# Patient Record
Sex: MALE | Race: BLACK OR AFRICAN AMERICAN | Employment: FULL TIME | URBAN - METROPOLITAN AREA
[De-identification: names, ages, dates, MRNs, and addresses within clinical notes are randomized per-mention and may not be internally consistent; named-entity substitution may affect disease eponyms.]

---

## 2017-02-09 ENCOUNTER — OP HISTORICAL/CONVERTED ENCOUNTER (OUTPATIENT)
Dept: OTHER | Age: 47
End: 2017-02-09

## 2017-07-15 ENCOUNTER — APPOINTMENT (OUTPATIENT)
Dept: GENERAL RADIOLOGY | Age: 47
End: 2017-07-15
Attending: PHYSICIAN ASSISTANT
Payer: COMMERCIAL

## 2017-07-15 ENCOUNTER — HOSPITAL ENCOUNTER (EMERGENCY)
Age: 47
Discharge: HOME | End: 2017-07-15
Attending: STUDENT IN AN ORGANIZED HEALTH CARE EDUCATION/TRAINING PROGRAM | Admitting: STUDENT IN AN ORGANIZED HEALTH CARE EDUCATION/TRAINING PROGRAM
Payer: COMMERCIAL

## 2017-07-15 VITALS
OXYGEN SATURATION: 97 % | DIASTOLIC BLOOD PRESSURE: 115 MMHG | WEIGHT: 245 LBS | HEIGHT: 71 IN | BODY MASS INDEX: 34.3 KG/M2 | SYSTOLIC BLOOD PRESSURE: 166 MMHG | HEART RATE: 97 BPM | RESPIRATION RATE: 16 BRPM | TEMPERATURE: 98.6 F

## 2017-07-15 DIAGNOSIS — M54.50 ACUTE BILATERAL LOW BACK PAIN WITHOUT SCIATICA: Primary | ICD-10-CM

## 2017-07-15 DIAGNOSIS — M51.36 DDD (DEGENERATIVE DISC DISEASE), LUMBAR: ICD-10-CM

## 2017-07-15 DIAGNOSIS — M41.9 SCOLIOSIS, UNSPECIFIED SCOLIOSIS TYPE, UNSPECIFIED SPINAL REGION: ICD-10-CM

## 2017-07-15 PROCEDURE — 72100 X-RAY EXAM L-S SPINE 2/3 VWS: CPT

## 2017-07-15 PROCEDURE — 99282 EMERGENCY DEPT VISIT SF MDM: CPT

## 2017-07-15 RX ORDER — TRAMADOL HYDROCHLORIDE 50 MG/1
50 TABLET ORAL
Qty: 15 TAB | Refills: 0 | Status: SHIPPED | OUTPATIENT
Start: 2017-07-15 | End: 2019-01-28

## 2017-07-15 RX ORDER — METHOCARBAMOL 500 MG/1
500 TABLET, FILM COATED ORAL 3 TIMES DAILY
Qty: 15 TAB | Refills: 0 | Status: SHIPPED | OUTPATIENT
Start: 2017-07-15 | End: 2019-01-28

## 2017-07-15 NOTE — ED PROVIDER NOTES
HPI Comments: 41year old male presenting ot the ED for back pain. Pt reports that July 3rd of last year he had an episode of back pain that resolved with therapy and exercises. Pt notes that 2 days ago he had new onset again of back pain. States that yesterday he had errands to do and washed his car and felt okay but around 9:30PM last night was sitting in the chair at a barbershop for a while, after which he felt dodie the pain had intensified, was unable to sleep last night. Denies any injury or trauma. No heavy lifting. Pain is lower midline in location and somewhat radiates to the low back on both sides, L>R, explicitly worsened with movement, \"it spasms all the way across,\" 10/10, constant. Pain radiates \"a little bit\" into the right leg today. Occasional subjective weakness. Patient denies numbness in the legs, urinary retention, incontinence of bowel or bladder, perineal numbness, fever, or history of IV drug abuse. No long-term steroid use. Pt has been taking APAP with minimal relief. Pt has been instructed to avoid NSAIDs due to hx PCKD. PMHx: HTN, PCKD, CHF  PSx: denies  Social: non-smoker. Patient is a 41 y.o. male presenting with back pain. The history is provided by the patient. Back Pain    Pertinent negatives include no chest pain, no fever, no numbness, no abdominal pain and no dysuria. Past Medical History:   Diagnosis Date    Heart failure [facility])     CHF 2007    Hypertension        No past surgical history on file. No family history on file. Social History     Social History    Marital status: SINGLE     Spouse name: N/A    Number of children: N/A    Years of education: N/A     Occupational History    Not on file.      Social History Main Topics    Smoking status: Never Smoker    Smokeless tobacco: Not on file    Alcohol use Yes    Drug use: Not on file    Sexual activity: Not on file     Other Topics Concern    Not on file     Social History Narrative    No narrative on file         ALLERGIES: Shellfish containing products    Review of Systems   Constitutional: Negative for fever. Eyes: Negative for discharge. Respiratory: Negative for shortness of breath. Cardiovascular: Negative for chest pain. Gastrointestinal: Negative for abdominal pain and vomiting. Genitourinary: Negative for difficulty urinating and dysuria. Musculoskeletal: Positive for back pain. Negative for neck pain. Skin: Negative for rash. Neurological: Negative for numbness. All other systems reviewed and are negative. Vitals:    07/15/17 1057   BP: (!) 166/115   Pulse: 92   Resp: 16   Temp: 98.7 °F (37.1 °C)   SpO2: 97%   Weight: 111.1 kg (245 lb)   Height: 5' 11\" (1.803 m)            Physical Exam   Constitutional: He appears well-developed and well-nourished. No distress. Pleasant AA male   HENT:   Head: Normocephalic and atraumatic. Right Ear: External ear normal.   Left Ear: External ear normal.   Eyes: Conjunctivae are normal. Pupils are equal, round, and reactive to light. Right eye exhibits no discharge. Left eye exhibits no discharge. Neck: Normal range of motion. Neck supple. No C-spine midline tenderness   Cardiovascular: Normal rate, regular rhythm, normal heart sounds and intact distal pulses. Exam reveals no gallop and no friction rub. No murmur heard. Pulmonary/Chest: Effort normal and breath sounds normal. No respiratory distress. Abdominal: Soft. He exhibits no distension. Musculoskeletal:   No CVA tenderness  + midline and bilateral paraspinal muscular tenderness in the lumbar region, L>R   Neurological: He is alert. He has normal strength. He is not disoriented. No sensory deficit. Reflex Scores:       Patellar reflexes are 2+ on the right side and 2+ on the left side. 5/5 strength at the ankles, knees, and hips  Sensation grossly intact distally   Skin: Skin is warm and dry. Psychiatric: He has a normal mood and affect. His behavior is normal.   Nursing note and vitals reviewed. MDM  Number of Diagnoses or Management Options  Diagnosis management comments: 41year old male presenting to the ED for 2 days of atraumatic back pain, hx same last year which resolved with PT. Some occasional pain into the right thigh, no clear radicular symptoms. Good strength, reflexes on exam, no red flag symptoms. XR remarkable for scoliosis and DDD. Discussed with pt use of short course pain medicine, tylenol, muscle relaxer (cannot take NSAIDs), spine f/u and return precautions given.  reviewed, 3 Rx in the last year, 0 in the last 5 months.        Amount and/or Complexity of Data Reviewed  Tests in the radiology section of CPT®: ordered and reviewed  Obtain history from someone other than the patient: yes ()  Discuss the patient with other providers: yes (Dr. Dania Blanco, ED attending)      ED Course       Procedures

## 2017-07-15 NOTE — DISCHARGE INSTRUCTIONS
We hope that we have addressed all of your medical concerns. The examination and treatment you received in the Emergency Department were for an emergent problem and were not intended as complete care. It is important that you follow up with your healthcare provider(s) for ongoing care. If your symptoms worsen or do not improve as expected, and you are unable to reach your usual health care provider(s), you should return to the Emergency Department. Today's healthcare is undergoing tremendous change, and patient satisfaction surveys are one of the many tools to assess the quality of medical care. You may receive a survey from the [facility] regarding your experience in the Emergency Department. I hope that your experience has been completely positive, particularly the medical care that I provided. As such, please participate in the survey; anything less than excellent does not meet my expectations or intentions. [facility] and [address] [place] [address] participate in nationally recognized quality of care measures. If your blood pressure is greater than 120/80, as reported below, we urge that you seek medical care to address the potential of high blood pressure, commonly known as hypertension. Hypertension can be hereditary or can be caused by certain medical conditions, pain, stress, or \"white coat syndrome. \"       Please make an appointment with your health care provider(s) for follow up of your Emergency Department visit. VITALS:   Patient Vitals for the past 8 hrs:   Temp Pulse Resp BP SpO2   07/15/17 1057 98.7 °F (37.1 °C) 92 16 (!) 166/115 97 %          Thank you for allowing us to provide you with medical care today. We realize that you have many choices for your emergency care needs. Please choose us in the future for any continued health care needs. Giancarlo Mireles, [address] [facility]. Office: [phone]            No results found for this or any previous visit (from the past 24 hour(s)). Xr Spine Lumb 2 Or 3 V    Result Date: 7/15/2017  EXAM:  XR SPINE LUMB 2 OR 3 V INDICATION: Back pain for 2 days, no trauma. COMPARISON: None. FINDINGS: AP, lateral and spot lateral views of the lumbar spine demonstrate normal alignment with mild levoconvex scoliosis. There are degenerative disc changes with disc space narrowing and sclerosis at L3-L4. The vertebral body heights and remaining disc spaces are preserved. There is no fracture, subluxation or other acute abnormality. IMPRESSION: Mild levoconvex lumbar scoliosis and L3-L4 degenerative disc disease. No fracture or acute abnormality. Learning About How to Have a Healthy Back  What causes back pain? Back pain is often caused by overuse, strain, or injury. For example, people often hurt their backs playing sports or working in the yard, being jolted in a car accident, or lifting something too heavy. Aging plays a part too. Your bones and muscles tend to lose strength as you age, which makes injury more likely. The spongy discs between the bones of the spine (vertebrae) may suffer from wear and tear and no longer provide enough cushion between the bones. A disc that bulges or breaks open (herniated disc) can press on nerves, causing back pain. In some people, back pain is the result of arthritis, broken vertebrae caused by bone loss (osteoporosis), illness, or a spine problem. Although most people have back pain at one time or another, there are steps you can take to make it less likely. How can you have a healthy back? Reduce stress on your back through good posture  Slumping or slouching alone may not cause low back pain. But after the back has been strained or injured, bad posture can make pain worse. · Sleep in a position that maintains your back's normal curves and on a mattress that feels comfortable.  Sleep on your side with a pillow between your knees, or sleep on your back with a pillow under your knees. These positions can reduce strain on your back. · Stand and sit up straight. \"Good posture\" generally means your ears, shoulders, and hips are in a straight line. · If you must stand for a long time, put one foot on a stool, ledge, or box. Switch feet every now and then. · Sit in a chair that is low enough to let you place both feet flat on the floor with both knees nearly level with your hips. If your chair or desk is too high, use a footrest to raise your knees. Place a small pillow, a rolled-up towel, or a lumbar roll in the curve of your back if you need extra support. · Try a kneeling chair, which helps tilt your hips forward. This takes pressure off your lower back. · Try sitting on an exercise ball. It can rock from side to side, which helps keep your back loose. · When driving, keep your knees nearly level with your hips. Sit straight, and drive with both hands on the steering wheel. Your arms should be in a slightly bent position. Reduce stress on your back through careful lifting  · Squat down, bending at the hips and knees only. If you need to, put one knee to the floor and extend your other knee in front of you, bent at a right angle (half kneeling). · Press your chest straight forward. This helps keep your upper back straight while keeping a slight arch in your low back. · Hold the load as close to your body as possible, at the level of your belly button (navel). · Use your feet to change direction, taking small steps. · Lead with your hips as you change direction. Keep your shoulders in line with your hips as you move. · Set down your load carefully, squatting with your knees and hips only. Exercise and stretch your back  · Do some exercise on most days of the week, if your doctor says it is okay. You can walk, run, swim, or cycle. · Stretch your back muscles.  Here are a few exercises to try:  Madi Cutting on your back, and gently pull one bent knee to your chest. Put that foot back on the floor, and then pull the other knee to your chest.  ¨ Do pelvic tilts. Lie on your back with your knees bent. Tighten your stomach muscles. Pull your belly button (navel) in and up toward your ribs. You should feel like your back is pressing to the floor and your hips and pelvis are slightly lifting off the floor. Hold for 6 seconds while breathing smoothly. ¨ Sit with your back flat against a wall. · Keep your core muscles strong. The muscles of your back, belly (abdomen), and buttocks support your spine. ¨ Pull in your belly and imagine pulling your navel toward your spine. Hold this for 6 seconds, then relax. Remember to keep breathing normally as you tense your muscles. ¨ Do curl-ups. Always do them with your knees bent. Keep your low back on the floor, and curl your shoulders toward your knees using a smooth, slow motion. Keep your arms folded across your chest. If this bothers your neck, try putting your hands behind your neck (not your head), with your elbows spread apart. ¨ Lie on your back with your knees bent and your feet flat on the floor. Tighten your belly muscles, and then push with your feet and raise your buttocks up a few inches. Hold this position 6 seconds as you continue to breathe normally, then lower yourself slowly to the floor. Repeat 8 to 12 times. ¨ If you like group exercise, try Pilates or yoga. These classes have poses that strengthen the core muscles. Lead a healthy lifestyle  · Stay at a healthy weight to avoid strain on your back. · Do not smoke. Smoking increases the risk of osteoporosis, which weakens the spine. If you need help quitting, talk to your doctor about stop-smoking programs and medicines. These can increase your chances of quitting for good. Where can you learn more? Go to http://donald-pedro.info/.   Enter L315 in the search box to learn more about \"Learning About How to Have a Healthy Back. \"  Current as of: March 21, 2017  Content Version: 11.3  © 1395-3950 [employer]. Care instructions adapted under license by [employer] (which disclaims liability or warranty for this information). If you have questions about a medical condition or this instruction, always ask your healthcare professional. [address] any warranty or liability for your use of this information. Back Pain, Emergency or Urgent Symptoms: Care Instructions  Your Care Instructions  Many people have back pain at one time or another. In most cases, pain gets better with self-care that includes over-the-counter pain medicine, ice, heat, and exercises. Unless you have symptoms of a severe injury or heart attack, you may be able to give yourself a few days before you call a doctor. But some back problems are very serious. Do not ignore symptoms that need to be checked right away. Follow-up care is a key part of your treatment and safety. Be sure to make and go to all appointments, and call your doctor if you are having problems. It's also a good idea to know your test results and keep a list of the medicines you take. How can you care for yourself at home? · Sit or lie in positions that are most comfortable and that reduce your pain. Try one of these positions when you lie down:  ¨ Lie on your back with your knees bent and supported by large pillows. ¨ Lie on the floor with your legs on the seat of a sofa or chair. Blima Naegeli on your side with your knees and hips bent and a pillow between your legs. ¨ Lie on your stomach if it does not make pain worse. · Do not sit up in bed, and avoid soft couches and twisted positions. Bed rest can help relieve pain at first, but it delays healing. Avoid bed rest after the first day. · Change positions every 30 minutes. If you must sit for long periods of time, take breaks from sitting.  Get up and walk around, or lie flat. · Try using a heating pad on a low or medium setting, for 15 to 20 minutes every 2 or 3 hours. Try a warm shower in place of one session with the heating pad. You can also buy single-use heat wraps that last up to 8 hours. You can also try ice or cold packs on your back for 10 to 20 minutes at a time, several times a day. (Put a thin cloth between the ice pack and your skin.) This reduces pain and makes it easier to be active and exercise. · Take pain medicines exactly as directed. ¨ If the doctor gave you a prescription medicine for pain, take it as prescribed. ¨ If you are not taking a prescription pain medicine, ask your doctor if you can take an over-the-counter medicine. When should you call for help? Call 911 anytime you think you may need emergency care. For example, call if:  · You are unable to move a leg at all. · You have back pain with severe belly pain. · You have symptoms of a heart attack. These may include:  ¨ Chest pain or pressure, or a strange feeling in the chest.  ¨ Sweating. ¨ Shortness of breath. ¨ Nausea or vomiting. ¨ Pain, pressure, or a strange feeling in the back, neck, jaw, or upper belly or in one or both shoulders or arms. ¨ Lightheadedness or sudden weakness. ¨ A fast or irregular heartbeat. After you call 911, the  may tell you to chew 1 adult-strength or 2 to 4 low-dose aspirin. Wait for an ambulance. Do not try to drive yourself. Call your doctor now or seek immediate medical care if:  · You have new or worse symptoms in your arms, legs, chest, belly, or buttocks. Symptoms may include:  ¨ Numbness or tingling. ¨ Weakness. ¨ Pain. · You lose bladder or bowel control. · You have back pain and:  ¨ You have injured your back while lifting or doing some other activity. Call if the pain is severe, has not gone away after 1 or 2 days, and you cannot do your normal daily activities.   ¨ You have had a back injury before that needed treatment. ¨ Your pain has lasted longer than 4 weeks. ¨ You have had weight loss you cannot explain. ¨ You are age 48 or older. ¨ You have cancer now or have had it before. Watch closely for changes in your health, and be sure to contact your doctor if you are not getting better as expected. Where can you learn more? Go to http://donald-pedro.info/. Enter F174 in the search box to learn more about \"Back Pain, Emergency or Urgent Symptoms: Care Instructions. \"  Current as of: March 20, 2017  Content Version: 11.3  © 4999-8270 [employer]. Care instructions adapted under license by [employer] (which disclaims liability or warranty for this information). If you have questions about a medical condition or this instruction, always ask your healthcare professional. [address] any warranty or liability for your use of this information.

## 2017-07-15 NOTE — ED TRIAGE NOTES
Pt reports lower back pain that began Thursday night. Pt denies fall or injury. Pt has HX of back pain that began 1 year ago. Pt has been to PT with good results.

## 2018-08-18 ENCOUNTER — HOSPITAL ENCOUNTER (EMERGENCY)
Age: 48
Discharge: HOME | End: 2018-08-18
Attending: EMERGENCY MEDICINE
Payer: COMMERCIAL

## 2018-08-18 ENCOUNTER — APPOINTMENT (OUTPATIENT)
Dept: GENERAL RADIOLOGY | Age: 48
End: 2018-08-18
Attending: PHYSICIAN ASSISTANT
Payer: COMMERCIAL

## 2018-08-18 VITALS
HEIGHT: 71 IN | HEART RATE: 101 BPM | SYSTOLIC BLOOD PRESSURE: 133 MMHG | OXYGEN SATURATION: 97 % | TEMPERATURE: 98.6 F | BODY MASS INDEX: 32.2 KG/M2 | DIASTOLIC BLOOD PRESSURE: 96 MMHG | RESPIRATION RATE: 16 BRPM | WEIGHT: 230 LBS

## 2018-08-18 DIAGNOSIS — M54.5 ACUTE BILATERAL LOW BACK PAIN, WITH SCIATICA PRESENCE UNSPECIFIED: Primary | ICD-10-CM

## 2018-08-18 PROCEDURE — 74011636637 HC RX REV CODE- 636/637: Performed by: PHYSICIAN ASSISTANT

## 2018-08-18 PROCEDURE — 72100 X-RAY EXAM L-S SPINE 2/3 VWS: CPT

## 2018-08-18 PROCEDURE — 74011250637 HC RX REV CODE- 250/637: Performed by: PHYSICIAN ASSISTANT

## 2018-08-18 PROCEDURE — 96372 THER/PROPH/DIAG INJ SC/IM: CPT

## 2018-08-18 PROCEDURE — 74011250636 HC RX REV CODE- 250/636: Performed by: PHYSICIAN ASSISTANT

## 2018-08-18 PROCEDURE — 99283 EMERGENCY DEPT VISIT LOW MDM: CPT

## 2018-08-18 RX ORDER — OXYCODONE AND ACETAMINOPHEN 5; 325 MG/1; MG/1
1 TABLET ORAL
Status: COMPLETED | OUTPATIENT
Start: 2018-08-18 | End: 2018-08-18

## 2018-08-18 RX ORDER — PREDNISONE 20 MG/1
60 TABLET ORAL ONCE
Status: COMPLETED | OUTPATIENT
Start: 2018-08-18 | End: 2018-08-18

## 2018-08-18 RX ORDER — PREDNISONE 50 MG/1
50 TABLET ORAL DAILY
Qty: 4 TAB | Refills: 0 | Status: SHIPPED | OUTPATIENT
Start: 2018-08-18 | End: 2018-08-22

## 2018-08-18 RX ORDER — KETOROLAC TROMETHAMINE 30 MG/ML
60 INJECTION, SOLUTION INTRAMUSCULAR; INTRAVENOUS
Status: COMPLETED | OUTPATIENT
Start: 2018-08-18 | End: 2018-08-18

## 2018-08-18 RX ORDER — DICLOFENAC POTASSIUM 50 MG/1
50 TABLET, FILM COATED ORAL 3 TIMES DAILY
Qty: 15 TAB | Refills: 0 | Status: SHIPPED | OUTPATIENT
Start: 2018-08-18 | End: 2019-01-28

## 2018-08-18 RX ORDER — DIAZEPAM 5 MG/1
5 TABLET ORAL
Status: COMPLETED | OUTPATIENT
Start: 2018-08-18 | End: 2018-08-18

## 2018-08-18 RX ORDER — CYCLOBENZAPRINE HCL 10 MG
10 TABLET ORAL
Qty: 15 TAB | Refills: 0 | Status: SHIPPED | OUTPATIENT
Start: 2018-08-18 | End: 2019-01-28

## 2018-08-18 RX ADMIN — KETOROLAC TROMETHAMINE 60 MG: 30 INJECTION, SOLUTION INTRAMUSCULAR at 14:40

## 2018-08-18 RX ADMIN — DIAZEPAM 5 MG: 5 TABLET ORAL at 14:40

## 2018-08-18 RX ADMIN — PREDNISONE 60 MG: 20 TABLET ORAL at 14:40

## 2018-08-18 RX ADMIN — OXYCODONE HYDROCHLORIDE AND ACETAMINOPHEN 1 TABLET: 5; 325 TABLET ORAL at 14:40

## 2018-08-18 NOTE — LETTER
Sheela. Andrea 57 
[address] [place], [place] [address] [id number] 
[phone] Work/School Note Date: 8/18/2018 To Whom It May concern: Yessy Watters was seen and treated today in the emergency room by the following provider(s): 
Attending Provider: Bailee Pagan MD 
Physician Assistant: Teresa Ross. Yessy Watters may return to work on 08/20/2018.  
 
 
 
 
Sincerely, 
 
 
 
 
Shyam Gaspar RN

## 2018-08-18 NOTE — ED NOTES
1:58 PM  I have evaluated the patient as the Provider in Triage. I have reviewed His vital signs and the triage nurse assessment. I have talked with the patient and any available family and advised that I am the provider in triage and have ordered the appropriate study to initiate their work up based on the clinical presentation during my assessment. I have advised that the patient will be accommodated in the Main ED as soon as possible. I have also requested to contact the triage nurse or myself immediately if the patient experiences any changes in their condition during this brief waiting period. One week of low back pain with spasms.  R>>L. Some radiation with intermittent numbness in the left leg. Patient denies weakness  in the legs, urinary retention, incontinence of bowel or bladder, perineal numbness, fever,  or history of IV drug abuse. No long-term steroid use.       Levell Vinnie, PA

## 2018-08-18 NOTE — ED PROVIDER NOTES
HPI Comments: 57year old male hx HTN, CHF presenting for back pain. Pt reports issues with the same in the past.  Reports about 5 days of low back pain, atraumatic. Reports that pain is located across the lower back, R>L, explicitly worsened with movement - notes that he tries to avoid lying down because getting up is too painful. Notes that pain sometimes radiates down the right leg with intermittent numbness in the left leg. Patient denies weakness  in the legs, urinary retention, incontinence of bowel or bladder, perineal numbness, fever, or history of IV drug abuse. No long-term steroid use. Has tried various OTC meds with no relief. PMHx: as above  Social: non-smoker    Patient is a 57 y.o. male presenting with back pain. The history is provided by the patient. Back Pain    Associated symptoms include numbness. Pertinent negatives include no chest pain and no fever. Past Medical History:   Diagnosis Date    Heart failure [facility])     CHF 2007    Hypertension        History reviewed. No pertinent surgical history. History reviewed. No pertinent family history. Social History     Social History    Marital status: SINGLE     Spouse name: N/A    Number of children: N/A    Years of education: N/A     Occupational History    Not on file. Social History Main Topics    Smoking status: Never Smoker    Smokeless tobacco: Not on file    Alcohol use Yes    Drug use: Not on file    Sexual activity: Not on file     Other Topics Concern    Not on file     Social History Narrative         ALLERGIES: Shellfish containing products    Review of Systems   Constitutional: Negative for fever. Eyes: Negative for discharge. Respiratory: Negative for shortness of breath. Cardiovascular: Negative for chest pain. Gastrointestinal: Negative for diarrhea and vomiting. Genitourinary: Negative for difficulty urinating and flank pain. Musculoskeletal: Positive for back pain.  Negative for joint swelling. Skin: Negative for wound. Neurological: Positive for numbness. Negative for syncope. All other systems reviewed and are negative. Vitals:    08/18/18 1400 08/18/18 1545   BP: (!) 133/92 (!) 133/96   Pulse: (!) 107 (!) 101   Resp: 16 16   Temp: 98.9 °F (37.2 °C) 98.6 °F (37 °C)   SpO2: 97% 97%   Weight: 104.3 kg (230 lb)    Height: 5' 11\" (1.803 m)             Physical Exam   Constitutional: He appears well-developed and well-nourished. No distress. Pleasant AA male, appears uncomfortable with movement   HENT:   Head: Normocephalic and atraumatic. Right Ear: External ear normal.   Left Ear: External ear normal.   Eyes: Conjunctivae are normal. Pupils are equal, round, and reactive to light. Right eye exhibits no discharge. Left eye exhibits no discharge. Neck: Normal range of motion. Neck supple. No C-spine midline tenderness   Cardiovascular: Normal rate, regular rhythm, normal heart sounds and intact distal pulses. Exam reveals no gallop and no friction rub. No murmur heard. Pulmonary/Chest: Effort normal and breath sounds normal. No respiratory distress. Abdominal: Soft. He exhibits no distension. There is no tenderness. Musculoskeletal:   No CVA tenderness  Diffuse lumbar TTP without focal bony TTP   Neurological: He is alert. He has normal strength. He is not disoriented. No sensory deficit. Reflex Scores:       Patellar reflexes are 2+ on the right side and 2+ on the left side. 5/5 strength at the ankles, knees, and hips  Sensation grossly intact distally  Observed patient ambulate with steady gait   Skin: Skin is warm and dry. Psychiatric: He has a normal mood and affect. His behavior is normal.   Nursing note and vitals reviewed. MDM  Number of Diagnoses or Management Options  Acute bilateral low back pain, with sciatica presence unspecified:   Diagnosis management comments: 57year old male presenting for low back pain, mechanical in nature.   Some radicular symptoms, no bilateral weakness/numbness, good strength, reflexes, distal pulses. Improved with medications in the ED. Moderately severe DDD on XR. Discussed use of medications at home, spine f/u, return precautions given. Amount and/or Complexity of Data Reviewed  Tests in the radiology section of CPT®: ordered and reviewed  Discuss the patient with other providers: yes (Dr. Juju Dobbs, ED attending)          ED Course       Procedures         Pt sleeping comfortably. Notes pain improved. Discussed results, care at home, need for spine f/u. Pt voiced understanding.   Kathie Osler, PA  3:40 PM

## 2018-08-18 NOTE — DISCHARGE INSTRUCTIONS
Back Pain: Care Instructions  Your Care Instructions    Back pain has many possible causes. It is often related to problems with muscles and ligaments of the back. It may also be related to problems with the nerves, discs, or bones of the back. Moving, lifting, standing, sitting, or sleeping in an awkward way can strain the back. Sometimes you don't notice the injury until later. Arthritis is another common cause of back pain. Although it may hurt a lot, back pain usually improves on its own within several weeks. Most people recover in 12 weeks or less. Using good home treatment and being careful not to stress your back can help you feel better sooner. Follow-up care is a key part of your treatment and safety. Be sure to make and go to all appointments, and call your doctor if you are having problems. It's also a good idea to know your test results and keep a list of the medicines you take. How can you care for yourself at home? · Sit or lie in positions that are most comfortable and reduce your pain. Try one of these positions when you lie down:  ¨ Lie on your back with your knees bent and supported by large pillows. ¨ Lie on the floor with your legs on the seat of a sofa or chair. [place] on your side with your knees and hips bent and a pillow between your legs. ¨ Lie on your stomach if it does not make pain worse. · Do not sit up in bed, and avoid soft couches and twisted positions. Bed rest can help relieve pain at first, but it delays healing. Avoid bed rest after the first day of back pain. · Change positions every 30 minutes. If you must sit for long periods of time, take breaks from sitting. Get up and walk around, or lie in a comfortable position. · Try using a heating pad on a low or medium setting for 15 to 20 minutes every 2 or 3 hours. Try a warm shower in place of one session with the heating pad. · You can also try an ice pack for 10 to 15 minutes every 2 to 3 hours.  Put a thin cloth between the ice pack and your skin. · Take pain medicines exactly as directed. ¨ If the doctor gave you a prescription medicine for pain, take it as prescribed. ¨ If you are not taking a prescription pain medicine, ask your doctor if you can take an over-the-counter medicine. · Take short walks several times a day. You can start with 5 to 10 minutes, 3 or 4 times a day, and work up to longer walks. Walk on level surfaces and avoid hills and stairs until your back is better. · Return to work and other activities as soon as you can. Continued rest without activity is usually not good for your back. · To prevent future back pain, do exercises to stretch and strengthen your back and stomach. Learn how to use good posture, safe lifting techniques, and proper body mechanics. When should you call for help? Call your doctor now or seek immediate medical care if:    · You have new or worsening numbness in your legs.     · You have new or worsening weakness in your legs. (This could make it hard to stand up.)     · You lose control of your bladder or bowels.    Watch closely for changes in your health, and be sure to contact your doctor if:    · You have a fever, lose weight, or don't feel well.     · You do not get better as expected. Where can you learn more? Go to http://donald-pedro.info/. Enter U571 in the search box to learn more about \"Back Pain: Care Instructions. \"  Current as of: November 29, 2017  Content Version: 11.7  © 7465-1186 [employer]. Care instructions adapted under license by [employer] (which disclaims liability or warranty for this information). If you have questions about a medical condition or this instruction, always ask your healthcare professional. [place] [address] any warranty or liability for your use of this information.          Back Pain, Emergency or Urgent Symptoms: Care Instructions  Your Care Instructions    Many people have back pain at one time or another. In most cases, pain gets better with self-care that includes over-the-counter pain medicine, ice, heat, and exercises. Unless you have symptoms of a severe injury or heart attack, you may be able to give yourself a few days before you call a doctor. But some back problems are very serious. Do not ignore symptoms that need to be checked right away. Follow-up care is a key part of your treatment and safety. Be sure to make and go to all appointments, and call your doctor if you are having problems. It's also a good idea to know your test results and keep a list of the medicines you take. How can you care for yourself at home? · Sit or lie in positions that are most comfortable and that reduce your pain. Try one of these positions when you lie down:  ¨ Lie on your back with your knees bent and supported by large pillows. ¨ Lie on the floor with your legs on the seat of a sofa or chair. Javier Grist on your side with your knees and hips bent and a pillow between your legs. ¨ Lie on your stomach if it does not make pain worse. · Do not sit up in bed, and avoid soft couches and twisted positions. Bed rest can help relieve pain at first, but it delays healing. Avoid bed rest after the first day. · Change positions every 30 minutes. If you must sit for long periods of time, take breaks from sitting. Get up and walk around, or lie flat. · Try using a heating pad on a low or medium setting, for 15 to 20 minutes every 2 or 3 hours. Try a warm shower in place of one session with the heating pad. You can also buy single-use heat wraps that last up to 8 hours. You can also try ice or cold packs on your back for 10 to 20 minutes at a time, several times a day. (Put a thin cloth between the ice pack and your skin.) This reduces pain and makes it easier to be active and exercise. · Take pain medicines exactly as directed.   ¨ If the doctor gave you a prescription medicine for pain, take it as prescribed. ¨ If you are not taking a prescription pain medicine, ask your doctor if you can take an over-the-counter medicine. When should you call for help? Call 911 anytime you think you may need emergency care. For example, call if:    · You are unable to move a leg at all.     · You have back pain with severe belly pain.     · You have symptoms of a heart attack. These may include:  ¨ Chest pain or pressure, or a strange feeling in the chest.  ¨ Sweating. ¨ Shortness of breath. ¨ Nausea or vomiting. ¨ Pain, pressure, or a strange feeling in the back, neck, jaw, or upper belly or in one or both shoulders or arms. ¨ Lightheadedness or sudden weakness. ¨ A fast or irregular heartbeat. After you call 911, the  may tell you to chew 1 adult-strength or 2 to 4 low-dose aspirin. Wait for an ambulance. Do not try to drive yourself.    Call your doctor now or seek immediate medical care if:    · You have new or worse symptoms in your arms, legs, chest, belly, or buttocks. Symptoms may include:  ¨ Numbness or tingling. ¨ Weakness. ¨ Pain.     · You lose bladder or bowel control.     · You have back pain and:  ¨ You have injured your back while lifting or doing some other activity. Call if the pain is severe, has not gone away after 1 or 2 days, and you cannot do your normal daily activities. ¨ You have had a back injury before that needed treatment. ¨ Your pain has lasted longer than 4 weeks. ¨ You have had weight loss you cannot explain. ¨ You have a fever. ¨ You are age 48 or older. ¨ You have cancer now or have had it before.    Watch closely for changes in your health, and be sure to contact your doctor if you are not getting better as expected. Where can you learn more? Go to http://donald-pedro.info/. Enter Z373 in the search box to learn more about \"Back Pain, Emergency or Urgent Symptoms: Care Instructions. \"  Current as of: November 20, 2017  Content Version: 11.7  © 8489-8935 [employer]. Care instructions adapted under license by [employer] (which disclaims liability or warranty for this information). If you have questions about a medical condition or this instruction, always ask your healthcare professional. [address] any warranty or liability for your use of this information.

## 2018-11-06 ENCOUNTER — OP HISTORICAL/CONVERTED ENCOUNTER (OUTPATIENT)
Dept: OTHER | Age: 48
End: 2018-11-06

## 2019-01-28 ENCOUNTER — HOSPITAL ENCOUNTER (EMERGENCY)
Age: 49
Discharge: HOME | End: 2019-01-28
Attending: EMERGENCY MEDICINE
Payer: COMMERCIAL

## 2019-01-28 ENCOUNTER — APPOINTMENT (OUTPATIENT)
Dept: CT IMAGING | Age: 49
End: 2019-01-28
Attending: EMERGENCY MEDICINE
Payer: COMMERCIAL

## 2019-01-28 VITALS
WEIGHT: 230 LBS | BODY MASS INDEX: 32.2 KG/M2 | HEIGHT: 71 IN | HEART RATE: 84 BPM | TEMPERATURE: 98 F | SYSTOLIC BLOOD PRESSURE: 189 MMHG | RESPIRATION RATE: 16 BRPM | OXYGEN SATURATION: 98 % | DIASTOLIC BLOOD PRESSURE: 127 MMHG

## 2019-01-28 DIAGNOSIS — I10 HYPERTENSION, UNSPECIFIED TYPE: ICD-10-CM

## 2019-01-28 DIAGNOSIS — Q61.3 POLYCYSTIC KIDNEY DISEASE: Primary | ICD-10-CM

## 2019-01-28 DIAGNOSIS — M54.6 ACUTE LEFT-SIDED THORACIC BACK PAIN: ICD-10-CM

## 2019-01-28 LAB
ALBUMIN SERPL-MCNC: 3.3 G/DL (ref 3.5–5)
ALBUMIN/GLOB SERPL: 0.8 {RATIO} (ref 1.1–2.2)
ALP SERPL-CCNC: 55 U/L (ref 45–117)
ALT SERPL-CCNC: 22 U/L (ref 12–78)
ANION GAP SERPL CALC-SCNC: 8 MMOL/L (ref 5–15)
APPEARANCE UR: CLEAR
AST SERPL-CCNC: 21 U/L (ref 15–37)
BACTERIA URNS QL MICRO: NEGATIVE /HPF
BASOPHILS # BLD: 0 K/UL (ref 0–0.1)
BASOPHILS NFR BLD: 0 % (ref 0–1)
BILIRUB SERPL-MCNC: 0.5 MG/DL (ref 0.2–1)
BILIRUB UR QL: NEGATIVE
BUN SERPL-MCNC: 37 MG/DL (ref 6–20)
BUN/CREAT SERPL: 13 (ref 12–20)
CALCIUM SERPL-MCNC: 9 MG/DL (ref 8.5–10.1)
CHLORIDE SERPL-SCNC: 107 MMOL/L (ref 97–108)
CO2 SERPL-SCNC: 26 MMOL/L (ref 21–32)
COLOR UR: ABNORMAL
CREAT SERPL-MCNC: 2.8 MG/DL (ref 0.7–1.3)
DIFFERENTIAL METHOD BLD: ABNORMAL
EOSINOPHIL # BLD: 0.2 K/UL (ref 0–0.4)
EOSINOPHIL NFR BLD: 2 % (ref 0–7)
EPITH CASTS URNS QL MICRO: ABNORMAL /LPF
ERYTHROCYTE [DISTWIDTH] IN BLOOD BY AUTOMATED COUNT: 15.1 % (ref 11.5–14.5)
GLOBULIN SER CALC-MCNC: 4.4 G/DL (ref 2–4)
GLUCOSE SERPL-MCNC: 108 MG/DL (ref 65–100)
GLUCOSE UR STRIP.AUTO-MCNC: NEGATIVE MG/DL
HCT VFR BLD AUTO: 34.8 % (ref 36.6–50.3)
HGB BLD-MCNC: 10.9 G/DL (ref 12.1–17)
HGB UR QL STRIP: ABNORMAL
HYALINE CASTS URNS QL MICRO: ABNORMAL /LPF (ref 0–5)
IMM GRANULOCYTES # BLD AUTO: 0 K/UL (ref 0–0.04)
IMM GRANULOCYTES NFR BLD AUTO: 0 % (ref 0–0.5)
KETONES UR QL STRIP.AUTO: NEGATIVE MG/DL
LEUKOCYTE ESTERASE UR QL STRIP.AUTO: NEGATIVE
LIPASE SERPL-CCNC: 228 U/L (ref 73–393)
LYMPHOCYTES # BLD: 1.1 K/UL (ref 0.8–3.5)
LYMPHOCYTES NFR BLD: 12 % (ref 12–49)
MCH RBC QN AUTO: 29.1 PG (ref 26–34)
MCHC RBC AUTO-ENTMCNC: 31.3 G/DL (ref 30–36.5)
MCV RBC AUTO: 92.8 FL (ref 80–99)
MONOCYTES # BLD: 0.7 K/UL (ref 0–1)
MONOCYTES NFR BLD: 8 % (ref 5–13)
NEUTS SEG # BLD: 7 K/UL (ref 1.8–8)
NEUTS SEG NFR BLD: 78 % (ref 32–75)
NITRITE UR QL STRIP.AUTO: NEGATIVE
NRBC # BLD: 0 K/UL (ref 0–0.01)
NRBC BLD-RTO: 0 PER 100 WBC
PH UR STRIP: 6 [PH] (ref 5–8)
PLATELET # BLD AUTO: 267 K/UL (ref 150–400)
PMV BLD AUTO: 10.5 FL (ref 8.9–12.9)
POTASSIUM SERPL-SCNC: 3.4 MMOL/L (ref 3.5–5.1)
PROT SERPL-MCNC: 7.7 G/DL (ref 6.4–8.2)
PROT UR STRIP-MCNC: 100 MG/DL
RBC # BLD AUTO: 3.75 M/UL (ref 4.1–5.7)
RBC #/AREA URNS HPF: ABNORMAL /HPF (ref 0–5)
RBC MORPH BLD: ABNORMAL
SODIUM SERPL-SCNC: 141 MMOL/L (ref 136–145)
SP GR UR REFRACTOMETRY: 1.01 (ref 1–1.03)
UROBILINOGEN UR QL STRIP.AUTO: 0.2 EU/DL (ref 0.2–1)
WBC # BLD AUTO: 9 K/UL (ref 4.1–11.1)
WBC URNS QL MICRO: ABNORMAL /HPF (ref 0–4)

## 2019-01-28 PROCEDURE — 74011250636 HC RX REV CODE- 250/636: Performed by: EMERGENCY MEDICINE

## 2019-01-28 PROCEDURE — 74011250637 HC RX REV CODE- 250/637: Performed by: EMERGENCY MEDICINE

## 2019-01-28 PROCEDURE — 80053 COMPREHEN METABOLIC PANEL: CPT

## 2019-01-28 PROCEDURE — 85025 COMPLETE CBC W/AUTO DIFF WBC: CPT

## 2019-01-28 PROCEDURE — 81001 URINALYSIS AUTO W/SCOPE: CPT

## 2019-01-28 PROCEDURE — 74176 CT ABD & PELVIS W/O CONTRAST: CPT

## 2019-01-28 PROCEDURE — 83690 ASSAY OF LIPASE: CPT

## 2019-01-28 PROCEDURE — 99283 EMERGENCY DEPT VISIT LOW MDM: CPT

## 2019-01-28 PROCEDURE — 96360 HYDRATION IV INFUSION INIT: CPT

## 2019-01-28 RX ORDER — HYDROCODONE BITARTRATE AND ACETAMINOPHEN 5; 325 MG/1; MG/1
1 TABLET ORAL
Status: COMPLETED | OUTPATIENT
Start: 2019-01-28 | End: 2019-01-28

## 2019-01-28 RX ORDER — TRAMADOL HYDROCHLORIDE 50 MG/1
50 TABLET ORAL
Qty: 12 TAB | Refills: 0 | Status: SHIPPED | OUTPATIENT
Start: 2019-01-28 | End: 2019-06-13

## 2019-01-28 RX ADMIN — SODIUM CHLORIDE 500 ML: 900 INJECTION, SOLUTION INTRAVENOUS at 12:41

## 2019-01-28 RX ADMIN — HYDROCODONE BITARTRATE AND ACETAMINOPHEN 1 TABLET: 5; 325 TABLET ORAL at 12:03

## 2019-01-28 NOTE — ED NOTES
Patient given discharge instructions and verbalized understanding.  Pt ambulatory out of ER with steady gait

## 2019-01-28 NOTE — ED PROVIDER NOTES
Pt w hx of polycystic kidney disease presenting with flank pain. Has had worsening Cr over the last few years actively being managed by his urologist.      The history is provided by the patient. Abdominal Pain    This is a new problem. The current episode started 12 to 24 hours ago. The problem occurs constantly. The problem has not changed since onset. The pain is associated with lifting. Pain location: left flank radiating to the front. The pain is at a severity of 9/10. Associated symptoms include back pain. Pertinent negatives include no fever, no diarrhea, no nausea, no vomiting, no constipation, no dysuria (recently completed a course of abx for a UTI), no frequency and no chest pain. Nothing worsens the pain. The pain is relieved by nothing. Past medical history comments: polycycstic kidney disease, HTN. Past Medical History:   Diagnosis Date    Heart failure [facility])     CHF 2007    Hypertension        No past surgical history on file. No family history on file. Social History     Socioeconomic History    Marital status: SINGLE     Spouse name: Not on file    Number of children: Not on file    Years of education: Not on file    Highest education level: Not on file   Social Needs    Financial resource strain: Not on file    Food insecurity - worry: Not on file    Food insecurity - inability: Not on file    Transportation needs - medical: Not on file   [email or web address] needs - non-medical: Not on file   Occupational History    Not on file   Tobacco Use    Smoking status: Never Smoker   Substance and Sexual Activity    Alcohol use: Yes    Drug use: Not on file    Sexual activity: Not on file   Other Topics Concern    Not on file   Social History Narrative    Not on file         ALLERGIES: Shellfish containing products    Review of Systems   Constitutional: Negative for chills and fever. Respiratory: Negative for shortness of breath. Cardiovascular: Negative for chest pain. Gastrointestinal: Negative for abdominal pain, constipation, diarrhea, nausea and vomiting. Genitourinary: Negative for dysuria (recently completed a course of abx for a UTI) and frequency. Musculoskeletal: Positive for back pain. Neurological: Negative for dizziness and light-headedness. All other systems reviewed and are negative. Vitals:    01/28/19 1042 01/28/19 1150   BP:  (!) 182/116   Pulse: 87 80   Resp:  18   SpO2: 98% 99%   Weight:  104.3 kg (230 lb)   Height:  5' 11\" (1.803 m)            Physical Exam   Constitutional: He is oriented to person, place, and time. He appears well-developed and well-nourished. HENT:   Head: Normocephalic and atraumatic. Eyes: No scleral icterus. Neck: Normal range of motion. Cardiovascular: Normal rate and regular rhythm. Pulmonary/Chest: Effort normal and breath sounds normal.   Abdominal: He exhibits no distension. There is no tenderness. There is no CVA tenderness. Pilar-umbilical hernia, soft and easily reducible   Neurological: He is alert and oriented to person, place, and time. Skin: No rash noted. No erythema. Psychiatric: He has a normal mood and affect. His behavior is normal.   Nursing note and vitals reviewed. MDM  Number of Diagnoses or Management Options  Acute left-sided thoracic back pain: new and requires workup  Hypertension, unspecified type: established and worsening  Polycystic kidney disease: established and worsening  Diagnosis management comments: The patient is resting comfortably and feels better, is alert and in no distress. Cr is 2.8 today and was 2.4 last week, per patient report. HTN is chronic and patient is asymptomatic - no chest pain, shortness of breath, or neurological deficits. The repeat examination is unremarkable and benign; in particular, there is no discomfort at McBurney's point.  The history, exam, diagnostic testing, and current condition do not suggest acute appendicitis, bowel obstruction, incarcerated hernia, acute cholecystitis, bowel perforation, major gastrointestinal bleeding, severe diverticulitis, sepsis, or other significant pathology to warrant further testing, continued ED treatment, admission, or surgical evaluation at this point. The vital signs have been stable and are within normal limits at this time. The patient does not have uncontrollable pain, intractable vomiting, or other significant symptoms. The patient's condition is stable and appropriate for discharge. The patient will pursue further outpatient evaluation with the primary care physician or other designated or consulting physician as indicated in the discharge instructions. Procedures    The patient's results have been reviewed with them and/or available family. Patient and/or family verbally conveyed their understanding and agreement of the patient's signs, symptoms, diagnosis, treatment and prognosis and additionally agree to follow up as recommended in the discharge instructions or to return to the Emergency Room should their condition change prior to their follow-up appointment. The patient/family verbally agrees with the care-plan and verbally conveys that all of their questions have been answered. The discharge instructions have also been provided to the patient and/or family with some educational information regarding the patient's diagnosis as well a list of reasons why the patient would want to return to the ER prior to their follow-up appointment, should their condition change.

## 2019-01-28 NOTE — LETTER
[address] 
[address] [facility] [address] [id number] 
[phone] Work/School Note Date: 1/28/2019 To Whom It May concern: Danna Jenkins was seen and treated today in the emergency room by the following provider(s): 
Attending Provider: Arpit Mock MD. Danna Jenkins may return to work on 1/29/19. Sincerely, Sherry VillagomezkeMD isaias

## 2019-01-28 NOTE — ED TRIAGE NOTES
Pt presents with left sided back/flank pain that started yesterday morning. Pt states it is worse with movement. Denies N/V.  Pt states last Friday he was working with heavier boxes than normal and may have strained it

## 2019-04-01 ENCOUNTER — HOSPITAL ENCOUNTER (EMERGENCY)
Age: 49
Discharge: HOME | End: 2019-04-01
Attending: EMERGENCY MEDICINE
Payer: COMMERCIAL

## 2019-04-01 ENCOUNTER — APPOINTMENT (OUTPATIENT)
Dept: GENERAL RADIOLOGY | Age: 49
End: 2019-04-01
Attending: EMERGENCY MEDICINE
Payer: COMMERCIAL

## 2019-04-01 VITALS — RESPIRATION RATE: 16 BRPM | TEMPERATURE: 97.7 F | HEART RATE: 82 BPM

## 2019-04-01 DIAGNOSIS — M75.51 ACUTE SHOULDER BURSITIS, RIGHT: Primary | ICD-10-CM

## 2019-04-01 PROCEDURE — 74011250637 HC RX REV CODE- 250/637: Performed by: EMERGENCY MEDICINE

## 2019-04-01 PROCEDURE — 99283 EMERGENCY DEPT VISIT LOW MDM: CPT

## 2019-04-01 PROCEDURE — 73030 X-RAY EXAM OF SHOULDER: CPT

## 2019-04-01 PROCEDURE — A4565 SLINGS: HCPCS

## 2019-04-01 RX ORDER — OXYCODONE AND ACETAMINOPHEN 5; 325 MG/1; MG/1
1 TABLET ORAL
Qty: 12 TAB | Refills: 0 | Status: SHIPPED | OUTPATIENT
Start: 2019-04-01 | End: 2019-04-04

## 2019-04-01 RX ORDER — ONDANSETRON 4 MG/1
8 TABLET, ORALLY DISINTEGRATING ORAL
Status: COMPLETED | OUTPATIENT
Start: 2019-04-01 | End: 2019-04-01

## 2019-04-01 RX ORDER — OXYCODONE AND ACETAMINOPHEN 5; 325 MG/1; MG/1
2 TABLET ORAL
Status: COMPLETED | OUTPATIENT
Start: 2019-04-01 | End: 2019-04-01

## 2019-04-01 RX ORDER — PREDNISONE 20 MG/1
20 TABLET ORAL DAILY
Qty: 5 TAB | Refills: 0 | Status: SHIPPED | OUTPATIENT
Start: 2019-04-01 | End: 2019-04-06

## 2019-04-01 RX ADMIN — ONDANSETRON 8 MG: 4 TABLET, ORALLY DISINTEGRATING ORAL at 06:07

## 2019-04-01 RX ADMIN — OXYCODONE AND ACETAMINOPHEN 2 TABLET: 5; 325 TABLET ORAL at 06:07

## 2019-04-01 NOTE — LETTER
[address] 
[address] [facility] [address] 
[phone] Work/School Note Date: 4/1/2019 To Whom It May concern: Calvin Fernandez was seen and treated today in the emergency room by the following provider(s): 
Attending Provider: Jose Burgos MD. Calvin Fernandez may return to school on 4/3/2019. Sincerely, Osman Shultz RN

## 2019-04-01 NOTE — ED PROVIDER NOTES
HPI     54year old male with PCKD, htn, gouty arthritis, presents with severe right shoulder pain. Onset 3 days ago, progressively getting worse. Never had gout in his shoulder. Denies injury. Works as a , is right handed. Has chronic numbness in right hand from carpal tunnel    Past Medical History:   Diagnosis Date    Heart failure ([facility] [address]     CHF 2007    Hypertension     Polycystic kidney disease        History reviewed. No pertinent surgical history. History reviewed. No pertinent family history. Social History     Socioeconomic History    Marital status: SINGLE     Spouse name: Not on file    Number of children: Not on file    Years of education: Not on file    Highest education level: Not on file   Occupational History    Not on file   Social Needs    Financial resource strain: Not on file    Food insecurity:     Worry: Not on file     Inability: Not on file    Transportation needs:     Medical: Not on file     Non-medical: Not on file   Tobacco Use    Smoking status: Never Smoker    Smokeless tobacco: Never Used   Substance and Sexual Activity    Alcohol use:  Yes    Drug use: Yes     Types: Marijuana     Comment: last used 3 days ago     Sexual activity: Not on file   Lifestyle    Physical activity:     Days per week: Not on file     Minutes per session: Not on file    Stress: Not on file   Relationships    Social connections:     Talks on phone: Not on file     Gets together: Not on file     Attends Bahai service: Not on file     Active member of club or organization: Not on file     Attends meetings of clubs or organizations: Not on file     Relationship status: Not on file    Intimate partner violence:     Fear of current or ex partner: Not on file     Emotionally abused: Not on file     Physically abused: Not on file     Forced sexual activity: Not on file   Other Topics Concern    Not on file   Social History Narrative    Not on file         ALLERGIES: Shellfish containing products    Review of Systems   Constitutional: Negative for fever. HENT: Negative for congestion. Eyes: Negative for visual disturbance. Respiratory: Negative for cough and shortness of breath. Cardiovascular: Negative for chest pain. Gastrointestinal: Negative for abdominal pain, nausea and vomiting. Endocrine: Negative for polyuria. Genitourinary: Negative for dysuria. Musculoskeletal: Positive for arthralgias. Skin: Negative for rash. Neurological: Negative for headaches. Psychiatric/Behavioral: Negative for dysphoric mood. Vitals:    04/01/19 0541   Pulse: 82   Resp: 16   Temp: 97.7 °F (36.5 °C)            Physical Exam   Constitutional: He is oriented to person, place, and time. He appears well-developed and well-nourished. No distress. HENT:   Head: Normocephalic and atraumatic. Mouth/Throat: No oropharyngeal exudate. Eyes: Pupils are equal, round, and reactive to light. Right eye exhibits no discharge. Left eye exhibits no discharge. No scleral icterus. Neck: Normal range of motion. Neck supple. No JVD present. Cardiovascular: Normal rate, regular rhythm and normal heart sounds. No murmur heard. Pulmonary/Chest: Effort normal and breath sounds normal. No stridor. No respiratory distress. He has no wheezes. He has no rales. He exhibits no tenderness. Abdominal: Soft. Bowel sounds are normal. He exhibits no distension and no mass. There is no tenderness. There is no rebound and no guarding. Musculoskeletal: Normal range of motion. No gross deformity  No erythema or apparent edema  Painful passive ROM right shoulder   Neurological: He is oriented to person, place, and time. Skin: Skin is warm and dry. Capillary refill takes less than 2 seconds. No rash noted. Psychiatric: He has a normal mood and affect. His behavior is normal. Judgment and thought content normal.        MDM       Procedures         shoulder with ? Effusion.  Spoke with radiologist. Shoulder is not red or hot. Doubt gout or infection. Will treat with Percocet (CAn't take nsaids due to kidney disease) sling and steroids. Will follow up with his orthopedist. REturn if fever, red/hot joint.

## 2019-04-01 NOTE — ED TRIAGE NOTES
Patient arrives ambulatory from home with c/ R shoulder pain that started 3 days ago. Pain worsens with movement and use of arm. Pt denies injury. Pt chronic numbness in hand d/t carpal tunnel. Denies CP, SOB.

## 2019-06-01 ENCOUNTER — APPOINTMENT (OUTPATIENT)
Dept: CT IMAGING | Age: 49
DRG: 354 | End: 2019-06-01
Attending: EMERGENCY MEDICINE
Payer: COMMERCIAL

## 2019-06-01 ENCOUNTER — ANESTHESIA EVENT (OUTPATIENT)
Dept: SURGERY | Age: 49
DRG: 354 | End: 2019-06-01
Payer: COMMERCIAL

## 2019-06-01 ENCOUNTER — ANESTHESIA (OUTPATIENT)
Dept: SURGERY | Age: 49
DRG: 354 | End: 2019-06-01
Payer: COMMERCIAL

## 2019-06-01 ENCOUNTER — HOSPITAL ENCOUNTER (INPATIENT)
Age: 49
LOS: 4 days | Discharge: HOME | DRG: 354 | End: 2019-06-05
Attending: EMERGENCY MEDICINE | Admitting: SURGERY
Payer: COMMERCIAL

## 2019-06-01 DIAGNOSIS — K46.0 ABDOMINAL HERNIA WITH OBSTRUCTION AND WITHOUT GANGRENE, RECURRENCE NOT SPECIFIED, UNSPECIFIED HERNIA TYPE: ICD-10-CM

## 2019-06-01 DIAGNOSIS — K56.609 SMALL BOWEL OBSTRUCTION (HCC): Primary | ICD-10-CM

## 2019-06-01 PROBLEM — K43.6 INCARCERATED VENTRAL HERNIA: Status: ACTIVE | Noted: 2019-06-01

## 2019-06-01 LAB
ALBUMIN SERPL-MCNC: 3.2 G/DL (ref 3.5–5)
ALBUMIN/GLOB SERPL: 0.6 {RATIO} (ref 1.1–2.2)
ALP SERPL-CCNC: 53 U/L (ref 45–117)
ALT SERPL-CCNC: 24 U/L (ref 12–78)
ANION GAP SERPL CALC-SCNC: 9 MMOL/L (ref 5–15)
APPEARANCE UR: CLEAR
AST SERPL-CCNC: 13 U/L (ref 15–37)
BACTERIA URNS QL MICRO: NEGATIVE /HPF
BASOPHILS # BLD: 0 K/UL (ref 0–0.1)
BASOPHILS NFR BLD: 0 % (ref 0–1)
BILIRUB SERPL-MCNC: 0.3 MG/DL (ref 0.2–1)
BILIRUB UR QL: NEGATIVE
BUN SERPL-MCNC: 42 MG/DL (ref 6–20)
BUN/CREAT SERPL: 13 (ref 12–20)
CALCIUM SERPL-MCNC: 9.6 MG/DL (ref 8.5–10.1)
CHLORIDE SERPL-SCNC: 105 MMOL/L (ref 97–108)
CO2 SERPL-SCNC: 28 MMOL/L (ref 21–32)
COLOR UR: ABNORMAL
COMMENT, HOLDF: NORMAL
CREAT SERPL-MCNC: 3.3 MG/DL (ref 0.7–1.3)
DIFFERENTIAL METHOD BLD: ABNORMAL
EOSINOPHIL # BLD: 0 K/UL (ref 0–0.4)
EOSINOPHIL NFR BLD: 0 % (ref 0–7)
EPITH CASTS URNS QL MICRO: ABNORMAL /LPF
ERYTHROCYTE [DISTWIDTH] IN BLOOD BY AUTOMATED COUNT: 14.6 % (ref 11.5–14.5)
GLOBULIN SER CALC-MCNC: 5.1 G/DL (ref 2–4)
GLUCOSE SERPL-MCNC: 126 MG/DL (ref 65–100)
GLUCOSE UR STRIP.AUTO-MCNC: NEGATIVE MG/DL
HCT VFR BLD AUTO: 35.3 % (ref 36.6–50.3)
HGB BLD-MCNC: 11 G/DL (ref 12.1–17)
HGB UR QL STRIP: ABNORMAL
HYALINE CASTS URNS QL MICRO: ABNORMAL /LPF (ref 0–5)
IMM GRANULOCYTES # BLD AUTO: 0 K/UL (ref 0–0.04)
IMM GRANULOCYTES NFR BLD AUTO: 0 % (ref 0–0.5)
KETONES UR QL STRIP.AUTO: NEGATIVE MG/DL
LACTATE SERPL-SCNC: 1.6 MMOL/L (ref 0.4–2)
LEUKOCYTE ESTERASE UR QL STRIP.AUTO: NEGATIVE
LIPASE SERPL-CCNC: 411 U/L (ref 73–393)
LYMPHOCYTES # BLD: 1.2 K/UL (ref 0.8–3.5)
LYMPHOCYTES NFR BLD: 12 % (ref 12–49)
MCH RBC QN AUTO: 28.2 PG (ref 26–34)
MCHC RBC AUTO-ENTMCNC: 31.2 G/DL (ref 30–36.5)
MCV RBC AUTO: 90.5 FL (ref 80–99)
MONOCYTES # BLD: 1 K/UL (ref 0–1)
MONOCYTES NFR BLD: 10 % (ref 5–13)
NEUTS SEG # BLD: 7.4 K/UL (ref 1.8–8)
NEUTS SEG NFR BLD: 78 % (ref 32–75)
NITRITE UR QL STRIP.AUTO: NEGATIVE
NRBC # BLD: 0 K/UL (ref 0–0.01)
NRBC BLD-RTO: 0 PER 100 WBC
PH UR STRIP: 6 [PH] (ref 5–8)
PLATELET # BLD AUTO: 408 K/UL (ref 150–400)
PMV BLD AUTO: 10 FL (ref 8.9–12.9)
POTASSIUM SERPL-SCNC: 3.2 MMOL/L (ref 3.5–5.1)
PROT SERPL-MCNC: 8.3 G/DL (ref 6.4–8.2)
PROT UR STRIP-MCNC: 300 MG/DL
RBC # BLD AUTO: 3.9 M/UL (ref 4.1–5.7)
RBC #/AREA URNS HPF: ABNORMAL /HPF (ref 0–5)
SAMPLES BEING HELD,HOLD: NORMAL
SODIUM SERPL-SCNC: 142 MMOL/L (ref 136–145)
SP GR UR REFRACTOMETRY: 1.02 (ref 1–1.03)
UR CULT HOLD, URHOLD: NORMAL
UROBILINOGEN UR QL STRIP.AUTO: 1 EU/DL (ref 0.2–1)
WBC # BLD AUTO: 9.6 K/UL (ref 4.1–11.1)
WBC URNS QL MICRO: ABNORMAL /HPF (ref 0–4)

## 2019-06-01 PROCEDURE — 65270000029 HC RM PRIVATE

## 2019-06-01 PROCEDURE — 77030008684 HC TU ET CUF COVD -B: Performed by: NURSE ANESTHETIST, CERTIFIED REGISTERED

## 2019-06-01 PROCEDURE — 96375 TX/PRO/DX INJ NEW DRUG ADDON: CPT

## 2019-06-01 PROCEDURE — 74011250636 HC RX REV CODE- 250/636

## 2019-06-01 PROCEDURE — 83605 ASSAY OF LACTIC ACID: CPT

## 2019-06-01 PROCEDURE — 74011250636 HC RX REV CODE- 250/636: Performed by: SURGERY

## 2019-06-01 PROCEDURE — 80053 COMPREHEN METABOLIC PANEL: CPT

## 2019-06-01 PROCEDURE — 74011250636 HC RX REV CODE- 250/636: Performed by: EMERGENCY MEDICINE

## 2019-06-01 PROCEDURE — 85025 COMPLETE CBC W/AUTO DIFF WBC: CPT

## 2019-06-01 PROCEDURE — 74011000250 HC RX REV CODE- 250

## 2019-06-01 PROCEDURE — 74176 CT ABD & PELVIS W/O CONTRAST: CPT

## 2019-06-01 PROCEDURE — 96361 HYDRATE IV INFUSION ADD-ON: CPT

## 2019-06-01 PROCEDURE — 96374 THER/PROPH/DIAG INJ IV PUSH: CPT

## 2019-06-01 PROCEDURE — 77030026438 HC STYL ET INTUB CARD -A: Performed by: NURSE ANESTHETIST, CERTIFIED REGISTERED

## 2019-06-01 PROCEDURE — 36415 COLL VENOUS BLD VENIPUNCTURE: CPT

## 2019-06-01 PROCEDURE — 83690 ASSAY OF LIPASE: CPT

## 2019-06-01 PROCEDURE — 81001 URINALYSIS AUTO W/SCOPE: CPT

## 2019-06-01 PROCEDURE — 99284 EMERGENCY DEPT VISIT MOD MDM: CPT

## 2019-06-01 RX ORDER — SODIUM CHLORIDE, SODIUM LACTATE, POTASSIUM CHLORIDE, CALCIUM CHLORIDE 600; 310; 30; 20 MG/100ML; MG/100ML; MG/100ML; MG/100ML
125 INJECTION, SOLUTION INTRAVENOUS CONTINUOUS
Status: DISCONTINUED | OUTPATIENT
Start: 2019-06-01 | End: 2019-06-06 | Stop reason: HOSPADM

## 2019-06-01 RX ORDER — MIDAZOLAM HYDROCHLORIDE 1 MG/ML
1 INJECTION, SOLUTION INTRAMUSCULAR; INTRAVENOUS AS NEEDED
Status: DISCONTINUED | OUTPATIENT
Start: 2019-06-01 | End: 2019-06-02 | Stop reason: HOSPADM

## 2019-06-01 RX ORDER — LIDOCAINE HYDROCHLORIDE 10 MG/ML
0.1 INJECTION, SOLUTION EPIDURAL; INFILTRATION; INTRACAUDAL; PERINEURAL AS NEEDED
Status: DISCONTINUED | OUTPATIENT
Start: 2019-06-01 | End: 2019-06-02 | Stop reason: HOSPADM

## 2019-06-01 RX ORDER — SODIUM CHLORIDE 0.9 % (FLUSH) 0.9 %
5-40 SYRINGE (ML) INJECTION AS NEEDED
Status: DISCONTINUED | OUTPATIENT
Start: 2019-06-01 | End: 2019-06-02 | Stop reason: HOSPADM

## 2019-06-01 RX ORDER — SODIUM CHLORIDE, SODIUM LACTATE, POTASSIUM CHLORIDE, CALCIUM CHLORIDE 600; 310; 30; 20 MG/100ML; MG/100ML; MG/100ML; MG/100ML
100 INJECTION, SOLUTION INTRAVENOUS CONTINUOUS
Status: DISCONTINUED | OUTPATIENT
Start: 2019-06-02 | End: 2019-06-02 | Stop reason: HOSPADM

## 2019-06-01 RX ORDER — SODIUM CHLORIDE 0.9 % (FLUSH) 0.9 %
5-40 SYRINGE (ML) INJECTION EVERY 8 HOURS
Status: DISCONTINUED | OUTPATIENT
Start: 2019-06-02 | End: 2019-06-02 | Stop reason: HOSPADM

## 2019-06-01 RX ORDER — HYDROMORPHONE HYDROCHLORIDE 1 MG/ML
1 INJECTION, SOLUTION INTRAMUSCULAR; INTRAVENOUS; SUBCUTANEOUS
Status: DISCONTINUED | OUTPATIENT
Start: 2019-06-01 | End: 2019-06-06 | Stop reason: HOSPADM

## 2019-06-01 RX ORDER — HYDROMORPHONE HYDROCHLORIDE 1 MG/ML
1 INJECTION, SOLUTION INTRAMUSCULAR; INTRAVENOUS; SUBCUTANEOUS
Status: COMPLETED | OUTPATIENT
Start: 2019-06-01 | End: 2019-06-01

## 2019-06-01 RX ORDER — ROPIVACAINE HYDROCHLORIDE 5 MG/ML
150 INJECTION, SOLUTION EPIDURAL; INFILTRATION; PERINEURAL AS NEEDED
Status: DISCONTINUED | OUTPATIENT
Start: 2019-06-01 | End: 2019-06-02 | Stop reason: HOSPADM

## 2019-06-01 RX ORDER — FENTANYL CITRATE 50 UG/ML
50 INJECTION, SOLUTION INTRAMUSCULAR; INTRAVENOUS AS NEEDED
Status: DISCONTINUED | OUTPATIENT
Start: 2019-06-01 | End: 2019-06-02 | Stop reason: HOSPADM

## 2019-06-01 RX ORDER — SODIUM CHLORIDE 9 MG/ML
1000 INJECTION, SOLUTION INTRAVENOUS ONCE
Status: COMPLETED | OUTPATIENT
Start: 2019-06-01 | End: 2019-06-01

## 2019-06-01 RX ORDER — FENTANYL CITRATE 50 UG/ML
INJECTION, SOLUTION INTRAMUSCULAR; INTRAVENOUS
Status: COMPLETED
Start: 2019-06-01 | End: 2019-06-02

## 2019-06-01 RX ORDER — METOCLOPRAMIDE HYDROCHLORIDE 5 MG/ML
10 INJECTION INTRAMUSCULAR; INTRAVENOUS
Status: COMPLETED | OUTPATIENT
Start: 2019-06-01 | End: 2019-06-01

## 2019-06-01 RX ORDER — MIDAZOLAM HYDROCHLORIDE 1 MG/ML
INJECTION, SOLUTION INTRAMUSCULAR; INTRAVENOUS
Status: COMPLETED
Start: 2019-06-01 | End: 2019-06-02

## 2019-06-01 RX ORDER — ONDANSETRON 2 MG/ML
4 INJECTION INTRAMUSCULAR; INTRAVENOUS
Status: DISCONTINUED | OUTPATIENT
Start: 2019-06-01 | End: 2019-06-06 | Stop reason: HOSPADM

## 2019-06-01 RX ADMIN — HYDROMORPHONE HYDROCHLORIDE 1 MG: 1 INJECTION, SOLUTION INTRAMUSCULAR; INTRAVENOUS; SUBCUTANEOUS at 22:28

## 2019-06-01 RX ADMIN — SODIUM CHLORIDE 1000 ML: 900 INJECTION, SOLUTION INTRAVENOUS at 19:55

## 2019-06-01 RX ADMIN — METOCLOPRAMIDE 10 MG: 5 INJECTION, SOLUTION INTRAMUSCULAR; INTRAVENOUS at 19:54

## 2019-06-01 RX ADMIN — SODIUM CHLORIDE, SODIUM LACTATE, POTASSIUM CHLORIDE, AND CALCIUM CHLORIDE: 600; 310; 30; 20 INJECTION, SOLUTION INTRAVENOUS at 23:59

## 2019-06-01 NOTE — ED PROVIDER NOTES
54 y.o. male with past medical history significant for HTN, CHF and Polycystic kidney dz who presents ambulatory to the ED, with chief complaint of 7/04 umbilical hernia pain, exacerbated by movement and ambulation, onset 05/29/2019 while pt was walking his dog, w/ N/V (onset 05/30/2019, x10 episodes), diarrhea (resolved), worsened back pain and lower abd pain. No modifying factors noted. Pt states that he has been unable to hold down liquids, since 05/30/19, always vomiting bout 5 minutes after attempting to drink. Pt denies hematemesis. He states that while he believes that his hernia does not typically protrude as far as it is currently, he's recently had a great deal of weight loss, making it hard to determine the \"typical degree of protrusion. \" Pt notes that he saw a surgeon about his hernia \"a long time ago,\" but he was unable to take off from work for the surgery and recovery. There are no other acute medical concerns at this time. Negative Tobacco use; Positive EtOH use; Positive Illicit Drug Abuse       PCP: None    Note written by Bear Escobedo, as dictated by Boni Coronel PA-C 7:09 PM     The history is provided by the patient and medical records. No  was used. Past Medical History:   Diagnosis Date    Heart failure [facility])     CHF 2007    Hypertension     Polycystic kidney disease        No past surgical history on file. No family history on file.     Social History     Socioeconomic History    Marital status: SINGLE     Spouse name: Not on file    Number of children: Not on file    Years of education: Not on file    Highest education level: Not on file   Occupational History    Not on file   Social Needs    Financial resource strain: Not on file    Food insecurity:     Worry: Not on file     Inability: Not on file    Transportation needs:     Medical: Not on file     Non-medical: Not on file   Tobacco Use    Smoking status: Never Smoker    Smokeless tobacco: Never Used   Substance and Sexual Activity    Alcohol use: Yes    Drug use: Yes     Types: Marijuana     Comment: last used 3 days ago     Sexual activity: Not on file   Lifestyle    Physical activity:     Days per week: Not on file     Minutes per session: Not on file    Stress: Not on file   Relationships    Social connections:     Talks on phone: Not on file     Gets together: Not on file     Attends Mosque service: Not on file     Active member of club or organization: Not on file     Attends meetings of clubs or organizations: Not on file     Relationship status: Not on file    Intimate partner violence:     Fear of current or ex partner: Not on file     Emotionally abused: Not on file     Physically abused: Not on file     Forced sexual activity: Not on file   Other Topics Concern    Not on file   Social History Narrative    Not on file         ALLERGIES: Shellfish containing products    Review of Systems   Constitutional: Negative for chills and fever. HENT: Negative for congestion and rhinorrhea. Respiratory: Negative for chest tightness, shortness of breath and wheezing. Cardiovascular: Negative for chest pain and palpitations. Gastrointestinal: Positive for abdominal pain, nausea and vomiting. Negative for constipation, diarrhea and rectal pain. Genitourinary: Negative for difficulty urinating, dysuria, frequency, hematuria and testicular pain. Musculoskeletal: Positive for back pain (chronic). Negative for myalgias, neck pain and neck stiffness. Skin: Negative for rash and wound. Neurological: Negative for dizziness and headaches. All other systems reviewed and are negative. Vitals:    06/01/19 1834 06/01/19 1914   BP:  (!) 144/93   Pulse: (!) 128 (!) 104   Resp:  17   Temp:  98.6 °F (37 °C)   SpO2: 97% 98%   Weight:  92.1 kg (203 lb)   Height:  5' 10\" (1.778 m)            Physical Exam   Constitutional: He is oriented to person, place, and time.  He appears well-developed and well-nourished. No distress. HENT:   Head: Normocephalic and atraumatic. Right Ear: External ear normal.   Left Ear: External ear normal.   Eyes: Pupils are equal, round, and reactive to light. Conjunctivae and EOM are normal.   Neck: Normal range of motion. Neck supple. Cardiovascular: Normal rate, regular rhythm and normal heart sounds. Pulmonary/Chest: Effort normal and breath sounds normal. No respiratory distress. He has no wheezes. Abdominal: Soft. Normal appearance and bowel sounds are normal. He exhibits no shifting dullness, no distension, no pulsatile liver, no abdominal bruit, no pulsatile midline mass and no mass. There is no hepatosplenomegaly, splenomegaly or hepatomegaly. There is tenderness. There is no rigidity, no rebound, no guarding, no CVA tenderness, no tenderness at McBurney's point and negative Vyas's sign. Abdomen exposed for exam. Large ventral hernia  Present  No peritoneal signs   Musculoskeletal: Normal range of motion. He exhibits no edema or tenderness. Neurological: He is alert and oriented to person, place, and time. He has normal reflexes. He displays normal reflexes. No cranial nerve deficit. Coordination normal.   Skin: Skin is warm and dry. No rash noted. No erythema. Psychiatric: He has a normal mood and affect. His behavior is normal. Judgment and thought content normal.   Nursing note and vitals reviewed. MDM  Number of Diagnoses or Management Options  Abdominal hernia with obstruction and without gangrene, recurrence not specified, unspecified hernia type:   Small bowel obstruction [facility]):   Diagnosis management comments: 53 yo male presenting for abdominal pain and vomiting. Large hernia present. No fever. Lungs clear.  No peritoneal signs  P: CT, labs, ua    10:20 PM  Pt case including HPI, PE, and all available lab and radiology results has been discussed with Dr. Glenroy Murdock, general surgery, he will come see patient    11:40 PM  Pt seen and evaluated by , he will admit and go to OR           Amount and/or Complexity of Data Reviewed  Discuss the patient with other providers: yes (ER attending-Nat)    Patient Progress  Patient progress: stable         Procedures      Pt case including HPI, PE, and all available lab and radiology results has been discussed with attending physician. Opportunity to evaluate patient has been provided to ER attending. Discharge and prescription plan has been agreed upon.

## 2019-06-01 NOTE — ED TRIAGE NOTES
Pt states that he diffuse abd pain with an umbilical hernia noted with frequent vomiting since Thursday. Pt states that he feels very weak and has no energy.

## 2019-06-02 LAB
ERYTHROCYTE [DISTWIDTH] IN BLOOD BY AUTOMATED COUNT: 14.6 % (ref 11.5–14.5)
HCT VFR BLD AUTO: 34.7 % (ref 36.6–50.3)
HGB BLD-MCNC: 10.3 G/DL (ref 12.1–17)
LACTATE SERPL-SCNC: 1.8 MMOL/L (ref 0.4–2)
MCH RBC QN AUTO: 27.7 PG (ref 26–34)
MCHC RBC AUTO-ENTMCNC: 29.7 G/DL (ref 30–36.5)
MCV RBC AUTO: 93.3 FL (ref 80–99)
NRBC # BLD: 0 K/UL (ref 0–0.01)
NRBC BLD-RTO: 0 PER 100 WBC
PLATELET # BLD AUTO: 329 K/UL (ref 150–400)
PMV BLD AUTO: 10.1 FL (ref 8.9–12.9)
RBC # BLD AUTO: 3.72 M/UL (ref 4.1–5.7)
WBC # BLD AUTO: 8.5 K/UL (ref 4.1–11.1)

## 2019-06-02 PROCEDURE — 74011000258 HC RX REV CODE- 258: Performed by: SURGERY

## 2019-06-02 PROCEDURE — 77030018846 HC SOL IRR STRL H20 ICUM -A: Performed by: SURGERY

## 2019-06-02 PROCEDURE — 65270000029 HC RM PRIVATE

## 2019-06-02 PROCEDURE — 74011250636 HC RX REV CODE- 250/636

## 2019-06-02 PROCEDURE — 36415 COLL VENOUS BLD VENIPUNCTURE: CPT

## 2019-06-02 PROCEDURE — 77030002895 HC DEV VASC CLOSR COVD -B: Performed by: SURGERY

## 2019-06-02 PROCEDURE — 77030002996 HC SUT SLK J&J -A: Performed by: SURGERY

## 2019-06-02 PROCEDURE — 76010000153 HC OR TIME 1.5 TO 2 HR: Performed by: SURGERY

## 2019-06-02 PROCEDURE — 74011250636 HC RX REV CODE- 250/636: Performed by: SURGERY

## 2019-06-02 PROCEDURE — 74011250637 HC RX REV CODE- 250/637: Performed by: SURGERY

## 2019-06-02 PROCEDURE — 76210000006 HC OR PH I REC 0.5 TO 1 HR: Performed by: SURGERY

## 2019-06-02 PROCEDURE — 74011000250 HC RX REV CODE- 250: Performed by: SURGERY

## 2019-06-02 PROCEDURE — 85027 COMPLETE CBC AUTOMATED: CPT

## 2019-06-02 PROCEDURE — 0WQF0ZZ REPAIR ABDOMINAL WALL, OPEN APPROACH: ICD-10-PCS | Performed by: SURGERY

## 2019-06-02 PROCEDURE — 77030002933 HC SUT MCRYL J&J -A: Performed by: SURGERY

## 2019-06-02 PROCEDURE — 77030013079 HC BLNKT BAIR HGGR 3M -A: Performed by: ANESTHESIOLOGY

## 2019-06-02 PROCEDURE — 77030039266 HC ADH SKN EXOFIN S2SG -A: Performed by: SURGERY

## 2019-06-02 PROCEDURE — 0WUF0JZ SUPPLEMENT ABDOMINAL WALL WITH SYNTHETIC SUBSTITUTE, OPEN APPROACH: ICD-10-PCS | Performed by: SURGERY

## 2019-06-02 PROCEDURE — 77030002966 HC SUT PDS J&J -A: Performed by: SURGERY

## 2019-06-02 PROCEDURE — 77030011640 HC PAD GRND REM COVD -A: Performed by: SURGERY

## 2019-06-02 PROCEDURE — 74011250636 HC RX REV CODE- 250/636: Performed by: ANESTHESIOLOGY

## 2019-06-02 PROCEDURE — 83605 ASSAY OF LACTIC ACID: CPT

## 2019-06-02 PROCEDURE — C1781 MESH (IMPLANTABLE): HCPCS | Performed by: SURGERY

## 2019-06-02 PROCEDURE — 76060000034 HC ANESTHESIA 1.5 TO 2 HR: Performed by: SURGERY

## 2019-06-02 PROCEDURE — 77030032490 HC SLV COMPR SCD KNE COVD -B: Performed by: SURGERY

## 2019-06-02 PROCEDURE — 88302 TISSUE EXAM BY PATHOLOGIST: CPT

## 2019-06-02 PROCEDURE — 77030031139 HC SUT VCRL2 J&J -A: Performed by: SURGERY

## 2019-06-02 DEVICE — VENTRALEX ST HERNIA PATCH, 8.0 CM (3.2"), CIRCLE
Type: IMPLANTABLE DEVICE | Site: UMBILICAL | Status: FUNCTIONAL
Brand: VENTRALEX

## 2019-06-02 RX ORDER — HYDROMORPHONE HYDROCHLORIDE 1 MG/ML
0.2 INJECTION, SOLUTION INTRAMUSCULAR; INTRAVENOUS; SUBCUTANEOUS
Status: DISCONTINUED | OUTPATIENT
Start: 2019-06-02 | End: 2019-06-02 | Stop reason: HOSPADM

## 2019-06-02 RX ORDER — MIDAZOLAM HYDROCHLORIDE 1 MG/ML
INJECTION, SOLUTION INTRAMUSCULAR; INTRAVENOUS AS NEEDED
Status: DISCONTINUED | OUTPATIENT
Start: 2019-06-02 | End: 2019-06-02 | Stop reason: HOSPADM

## 2019-06-02 RX ORDER — ROCURONIUM BROMIDE 10 MG/ML
INJECTION, SOLUTION INTRAVENOUS AS NEEDED
Status: DISCONTINUED | OUTPATIENT
Start: 2019-06-02 | End: 2019-06-02 | Stop reason: HOSPADM

## 2019-06-02 RX ORDER — DEXAMETHASONE SODIUM PHOSPHATE 4 MG/ML
INJECTION, SOLUTION INTRA-ARTICULAR; INTRALESIONAL; INTRAMUSCULAR; INTRAVENOUS; SOFT TISSUE AS NEEDED
Status: DISCONTINUED | OUTPATIENT
Start: 2019-06-02 | End: 2019-06-02 | Stop reason: HOSPADM

## 2019-06-02 RX ORDER — SODIUM CHLORIDE 0.9 % (FLUSH) 0.9 %
5-40 SYRINGE (ML) INJECTION AS NEEDED
Status: DISCONTINUED | OUTPATIENT
Start: 2019-06-02 | End: 2019-06-02 | Stop reason: HOSPADM

## 2019-06-02 RX ORDER — SODIUM CHLORIDE, SODIUM LACTATE, POTASSIUM CHLORIDE, CALCIUM CHLORIDE 600; 310; 30; 20 MG/100ML; MG/100ML; MG/100ML; MG/100ML
100 INJECTION, SOLUTION INTRAVENOUS CONTINUOUS
Status: DISCONTINUED | OUTPATIENT
Start: 2019-06-02 | End: 2019-06-02 | Stop reason: HOSPADM

## 2019-06-02 RX ORDER — ONDANSETRON 2 MG/ML
INJECTION INTRAMUSCULAR; INTRAVENOUS AS NEEDED
Status: DISCONTINUED | OUTPATIENT
Start: 2019-06-02 | End: 2019-06-02 | Stop reason: HOSPADM

## 2019-06-02 RX ORDER — FUROSEMIDE 40 MG/1
40 TABLET ORAL DAILY
Status: DISCONTINUED | OUTPATIENT
Start: 2019-06-02 | End: 2019-06-06 | Stop reason: HOSPADM

## 2019-06-02 RX ORDER — BUPIVACAINE HYDROCHLORIDE 5 MG/ML
INJECTION, SOLUTION EPIDURAL; INTRACAUDAL AS NEEDED
Status: DISCONTINUED | OUTPATIENT
Start: 2019-06-02 | End: 2019-06-02 | Stop reason: HOSPADM

## 2019-06-02 RX ORDER — LIDOCAINE HYDROCHLORIDE 20 MG/ML
INJECTION, SOLUTION EPIDURAL; INFILTRATION; INTRACAUDAL; PERINEURAL AS NEEDED
Status: DISCONTINUED | OUTPATIENT
Start: 2019-06-02 | End: 2019-06-02 | Stop reason: HOSPADM

## 2019-06-02 RX ORDER — NEOSTIGMINE METHYLSULFATE 1 MG/ML
INJECTION INTRAVENOUS AS NEEDED
Status: DISCONTINUED | OUTPATIENT
Start: 2019-06-02 | End: 2019-06-02 | Stop reason: HOSPADM

## 2019-06-02 RX ORDER — CLONIDINE HYDROCHLORIDE 0.1 MG/1
0.1 TABLET ORAL
Status: DISCONTINUED | OUTPATIENT
Start: 2019-06-02 | End: 2019-06-06 | Stop reason: HOSPADM

## 2019-06-02 RX ORDER — CARVEDILOL 12.5 MG/1
12.5 TABLET ORAL 2 TIMES DAILY WITH MEALS
Status: DISCONTINUED | OUTPATIENT
Start: 2019-06-02 | End: 2019-06-06 | Stop reason: HOSPADM

## 2019-06-02 RX ORDER — PROPOFOL 10 MG/ML
INJECTION, EMULSION INTRAVENOUS AS NEEDED
Status: DISCONTINUED | OUTPATIENT
Start: 2019-06-02 | End: 2019-06-02 | Stop reason: HOSPADM

## 2019-06-02 RX ORDER — SODIUM CHLORIDE 0.9 % (FLUSH) 0.9 %
5-40 SYRINGE (ML) INJECTION EVERY 8 HOURS
Status: DISCONTINUED | OUTPATIENT
Start: 2019-06-02 | End: 2019-06-02 | Stop reason: HOSPADM

## 2019-06-02 RX ORDER — DEXMEDETOMIDINE HYDROCHLORIDE 4 UG/ML
INJECTION, SOLUTION INTRAVENOUS AS NEEDED
Status: DISCONTINUED | OUTPATIENT
Start: 2019-06-02 | End: 2019-06-02 | Stop reason: HOSPADM

## 2019-06-02 RX ORDER — DOCUSATE SODIUM 100 MG/1
100 CAPSULE, LIQUID FILLED ORAL 2 TIMES DAILY
Status: DISCONTINUED | OUTPATIENT
Start: 2019-06-02 | End: 2019-06-06 | Stop reason: HOSPADM

## 2019-06-02 RX ORDER — GLYCOPYRROLATE 0.2 MG/ML
INJECTION INTRAMUSCULAR; INTRAVENOUS AS NEEDED
Status: DISCONTINUED | OUTPATIENT
Start: 2019-06-02 | End: 2019-06-02 | Stop reason: HOSPADM

## 2019-06-02 RX ORDER — DIPHENHYDRAMINE HYDROCHLORIDE 50 MG/ML
12.5 INJECTION, SOLUTION INTRAMUSCULAR; INTRAVENOUS AS NEEDED
Status: DISCONTINUED | OUTPATIENT
Start: 2019-06-02 | End: 2019-06-02 | Stop reason: HOSPADM

## 2019-06-02 RX ORDER — HYDROMORPHONE HYDROCHLORIDE 2 MG/ML
INJECTION, SOLUTION INTRAMUSCULAR; INTRAVENOUS; SUBCUTANEOUS AS NEEDED
Status: DISCONTINUED | OUTPATIENT
Start: 2019-06-02 | End: 2019-06-02 | Stop reason: HOSPADM

## 2019-06-02 RX ORDER — MIDAZOLAM HYDROCHLORIDE 1 MG/ML
0.5 INJECTION, SOLUTION INTRAMUSCULAR; INTRAVENOUS
Status: DISCONTINUED | OUTPATIENT
Start: 2019-06-02 | End: 2019-06-02 | Stop reason: HOSPADM

## 2019-06-02 RX ORDER — SUCCINYLCHOLINE CHLORIDE 20 MG/ML
INJECTION INTRAMUSCULAR; INTRAVENOUS AS NEEDED
Status: DISCONTINUED | OUTPATIENT
Start: 2019-06-02 | End: 2019-06-02 | Stop reason: HOSPADM

## 2019-06-02 RX ORDER — AA/PROT/LYSINE/METHIO/VIT C/B6 50-12.5 MG
TABLET ORAL
COMMUNITY
End: 2019-06-13

## 2019-06-02 RX ORDER — SODIUM CHLORIDE 0.9 % (FLUSH) 0.9 %
5-40 SYRINGE (ML) INJECTION EVERY 8 HOURS
Status: DISCONTINUED | OUTPATIENT
Start: 2019-06-02 | End: 2019-06-06 | Stop reason: HOSPADM

## 2019-06-02 RX ORDER — ACETAMINOPHEN 325 MG/1
650 TABLET ORAL
Status: DISCONTINUED | OUTPATIENT
Start: 2019-06-02 | End: 2019-06-06 | Stop reason: HOSPADM

## 2019-06-02 RX ORDER — FENTANYL CITRATE 50 UG/ML
25 INJECTION, SOLUTION INTRAMUSCULAR; INTRAVENOUS
Status: DISCONTINUED | OUTPATIENT
Start: 2019-06-02 | End: 2019-06-02 | Stop reason: HOSPADM

## 2019-06-02 RX ORDER — FENTANYL CITRATE 50 UG/ML
INJECTION, SOLUTION INTRAMUSCULAR; INTRAVENOUS AS NEEDED
Status: DISCONTINUED | OUTPATIENT
Start: 2019-06-02 | End: 2019-06-02 | Stop reason: HOSPADM

## 2019-06-02 RX ADMIN — PIPERACILLIN SODIUM,TAZOBACTAM SODIUM 3.38 G: 3; .375 INJECTION, POWDER, FOR SOLUTION INTRAVENOUS at 05:55

## 2019-06-02 RX ADMIN — ROCURONIUM BROMIDE 10 MG: 10 INJECTION, SOLUTION INTRAVENOUS at 00:18

## 2019-06-02 RX ADMIN — SODIUM CHLORIDE, SODIUM LACTATE, POTASSIUM CHLORIDE, AND CALCIUM CHLORIDE 100 ML/HR: 600; 310; 30; 20 INJECTION, SOLUTION INTRAVENOUS at 01:59

## 2019-06-02 RX ADMIN — LIDOCAINE HYDROCHLORIDE 100 MG: 20 INJECTION, SOLUTION EPIDURAL; INFILTRATION; INTRACAUDAL; PERINEURAL at 00:06

## 2019-06-02 RX ADMIN — DEXMEDETOMIDINE HYDROCHLORIDE 8 MCG: 4 INJECTION, SOLUTION INTRAVENOUS at 00:38

## 2019-06-02 RX ADMIN — CARVEDILOL 12.5 MG: 12.5 TABLET, FILM COATED ORAL at 16:42

## 2019-06-02 RX ADMIN — PIPERACILLIN AND TAZOBACTAM 3.38 G: 3; .375 INJECTION, POWDER, FOR SOLUTION INTRAVENOUS at 00:17

## 2019-06-02 RX ADMIN — HYDROMORPHONE HYDROCHLORIDE 1 MG: 2 INJECTION, SOLUTION INTRAMUSCULAR; INTRAVENOUS; SUBCUTANEOUS at 00:46

## 2019-06-02 RX ADMIN — ROCURONIUM BROMIDE 10 MG: 10 INJECTION, SOLUTION INTRAVENOUS at 00:27

## 2019-06-02 RX ADMIN — DOCUSATE SODIUM 100 MG: 100 CAPSULE, LIQUID FILLED ORAL at 08:04

## 2019-06-02 RX ADMIN — HYDROMORPHONE HYDROCHLORIDE 1 MG: 1 INJECTION, SOLUTION INTRAMUSCULAR; INTRAVENOUS; SUBCUTANEOUS at 17:31

## 2019-06-02 RX ADMIN — HYDROMORPHONE HYDROCHLORIDE 1 MG: 2 INJECTION, SOLUTION INTRAMUSCULAR; INTRAVENOUS; SUBCUTANEOUS at 01:55

## 2019-06-02 RX ADMIN — HYDROMORPHONE HYDROCHLORIDE 1 MG: 1 INJECTION, SOLUTION INTRAMUSCULAR; INTRAVENOUS; SUBCUTANEOUS at 21:51

## 2019-06-02 RX ADMIN — PIPERACILLIN SODIUM,TAZOBACTAM SODIUM 3.38 G: 3; .375 INJECTION, POWDER, FOR SOLUTION INTRAVENOUS at 14:20

## 2019-06-02 RX ADMIN — Medication 10 ML: at 21:50

## 2019-06-02 RX ADMIN — FENTANYL CITRATE 50 MCG: 50 INJECTION, SOLUTION INTRAMUSCULAR; INTRAVENOUS at 00:38

## 2019-06-02 RX ADMIN — FENTANYL CITRATE 50 MCG: 50 INJECTION, SOLUTION INTRAMUSCULAR; INTRAVENOUS at 00:06

## 2019-06-02 RX ADMIN — ROCURONIUM BROMIDE 20 MG: 10 INJECTION, SOLUTION INTRAVENOUS at 00:14

## 2019-06-02 RX ADMIN — FUROSEMIDE 40 MG: 40 TABLET ORAL at 08:04

## 2019-06-02 RX ADMIN — ONDANSETRON 4 MG: 2 INJECTION INTRAMUSCULAR; INTRAVENOUS at 21:50

## 2019-06-02 RX ADMIN — SODIUM CHLORIDE, SODIUM LACTATE, POTASSIUM CHLORIDE, AND CALCIUM CHLORIDE 125 ML/HR: 600; 310; 30; 20 INJECTION, SOLUTION INTRAVENOUS at 21:50

## 2019-06-02 RX ADMIN — SUCCINYLCHOLINE CHLORIDE 160 MG: 20 INJECTION INTRAMUSCULAR; INTRAVENOUS at 00:06

## 2019-06-02 RX ADMIN — ROCURONIUM BROMIDE 10 MG: 10 INJECTION, SOLUTION INTRAVENOUS at 00:06

## 2019-06-02 RX ADMIN — MIDAZOLAM HYDROCHLORIDE 2 MG: 1 INJECTION, SOLUTION INTRAMUSCULAR; INTRAVENOUS at 00:01

## 2019-06-02 RX ADMIN — PIPERACILLIN SODIUM,TAZOBACTAM SODIUM 3.38 G: 3; .375 INJECTION, POWDER, FOR SOLUTION INTRAVENOUS at 21:50

## 2019-06-02 RX ADMIN — HYDROMORPHONE HYDROCHLORIDE 1 MG: 1 INJECTION, SOLUTION INTRAMUSCULAR; INTRAVENOUS; SUBCUTANEOUS at 11:04

## 2019-06-02 RX ADMIN — GLYCOPYRROLATE 0.6 MG: 0.2 INJECTION INTRAMUSCULAR; INTRAVENOUS at 01:27

## 2019-06-02 RX ADMIN — Medication 10 ML: at 05:45

## 2019-06-02 RX ADMIN — DEXAMETHASONE SODIUM PHOSPHATE 4 MG: 4 INJECTION, SOLUTION INTRA-ARTICULAR; INTRALESIONAL; INTRAMUSCULAR; INTRAVENOUS; SOFT TISSUE at 00:16

## 2019-06-02 RX ADMIN — ONDANSETRON 4 MG: 2 INJECTION INTRAMUSCULAR; INTRAVENOUS at 00:16

## 2019-06-02 RX ADMIN — SODIUM CHLORIDE, SODIUM LACTATE, POTASSIUM CHLORIDE, AND CALCIUM CHLORIDE 125 ML/HR: 600; 310; 30; 20 INJECTION, SOLUTION INTRAVENOUS at 05:55

## 2019-06-02 RX ADMIN — NEOSTIGMINE METHYLSULFATE 3 MG: 1 INJECTION INTRAVENOUS at 01:27

## 2019-06-02 RX ADMIN — PROPOFOL 160 MG: 10 INJECTION, EMULSION INTRAVENOUS at 00:06

## 2019-06-02 RX ADMIN — Medication 10 ML: at 14:20

## 2019-06-02 RX ADMIN — HYDROMORPHONE HYDROCHLORIDE 1 MG: 1 INJECTION, SOLUTION INTRAMUSCULAR; INTRAVENOUS; SUBCUTANEOUS at 14:20

## 2019-06-02 RX ADMIN — SODIUM CHLORIDE, SODIUM LACTATE, POTASSIUM CHLORIDE, AND CALCIUM CHLORIDE 125 ML/HR: 600; 310; 30; 20 INJECTION, SOLUTION INTRAVENOUS at 15:16

## 2019-06-02 RX ADMIN — CARVEDILOL 12.5 MG: 12.5 TABLET, FILM COATED ORAL at 08:04

## 2019-06-02 NOTE — ANESTHESIA POSTPROCEDURE EVALUATION
Procedure(s):  SUPRA UMBILICAL HERNIA  REPAIR WITH MESH AND PRIMARY UMBILICAL HERNIA REPAIR. general    Anesthesia Post Evaluation      Multimodal analgesia: multimodal analgesia used between 6 hours prior to anesthesia start to PACU discharge  Patient location during evaluation: bedside  Patient participation: waiting for patient participation  Level of consciousness: awake  Pain management: adequate  Airway patency: patent  Anesthetic complications: no  Cardiovascular status: acceptable  Respiratory status: unassisted  Hydration status: acceptable  Comments: Post-Anesthesia Evaluation and Assessment    I have evaluated the patient and they are ready for PACU discharge. Patient: Mook Mcdonald MRN: [id number]  SSN: [id number]   YOB: 1970  Age: 54 y.o. Sex: male      Cardiovascular Function/Vital Signs  BP (!) 132/92 (BP 1 Location: Right arm, BP Patient Position: At rest)   Pulse 84   Temp 36.3 °C (97.4 °F)   Resp 12   Ht 5' 10\" (1.778 m)   Wt 92.1 kg (203 lb)   SpO2 96%   BMI 29.13 kg/m²     Patient is status post General anesthesia for Procedure(s):  SUPRA UMBILICAL HERNIA  REPAIR WITH MESH AND PRIMARY UMBILICAL HERNIA REPAIR. Nausea/Vomiting: None    Postoperative hydration reviewed and adequate. Pain:  Pain Scale 1: Numeric (0 - 10) (06/02/19 0215)  Pain Intensity 1: 5 (06/02/19 0215)   Managed    Neurological Status:   Neuro (WDL): Within Defined Limits (06/02/19 0215)  Neuro  Neurologic State: Alert (06/02/19 0215)  Orientation Level: Oriented X4 (06/02/19 0215)  Cognition: Follows commands (06/02/19 0215)  Speech: Clear (06/02/19 0215)  LUE Motor Response: Moderate (06/02/19 0215)  LLE Motor Response: Moderate (06/02/19 0215)  RUE Motor Response: Moderate (06/02/19 0215)  RLE Motor Response:  Moderate (06/02/19 0215)   At baseline    Mental Status, Level of Consciousness: Alert and  oriented to person, place, and time    Pulmonary Status:   O2 Device: Room air (06/02/19 0215)   Adequate oxygenation and airway patent    Complications related to anesthesia: None    Post-anesthesia assessment completed. No concerns    Signed By: Rosa Beaulieu MD    June 2, 2019                   Vitals Value Taken Time   /81 6/2/2019  2:30 AM   Temp 36.3 °C (97.4 °F) 6/2/2019  2:15 AM   Pulse 72 6/2/2019  2:40 AM   Resp 10 6/2/2019  2:40 AM   SpO2 89 % 6/2/2019  2:40 AM   Vitals shown include unvalidated device data.

## 2019-06-02 NOTE — ACP (ADVANCE CARE PLANNING)
visit for Advance Medical Directive (AMD) consult. Pt was in bed and welcomed visit. Pt was feeling pain and not up to completing paperwork at this time.  left paperwork with pt. Please contact [phone] [address] [facility] for further support.  follow up as needed.      Jessica Petersen, [facility] [place]   [phone])

## 2019-06-02 NOTE — CONSULTS
Chief Complaint:  Incarcerated ventral hernia    HPI:  55 yo man with hx polycystic kidney disease, HTN, supraumbilical hernia presented to ED with abdominal pain, nausea and vomiting. Pt reported he has known history of abdominal hernia for several years but it had always been reducible. Two days ago he was not able to reduce hernia and became constipated. Pt reported nausea and vomiting with diffuse abdominal pain worsened at hernia site. No fever or chills. Last BM was Thursday. Pt had CT scan performed which showed incarcerated supraumbilical hernia causing bowel obstruction with mural thickening concerning for ischemic bowel. Hernia was not reducible. Past Medical History:   Diagnosis Date    Heart failure [facility])     CHF 2007    Hypertension     Polycystic kidney disease        History reviewed. No pertinent surgical history. No current facility-administered medications on file prior to encounter. Current Outpatient Medications on File Prior to Encounter   Medication Sig Dispense Refill    traMADol (ULTRAM) 50 mg tablet Take 1 Tab by mouth every six (6) hours as needed for Pain. Max Daily Amount: 200 mg. 12 Tab 0    carvedilol (COREG) 12.5 mg tablet Take 12.5 mg by mouth two (2) times daily (with meals).  furosemide (LASIX) 40 mg tablet Take 40 mg by mouth daily.  magnesium oxide (MAG-OX) 400 mg tablet Take 400 mg by mouth daily.  POTASSIUM ASPARTATE/MAG ASP (POTASSIUM & MAGNESIUM ASPARTAT PO) Take  by mouth.  OTHER,NON-FORMULARY, \"potassium co\"       ramipril (ALTACE) 10 mg capsule Take 10 mg by mouth daily.          Allergies   Allergen Reactions    Shellfish Containing Products Swelling       Review of Systems - General ROS: negative  Psychological ROS: negative  Respiratory ROS: negative  Cardiovascular ROS: negative  Gastrointestinal ROS: positive for - abdominal pain and nausea/vomiting    Visit Vitals  /86   Pulse 86   Temp 98.4 °F (36.9 °C)   Resp 16   Ht 5' 10\" (1.778 m)   Wt 203 lb (92.1 kg)   SpO2 98%   BMI 29.13 kg/m²         Physical Exam:    Gen:  NAD  Pulm:  Unlabored  Abd:  S/mildly distended/moderate TTP/4 cm irreducible supraumbilical hernia    Recent Results (from the past 24 hour(s))   CBC WITH AUTOMATED DIFF    Collection Time: 06/01/19  7:22 PM   Result Value Ref Range    WBC 9.6 4.1 - 11.1 K/uL    RBC 3.90 (L) 4.10 - 5.70 M/uL    HGB 11.0 (L) 12.1 - 17.0 g/dL    HCT 35.3 (L) 36.6 - 50.3 %    MCV 90.5 80.0 - 99.0 FL    MCH 28.2 26.0 - 34.0 PG    MCHC 31.2 30.0 - 36.5 g/dL    RDW 14.6 (H) 11.5 - 14.5 %    PLATELET 812 (H) 231 - 400 K/uL    MPV 10.0 8.9 - 12.9 FL    NRBC 0.0 0  WBC    ABSOLUTE NRBC 0.00 0.00 - 0.01 K/uL    NEUTROPHILS 78 (H) 32 - 75 %    LYMPHOCYTES 12 12 - 49 %    MONOCYTES 10 5 - 13 %    EOSINOPHILS 0 0 - 7 %    BASOPHILS 0 0 - 1 %    IMMATURE GRANULOCYTES 0 0.0 - 0.5 %    ABS. NEUTROPHILS 7.4 1.8 - 8.0 K/UL    ABS. LYMPHOCYTES 1.2 0.8 - 3.5 K/UL    ABS. MONOCYTES 1.0 0.0 - 1.0 K/UL    ABS. EOSINOPHILS 0.0 0.0 - 0.4 K/UL    ABS. BASOPHILS 0.0 0.0 - 0.1 K/UL    ABS. IMM. GRANS. 0.0 0.00 - 0.04 K/UL    DF AUTOMATED     METABOLIC PANEL, COMPREHENSIVE    Collection Time: 06/01/19  7:22 PM   Result Value Ref Range    Sodium 142 136 - 145 mmol/L    Potassium 3.2 (L) 3.5 - 5.1 mmol/L    Chloride 105 97 - 108 mmol/L    CO2 28 21 - 32 mmol/L    Anion gap 9 5 - 15 mmol/L    Glucose 126 (H) 65 - 100 mg/dL    BUN 42 (H) 6 - 20 MG/DL    Creatinine 3.30 (H) 0.70 - 1.30 MG/DL    BUN/Creatinine ratio 13 12 - 20      GFR est AA 24 (L) >60 ml/min/1.73m2    GFR est non-AA 20 (L) >60 ml/min/1.73m2    Calcium 9.6 8.5 - 10.1 MG/DL    Bilirubin, total 0.3 0.2 - 1.0 MG/DL    ALT (SGPT) 24 12 - 78 U/L    AST (SGOT) 13 (L) 15 - 37 U/L    Alk.  phosphatase 53 45 - 117 U/L    Protein, total 8.3 (H) 6.4 - 8.2 g/dL    Albumin 3.2 (L) 3.5 - 5.0 g/dL    Globulin 5.1 (H) 2.0 - 4.0 g/dL    A-G Ratio 0.6 (L) 1.1 - 2.2     LIPASE    Collection Time: 06/01/19  7:22 PM   Result Value Ref Range    Lipase 411 (H) 73 - 393 U/L   LACTIC ACID    Collection Time: 06/01/19  7:22 PM   Result Value Ref Range    Lactic acid 1.6 0.4 - 2.0 MMOL/L   SAMPLES BEING HELD    Collection Time: 06/01/19  7:22 PM   Result Value Ref Range    SAMPLES BEING HELD 1 RED, 1 BLUE     COMMENT        Add-on orders for these samples will be processed based on acceptable specimen integrity and analyte stability, which may vary by analyte. URINALYSIS W/MICROSCOPIC    Collection Time: 06/01/19  7:58 PM   Result Value Ref Range    Color YELLOW/STRAW      Appearance CLEAR CLEAR      Specific gravity 1.016 1.003 - 1.030      pH (UA) 6.0 5.0 - 8.0      Protein 300 (A) NEG mg/dL    Glucose NEGATIVE  NEG mg/dL    Ketone NEGATIVE  NEG mg/dL    Bilirubin NEGATIVE  NEG      Blood MODERATE (A) NEG      Urobilinogen 1.0 0.2 - 1.0 EU/dL    Nitrites NEGATIVE  NEG      Leukocyte Esterase NEGATIVE  NEG      WBC 0-4 0 - 4 /hpf    RBC 5-10 0 - 5 /hpf    Epithelial cells FEW FEW /lpf    Bacteria NEGATIVE  NEG /hpf    Hyaline cast 0-2 0 - 5 /lpf   URINE CULTURE HOLD SAMPLE    Collection Time: 06/01/19  7:58 PM   Result Value Ref Range    Urine culture hold        URINE ON HOLD IN MICROBIOLOGY DEPT FOR 3 DAYS. IF UNPRESERVED URINE IS SUBMITTED, IT CANNOT BE USED FOR ADDITIONAL TESTING AFTER 24 HRS, RECOLLECTION WILL BE REQUIRED. AP:  55 yo man with incarcerated ventral hernia concerning for ischemia    - incarcerated ventral hernia:  To OR for open hernia repair with mesh and possible bowel resection  - Risks and benefits explained.   Pt wish to pursue with operation  - NPO  - Zosyn antibiotic

## 2019-06-02 NOTE — ANESTHESIA PREPROCEDURE EVALUATION
Relevant Problems   No relevant active problems       Anesthetic History   No history of anesthetic complications            Review of Systems / Medical History  Patient summary reviewed, nursing notes reviewed and pertinent labs reviewed    Pulmonary  Within defined limits                 Neuro/Psych   Within defined limits           Cardiovascular    Hypertension                   GI/Hepatic/Renal         Renal disease: CRI       Endo/Other  Within defined limits           Other Findings            Physical Exam    Airway  Mallampati: II  TM Distance: > 6 cm  Neck ROM: normal range of motion   Mouth opening: Normal     Cardiovascular  Regular rate and rhythm,  S1 and S2 normal,  no murmur, click, rub, or gallop             Dental  No notable dental hx       Pulmonary  Breath sounds clear to auscultation               Abdominal  GI exam deferred       Other Findings            Anesthetic Plan    ASA: 2, emergent  Anesthesia type: general          Induction: Intravenous  Anesthetic plan and risks discussed with: Patient

## 2019-06-02 NOTE — CDMP QUERY
#2    Patient admitted with hernia  noted to have history of polycystic kidney disease    If possible, please document in progress notes and d/c summary if you are evaluating and/or treating any of the following:    - CKD Stage 1 GFR>90  - CKD Stage 2 GFR 60-90  - CKD Stage 3 GFR 30-59  - CKD Stage 4 GFR 15-29  - CKD Stage 5 GFR<15  - polycystic kidney disease ( current diagnosis )  - Other, please specify  - Unable to Determine    The medical record reflects the following:    Risk Factors: HX of polycystic kidney disease    Clinical Indicators: renal labs consistent with prior labs  GFR  20  BUN: 42 (H)  Creatinine: 3.30 (H)  Treatment: lab monitoring     Thank you,         [place], [place]

## 2019-06-02 NOTE — PROGRESS NOTES
TRANSFER - IN REPORT:    Verbal report received from [facility]) on Vicky Morrison  being received from PACU(unit) for routine post - op      Report consisted of patients Situation, Background, Assessment and   Recommendations(SBAR). Information from the following report(s) SBAR, Kardex, ED Summary, OR Summary, Procedure Summary, Intake/Output, MAR, Recent Results and Cardiac Rhythm NSR was reviewed with the receiving nurse. Opportunity for questions and clarification was provided. Assessment completed upon patients arrival to unit and care assumed.

## 2019-06-02 NOTE — PERIOP NOTES
TRANSFER - OUT REPORT:    Verbal report given to Scarlett Rn(name) on Marialuisa Hutchinson  being transferred to 609(unit) for routine post - op       Report consisted of patients Situation, Background, Assessment and   Recommendations(SBAR). Time Pre op antibiotic given:0017  Anesthesia Stop time: 3834  Wong Present on Transfer to floor:yes  Order for Wong on Chart:yes  Discharge Prescriptions with Chart:no    Information from the following report(s) SBAR, OR Summary, Procedure Summary, Cardiac Rhythm NSR and Alarm Parameters  was reviewed with the receiving nurse. Opportunity for questions and clarification was provided. Is the patient on 02? NO       L/Min 0       Other 0    Is the patient on a monitor? NO    Is the nurse transporting with the patient? YES    Surgical Waiting Area notified of patient's transfer from PACU? YES      The following personal items collected during your admission accompanied patient upon transfer:   Dental Appliance: Dental Appliances: None  Vision: Visual Aid: None  Hearing Aid:    Jewelry: Jewelry: Ring  Clothing: Clothing:  At bedside  Other Valuables:    Valuables sent to safe:

## 2019-06-02 NOTE — H&P
Chief Complaint:  Incarcerated ventral hernia    HPI:  53 yo man with hx polycystic kidney disease, HTN, supraumbilical hernia presented to ED with abdominal pain, nausea and vomiting. Pt reported he has known history of abdominal hernia for several years but it had always been reducible. Two days ago he was not able to reduce hernia and became constipated. Pt reported nausea and vomiting with diffuse abdominal pain worsened at hernia site. No fever or chills. Last BM was Thursday. Pt had CT scan performed which showed incarcerated supraumbilical hernia causing bowel obstruction with mural thickening concerning for ischemic bowel. Hernia was not reducible. Past Medical History:   Diagnosis Date    Heart failure [facility])     CHF 2007    Hypertension     Polycystic kidney disease        History reviewed. No pertinent surgical history. No current facility-administered medications on file prior to encounter. Current Outpatient Medications on File Prior to Encounter   Medication Sig Dispense Refill    traMADol (ULTRAM) 50 mg tablet Take 1 Tab by mouth every six (6) hours as needed for Pain. Max Daily Amount: 200 mg. 12 Tab 0    carvedilol (COREG) 12.5 mg tablet Take 12.5 mg by mouth two (2) times daily (with meals).  furosemide (LASIX) 40 mg tablet Take 40 mg by mouth daily.  magnesium oxide (MAG-OX) 400 mg tablet Take 400 mg by mouth daily.  POTASSIUM ASPARTATE/MAG ASP (POTASSIUM & MAGNESIUM ASPARTAT PO) Take  by mouth.  OTHER,NON-FORMULARY, \"potassium co\"       ramipril (ALTACE) 10 mg capsule Take 10 mg by mouth daily.          Allergies   Allergen Reactions    Shellfish Containing Products Swelling       Review of Systems - General ROS: negative  Psychological ROS: negative  Respiratory ROS: negative  Cardiovascular ROS: negative  Gastrointestinal ROS: positive for - abdominal pain and nausea/vomiting    Visit Vitals  /86   Pulse 86   Temp 98.4 °F (36.9 °C)   Resp 16   Ht 5' 10\" (1.778 m)   Wt 203 lb (92.1 kg)   SpO2 98%   BMI 29.13 kg/m²         Physical Exam:    Gen:  NAD  Pulm:  Unlabored  Abd:  S/mildly distended/moderate TTP/4 cm irreducible supraumbilical hernia    Recent Results (from the past 24 hour(s))   CBC WITH AUTOMATED DIFF    Collection Time: 06/01/19  7:22 PM   Result Value Ref Range    WBC 9.6 4.1 - 11.1 K/uL    RBC 3.90 (L) 4.10 - 5.70 M/uL    HGB 11.0 (L) 12.1 - 17.0 g/dL    HCT 35.3 (L) 36.6 - 50.3 %    MCV 90.5 80.0 - 99.0 FL    MCH 28.2 26.0 - 34.0 PG    MCHC 31.2 30.0 - 36.5 g/dL    RDW 14.6 (H) 11.5 - 14.5 %    PLATELET 058 (H) 451 - 400 K/uL    MPV 10.0 8.9 - 12.9 FL    NRBC 0.0 0  WBC    ABSOLUTE NRBC 0.00 0.00 - 0.01 K/uL    NEUTROPHILS 78 (H) 32 - 75 %    LYMPHOCYTES 12 12 - 49 %    MONOCYTES 10 5 - 13 %    EOSINOPHILS 0 0 - 7 %    BASOPHILS 0 0 - 1 %    IMMATURE GRANULOCYTES 0 0.0 - 0.5 %    ABS. NEUTROPHILS 7.4 1.8 - 8.0 K/UL    ABS. LYMPHOCYTES 1.2 0.8 - 3.5 K/UL    ABS. MONOCYTES 1.0 0.0 - 1.0 K/UL    ABS. EOSINOPHILS 0.0 0.0 - 0.4 K/UL    ABS. BASOPHILS 0.0 0.0 - 0.1 K/UL    ABS. IMM. GRANS. 0.0 0.00 - 0.04 K/UL    DF AUTOMATED     METABOLIC PANEL, COMPREHENSIVE    Collection Time: 06/01/19  7:22 PM   Result Value Ref Range    Sodium 142 136 - 145 mmol/L    Potassium 3.2 (L) 3.5 - 5.1 mmol/L    Chloride 105 97 - 108 mmol/L    CO2 28 21 - 32 mmol/L    Anion gap 9 5 - 15 mmol/L    Glucose 126 (H) 65 - 100 mg/dL    BUN 42 (H) 6 - 20 MG/DL    Creatinine 3.30 (H) 0.70 - 1.30 MG/DL    BUN/Creatinine ratio 13 12 - 20      GFR est AA 24 (L) >60 ml/min/1.73m2    GFR est non-AA 20 (L) >60 ml/min/1.73m2    Calcium 9.6 8.5 - 10.1 MG/DL    Bilirubin, total 0.3 0.2 - 1.0 MG/DL    ALT (SGPT) 24 12 - 78 U/L    AST (SGOT) 13 (L) 15 - 37 U/L    Alk.  phosphatase 53 45 - 117 U/L    Protein, total 8.3 (H) 6.4 - 8.2 g/dL    Albumin 3.2 (L) 3.5 - 5.0 g/dL    Globulin 5.1 (H) 2.0 - 4.0 g/dL    A-G Ratio 0.6 (L) 1.1 - 2.2     LIPASE    Collection Time: 06/01/19  7:22 PM   Result Value Ref Range    Lipase 411 (H) 73 - 393 U/L   LACTIC ACID    Collection Time: 06/01/19  7:22 PM   Result Value Ref Range    Lactic acid 1.6 0.4 - 2.0 MMOL/L   SAMPLES BEING HELD    Collection Time: 06/01/19  7:22 PM   Result Value Ref Range    SAMPLES BEING HELD 1 RED, 1 BLUE     COMMENT        Add-on orders for these samples will be processed based on acceptable specimen integrity and analyte stability, which may vary by analyte. URINALYSIS W/MICROSCOPIC    Collection Time: 06/01/19  7:58 PM   Result Value Ref Range    Color YELLOW/STRAW      Appearance CLEAR CLEAR      Specific gravity 1.016 1.003 - 1.030      pH (UA) 6.0 5.0 - 8.0      Protein 300 (A) NEG mg/dL    Glucose NEGATIVE  NEG mg/dL    Ketone NEGATIVE  NEG mg/dL    Bilirubin NEGATIVE  NEG      Blood MODERATE (A) NEG      Urobilinogen 1.0 0.2 - 1.0 EU/dL    Nitrites NEGATIVE  NEG      Leukocyte Esterase NEGATIVE  NEG      WBC 0-4 0 - 4 /hpf    RBC 5-10 0 - 5 /hpf    Epithelial cells FEW FEW /lpf    Bacteria NEGATIVE  NEG /hpf    Hyaline cast 0-2 0 - 5 /lpf   URINE CULTURE HOLD SAMPLE    Collection Time: 06/01/19  7:58 PM   Result Value Ref Range    Urine culture hold        URINE ON HOLD IN MICROBIOLOGY DEPT FOR 3 DAYS. IF UNPRESERVED URINE IS SUBMITTED, IT CANNOT BE USED FOR ADDITIONAL TESTING AFTER 24 HRS, RECOLLECTION WILL BE REQUIRED. AP:  55 yo man with incarcerated ventral hernia concerning for ischemia    - incarcerated ventral hernia:  To OR for open hernia repair with mesh and possible bowel resection  - Risks and benefits explained.   Pt wish to pursue with operation  - NPO  - Zosyn antibiotic

## 2019-06-02 NOTE — BRIEF OP NOTE
BRIEF OPERATIVE NOTE    Date of Procedure: 6/1/2019   Preoperative Diagnosis: INCARCERATED SUPRAUMBILICAL HERNIA  Postoperative Diagnosis: INCARCERATED SUPRAUMBILICAL HERNIA    Procedure(s):  SUPRA UMBILICAL HERNIA  REPAIR WITH MESH AND PRIMARY UMBILICAL HERNIA REPAIR  Surgeon(s) and Role:     * Bonnie Parker MD - Primary         Surgical Assistant: Marko Campa    Surgical Staff:  Circ-1: Trace Holguin RN  Scrub Tech-1: Vlad Knox  Surg Asst-1: Marko Campa  Event Time In Time Out   Incision Start 06/02/2019 0024    Incision Close 06/02/2019 0140      Anesthesia: General   Estimated Blood Loss: Minimal  Specimens:   ID Type Source Tests Collected by Time Destination   1 : umbilical hernia sac  Kar Lundberg MD 6/2/2019 0046 Pathology      Findings: There was a 4 cm hernia defect above the umbilicus with incarcerated small bowel and omentum. The small bowel was mildly ischemic which pink up when hernia defect was enlarge and small bowel was reduced. This supraumbilical hernia defect was repaired with 8 cm ventralex mesh. There was also a 1 cm umbilical hernia defect without any incarcerated content which was fixed primary  Complications: None  Implants:   Implant Name Type Inv.  Item Serial No.  Lot No. LRB No. Used Action   MESH VENTRALEX ST LG --  - [id number]  MESH VENTRALEX ST LG --  [id number] [facility] [id number] N/A 1 Implanted

## 2019-06-02 NOTE — CDMP QUERY
#1    Pt admitted with hernia and has history of CHF documented. Please further specify type and acuity of CHF in the medical record.         Chronic Systolic CHF   Chronic Diastolic CHF   Chronic Systolic and Diastolic CHF   Other, please specify   Clinically unable to determine    The medical record reflects the following:    Risk Factors:hx of CHF  No current echo    Clinical Indicators:no acute exascerbation    Treatment: lasix, coreg (home meds)    Thank you,         [place]

## 2019-06-02 NOTE — ROUTINE PROCESS
TRANSFER - OUT REPORT:    Verbal report given to RN(name) on Sissy Reason  being transferred to OR(unit) for routine progression of care       Report consisted of patients Situation, Background, Assessment and   Recommendations(SBAR). Information from the following report(s) SBAR, ED Summary, [facility] and Recent Results was reviewed with the receiving nurse. Lines:   Peripheral IV 06/01/19 Left Antecubital (Active)   Site Assessment Clean, dry, & intact 6/1/2019  7:27 PM   Phlebitis Assessment 0 6/1/2019  7:27 PM   Infiltration Assessment 0 6/1/2019  7:27 PM   Dressing Status Clean, dry, & intact 6/1/2019  7:27 PM   Hub Color/Line Status Pink 6/1/2019  7:27 PM        Opportunity for questions and clarification was provided.       Patient transported with:   Nguyen Goncalves RN

## 2019-06-02 NOTE — PROGRESS NOTES
Spiritual Care Partner Volunteer visited patient in [address] on 6/2/19. Documented by:   Chaplain Dong MDiv, MACE  [phone] PRAY [phone])

## 2019-06-02 NOTE — PROGRESS NOTES
Spiritual Care Assessment/Progress Note  [facility]      NAME: Marciano Yousif      MRN: [id number]  AGE: 54 y.o.  SEX: male  Judaism Affiliation: Orthodox   Language: English     6/2/2019     Total Time (in minutes): 10     Spiritual Assessment begun in [facility] through conversation with:         [x]Patient        [] Family    [] Friend(s)        Reason for Consult: Advance medical directive consult     Spiritual beliefs: (Please include comment if needed)     [x] Identifies with a kayla tradition:         [] Supported by a kayla community:            [] Claims no spiritual orientation:           [] Seeking spiritual identity:                [] Adheres to an individual form of spirituality:           [] Not able to assess:                           Identified resources for coping:      [x] Prayer                               [] Music                  [] Guided Imagery     [x] Family/friends                 [] Pet visits     [] Devotional reading                         [] Unknown     [] Other:                                               Interventions offered during this visit: (See comments for more details)    Patient Interventions: Advance medical directive consult, Affirmation of emotions/emotional suffering, Affirmation of kayla, Normalization of emotional/spiritual concerns, Prayer (assurance of)           Plan of Care:     [] Support spiritual and/or cultural needs    [] Support AMD and/or advance care planning process      [] Support grieving process   [] Coordinate Rites and/or Rituals    [] Coordination with community clergy   [] No spiritual needs identified at this time   [] Detailed Plan of Care below (See Comments)  [] Make referral to Music Therapy  [] Make referral to Pet Therapy     [] Make referral to Addiction services  [] Make referral to [facility]  [] Make referral to Spiritual Care Partner  [] No future visits requested        [x] Follow up visits as needed Comments:  visit for Advance Medical Directive (AMD) consult. Pt was in bed and welcomed visit. Pt was feeling pain and not up to completing paperwork at this time.  left paperwork with pt. Please contact [phone] for further support.   follow up as needed.      Chaplain Arianna Ward M.Div, [facility] [place]   [phone])

## 2019-06-02 NOTE — PROGRESS NOTES
Progress Note    Patient: Lenka Retana MRN: [id number]  SSN: [id number]    YOB: 1970  Age: 54 y.o. Sex: male      Admit Date: 2019    1 Day Post-Op    Procedure:  Procedure(s):  SUPRA UMBILICAL HERNIA  REPAIR WITH MESH AND PRIMARY UMBILICAL HERNIA REPAIR    Subjective:     No acute surgical issues. Pt reported soreness. No nausea or vomiting. Pain is under control but worsened with movement. No flatus or BM yet. Objective:     Visit Vitals  BP (!) 141/97   Pulse 65   Temp 97.7 °F (36.5 °C)   Resp 14   Ht 5' 10\" (1.778 m)   Wt 197 lb 12 oz (89.7 kg)   SpO2 99%   BMI 28.37 kg/m²       Temp (24hrs), Av.7 °F (36.5 °C), Min:97.3 °F (36.3 °C), Max:98.6 °F (37 °C)        Physical Exam:    Gen:  NAD  Pulm:  Unlabored  Abd:  S/ND/appropriate TTP  Wound:  C/D/I    Recent Results (from the past 24 hour(s))   CBC WITH AUTOMATED DIFF    Collection Time: 19  7:22 PM   Result Value Ref Range    WBC 9.6 4.1 - 11.1 K/uL    RBC 3.90 (L) 4.10 - 5.70 M/uL    HGB 11.0 (L) 12.1 - 17.0 g/dL    HCT 35.3 (L) 36.6 - 50.3 %    MCV 90.5 80.0 - 99.0 FL    MCH 28.2 26.0 - 34.0 PG    MCHC 31.2 30.0 - 36.5 g/dL    RDW 14.6 (H) 11.5 - 14.5 %    PLATELET 144 (H) 913 - 400 K/uL    MPV 10.0 8.9 - 12.9 FL    NRBC 0.0 0  WBC    ABSOLUTE NRBC 0.00 0.00 - 0.01 K/uL    NEUTROPHILS 78 (H) 32 - 75 %    LYMPHOCYTES 12 12 - 49 %    MONOCYTES 10 5 - 13 %    EOSINOPHILS 0 0 - 7 %    BASOPHILS 0 0 - 1 %    IMMATURE GRANULOCYTES 0 0.0 - 0.5 %    ABS. NEUTROPHILS 7.4 1.8 - 8.0 K/UL    ABS. LYMPHOCYTES 1.2 0.8 - 3.5 K/UL    ABS. MONOCYTES 1.0 0.0 - 1.0 K/UL    ABS. EOSINOPHILS 0.0 0.0 - 0.4 K/UL    ABS. BASOPHILS 0.0 0.0 - 0.1 K/UL    ABS. IMM.  GRANS. 0.0 0.00 - 0.04 K/UL    DF AUTOMATED     METABOLIC PANEL, COMPREHENSIVE    Collection Time: 19  7:22 PM   Result Value Ref Range    Sodium 142 136 - 145 mmol/L    Potassium 3.2 (L) 3.5 - 5.1 mmol/L    Chloride 105 97 - 108 mmol/L    CO2 28 21 - 32 mmol/L    Anion gap 9 5 - 15 mmol/L    Glucose 126 (H) 65 - 100 mg/dL    BUN 42 (H) 6 - 20 MG/DL    Creatinine 3.30 (H) 0.70 - 1.30 MG/DL    BUN/Creatinine ratio 13 12 - 20      GFR est AA 24 (L) >60 ml/min/1.73m2    GFR est non-AA 20 (L) >60 ml/min/1.73m2    Calcium 9.6 8.5 - 10.1 MG/DL    Bilirubin, total 0.3 0.2 - 1.0 MG/DL    ALT (SGPT) 24 12 - 78 U/L    AST (SGOT) 13 (L) 15 - 37 U/L    Alk. phosphatase 53 45 - 117 U/L    Protein, total 8.3 (H) 6.4 - 8.2 g/dL    Albumin 3.2 (L) 3.5 - 5.0 g/dL    Globulin 5.1 (H) 2.0 - 4.0 g/dL    A-G Ratio 0.6 (L) 1.1 - 2.2     LIPASE    Collection Time: 06/01/19  7:22 PM   Result Value Ref Range    Lipase 411 (H) 73 - 393 U/L   LACTIC ACID    Collection Time: 06/01/19  7:22 PM   Result Value Ref Range    Lactic acid 1.6 0.4 - 2.0 MMOL/L   SAMPLES BEING HELD    Collection Time: 06/01/19  7:22 PM   Result Value Ref Range    SAMPLES BEING HELD 1 RED, 1 BLUE     COMMENT        Add-on orders for these samples will be processed based on acceptable specimen integrity and analyte stability, which may vary by analyte. URINALYSIS W/MICROSCOPIC    Collection Time: 06/01/19  7:58 PM   Result Value Ref Range    Color YELLOW/STRAW      Appearance CLEAR CLEAR      Specific gravity 1.016 1.003 - 1.030      pH (UA) 6.0 5.0 - 8.0      Protein 300 (A) NEG mg/dL    Glucose NEGATIVE  NEG mg/dL    Ketone NEGATIVE  NEG mg/dL    Bilirubin NEGATIVE  NEG      Blood MODERATE (A) NEG      Urobilinogen 1.0 0.2 - 1.0 EU/dL    Nitrites NEGATIVE  NEG      Leukocyte Esterase NEGATIVE  NEG      WBC 0-4 0 - 4 /hpf    RBC 5-10 0 - 5 /hpf    Epithelial cells FEW FEW /lpf    Bacteria NEGATIVE  NEG /hpf    Hyaline cast 0-2 0 - 5 /lpf   URINE CULTURE HOLD SAMPLE    Collection Time: 06/01/19  7:58 PM   Result Value Ref Range    Urine culture hold        URINE ON HOLD IN MICROBIOLOGY DEPT FOR 3 DAYS. IF UNPRESERVED URINE IS SUBMITTED, IT CANNOT BE USED FOR ADDITIONAL TESTING AFTER 24 HRS, RECOLLECTION WILL BE REQUIRED. Assessment:     Hospital Problems  Never Reviewed          Codes Class Noted POA    Incarcerated ventral hernia ICD-10-CM: K43.6  ICD-9-CM: 552.20  6/1/2019 Unknown              Plan/Recommendations/Medical Decision Making:     - NPO with sips of clears and popsicles  - Continue antibiotic therapy  - Continue IV dilaudid  - DC jacinto  - Labs in am  - Nephrology consult if renal function not improving  - Out of bed as tolerated

## 2019-06-03 PROCEDURE — 74011250636 HC RX REV CODE- 250/636: Performed by: SURGERY

## 2019-06-03 PROCEDURE — 74011000258 HC RX REV CODE- 258: Performed by: SURGERY

## 2019-06-03 PROCEDURE — 74011250637 HC RX REV CODE- 250/637: Performed by: SURGERY

## 2019-06-03 PROCEDURE — 65270000029 HC RM PRIVATE

## 2019-06-03 RX ORDER — HYDROMORPHONE HYDROCHLORIDE 2 MG/1
2 TABLET ORAL
Status: DISCONTINUED | OUTPATIENT
Start: 2019-06-03 | End: 2019-06-06 | Stop reason: HOSPADM

## 2019-06-03 RX ADMIN — FUROSEMIDE 40 MG: 40 TABLET ORAL at 08:44

## 2019-06-03 RX ADMIN — PIPERACILLIN SODIUM,TAZOBACTAM SODIUM 3.38 G: 3; .375 INJECTION, POWDER, FOR SOLUTION INTRAVENOUS at 05:39

## 2019-06-03 RX ADMIN — SODIUM CHLORIDE, SODIUM LACTATE, POTASSIUM CHLORIDE, AND CALCIUM CHLORIDE 125 ML/HR: 600; 310; 30; 20 INJECTION, SOLUTION INTRAVENOUS at 21:42

## 2019-06-03 RX ADMIN — HYDROMORPHONE HYDROCHLORIDE 1 MG: 1 INJECTION, SOLUTION INTRAMUSCULAR; INTRAVENOUS; SUBCUTANEOUS at 02:28

## 2019-06-03 RX ADMIN — CARVEDILOL 12.5 MG: 12.5 TABLET, FILM COATED ORAL at 07:09

## 2019-06-03 RX ADMIN — DOCUSATE SODIUM 100 MG: 100 CAPSULE, LIQUID FILLED ORAL at 08:43

## 2019-06-03 RX ADMIN — HYDROMORPHONE HYDROCHLORIDE 2 MG: 2 TABLET ORAL at 14:17

## 2019-06-03 RX ADMIN — PIPERACILLIN SODIUM,TAZOBACTAM SODIUM 3.38 G: 3; .375 INJECTION, POWDER, FOR SOLUTION INTRAVENOUS at 21:42

## 2019-06-03 RX ADMIN — PIPERACILLIN SODIUM,TAZOBACTAM SODIUM 3.38 G: 3; .375 INJECTION, POWDER, FOR SOLUTION INTRAVENOUS at 14:17

## 2019-06-03 RX ADMIN — Medication 10 ML: at 14:18

## 2019-06-03 RX ADMIN — Medication 10 ML: at 21:42

## 2019-06-03 RX ADMIN — HYDROMORPHONE HYDROCHLORIDE 2 MG: 2 TABLET ORAL at 18:44

## 2019-06-03 RX ADMIN — CARVEDILOL 12.5 MG: 12.5 TABLET, FILM COATED ORAL at 17:55

## 2019-06-03 RX ADMIN — Medication 10 ML: at 02:29

## 2019-06-03 RX ADMIN — SODIUM CHLORIDE, SODIUM LACTATE, POTASSIUM CHLORIDE, AND CALCIUM CHLORIDE 125 ML/HR: 600; 310; 30; 20 INJECTION, SOLUTION INTRAVENOUS at 05:41

## 2019-06-03 RX ADMIN — DOCUSATE SODIUM 100 MG: 100 CAPSULE, LIQUID FILLED ORAL at 17:55

## 2019-06-03 RX ADMIN — HYDROMORPHONE HYDROCHLORIDE 1 MG: 1 INJECTION, SOLUTION INTRAMUSCULAR; INTRAVENOUS; SUBCUTANEOUS at 08:44

## 2019-06-03 RX ADMIN — SODIUM CHLORIDE, SODIUM LACTATE, POTASSIUM CHLORIDE, AND CALCIUM CHLORIDE 125 ML/HR: 600; 310; 30; 20 INJECTION, SOLUTION INTRAVENOUS at 13:35

## 2019-06-03 NOTE — PROGRESS NOTES
NUTRITION COMPLETE ASSESSMENT    RECOMMENDATIONS:   1. Continue to advance diet per surgery and encourage adequate intake for post-op healing  -- Goal: Regular, Low Fiber    2. Continue daily weights while in the hospital     Interventions/Plan:   Food/Nutrient Delivery:  Ensure Enlive strawberry    Assessment:   Reason for Assessment:   [x]BPA/MST Referral     Diet:  Full Liquid  Nutritionally Significant Medications: [x] Reviewed & Includes: colace twice/day; lasix daily; LR @ 125 mL/hr; zofran q 4 hours prn; zosyn q8 hours   Meal Intake: No data found. Pre-Hospitalization:  Usual Appetite: Poor  Diet at Home: Regular  Vitamins/Supplements: No    Current Hospitalization:   Fluid Restriction:  NA  Appetite: Fair  PO Ability: Independent      Subjective:  Pt admits he hasn't been able to tolerate po and intake has been low for some time. Right before coming in, he wasn't able to tolerate water. He has recently made changes to his diet as well (limiting high fat meats, low sodium, limiting alcohol). Early satiety was his biggest complaint. He works as a . The pt feels better today and feels like he could \"eat a horse\". Objective:  Chart reviewed. Pt admitted with incarcerated ventral hernia and obstruction. PMHx: CHF, HTN, others noted. Pt is now POD2 from hernia repair. Diet advanced to Full Liquids today and will be GI Lite in the morning. He is tolerating full liquids and feels much better. Pt with 11% weight loss x 4 months, which is severe. He meets criteria for malnutrition-- discussed. Pt interested in trial of Ensure Enlive (strawberry), so RD to order BID. This will provide 700 kcal, 40 g protein. Will adjust prn per renal function, though his protein needs are high for post-op healing.     Patient meets criteria for Severe Protein Calorie Malnutrition as evidenced by:   ASPEN Malnutrition Criteria  Acute Illness, Chronic Illness, or Social/Enviornmental: Acute illness  Energy Intake: Less than/equal to 50% est energy req for greater than/equal to 5 days  Weight Loss: Greater than 7.5% x 3 mos  Muscle Mass: Moderate  ASPEN Malnutrition Score - Acute Illness: 18  Acute Illness - Malnutrition Diagnosis: Severe malnutrition. Estimated Nutrition Needs:   Kcals/day: 3204 Kcals/day(MSJ x 1.3)  Protein: 111 g(1.2 g/kg)  Fluid: (1 mL/kcal)  Based On: [place] [address] Jeor  Weight Used: Actual wt(92.6 kg)    Pt expected to meet estimated nutrient needs:  [x]   Yes     []  No [] Unable to predict at this time  Nutrition Diagnosis:   1. Inadequate protein-energy intake related to decreased appetite, N/V; hernia and obstruction as evidenced by 45# weight loss x 1 year and 26# weight loss x 4 months (11%)    Goals:     Pt to tolerate diet advancement to solids and consume at least 75% of meals + 1 supplement per day over the next 5-7 days     Monitoring & Evaluation:    -     - Weight/weight change     Previous Nutrition Goals Met:   N/A  Previous Recommendations:    N/A    Education & Discharge Needs:   [x] None Identified   [] Identified and addressed    [x] Participated in care plan, discharge planning, and/or interdisciplinary rounds        Cultural, Uatsdin and ethnic food preferences identified: None    Skin Integrity: []Intact  []Other  Edema: []None [x]Other: 1-2+ non-pitting  Last BM: 5/30/19  Food Allergies: [x]None []Other  Diet Restrictions: Cultural/Holiness Preference(s): None     Anthropometrics:    Weight Loss Metrics 6/3/2019 1/28/2019 8/18/2018 7/15/2017 3/25/2012 7/6/2011   Today's Wt 204 lb 2.3 oz 230 lb 230 lb 245 lb 280 lb 290 lb   BMI 29.29 kg/m2 32.08 kg/m2 32.08 kg/m2 34.17 kg/m2 39.07 kg/m2 40.46 kg/m2      Weight Source: Bed  Height: 5' 10\" (177.8 cm),    Body mass index is 29.29 kg/m².      IBW : 75.3 kg (166 lb),    Usual Body Weight: 113.4 kg (250 lb),      Labs:    Lab Results   Component Value Date/Time    Sodium 142 06/01/2019 07:22 PM    Potassium 3.2 (L) 06/01/2019 07:22 PM    Chloride 105 06/01/2019 07:22 PM    CO2 28 06/01/2019 07:22 PM    Glucose 126 (H) 06/01/2019 07:22 PM    BUN 42 (H) 06/01/2019 07:22 PM    Creatinine 3.30 (H) 06/01/2019 07:22 PM    Calcium 9.6 06/01/2019 07:22 PM    Magnesium 1.8 03/25/2012 06:00 AM    Albumin 3.2 (L) 06/01/2019 07:22 PM     No results found for: HBA1C, HGBE8, NZV8PCVU, ZEX8MPGR, [address], [address] [facility]

## 2019-06-03 NOTE — PROGRESS NOTES
Reason for Admission:   Incarcerated Ventral Hernia                   RRAT Score:          3           Plan for utilizing home health:    TBD                      Current Advanced Directive/Advance Care Plan:  Not on file    Likelihood of Readmission:  low                         Transition of Care Plan:             CM met with patient to discuss discharge plans. Patient was alert and oriented. Lives with roommate and is independent with ADL's. Patient uses no assisting devices. Patient doesn't use home oxygen is on room air. Patient drives to and from appointment using private vehicle. Patients sister will transport at discharge. [facility] uses [facility] [address]. Patient confirmed that his PCP is Darryle Rough. Patient advised has not had home health in past. There are no plans for home health at this time. CM continuing to monitor patient for updates. SALIMA Gorman/SHALONDA    Care Management Interventions  PCP Verified by CM:  Yes  Mode of Transport at Discharge: (Sister)  Current Support Network: Lives Alone(Has a roommate)  Confirm Follow Up Transport: Self  Discharge Location  Discharge Placement: Home

## 2019-06-03 NOTE — PROGRESS NOTES
Assuming care for this patient. Patient is A&Ox4. Denies pain at this time. Midline abdominal incision has dermabond, dry intact and open to air. Will continue to monitor through end of this shift.

## 2019-06-03 NOTE — OP NOTES
[address]   OPERATIVE REPORT    Name:  Mckenzie Sinha  MR#:  [id number]  :  1970  ACCOUNT #:  [de-identified]  DATE OF SERVICE:  2019    PREOPERATIVE DIAGNOSIS:  Incarcerated supraumbilical hernia. POSTOPERATIVE DIAGNOSIS:  Incarcerated supraumbilical hernia. PROCEDURE PERFORMED:  Open supraumbilical hernia repair with mesh and primary umbilical hernia repair. PRIMARY SURGEON:  Polo Patrick MD    SURGICAL ASSISTANT:  Liam Ahumada, SA    ANESTHESIA:  General endotracheal.    ESTIMATED BLOOD LOSS:  Minimal.    COMPLICATIONS:  None. IMPLANTS:  An 8 cm Ventralex mesh was used with the operation. DRAINS AND TUBES:  None. SPECIMENS:  Supraumbilical hernia sac. FINDINGS:  There was a 4-cm hernia defect above the umbilicus with incarcerated omentum and small bowel. The small bowel was noted to be mildly ischemic but once it was reduced back into the abdomen, it pinked up nicely. There was no evidence of necrosis of the small bowel. The supraumbilical hernia defect was repaired with an 8 cm Ventralex mesh. There was a 1 cm umbilical hernia defect that was closed primarily with 0 PDS. INDICATION FOR OPERATION:  The patient is a 82-year-old gentleman with a history of polycystic kidney disease, chronic renal insufficiency, hypertension, and a known supraumbilical hernia for 2 to 3 years who presented to the emergency department with abdominal pain, nausea and vomiting, who noted to have an incarcerated supraumbilical hernia. A CT scan was performed which showed incarcerated hernia were causing bowel obstruction and concern for ischemia. On physical examination, we tried to reduce the hernia in the emergency department however it was irreducible so he was taken emergently to the operating room for an open supraumbilical hernia repair with mesh with possible bowel resection.     PROCEDURE IN DETAIL:  After informed consent was obtained from the patient, the patient was taken emergently to the operating room and placed in the supine position on the operating table. He underwent general anesthesia with endotracheal intubation without any complication. A Wong catheter was inserted. The patient's abdomen and pelvis were then prepped and draped in the usual sterile surgical fashion. A surgical time-out was then performed. Preoperative antibiotic was given prior to skin incision. After the time-out was performed, a midline incision was made with a 10 blade scalpel. The incision was approximately 5 cm in length and was confined to the supraumbilical area. Dissection was then taken down using a combination of electrocautery and blunt dissection. The hernia was then identified. We circumferentially dissected out the hernia sac. Two hemostats were then used to grasp the hernia sac and Metzenbaum scissors was then used to open up the hernia sac. When the hernia sac was opened, we noted a bunch of ascitic fluid draining from the site. There was definitely incarcerated omentum and small bowel. The small bowel appears mildly ischemic, however, once the reduced back into the abdomen, it pinked up nicely. The omentum was noted to be incarcerated with some edema. A small segment of the omentum was divided with the hernia sac. The hernia sac was circumferentially amputated to reveal a 4-cm hernia defect. An 8-cm Ventralex mesh was then used to repair the hernia defect. Before we did this, however, we noted that the patient also had an umbilical hernia that was approximately 1 cm in diameter. This was closed primarily with 0 PDS. After the umbilical hernia defect was closed, we then focused our attention on the supraumbilical hernia. The 8-cm Ventralex mesh was then circumferentially tagged with 0 PDS and the mesh was then placed into the abdomen in an underlay fashion and then was secured to the fascia using interrupted 0 PDS in a circumferential fashion.   Once the mesh was secured, we then used #1 PDS to close the fascia over the mesh. The subcutaneous tissue was then closed with 3-0 Vicryl in a running fashion. The skin was then closed with 4-0 Monocryl in a subcuticular fashion. Dermabond glue was then applied at each incision site. The patient tolerated the procedure well. There were no complications associated with the operation. Dr. Brian Stevens, the attending surgeon, was present during the entirety of the case. All lap, needle, and instrument counts were correct x2. The patient was successfully extubated and was then transported to PACU in stable condition.         Dee Hanson MD      [id number]  D:  06/02/2019 16:14  T:  06/02/2019 16:18  JOB #:  [id number]

## 2019-06-03 NOTE — PROGRESS NOTES
Progress Note    Patient: Linda Duffy MRN: [id number]  SSN: [id number]    YOB: 1970  Age: 54 y.o. Sex: male      Admit Date: 2019    2 Days Post-Op    Procedure:  Procedure(s):  SUPRA UMBILICAL HERNIA  REPAIR WITH MESH AND PRIMARY UMBILICAL HERNIA REPAIR    Subjective:     No acute surgical issues. Pt doing well but is having pain at midline incision site. Tolerating clears without nausea or vomiting. No flatus or BM yet. Objective:     Visit Vitals  BP (!) 138/92 (BP 1 Location: Right arm, BP Patient Position: At rest)   Pulse 80   Temp 98.4 °F (36.9 °C)   Resp 17   Ht 5' 10\" (1.778 m)   Wt 204 lb 2.3 oz (92.6 kg)   SpO2 96%   BMI 29.29 kg/m²       Temp (24hrs), Av.2 °F (36.8 °C), Min:97.7 °F (36.5 °C), Max:99.1 °F (37.3 °C)        Physical Exam:    Gen:  NAD  Pulm:  Unlabored  Abd:  S/ND/appropriate TTP  Wound:  C/D/I    Recent Results (from the past 24 hour(s))   CBC W/O DIFF    Collection Time: 19 10:33 AM   Result Value Ref Range    WBC 8.5 4.1 - 11.1 K/uL    RBC 3.72 (L) 4.10 - 5.70 M/uL    HGB 10.3 (L) 12.1 - 17.0 g/dL    HCT 34.7 (L) 36.6 - 50.3 %    MCV 93.3 80.0 - 99.0 FL    MCH 27.7 26.0 - 34.0 PG    MCHC 29.7 (L) 30.0 - 36.5 g/dL    RDW 14.6 (H) 11.5 - 14.5 %    PLATELET 719 335 - 046 K/uL    MPV 10.1 8.9 - 12.9 FL    NRBC 0.0 0  WBC    ABSOLUTE NRBC 0.00 0.00 - 0.01 K/uL   LACTIC ACID    Collection Time: 19 10:33 AM   Result Value Ref Range    Lactic acid 1.8 0.4 - 2.0 MMOL/L         Assessment:     Hospital Problems  Never Reviewed          Codes Class Noted POA    Incarcerated ventral hernia ICD-10-CM: K43.6  ICD-9-CM: 552.20  2019 Unknown              Plan/Recommendations/Medical Decision Making:     - Advance to full liquids for lunch  - Out of bed.   Encourage ambulation  - bowel regiment  - Hopefully DC home tomorrow      ADDENDUM FOR CODING QUERY:  - Pt with hx congestive heart failure but no symptomatic and Clinically unable to determine which kind.   No further workup at this time  - Pt has history of polycystic kidney disease with Stage 4 CKD management with gentle fluid hydration and laboratory monitoring

## 2019-06-03 NOTE — PROGRESS NOTES
Problem: Pressure Injury - Risk of  Goal: *Prevention of pressure injury  Description  Document Vito Scale and appropriate interventions in the flowsheet. Outcome: Progressing Towards Goal     Problem: Patient Education: Go to Patient Education Activity  Goal: Patient/Family Education  Outcome: Progressing Towards Goal     Problem: Falls - Risk of  Goal: *Absence of Falls  Description  Document Addisadamaris Morris Fall Risk and appropriate interventions in the flowsheet. Outcome: Progressing Towards Goal  Note:   Fall Risk Interventions:     Call bell within patient reach. Bed in lowest position, side rails up x 3.      Frequent rounding to adjust patient's care needs         Medication Interventions: Evaluate medications/consider consulting pharmacy, Patient to call before getting OOB

## 2019-06-03 NOTE — PROGRESS NOTES
Problem: Pressure Injury - Risk of  Goal: *Prevention of pressure injury  Description  Document Vito Scale and appropriate interventions in the flowsheet. Outcome: Progressing Towards Goal  Note:   Pressure Injury Interventions:  Sensory Interventions: Assess changes in LOC, Avoid rigorous massage over bony prominences, Keep linens dry and wrinkle-free, Monitor skin under medical devices    Moisture Interventions: Absorbent underpads, Apply protective barrier, creams and emollients, Maintain skin hydration (lotion/cream)    Activity Interventions: Pressure redistribution bed/mattress(bed type), Increase time out of bed    Mobility Interventions: HOB 30 degrees or less    Nutrition Interventions: Document food/fluid/supplement intake    Friction and Shear Interventions: HOB 30 degrees or less                Problem: Falls - Risk of  Goal: *Absence of Falls  Description  Document Kelton Fall Risk and appropriate interventions in the flowsheet. Outcome: Progressing Towards Goal  Note:   Fall Risk Interventions:  Medication Interventions: Evaluate medications/consider consulting pharmacy  Patient to call before getting out of bed. Call light within reach.  Gripper socks on feet

## 2019-06-03 NOTE — PROGRESS NOTES
Problem: Infection - Risk of, Surgical Site Infection  Goal: *Absence of surgical site infection signs and symptoms  Outcome: Progressing Towards Goal  Note:   Monitor surgical site for signs of purulent drainage, redness, non-control pain management at surgical site.

## 2019-06-04 LAB
ANION GAP SERPL CALC-SCNC: 8 MMOL/L (ref 5–15)
BUN SERPL-MCNC: 28 MG/DL (ref 6–20)
BUN/CREAT SERPL: 10 (ref 12–20)
CALCIUM SERPL-MCNC: 8.5 MG/DL (ref 8.5–10.1)
CHLORIDE SERPL-SCNC: 106 MMOL/L (ref 97–108)
CO2 SERPL-SCNC: 26 MMOL/L (ref 21–32)
CREAT SERPL-MCNC: 2.74 MG/DL (ref 0.7–1.3)
ERYTHROCYTE [DISTWIDTH] IN BLOOD BY AUTOMATED COUNT: 14.4 % (ref 11.5–14.5)
GLUCOSE SERPL-MCNC: 101 MG/DL (ref 65–100)
HCT VFR BLD AUTO: 31.2 % (ref 36.6–50.3)
HGB BLD-MCNC: 9.6 G/DL (ref 12.1–17)
MCH RBC QN AUTO: 28.1 PG (ref 26–34)
MCHC RBC AUTO-ENTMCNC: 30.8 G/DL (ref 30–36.5)
MCV RBC AUTO: 91.2 FL (ref 80–99)
NRBC # BLD: 0 K/UL (ref 0–0.01)
NRBC BLD-RTO: 0 PER 100 WBC
PLATELET # BLD AUTO: 281 K/UL (ref 150–400)
PMV BLD AUTO: 9.7 FL (ref 8.9–12.9)
POTASSIUM SERPL-SCNC: 3.1 MMOL/L (ref 3.5–5.1)
RBC # BLD AUTO: 3.42 M/UL (ref 4.1–5.7)
SODIUM SERPL-SCNC: 140 MMOL/L (ref 136–145)
WBC # BLD AUTO: 9.2 K/UL (ref 4.1–11.1)

## 2019-06-04 PROCEDURE — 80048 BASIC METABOLIC PNL TOTAL CA: CPT

## 2019-06-04 PROCEDURE — 74011250637 HC RX REV CODE- 250/637: Performed by: NURSE PRACTITIONER

## 2019-06-04 PROCEDURE — 74011250636 HC RX REV CODE- 250/636: Performed by: SURGERY

## 2019-06-04 PROCEDURE — 74011000258 HC RX REV CODE- 258: Performed by: SURGERY

## 2019-06-04 PROCEDURE — 74011250637 HC RX REV CODE- 250/637: Performed by: SURGERY

## 2019-06-04 PROCEDURE — 36415 COLL VENOUS BLD VENIPUNCTURE: CPT

## 2019-06-04 PROCEDURE — 85027 COMPLETE CBC AUTOMATED: CPT

## 2019-06-04 PROCEDURE — 65270000029 HC RM PRIVATE

## 2019-06-04 RX ORDER — POTASSIUM CHLORIDE 7.45 MG/ML
10 INJECTION INTRAVENOUS
Status: COMPLETED | OUTPATIENT
Start: 2019-06-04 | End: 2019-06-04

## 2019-06-04 RX ORDER — POTASSIUM CHLORIDE 750 MG/1
40 TABLET, FILM COATED, EXTENDED RELEASE ORAL EVERY 4 HOURS
Status: COMPLETED | OUTPATIENT
Start: 2019-06-04 | End: 2019-06-04

## 2019-06-04 RX ADMIN — HYDROMORPHONE HYDROCHLORIDE 2 MG: 2 TABLET ORAL at 08:46

## 2019-06-04 RX ADMIN — PIPERACILLIN SODIUM,TAZOBACTAM SODIUM 3.38 G: 3; .375 INJECTION, POWDER, FOR SOLUTION INTRAVENOUS at 07:13

## 2019-06-04 RX ADMIN — HYDROMORPHONE HYDROCHLORIDE 2 MG: 2 TABLET ORAL at 00:28

## 2019-06-04 RX ADMIN — DOCUSATE SODIUM 100 MG: 100 CAPSULE, LIQUID FILLED ORAL at 08:28

## 2019-06-04 RX ADMIN — PIPERACILLIN SODIUM,TAZOBACTAM SODIUM 3.38 G: 3; .375 INJECTION, POWDER, FOR SOLUTION INTRAVENOUS at 13:00

## 2019-06-04 RX ADMIN — POTASSIUM CHLORIDE 40 MEQ: 750 TABLET, EXTENDED RELEASE ORAL at 16:02

## 2019-06-04 RX ADMIN — CARVEDILOL 12.5 MG: 12.5 TABLET, FILM COATED ORAL at 16:02

## 2019-06-04 RX ADMIN — POTASSIUM CHLORIDE 10 MEQ: 7.46 INJECTION, SOLUTION INTRAVENOUS at 15:31

## 2019-06-04 RX ADMIN — SODIUM CHLORIDE, SODIUM LACTATE, POTASSIUM CHLORIDE, AND CALCIUM CHLORIDE 125 ML/HR: 600; 310; 30; 20 INJECTION, SOLUTION INTRAVENOUS at 05:04

## 2019-06-04 RX ADMIN — SODIUM CHLORIDE, SODIUM LACTATE, POTASSIUM CHLORIDE, AND CALCIUM CHLORIDE 125 ML/HR: 600; 310; 30; 20 INJECTION, SOLUTION INTRAVENOUS at 12:57

## 2019-06-04 RX ADMIN — CLONIDINE HYDROCHLORIDE 0.1 MG: 0.1 TABLET ORAL at 08:28

## 2019-06-04 RX ADMIN — Medication 10 ML: at 21:15

## 2019-06-04 RX ADMIN — Medication 10 ML: at 07:14

## 2019-06-04 RX ADMIN — PIPERACILLIN SODIUM,TAZOBACTAM SODIUM 3.38 G: 3; .375 INJECTION, POWDER, FOR SOLUTION INTRAVENOUS at 21:14

## 2019-06-04 RX ADMIN — FUROSEMIDE 40 MG: 40 TABLET ORAL at 08:28

## 2019-06-04 RX ADMIN — POTASSIUM CHLORIDE 40 MEQ: 750 TABLET, EXTENDED RELEASE ORAL at 12:57

## 2019-06-04 RX ADMIN — POTASSIUM CHLORIDE 10 MEQ: 7.46 INJECTION, SOLUTION INTRAVENOUS at 14:03

## 2019-06-04 RX ADMIN — DOCUSATE SODIUM 100 MG: 100 CAPSULE, LIQUID FILLED ORAL at 17:10

## 2019-06-04 RX ADMIN — Medication 10 ML: at 13:04

## 2019-06-04 RX ADMIN — HYDROMORPHONE HYDROCHLORIDE 2 MG: 2 TABLET ORAL at 04:46

## 2019-06-04 NOTE — PROGRESS NOTES
Progress Note    Patient: Sissy Ybarra MRN: [id number]  SSN: [id number]    YOB: 1970  Age: 54 y.o. Sex: male      Admit Date: 2019    2 Day Post-Op    Procedure:  Procedure(s):  SUPRA UMBILICAL HERNIA  REPAIR WITH MESH AND PRIMARY UMBILICAL HERNIA REPAIR    Subjective:     No acute surgical issues. Pt reported passing a little flatus but no BM yet. No nausea or vomiting. Pain is under control but worsened with movement.       Objective:     Visit Vitals  BP 92/69   Pulse 86   Temp 99.5 °F (37.5 °C)   Resp 18   Ht 5' 10\" (1.778 m)   Wt 193 lb 4.8 oz (87.7 kg)   SpO2 99%   BMI 27.74 kg/m²       Temp (24hrs), Av.3 °F (37.4 °C), Min:98.3 °F (36.8 °C), Max:99.7 °F (37.6 °C)        Physical Exam:    Gen:  NAD  Pulm:  Unlabored  Abd:  S/moderately distended/appropriate TTP  Wound:  C/D/I    Recent Results (from the past 24 hour(s))   CBC W/O DIFF    Collection Time: 19  2:32 AM   Result Value Ref Range    WBC 9.2 4.1 - 11.1 K/uL    RBC 3.42 (L) 4.10 - 5.70 M/uL    HGB 9.6 (L) 12.1 - 17.0 g/dL    HCT 31.2 (L) 36.6 - 50.3 %    MCV 91.2 80.0 - 99.0 FL    MCH 28.1 26.0 - 34.0 PG    MCHC 30.8 30.0 - 36.5 g/dL    RDW 14.4 11.5 - 14.5 %    PLATELET 762 053 - 691 K/uL    MPV 9.7 8.9 - 12.9 FL    NRBC 0.0 0  WBC    ABSOLUTE NRBC 0.00 0.00 - 3.29 K/uL   METABOLIC PANEL, BASIC    Collection Time: 19  2:32 AM   Result Value Ref Range    Sodium 140 136 - 145 mmol/L    Potassium 3.1 (L) 3.5 - 5.1 mmol/L    Chloride 106 97 - 108 mmol/L    CO2 26 21 - 32 mmol/L    Anion gap 8 5 - 15 mmol/L    Glucose 101 (H) 65 - 100 mg/dL    BUN 28 (H) 6 - 20 MG/DL    Creatinine 2.74 (H) 0.70 - 1.30 MG/DL    BUN/Creatinine ratio 10 (L) 12 - 20      GFR est AA 30 (L) >60 ml/min/1.73m2    GFR est non-AA 25 (L) >60 ml/min/1.73m2    Calcium 8.5 8.5 - 10.1 MG/DL       Assessment:     Hospital Problems  Never Reviewed          Codes Class Noted POA    Incarcerated ventral hernia ICD-10-CM: K43.6  ICD-9-CM: 552.20  6/1/2019 Unknown              Plan/Recommendations/Medical Decision Making:     - Continue antibiotic therapy  - Continue oral dilaudid with IV dilaudid for breakthrough pain  - Labs in am  - Nephrology consult if renal function not improving  - Out of bed as tolerated  - Hypokalemia:  Replete with 20 meq KCl given renal insufficiency

## 2019-06-04 NOTE — CDMP QUERY
#3    Pt admitted with incarcerated hernia /Pt noted to meet ASPEN criteria by nutritional consult. If possible, please document in the progress notes and d/c summary if you are evaluating and / or treating any of the following:     Severe Protein Calorie Malnutrition   Severe Cachexia    Other, please specify   Clinically unable to determine    The medical record reflects the following:    Risk Factors: hernia    Clinical Indicators:   Pt with 11% weight loss x 4 months, which is severe. He meets criteria for malnutrition-- discussed. Pt interested in trial of Ensure Enlive (strawberry), so RD to order BID. This will provide 700 kcal, 40 g protein. Will adjust prn per renal function, though his protein needs are high for post-op healing. Patient meets criteria for Severe Protein Calorie Malnutrition as evidenced by:   ASPEN Malnutrition Criteria  Acute Illness, Chronic Illness, or Social/Enviornmental: Acute illness  Energy Intake: Less than/equal to 50% est energy req for greater than/equal to 5 days  Weight Loss: Greater than 7.5% x 3 mos  Muscle Mass: Moderate  ASPEN Malnutrition Score - Acute Illness: 18  Acute Illness - Malnutrition Diagnosis: Severe malnutrition.          Treatment: nutritional consult, ensure enlive    Thank you,         Shira Diss [facility], [address]

## 2019-06-04 NOTE — PROGRESS NOTES
Problem: Pressure Injury - Risk of  Goal: *Prevention of pressure injury  Description  Document Vito Scale and appropriate interventions in the flowsheet. Outcome: Progressing Towards Goal  Note:   Pressure Injury Interventions:  Sensory Interventions: Assess changes in LOC, Avoid rigorous massage over bony prominences, Keep linens dry and wrinkle-free, Monitor skin under medical devices    Moisture Interventions: Absorbent underpads, Apply protective barrier, creams and emollients, Maintain skin hydration (lotion/cream)    Activity Interventions: Increase time out of bed, PT/OT evaluation    Mobility Interventions: Float heels, HOB 30 degrees or less    Nutrition Interventions: Document food/fluid/supplement intake    Friction and Shear Interventions: HOB 30 degrees or less, Lift sheet                Problem: Falls - Risk of  Goal: *Absence of Falls  Description  Document Kelton Fall Risk and appropriate interventions in the flowsheet.   Outcome: Progressing Towards Goal  Note:   Fall Risk Interventions:  Mobility Interventions: Strengthening exercises (ROM-active/passive), PT Consult for assist device competence, Communicate number of staff needed for ambulation/transfer         Medication Interventions: Evaluate medications/consider consulting pharmacy, Teach patient to arise slowly         History of Falls Interventions: Door open when patient unattended, Evaluate medications/consider consulting pharmacy

## 2019-06-05 VITALS
RESPIRATION RATE: 17 BRPM | HEIGHT: 70 IN | DIASTOLIC BLOOD PRESSURE: 102 MMHG | OXYGEN SATURATION: 99 % | BODY MASS INDEX: 28.77 KG/M2 | HEART RATE: 85 BPM | TEMPERATURE: 99.3 F | SYSTOLIC BLOOD PRESSURE: 146 MMHG | WEIGHT: 201 LBS

## 2019-06-05 LAB
ANION GAP SERPL CALC-SCNC: 9 MMOL/L (ref 5–15)
BUN SERPL-MCNC: 28 MG/DL (ref 6–20)
BUN/CREAT SERPL: 11 (ref 12–20)
CALCIUM SERPL-MCNC: 8.7 MG/DL (ref 8.5–10.1)
CHLORIDE SERPL-SCNC: 106 MMOL/L (ref 97–108)
CO2 SERPL-SCNC: 24 MMOL/L (ref 21–32)
CREAT SERPL-MCNC: 2.66 MG/DL (ref 0.7–1.3)
GLUCOSE SERPL-MCNC: 133 MG/DL (ref 65–100)
POTASSIUM SERPL-SCNC: 3.2 MMOL/L (ref 3.5–5.1)
SODIUM SERPL-SCNC: 139 MMOL/L (ref 136–145)

## 2019-06-05 PROCEDURE — 74011000258 HC RX REV CODE- 258: Performed by: SURGERY

## 2019-06-05 PROCEDURE — 80048 BASIC METABOLIC PNL TOTAL CA: CPT

## 2019-06-05 PROCEDURE — 74011250636 HC RX REV CODE- 250/636: Performed by: SURGERY

## 2019-06-05 PROCEDURE — 36415 COLL VENOUS BLD VENIPUNCTURE: CPT

## 2019-06-05 PROCEDURE — 74011250637 HC RX REV CODE- 250/637: Performed by: SURGERY

## 2019-06-05 RX ORDER — HYDROMORPHONE HYDROCHLORIDE 2 MG/1
2 TABLET ORAL
Qty: 30 TAB | Refills: 0 | Status: SHIPPED | OUTPATIENT
Start: 2019-06-05 | End: 2019-06-12

## 2019-06-05 RX ORDER — LISINOPRIL 20 MG/1
40 TABLET ORAL DAILY
Status: DISCONTINUED | OUTPATIENT
Start: 2019-06-05 | End: 2019-06-06 | Stop reason: HOSPADM

## 2019-06-05 RX ORDER — RAMIPRIL 10 MG/1
10 CAPSULE ORAL DAILY
Status: DISCONTINUED | OUTPATIENT
Start: 2019-06-06 | End: 2019-06-05

## 2019-06-05 RX ORDER — DOCUSATE SODIUM 100 MG/1
100 CAPSULE, LIQUID FILLED ORAL 2 TIMES DAILY
Qty: 30 CAP | Refills: 0 | Status: SHIPPED | OUTPATIENT
Start: 2019-06-05 | End: 2019-06-13

## 2019-06-05 RX ADMIN — CARVEDILOL 12.5 MG: 12.5 TABLET, FILM COATED ORAL at 16:27

## 2019-06-05 RX ADMIN — SODIUM CHLORIDE, SODIUM LACTATE, POTASSIUM CHLORIDE, AND CALCIUM CHLORIDE 125 ML/HR: 600; 310; 30; 20 INJECTION, SOLUTION INTRAVENOUS at 00:45

## 2019-06-05 RX ADMIN — LISINOPRIL 40 MG: 20 TABLET ORAL at 16:27

## 2019-06-05 RX ADMIN — HYDROMORPHONE HYDROCHLORIDE 2 MG: 2 TABLET ORAL at 00:18

## 2019-06-05 RX ADMIN — HYDROMORPHONE HYDROCHLORIDE 2 MG: 2 TABLET ORAL at 14:55

## 2019-06-05 RX ADMIN — PIPERACILLIN SODIUM,TAZOBACTAM SODIUM 3.38 G: 3; .375 INJECTION, POWDER, FOR SOLUTION INTRAVENOUS at 14:52

## 2019-06-05 RX ADMIN — SODIUM CHLORIDE, SODIUM LACTATE, POTASSIUM CHLORIDE, AND CALCIUM CHLORIDE 125 ML/HR: 600; 310; 30; 20 INJECTION, SOLUTION INTRAVENOUS at 08:55

## 2019-06-05 RX ADMIN — CARVEDILOL 12.5 MG: 12.5 TABLET, FILM COATED ORAL at 07:40

## 2019-06-05 RX ADMIN — ACETAMINOPHEN 650 MG: 325 TABLET ORAL at 07:42

## 2019-06-05 RX ADMIN — CLONIDINE HYDROCHLORIDE 0.1 MG: 0.1 TABLET ORAL at 04:10

## 2019-06-05 RX ADMIN — PIPERACILLIN SODIUM,TAZOBACTAM SODIUM 3.38 G: 3; .375 INJECTION, POWDER, FOR SOLUTION INTRAVENOUS at 05:00

## 2019-06-05 RX ADMIN — FUROSEMIDE 40 MG: 40 TABLET ORAL at 08:23

## 2019-06-05 RX ADMIN — HYDROMORPHONE HYDROCHLORIDE 2 MG: 2 TABLET ORAL at 04:54

## 2019-06-05 NOTE — DISCHARGE INSTRUCTIONS
1.  Do not drive while on pain medication. You should take a stool softener while on pain medication to prevent constipation. 2.  Do not lift anything greater than 10 pounds for 6 weeks. 3.  You may resume your home medications. 4.  You may shower but do not swim or soak in tub for 2 weeks. 5.  Please call [phone] to schedule a follow-up with Dr. David Luu within 2 weeks. Patient Education        Abdominal Hernia Repair: What to Expect at Home  Your Recovery  After surgery to repair your hernia, you are likely to have pain for a few days. You may also feel like you have the flu, and you may have a low fever and feel tired and nauseated. This is common. You should feel better after a few days and will probably feel much better in 7 days. For several weeks you may feel twinges or pulling in the hernia repair when you move. You may have some bruising around the area of your hernia repair. This is normal.  This care sheet gives you a general idea about how long it will take for you to recover. But each person recovers at a different pace. Follow the steps below to get better as quickly as possible. How can you care for yourself at home? Activity    · Rest when you feel tired. Getting enough sleep will help you recover.     · Try to walk each day. Start by walking a little more than you did the day before. Bit by bit, increase the amount you walk. Walking boosts blood flow and helps prevent pneumonia and constipation.     · If your doctor gives you an abdominal binder to wear, use it as directed. This is an elastic bandage that wraps around your belly and upper hips. It helps support your belly muscles after surgery.     · Avoid strenuous activities, such as biking, jogging, weight lifting, or aerobic exercise, until your doctor says it is okay.     · Avoid lifting anything that would make you strain.  This may include heavy grocery bags and milk containers, a heavy briefcase or backpack, cat litter or dog food bags, a vacuum , or a child.     · Ask your doctor when you can drive again.     · Most people are able to return to work within 1 to 2 weeks after surgery. But if your job requires that you to do heavy lifting or strenuous activity, you may need to take 4 to 6 weeks off from work.     · You may shower 24 to 48 hours after surgery, if your doctor okays it. Pat the cut (incision) dry. Do not take a bath for the first 2 weeks, or until your doctor tells you it is okay.     · Ask your doctor when it is okay for you to have sex. Diet    · You can eat your normal diet. If your stomach is upset, try bland, low-fat foods like plain rice, broiled chicken, toast, and yogurt.     · Drink plenty of fluids (unless your doctor tells you not to).     · You may notice that your bowel movements are not regular right after your surgery. This is common. Avoid constipation and straining with bowel movements. You may want to take a fiber supplement every day. If you have not had a bowel movement after a couple of days, ask your doctor about taking a mild laxative. Medicines    · Your doctor will tell you if and when you can restart your medicines. He or she will also give you instructions about taking any new medicines.     · If you take blood thinners, such as warfarin (Coumadin), clopidogrel (Plavix), or aspirin, be sure to talk to your doctor. He or she will tell you if and when to start taking those medicines again. Make sure that you understand exactly what your doctor wants you to do.     · Be safe with medicines. Take pain medicines exactly as directed. ? If the doctor gave you a prescription medicine for pain, take it as prescribed. ? If you are not taking a prescription pain medicine, ask your doctor if you can take an over-the-counter medicine.     · If your doctor prescribed antibiotics, take them as directed. Do not stop taking them just because you feel better. You need to take the full course of antibiotics.   · If you think your pain medicine is making you sick to your stomach:  ? Take your medicine after meals (unless your doctor has told you not to). ? Ask your doctor for a different pain medicine. Incision care    · If you have strips of tape on the cut (incision) the doctor made, leave the tape on for a week or until it falls off. Or follow your doctor's instructions for removing the tape.     · If you have staples closing the cut, you will need to visit your doctor in 1 to 2 weeks to have them removed.     · Wash the area daily with warm, soapy water, and pat it dry. Don't use hydrogen peroxide or alcohol, which can slow healing. You may cover the area with a gauze bandage if it weeps or rubs against clothing. Change the bandage every day. Other instructions    · Hold a pillow over your incision when you cough or take deep breaths. This will support your belly and decrease your pain.     · Do breathing exercises at home as instructed by your doctor. This will help prevent pneumonia.     · If you had laparoscopic surgery, you may also have pain in your left shoulder. The pain usually lasts about a day or two. Follow-up care is a key part of your treatment and safety. Be sure to make and go to all appointments, and call your doctor if you are having problems. It's also a good idea to know your test results and keep a list of the medicines you take. When should you call for help? Call 911 anytime you think you may need emergency care. For example, call if:    · You passed out (lost consciousness).     · You are short of breath.    Call your doctor now or seek immediate medical care if:    · You are sick to your stomach and cannot drink fluids.     · You have signs of a blood clot in your leg (called a deep vein thrombosis), such as:  ? Pain in your calf, back of the knee, thigh, or groin. ?  Redness and swelling in your leg or groin.     · You have signs of infection, such as:  ? Increased pain, swelling, warmth, or redness. ? Red streaks leading from the incision. ? Pus draining from the incision. ? A fever.     · You cannot pass stools or gas.     · You have pain that does not get better after you take pain medicine.     · You have loose stitches, or your incision comes open.     · Bright red blood has soaked through the bandage over your incision.    Watch closely for changes in your health, and be sure to contact your doctor if you have any problems. Where can you learn more? Go to http://donald-pedro.info/. Enter B577 in the search box to learn more about \"Abdominal Hernia Repair: What to Expect at Home. \"  Current as of: March 27, 2018  Content Version: 11.9  © 1907-7918 [employer]. Care instructions adapted under license by [employer] (which disclaims liability or warranty for this information). If you have questions about a medical condition or this instruction, always ask your healthcare professional. [place] [address] any warranty or liability for your use of this information.

## 2019-06-05 NOTE — PROGRESS NOTES
General Surgery Daily Progress Note    Admit Date: 2019  Post-Operative Day: 4 Days Post-Op from Procedure(s):  SUPRA UMBILICAL HERNIA  REPAIR WITH MESH AND PRIMARY UMBILICAL HERNIA REPAIR     Subjective:     Last 24 hrs: Pt c/o R knee pain; no abd pain. Attributes knee pain to arthritis and not walking as much as he usually does. +BM and tolerating gi lite diet   BP up    Objective:     Blood pressure (!) 149/106, pulse 99, temperature 98.4 °F (36.9 °C), resp. rate 16, height 5' 10\" (1.778 m), weight 201 lb (91.2 kg), SpO2 100 %. Temp (24hrs), Av.8 °F (37.1 °C), Min:98.4 °F (36.9 °C), Max:99.2 °F (37.3 °C)      _____________________  Physical Exam:     Alert and Oriented, x3, in no acute distress. Cardiovascular: RRR, no peripheral edema  Lungs:CTAB   Abdomen: soft, +BS, Incision above umbilicus is c/d/i      Assessment:   Active Problems:    Incarcerated ventral hernia (2019)            Plan:     Can go home this afternoon once a ride has been arranged  Ambulate  Cont diet    Data Review:    Recent Labs     19  0232   WBC 9.2   HGB 9.6*   HCT 31.2*        Recent Labs     19  1030 19  0232    140   K 3.2* 3.1*    106   CO2 24 26   * 101*   BUN 28* 28*   CREA 2.66* 2.74*   CA 8.7 8.5     No results for input(s): AML, LPSE in the last 72 hours.         ______________________  Medications:    Current Facility-Administered Medications   Medication Dose Route Frequency    HYDROmorphone (DILAUDID) tablet 2 mg  2 mg Oral Q4H PRN    carvedilol (COREG) tablet 12.5 mg  12.5 mg Oral BID WITH MEALS    furosemide (LASIX) tablet 40 mg  40 mg Oral DAILY    acetaminophen (TYLENOL) tablet 650 mg  650 mg Oral Q6H PRN    docusate sodium (COLACE) capsule 100 mg  100 mg Oral BID    piperacillin-tazobactam (ZOSYN) 3.375 g in 0.9% sodium chloride (MBP/ADV) 100 mL  3.375 g IntraVENous Q8H    SALINE PERIPHERAL FLUSH Q8H soln 5-40 mL  5-40 mL InterCATHeter Q8H    cloNIDine HCl (CATAPRES) tablet 0.1 mg  0.1 mg Oral Q4H PRN    lactated Ringers infusion  125 mL/hr IntraVENous CONTINUOUS    HYDROmorphone (PF) (DILAUDID) injection 1 mg  1 mg IntraVENous Q3H PRN    ondansetron (ZOFRAN) injection 4 mg  4 mg IntraVENous Q4H PRN       Mc Hernandez NP  6/5/2019        ADDENDUM:  Jose Eduardo Gaviria MD  Pt seen and examined. Pt reported right knee pain. Has hx gout flare up. No nausea or vomiting and pt is having BM. No fever or chills. Abdomen is soft and non-distended. Appropriate TTP. Gi lite diet. Pain control.   Plan DC home this afternoon

## 2019-06-05 NOTE — PROGRESS NOTES
Discharge instructions done with patient at the bedside. Hardcopy of prescriptions were provided and patient verbalizes understanding of picking up the medications from his preferred pharmacy. Patient verbalizes and demonstrates all discharge instructions and states that he has no further questions or concerns at this time. Patient IV access removed. Patient will be discharged with his girlfriend via private vehicle.  Tech will wheel patient down to discharge lot

## 2019-06-05 NOTE — ANCILLARY DISCHARGE INSTRUCTIONS
6/5/2019      RE: Robson Thompson      To Whom it May Concern: This is to certify that Robson Thompson was admitted to the hospital for an emergency operation on June 1, 2019. He may return to work on Monday, July 15, 2019. Please feel free to contact my office at [phone] if you have any questions or concerns. Thank you for your assistance in this matter.     Sincerely,      Sam Huerta MD

## 2019-06-10 ENCOUNTER — OFFICE VISIT (OUTPATIENT)
Dept: SURGERY | Age: 49
End: 2019-06-10

## 2019-06-10 VITALS
DIASTOLIC BLOOD PRESSURE: 80 MMHG | BODY MASS INDEX: 29.35 KG/M2 | TEMPERATURE: 98.5 F | SYSTOLIC BLOOD PRESSURE: 110 MMHG | HEART RATE: 95 BPM | RESPIRATION RATE: 18 BRPM | WEIGHT: 205 LBS | HEIGHT: 70 IN | OXYGEN SATURATION: 98 %

## 2019-06-10 DIAGNOSIS — K43.6 INCARCERATED VENTRAL HERNIA: ICD-10-CM

## 2019-06-10 DIAGNOSIS — Z09 POSTOPERATIVE EXAMINATION: Primary | ICD-10-CM

## 2019-06-10 DIAGNOSIS — Z98.890 S/P HERNIA REPAIR: ICD-10-CM

## 2019-06-10 DIAGNOSIS — Z87.19 S/P HERNIA REPAIR: ICD-10-CM

## 2019-06-10 RX ORDER — ALLOPURINOL 100 MG/1
50 TABLET ORAL DAILY
COMMUNITY
End: 2019-06-13

## 2019-06-10 NOTE — PROGRESS NOTES
1. Have you been to the ER, urgent care clinic since your last visit? Hospitalized since your last visit? No    2. Have you seen or consulted any other health care providers outside of the [address] [place] [address] since your last visit? Include any pap smears or colon screening.  no

## 2019-06-10 NOTE — PATIENT INSTRUCTIONS
As of today, you may lift up to 15 lbs for another week and then increase to 25 lbs the following week. On the third week from now, you may lift 35-40 lbs as tolerated. Thereafter, gradually increase weight limit as tolerated. If at any time any lifting or activity causes you pain, please avoid it until pain improves. If you had an inguinal hernia repair, do not perform any biking for 6 weeks after your surgery. Abdominal Hernia Repair: What to Expect at Home  Your Recovery  After surgery to repair your hernia, you are likely to have pain for a few days. You may also feel like you have the flu, and you may have a low fever and feel tired and nauseated. This is common. You should feel better after a few days and will probably feel much better in 7 days. For several weeks you may feel twinges or pulling in the hernia repair when you move. You may have some bruising around the area of your hernia repair. This is normal.  This care sheet gives you a general idea about how long it will take for you to recover. But each person recovers at a different pace. Follow the steps below to get better as quickly as possible. How can you care for yourself at home? Activity    · Rest when you feel tired. Getting enough sleep will help you recover.     · Try to walk each day. Start by walking a little more than you did the day before. Bit by bit, increase the amount you walk. Walking boosts blood flow and helps prevent pneumonia and constipation.     · If your doctor gives you an abdominal binder to wear, use it as directed. This is an elastic bandage that wraps around your belly and upper hips. It helps support your belly muscles after surgery.     · Avoid strenuous activities, such as biking, jogging, weight lifting, or aerobic exercise, until your doctor says it is okay.     · Avoid lifting anything that would make you strain.  This may include heavy grocery bags and milk containers, a heavy briefcase or backpack, cat litter or dog food bags, a vacuum , or a child.     · Ask your doctor when you can drive again.     · Most people are able to return to work within 1 to 2 weeks after surgery. But if your job requires that you to do heavy lifting or strenuous activity, you may need to take 4 to 6 weeks off from work.     · You may shower 24 to 48 hours after surgery, if your doctor okays it. Pat the cut (incision) dry. Do not take a bath for the first 2 weeks, or until your doctor tells you it is okay.     · Ask your doctor when it is okay for you to have sex. Diet    · You can eat your normal diet. If your stomach is upset, try bland, low-fat foods like plain rice, broiled chicken, toast, and yogurt.     · Drink plenty of fluids (unless your doctor tells you not to).     · You may notice that your bowel movements are not regular right after your surgery. This is common. Avoid constipation and straining with bowel movements. You may want to take a fiber supplement every day. If you have not had a bowel movement after a couple of days, ask your doctor about taking a mild laxative. Medicines    · Your doctor will tell you if and when you can restart your medicines. He or she will also give you instructions about taking any new medicines.     · If you take blood thinners, such as warfarin (Coumadin), clopidogrel (Plavix), or aspirin, be sure to talk to your doctor. He or she will tell you if and when to start taking those medicines again. Make sure that you understand exactly what your doctor wants you to do.     · Be safe with medicines. Take pain medicines exactly as directed. ? If the doctor gave you a prescription medicine for pain, take it as prescribed. ? If you are not taking a prescription pain medicine, ask your doctor if you can take an over-the-counter medicine.     · If your doctor prescribed antibiotics, take them as directed. Do not stop taking them just because you feel better.  You need to take the full course of antibiotics.     · If you think your pain medicine is making you sick to your stomach:  ? Take your medicine after meals (unless your doctor has told you not to). ? Ask your doctor for a different pain medicine. Incision care    · If you have strips of tape on the cut (incision) the doctor made, leave the tape on for a week or until it falls off. Or follow your doctor's instructions for removing the tape.     · If you have staples closing the cut, you will need to visit your doctor in 1 to 2 weeks to have them removed.     · Wash the area daily with warm, soapy water, and pat it dry. Don't use hydrogen peroxide or alcohol, which can slow healing. You may cover the area with a gauze bandage if it weeps or rubs against clothing. Change the bandage every day. Other instructions    · Hold a pillow over your incision when you cough or take deep breaths. This will support your belly and decrease your pain.     · Do breathing exercises at home as instructed by your doctor. This will help prevent pneumonia.     · If you had laparoscopic surgery, you may also have pain in your left shoulder. The pain usually lasts about a day or two. Follow-up care is a key part of your treatment and safety. Be sure to make and go to all appointments, and call your doctor if you are having problems. It's also a good idea to know your test results and keep a list of the medicines you take. When should you call for help? Call 911 anytime you think you may need emergency care. For example, call if:    · You passed out (lost consciousness).     · You are short of breath.    Call your doctor now or seek immediate medical care if:    · You are sick to your stomach and cannot drink fluids.     · You have signs of a blood clot in your leg (called a deep vein thrombosis), such as:  ? Pain in your calf, back of the knee, thigh, or groin. ?  Redness and swelling in your leg or groin.     · You have signs of infection, such as:  ? Increased pain, swelling, warmth, or redness. ? Red streaks leading from the incision. ? Pus draining from the incision. ? A fever.     · You cannot pass stools or gas.     · You have pain that does not get better after you take pain medicine.     · You have loose stitches, or your incision comes open.     · Bright red blood has soaked through the bandage over your incision.    Watch closely for changes in your health, and be sure to contact your doctor if you have any problems. Where can you learn more? Go to http://donald-pedro.info/. Enter B577 in the search box to learn more about \"Abdominal Hernia Repair: What to Expect at Home. \"  Current as of: March 27, 2018  Content Version: 11.9  © 9907-5678 [employer]. Care instructions adapted under license by [employer] (which disclaims liability or warranty for this information). If you have questions about a medical condition or this instruction, always ask your healthcare professional. [address] any warranty or liability for your use of this information.

## 2019-06-10 NOTE — PROGRESS NOTES
Subjective: Marialuisa Hutchinson is a 54 y.o. male presents for postop care 9 days following open supraumbilical hernia repair with mesh and primary umbilical hernia repair by Dr. Verona Martinez. Appetite is good; enjoys eating now without pain. Eating a regular diet  without difficulty. Bowel movements are  regular. Patient's main source of pain is his gout flared that he had post surgically because he didn't get colchicine while in the hospital. That has now been stopped and is on allupurinol. Denies fever, nausea, shortness of breath, chest pain, redness at incision site, vomiting and diarrhea. Pt has note from Dr. Verona Martinez to remain out of work until July 15, 2019. Advance directive not on file      Objective:     Visit Vitals  /80   Pulse 95   Temp 98.5 °F (36.9 °C) (Oral)   Resp 18   Ht 5' 10\" (1.778 m)   Wt 205 lb (93 kg)   SpO2 98%   BMI 29.41 kg/m²       General:  alert, no distress   Abdomen: soft, bowel sounds active, non-tender   Incision:   healing well, no drainage, no erythema, no seroma, appropriate post op swelling, no dehiscence, incisions well approximated   Heart: regular rate and rhythm, S1, S2 normal, no murmur, click, rub or gallop   Lungs: clear to auscultation bilaterally       Assessment:     Supraumbilical and umbilical hernia status post repair. Doing well postoperatively. Plan:     1. Pt is to increase activities as tolerated. May lift 15 lbs starting today x 1 week, then increase to 25 lbs x 1 week and increase slowly thereafter. Pt will bring his [facility] paperwork to be filled out- return to work on 7/15/19 with regular duties. 2. Follow-up prn.  3. Wound care discussed    Mr. Kush Flood has a reminder for a \"due or due soon\" health maintenance. I have asked that he contact his primary care provider for follow-up on this health maintenance. Patient verbalized understanding and agreement.

## 2019-06-13 ENCOUNTER — OFFICE VISIT (OUTPATIENT)
Dept: RHEUMATOLOGY | Age: 49
End: 2019-06-13

## 2019-06-13 VITALS
SYSTOLIC BLOOD PRESSURE: 146 MMHG | RESPIRATION RATE: 18 BRPM | TEMPERATURE: 98 F | BODY MASS INDEX: 29.06 KG/M2 | HEIGHT: 70 IN | DIASTOLIC BLOOD PRESSURE: 99 MMHG | WEIGHT: 203 LBS | HEART RATE: 75 BPM

## 2019-06-13 DIAGNOSIS — Q61.2 AUTOSOMAL DOMINANT ADULT POLYCYSTIC KIDNEY DISEASE: ICD-10-CM

## 2019-06-13 DIAGNOSIS — N18.30 CKD (CHRONIC KIDNEY DISEASE) STAGE 3, GFR 30-59 ML/MIN (HCC): ICD-10-CM

## 2019-06-13 DIAGNOSIS — I10 ESSENTIAL HYPERTENSION: ICD-10-CM

## 2019-06-13 DIAGNOSIS — M1A.39X1 CHRONIC TOPHACEOUS GOUT OF MULTIPLE SITES DUE TO RENAL IMPAIRMENT: Primary | ICD-10-CM

## 2019-06-13 RX ORDER — CHLORTHALIDONE 25 MG/1
TABLET ORAL DAILY
COMMUNITY
End: 2019-06-13

## 2019-06-13 RX ORDER — LEFLUNOMIDE 20 MG/1
20 TABLET ORAL DAILY
Qty: 90 TAB | Refills: 0 | Status: SHIPPED | OUTPATIENT
Start: 2019-06-13 | End: 2019-09-12 | Stop reason: SDUPTHER

## 2019-06-13 RX ORDER — COLCHICINE 0.6 MG/1
0.6 TABLET ORAL
Qty: 90 TAB | Refills: 4 | Status: SHIPPED | OUTPATIENT
Start: 2019-06-13 | End: 2019-09-12 | Stop reason: SDUPTHER

## 2019-06-13 RX ORDER — COLCHICINE 0.6 MG/1
0.6 TABLET ORAL DAILY
COMMUNITY
End: 2019-06-13 | Stop reason: SDUPTHER

## 2019-06-13 NOTE — LETTER
6/13/19 Patient: Yolette Barber YOB: 1970 Date of Visit: 6/13/2019 Jai Flores NP 
[address] [place] [address] [address] [id number] [place] [address] VIA Facsimile: [phone] Dear Jai Flores NP, Thank you for referring Mr. Philmore Soulier to [facility] for evaluation. My notes for this consultation are attached. If you have questions, please do not hesitate to call me. I look forward to following your patient along with you.  
 
 
Sincerely, 
 
Farhana Cheatham MD

## 2019-06-14 LAB
ALBUMIN SERPL-MCNC: 3.5 G/DL (ref 3.5–5.5)
ALBUMIN/GLOB SERPL: 1.2 {RATIO} (ref 1.2–2.2)
ALP SERPL-CCNC: 49 IU/L (ref 39–117)
ALT SERPL-CCNC: 28 IU/L (ref 0–44)
AST SERPL-CCNC: 19 IU/L (ref 0–40)
BASOPHILS # BLD AUTO: 0 X10E3/UL (ref 0–0.2)
BASOPHILS NFR BLD AUTO: 0 %
BILIRUB SERPL-MCNC: 0.2 MG/DL (ref 0–1.2)
BUN SERPL-MCNC: 41 MG/DL (ref 6–24)
BUN/CREAT SERPL: 16 (ref 9–20)
CALCIUM SERPL-MCNC: 8.6 MG/DL (ref 8.7–10.2)
CHLORIDE SERPL-SCNC: 103 MMOL/L (ref 96–106)
CO2 SERPL-SCNC: 19 MMOL/L (ref 20–29)
CREAT SERPL-MCNC: 2.55 MG/DL (ref 0.76–1.27)
EOSINOPHIL # BLD AUTO: 0.4 X10E3/UL (ref 0–0.4)
EOSINOPHIL NFR BLD AUTO: 5 %
ERYTHROCYTE [DISTWIDTH] IN BLOOD BY AUTOMATED COUNT: 14.7 % (ref 12.3–15.4)
G6PD BLD QN: 328 U/10E12 RBC (ref 146–376)
GLOBULIN SER CALC-MCNC: 3 G/DL (ref 1.5–4.5)
GLUCOSE SERPL-MCNC: 74 MG/DL (ref 65–99)
HCT VFR BLD AUTO: 30.8 % (ref 37.5–51)
HGB BLD-MCNC: 9.4 G/DL (ref 13–17.7)
IMM GRANULOCYTES # BLD AUTO: 0 X10E3/UL (ref 0–0.1)
IMM GRANULOCYTES NFR BLD AUTO: 0 %
LYMPHOCYTES # BLD AUTO: 1.9 X10E3/UL (ref 0.7–3.1)
LYMPHOCYTES NFR BLD AUTO: 26 %
MCH RBC QN AUTO: 27.1 PG (ref 26.6–33)
MCHC RBC AUTO-ENTMCNC: 30.5 G/DL (ref 31.5–35.7)
MCV RBC AUTO: 89 FL (ref 79–97)
MONOCYTES # BLD AUTO: 0.5 X10E3/UL (ref 0.1–0.9)
MONOCYTES NFR BLD AUTO: 7 %
NEUTROPHILS # BLD AUTO: 4.6 X10E3/UL (ref 1.4–7)
NEUTROPHILS NFR BLD AUTO: 62 %
PLATELET # BLD AUTO: 492 X10E3/UL (ref 150–450)
POTASSIUM SERPL-SCNC: 4.1 MMOL/L (ref 3.5–5.2)
PROT SERPL-MCNC: 6.5 G/DL (ref 6–8.5)
RBC # BLD AUTO: 3.47 X10E6/UL (ref 4.14–5.8)
SODIUM SERPL-SCNC: 141 MMOL/L (ref 134–144)
URATE SERPL-MCNC: 9.4 MG/DL (ref 3.7–8.6)
WBC # BLD AUTO: 7.4 X10E3/UL (ref 3.4–10.8)

## 2019-06-16 NOTE — PROGRESS NOTES
The results were reviewed. Uric acid 9.4 mg/dL. Target less than 4. Normal G6PD. Cleared for Harlin Capuchin. Stable anemia and chronic kidney disease. Elevated platelets which could be from anemia versus inflammation.

## 2019-06-17 NOTE — DISCHARGE SUMMARY
Physician Discharge Summary     Patient ID:  Melecio Botello  [id number]  81 y.o.  1970    Allergies: Shellfish containing products    Admit Date: 6/1/2019    Discharge Date: 6/5/2019    * Admission Diagnoses: Incarcerated ventral hernia [K43.6]    * Discharge Diagnoses:    Hospital Problems as of 6/5/2019 Never Reviewed          Codes Class Noted - Resolved POA    Incarcerated ventral hernia ICD-10-CM: K43.6  ICD-9-CM: 552.20  6/1/2019 - Present Unknown               Admission Condition: Fair    * Discharge Condition: good    * Procedures: Procedure(s):  SUPRA UMBILICAL HERNIA  REPAIR WITH MESH AND PRIMARY UMBILICAL HERNIA REPAIR    * Hospital Course:   55 yo man with known supraumbilical hernia for two years who presented with incarcerated umbilical hernia causing small bowel obstruction. He was taken to emergently to OR for open ventral hernia repair with mesh. He did well post-operative and was placed on a diet when his small bowel obstruction improved. He was discharged home once he tolerated a diet and his pain was under control. Consults: None    Significant Diagnostic Studies: radiology: CT scan: Abdomen and pelvis    * Disposition: Home    Discharge Medications:   Discharge Medication List as of 6/5/2019  4:24 PM          * Follow-up Care/Patient Instructions:   Activity: No lifting, Driving, or Strenuous exercise for 6 weeks  Diet: Regular Diet  Wound Care: Keep wound clean and dry    Follow-up Information     Follow up With Specialties Details Why Contact Info    None    None ([phone]) Patient stated that they have no PCP          Follow-up tests/labs None    Signed:  Antwon Morrison MD  6/17/2019  9:00 AM

## 2019-06-20 ENCOUNTER — TELEPHONE (OUTPATIENT)
Dept: RHEUMATOLOGY | Age: 49
End: 2019-06-20

## 2019-06-20 RX ORDER — LOSARTAN POTASSIUM 25 MG/1
25 TABLET ORAL DAILY
Qty: 90 TAB | Refills: 0 | Status: CANCELLED | OUTPATIENT
Start: 2019-06-20

## 2019-06-20 NOTE — TELEPHONE ENCOUNTER
Spoke with pt who called stating that his new BP medication was never sent in because Dr. Lam Ruiz was taking him off chlorthalidone. I looked back at Dr. Lee Weiner notes and he stated that Dr. Starr Baird was going to prescribe him losartan. I instructed him to call their office and have him prescribe him the Losartan. He stated an understanding and will call back if he has any problems.

## 2019-06-20 NOTE — TELEPHONE ENCOUNTER
----- Message from Anderson Osborne sent at 6/19/2019  3:17 PM EDT -----  Regarding: Dr. Hammad Pearson  Contact: [phone]  Pt requesting new BP medication (Name unknown), stated doctor told him that he was going to put him on a new medication for BP but when he went to pick it up it was the same medication that he normally take. Stated the old medication cause Gout.  [address] (on file)

## 2019-06-25 RX ORDER — DIPHENHYDRAMINE HYDROCHLORIDE 50 MG/ML
25 INJECTION, SOLUTION INTRAMUSCULAR; INTRAVENOUS
Status: DISCONTINUED | OUTPATIENT
Start: 2019-06-28 | End: 2019-07-02 | Stop reason: HOSPADM

## 2019-06-25 RX ORDER — ACETAMINOPHEN 325 MG/1
650 TABLET ORAL ONCE
Status: COMPLETED | OUTPATIENT
Start: 2019-06-28 | End: 2019-06-28

## 2019-06-25 RX ORDER — DIPHENHYDRAMINE HCL 25 MG
25 CAPSULE ORAL ONCE
Status: COMPLETED | OUTPATIENT
Start: 2019-06-28 | End: 2019-06-28

## 2019-06-25 RX ORDER — FAMOTIDINE 10 MG/ML
20 INJECTION INTRAVENOUS ONCE
Status: COMPLETED | OUTPATIENT
Start: 2019-06-28 | End: 2019-06-28

## 2019-06-25 RX ORDER — ACETAMINOPHEN 325 MG/1
650 TABLET ORAL
Status: DISCONTINUED | OUTPATIENT
Start: 2019-06-28 | End: 2019-07-02 | Stop reason: HOSPADM

## 2019-06-28 ENCOUNTER — HOSPITAL ENCOUNTER (OUTPATIENT)
Dept: INFUSION THERAPY | Age: 49
Discharge: HOME | End: 2019-06-28
Payer: COMMERCIAL

## 2019-06-28 VITALS
DIASTOLIC BLOOD PRESSURE: 93 MMHG | SYSTOLIC BLOOD PRESSURE: 145 MMHG | HEART RATE: 77 BPM | RESPIRATION RATE: 18 BRPM | TEMPERATURE: 97.9 F

## 2019-06-28 LAB
ALBUMIN SERPL-MCNC: 3.1 G/DL (ref 3.5–5)
ALBUMIN/GLOB SERPL: 0.9 {RATIO} (ref 1.1–2.2)
ALP SERPL-CCNC: 50 U/L (ref 45–117)
ALT SERPL-CCNC: 26 U/L (ref 12–78)
ANION GAP SERPL CALC-SCNC: 11 MMOL/L (ref 5–15)
AST SERPL-CCNC: 38 U/L (ref 15–37)
BASOPHILS # BLD: 0 K/UL (ref 0–0.1)
BASOPHILS NFR BLD: 0 % (ref 0–1)
BILIRUB SERPL-MCNC: 0.4 MG/DL (ref 0.2–1)
BUN SERPL-MCNC: 40 MG/DL (ref 6–20)
BUN/CREAT SERPL: 13 (ref 12–20)
CALCIUM SERPL-MCNC: 8 MG/DL (ref 8.5–10.1)
CHLORIDE SERPL-SCNC: 111 MMOL/L (ref 97–108)
CO2 SERPL-SCNC: 20 MMOL/L (ref 21–32)
CREAT SERPL-MCNC: 3.07 MG/DL (ref 0.7–1.3)
DIFFERENTIAL METHOD BLD: ABNORMAL
EOSINOPHIL # BLD: 0.6 K/UL (ref 0–0.4)
EOSINOPHIL NFR BLD: 10 % (ref 0–7)
ERYTHROCYTE [DISTWIDTH] IN BLOOD BY AUTOMATED COUNT: 16.8 % (ref 11.5–14.5)
ERYTHROCYTE [SEDIMENTATION RATE] IN BLOOD: 20 MM/HR (ref 0–15)
GLOBULIN SER CALC-MCNC: 3.5 G/DL (ref 2–4)
GLUCOSE SERPL-MCNC: 145 MG/DL (ref 65–100)
HCT VFR BLD AUTO: 29.1 % (ref 36.6–50.3)
HGB BLD-MCNC: 8.7 G/DL (ref 12.1–17)
IMM GRANULOCYTES # BLD AUTO: 0 K/UL (ref 0–0.04)
IMM GRANULOCYTES NFR BLD AUTO: 0 % (ref 0–0.5)
LYMPHOCYTES # BLD: 1.5 K/UL (ref 0.8–3.5)
LYMPHOCYTES NFR BLD: 26 % (ref 12–49)
MCH RBC QN AUTO: 27.6 PG (ref 26–34)
MCHC RBC AUTO-ENTMCNC: 29.9 G/DL (ref 30–36.5)
MCV RBC AUTO: 92.4 FL (ref 80–99)
MONOCYTES # BLD: 0.3 K/UL (ref 0–1)
MONOCYTES NFR BLD: 5 % (ref 5–13)
NEUTS SEG # BLD: 3.6 K/UL (ref 1.8–8)
NEUTS SEG NFR BLD: 59 % (ref 32–75)
NRBC # BLD: 0 K/UL (ref 0–0.01)
NRBC BLD-RTO: 0 PER 100 WBC
PLATELET # BLD AUTO: 222 K/UL (ref 150–400)
PMV BLD AUTO: 11.4 FL (ref 8.9–12.9)
POTASSIUM SERPL-SCNC: 4.9 MMOL/L (ref 3.5–5.1)
PROT SERPL-MCNC: 6.6 G/DL (ref 6.4–8.2)
RBC # BLD AUTO: 3.15 M/UL (ref 4.1–5.7)
SODIUM SERPL-SCNC: 142 MMOL/L (ref 136–145)
URATE SERPL-MCNC: 9.4 MG/DL (ref 3.5–7.2)
WBC # BLD AUTO: 6 K/UL (ref 4.1–11.1)

## 2019-06-28 PROCEDURE — 80053 COMPREHEN METABOLIC PANEL: CPT

## 2019-06-28 PROCEDURE — 74011250637 HC RX REV CODE- 250/637: Performed by: INTERNAL MEDICINE

## 2019-06-28 PROCEDURE — 74011250636 HC RX REV CODE- 250/636: Performed by: INTERNAL MEDICINE

## 2019-06-28 PROCEDURE — 96365 THER/PROPH/DIAG IV INF INIT: CPT

## 2019-06-28 PROCEDURE — 85652 RBC SED RATE AUTOMATED: CPT

## 2019-06-28 PROCEDURE — 85025 COMPLETE CBC W/AUTO DIFF WBC: CPT

## 2019-06-28 PROCEDURE — 36415 COLL VENOUS BLD VENIPUNCTURE: CPT

## 2019-06-28 PROCEDURE — 96366 THER/PROPH/DIAG IV INF ADDON: CPT

## 2019-06-28 PROCEDURE — 96375 TX/PRO/DX INJ NEW DRUG ADDON: CPT

## 2019-06-28 PROCEDURE — 84550 ASSAY OF BLOOD/URIC ACID: CPT

## 2019-06-28 RX ADMIN — METHYLPREDNISOLONE SODIUM SUCCINATE 125 MG: 125 INJECTION, POWDER, FOR SOLUTION INTRAMUSCULAR; INTRAVENOUS at 15:10

## 2019-06-28 RX ADMIN — DIPHENHYDRAMINE HYDROCHLORIDE 25 MG: 25 CAPSULE ORAL at 15:06

## 2019-06-28 RX ADMIN — PEGLOTICASE 8 MG: 8 INJECTION, SOLUTION INTRAVENOUS at 15:50

## 2019-06-28 RX ADMIN — FAMOTIDINE 20 MG: 10 INJECTION, SOLUTION INTRAVENOUS at 15:08

## 2019-06-28 RX ADMIN — ACETAMINOPHEN 650 MG: 325 TABLET ORAL at 15:05

## 2019-06-28 NOTE — PROGRESS NOTES
[facility] VISIT NOTE    1410  Pt arrived at [facility] ambulatory and in no distress for Krystexxa infusion. Assessment completed, no acute complaints at this time. PIV started with no difficulty. Positive blood return noted and labs drawn. Recent Results (from the past 12 hour(s))   CBC WITH AUTOMATED DIFF    Collection Time: 06/28/19  2:34 PM   Result Value Ref Range    WBC 6.0 4.1 - 11.1 K/uL    RBC 3.15 (L) 4.10 - 5.70 M/uL    HGB 8.7 (L) 12.1 - 17.0 g/dL    HCT 29.1 (L) 36.6 - 50.3 %    MCV 92.4 80.0 - 99.0 FL    MCH 27.6 26.0 - 34.0 PG    MCHC 29.9 (L) 30.0 - 36.5 g/dL    RDW 16.8 (H) 11.5 - 14.5 %    PLATELET 779 250 - 187 K/uL    MPV 11.4 8.9 - 12.9 FL    NRBC 0.0 0  WBC    ABSOLUTE NRBC 0.00 0.00 - 0.01 K/uL    NEUTROPHILS 59 32 - 75 %    LYMPHOCYTES 26 12 - 49 %    MONOCYTES 5 5 - 13 %    EOSINOPHILS 10 (H) 0 - 7 %    BASOPHILS 0 0 - 1 %    IMMATURE GRANULOCYTES 0 0.0 - 0.5 %    ABS. NEUTROPHILS 3.6 1.8 - 8.0 K/UL    ABS. LYMPHOCYTES 1.5 0.8 - 3.5 K/UL    ABS. MONOCYTES 0.3 0.0 - 1.0 K/UL    ABS. EOSINOPHILS 0.6 (H) 0.0 - 0.4 K/UL    ABS. BASOPHILS 0.0 0.0 - 0.1 K/UL    ABS. IMM. GRANS. 0.0 0.00 - 0.04 K/UL    DF AUTOMATED     METABOLIC PANEL, COMPREHENSIVE    Collection Time: 06/28/19  2:34 PM   Result Value Ref Range    Sodium 142 136 - 145 mmol/L    Potassium 4.9 3.5 - 5.1 mmol/L    Chloride 111 (H) 97 - 108 mmol/L    CO2 20 (L) 21 - 32 mmol/L    Anion gap 11 5 - 15 mmol/L    Glucose 145 (H) 65 - 100 mg/dL    BUN 40 (H) 6 - 20 MG/DL    Creatinine 3.07 (H) 0.70 - 1.30 MG/DL    BUN/Creatinine ratio 13 12 - 20      GFR est AA 27 (L) >60 ml/min/1.73m2    GFR est non-AA 22 (L) >60 ml/min/1.73m2    Calcium 8.0 (L) 8.5 - 10.1 MG/DL    Bilirubin, total 0.4 0.2 - 1.0 MG/DL    ALT (SGPT) 26 12 - 78 U/L    AST (SGOT) 38 (H) 15 - 37 U/L    Alk.  phosphatase 50 45 - 117 U/L    Protein, total 6.6 6.4 - 8.2 g/dL    Albumin 3.1 (L) 3.5 - 5.0 g/dL    Globulin 3.5 2.0 - 4.0 g/dL    A-G Ratio 0.9 (L) 1.1 - 2.2     URIC ACID    Collection Time: 06/28/19  2:34 PM   Result Value Ref Range    Uric acid 9.4 (H) 3.5 - 7.2 MG/DL     Medications received:  NS at [facility]  Benadryl PO  Tylenol PO  Pepcid IVP  Solu-Medrol IVP  Krystexxa IV over 2 hours    Patient Vitals for the past 12 hrs:   Temp Pulse Resp BP   06/28/19 1830 97.9 °F (36.6 °C) 77 18 (!) 145/93   06/28/19 1720 98 °F (36.7 °C) 75 18 (!) 156/99   06/28/19 1650 98.1 °F (36.7 °C) 73 18 141/89   06/28/19 1620 97.6 °F (36.4 °C) 77 18 (!) 156/96   06/28/19 1412 98.1 °F (36.7 °C) 76 18 (!) 141/93     Tolerated treatment well, no adverse reaction noted. Pt declined 60 minute wait post infusion. [facility]rom[facility] info on [facility] given and all questions answered. Pt verbalized understanding. PIV removed post treatment with positive blood return. 1830  D/C'd from [facility] ambulatory and in no distress.  Next appointment is 7/12/19 at 2pm.

## 2019-07-08 ENCOUNTER — TELEPHONE (OUTPATIENT)
Dept: RHEUMATOLOGY | Age: 49
End: 2019-07-08

## 2019-07-08 RX ORDER — DIPHENHYDRAMINE HYDROCHLORIDE 50 MG/ML
25 INJECTION, SOLUTION INTRAMUSCULAR; INTRAVENOUS
Status: DISCONTINUED | OUTPATIENT
Start: 2019-07-12 | End: 2019-07-16 | Stop reason: HOSPADM

## 2019-07-08 RX ORDER — FAMOTIDINE 10 MG/ML
20 INJECTION INTRAVENOUS ONCE
Status: COMPLETED | OUTPATIENT
Start: 2019-07-12 | End: 2019-07-12

## 2019-07-08 RX ORDER — ACETAMINOPHEN 325 MG/1
650 TABLET ORAL
Status: DISCONTINUED | OUTPATIENT
Start: 2019-07-12 | End: 2019-07-16 | Stop reason: HOSPADM

## 2019-07-08 RX ORDER — ACETAMINOPHEN 325 MG/1
650 TABLET ORAL ONCE
Status: COMPLETED | OUTPATIENT
Start: 2019-07-12 | End: 2019-07-12

## 2019-07-08 RX ORDER — DIPHENHYDRAMINE HCL 25 MG
25 CAPSULE ORAL ONCE
Status: COMPLETED | OUTPATIENT
Start: 2019-07-12 | End: 2019-07-12

## 2019-07-08 NOTE — TELEPHONE ENCOUNTER
----- Message from Zac Frias sent at 7/8/2019 10:58 AM EDT -----  Regarding: Dr. Rodriguez Brochosman  Pt requesting a call back in regards to him having a \"Gout\" flair up.  Pt best contact number is [phone]

## 2019-07-09 ENCOUNTER — TELEPHONE (OUTPATIENT)
Dept: RHEUMATOLOGY | Age: 49
End: 2019-07-09

## 2019-07-09 RX ORDER — PREDNISONE 20 MG/1
20 TABLET ORAL DAILY
Qty: 5 TAB | Refills: 0 | Status: SHIPPED | OUTPATIENT
Start: 2019-07-09 | End: 2019-07-14

## 2019-07-09 NOTE — TELEPHONE ENCOUNTER
Pt called stating that he is having a gout flare in his left knee and his hip. He stated that his knee is swollen and his hip hurts on the backside of his buttock. He stated that this started on Saturday. I spoke with Dr. Nena Morris and he stated that he can either come in for an injection in his knee, or we can send in prednisone for him to take, but that it might not help his hip pain and he does not do injections in the hip. Pt stated that he would prefer to take the oral prednisone because he is having a hard time walking to come in for the injection. A prescription will be sent.

## 2019-07-12 ENCOUNTER — HOSPITAL ENCOUNTER (OUTPATIENT)
Dept: INFUSION THERAPY | Age: 49
Discharge: HOME | End: 2019-07-12
Payer: COMMERCIAL

## 2019-07-12 VITALS
HEART RATE: 80 BPM | DIASTOLIC BLOOD PRESSURE: 97 MMHG | SYSTOLIC BLOOD PRESSURE: 142 MMHG | RESPIRATION RATE: 18 BRPM | TEMPERATURE: 98.1 F

## 2019-07-12 LAB
ALBUMIN SERPL-MCNC: 3.1 G/DL (ref 3.5–5)
ALBUMIN/GLOB SERPL: 0.8 {RATIO} (ref 1.1–2.2)
ALP SERPL-CCNC: 52 U/L (ref 45–117)
ALT SERPL-CCNC: 25 U/L (ref 12–78)
ANION GAP SERPL CALC-SCNC: 9 MMOL/L (ref 5–15)
AST SERPL-CCNC: 14 U/L (ref 15–37)
BASOPHILS # BLD: 0.1 K/UL (ref 0–0.1)
BASOPHILS NFR BLD: 1 % (ref 0–1)
BILIRUB SERPL-MCNC: 0.2 MG/DL (ref 0.2–1)
BUN SERPL-MCNC: 52 MG/DL (ref 6–20)
BUN/CREAT SERPL: 17 (ref 12–20)
CALCIUM SERPL-MCNC: 8.6 MG/DL (ref 8.5–10.1)
CHLORIDE SERPL-SCNC: 112 MMOL/L (ref 97–108)
CO2 SERPL-SCNC: 21 MMOL/L (ref 21–32)
CREAT SERPL-MCNC: 3.15 MG/DL (ref 0.7–1.3)
DIFFERENTIAL METHOD BLD: ABNORMAL
EOSINOPHIL # BLD: 0.3 K/UL (ref 0–0.4)
EOSINOPHIL NFR BLD: 5 % (ref 0–7)
ERYTHROCYTE [DISTWIDTH] IN BLOOD BY AUTOMATED COUNT: 16.2 % (ref 11.5–14.5)
ERYTHROCYTE [SEDIMENTATION RATE] IN BLOOD: 63 MM/HR (ref 0–15)
GLOBULIN SER CALC-MCNC: 3.7 G/DL (ref 2–4)
GLUCOSE SERPL-MCNC: 103 MG/DL (ref 65–100)
HCT VFR BLD AUTO: 31.3 % (ref 36.6–50.3)
HGB BLD-MCNC: 9.2 G/DL (ref 12.1–17)
IMM GRANULOCYTES # BLD AUTO: 0 K/UL (ref 0–0.04)
IMM GRANULOCYTES NFR BLD AUTO: 0 % (ref 0–0.5)
LYMPHOCYTES # BLD: 1.2 K/UL (ref 0.8–3.5)
LYMPHOCYTES NFR BLD: 18 % (ref 12–49)
MCH RBC QN AUTO: 27.5 PG (ref 26–34)
MCHC RBC AUTO-ENTMCNC: 29.4 G/DL (ref 30–36.5)
MCV RBC AUTO: 93.4 FL (ref 80–99)
MONOCYTES # BLD: 0.8 K/UL (ref 0–1)
MONOCYTES NFR BLD: 11 % (ref 5–13)
NEUTS SEG # BLD: 4.5 K/UL (ref 1.8–8)
NEUTS SEG NFR BLD: 65 % (ref 32–75)
NRBC # BLD: 0 K/UL (ref 0–0.01)
NRBC BLD-RTO: 0 PER 100 WBC
PLATELET # BLD AUTO: 287 K/UL (ref 150–400)
PMV BLD AUTO: 11 FL (ref 8.9–12.9)
POTASSIUM SERPL-SCNC: 4.1 MMOL/L (ref 3.5–5.1)
PROT SERPL-MCNC: 6.8 G/DL (ref 6.4–8.2)
RBC # BLD AUTO: 3.35 M/UL (ref 4.1–5.7)
SODIUM SERPL-SCNC: 142 MMOL/L (ref 136–145)
URATE SERPL-MCNC: 2.4 MG/DL (ref 3.5–7.2)
WBC # BLD AUTO: 6.8 K/UL (ref 4.1–11.1)

## 2019-07-12 PROCEDURE — 36415 COLL VENOUS BLD VENIPUNCTURE: CPT

## 2019-07-12 PROCEDURE — 80053 COMPREHEN METABOLIC PANEL: CPT

## 2019-07-12 PROCEDURE — 96375 TX/PRO/DX INJ NEW DRUG ADDON: CPT

## 2019-07-12 PROCEDURE — 74011250636 HC RX REV CODE- 250/636: Performed by: INTERNAL MEDICINE

## 2019-07-12 PROCEDURE — 96366 THER/PROPH/DIAG IV INF ADDON: CPT

## 2019-07-12 PROCEDURE — 96365 THER/PROPH/DIAG IV INF INIT: CPT

## 2019-07-12 PROCEDURE — 85025 COMPLETE CBC W/AUTO DIFF WBC: CPT

## 2019-07-12 PROCEDURE — 85652 RBC SED RATE AUTOMATED: CPT

## 2019-07-12 PROCEDURE — 74011250637 HC RX REV CODE- 250/637: Performed by: INTERNAL MEDICINE

## 2019-07-12 PROCEDURE — 84550 ASSAY OF BLOOD/URIC ACID: CPT

## 2019-07-12 RX ADMIN — PEGLOTICASE 8 MG: 8 INJECTION, SOLUTION INTRAVENOUS at 15:53

## 2019-07-12 RX ADMIN — METHYLPREDNISOLONE SODIUM SUCCINATE 125 MG: 125 INJECTION, POWDER, FOR SOLUTION INTRAMUSCULAR; INTRAVENOUS at 14:53

## 2019-07-12 RX ADMIN — FAMOTIDINE 20 MG: 10 INJECTION INTRAVENOUS at 15:35

## 2019-07-12 RX ADMIN — DIPHENHYDRAMINE HYDROCHLORIDE 25 MG: 25 CAPSULE ORAL at 14:52

## 2019-07-12 RX ADMIN — ACETAMINOPHEN 650 MG: 325 TABLET ORAL at 14:52

## 2019-07-12 NOTE — PROGRESS NOTES
[facility] VISIT NOTE    1430  Pt arrived at [facility] ambulatory and in no distress for Krystexxa infusion. Assessment completed, no acute complaints at this time. PIV started with no difficulty. Positive blood return noted and labs drawn. Recent Results (from the past 12 hour(s))   CBC WITH AUTOMATED DIFF    Collection Time: 07/12/19  2:42 PM   Result Value Ref Range    WBC 6.8 4.1 - 11.1 K/uL    RBC 3.35 (L) 4.10 - 5.70 M/uL    HGB 9.2 (L) 12.1 - 17.0 g/dL    HCT 31.3 (L) 36.6 - 50.3 %    MCV 93.4 80.0 - 99.0 FL    MCH 27.5 26.0 - 34.0 PG    MCHC 29.4 (L) 30.0 - 36.5 g/dL    RDW 16.2 (H) 11.5 - 14.5 %    PLATELET 789 076 - 796 K/uL    MPV 11.0 8.9 - 12.9 FL    NRBC 0.0 0  WBC    ABSOLUTE NRBC 0.00 0.00 - 0.01 K/uL    NEUTROPHILS 65 32 - 75 %    LYMPHOCYTES 18 12 - 49 %    MONOCYTES 11 5 - 13 %    EOSINOPHILS 5 0 - 7 %    BASOPHILS 1 0 - 1 %    IMMATURE GRANULOCYTES 0 0.0 - 0.5 %    ABS. NEUTROPHILS 4.5 1.8 - 8.0 K/UL    ABS. LYMPHOCYTES 1.2 0.8 - 3.5 K/UL    ABS. MONOCYTES 0.8 0.0 - 1.0 K/UL    ABS. EOSINOPHILS 0.3 0.0 - 0.4 K/UL    ABS. BASOPHILS 0.1 0.0 - 0.1 K/UL    ABS. IMM. GRANS. 0.0 0.00 - 0.04 K/UL    DF AUTOMATED     METABOLIC PANEL, COMPREHENSIVE    Collection Time: 07/12/19  2:42 PM   Result Value Ref Range    Sodium 142 136 - 145 mmol/L    Potassium 4.1 3.5 - 5.1 mmol/L    Chloride 112 (H) 97 - 108 mmol/L    CO2 21 21 - 32 mmol/L    Anion gap 9 5 - 15 mmol/L    Glucose 103 (H) 65 - 100 mg/dL    BUN 52 (H) 6 - 20 MG/DL    Creatinine 3.15 (H) 0.70 - 1.30 MG/DL    BUN/Creatinine ratio 17 12 - 20      GFR est AA 26 (L) >60 ml/min/1.73m2    GFR est non-AA 21 (L) >60 ml/min/1.73m2    Calcium 8.6 8.5 - 10.1 MG/DL    Bilirubin, total 0.2 0.2 - 1.0 MG/DL    ALT (SGPT) 25 12 - 78 U/L    AST (SGOT) 14 (L) 15 - 37 U/L    Alk.  phosphatase 52 45 - 117 U/L    Protein, total 6.8 6.4 - 8.2 g/dL    Albumin 3.1 (L) 3.5 - 5.0 g/dL    Globulin 3.7 2.0 - 4.0 g/dL    A-G Ratio 0.8 (L) 1.1 - 2.2     SED RATE (ESR) Collection Time: 07/12/19  2:42 PM   Result Value Ref Range    Sed rate, automated 63 (H) 0 - 15 mm/hr   URIC ACID    Collection Time: 07/12/19  2:42 PM   Result Value Ref Range    Uric acid 2.4 (L) 3.5 - 7.2 MG/DL     Medications received:  NS at [facility]  Benadryl PO  Tylenol PO  Pepcid IVP  Solu-Medrol IVP  Krystexxa IV over 2 hours    Patient Vitals for the past 12 hrs:   Temp Pulse Resp BP   07/12/19 1758 98.1 °F (36.7 °C) 80 -- (!) 142/97   07/12/19 1427 98.2 °F (36.8 °C) 99 18 (!) 145/94     Tolerated treatment well, no adverse reaction noted. Pt declined 60 minute wait post infusion. PIV removed post treatment with positive blood return. 1800  D/C'd from [facility] ambulatory and in no distress. Next appointment is 7/26/19.

## 2019-07-15 NOTE — PROGRESS NOTES
The results were reviewed. Elevated ESR from anemia from chronic kidney disease. Uric acid is starting to decrease from 9.4 to 2. 4.

## 2019-07-23 RX ORDER — DIPHENHYDRAMINE HYDROCHLORIDE 50 MG/ML
25 INJECTION, SOLUTION INTRAMUSCULAR; INTRAVENOUS
Status: DISCONTINUED | OUTPATIENT
Start: 2019-07-26 | End: 2019-07-30 | Stop reason: HOSPADM

## 2019-07-23 RX ORDER — FAMOTIDINE 10 MG/ML
20 INJECTION INTRAVENOUS ONCE
Status: COMPLETED | OUTPATIENT
Start: 2019-07-26 | End: 2019-07-26

## 2019-07-23 RX ORDER — DIPHENHYDRAMINE HCL 25 MG
25 CAPSULE ORAL ONCE
Status: COMPLETED | OUTPATIENT
Start: 2019-07-26 | End: 2019-07-26

## 2019-07-23 RX ORDER — ACETAMINOPHEN 325 MG/1
650 TABLET ORAL ONCE
Status: COMPLETED | OUTPATIENT
Start: 2019-07-26 | End: 2019-07-26

## 2019-07-23 RX ORDER — ACETAMINOPHEN 325 MG/1
650 TABLET ORAL
Status: DISCONTINUED | OUTPATIENT
Start: 2019-07-26 | End: 2019-07-30 | Stop reason: HOSPADM

## 2019-07-26 ENCOUNTER — HOSPITAL ENCOUNTER (OUTPATIENT)
Dept: INFUSION THERAPY | Age: 49
Discharge: HOME | End: 2019-07-26
Payer: COMMERCIAL

## 2019-07-26 VITALS
SYSTOLIC BLOOD PRESSURE: 142 MMHG | HEART RATE: 109 BPM | DIASTOLIC BLOOD PRESSURE: 89 MMHG | RESPIRATION RATE: 18 BRPM | TEMPERATURE: 99.3 F

## 2019-07-26 LAB
ALBUMIN SERPL-MCNC: 3 G/DL (ref 3.5–5)
ALBUMIN/GLOB SERPL: 1 {RATIO} (ref 1.1–2.2)
ALP SERPL-CCNC: 54 U/L (ref 45–117)
ALT SERPL-CCNC: 18 U/L (ref 12–78)
ANION GAP SERPL CALC-SCNC: 7 MMOL/L (ref 5–15)
AST SERPL-CCNC: 13 U/L (ref 15–37)
BASOPHILS # BLD: 0 K/UL (ref 0–0.1)
BASOPHILS NFR BLD: 0 % (ref 0–1)
BILIRUB SERPL-MCNC: 0.2 MG/DL (ref 0.2–1)
BUN SERPL-MCNC: 45 MG/DL (ref 6–20)
BUN/CREAT SERPL: 15 (ref 12–20)
CALCIUM SERPL-MCNC: 8.7 MG/DL (ref 8.5–10.1)
CHLORIDE SERPL-SCNC: 112 MMOL/L (ref 97–108)
CO2 SERPL-SCNC: 24 MMOL/L (ref 21–32)
CREAT SERPL-MCNC: 3.08 MG/DL (ref 0.7–1.3)
DIFFERENTIAL METHOD BLD: ABNORMAL
EOSINOPHIL # BLD: 0.2 K/UL (ref 0–0.4)
EOSINOPHIL NFR BLD: 3 % (ref 0–7)
ERYTHROCYTE [DISTWIDTH] IN BLOOD BY AUTOMATED COUNT: 16.5 % (ref 11.5–14.5)
ERYTHROCYTE [SEDIMENTATION RATE] IN BLOOD: 47 MM/HR (ref 0–15)
GLOBULIN SER CALC-MCNC: 3 G/DL (ref 2–4)
GLUCOSE SERPL-MCNC: 93 MG/DL (ref 65–100)
HCT VFR BLD AUTO: 29.4 % (ref 36.6–50.3)
HGB BLD-MCNC: 8.8 G/DL (ref 12.1–17)
IMM GRANULOCYTES # BLD AUTO: 0 K/UL (ref 0–0.04)
IMM GRANULOCYTES NFR BLD AUTO: 0 % (ref 0–0.5)
LYMPHOCYTES # BLD: 1.2 K/UL (ref 0.8–3.5)
LYMPHOCYTES NFR BLD: 17 % (ref 12–49)
MCH RBC QN AUTO: 27.8 PG (ref 26–34)
MCHC RBC AUTO-ENTMCNC: 29.9 G/DL (ref 30–36.5)
MCV RBC AUTO: 92.7 FL (ref 80–99)
MONOCYTES # BLD: 0.6 K/UL (ref 0–1)
MONOCYTES NFR BLD: 9 % (ref 5–13)
NEUTS SEG # BLD: 4.8 K/UL (ref 1.8–8)
NEUTS SEG NFR BLD: 71 % (ref 32–75)
NRBC # BLD: 0 K/UL (ref 0–0.01)
NRBC BLD-RTO: 0 PER 100 WBC
PLATELET # BLD AUTO: 432 K/UL (ref 150–400)
PMV BLD AUTO: 9.9 FL (ref 8.9–12.9)
POTASSIUM SERPL-SCNC: 4.6 MMOL/L (ref 3.5–5.1)
PROT SERPL-MCNC: 6 G/DL (ref 6.4–8.2)
RBC # BLD AUTO: 3.17 M/UL (ref 4.1–5.7)
SODIUM SERPL-SCNC: 143 MMOL/L (ref 136–145)
URATE SERPL-MCNC: 1.4 MG/DL (ref 3.5–7.2)
WBC # BLD AUTO: 6.7 K/UL (ref 4.1–11.1)

## 2019-07-26 PROCEDURE — 96365 THER/PROPH/DIAG IV INF INIT: CPT

## 2019-07-26 PROCEDURE — 36415 COLL VENOUS BLD VENIPUNCTURE: CPT

## 2019-07-26 PROCEDURE — 80053 COMPREHEN METABOLIC PANEL: CPT

## 2019-07-26 PROCEDURE — 85025 COMPLETE CBC W/AUTO DIFF WBC: CPT

## 2019-07-26 PROCEDURE — 74011250636 HC RX REV CODE- 250/636: Performed by: INTERNAL MEDICINE

## 2019-07-26 PROCEDURE — 96375 TX/PRO/DX INJ NEW DRUG ADDON: CPT

## 2019-07-26 PROCEDURE — 85652 RBC SED RATE AUTOMATED: CPT

## 2019-07-26 PROCEDURE — 96366 THER/PROPH/DIAG IV INF ADDON: CPT

## 2019-07-26 PROCEDURE — 74011250637 HC RX REV CODE- 250/637: Performed by: INTERNAL MEDICINE

## 2019-07-26 PROCEDURE — 84550 ASSAY OF BLOOD/URIC ACID: CPT

## 2019-07-26 RX ADMIN — ACETAMINOPHEN 650 MG: 325 TABLET ORAL at 14:41

## 2019-07-26 RX ADMIN — METHYLPREDNISOLONE SODIUM SUCCINATE 125 MG: 125 INJECTION, POWDER, FOR SOLUTION INTRAMUSCULAR; INTRAVENOUS at 14:42

## 2019-07-26 RX ADMIN — PEGLOTICASE 8 MG: 8 INJECTION, SOLUTION INTRAVENOUS at 15:07

## 2019-07-26 RX ADMIN — FAMOTIDINE 20 MG: 10 INJECTION INTRAVENOUS at 14:46

## 2019-07-26 RX ADMIN — DIPHENHYDRAMINE HYDROCHLORIDE 25 MG: 25 CAPSULE ORAL at 14:41

## 2019-07-26 NOTE — PROGRESS NOTES
1425 Pt admit to [facility] for [address] ambulatory in stable condition. Assessment completed. No new concerns voiced. Peripheral IV established with positive blood return. Labs drawn per order and sent for processing. Normal Saline started at [facility]. Visit Vitals  /89   Pulse (!) 109   Temp 99.3 °F (37.4 °C)   Resp 18       Medications:  Medications Administered     acetaminophen (TYLENOL) tablet 650 mg     Admin Date  07/26/2019 Action  Given Dose  650 mg Route  Oral Administered By  Obey Hernandez RN          diphenhydrAMINE (BENADRYL) capsule 25 mg     Admin Date  07/26/2019 Action  Given Dose  25 mg Route  Oral Administered By  Obey Hernandez RN          famotidine (PF) (PEPCID) injection 20 mg     Admin Date  07/26/2019 Action  Given Dose  20 mg Route  IntraVENous Administered By  Obey Hernandez RN          methylPREDNISolone (PF) (Solu-MEDROL) injection 125 mg     Admin Date  07/26/2019 Action  Given Dose  125 mg Route  IntraVENous Administered By  Obey Hernandez RN          [facility]) 8 mg, overfill volume 25 mL in 0.9% sodium chloride 250 mL infusion     Admin Date  07/26/2019 Action  Given Dose  8 mg Rate  137.5 mL/hr Route  IntraVENous Administered By  Obey Hernandez RN                  [id number] Pt tolerated treatment well. Peripheral IV maintained positive blood return throughout treatment, flushed with positive blood return and removed  at conclusion. D/c home ambulatory in no distress. Pt aware of next [facility] appointment scheduled for 9/6/19.     Recent Results (from the past 24 hour(s))   CBC WITH AUTOMATED DIFF    Collection Time: 07/26/19  2:20 PM   Result Value Ref Range    WBC 6.7 4.1 - 11.1 K/uL    RBC 3.17 (L) 4.10 - 5.70 M/uL    HGB 8.8 (L) 12.1 - 17.0 g/dL    HCT 29.4 (L) 36.6 - 50.3 %    MCV 92.7 80.0 - 99.0 FL    MCH 27.8 26.0 - 34.0 PG    MCHC 29.9 (L) 30.0 - 36.5 g/dL    RDW 16.5 (H) 11.5 - 14.5 %    PLATELET 963 (H) 923 - 400 K/uL    MPV 9.9 8.9 - 12.9 FL    NRBC 0.0 0  WBC    ABSOLUTE NRBC 0.00 0.00 - 0.01 K/uL    NEUTROPHILS 71 32 - 75 %    LYMPHOCYTES 17 12 - 49 %    MONOCYTES 9 5 - 13 %    EOSINOPHILS 3 0 - 7 %    BASOPHILS 0 0 - 1 %    IMMATURE GRANULOCYTES 0 0.0 - 0.5 %    ABS. NEUTROPHILS 4.8 1.8 - 8.0 K/UL    ABS. LYMPHOCYTES 1.2 0.8 - 3.5 K/UL    ABS. MONOCYTES 0.6 0.0 - 1.0 K/UL    ABS. EOSINOPHILS 0.2 0.0 - 0.4 K/UL    ABS. BASOPHILS 0.0 0.0 - 0.1 K/UL    ABS. IMM. GRANS. 0.0 0.00 - 0.04 K/UL    DF AUTOMATED     SED RATE (ESR)    Collection Time: 07/26/19  2:20 PM   Result Value Ref Range    Sed rate, automated 47 (H) 0 - 15 mm/hr   METABOLIC PANEL, COMPREHENSIVE    Collection Time: 07/26/19  5:20 PM   Result Value Ref Range    Sodium 143 136 - 145 mmol/L    Potassium 4.6 3.5 - 5.1 mmol/L    Chloride 112 (H) 97 - 108 mmol/L    CO2 24 21 - 32 mmol/L    Anion gap 7 5 - 15 mmol/L    Glucose 93 65 - 100 mg/dL    BUN 45 (H) 6 - 20 MG/DL    Creatinine 3.08 (H) 0.70 - 1.30 MG/DL    BUN/Creatinine ratio 15 12 - 20      GFR est AA 27 (L) >60 ml/min/1.73m2    GFR est non-AA 22 (L) >60 ml/min/1.73m2    Calcium 8.7 8.5 - 10.1 MG/DL    Bilirubin, total 0.2 0.2 - 1.0 MG/DL    ALT (SGPT) 18 12 - 78 U/L    AST (SGOT) 13 (L) 15 - 37 U/L    Alk.  phosphatase 54 45 - 117 U/L    Protein, total 6.0 (L) 6.4 - 8.2 g/dL    Albumin 3.0 (L) 3.5 - 5.0 g/dL    Globulin 3.0 2.0 - 4.0 g/dL    A-G Ratio 1.0 (L) 1.1 - 2.2     URIC ACID    Collection Time: 07/26/19  5:20 PM   Result Value Ref Range    Uric acid 1.4 (L) 3.5 - 7.2 MG/DL

## 2019-08-06 RX ORDER — FAMOTIDINE 10 MG/ML
20 INJECTION INTRAVENOUS ONCE
Status: DISCONTINUED | OUTPATIENT
Start: 2019-08-09 | End: 2019-08-06 | Stop reason: CLARIF

## 2019-08-06 RX ORDER — ACETAMINOPHEN 325 MG/1
650 TABLET ORAL ONCE
Status: DISCONTINUED | OUTPATIENT
Start: 2019-08-09 | End: 2019-08-10 | Stop reason: HOSPADM

## 2019-08-06 RX ORDER — DIPHENHYDRAMINE HCL 25 MG
25 CAPSULE ORAL ONCE
Status: COMPLETED | OUTPATIENT
Start: 2019-08-09 | End: 2019-08-09

## 2019-08-06 RX ORDER — ACETAMINOPHEN 325 MG/1
650 TABLET ORAL
Status: DISCONTINUED | OUTPATIENT
Start: 2019-08-09 | End: 2019-08-13 | Stop reason: HOSPADM

## 2019-08-06 RX ORDER — DIPHENHYDRAMINE HYDROCHLORIDE 50 MG/ML
25 INJECTION, SOLUTION INTRAMUSCULAR; INTRAVENOUS
Status: DISCONTINUED | OUTPATIENT
Start: 2019-08-09 | End: 2019-08-13 | Stop reason: HOSPADM

## 2019-08-09 ENCOUNTER — HOSPITAL ENCOUNTER (OUTPATIENT)
Dept: INFUSION THERAPY | Age: 49
Discharge: HOME | End: 2019-08-09
Payer: COMMERCIAL

## 2019-08-09 ENCOUNTER — TELEPHONE (OUTPATIENT)
Dept: RHEUMATOLOGY | Age: 49
End: 2019-08-09

## 2019-08-09 VITALS
TEMPERATURE: 98.1 F | HEART RATE: 75 BPM | DIASTOLIC BLOOD PRESSURE: 96 MMHG | RESPIRATION RATE: 17 BRPM | SYSTOLIC BLOOD PRESSURE: 146 MMHG

## 2019-08-09 LAB
ALBUMIN SERPL-MCNC: 3 G/DL (ref 3.5–5)
ALBUMIN/GLOB SERPL: 0.9 {RATIO} (ref 1.1–2.2)
ALP SERPL-CCNC: 54 U/L (ref 45–117)
ALT SERPL-CCNC: 22 U/L (ref 12–78)
ANION GAP SERPL CALC-SCNC: 6 MMOL/L (ref 5–15)
AST SERPL-CCNC: 27 U/L (ref 15–37)
BASOPHILS # BLD: 0 K/UL (ref 0–0.1)
BASOPHILS NFR BLD: 0 % (ref 0–1)
BILIRUB SERPL-MCNC: 0.4 MG/DL (ref 0.2–1)
BUN SERPL-MCNC: 49 MG/DL (ref 6–20)
BUN/CREAT SERPL: 14 (ref 12–20)
CALCIUM SERPL-MCNC: 8.5 MG/DL (ref 8.5–10.1)
CHLORIDE SERPL-SCNC: 115 MMOL/L (ref 97–108)
CO2 SERPL-SCNC: 22 MMOL/L (ref 21–32)
CREAT SERPL-MCNC: 3.56 MG/DL (ref 0.7–1.3)
DIFFERENTIAL METHOD BLD: ABNORMAL
EOSINOPHIL # BLD: 0.3 K/UL (ref 0–0.4)
EOSINOPHIL NFR BLD: 6 % (ref 0–7)
ERYTHROCYTE [DISTWIDTH] IN BLOOD BY AUTOMATED COUNT: 16.6 % (ref 11.5–14.5)
ERYTHROCYTE [SEDIMENTATION RATE] IN BLOOD: 52 MM/HR (ref 0–15)
GLOBULIN SER CALC-MCNC: 3.4 G/DL (ref 2–4)
GLUCOSE SERPL-MCNC: 104 MG/DL (ref 65–100)
HCT VFR BLD AUTO: 28.3 % (ref 36.6–50.3)
HGB BLD-MCNC: 8.4 G/DL (ref 12.1–17)
IMM GRANULOCYTES # BLD AUTO: 0 K/UL (ref 0–0.04)
IMM GRANULOCYTES NFR BLD AUTO: 0 % (ref 0–0.5)
LYMPHOCYTES # BLD: 1.5 K/UL (ref 0.8–3.5)
LYMPHOCYTES NFR BLD: 28 % (ref 12–49)
MCH RBC QN AUTO: 27.6 PG (ref 26–34)
MCHC RBC AUTO-ENTMCNC: 29.7 G/DL (ref 30–36.5)
MCV RBC AUTO: 93.1 FL (ref 80–99)
MONOCYTES # BLD: 0.5 K/UL (ref 0–1)
MONOCYTES NFR BLD: 9 % (ref 5–13)
NEUTS SEG # BLD: 3 K/UL (ref 1.8–8)
NEUTS SEG NFR BLD: 57 % (ref 32–75)
NRBC # BLD: 0 K/UL (ref 0–0.01)
NRBC BLD-RTO: 0 PER 100 WBC
PLATELET # BLD AUTO: 245 K/UL (ref 150–400)
PMV BLD AUTO: 10.9 FL (ref 8.9–12.9)
POTASSIUM SERPL-SCNC: 5.4 MMOL/L (ref 3.5–5.1)
PROT SERPL-MCNC: 6.4 G/DL (ref 6.4–8.2)
RBC # BLD AUTO: 3.04 M/UL (ref 4.1–5.7)
SODIUM SERPL-SCNC: 143 MMOL/L (ref 136–145)
URATE SERPL-MCNC: 8.2 MG/DL (ref 3.5–7.2)
WBC # BLD AUTO: 5.3 K/UL (ref 4.1–11.1)

## 2019-08-09 PROCEDURE — 80053 COMPREHEN METABOLIC PANEL: CPT

## 2019-08-09 PROCEDURE — 84550 ASSAY OF BLOOD/URIC ACID: CPT

## 2019-08-09 PROCEDURE — 85652 RBC SED RATE AUTOMATED: CPT

## 2019-08-09 PROCEDURE — 96366 THER/PROPH/DIAG IV INF ADDON: CPT

## 2019-08-09 PROCEDURE — 74011250636 HC RX REV CODE- 250/636: Performed by: INTERNAL MEDICINE

## 2019-08-09 PROCEDURE — 74011000250 HC RX REV CODE- 250: Performed by: INTERNAL MEDICINE

## 2019-08-09 PROCEDURE — 85025 COMPLETE CBC W/AUTO DIFF WBC: CPT

## 2019-08-09 PROCEDURE — 96375 TX/PRO/DX INJ NEW DRUG ADDON: CPT

## 2019-08-09 PROCEDURE — 36415 COLL VENOUS BLD VENIPUNCTURE: CPT

## 2019-08-09 PROCEDURE — 74011250637 HC RX REV CODE- 250/637: Performed by: INTERNAL MEDICINE

## 2019-08-09 PROCEDURE — 96365 THER/PROPH/DIAG IV INF INIT: CPT

## 2019-08-09 RX ADMIN — PEGLOTICASE 8 MG: 8 INJECTION, SOLUTION INTRAVENOUS at 15:44

## 2019-08-09 RX ADMIN — METHYLPREDNISOLONE SODIUM SUCCINATE 125 MG: 125 INJECTION, POWDER, FOR SOLUTION INTRAMUSCULAR; INTRAVENOUS at 15:24

## 2019-08-09 RX ADMIN — SODIUM CHLORIDE 20 MG: 9 INJECTION INTRAMUSCULAR; INTRAVENOUS; SUBCUTANEOUS at 15:27

## 2019-08-09 RX ADMIN — DIPHENHYDRAMINE HYDROCHLORIDE 25 MG: 25 CAPSULE ORAL at 15:23

## 2019-08-09 RX ADMIN — ACETAMINOPHEN 650 MG: 325 TABLET ORAL at 15:23

## 2019-08-09 NOTE — PROGRESS NOTES
1445 Pt admit to [facility] for [address] ambulatory in stable condition. Assessment completed. No new concerns voiced. Peripheral IV esablished with positive blood return. Labs drawn per order and sent for processing. Visit Vitals  BP (!) 146/96   Pulse 75   Temp 98.1 °F (36.7 °C)   Resp 17       Medications:  Medications Administered     acetaminophen (TYLENOL) tablet 650 mg     Admin Date  08/09/2019 Action  Given Dose  650 mg Route  Oral Administered By  Kaitlynn Pugh RN          diphenhydrAMINE (BENADRYL) capsule 25 mg     Admin Date  08/09/2019 Action  Given Dose  25 mg Route  Oral Administered By  Kaitlynn Pugh RN          famotidine (PF) (PEPCID) 20 mg in sodium chloride 0.9% 10 mL injection     Admin Date  08/09/2019 Action  Given Dose  20 mg Route  IntraVENous Administered By  Kaitlynn Pugh RN          methylPREDNISolone (PF) (Solu-MEDROL) injection 125 mg     Admin Date  08/09/2019 Action  Given Dose  125 mg Route  IntraVENous Administered By  Kaitlynn Pugh RN          [facility]) 8 mg, overfill volume 25 mL in 0.9% sodium chloride 250 mL infusion     Admin Date  08/09/2019 Action  Given Dose  8 mg Rate  137.5 mL/hr Route  IntraVENous Administered By  Kaitlynn Pugh RN                  1800 Pt tolerated treatment well. Peripheral IV maintained positive blood return throughout treatment, flushed with positive blood return and removed at conclusion. D/c home ambulatory in no distress. Pt aware of next [facility] appointment scheduled for 8/23/19.     Recent Results (from the past 12 hour(s))   CBC WITH AUTOMATED DIFF    Collection Time: 08/09/19  2:51 PM   Result Value Ref Range    WBC 5.3 4.1 - 11.1 K/uL    RBC 3.04 (L) 4.10 - 5.70 M/uL    HGB 8.4 (L) 12.1 - 17.0 g/dL    HCT 28.3 (L) 36.6 - 50.3 %    MCV 93.1 80.0 - 99.0 FL    MCH 27.6 26.0 - 34.0 PG    MCHC 29.7 (L) 30.0 - 36.5 g/dL    RDW 16.6 (H) 11.5 - 14.5 %    PLATELET 698 223 - 313 K/uL    MPV 10.9 8.9 - 12.9 FL    NRBC 0.0 0 PER 100 WBC    ABSOLUTE NRBC 0.00 0.00 - 0.01 K/uL    NEUTROPHILS 57 32 - 75 %    LYMPHOCYTES 28 12 - 49 %    MONOCYTES 9 5 - 13 %    EOSINOPHILS 6 0 - 7 %    BASOPHILS 0 0 - 1 %    IMMATURE GRANULOCYTES 0 0.0 - 0.5 %    ABS. NEUTROPHILS 3.0 1.8 - 8.0 K/UL    ABS. LYMPHOCYTES 1.5 0.8 - 3.5 K/UL    ABS. MONOCYTES 0.5 0.0 - 1.0 K/UL    ABS. EOSINOPHILS 0.3 0.0 - 0.4 K/UL    ABS. BASOPHILS 0.0 0.0 - 0.1 K/UL    ABS. IMM. GRANS. 0.0 0.00 - 0.04 K/UL    DF AUTOMATED     METABOLIC PANEL, COMPREHENSIVE    Collection Time: 08/09/19  2:51 PM   Result Value Ref Range    Sodium 143 136 - 145 mmol/L    Potassium 5.4 (H) 3.5 - 5.1 mmol/L    Chloride 115 (H) 97 - 108 mmol/L    CO2 22 21 - 32 mmol/L    Anion gap 6 5 - 15 mmol/L    Glucose 104 (H) 65 - 100 mg/dL    BUN 49 (H) 6 - 20 MG/DL    Creatinine 3.56 (H) 0.70 - 1.30 MG/DL    BUN/Creatinine ratio 14 12 - 20      GFR est AA 22 (L) >60 ml/min/1.73m2    GFR est non-AA 19 (L) >60 ml/min/1.73m2    Calcium 8.5 8.5 - 10.1 MG/DL    Bilirubin, total 0.4 0.2 - 1.0 MG/DL    ALT (SGPT) 22 12 - 78 U/L    AST (SGOT) 27 15 - 37 U/L    Alk.  phosphatase 54 45 - 117 U/L    Protein, total 6.4 6.4 - 8.2 g/dL    Albumin 3.0 (L) 3.5 - 5.0 g/dL    Globulin 3.4 2.0 - 4.0 g/dL    A-G Ratio 0.9 (L) 1.1 - 2.2     SED RATE (ESR)    Collection Time: 08/09/19  2:51 PM   Result Value Ref Range    Sed rate, automated 52 (H) 0 - 15 mm/hr   URIC ACID    Collection Time: 08/09/19  2:51 PM   Result Value Ref Range    Uric acid 8.2 (H) 3.5 - 7.2 MG/DL

## 2019-08-09 NOTE — TELEPHONE ENCOUNTER
Spoke with pt and he stated that he is having a gout flare in his wrist/hand. He is at the infusion center to get his first Krystexxa infusion now. I told him that he will be getting steroids with his infusion, but that per Dr. Dea Lanier we will send in prednisone 20mg tabs for him to take daily for 1 week for the flare if it is not better from the infusion. He stated an understanding.

## 2019-08-09 NOTE — TELEPHONE ENCOUNTER
Patient is calling due to he is having a Gout flare-up and he is at the infusion center as of now. This is in his Right wrist/hand and he is  requesting Prednisone. His phone is [phone].

## 2019-08-11 RX ORDER — PREDNISONE 20 MG/1
20 TABLET ORAL
Qty: 7 TAB | Refills: 0 | Status: SHIPPED | OUTPATIENT
Start: 2019-08-11 | End: 2019-08-28 | Stop reason: SDUPTHER

## 2019-08-20 RX ORDER — SODIUM CHLORIDE 9 MG/ML
25 INJECTION, SOLUTION INTRAVENOUS CONTINUOUS
Status: DISPENSED | OUTPATIENT
Start: 2019-08-23 | End: 2019-08-23

## 2019-08-20 RX ORDER — ACETAMINOPHEN 325 MG/1
650 TABLET ORAL ONCE
Status: COMPLETED | OUTPATIENT
Start: 2019-08-23 | End: 2019-08-23

## 2019-08-20 RX ORDER — DIPHENHYDRAMINE HCL 25 MG
25 CAPSULE ORAL ONCE
Status: COMPLETED | OUTPATIENT
Start: 2019-08-23 | End: 2019-08-23

## 2019-08-20 RX ORDER — ACETAMINOPHEN 325 MG/1
650 TABLET ORAL
Status: DISCONTINUED | OUTPATIENT
Start: 2019-08-23 | End: 2019-08-27 | Stop reason: HOSPADM

## 2019-08-20 RX ORDER — DIPHENHYDRAMINE HYDROCHLORIDE 50 MG/ML
25 INJECTION, SOLUTION INTRAMUSCULAR; INTRAVENOUS
Status: DISCONTINUED | OUTPATIENT
Start: 2019-08-23 | End: 2019-08-27 | Stop reason: HOSPADM

## 2019-08-20 RX ORDER — FAMOTIDINE 10 MG/ML
20 INJECTION INTRAVENOUS ONCE
Status: DISCONTINUED | OUTPATIENT
Start: 2019-08-23 | End: 2019-08-20

## 2019-08-23 ENCOUNTER — HOSPITAL ENCOUNTER (OUTPATIENT)
Dept: INFUSION THERAPY | Age: 49
Discharge: HOME | End: 2019-08-23
Payer: COMMERCIAL

## 2019-08-23 VITALS
HEART RATE: 88 BPM | TEMPERATURE: 98.2 F | SYSTOLIC BLOOD PRESSURE: 144 MMHG | DIASTOLIC BLOOD PRESSURE: 100 MMHG | RESPIRATION RATE: 16 BRPM

## 2019-08-23 LAB
ALBUMIN SERPL-MCNC: 3.2 G/DL (ref 3.5–5)
ALBUMIN/GLOB SERPL: 1 {RATIO} (ref 1.1–2.2)
ALP SERPL-CCNC: 55 U/L (ref 45–117)
ALT SERPL-CCNC: 32 U/L (ref 12–78)
ANION GAP SERPL CALC-SCNC: 8 MMOL/L (ref 5–15)
AST SERPL-CCNC: 15 U/L (ref 15–37)
BASOPHILS # BLD: 0 K/UL (ref 0–0.1)
BASOPHILS NFR BLD: 0 % (ref 0–1)
BILIRUB SERPL-MCNC: 0.2 MG/DL (ref 0.2–1)
BUN SERPL-MCNC: 53 MG/DL (ref 6–20)
BUN/CREAT SERPL: 16 (ref 12–20)
CALCIUM SERPL-MCNC: 8.3 MG/DL (ref 8.5–10.1)
CHLORIDE SERPL-SCNC: 115 MMOL/L (ref 97–108)
CO2 SERPL-SCNC: 23 MMOL/L (ref 21–32)
CREAT SERPL-MCNC: 3.23 MG/DL (ref 0.7–1.3)
DIFFERENTIAL METHOD BLD: ABNORMAL
EOSINOPHIL # BLD: 0.2 K/UL (ref 0–0.4)
EOSINOPHIL NFR BLD: 3 % (ref 0–7)
ERYTHROCYTE [DISTWIDTH] IN BLOOD BY AUTOMATED COUNT: 17.2 % (ref 11.5–14.5)
ERYTHROCYTE [SEDIMENTATION RATE] IN BLOOD: 18 MM/HR (ref 0–15)
GLOBULIN SER CALC-MCNC: 3.1 G/DL (ref 2–4)
GLUCOSE SERPL-MCNC: 97 MG/DL (ref 65–100)
HCT VFR BLD AUTO: 29 % (ref 36.6–50.3)
HGB BLD-MCNC: 8.6 G/DL (ref 12.1–17)
IMM GRANULOCYTES # BLD AUTO: 0 K/UL (ref 0–0.04)
IMM GRANULOCYTES NFR BLD AUTO: 0 % (ref 0–0.5)
LYMPHOCYTES # BLD: 1.9 K/UL (ref 0.8–3.5)
LYMPHOCYTES NFR BLD: 24 % (ref 12–49)
MCH RBC QN AUTO: 27.8 PG (ref 26–34)
MCHC RBC AUTO-ENTMCNC: 29.7 G/DL (ref 30–36.5)
MCV RBC AUTO: 93.9 FL (ref 80–99)
MONOCYTES # BLD: 0.6 K/UL (ref 0–1)
MONOCYTES NFR BLD: 7 % (ref 5–13)
NEUTS SEG # BLD: 5.2 K/UL (ref 1.8–8)
NEUTS SEG NFR BLD: 66 % (ref 32–75)
NRBC # BLD: 0 K/UL (ref 0–0.01)
NRBC BLD-RTO: 0 PER 100 WBC
PLATELET # BLD AUTO: 253 K/UL (ref 150–400)
PMV BLD AUTO: 11.2 FL (ref 8.9–12.9)
POTASSIUM SERPL-SCNC: 3.6 MMOL/L (ref 3.5–5.1)
PROT SERPL-MCNC: 6.3 G/DL (ref 6.4–8.2)
RBC # BLD AUTO: 3.09 M/UL (ref 4.1–5.7)
SODIUM SERPL-SCNC: 146 MMOL/L (ref 136–145)
URATE SERPL-MCNC: 6.4 MG/DL (ref 3.5–7.2)
WBC # BLD AUTO: 7.9 K/UL (ref 4.1–11.1)

## 2019-08-23 PROCEDURE — 84550 ASSAY OF BLOOD/URIC ACID: CPT

## 2019-08-23 PROCEDURE — 85025 COMPLETE CBC W/AUTO DIFF WBC: CPT

## 2019-08-23 PROCEDURE — 85652 RBC SED RATE AUTOMATED: CPT

## 2019-08-23 PROCEDURE — 74011250636 HC RX REV CODE- 250/636: Performed by: INTERNAL MEDICINE

## 2019-08-23 PROCEDURE — 96375 TX/PRO/DX INJ NEW DRUG ADDON: CPT

## 2019-08-23 PROCEDURE — 96366 THER/PROPH/DIAG IV INF ADDON: CPT

## 2019-08-23 PROCEDURE — 80053 COMPREHEN METABOLIC PANEL: CPT

## 2019-08-23 PROCEDURE — 74011250637 HC RX REV CODE- 250/637: Performed by: INTERNAL MEDICINE

## 2019-08-23 PROCEDURE — 96365 THER/PROPH/DIAG IV INF INIT: CPT

## 2019-08-23 PROCEDURE — 74011000250 HC RX REV CODE- 250: Performed by: INTERNAL MEDICINE

## 2019-08-23 PROCEDURE — 36415 COLL VENOUS BLD VENIPUNCTURE: CPT

## 2019-08-23 RX ADMIN — PEGLOTICASE 8 MG: 8 INJECTION, SOLUTION INTRAVENOUS at 17:17

## 2019-08-23 RX ADMIN — DIPHENHYDRAMINE HYDROCHLORIDE 25 MG: 25 CAPSULE ORAL at 17:00

## 2019-08-23 RX ADMIN — SODIUM CHLORIDE 20 MG: 9 INJECTION INTRAMUSCULAR; INTRAVENOUS; SUBCUTANEOUS at 17:00

## 2019-08-23 RX ADMIN — METHYLPREDNISOLONE SODIUM SUCCINATE 125 MG: 125 INJECTION, POWDER, FOR SOLUTION INTRAMUSCULAR; INTRAVENOUS at 17:00

## 2019-08-23 RX ADMIN — ACETAMINOPHEN 650 MG: 325 TABLET ORAL at 17:00

## 2019-08-23 NOTE — PROGRESS NOTES
[facility] Short Note                       Date: 2019    Name: Madie Bassett    MRN: [id number]         : 1970    1420 Pt admit to [facility] for Reji Escobedo ambulatory in stable condition. Assessment completed. No new concerns voiced. Please review pending lab results in [address] [place] [address]. Labs reviewed and MD called about uric acid and was given ok to treat today    Mr. Mcleod Class vitals were reviewed prior to treatment. Patient Vitals for the past 12 hrs:   Temp Pulse Resp BP   19 1419 98.2 °F (36.8 °C) 84 16 (!) 153/97       PIV with positive blood return. Lab drawn, flushed, heparinized and de-accessed per protocol. Patients blood pressure was a little elevated at end of treatment. Patient stated that he hasn't taken his blood pressure meds and that he feels fine and will monitor it at home. Education was provided. Medications given:   Medications Administered     acetaminophen (TYLENOL) tablet 650 mg     Admin Date  2019 Action  Given Dose  650 mg Route  Oral Administered By  Sadie Ornelas RN          diphenhydrAMINE (BENADRYL) capsule 25 mg     Admin Date  2019 Action  Given Dose  25 mg Route  Oral Administered By  Sadie Ornelas RN          famotidine (PF) (PEPCID) 20 mg in sodium chloride 0.9% 10 mL injection     Admin Date  2019 Action  Given Dose  20 mg Route  IntraVENous Administered By  Sadie Ornelas RN          methylPREDNISolone (PF) (Solu-MEDROL) injection 125 mg     Admin Date  2019 Action  Given Dose  125 mg Route  IntraVENous Administered By  Sadie Ornelas RN          [facility]) 8 mg, overfill volume 25 mL in 0.9% sodium chloride 250 mL infusion     Admin Date  2019 Action  Given Dose  8 mg Rate  138 mL/hr Route  IntraVENous Administered By  Sadie Ornelas RN                   1920 Pt tolerated treatment well. D/c home ambulatory in no distress.      Future Appointments   Date Time Provider Barry Cano   2019  2:00 PM [facility] INFUSION NURSE 5 [facility]   9/12/2019  3:40 PM Krysten Rabago MD [address] [facility]   9/20/2019  2:00 PM [facility] INFUSION NURSE 5 [facility]   10/4/2019  2:00 PM 7 [facility] INFUSION NURSE [address], RN  August 23, 2019  7:21 PM

## 2019-08-25 NOTE — PROGRESS NOTES
The results were reviewed. Uric acid decreased to 6.4 from 8.2, which may suggest uric acid shift rather than loss of Krystexxa effectiveness. Will monitor. Remaining labs are stable.

## 2019-08-28 RX ORDER — PREDNISONE 20 MG/1
TABLET ORAL
Qty: 20 TAB | Refills: 0 | Status: ON HOLD | OUTPATIENT
Start: 2019-08-28 | End: 2019-10-29 | Stop reason: SDUPTHER

## 2019-09-01 RX ORDER — DIPHENHYDRAMINE HYDROCHLORIDE 50 MG/ML
25 INJECTION, SOLUTION INTRAMUSCULAR; INTRAVENOUS
Status: DISCONTINUED | OUTPATIENT
Start: 2019-09-06 | End: 2019-09-10 | Stop reason: HOSPADM

## 2019-09-01 RX ORDER — ACETAMINOPHEN 325 MG/1
650 TABLET ORAL ONCE
Status: COMPLETED | OUTPATIENT
Start: 2019-09-06 | End: 2019-09-06

## 2019-09-01 RX ORDER — ACETAMINOPHEN 325 MG/1
650 TABLET ORAL
Status: DISCONTINUED | OUTPATIENT
Start: 2019-09-06 | End: 2019-09-10 | Stop reason: HOSPADM

## 2019-09-01 RX ORDER — SODIUM CHLORIDE 9 MG/ML
25 INJECTION, SOLUTION INTRAVENOUS CONTINUOUS
Status: DISPENSED | OUTPATIENT
Start: 2019-09-06 | End: 2019-09-06

## 2019-09-01 RX ORDER — DIPHENHYDRAMINE HCL 25 MG
25 CAPSULE ORAL ONCE
Status: COMPLETED | OUTPATIENT
Start: 2019-09-06 | End: 2019-09-06

## 2019-09-06 ENCOUNTER — HOSPITAL ENCOUNTER (OUTPATIENT)
Dept: INFUSION THERAPY | Age: 49
Discharge: HOME | End: 2019-09-06
Payer: COMMERCIAL

## 2019-09-06 VITALS
OXYGEN SATURATION: 100 % | TEMPERATURE: 97.7 F | HEART RATE: 74 BPM | SYSTOLIC BLOOD PRESSURE: 136 MMHG | RESPIRATION RATE: 16 BRPM | DIASTOLIC BLOOD PRESSURE: 95 MMHG

## 2019-09-06 LAB
ALBUMIN SERPL-MCNC: 3 G/DL (ref 3.5–5)
ALBUMIN/GLOB SERPL: 0.7 {RATIO} (ref 1.1–2.2)
ALP SERPL-CCNC: 52 U/L (ref 45–117)
ALT SERPL-CCNC: 34 U/L (ref 12–78)
ANION GAP SERPL CALC-SCNC: 11 MMOL/L (ref 5–15)
AST SERPL-CCNC: 14 U/L (ref 15–37)
BASOPHILS # BLD: 0 K/UL (ref 0–0.1)
BASOPHILS NFR BLD: 0 % (ref 0–1)
BILIRUB SERPL-MCNC: 0.3 MG/DL (ref 0.2–1)
BUN SERPL-MCNC: 65 MG/DL (ref 6–20)
BUN/CREAT SERPL: 19 (ref 12–20)
CALCIUM SERPL-MCNC: 8.9 MG/DL (ref 8.5–10.1)
CHLORIDE SERPL-SCNC: 112 MMOL/L (ref 97–108)
CO2 SERPL-SCNC: 19 MMOL/L (ref 21–32)
CREAT SERPL-MCNC: 3.44 MG/DL (ref 0.7–1.3)
DIFFERENTIAL METHOD BLD: ABNORMAL
EOSINOPHIL # BLD: 0 K/UL (ref 0–0.4)
EOSINOPHIL NFR BLD: 0 % (ref 0–7)
ERYTHROCYTE [DISTWIDTH] IN BLOOD BY AUTOMATED COUNT: 15.2 % (ref 11.5–14.5)
ERYTHROCYTE [SEDIMENTATION RATE] IN BLOOD: 63 MM/HR (ref 0–15)
GLOBULIN SER CALC-MCNC: 4.2 G/DL (ref 2–4)
GLUCOSE SERPL-MCNC: 97 MG/DL (ref 65–100)
HCT VFR BLD AUTO: 29 % (ref 36.6–50.3)
HGB BLD-MCNC: 8.6 G/DL (ref 12.1–17)
IMM GRANULOCYTES # BLD AUTO: 0.1 K/UL (ref 0–0.04)
IMM GRANULOCYTES NFR BLD AUTO: 1 % (ref 0–0.5)
LYMPHOCYTES # BLD: 0.7 K/UL (ref 0.8–3.5)
LYMPHOCYTES NFR BLD: 7 % (ref 12–49)
MCH RBC QN AUTO: 27.9 PG (ref 26–34)
MCHC RBC AUTO-ENTMCNC: 29.7 G/DL (ref 30–36.5)
MCV RBC AUTO: 94.2 FL (ref 80–99)
MONOCYTES # BLD: 0.4 K/UL (ref 0–1)
MONOCYTES NFR BLD: 4 % (ref 5–13)
NEUTS SEG # BLD: 8.4 K/UL (ref 1.8–8)
NEUTS SEG NFR BLD: 88 % (ref 32–75)
NRBC # BLD: 0 K/UL (ref 0–0.01)
NRBC BLD-RTO: 0 PER 100 WBC
PLATELET # BLD AUTO: 432 K/UL (ref 150–400)
PMV BLD AUTO: 9.6 FL (ref 8.9–12.9)
POTASSIUM SERPL-SCNC: 3.8 MMOL/L (ref 3.5–5.1)
PROT SERPL-MCNC: 7.2 G/DL (ref 6.4–8.2)
RBC # BLD AUTO: 3.08 M/UL (ref 4.1–5.7)
RBC MORPH BLD: ABNORMAL
SODIUM SERPL-SCNC: 142 MMOL/L (ref 136–145)
URATE SERPL-MCNC: 4.5 MG/DL (ref 3.5–7.2)
WBC # BLD AUTO: 9.6 K/UL (ref 4.1–11.1)

## 2019-09-06 PROCEDURE — 84550 ASSAY OF BLOOD/URIC ACID: CPT

## 2019-09-06 PROCEDURE — 85652 RBC SED RATE AUTOMATED: CPT

## 2019-09-06 PROCEDURE — 74011250636 HC RX REV CODE- 250/636: Performed by: INTERNAL MEDICINE

## 2019-09-06 PROCEDURE — 74011000250 HC RX REV CODE- 250: Performed by: INTERNAL MEDICINE

## 2019-09-06 PROCEDURE — 80053 COMPREHEN METABOLIC PANEL: CPT

## 2019-09-06 PROCEDURE — 85025 COMPLETE CBC W/AUTO DIFF WBC: CPT

## 2019-09-06 PROCEDURE — 74011000258 HC RX REV CODE- 258: Performed by: INTERNAL MEDICINE

## 2019-09-06 PROCEDURE — 96365 THER/PROPH/DIAG IV INF INIT: CPT

## 2019-09-06 PROCEDURE — 96375 TX/PRO/DX INJ NEW DRUG ADDON: CPT

## 2019-09-06 PROCEDURE — 74011250637 HC RX REV CODE- 250/637: Performed by: INTERNAL MEDICINE

## 2019-09-06 PROCEDURE — 36415 COLL VENOUS BLD VENIPUNCTURE: CPT

## 2019-09-06 PROCEDURE — 96366 THER/PROPH/DIAG IV INF ADDON: CPT

## 2019-09-06 RX ADMIN — METHYLPREDNISOLONE SODIUM SUCCINATE 125 MG: 125 INJECTION, POWDER, FOR SOLUTION INTRAMUSCULAR; INTRAVENOUS at 16:45

## 2019-09-06 RX ADMIN — SODIUM CHLORIDE 20 MG: 9 INJECTION INTRAMUSCULAR; INTRAVENOUS; SUBCUTANEOUS at 16:46

## 2019-09-06 RX ADMIN — SODIUM CHLORIDE 25 ML/HR: 900 INJECTION, SOLUTION INTRAVENOUS at 16:51

## 2019-09-06 RX ADMIN — ACETAMINOPHEN 650 MG: 325 TABLET ORAL at 16:49

## 2019-09-06 RX ADMIN — PEGLOTICASE 8 MG: 8 INJECTION, SOLUTION INTRAVENOUS at 17:00

## 2019-09-06 RX ADMIN — DIPHENHYDRAMINE HYDROCHLORIDE 25 MG: 25 CAPSULE ORAL at 16:49

## 2019-09-06 NOTE — PROGRESS NOTES
[facility] Note:  1415Pt arrived at [facility] ambulatory and in no distress for Lab and Krystexxa. Assessment stable, no new complaints voiced. Uric acid improved. Pt having a  Lot of gout lately. Medications received:  Pepcid  Methylprednisolone  Tylenol  Benadryl  Krystexxa    1910 Tolerated treatment well, no adverse reaction noted. D/Cd from [facility] ambulatory and in no distress accompanied by self. Next appt 9/20  1400  Visit Vitals  BP (!) 136/95 (BP 1 Location: Left arm, BP Patient Position: Sitting)   Pulse 74   Temp 97.7 °F (36.5 °C)   Resp 16   SpO2 100%     Recent Results (from the past 12 hour(s))   CBC WITH AUTOMATED DIFF    Collection Time: 09/06/19  3:13 PM   Result Value Ref Range    WBC 9.6 4.1 - 11.1 K/uL    RBC 3.08 (L) 4.10 - 5.70 M/uL    HGB 8.6 (L) 12.1 - 17.0 g/dL    HCT 29.0 (L) 36.6 - 50.3 %    MCV 94.2 80.0 - 99.0 FL    MCH 27.9 26.0 - 34.0 PG    MCHC 29.7 (L) 30.0 - 36.5 g/dL    RDW 15.2 (H) 11.5 - 14.5 %    PLATELET 009 (H) 393 - 400 K/uL    MPV 9.6 8.9 - 12.9 FL    NRBC 0.0 0  WBC    ABSOLUTE NRBC 0.00 0.00 - 0.01 K/uL    NEUTROPHILS 88 (H) 32 - 75 %    LYMPHOCYTES 7 (L) 12 - 49 %    MONOCYTES 4 (L) 5 - 13 %    EOSINOPHILS 0 0 - 7 %    BASOPHILS 0 0 - 1 %    IMMATURE GRANULOCYTES 1 (H) 0.0 - 0.5 %    ABS. NEUTROPHILS 8.4 (H) 1.8 - 8.0 K/UL    ABS. LYMPHOCYTES 0.7 (L) 0.8 - 3.5 K/UL    ABS. MONOCYTES 0.4 0.0 - 1.0 K/UL    ABS. EOSINOPHILS 0.0 0.0 - 0.4 K/UL    ABS. BASOPHILS 0.0 0.0 - 0.1 K/UL    ABS. IMM.  GRANS. 0.1 (H) 0.00 - 0.04 K/UL    DF SMEAR SCANNED      RBC COMMENTS ANISOCYTOSIS  1+       METABOLIC PANEL, COMPREHENSIVE    Collection Time: 09/06/19  3:13 PM   Result Value Ref Range    Sodium 142 136 - 145 mmol/L    Potassium 3.8 3.5 - 5.1 mmol/L    Chloride 112 (H) 97 - 108 mmol/L    CO2 19 (L) 21 - 32 mmol/L    Anion gap 11 5 - 15 mmol/L    Glucose 97 65 - 100 mg/dL    BUN 65 (H) 6 - 20 MG/DL    Creatinine 3.44 (H) 0.70 - 1.30 MG/DL BUN/Creatinine ratio 19 12 - 20      GFR est AA 23 (L) >60 ml/min/1.73m2    GFR est non-AA 19 (L) >60 ml/min/1.73m2    Calcium 8.9 8.5 - 10.1 MG/DL    Bilirubin, total 0.3 0.2 - 1.0 MG/DL    ALT (SGPT) 34 12 - 78 U/L    AST (SGOT) 14 (L) 15 - 37 U/L    Alk.  phosphatase 52 45 - 117 U/L    Protein, total 7.2 6.4 - 8.2 g/dL    Albumin 3.0 (L) 3.5 - 5.0 g/dL    Globulin 4.2 (H) 2.0 - 4.0 g/dL    A-G Ratio 0.7 (L) 1.1 - 2.2     SED RATE (ESR)    Collection Time: 09/06/19  3:13 PM   Result Value Ref Range    Sed rate, automated 63 (H) 0 - 15 mm/hr   URIC ACID    Collection Time: 09/06/19  3:13 PM   Result Value Ref Range    Uric acid 4.5 3.5 - 7.2 MG/DL

## 2019-09-08 NOTE — PROGRESS NOTES
The results were reviewed. Uric acid improving, down to 4.5 from 6.4 from 8.2. Remaining labs are stable and reactive.

## 2019-09-12 ENCOUNTER — OFFICE VISIT (OUTPATIENT)
Dept: RHEUMATOLOGY | Age: 49
End: 2019-09-12

## 2019-09-12 VITALS
BODY MASS INDEX: 28.77 KG/M2 | SYSTOLIC BLOOD PRESSURE: 131 MMHG | RESPIRATION RATE: 18 BRPM | DIASTOLIC BLOOD PRESSURE: 84 MMHG | HEIGHT: 70 IN | TEMPERATURE: 98.2 F | WEIGHT: 201 LBS | HEART RATE: 99 BPM

## 2019-09-12 DIAGNOSIS — N18.4 CKD (CHRONIC KIDNEY DISEASE) STAGE 4, GFR 15-29 ML/MIN (HCC): ICD-10-CM

## 2019-09-12 DIAGNOSIS — Z79.60 LONG-TERM USE OF IMMUNOSUPPRESSANT MEDICATION: ICD-10-CM

## 2019-09-12 DIAGNOSIS — Q61.2 AUTOSOMAL DOMINANT ADULT POLYCYSTIC KIDNEY DISEASE: ICD-10-CM

## 2019-09-12 DIAGNOSIS — M1A.39X1 CHRONIC TOPHACEOUS GOUT OF MULTIPLE SITES DUE TO RENAL IMPAIRMENT: Primary | ICD-10-CM

## 2019-09-12 RX ORDER — PREDNISONE 20 MG/1
20 TABLET ORAL
Qty: 90 TAB | Refills: 0 | Status: SHIPPED | OUTPATIENT
Start: 2019-09-12 | End: 2019-10-17 | Stop reason: SDUPTHER

## 2019-09-12 RX ORDER — COLCHICINE 0.6 MG/1
0.6 TABLET ORAL DAILY
Qty: 90 TAB | Refills: 0 | Status: SHIPPED | OUTPATIENT
Start: 2019-09-12 | End: 2020-03-19

## 2019-09-12 RX ORDER — LEFLUNOMIDE 20 MG/1
20 TABLET ORAL DAILY
Qty: 90 TAB | Refills: 0 | Status: SHIPPED | OUTPATIENT
Start: 2019-09-12 | End: 2020-06-17 | Stop reason: SDUPTHER

## 2019-09-12 NOTE — PROGRESS NOTES
REASON FOR VISIT    This is a follow-up visit for Mr. Tammie Norris for     ICD-10-CM   1. 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     Gouty arthritis phenotype includes:  Tophi: yes  Erosions: yes  Tenosynovitis: yes  Double Contour: N/A     Therapy Includes:  NSAIDs: no (contra-indicated due to CKD)  Colchicine prophylaxis: yes  Current uricosuric therapy: none  Current urate-lowering therapy: Krystexxa 8 mg every 14 days (6/28/2019 to present)  Discontinued uricosuric because of inefficacy: none  Discontinued uricosuric because of side effects: none  Discontinued urate-lowering therapy because of inefficacy: allopurinol 50 mg daily  Discontinued urate-lowering therapy because of side effects: none  Contraindicated urate-lowering therapy because of CKD: allopurinol, probenecid  G6PD Status: normal  Current anti-immunogenicity DMARD therapy: leflunomide    Active problems include:    Patient Active Problem List   Diagnosis Code    Incarcerated ventral hernia K43.6    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 Tammie Norris returns for a follow-up visit. On his last visit, I ordered labs and radiographs, and started Krystexxa infusion 8 mg every 14 days and leflunomide 20 mg daily for immunogenicity prevention. His recent infusion was 9/06/2019. He has had 3 flares of gout after his infusions. He is currently on colchicine daily and prednisone 20 mg daily and prednisone was started 2 days ago for his flare. Today, he reports flaring in his hands and left elbow after his last infusion. The prednisone is helping. He feels more dexterity in his hands when not flaring. He is feeling better daily and is slowly improving. He continues to work.     Most recent uric acid from 9/06/2019 was 4.5 mg/dL (previously 6.4, 8.2, 1.4, 2.4, 9.4 mg/dL). Last toxicity monitoring by blood work was done on 9/06/2019 and did not reveal any significant adverse effects, except creatinine 3.44, eGFR 23, albumin 3.0 g/dL. The patient has not had any interval hospital admissions, infections, or surgeries. REVIEW OF SYSTEMS    A comprehensive review of systems was performed and pertinent results are documented in the HPI, review of systems is otherwise non-contributory. PAST MEDICAL HISTORY    He has a past medical history of Heart failure ([address] Hypertension, and Polycystic kidney disease. FAMILY HISTORY    His family history includes Hypertension in his father and mother; No Known Problems in his brother and brother; Other in his sister. SOCIAL HISTORY    He reports that he has never smoked. He has never used smokeless tobacco. He reports that he drank alcohol. He reports that he has current or past drug history. Drug: Marijuana. IMMUNIZATIONS    There is no immunization history on file for this patient. MEDICATIONS    Current Outpatient Medications   Medication Sig Dispense Refill    colchicine 0.6 mg tablet Take 1 Tab by mouth daily. 90 Tab 0    leflunomide (ARAVA) 20 mg tablet Take 1 Tab by mouth daily. Take half tab daily for 7 days and then one tab daily if tolerated 90 Tab 0    predniSONE (DELTASONE) 20 mg tablet Take 20 mg by mouth daily as needed for Pain. 90 Tab 0    predniSONE (DELTASONE) 20 mg tablet Take 1 tab by mouth daily for flare 20 Tab 0    pegloticase (KRYSTEXXA) 8 mg/mL soln injection 8 mg by IntraVENous route.  carvedilol (COREG) 12.5 mg tablet Take 12.5 mg by mouth two (2) times daily (with meals).  POTASSIUM ASPARTATE/MAG ASP (POTASSIUM & MAGNESIUM ASPARTAT PO) Take  by mouth.           ALLERGIES    Allergies   Allergen Reactions    Shellfish Containing Products Swelling     PHYSICAL EXAMINATION    Visit Vitals  /84   Pulse 99   Temp 98.2 °F (36.8 °C)   Resp 18   Ht 5' 10\" (1.778 m)   Wt 201 lb (91.2 kg)   BMI 28.84 kg/m²     Body mass index is 28.84 kg/m². General: Patient is alert, oriented x 3, not in acute distress    HEENT:   Sclerae are not injected and appear moist.  There is no alopecia. Neck is supple     Cardiovascular:  Heart is regular rate and rhythm, no murmurs. Chest:  Lungs are clear to auscultation bilaterally. No rhonchi, wheezes, or crackles. Extremities:  Free of clubbing, cyanosis, edema    Neurological exam:  Muscle strength is full in upper and lower extremities     Skin exam:  There are no rashes, no alopecia, no discoid lesions, no active Raynaud's, no livedo reticularis, no periungual erythema. Tophi: Tophi: yes: left: left helix, bilateral olecranon (LEFT larger 15 cm), bilateral wrists, left 2nd MCP, IP, 2nd PIP, 3rd PIP, 4th PIP, right 2nd MCP, 5th MCP, IP, 2nd PIP, 5th PIP, RIGHT: patella, LEFT: achilles    Musculoskeletal exam:  A comprehensive musculoskeletal exam was performed for all joints of each upper and lower extremity and assessed for swelling, tenderness and range of motion. Positive results are documented as below:    Bilateral wrist, hand dorsum, MCP and digit swelling with tenderness    DATA REVIEW    Laboratory     Recent laboratory results were reviewed, summarized, and discussed with the patient. Imaging    Musculoskeletal Ultrasound    None    Radiographs    Bilateral Elbow 6/13/2019: RIGHT: normal alignment and bone mineral density. There is a well-circumscribed erosion within the olecranon. No definite effusion. There is high density material in the region of the olecranon bursa. LEFT: no fracture or effusion. No erosive change. There is high density material in the region of the olecranon bursa. Bilateral Hand 6/13/2019: RIGHT: normal alignment. Bone mineral density is normal. No fracture.  There are extensive erosive changes of the distal ulna with adjacent high density soft tissue mass. There is erosive change within the triquetrum capitate and likely trapezoid. Erosive changes are noted the first second and third MCP joints. There is high density soft tissue adjacent to the second digit PIP joint. LEFT: normal alignment and no fracture. There are scattered erosive changes throughout the carpus second MTP joint and PIP joints of the second third and fourth digits. There is soft tissue swelling which is high density of the thumb and second MTP joints. Additional soft tissue swelling at the wrist.     Bilateral Foot 6/13/2019: RIGHT: normal alignment and bone mineral density. There are erosive changes throughout the ankle, talar head, navicular and Lisfranc joint as well as the medial base proximal phalanx of the great toe. There is adjacent soft tissue density at the first metatarsal head. No acute fracture. LEFT: normal alignment bone mineral densities. There are periarticular erosions throughout the Lisfranc joint, between the navicular and cuneiforms and at the first MTP joint. There is mild soft tissue thickening and density medial to the first metatarsal head. There is suggestion of soft tissue density between the second and third metatarsal heads. No acute fracture.     CT Imaging    CT Abdomen and Pelvis without contrast 6/01/2019: Heart/vessels: Pericardial effusion and/or thickening measuring approximately 0.8 cm. Lungs/Pleura: Within normal limits. . ABDOMEN: Liver: Small, low-attenuation lesions in the liver which are incompletely characterized and statistically likely benign. Gallbladder/Biliary: Within normal limits. Spleen: Within normal limits. Pancreas: Within normal limits. Adrenals: Within normal limits. Kidneys: Innumerable low-attenuation lesions and high attenuation lesions throughout the kidneys with complete loss of normal-appearing parenchyma suggestive of polycystic kidney disease. Peritoneum/Mesenteries: Trace amount of fluid in the pelvis. Extraperitoneum: Within normal limits. Gastrointestinal tract: There is a short segment of small bowel within a supraumbilical hernia. The short segment of bowel within the hernia appears to have some mural thickening There are distended loops of small bowel with air-fluid levels associated with the hernia. The more distal small bowel is nearly entirely decompressed as is the large bowel. Normal-appearing appendix. Vascular: Within normal limits. PELVIS: Extraperitoneum: Within normal limits. Ureters: Within normal limits. Bladder: The bladder is nearly entirely decompressed. There is concentric mural thickening in the bladder. Reproductive System: Within normal limits. MSK:  There is a supraumbilical hernia containing a short segment of small bowel with adjacent stranding and inflammation and stranding/inflammation within the fat in the hernia. Tiny, fat-containing umbilical hernia. Degenerative changes in the lumbar spine. There is degenerative disc disease at L3/L4 and L4/L5. Extensive degenerative changes in both hips. Dystrophic appearing calcifications at the origin of both hamstrings at the pubic rami. Degenerative changes in the pubic symphysis. MR Imaging    MRI Right Ankle without contrast 4/10/2012: There is osseous edema like signal at the inferior aspect of the lateral malleolus. There are also subcortical cysts in the anterior process of the calcaneus adjacent to the sinus tarsi. A mild to moderate osseous edema and the medial cuneiform bone is demonstrated subjacent to an area of distal high-grade chondral derangement. There is a moderate to large effusion of the ankle and posterior subtalar joints with demonstration of mild diffuse chondral thinning with small marginal osteophytes. There is a 7 mm loose body in the anterior joint recess. There is also heterogeneous signal in the posterior joint recesses which could contain small loose bodies as well.  There is absent  visualization of the anterior talofibular ligament consistent with chronic tear. The other ankle ligaments appear intact. There is a small effusion of the talonavicular joint. Small dorsal marginal osteophytes throughout the midfoot region are noted. There is a moderate effusion in the posterior tibialis tendon sheath with diffuse mild tendon thickening and inframalleolar heterogeneous signal. There is a small effusion of the flexor digitorum longus and flexor hallucis longus tendon sheaths with normal appearing tendons. There is a small effusion of the peroneal tendon sheath with mild diffuse thickening of the peroneus longus tendon though uniform signal. Mild. Achilles peritendinitis is noted. There is a trace amount of fluid signal in the extensor digitorum longus tendon sheath. There is mild flatfoot deformity. No tarsal coalition is shown. There is no bone or soft tissue mass. DXA     None    ASSESSMENT AND PLAN    This is a follow-up visit for Mr. Sweta Morrissey. 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 Probenecid and allopurinol are contraindicated. Uloric will be ineffective and given the new FDA box warning and his history of heart failure, I do not recommend it. He is currently maintained on colchicine daily, Krystexxa 8 mg every 2 weeks and leflunomide 20 mg daily for immunogenicity prevention, with good tolerance. He has had gouty flares after his infusions which is reassuring of benefit. His uric acid has not dropped to less than 0.2 due to his overwhelming tophaceous burden and I expect it hover for a while. I will continue treatment and asked him to use prednisone 20 mg as needed for flares since NSAIDs are contra-indicated.    2) Long Term Use of Immunosuppressants. The patient remains on immunomodulatory medications (leflunomide) and requires frequent toxicity monitoring by blood work. 3) Chronic Kidney Disease Stage 3.  The patient's creatinine 3.30 mg/dL, eGFR 24. I will repeat today.    4) Autosomal Dominant Polycystic Kidney Disease. He follows with Dr. Ngoc Caraballo who will start [facility].     5) Essential Hypertension. Dr. Ngoc Caraballo will start losartan which will help with its uricosuric properties. The patient voiced understanding of the aforementioned assessment and plan. Summary of plan was provided in the After Visit Summary patient instructions.      TODAY'S ORDERS    Orders Placed This Encounter    colchicine 0.6 mg tablet    leflunomide (ARAVA) 20 mg tablet    predniSONE (DELTASONE) 20 mg tablet     Future Appointments   Date Time Provider Barry Cano   9/20/2019  2:00 PM [facility]   10/4/2019  2:00 PM [address], [place], [address] [facility] [place]    Adult Rheumatology   Rheumatology Ultrasound Certified  [facility], [address]   Phone [phone]  Fax [phone]

## 2019-09-12 NOTE — LETTER
9/12/19 Patient: Britany Lagunas YOB: 1970 Date of Visit: 9/12/2019 Carlo Sebastian NP 
[address] [facility] [address] [address] [place] [address] VIA Facsimile: [phone] Dear Carlo Sebastian NP, Thank you for referring Mr. Hattie Bender to [facility] for evaluation. My notes for this consultation are attached. If you have questions, please do not hesitate to call me. I look forward to following your patient along with you.  
 
 
Sincerely, 
 
Thom Comer MD

## 2019-09-12 NOTE — PROGRESS NOTES
Chief Complaint   Patient presents with    Gout     1. Have you been to the ER, urgent care clinic since your last visit? Hospitalized since your last visit? No    2. Have you seen or consulted any other health care providers outside of the [address] [place] [address] since your last visit? Include any pap smears or colon screening.  No

## 2019-09-17 RX ORDER — SODIUM CHLORIDE 9 MG/ML
25 INJECTION, SOLUTION INTRAVENOUS CONTINUOUS
Status: DISPENSED | OUTPATIENT
Start: 2019-09-20 | End: 2019-09-20

## 2019-09-17 RX ORDER — DIPHENHYDRAMINE HYDROCHLORIDE 50 MG/ML
25 INJECTION, SOLUTION INTRAMUSCULAR; INTRAVENOUS
Status: DISCONTINUED | OUTPATIENT
Start: 2019-09-20 | End: 2019-09-24 | Stop reason: HOSPADM

## 2019-09-17 RX ORDER — ACETAMINOPHEN 325 MG/1
650 TABLET ORAL ONCE
Status: COMPLETED | OUTPATIENT
Start: 2019-09-20 | End: 2019-09-20

## 2019-09-17 RX ORDER — ACETAMINOPHEN 325 MG/1
650 TABLET ORAL
Status: DISCONTINUED | OUTPATIENT
Start: 2019-09-20 | End: 2019-09-24 | Stop reason: HOSPADM

## 2019-09-17 RX ORDER — DIPHENHYDRAMINE HCL 25 MG
25 CAPSULE ORAL ONCE
Status: COMPLETED | OUTPATIENT
Start: 2019-09-20 | End: 2019-09-20

## 2019-09-20 ENCOUNTER — HOSPITAL ENCOUNTER (OUTPATIENT)
Dept: INFUSION THERAPY | Age: 49
Discharge: HOME | End: 2019-09-20
Payer: COMMERCIAL

## 2019-09-20 VITALS
HEART RATE: 90 BPM | DIASTOLIC BLOOD PRESSURE: 74 MMHG | RESPIRATION RATE: 18 BRPM | SYSTOLIC BLOOD PRESSURE: 109 MMHG | TEMPERATURE: 98.2 F

## 2019-09-20 LAB
ALBUMIN SERPL-MCNC: 2.5 G/DL (ref 3.5–5)
ALBUMIN/GLOB SERPL: 0.8 {RATIO} (ref 1.1–2.2)
ALP SERPL-CCNC: 47 U/L (ref 45–117)
ALT SERPL-CCNC: 26 U/L (ref 12–78)
ANION GAP SERPL CALC-SCNC: 10 MMOL/L (ref 5–15)
AST SERPL-CCNC: 12 U/L (ref 15–37)
BASOPHILS # BLD: 0 K/UL (ref 0–0.1)
BASOPHILS NFR BLD: 0 % (ref 0–1)
BILIRUB SERPL-MCNC: 0.4 MG/DL (ref 0.2–1)
BUN SERPL-MCNC: 53 MG/DL (ref 6–20)
BUN/CREAT SERPL: 16 (ref 12–20)
CALCIUM SERPL-MCNC: 8 MG/DL (ref 8.5–10.1)
CHLORIDE SERPL-SCNC: 113 MMOL/L (ref 97–108)
CO2 SERPL-SCNC: 22 MMOL/L (ref 21–32)
CREAT SERPL-MCNC: 3.27 MG/DL (ref 0.7–1.3)
DIFFERENTIAL METHOD BLD: ABNORMAL
EOSINOPHIL # BLD: 0.2 K/UL (ref 0–0.4)
EOSINOPHIL NFR BLD: 2 % (ref 0–7)
ERYTHROCYTE [DISTWIDTH] IN BLOOD BY AUTOMATED COUNT: 15.6 % (ref 11.5–14.5)
ERYTHROCYTE [SEDIMENTATION RATE] IN BLOOD: 68 MM/HR (ref 0–15)
GLOBULIN SER CALC-MCNC: 3 G/DL (ref 2–4)
GLUCOSE SERPL-MCNC: 147 MG/DL (ref 65–100)
HCT VFR BLD AUTO: 24.2 % (ref 36.6–50.3)
HGB BLD-MCNC: 7.1 G/DL (ref 12.1–17)
IMM GRANULOCYTES # BLD AUTO: 0 K/UL (ref 0–0.04)
IMM GRANULOCYTES NFR BLD AUTO: 1 % (ref 0–0.5)
LYMPHOCYTES # BLD: 1.1 K/UL (ref 0.8–3.5)
LYMPHOCYTES NFR BLD: 14 % (ref 12–49)
MCH RBC QN AUTO: 27.8 PG (ref 26–34)
MCHC RBC AUTO-ENTMCNC: 29.3 G/DL (ref 30–36.5)
MCV RBC AUTO: 94.9 FL (ref 80–99)
MONOCYTES # BLD: 0.6 K/UL (ref 0–1)
MONOCYTES NFR BLD: 7 % (ref 5–13)
NEUTS SEG # BLD: 6.4 K/UL (ref 1.8–8)
NEUTS SEG NFR BLD: 76 % (ref 32–75)
NRBC # BLD: 0 K/UL (ref 0–0.01)
NRBC BLD-RTO: 0 PER 100 WBC
PLATELET # BLD AUTO: 255 K/UL (ref 150–400)
PMV BLD AUTO: 10.8 FL (ref 8.9–12.9)
POTASSIUM SERPL-SCNC: 4.1 MMOL/L (ref 3.5–5.1)
PROT SERPL-MCNC: 5.5 G/DL (ref 6.4–8.2)
RBC # BLD AUTO: 2.55 M/UL (ref 4.1–5.7)
SODIUM SERPL-SCNC: 145 MMOL/L (ref 136–145)
URATE SERPL-MCNC: 2 MG/DL (ref 3.5–7.2)
WBC # BLD AUTO: 8.3 K/UL (ref 4.1–11.1)

## 2019-09-20 PROCEDURE — 84550 ASSAY OF BLOOD/URIC ACID: CPT

## 2019-09-20 PROCEDURE — 80053 COMPREHEN METABOLIC PANEL: CPT

## 2019-09-20 PROCEDURE — 85025 COMPLETE CBC W/AUTO DIFF WBC: CPT

## 2019-09-20 PROCEDURE — 74011250636 HC RX REV CODE- 250/636: Performed by: INTERNAL MEDICINE

## 2019-09-20 PROCEDURE — 74011250637 HC RX REV CODE- 250/637: Performed by: INTERNAL MEDICINE

## 2019-09-20 PROCEDURE — 74011000250 HC RX REV CODE- 250: Performed by: INTERNAL MEDICINE

## 2019-09-20 PROCEDURE — 96375 TX/PRO/DX INJ NEW DRUG ADDON: CPT

## 2019-09-20 PROCEDURE — 96366 THER/PROPH/DIAG IV INF ADDON: CPT

## 2019-09-20 PROCEDURE — 96365 THER/PROPH/DIAG IV INF INIT: CPT

## 2019-09-20 PROCEDURE — 36415 COLL VENOUS BLD VENIPUNCTURE: CPT

## 2019-09-20 PROCEDURE — 85652 RBC SED RATE AUTOMATED: CPT

## 2019-09-20 RX ADMIN — SODIUM CHLORIDE 20 MG: 9 INJECTION INTRAMUSCULAR; INTRAVENOUS; SUBCUTANEOUS at 16:33

## 2019-09-20 RX ADMIN — PEGLOTICASE 8 MG: 8 INJECTION, SOLUTION INTRAVENOUS at 17:11

## 2019-09-20 RX ADMIN — ACETAMINOPHEN 650 MG: 325 TABLET ORAL at 16:32

## 2019-09-20 RX ADMIN — DIPHENHYDRAMINE HYDROCHLORIDE 25 MG: 25 CAPSULE ORAL at 16:32

## 2019-09-20 RX ADMIN — SODIUM CHLORIDE 25 ML/HR: 900 INJECTION, SOLUTION INTRAVENOUS at 17:00

## 2019-09-20 RX ADMIN — METHYLPREDNISOLONE SODIUM SUCCINATE 125 MG: 125 INJECTION, POWDER, FOR SOLUTION INTRAMUSCULAR; INTRAVENOUS at 16:33

## 2019-09-20 NOTE — PROGRESS NOTES
OPIC Short Note                       Date: 2019    Name: Sami Corea    MRN: [id number]         : 1970    1430 Pt admit to [place] for [address] ambulatory in stable condition. Assessment completed. No new concerns voiced. Please review pending lab results in [address] [place] [address]. Mr. Ania Felix vitals were reviewed prior to treatment. Patient Vitals for the past 12 hrs:   Temp Pulse Resp BP   19 1434 98.2 °F (36.8 °C) 90 18 109/74       PIV with positive blood return. Lab drawn, flushed, heparinized and de-accessed per protocol. Medications given:   Medications Administered     0.9% sodium chloride infusion     Admin Date  2019 Action  New Bag Dose  25 mL/hr Rate  25 mL/hr Route  IntraVENous Administered By  Stepan Sloan RN          acetaminophen (TYLENOL) tablet 650 mg     Admin Date  2019 Action  Given Dose  650 mg Route  Oral Administered By  Little Alvares RN          diphenhydrAMINE (BENADRYL) capsule 25 mg     Admin Date  2019 Action  Given Dose  25 mg Route  Oral Administered By  Little Alvares RN          famotidine (PF) (PEPCID) 20 mg in sodium chloride 0.9% 10 mL injection     Admin Date  2019 Action  Given Dose  20 mg Route  IntraVENous Administered By  Little Alvares RN          methylPREDNISolone (PF) (Solu-MEDROL) injection 125 mg     Admin Date  2019 Action  Given Dose  125 mg Route  IntraVENous Administered By  Little Alvares RN          [facility]) 8 mg, overfill volume 25 mL in 0.9% sodium chloride 250 mL infusion     Admin Date  2019 Action  Given Dose  8 mg Rate  138 mL/hr Route  IntraVENous Administered By  Stepan Sloan, LORRI                   1930 Pt tolerated treatment well. D/c home ambulatory in no distress.      Future Appointments   Date Time Provider Barry Cano   10/4/2019  2:00  5Th Ritika LORENZ RN  2019  7:22 PM

## 2019-10-01 RX ORDER — ACETAMINOPHEN 325 MG/1
650 TABLET ORAL ONCE
Status: COMPLETED | OUTPATIENT
Start: 2019-10-04 | End: 2019-10-04

## 2019-10-01 RX ORDER — DIPHENHYDRAMINE HCL 25 MG
25 CAPSULE ORAL ONCE
Status: COMPLETED | OUTPATIENT
Start: 2019-10-04 | End: 2019-10-04

## 2019-10-01 RX ORDER — DIPHENHYDRAMINE HYDROCHLORIDE 50 MG/ML
25 INJECTION, SOLUTION INTRAMUSCULAR; INTRAVENOUS
Status: ACTIVE | OUTPATIENT
Start: 2019-10-04 | End: 2019-10-04

## 2019-10-01 RX ORDER — ACETAMINOPHEN 325 MG/1
650 TABLET ORAL
Status: ACTIVE | OUTPATIENT
Start: 2019-10-04 | End: 2019-10-04

## 2019-10-01 RX ORDER — SODIUM CHLORIDE 9 MG/ML
25 INJECTION, SOLUTION INTRAVENOUS CONTINUOUS
Status: DISPENSED | OUTPATIENT
Start: 2019-10-04 | End: 2019-10-04

## 2019-10-04 ENCOUNTER — HOSPITAL ENCOUNTER (OUTPATIENT)
Dept: INFUSION THERAPY | Age: 49
Discharge: HOME | End: 2019-10-04
Payer: COMMERCIAL

## 2019-10-04 VITALS
BODY MASS INDEX: 30.56 KG/M2 | WEIGHT: 213 LBS | RESPIRATION RATE: 18 BRPM | TEMPERATURE: 98.5 F | DIASTOLIC BLOOD PRESSURE: 85 MMHG | HEART RATE: 82 BPM | SYSTOLIC BLOOD PRESSURE: 121 MMHG

## 2019-10-04 LAB
ALBUMIN SERPL-MCNC: 2.8 G/DL (ref 3.5–5)
ALBUMIN/GLOB SERPL: 1 {RATIO} (ref 1.1–2.2)
ALP SERPL-CCNC: 49 U/L (ref 45–117)
ALT SERPL-CCNC: 37 U/L (ref 12–78)
ANION GAP SERPL CALC-SCNC: 7 MMOL/L (ref 5–15)
AST SERPL-CCNC: 21 U/L (ref 15–37)
BASOPHILS # BLD: 0 K/UL (ref 0–0.1)
BASOPHILS NFR BLD: 0 % (ref 0–1)
BILIRUB SERPL-MCNC: 0.2 MG/DL (ref 0.2–1)
BUN SERPL-MCNC: 45 MG/DL (ref 6–20)
BUN/CREAT SERPL: 14 (ref 12–20)
CALCIUM SERPL-MCNC: 8 MG/DL (ref 8.5–10.1)
CHLORIDE SERPL-SCNC: 113 MMOL/L (ref 97–108)
CO2 SERPL-SCNC: 23 MMOL/L (ref 21–32)
CREAT SERPL-MCNC: 3.33 MG/DL (ref 0.7–1.3)
DIFFERENTIAL METHOD BLD: ABNORMAL
EOSINOPHIL # BLD: 0.1 K/UL (ref 0–0.4)
EOSINOPHIL NFR BLD: 2 % (ref 0–7)
ERYTHROCYTE [DISTWIDTH] IN BLOOD BY AUTOMATED COUNT: 16 % (ref 11.5–14.5)
ERYTHROCYTE [SEDIMENTATION RATE] IN BLOOD: 52 MM/HR (ref 0–15)
GLOBULIN SER CALC-MCNC: 2.9 G/DL (ref 2–4)
GLUCOSE SERPL-MCNC: 84 MG/DL (ref 65–100)
HCT VFR BLD AUTO: 24.4 % (ref 36.6–50.3)
HGB BLD-MCNC: 7.2 G/DL (ref 12.1–17)
IMM GRANULOCYTES # BLD AUTO: 0 K/UL (ref 0–0.04)
IMM GRANULOCYTES NFR BLD AUTO: 0 % (ref 0–0.5)
LYMPHOCYTES # BLD: 1.5 K/UL (ref 0.8–3.5)
LYMPHOCYTES NFR BLD: 18 % (ref 12–49)
MCH RBC QN AUTO: 27.9 PG (ref 26–34)
MCHC RBC AUTO-ENTMCNC: 29.5 G/DL (ref 30–36.5)
MCV RBC AUTO: 94.6 FL (ref 80–99)
MONOCYTES # BLD: 0.8 K/UL (ref 0–1)
MONOCYTES NFR BLD: 9 % (ref 5–13)
NEUTS SEG # BLD: 5.7 K/UL (ref 1.8–8)
NEUTS SEG NFR BLD: 71 % (ref 32–75)
NRBC # BLD: 0 K/UL (ref 0–0.01)
NRBC BLD-RTO: 0 PER 100 WBC
PLATELET # BLD AUTO: 238 K/UL (ref 150–400)
PMV BLD AUTO: 10.4 FL (ref 8.9–12.9)
POTASSIUM SERPL-SCNC: 3.7 MMOL/L (ref 3.5–5.1)
PROT SERPL-MCNC: 5.7 G/DL (ref 6.4–8.2)
RBC # BLD AUTO: 2.58 M/UL (ref 4.1–5.7)
SODIUM SERPL-SCNC: 143 MMOL/L (ref 136–145)
URATE SERPL-MCNC: 1.7 MG/DL (ref 3.5–7.2)
WBC # BLD AUTO: 8.1 K/UL (ref 4.1–11.1)

## 2019-10-04 PROCEDURE — 84550 ASSAY OF BLOOD/URIC ACID: CPT

## 2019-10-04 PROCEDURE — 74011250636 HC RX REV CODE- 250/636: Performed by: INTERNAL MEDICINE

## 2019-10-04 PROCEDURE — 85652 RBC SED RATE AUTOMATED: CPT

## 2019-10-04 PROCEDURE — 74011250637 HC RX REV CODE- 250/637: Performed by: INTERNAL MEDICINE

## 2019-10-04 PROCEDURE — 36415 COLL VENOUS BLD VENIPUNCTURE: CPT

## 2019-10-04 PROCEDURE — 80053 COMPREHEN METABOLIC PANEL: CPT

## 2019-10-04 PROCEDURE — 85025 COMPLETE CBC W/AUTO DIFF WBC: CPT

## 2019-10-04 PROCEDURE — 96366 THER/PROPH/DIAG IV INF ADDON: CPT

## 2019-10-04 PROCEDURE — 96365 THER/PROPH/DIAG IV INF INIT: CPT

## 2019-10-04 PROCEDURE — 96375 TX/PRO/DX INJ NEW DRUG ADDON: CPT

## 2019-10-04 PROCEDURE — 74011000250 HC RX REV CODE- 250: Performed by: INTERNAL MEDICINE

## 2019-10-04 RX ORDER — FUROSEMIDE 10 MG/ML
20 INJECTION INTRAMUSCULAR; INTRAVENOUS ONCE
Status: COMPLETED | OUTPATIENT
Start: 2019-10-04 | End: 2019-10-04

## 2019-10-04 RX ORDER — SODIUM CHLORIDE 9 MG/ML
50 INJECTION, SOLUTION INTRAVENOUS AS NEEDED
Status: DISCONTINUED | OUTPATIENT
Start: 2019-10-04 | End: 2019-10-05 | Stop reason: HOSPADM

## 2019-10-04 RX ADMIN — PEGLOTICASE 8 MG: 8 INJECTION, SOLUTION INTRAVENOUS at 16:56

## 2019-10-04 RX ADMIN — DIPHENHYDRAMINE HYDROCHLORIDE 25 MG: 25 CAPSULE ORAL at 16:01

## 2019-10-04 RX ADMIN — METHYLPREDNISOLONE SODIUM SUCCINATE 125 MG: 125 INJECTION, POWDER, FOR SOLUTION INTRAMUSCULAR; INTRAVENOUS at 16:02

## 2019-10-04 RX ADMIN — FAMOTIDINE 20 MG: 10 INJECTION, SOLUTION INTRAVENOUS at 16:15

## 2019-10-04 RX ADMIN — SODIUM CHLORIDE 25 ML/HR: 900 INJECTION, SOLUTION INTRAVENOUS at 16:03

## 2019-10-04 RX ADMIN — ACETAMINOPHEN 650 MG: 325 TABLET ORAL at 16:01

## 2019-10-04 RX ADMIN — FUROSEMIDE 20 MG: 10 INJECTION, SOLUTION INTRAVENOUS at 16:10

## 2019-10-04 NOTE — PROGRESS NOTES
[facility] Note:  1440Pt arrived at [facility] ambulatory and in no distress for q 2 week tx    Pt has 12 lb weight gain since 9/12 at MD office. He has bilateral plus 2 edema in lower legs. For over a week. He also has swelling in wrists and hands from gout. Discussed this with MD and lasix ordered and pt to wear compression stockings. HGB is 7.2      Medications received:  Lasix 20 mg iv  Krystexxa  Pepcid  Methylprednisolone  Benadryl  Tylenol     Tolerated treatment well, no adverse reaction noted. D/Cd from [facility] ambulatory and in no distress accompanied by self. Next appt 10/18  1400  Visit Vitals  BP (!) 140/99   Pulse 85   Temp 98.9 °F (37.2 °C)   Resp 18   Wt 96.6 kg (213 lb)   BMI 30.56 kg/m²     Recent Results (from the past 12 hour(s))   CBC WITH AUTOMATED DIFF    Collection Time: 10/04/19  2:53 PM   Result Value Ref Range    WBC 8.1 4.1 - 11.1 K/uL    RBC 2.58 (L) 4.10 - 5.70 M/uL    HGB 7.2 (L) 12.1 - 17.0 g/dL    HCT 24.4 (L) 36.6 - 50.3 %    MCV 94.6 80.0 - 99.0 FL    MCH 27.9 26.0 - 34.0 PG    MCHC 29.5 (L) 30.0 - 36.5 g/dL    RDW 16.0 (H) 11.5 - 14.5 %    PLATELET 307 804 - 820 K/uL    MPV 10.4 8.9 - 12.9 FL    NRBC 0.0 0  WBC    ABSOLUTE NRBC 0.00 0.00 - 0.01 K/uL    NEUTROPHILS 71 32 - 75 %    LYMPHOCYTES 18 12 - 49 %    MONOCYTES 9 5 - 13 %    EOSINOPHILS 2 0 - 7 %    BASOPHILS 0 0 - 1 %    IMMATURE GRANULOCYTES 0 0.0 - 0.5 %    ABS. NEUTROPHILS 5.7 1.8 - 8.0 K/UL    ABS. LYMPHOCYTES 1.5 0.8 - 3.5 K/UL    ABS. MONOCYTES 0.8 0.0 - 1.0 K/UL    ABS. EOSINOPHILS 0.1 0.0 - 0.4 K/UL    ABS. BASOPHILS 0.0 0.0 - 0.1 K/UL    ABS. IMM.  GRANS. 0.0 0.00 - 0.04 K/UL    DF AUTOMATED

## 2019-10-04 NOTE — PROGRESS NOTES
The results were reviewed. Uric acid at target. Stable anemia, chronic kidney disease and hypoalbuminemia.

## 2019-10-15 RX ORDER — ACETAMINOPHEN 325 MG/1
650 TABLET ORAL
Status: DISCONTINUED | OUTPATIENT
Start: 2019-10-18 | End: 2019-10-19 | Stop reason: HOSPADM

## 2019-10-15 RX ORDER — DIPHENHYDRAMINE HYDROCHLORIDE 50 MG/ML
25 INJECTION, SOLUTION INTRAMUSCULAR; INTRAVENOUS
Status: DISCONTINUED | OUTPATIENT
Start: 2019-10-18 | End: 2019-10-19 | Stop reason: HOSPADM

## 2019-10-15 RX ORDER — ACETAMINOPHEN 325 MG/1
650 TABLET ORAL ONCE
Status: COMPLETED | OUTPATIENT
Start: 2019-10-18 | End: 2019-10-18

## 2019-10-15 RX ORDER — DIPHENHYDRAMINE HCL 25 MG
25 CAPSULE ORAL ONCE
Status: COMPLETED | OUTPATIENT
Start: 2019-10-18 | End: 2019-10-18

## 2019-10-15 RX ORDER — SODIUM CHLORIDE 9 MG/ML
25 INJECTION, SOLUTION INTRAVENOUS CONTINUOUS
Status: DISPENSED | OUTPATIENT
Start: 2019-10-18 | End: 2019-10-18

## 2019-10-17 ENCOUNTER — OFFICE VISIT (OUTPATIENT)
Dept: RHEUMATOLOGY | Age: 49
End: 2019-10-17

## 2019-10-17 VITALS
SYSTOLIC BLOOD PRESSURE: 143 MMHG | WEIGHT: 218 LBS | HEIGHT: 70 IN | HEART RATE: 83 BPM | BODY MASS INDEX: 31.21 KG/M2 | RESPIRATION RATE: 18 BRPM | DIASTOLIC BLOOD PRESSURE: 101 MMHG | TEMPERATURE: 97.6 F

## 2019-10-17 DIAGNOSIS — Q61.2 AUTOSOMAL DOMINANT ADULT POLYCYSTIC KIDNEY DISEASE: ICD-10-CM

## 2019-10-17 DIAGNOSIS — M1A.39X1 CHRONIC TOPHACEOUS GOUT OF MULTIPLE SITES DUE TO RENAL IMPAIRMENT: ICD-10-CM

## 2019-10-17 DIAGNOSIS — N18.4 CKD (CHRONIC KIDNEY DISEASE) STAGE 4, GFR 15-29 ML/MIN (HCC): ICD-10-CM

## 2019-10-17 DIAGNOSIS — R60.0 BILATERAL LOWER EXTREMITY EDEMA: Primary | ICD-10-CM

## 2019-10-17 DIAGNOSIS — Z79.60 LONG-TERM USE OF IMMUNOSUPPRESSANT MEDICATION: ICD-10-CM

## 2019-10-17 RX ORDER — LOSARTAN POTASSIUM 25 MG/1
TABLET ORAL DAILY
COMMUNITY
End: 2019-10-29

## 2019-10-17 RX ORDER — BUMETANIDE 1 MG/1
1 TABLET ORAL DAILY
Qty: 30 TAB | Refills: 0 | Status: SHIPPED | OUTPATIENT
Start: 2019-10-17

## 2019-10-17 NOTE — PROGRESS NOTES
REASON FOR VISIT    This is a follow-up visit for Mr. Gisela Cooper for     ICD-10-CM   1. 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     Gouty arthritis phenotype includes:  Tophi: yes  Erosions: yes  Tenosynovitis: yes  Double Contour: N/A     Therapy Includes:  NSAIDs: no (contra-indicated due to CKD)  Colchicine prophylaxis: yes  Current uricosuric therapy: none  Current urate-lowering therapy: Krystexxa 8 mg every 14 days (6/28/2019 to present)  Discontinued uricosuric because of inefficacy: none  Discontinued uricosuric because of side effects: none  Discontinued urate-lowering therapy because of inefficacy: allopurinol 50 mg daily  Discontinued urate-lowering therapy because of side effects: none  Contraindicated urate-lowering therapy because of CKD: allopurinol, probenecid  G6PD Status: normal  Current anti-immunogenicity DMARD therapy: leflunomide    Active problems include:    Patient Active Problem List   Diagnosis Code    Incarcerated ventral hernia K43.6    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 Gisela Cooper returns for a follow-up visit. On his last visit, I continued Krystexxa infusion 8 mg every 14 days and leflunomide 20 mg daily for immunogenicity prevention. His recent infusion was 10/04/2019. Today, he complains of swelling in his legs that he associates with his infusions. He was given Lasix which helped but then it recurs after he stands up as the day goes on.  It is not present when he first. He denies interval.     He endorses ankle swelling (dependent)    He denies fever, weight loss, blurred vision, vision loss, oral ulcers, dry cough, dyspnea, nausea, vomiting, dysphagia, abdominal pain, black or bloody stool, fall since last visit, rash, easy bruising and increased thirst.    Most recent uric acid from 10/04/2019 was 1.7 mg/dL (previously 4.5, 6.4, 8.2, 1.4, 2.4, 9.4 mg/dL). Last toxicity monitoring by blood work was done on 10/04/2019 and did not reveal any significant adverse effects, except creatinine 3.33, eGFR 24, albumin 2.8 g/dL. The patient has not had any interval hospital admissions, infections, or surgeries. REVIEW OF SYSTEMS    A comprehensive review of systems was performed and pertinent results are documented in the HPI, review of systems is otherwise non-contributory. PAST MEDICAL HISTORY    He has a past medical history of Heart failure ([address] Hypertension, and Polycystic kidney disease. FAMILY HISTORY    His family history includes Hypertension in his father and mother; No Known Problems in his brother and brother; Other in his sister. SOCIAL HISTORY    He reports that he has never smoked. He has never used smokeless tobacco. He reports that he drank alcohol. He reports that he has current or past drug history. Drug: Marijuana. IMMUNIZATIONS    There is no immunization history on file for this patient. MEDICATIONS    Current Outpatient Medications   Medication Sig Dispense Refill    losartan (COZAAR) 25 mg tablet Take  by mouth daily.  bumetanide (BUMEX) 1 mg tablet Take 1 Tab by mouth daily. 30 Tab 0    colchicine 0.6 mg tablet Take 1 Tab by mouth daily. 90 Tab 0    leflunomide (ARAVA) 20 mg tablet Take 1 Tab by mouth daily. Take half tab daily for 7 days and then one tab daily if tolerated 90 Tab 0    predniSONE (DELTASONE) 20 mg tablet Take 1 tab by mouth daily for flare 20 Tab 0    pegloticase (KRYSTEXXA) 8 mg/mL soln injection 8 mg by IntraVENous route Once every 2 weeks.       carvedilol (COREG) 12.5 mg tablet Take 12.5 mg by mouth two (2) times daily (with meals).  POTASSIUM ASPARTATE/MAG ASP (POTASSIUM & MAGNESIUM ASPARTAT PO) Take  by mouth. Facility-Administered Medications Ordered in Other Visits   Medication Dose Route Frequency Provider Last Rate Last Dose    [START ON 10/18/2019] pegloticase (KRYSTEXXA) 8 mg, overfill volume 25 mL in 0.9% sodium chloride 250 mL infusion  8 mg IntraVENous ONCE Little Nicholas MD        [START ON 10/18/2019] diphenhydrAMINE (BENADRYL) capsule 25 mg  25 mg Oral ONCE Little Nicholas MD        [START ON 10/18/2019] methylPREDNISolone (PF) (Solu-MEDROL) injection 125 mg  125 mg IntraVENous ONCE Little Nicholas MD        [START ON 10/18/2019] acetaminophen (TYLENOL) tablet 650 mg  650 mg Oral ONCE Little Nicholas MD        [START ON 10/18/2019] 0.9% sodium chloride infusion  25 mL/hr IntraVENous CONTINUOUS Little Nicholas MD        [START ON 10/18/2019] famotidine (PF) (PEPCID) 20 mg in sodium chloride 0.9% 10 mL injection  20 mg IntraVENous ONCE Little Nicholas MD        [START ON 10/18/2019] diphenhydrAMINE (BENADRYL) injection 25 mg  25 mg IntraVENous Q4H PRN Little Nicholas MD        [START ON 10/18/2019] acetaminophen (TYLENOL) tablet 650 mg  650 mg Oral Q4H PRN Little Nicholas MD            ALLERGIES    Allergies   Allergen Reactions    Shellfish Containing Products Swelling     PHYSICAL EXAMINATION    Visit Vitals  BP (!) 143/101   Pulse 83   Temp 97.6 °F (36.4 °C)   Resp 18   Ht 5' 10\" (1.778 m)   Wt 218 lb (98.9 kg)   BMI 31.28 kg/m²     Body mass index is 31.28 kg/m². General: Patient is alert, oriented x 3, not in acute distress    HEENT:   Sclerae are not injected and appear moist.  There is no alopecia. Neck is supple     Cardiovascular:  Heart is regular rate and rhythm, no murmurs. Chest:  Lungs are clear to auscultation bilaterally. No rhonchi, wheezes, or crackles.     Extremities:  Free of clubbing, cyanosis, +3 pitting lower extremity edema    Neurological exam:  Muscle strength is full in upper and lower extremities     Skin exam:  There are no rashes, no alopecia, no discoid lesions, no active Raynaud's, no livedo reticularis, no periungual erythema. Tophi: Tophi: yes: left: left helix, bilateral olecranon (LEFT larger 15 cm), bilateral wrists, left 2nd MCP, IP, 2nd PIP, 3rd PIP, 4th PIP, right 2nd MCP, 5th MCP, IP, 2nd PIP, 5th PIP, RIGHT: patella, LEFT: achilles    Musculoskeletal exam:  A comprehensive musculoskeletal exam was NOT performed for all joints of each upper and lower extremity and assessed for swelling, tenderness and range of motion. Positive results are documented as below:    Previous Exam    Bilateral wrist, hand dorsum, MCP and digit swelling with tenderness    DATA REVIEW    Laboratory     Recent laboratory results were reviewed, summarized, and discussed with the patient. Imaging    Musculoskeletal Ultrasound    None    Radiographs    Bilateral Elbow 6/13/2019: RIGHT: normal alignment and bone mineral density. There is a well-circumscribed erosion within the olecranon. No definite effusion. There is high density material in the region of the olecranon bursa. LEFT: no fracture or effusion. No erosive change. There is high density material in the region of the olecranon bursa. Bilateral Hand 6/13/2019: RIGHT: normal alignment. Bone mineral density is normal. No fracture. There are extensive erosive changes of the distal ulna with adjacent high density soft tissue mass. There is erosive change within the triquetrum capitate and likely trapezoid. Erosive changes are noted the first second and third MCP joints. There is high density soft tissue adjacent to the second digit PIP joint. LEFT: normal alignment and no fracture. There are scattered erosive changes throughout the carpus second MTP joint and PIP joints of the second third and fourth digits. There is soft tissue swelling which is high density of the thumb and second MTP joints.  Additional soft tissue swelling at the wrist.     Bilateral Foot 6/13/2019: RIGHT: normal alignment and bone mineral density. There are erosive changes throughout the ankle, talar head, navicular and Lisfranc joint as well as the medial base proximal phalanx of the great toe. There is adjacent soft tissue density at the first metatarsal head. No acute fracture. LEFT: normal alignment bone mineral densities. There are periarticular erosions throughout the Lisfranc joint, between the navicular and cuneiforms and at the first MTP joint. There is mild soft tissue thickening and density medial to the first metatarsal head. There is suggestion of soft tissue density between the second and third metatarsal heads. No acute fracture.     CT Imaging    CT Abdomen and Pelvis without contrast 6/01/2019: Heart/vessels: Pericardial effusion and/or thickening measuring approximately 0.8 cm. Lungs/Pleura: Within normal limits. . ABDOMEN: Liver: Small, low-attenuation lesions in the liver which are incompletely characterized and statistically likely benign. Gallbladder/Biliary: Within normal limits. Spleen: Within normal limits. Pancreas: Within normal limits. Adrenals: Within normal limits. Kidneys: Innumerable low-attenuation lesions and high attenuation lesions throughout the kidneys with complete loss of normal-appearing parenchyma suggestive of polycystic kidney disease. Peritoneum/Mesenteries: Trace amount of fluid in the pelvis. Extraperitoneum: Within normal limits. Gastrointestinal tract: There is a short segment of small bowel within a supraumbilical hernia. The short segment of bowel within the hernia appears to have some mural thickening There are distended loops of small bowel with air-fluid levels associated with the hernia. The more distal small bowel is nearly entirely decompressed as is the large bowel. Normal-appearing appendix. Vascular: Within normal limits. PELVIS: Extraperitoneum: Within normal limits. Ureters: Within normal limits. Bladder: The bladder is nearly entirely decompressed. There is concentric mural thickening in the bladder. Reproductive System: Within normal limits. MSK:  There is a supraumbilical hernia containing a short segment of small bowel with adjacent stranding and inflammation and stranding/inflammation within the fat in the hernia. Tiny, fat-containing umbilical hernia. Degenerative changes in the lumbar spine. There is degenerative disc disease at L3/L4 and L4/L5. Extensive degenerative changes in both hips. Dystrophic appearing calcifications at the origin of both hamstrings at the pubic rami. Degenerative changes in the pubic symphysis. MR Imaging    MRI Right Ankle without contrast 4/10/2012: There is osseous edema like signal at the inferior aspect of the lateral malleolus. There are also subcortical cysts in the anterior process of the calcaneus adjacent to the sinus tarsi. A mild to moderate osseous edema and the medial cuneiform bone is demonstrated subjacent to an area of distal high-grade chondral derangement. There is a moderate to large effusion of the ankle and posterior subtalar joints with demonstration of mild diffuse chondral thinning with small marginal osteophytes. There is a 7 mm loose body in the anterior joint recess. There is also heterogeneous signal in the posterior joint recesses which could contain small loose bodies as well. There is absent  visualization of the anterior talofibular ligament consistent with chronic tear. The other ankle ligaments appear intact. There is a small effusion of the talonavicular joint. Small dorsal marginal osteophytes throughout the midfoot region are noted. There is a moderate effusion in the posterior tibialis tendon sheath with diffuse mild tendon thickening and inframalleolar heterogeneous signal. There is a small effusion of the flexor digitorum longus and flexor hallucis longus tendon sheaths with normal appearing tendons.  There is a small effusion of the peroneal tendon sheath with mild diffuse thickening of the peroneus longus tendon though uniform signal. Mild. Achilles peritendinitis is noted. There is a trace amount of fluid signal in the extensor digitorum longus tendon sheath. There is mild flatfoot deformity. No tarsal coalition is shown. There is no bone or soft tissue mass. DXA     None    ASSESSMENT AND PLAN    This is a follow-up visit for Mr. Edel Lloyd. 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 Probenecid and allopurinol are contraindicated. Uloric will be ineffective and given the new FDA box warning and his history of heart failure, I do not recommend it. He is currently maintained on colchicine daily, Krystexxa 8 mg every 2 weeks and leflunomide 20 mg daily for immunogenicity prevention, with good tolerance. He has not had gouty flare since his last visit. His uric acid is now 1.7 mg/dL    I will continue treatment and asked him to use prednisone 20 mg as needed for flares since NSAIDs are contra-indicated.    2) Long Term Use of Immunosuppressants. The patient remains on immunomodulatory medications (leflunomide) and requires frequent toxicity monitoring by blood work. 3) Chronic Kidney Disease Stage 3. The patient's creatinine 3.33 mg/dL, eGFR 24. He follows with Dr. Sarwat Dorado.        4) Autosomal Dominant Polycystic Kidney Disease. He follows with Dr. Joselito Baptiste who will start [facility].     5) Essential Hypertension. Dr. Joselito Baptiste will start losartan which will help with its uricosuric properties. 6) Bilateral Lower Extremity Edema. This is likely from Medrol 125 mg and CKD. I discussed with Dr. Joselito Baptiste today and he asked me to start Bumex 1 mg daily. The patient had not followed up with Dr. Joselito Baptiste so was asked to do so. I will lower his pre-medication Medrol to 60 mg for Krystexxa. I also recommended compression hose.      The patient voiced understanding of the aforementioned assessment and plan. Summary of plan was provided in the After Visit Summary patient instructions.      TODAY'S ORDERS    Orders Placed This Encounter    losartan (COZAAR) 25 mg tablet    bumetanide (BUMEX) 1 mg tablet     Future Appointments   Date Time Provider Barry Cano   10/18/2019  2:00 PM [facility] NURSE 5 [facility]   11/1/2019  2:00 PM [facility] NURSE 5 [facility]   11/15/2019  2:00 PM [facility] NURSE 5 [facility]   4/17/2020 10:40 AM Nicholas Rabago MD [place], MD, [facility]    Adult Rheumatology   Rheumatology Ultrasound Certified  [facility]  A Part of [facility], [address] [place] [address]   Phone [phone]  Fax [phone]

## 2019-10-17 NOTE — LETTER
10/17/19 Patient: Cheryl Postal YOB: 1970 Date of Visit: 10/17/2019 Jessica Smith NP 
[address] [place] [address] [id number] [place] [address] VIA Facsimile: [phone] Dear Jessica Smith NP, Thank you for referring Mr. Asencion Habermann to [facility] for evaluation. My notes for this consultation are attached. If you have questions, please do not hesitate to call me. I look forward to following your patient along with you.  
 
 
Sincerely, 
 
Liu Watson MD

## 2019-10-17 NOTE — PROGRESS NOTES
Chief Complaint   Patient presents with    Gout     1. Have you been to the ER, urgent care clinic since your last visit? Hospitalized since your last visit? No    2. Have you seen or consulted any other health care providers outside of the [address] [place] [address] since your last visit? Include any pap smears or colon screening.  No

## 2019-10-18 ENCOUNTER — HOSPITAL ENCOUNTER (OUTPATIENT)
Dept: INFUSION THERAPY | Age: 49
Discharge: HOME | End: 2019-10-18
Payer: COMMERCIAL

## 2019-10-18 VITALS
TEMPERATURE: 98.5 F | HEART RATE: 105 BPM | SYSTOLIC BLOOD PRESSURE: 151 MMHG | DIASTOLIC BLOOD PRESSURE: 97 MMHG | RESPIRATION RATE: 18 BRPM

## 2019-10-18 LAB
ALBUMIN SERPL-MCNC: 2.8 G/DL (ref 3.5–5)
ALBUMIN/GLOB SERPL: 0.9 {RATIO} (ref 1.1–2.2)
ALP SERPL-CCNC: 52 U/L (ref 45–117)
ALT SERPL-CCNC: 41 U/L (ref 12–78)
ANION GAP SERPL CALC-SCNC: 8 MMOL/L (ref 5–15)
AST SERPL-CCNC: 20 U/L (ref 15–37)
BASOPHILS # BLD: 0 K/UL (ref 0–0.1)
BASOPHILS NFR BLD: 0 % (ref 0–1)
BILIRUB SERPL-MCNC: 0.2 MG/DL (ref 0.2–1)
BUN SERPL-MCNC: 47 MG/DL (ref 6–20)
BUN/CREAT SERPL: 15 (ref 12–20)
CALCIUM SERPL-MCNC: 8.2 MG/DL (ref 8.5–10.1)
CHLORIDE SERPL-SCNC: 114 MMOL/L (ref 97–108)
CO2 SERPL-SCNC: 22 MMOL/L (ref 21–32)
CREAT SERPL-MCNC: 3.24 MG/DL (ref 0.7–1.3)
DIFFERENTIAL METHOD BLD: ABNORMAL
EOSINOPHIL # BLD: 0.1 K/UL (ref 0–0.4)
EOSINOPHIL NFR BLD: 1 % (ref 0–7)
ERYTHROCYTE [DISTWIDTH] IN BLOOD BY AUTOMATED COUNT: 16.1 % (ref 11.5–14.5)
ERYTHROCYTE [SEDIMENTATION RATE] IN BLOOD: 53 MM/HR (ref 0–15)
GLOBULIN SER CALC-MCNC: 3.1 G/DL (ref 2–4)
GLUCOSE SERPL-MCNC: 114 MG/DL (ref 65–100)
HCT VFR BLD AUTO: 27.4 % (ref 36.6–50.3)
HGB BLD-MCNC: 7.8 G/DL (ref 12.1–17)
IMM GRANULOCYTES # BLD AUTO: 0 K/UL (ref 0–0.04)
IMM GRANULOCYTES NFR BLD AUTO: 0 % (ref 0–0.5)
LYMPHOCYTES # BLD: 1.6 K/UL (ref 0.8–3.5)
LYMPHOCYTES NFR BLD: 19 % (ref 12–49)
MCH RBC QN AUTO: 27.2 PG (ref 26–34)
MCHC RBC AUTO-ENTMCNC: 28.5 G/DL (ref 30–36.5)
MCV RBC AUTO: 95.5 FL (ref 80–99)
MONOCYTES # BLD: 0.7 K/UL (ref 0–1)
MONOCYTES NFR BLD: 8 % (ref 5–13)
NEUTS SEG # BLD: 5.9 K/UL (ref 1.8–8)
NEUTS SEG NFR BLD: 72 % (ref 32–75)
NRBC # BLD: 0 K/UL (ref 0–0.01)
NRBC BLD-RTO: 0 PER 100 WBC
PLATELET # BLD AUTO: 284 K/UL (ref 150–400)
PMV BLD AUTO: 11.2 FL (ref 8.9–12.9)
POTASSIUM SERPL-SCNC: 4 MMOL/L (ref 3.5–5.1)
PROT SERPL-MCNC: 5.9 G/DL (ref 6.4–8.2)
RBC # BLD AUTO: 2.87 M/UL (ref 4.1–5.7)
RBC MORPH BLD: ABNORMAL
SODIUM SERPL-SCNC: 144 MMOL/L (ref 136–145)
URATE SERPL-MCNC: 0.8 MG/DL (ref 3.5–7.2)
WBC # BLD AUTO: 8.3 K/UL (ref 4.1–11.1)

## 2019-10-18 PROCEDURE — 74011000250 HC RX REV CODE- 250: Performed by: INTERNAL MEDICINE

## 2019-10-18 PROCEDURE — 74011250636 HC RX REV CODE- 250/636: Performed by: INTERNAL MEDICINE

## 2019-10-18 PROCEDURE — 80053 COMPREHEN METABOLIC PANEL: CPT

## 2019-10-18 PROCEDURE — 85025 COMPLETE CBC W/AUTO DIFF WBC: CPT

## 2019-10-18 PROCEDURE — 85652 RBC SED RATE AUTOMATED: CPT

## 2019-10-18 PROCEDURE — 36415 COLL VENOUS BLD VENIPUNCTURE: CPT

## 2019-10-18 PROCEDURE — 74011250637 HC RX REV CODE- 250/637: Performed by: INTERNAL MEDICINE

## 2019-10-18 PROCEDURE — 96375 TX/PRO/DX INJ NEW DRUG ADDON: CPT

## 2019-10-18 PROCEDURE — 84550 ASSAY OF BLOOD/URIC ACID: CPT

## 2019-10-18 PROCEDURE — 96365 THER/PROPH/DIAG IV INF INIT: CPT

## 2019-10-18 PROCEDURE — 96366 THER/PROPH/DIAG IV INF ADDON: CPT

## 2019-10-18 RX ORDER — FUROSEMIDE 10 MG/ML
60 INJECTION INTRAMUSCULAR; INTRAVENOUS ONCE
Status: COMPLETED | OUTPATIENT
Start: 2019-10-18 | End: 2019-10-18

## 2019-10-18 RX ADMIN — DIPHENHYDRAMINE HYDROCHLORIDE 25 MG: 25 CAPSULE ORAL at 16:16

## 2019-10-18 RX ADMIN — METHYLPREDNISOLONE SODIUM SUCCINATE 60 MG: 125 INJECTION, POWDER, FOR SOLUTION INTRAMUSCULAR; INTRAVENOUS at 16:17

## 2019-10-18 RX ADMIN — FAMOTIDINE 20 MG: 10 INJECTION, SOLUTION INTRAVENOUS at 16:16

## 2019-10-18 RX ADMIN — FUROSEMIDE 60 MG: 10 INJECTION, SOLUTION INTRAVENOUS at 16:16

## 2019-10-18 RX ADMIN — PEGLOTICASE 8 MG: 8 INJECTION, SOLUTION INTRAVENOUS at 16:53

## 2019-10-18 RX ADMIN — ACETAMINOPHEN 650 MG: 325 TABLET, FILM COATED ORAL at 16:16

## 2019-10-18 RX ADMIN — SODIUM CHLORIDE 25 ML/HR: 900 INJECTION, SOLUTION INTRAVENOUS at 16:17

## 2019-10-18 NOTE — PROGRESS NOTES
OPIC Short Note                       Date: 2019    Name: Sharmin Levy    MRN: [id number]         : 1970    1435 Pt admit to [facility] for [facility] ambulatory in stable condition. Assessment completed. No new concerns voiced. Please review pending lab results in [address] [place] [address]. Mr. Rachel Dahl vitals were reviewed prior to treatment. Patient Vitals for the past 12 hrs:   Temp Pulse Resp BP   10/18/19 1438 98.5 °F (36.9 °C) (!) 105 18 (!) 151/97       PIV with positive blood return. Lab drawn, flushed, heparinized and de-accessed per protocol. Medications given:   Medications Administered     0.9% sodium chloride infusion     Admin Date  10/18/2019 Action  New Bag Dose  25 mL/hr Rate  25 mL/hr Route  IntraVENous Administered By  Vishal Rubin RN          acetaminophen (TYLENOL) tablet 650 mg     Admin Date  10/18/2019 Action  Given Dose  650 mg Route  Oral Administered By  Vishal Rubin RN          diphenhydrAMINE (BENADRYL) capsule 25 mg     Admin Date  10/18/2019 Action  Given Dose  25 mg Route  Oral Administered By  Vishal Rubin RN          famotidine (PF) (PEPCID) 20 mg in sodium chloride 0.9% 10 mL injection     Admin Date  10/18/2019 Action  Given Dose  20 mg Route  IntraVENous Administered By  Vishal Rubin RN          furosemide (LASIX) injection 60 mg     Admin Date  10/18/2019 Action  Given Dose  60 mg Route  IntraVENous Administered By  Vishal Rubin RN          methylPREDNISolone (PF) (Solu-MEDROL) injection 60 mg     Admin Date  10/18/2019 Action  Given Dose  60 mg Route  IntraVENous Administered By  Vishal Rubin RN          pegloticase [facility]) 8 mg, overfill volume 25 mL in 0.9% sodium chloride 250 mL infusion     Admin Date  10/18/2019 Action  Given Dose  8 mg Rate  138 mL/hr Route  IntraVENous Administered By  Vishal Rubin RN                   1900 Pt tolerated treatment well. D/c home ambulatory in no distress.      Future Appointments   Date Time Provider Department [place]   11/1/2019  2:00 PM [facility]   11/15/2019  2:00 PM [facility] NURSE 5 [facility]   4/17/2020 10:40 AM Deandre Rabago MD [address], RN  October 18, 2019  7:42 PM

## 2019-10-20 NOTE — PROGRESS NOTES
The results were reviewed. Uric acid at target. Stable anemia and chronic kidney disease.  Stable low albumin

## 2019-10-26 ENCOUNTER — APPOINTMENT (OUTPATIENT)
Dept: VASCULAR SURGERY | Age: 49
DRG: 291 | End: 2019-10-26
Attending: HOSPITALIST
Payer: COMMERCIAL

## 2019-10-26 ENCOUNTER — APPOINTMENT (OUTPATIENT)
Dept: GENERAL RADIOLOGY | Age: 49
DRG: 291 | End: 2019-10-26
Attending: EMERGENCY MEDICINE
Payer: COMMERCIAL

## 2019-10-26 ENCOUNTER — APPOINTMENT (OUTPATIENT)
Dept: NON INVASIVE DIAGNOSTICS | Age: 49
DRG: 291 | End: 2019-10-26
Attending: HOSPITALIST
Payer: COMMERCIAL

## 2019-10-26 ENCOUNTER — APPOINTMENT (OUTPATIENT)
Dept: CT IMAGING | Age: 49
DRG: 291 | End: 2019-10-26
Attending: EMERGENCY MEDICINE
Payer: COMMERCIAL

## 2019-10-26 ENCOUNTER — HOSPITAL ENCOUNTER (INPATIENT)
Age: 49
LOS: 3 days | Discharge: HOME | DRG: 291 | End: 2019-10-29
Attending: EMERGENCY MEDICINE | Admitting: HOSPITALIST
Payer: COMMERCIAL

## 2019-10-26 DIAGNOSIS — R60.1 ANASARCA: Primary | ICD-10-CM

## 2019-10-26 DIAGNOSIS — Q61.2 AUTOSOMAL DOMINANT ADULT POLYCYSTIC KIDNEY DISEASE: ICD-10-CM

## 2019-10-26 DIAGNOSIS — M1A.39X1 CHRONIC TOPHACEOUS GOUT OF MULTIPLE SITES DUE TO RENAL IMPAIRMENT: ICD-10-CM

## 2019-10-26 DIAGNOSIS — N18.4 CKD (CHRONIC KIDNEY DISEASE) STAGE 4, GFR 15-29 ML/MIN (HCC): ICD-10-CM

## 2019-10-26 DIAGNOSIS — I50.20 SYSTOLIC CONGESTIVE HEART FAILURE, UNSPECIFIED HF CHRONICITY (HCC): ICD-10-CM

## 2019-10-26 PROBLEM — R06.02 SOB (SHORTNESS OF BREATH): Status: ACTIVE | Noted: 2019-10-26

## 2019-10-26 LAB
ALBUMIN SERPL-MCNC: 2.6 G/DL (ref 3.5–5)
ALBUMIN/GLOB SERPL: 0.8 {RATIO} (ref 1.1–2.2)
ALP SERPL-CCNC: 57 U/L (ref 45–117)
ALT SERPL-CCNC: 69 U/L (ref 12–78)
ANION GAP SERPL CALC-SCNC: 9 MMOL/L (ref 5–15)
APTT PPP: 27.9 SEC (ref 22.1–32)
AST SERPL-CCNC: 68 U/L (ref 15–37)
ATRIAL RATE: 103 BPM
AV PEAK GRADIENT: 86.76 MMHG
AV VELOCITY RATIO: 0.6
BASOPHILS # BLD: 0 K/UL (ref 0–0.1)
BASOPHILS NFR BLD: 0 % (ref 0–1)
BILIRUB SERPL-MCNC: 0.2 MG/DL (ref 0.2–1)
BNP SERPL-MCNC: ABNORMAL PG/ML
BUN SERPL-MCNC: 55 MG/DL (ref 6–20)
BUN/CREAT SERPL: 16 (ref 12–20)
CALCIUM SERPL-MCNC: 8.2 MG/DL (ref 8.5–10.1)
CALCULATED P AXIS, ECG09: 67 DEGREES
CALCULATED R AXIS, ECG10: -20 DEGREES
CALCULATED T AXIS, ECG11: 130 DEGREES
CHLORIDE SERPL-SCNC: 115 MMOL/L (ref 97–108)
CK MB CFR SERPL CALC: 2 % (ref 0–2.5)
CK MB SERPL-MCNC: 1.7 NG/ML (ref 5–25)
CK SERPL-CCNC: 87 U/L (ref 39–308)
CO2 SERPL-SCNC: 22 MMOL/L (ref 21–32)
COMMENT, HOLDF: NORMAL
CREAT SERPL-MCNC: 3.37 MG/DL (ref 0.7–1.3)
DIAGNOSIS, 93000: NORMAL
DIFFERENTIAL METHOD BLD: ABNORMAL
ECHO AO ROOT DIAM: 3.84 CM
ECHO AV AREA PEAK VELOCITY: 2.8 CM2
ECHO AV PEAK GRADIENT: 5.4 MMHG
ECHO AV PEAK VELOCITY: 116.4 CM/S
ECHO AV REGURGITANT PHT: 404.8 CM
ECHO EST RA PRESSURE: 15 MMHG
ECHO LA AREA 4C: 31.8 CM2
ECHO LA MAJOR AXIS: 4.46 CM
ECHO LA TO AORTIC ROOT RATIO: 1.16
ECHO LA VOL 2C: 114.92 ML (ref 18–58)
ECHO LA VOL 4C: 122.9 ML (ref 18–58)
ECHO LA VOL BP: 131.93 ML (ref 18–58)
ECHO LA VOL/BSA BIPLANE: 60.08 ML/M2 (ref 16–28)
ECHO LA VOLUME INDEX A2C: 52.33 ML/M2 (ref 16–28)
ECHO LA VOLUME INDEX A4C: 55.96 ML/M2 (ref 16–28)
ECHO LV E' LATERAL VELOCITY: 4.25 CM/S
ECHO LV E' SEPTAL VELOCITY: 4.04 CM/S
ECHO LV EDV A2C: 265.8 ML
ECHO LV EDV A4C: 210.5 ML
ECHO LV EDV BP: 236.5 ML (ref 67–155)
ECHO LV EDV INDEX A4C: 95.9 ML/M2
ECHO LV EDV INDEX BP: 107.7 ML/M2
ECHO LV EDV NDEX A2C: 121 ML/M2
ECHO LV EJECTION FRACTION A2C: 28 %
ECHO LV EJECTION FRACTION A4C: 26 %
ECHO LV EJECTION FRACTION BIPLANE: 26.7 % (ref 55–100)
ECHO LV ESV A2C: 192.1 ML
ECHO LV ESV A4C: 155.3 ML
ECHO LV ESV BP: 173.3 ML (ref 22–58)
ECHO LV ESV INDEX A2C: 87.5 ML/M2
ECHO LV ESV INDEX A4C: 70.7 ML/M2
ECHO LV ESV INDEX BP: 78.9 ML/M2
ECHO LV INTERNAL DIMENSION DIASTOLIC: 7.13 CM (ref 4.2–5.9)
ECHO LV INTERNAL DIMENSION SYSTOLIC: 6.29 CM
ECHO LV IVSD: 0.89 CM (ref 0.6–1)
ECHO LV MASS 2D: 344.9 G (ref 88–224)
ECHO LV MASS INDEX 2D: 157.1 G/M2 (ref 49–115)
ECHO LV POSTERIOR WALL DIASTOLIC: 0.89 CM (ref 0.6–1)
ECHO LVOT DIAM: 2.43 CM
ECHO LVOT PEAK GRADIENT: 1.9 MMHG
ECHO LVOT PEAK VELOCITY: 69.66 CM/S
ECHO MV A VELOCITY: 46.14 CM/S
ECHO MV AREA PHT: 8.7 CM2
ECHO MV E DECELERATION TIME (DT): 87 MS
ECHO MV E VELOCITY: 114.58 CM/S
ECHO MV E/A RATIO: 2.48
ECHO MV E/E' LATERAL: 26.96
ECHO MV E/E' RATIO (AVERAGED): 27.66
ECHO MV E/E' SEPTAL: 28.36
ECHO MV PRESSURE HALF TIME (PHT): 25.2 MS
ECHO PULMONARY ARTERY SYSTOLIC PRESSURE (PASP): 56.2 MMHG
ECHO PV MAX VELOCITY: 89.76 CM/S
ECHO PV PEAK GRADIENT: 3.2 MMHG
ECHO RIGHT VENTRICULAR SYSTOLIC PRESSURE (RVSP): 56.2 MMHG
ECHO RV INTERNAL DIMENSION: 3.52 CM
ECHO RV TAPSE: 1.61 CM (ref 1.5–2)
ECHO TV REGURGITANT MAX VELOCITY: 320.91 CM/S
ECHO TV REGURGITANT PEAK GRADIENT: 41.2 MMHG
EOSINOPHIL # BLD: 0.1 K/UL (ref 0–0.4)
EOSINOPHIL NFR BLD: 1 % (ref 0–7)
ERYTHROCYTE [DISTWIDTH] IN BLOOD BY AUTOMATED COUNT: 16.6 % (ref 11.5–14.5)
GLOBULIN SER CALC-MCNC: 3.2 G/DL (ref 2–4)
GLUCOSE SERPL-MCNC: 105 MG/DL (ref 65–100)
HCT VFR BLD AUTO: 28.3 % (ref 36.6–50.3)
HGB BLD-MCNC: 8 G/DL (ref 12.1–17)
IMM GRANULOCYTES # BLD AUTO: 0.1 K/UL (ref 0–0.04)
IMM GRANULOCYTES NFR BLD AUTO: 1 % (ref 0–0.5)
INR PPP: 1.1 (ref 0.9–1.1)
LVFS 2D: 11.82 %
LYMPHOCYTES # BLD: 1.3 K/UL (ref 0.8–3.5)
LYMPHOCYTES NFR BLD: 12 % (ref 12–49)
MAGNESIUM SERPL-MCNC: 1.6 MG/DL (ref 1.6–2.4)
MAGNESIUM SERPL-MCNC: 1.7 MG/DL (ref 1.6–2.4)
MCH RBC QN AUTO: 27.4 PG (ref 26–34)
MCHC RBC AUTO-ENTMCNC: 28.3 G/DL (ref 30–36.5)
MCV RBC AUTO: 96.9 FL (ref 80–99)
MONOCYTES # BLD: 1 K/UL (ref 0–1)
MONOCYTES NFR BLD: 9 % (ref 5–13)
MV DEC SLOPE: 13.16
NEUTS SEG # BLD: 8.6 K/UL (ref 1.8–8)
NEUTS SEG NFR BLD: 77 % (ref 32–75)
NRBC # BLD: 0 K/UL (ref 0–0.01)
NRBC BLD-RTO: 0 PER 100 WBC
P-R INTERVAL, ECG05: 180 MS
PHOSPHATE SERPL-MCNC: 3.4 MG/DL (ref 2.6–4.7)
PISA AR MAX VEL: 465.74 CM/S
PLATELET # BLD AUTO: 341 K/UL (ref 150–400)
PMV BLD AUTO: 10.6 FL (ref 8.9–12.9)
POTASSIUM SERPL-SCNC: 3.7 MMOL/L (ref 3.5–5.1)
PROT SERPL-MCNC: 5.8 G/DL (ref 6.4–8.2)
PROTHROMBIN TIME: 10.8 SEC (ref 9–11.1)
PULMONARY ARTERY END DIASTOLIC PRESSURE: 27.8 MMHG
PULMONARY ARTERY MEAN PRESURE: 37.3 MMHG
PV END DIASTOLIC VELOCITY: 1.8 MMHG
Q-T INTERVAL, ECG07: 340 MS
QRS DURATION, ECG06: 88 MS
QTC CALCULATION (BEZET), ECG08: 445 MS
RBC # BLD AUTO: 2.92 M/UL (ref 4.1–5.7)
RBC MORPH BLD: ABNORMAL
SAMPLES BEING HELD,HOLD: NORMAL
SODIUM SERPL-SCNC: 146 MMOL/L (ref 136–145)
THERAPEUTIC RANGE,PTTT: NORMAL SECS (ref 58–77)
TROPONIN I SERPL-MCNC: 0.11 NG/ML
TROPONIN I SERPL-MCNC: 0.12 NG/ML
VENTRICULAR RATE, ECG03: 103 BPM
WBC # BLD AUTO: 11.1 K/UL (ref 4.1–11.1)

## 2019-10-26 PROCEDURE — 82550 ASSAY OF CK (CPK): CPT

## 2019-10-26 PROCEDURE — 71250 CT THORAX DX C-: CPT

## 2019-10-26 PROCEDURE — 85730 THROMBOPLASTIN TIME PARTIAL: CPT

## 2019-10-26 PROCEDURE — 93005 ELECTROCARDIOGRAM TRACING: CPT

## 2019-10-26 PROCEDURE — 83735 ASSAY OF MAGNESIUM: CPT

## 2019-10-26 PROCEDURE — 85610 PROTHROMBIN TIME: CPT

## 2019-10-26 PROCEDURE — 74011250637 HC RX REV CODE- 250/637: Performed by: INTERNAL MEDICINE

## 2019-10-26 PROCEDURE — 84484 ASSAY OF TROPONIN QUANT: CPT

## 2019-10-26 PROCEDURE — 36415 COLL VENOUS BLD VENIPUNCTURE: CPT

## 2019-10-26 PROCEDURE — 93970 EXTREMITY STUDY: CPT

## 2019-10-26 PROCEDURE — 99285 EMERGENCY DEPT VISIT HI MDM: CPT

## 2019-10-26 PROCEDURE — 96374 THER/PROPH/DIAG INJ IV PUSH: CPT

## 2019-10-26 PROCEDURE — 74011000250 HC RX REV CODE- 250: Performed by: INTERNAL MEDICINE

## 2019-10-26 PROCEDURE — 84100 ASSAY OF PHOSPHORUS: CPT

## 2019-10-26 PROCEDURE — 80053 COMPREHEN METABOLIC PANEL: CPT

## 2019-10-26 PROCEDURE — 74011000250 HC RX REV CODE- 250: Performed by: EMERGENCY MEDICINE

## 2019-10-26 PROCEDURE — 71045 X-RAY EXAM CHEST 1 VIEW: CPT

## 2019-10-26 PROCEDURE — 99218 HC RM OBSERVATION: CPT

## 2019-10-26 PROCEDURE — 83880 ASSAY OF NATRIURETIC PEPTIDE: CPT

## 2019-10-26 PROCEDURE — 93306 TTE W/DOPPLER COMPLETE: CPT

## 2019-10-26 PROCEDURE — 65270000029 HC RM PRIVATE

## 2019-10-26 PROCEDURE — 74011250637 HC RX REV CODE- 250/637: Performed by: EMERGENCY MEDICINE

## 2019-10-26 PROCEDURE — 74011250636 HC RX REV CODE- 250/636: Performed by: HOSPITALIST

## 2019-10-26 PROCEDURE — 74011250637 HC RX REV CODE- 250/637: Performed by: HOSPITALIST

## 2019-10-26 PROCEDURE — 85025 COMPLETE CBC W/AUTO DIFF WBC: CPT

## 2019-10-26 RX ORDER — CARVEDILOL 12.5 MG/1
12.5 TABLET ORAL 2 TIMES DAILY WITH MEALS
Status: DISCONTINUED | OUTPATIENT
Start: 2019-10-26 | End: 2019-10-26

## 2019-10-26 RX ORDER — BUMETANIDE 0.25 MG/ML
4 INJECTION INTRAMUSCULAR; INTRAVENOUS EVERY 12 HOURS
Status: DISCONTINUED | OUTPATIENT
Start: 2019-10-26 | End: 2019-10-29

## 2019-10-26 RX ORDER — ACETAMINOPHEN 325 MG/1
650 TABLET ORAL
Status: DISCONTINUED | OUTPATIENT
Start: 2019-10-26 | End: 2019-10-29 | Stop reason: HOSPADM

## 2019-10-26 RX ORDER — BUMETANIDE 1 MG/1
2 TABLET ORAL 2 TIMES DAILY
Status: DISCONTINUED | OUTPATIENT
Start: 2019-10-26 | End: 2019-10-26

## 2019-10-26 RX ORDER — AMLODIPINE BESYLATE 5 MG/1
5 TABLET ORAL DAILY
Status: DISCONTINUED | OUTPATIENT
Start: 2019-10-27 | End: 2019-10-28

## 2019-10-26 RX ORDER — ASPIRIN 325 MG
325 TABLET ORAL ONCE
Status: COMPLETED | OUTPATIENT
Start: 2019-10-26 | End: 2019-10-26

## 2019-10-26 RX ORDER — LOSARTAN POTASSIUM 25 MG/1
25 TABLET ORAL DAILY
Status: DISCONTINUED | OUTPATIENT
Start: 2019-10-26 | End: 2019-10-26

## 2019-10-26 RX ORDER — CARVEDILOL 12.5 MG/1
25 TABLET ORAL 2 TIMES DAILY WITH MEALS
Status: DISCONTINUED | OUTPATIENT
Start: 2019-10-26 | End: 2019-10-29 | Stop reason: HOSPADM

## 2019-10-26 RX ORDER — COLCHICINE 0.6 MG/1
0.6 TABLET ORAL DAILY
Status: DISCONTINUED | OUTPATIENT
Start: 2019-10-26 | End: 2019-10-29 | Stop reason: HOSPADM

## 2019-10-26 RX ORDER — BUMETANIDE 0.25 MG/ML
2 INJECTION INTRAMUSCULAR; INTRAVENOUS
Status: COMPLETED | OUTPATIENT
Start: 2019-10-26 | End: 2019-10-26

## 2019-10-26 RX ORDER — LEFLUNOMIDE 10 MG/1
20 TABLET ORAL DAILY
Status: DISCONTINUED | OUTPATIENT
Start: 2019-10-26 | End: 2019-10-29 | Stop reason: HOSPADM

## 2019-10-26 RX ORDER — LOSARTAN POTASSIUM 50 MG/1
50 TABLET ORAL DAILY
Status: DISCONTINUED | OUTPATIENT
Start: 2019-10-27 | End: 2019-10-27

## 2019-10-26 RX ORDER — HEPARIN SODIUM 5000 [USP'U]/ML
5000 INJECTION, SOLUTION INTRAVENOUS; SUBCUTANEOUS EVERY 8 HOURS
Status: DISCONTINUED | OUTPATIENT
Start: 2019-10-26 | End: 2019-10-29 | Stop reason: HOSPADM

## 2019-10-26 RX ORDER — SODIUM CHLORIDE 0.9 % (FLUSH) 0.9 %
5-40 SYRINGE (ML) INJECTION AS NEEDED
Status: DISCONTINUED | OUTPATIENT
Start: 2019-10-26 | End: 2019-10-29 | Stop reason: HOSPADM

## 2019-10-26 RX ORDER — SODIUM CHLORIDE 0.9 % (FLUSH) 0.9 %
5-40 SYRINGE (ML) INJECTION EVERY 8 HOURS
Status: DISCONTINUED | OUTPATIENT
Start: 2019-10-26 | End: 2019-10-29 | Stop reason: HOSPADM

## 2019-10-26 RX ORDER — ONDANSETRON 2 MG/ML
4 INJECTION INTRAMUSCULAR; INTRAVENOUS
Status: DISCONTINUED | OUTPATIENT
Start: 2019-10-26 | End: 2019-10-29 | Stop reason: HOSPADM

## 2019-10-26 RX ADMIN — Medication 10 ML: at 15:00

## 2019-10-26 RX ADMIN — ASPIRIN 325 MG ORAL TABLET 325 MG: 325 PILL ORAL at 06:38

## 2019-10-26 RX ADMIN — LEFLUNOMIDE 20 MG: 10 TABLET ORAL at 10:35

## 2019-10-26 RX ADMIN — BUMETANIDE 2 MG: 0.25 INJECTION INTRAMUSCULAR; INTRAVENOUS at 03:04

## 2019-10-26 RX ADMIN — HEPARIN SODIUM 5000 UNITS: 5000 INJECTION INTRAVENOUS; SUBCUTANEOUS at 10:01

## 2019-10-26 RX ADMIN — Medication 10 ML: at 10:01

## 2019-10-26 RX ADMIN — Medication 10 ML: at 21:05

## 2019-10-26 RX ADMIN — ACETAMINOPHEN 650 MG: 325 TABLET, FILM COATED ORAL at 15:06

## 2019-10-26 RX ADMIN — LOSARTAN POTASSIUM 25 MG: 25 TABLET ORAL at 10:00

## 2019-10-26 RX ADMIN — HEPARIN SODIUM 5000 UNITS: 5000 INJECTION INTRAVENOUS; SUBCUTANEOUS at 17:16

## 2019-10-26 RX ADMIN — CARVEDILOL 12.5 MG: 12.5 TABLET, FILM COATED ORAL at 09:58

## 2019-10-26 RX ADMIN — BUMETANIDE 4 MG: 0.25 INJECTION INTRAMUSCULAR; INTRAVENOUS at 20:54

## 2019-10-26 RX ADMIN — COLCHICINE 0.6 MG: 0.6 TABLET, FILM COATED ORAL at 09:58

## 2019-10-26 RX ADMIN — BUMETANIDE 4 MG: 0.25 INJECTION INTRAMUSCULAR; INTRAVENOUS at 15:00

## 2019-10-26 RX ADMIN — CARVEDILOL 25 MG: 12.5 TABLET, FILM COATED ORAL at 17:19

## 2019-10-26 RX ADMIN — NITROGLYCERIN 1 INCH: 20 OINTMENT TOPICAL at 06:38

## 2019-10-26 NOTE — CONSULTS
[address]    Name:  Quincy Sánchez  MR#:  [id number]  :  1970  ACCOUNT #:  [de-identified]  DATE OF SERVICE:  10/26/2019    REFERRING PHYSICIAN:  Burton Ocasio MD    HISTORY OF PRESENT ILLNESS:  This 22-year-old man with polycystic kidney disease, presenting with shortness of breath and anasarca, is seen for evaluation of possible non-ST-elevation infarct. He has two troponins 0.12 and 0.11 with a normal CPK. No chest pain, some chest tightness as he has been more short of breath with exertion with one episode of orthopnea prompting him to come to the ER. He is feeling better after getting some diuresis with IV Bumex and continues to have edema. The patient has longstanding history of polycystic kidney disease, hypertension and gout, formerly followed by Dr. Jacky Keyes, now switching over to Dr. Carmen Ackerman. He had an episode of congestive heart failure in . PAST MEDICAL HISTORY:  Otherwise remarkable for hypertension, recurrent problems with GOUT. In  of this year, he had a herniorrhaphy. CURRENT MEDICATIONS:  Coreg 12.5 twice daily, colchicine 0.6 daily, heparin 5000 subcu q.8 h., Arava 20 mg daily, losartan 25 mg a day, Krystexxa injection solution every 2 weeks. ALLERGIES:  SHELLFISH. FAMILY HISTORY:  Positive for hypertension, polycystic kidney disease and gout. SOCIAL HISTORY:  Single, three children, working full-time as a  at [employer] in a [employer]. REVIEW OF SYSTEMS:  As per HPI, otherwise negative and specifically, no presyncope, syncope, palpitations and no recent acute exacerbations of gout. PHYSICAL EXAMINATION:  GENERAL:  Pleasant, middle-aged man, in no acute distress. VITAL SIGNS:  Blood pressure 131/96, pulse 92 and regular. HEENT:  Sitting up, no jugular distention. Carotids are full without bruits. Sclerae are clear. Pupils equal, round, and reactive to light and accommodation.   Extraocular muscles are full without nystagmus. LUNGS:  Clear. HEART:  Regular rate and rhythm with S4 and S3 gallops, but no murmur. ABDOMEN:  Soft and nontender without masses or organomegaly. EXTREMITIES:  Moderate distal edema. Radial and pedal pulses are intact. SKIN:  Intact. SKELETAL:  No skeletal deformities. NEUROLOGIC:  Nonfocal.    DIAGNOSTIC DATA:  Chest x-ray, no acute process. Echo is pending. CT of the chest without contrast, pulmonary interstitial and alveolar edema with trace pericardial effusion, markedly enlarged kidneys with cysts. EKG, sinus tachycardia, possible lateral ischemia. LABORATORY DATA:  Hemoglobin 8.0, hematocrit 28.3, white count 11,100, platelets 457,269. Sodium 146, potassium 3.7, chloride 115, CO2 22, BUN 55, creatinine 3.37. CPK total was 87. Troponin 0.12, then 0.11. PROBLEMS:  1. Acute systolic congestive heart failure with likely underlying cardiomyopathic process with prominent S4 and S3 gallop on auscultation and displaced PMI. 2.  Polycystic kidney disease, creatinine ranging from 3.34-3.37. On this hospitalization laboratories, he thinks is range is more in the 2.4 area. 3.  Hypertension. 4.  Gout. PLAN/RECOMMENDATIONS:  Diurese, intensify CHF regimen, Renal consult. He will need echo and stress nuclear studies to complete his workup along with thyroid levels.       Mimi De Luna MD [id number]  D:  10/26/2019 12:19  T:  10/26/2019 16:23  JOB #:  [id number]  CC:  MD Reji Bright MD

## 2019-10-26 NOTE — PROGRESS NOTES
TRANSFER - IN REPORT:    Verbal report received from Lashonda Barroso (name) on David Began  being received from ED (unit) for routine progression of care      Report consisted of patients Situation, Background, Assessment and   Recommendations(SBAR). Information from the following report(s) SBAR and ED Summary was reviewed with the receiving nurse. Opportunity for questions and clarification was provided. Assessment completed upon patients arrival to unit and care assumed.

## 2019-10-26 NOTE — ED NOTES
MD reviewed discharge instructions and options with patient and patient verbalized understanding. RN reviewed discharge instructions using teachback method. Pt ambulated to exit without difficulty and in no signs of acute distress, and a medicaid cab  will drive home. No complaints or needs expressed at this time. Patient was counseled on medications prescribed at discharge. VSS, verbalized relief from most intense pain. Patient to call PCP in the morning for appointment.

## 2019-10-26 NOTE — ED NOTES
TRANSFER - OUT REPORT:    Verbal report given to [employer], RN(name) on Willy Baron  being transferred to (unit) for routine progression of care       Report consisted of patients Situation, Background, Assessment and   Recommendations(SBAR). Information from the following report(s) SBAR and ED Summary was reviewed with the receiving nurse. Lines:       Opportunity for questions and clarification was provided.       Patient transported with:   [employer]

## 2019-10-26 NOTE — PROGRESS NOTES
Primary Nurse Quincy Fraser RN and Octavio Hinojosa RN performed a dual skin assessment on this patient No impairment noted.

## 2019-10-26 NOTE — ED PROVIDER NOTES
The patient is a 51-year-old male with a past medical history significant for CHF, hypertension, polycystic kidney disease, moderate to severe gouty arthropathy who presents to the ED with a complaint of shortness of breath on exertion and at rest that began today and has been getting progressively worse. The patient also complains with intermittent episodes of chest pain described as sharp and stabbing, lasting a few minutes at a time, without any aggravating or relieving factors and nonradiating. The patient is currently pain-free at this time. The patient states that he gets weekly injection of Cetraxal and gets premedicated with steroids that he takes occasionally when he gets a gout flareup. He denies any fever, cough, congestion, headache, neck and back pain, nausea, vomiting, abdominal pain, diarrhea, constipation, dysuria, dizziness, extremity weakness numbness, skin rash, sick contact, recent travel. The patient had a AUSTEN performed in 2009 that revealed mild left ventricular hypertrophy with no wall motion abnormality and EF of 60%. Nephrologist: Dr. Jazmin Robles             Past Medical History:   Diagnosis Date    Heart failure [facility])     CHF 2007    Hypertension     Polycystic kidney disease        Past Surgical History:   Procedure Laterality Date    HX HERNIA REPAIR  33/39/1402    Supra umbilical hernia with mesh and Umbilical hernia repair by Dr. Sb Worthington.          Family History:   Problem Relation Age of Onset    Hypertension Mother     Hypertension Father     Other Sister         PKD, Fibromyalgia    No Known Problems Brother     No Known Problems Brother        Social History     Socioeconomic History    Marital status: SINGLE     Spouse name: Not on file    Number of children: Not on file    Years of education: Not on file    Highest education level: Not on file   Occupational History    Not on file   Social Needs    Financial resource strain: Not on file    Food insecurity: Worry: Not on file     Inability: Not on file    Transportation needs:     Medical: Not on file     Non-medical: Not on file   Tobacco Use    Smoking status: Never Smoker    Smokeless tobacco: Never Used   Substance and Sexual Activity    Alcohol use: Not Currently    Drug use: Yes     Types: Marijuana    Sexual activity: Not on file   Lifestyle    Physical activity:     Days per week: Not on file     Minutes per session: Not on file    Stress: Not on file   Relationships    Social connections:     Talks on phone: Not on file     Gets together: Not on file     Attends Protestant service: Not on file     Active member of club or organization: Not on file     Attends meetings of clubs or organizations: Not on file     Relationship status: Not on file    Intimate partner violence:     Fear of current or ex partner: Not on file     Emotionally abused: Not on file     Physically abused: Not on file     Forced sexual activity: Not on file   Other Topics Concern    Not on file   Social History Narrative    Not on file         ALLERGIES: Shellfish containing products    Review of Systems   All other systems reviewed and are negative. Vitals:    10/26/19 0243   BP: (!) 151/105   Pulse: (!) 115   Resp: 22   Temp: 97.9 °F (36.6 °C)   SpO2: 98%            Physical Exam   Nursing note and vitals reviewed. CONSTITUTIONAL: Well-appearing; well-nourished; in mild distress  HEAD: Normocephalic; atraumatic  EYES: PERRL; EOM intact; conjunctiva and sclera are clear bilaterally. ENT: No rhinorrhea; normal pharynx with no tonsillar hypertrophy; mucous membranes pink/moist, no erythema, no exudate. NECK: Supple; non-tender; no cervical lymphadenopathy  CARD: Normal S1, S2; no murmurs, rubs, or gallops. Increased Regular rate and rhythm. RESP: Normal respiratory effort; coarse breath sounds equal bilaterally with bibasilar crackles but no wheezes, rhonchi. .  ABD: Normal bowel sounds; non-distended; non-tender; no palpable organomegaly, no masses, no bruits. Back Exam: Normal inspection; no vertebral point tenderness, no CVA tenderness. Normal range of motion. EXT: Normal ROM in all four extremities; non-tender to palpation; no swelling or deformity; distal pulses are normal,   4+ pitting edema bilaterally;  SKIN: Warm; dry; bilateral upper and lower extremity swelling with joint deformity consistent with anasarca with arthritic inflammatory changes  NEURO:Alert and oriented x 3, coherent, SHAHANA-XII grossly intact, sensory and motor are non-focal.        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          Amount and/or Complexity of Data Reviewed  Clinical lab tests: ordered and reviewed  Tests in the radiology section of CPT®: ordered and reviewed  Tests in the medicine section of CPT®: reviewed and ordered  Discussion of test results with the performing providers: yes  Decide to obtain previous medical records or to obtain history from someone other than the patient: yes  Obtain history from someone other than the patient: yes  Review and summarize past medical records: yes  Discuss the patient with other providers: yes  Independent visualization of images, tracings, or specimens: yes    Risk of Complications, Morbidity, and/or Mortality  Presenting problems: moderate  Diagnostic procedures: moderate  Management options: moderate           Procedures    ED EKG interpretation:  Rhythm: sinus tachycardia; and regular . Rate (approx.): 103; Axis: left axis deviation; P wave: normal; QRS interval: normal ; ST/T wave: non-specific changes; in  Lead: Diffusely; Other findings: abnormal ekg. This EKG was interpreted by Ro Martinez MD,ED Provider. XRAY INTERPRETATION (ED MD)  Chest Xray  No acute process seen. Normal heart size. No bony abnormalities. No infiltrate. Silvana Hallman MD 3:03 AM      PROGRESS NOTE:  Pt has been reexamined by Silvana Hallman MD all available results have been reviewed with pt and any available family. Pt understands sx, dx, and tx in ED. Care plan has been outlined and questions have been answered. Pt and any available family understands and agrees to need for admission to hospital for further tx not available in ED. Pt is ready for admission. Written by Ro Martinez MD,  5:14 AM    Hospitalist TigerText for Admission  6:24 AM    ED Room Number: ER09/09  Patient Name and age:  Zac Marquez 54 y.o.  male  Working Diagnosis:   1. Anasarca    2. Systolic congestive heart failure, unspecified HF chronicity ([address]    3. CKD (chronic kidney disease) stage 4, GFR 15-29 ml/min ([facility])    4. 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 Autosomal dominant adult polycystic kidney disease      Readmission: no  Isolation Requirements:  no  Recommended Level of Care:  telemetry  Code Status:  Full Code  Department:[facility] Adult ED - [phone]  Other:      CONSULT NOTE:  Silvana Hallman MD spoke with Dr. Elo Gutierrez of the adult hospitalist team. Discussed patient's presentation, history, physical assessment, and available diagnostic results.  He will evaluate, write orders and admit the patient to the hospital. 6:24 AM    .

## 2019-10-26 NOTE — H&P
[address]  HISTORY AND PHYSICAL    Name:  Jake Ureña  MR#:  [id number]  :  1970  ACCOUNT #:  [de-identified]  ADMIT DATE:  10/26/2019      TIME OF ADMISSION:  08:00 a.m. ADMITTING ATTENDING:  Jovon Ricardo MD    PRIMARY CARE PROVIDER:  Merry Brown NP    PRIMARY NEPHROLOGIST:  Roxie Fritz MD    CHIEF COMPLAINT:  Chest pain, shortness of breath that started yesterday. The patient claims that yesterday afternoon the patient went off to sleep. When he woke up, he felt some shortness of breath and chest tightness, and chest tightness was mainly in the retrosternal region, but it progressively got worse to the point that early morning the patient was having sharp, stabbing chest pain along with shortness of breath in the retrosternal region. Not associated with any vomiting, headache, dizziness, therefore, the patient was brought to the ER. The patient claims that when he was brought over here, he was having some significant shortness of breath especially on exertion, but by the time I saw the patient, the patient's chest pain as well as shortness of breath had already resolved. The patient also claims that he has been having pedal edema since many months, but in the last few days it had gone worse. Again by the time I saw the patient, the patient had received Bumex and his pedal edema was much better according to him. The patient says that he usually takes his medications regularly except for yesterday evening dose of his Coreg, he has taken all his medications. No fever, cough, nausea, vomiting, headache, or dizziness. No changes in bowel movements. REVIEW OF SYSTEMS:  Pertinent positive as above. Rest of review of systems done. They were all negative except for above. PAST MEDICAL HISTORY:  1. Hypertension. 2.  Adult polycystic kidney disease. PAST SURGICAL HISTORY:  Hernia repair in 2019. FAMILY HISTORY:  Significant for hypertension. No coronary artery disease. SOCIAL HISTORY:  Nonsmoker, nonalcoholic, non-drug abuser. ALLERGIES:  THE PATIENT IS REPORTEDLY ALLERGIC TO SHELLFISH. HOME MEDICATIONS:  The patient is on following medications:  1. Bumex 1 mg p.o. daily. 2.  Coreg 12.5 mg p.o. b.i.d.  3.  Colchicine 0.6 mg p.o. daily. 4.  Arava 20 mg tablet p.o. daily. 5.  Cozaar 25 mg p.o. daily. 6.  Krystexxa 8 mg intravenous injection every 2 weeks. 7.  Potassium supplement. 8.  Prednisone 20 mg tablet every other week on Friday. PHYSICAL EXAMINATION:  VITAL SIGNS:  At the time the patient was seen, the patient's vitals were as follows. Blood pressure 151/107, pulse 115, respiratory rate 17, saturating 94% on room air. GENERAL:  Not in acute distress, comfortable lying in bed. HEAD:  Normocephalic, atraumatic. EYES:  PERRLA. EOMI. EARS:  Bilateral hearing normal.  No growth. NOSE:  No polyps. No bleeding. MOUTH:  Dry mucous membranes. Decent oral hygiene. NECK:  Supple. No JVD. No thyromegaly. RESPIRATORY:  Clear to auscultation bilaterally. No adventitious breath sounds. CARDIOVASCULAR:  S1 and S2 normal.  No murmurs, rubs, or gallops. GASTROINTESTINAL:  Bowel sounds present. Soft, nontender, nondistended. NEUROLOGICAL:  Cranial nerves II through XII intact. Motor 5/5 bilaterally in upper limbs and lower limbs. Sensory normal.  PSYCHIATRIC: Mood and affect appropriate. Alert, awake, and oriented x3. LABORATORIES AND RADIOLOGICAL RESULTS:  WBC 11.1, hemoglobin , hematocrit 28, and platelet count of 825. Sodium 146, potassium 3.7, chloride 115, bicarbonate 22, BUN 55, creatinine 2.3. Troponin is 0. 12. X-ray of the chest was done which did not show any acute cardiopulmonary process. CT chest without contrast shows pulmonary interstitial and alveolar edema, trace bilateral pleural effusions, trace pericardial effusion. Findings consistent with autosomal dominant polycystic kidney disease.   EKG shows sinus tachycardia, no ST-T wave changes suggestive of ischemia. ASSESSMENT AND PLAN:  1. This is a 61-year-old [facility] American male with a past medical history of hypertension, adult polycystic kidney disease, gout. He comes over here because of chest pain, shortness of breath with elevated troponins. I think likely secondary to uncontrolled blood pressure. I would admit the patient and get an echo and consult Cardiology. I doubt that the patient would receive any cardiac workup for now but will follow up with Cardiology. 2.  Hypertensive urgency. We will continue the patient's home medications. He is on nitro paste. We will continue that, and we might have to readjust his antihypertensive. 3.  History of gout. I will continue the patient's home medication. The patient is being followed by   as outpatient. 4.  Chronic kidney disease stage IV. The patient is being followed by Dr. Lenin Torres. We will consult Dr. Lenin Torres. 5.  The patient is full code. I spent about 40 minutes in taking care of the patient.         Kennedi Garnett MD      [id number]  D:  10/26/2019 12:55  T:  10/26/2019 14:18  JOB #:  [id number]

## 2019-10-26 NOTE — ED NOTES
Pt was sleeping when RN entered room. Pt arousable to voice and is a&ox4, airway is patent, skin is cool, dry, intact. Pt given warm blanket. Pt has no complaints at this time.

## 2019-10-26 NOTE — PROGRESS NOTES
Pt admitted from ED and oriented to room. Pt A&Ox4. VSS. Pt O2 sats in the 90's on room air. Pt is ST/NSR on the heart monitor. Pt complained of pain and prn tylenol given x 1 with improvement. Pt with good urine output. Pt up ad micheline. Pt remains afebrile, free from injury, and with no changes in skin integrity noted this shift. 1930 Bedside shift change report given to [address] [place] [address] Avenue (oncoming nurse) by Laurence BLACKMON (offgoing nurse). Report included the following information SBAR, Kardex, Intake/Output, MAR, Recent Results and Cardiac Rhythm ST/NS. Problem: Falls - Risk of  Goal: *Absence of Falls  Description  Document Orly Dear Fall Risk and appropriate interventions in the flowsheet.   Outcome: Progressing Towards Goal  Note:   Fall Risk Interventions:            Medication Interventions: Teach patient to arise slowly                   Problem: Patient Education: Go to Patient Education Activity  Goal: Patient/Family Education  Outcome: Progressing Towards Goal     Problem: Patient Education: Go to Patient Education Activity  Goal: Patient/Family Education  Outcome: Progressing Towards Goal     Problem: Heart Failure: Day 1  Goal: Off Pathway (Use only if patient is Off Pathway)  Outcome: Progressing Towards Goal  Goal: Activity/Safety  Outcome: Progressing Towards Goal  Goal: Consults, if ordered  Outcome: Progressing Towards Goal  Goal: Diagnostic Test/Procedures  Outcome: Progressing Towards Goal  Goal: Nutrition/Diet  Outcome: Progressing Towards Goal  Goal: Discharge Planning  Outcome: Progressing Towards Goal  Goal: Medications  Outcome: Progressing Towards Goal  Goal: Respiratory  Outcome: Progressing Towards Goal  Goal: Treatments/Interventions/Procedures  Outcome: Progressing Towards Goal  Goal: Psychosocial  Outcome: Progressing Towards Goal  Goal: *Oxygen saturation within defined limits  Outcome: Progressing Towards Goal  Goal: *Hemodynamically stable  Outcome: Progressing Towards Goal  Goal: *Optimal pain control at patient's stated goal  Outcome: Progressing Towards Goal  Goal: *Anxiety reduced or absent  Outcome: Progressing Towards Goal     Problem: Heart Failure: Day 2  Goal: Off Pathway (Use only if patient is Off Pathway)  Outcome: Progressing Towards Goal  Goal: Activity/Safety  Outcome: Progressing Towards Goal  Goal: Consults, if ordered  Outcome: Progressing Towards Goal  Goal: Diagnostic Test/Procedures  Outcome: Progressing Towards Goal  Goal: Nutrition/Diet  Outcome: Progressing Towards Goal  Goal: Discharge Planning  Outcome: Progressing Towards Goal  Goal: Medications  Outcome: Progressing Towards Goal  Goal: Respiratory  Outcome: Progressing Towards Goal  Goal: Treatments/Interventions/Procedures  Outcome: Progressing Towards Goal  Goal: Psychosocial  Outcome: Progressing Towards Goal  Goal: *Oxygen saturation within defined limits  Outcome: Progressing Towards Goal  Goal: *Hemodynamically stable  Outcome: Progressing Towards Goal  Goal: *Optimal pain control at patient's stated goal  Outcome: Progressing Towards Goal  Goal: *Anxiety reduced or absent  Outcome: Progressing Towards Goal  Goal: *Demonstrates progressive activity  Outcome: Progressing Towards Goal     Problem: Heart Failure: Day 3  Goal: Off Pathway (Use only if patient is Off Pathway)  Outcome: Progressing Towards Goal  Goal: Activity/Safety  Outcome: Progressing Towards Goal  Goal: Diagnostic Test/Procedures  Outcome: Progressing Towards Goal  Goal: Nutrition/Diet  Outcome: Progressing Towards Goal  Goal: Discharge Planning  Outcome: Progressing Towards Goal  Goal: Medications  Outcome: Progressing Towards Goal  Goal: Respiratory  Outcome: Progressing Towards Goal  Goal: Treatments/Interventions/Procedures  Outcome: Progressing Towards Goal  Goal: Psychosocial  Outcome: Progressing Towards Goal  Goal: *Oxygen saturation within defined limits  Outcome: Progressing Towards Goal  Goal: *Hemodynamically stable  Outcome: Progressing Towards Goal  Goal: *Optimal pain control at patient's stated goal  Outcome: Progressing Towards Goal  Goal: *Anxiety reduced or absent  Outcome: Progressing Towards Goal  Goal: *Demonstrates progressive activity  Outcome: Progressing Towards Goal     Problem: Heart Failure: Day 4  Goal: Off Pathway (Use only if patient is Off Pathway)  Outcome: Progressing Towards Goal  Goal: Activity/Safety  Outcome: Progressing Towards Goal  Goal: Diagnostic Test/Procedures  Outcome: Progressing Towards Goal  Goal: Nutrition/Diet  Outcome: Progressing Towards Goal  Goal: Discharge Planning  Outcome: Progressing Towards Goal  Goal: Medications  Outcome: Progressing Towards Goal  Goal: Respiratory  Outcome: Progressing Towards Goal  Goal: Treatments/Interventions/Procedures  Outcome: Progressing Towards Goal  Goal: Psychosocial  Outcome: Progressing Towards Goal  Goal: *Oxygen saturation within defined limits  Outcome: Progressing Towards Goal  Goal: *Hemodynamically stable  Outcome: Progressing Towards Goal  Goal: *Optimal pain control at patient's stated goal  Outcome: Progressing Towards Goal  Goal: *Anxiety reduced or absent  Outcome: Progressing Towards Goal  Goal: *Demonstrates progressive activity  Outcome: Progressing Towards Goal     Problem: Heart Failure: Day 5  Goal: Off Pathway (Use only if patient is Off Pathway)  Outcome: Progressing Towards Goal  Goal: Activity/Safety  Outcome: Progressing Towards Goal  Goal: Diagnostic Test/Procedures  Outcome: Progressing Towards Goal  Goal: Nutrition/Diet  Outcome: Progressing Towards Goal  Goal: Discharge Planning  Outcome: Progressing Towards Goal  Goal: Medications  Outcome: Progressing Towards Goal  Goal: Respiratory  Outcome: Progressing Towards Goal  Goal: Treatments/Interventions/Procedures  Outcome: Progressing Towards Goal  Goal: Psychosocial  Outcome: Progressing Towards Goal     Problem: Heart Failure: Discharge Outcomes  Goal: *Demonstrates ability to perform prescribed activity without shortness of breath or discomfort  Outcome: Progressing Towards Goal  Goal: *Left ventricular function assessment completed prior to or during stay, or planned for post-discharge  Outcome: Progressing Towards Goal  Goal: *ACEI prescribed if LVEF less than 40% and no contraindications or ARB prescribed  Outcome: Progressing Towards Goal  Goal: *Verbalizes understanding and describes prescribed diet  Outcome: Progressing Towards Goal  Goal: *Verbalizes understanding/describes prescribed medications  Outcome: Progressing Towards Goal  Goal: *Describes available resources and support systems  Description  (eg: Home Health, Palliative Care, Advanced Medical Directive)  Outcome: Progressing Towards Goal  Goal: *Describes smoking cessation resources  Outcome: Progressing Towards Goal  Goal: *Understands and describes signs and symptoms to report to providers(Stroke Metric)  Outcome: Progressing Towards Goal  Goal: *Describes/verbalizes understanding of follow-up/return appt  Description  (eg: to physicians, diabetes treatment coordinator, and other resources  Outcome: Progressing Towards Goal  Goal: *Describes importance of continuing daily weights and changes to report to physician  Outcome: Progressing Towards Goal

## 2019-10-26 NOTE — ED TRIAGE NOTES
Arrived from home, reporting SOB, accompanied with some chest tightness that started around 2100 yesterday. Patient hx CHF. Also receives infusions for gout. Pt on Bumex. Bilateral legs edema noted.

## 2019-10-26 NOTE — CONSULTS
[facility]   [address] [facility], [address] [place] [address], [facility]  Phone: [phone] NOTE     Patient: Sami Corea MRN: [id number]  PCP: Lula Soler NP   :     1970  Age:   54 y.o. Sex:  male      Referring physician: Alyssia Interiano MD  Reason for consultation: 54 y.o. male with SOB (shortness of breath) [Z63.43] complicated by URI   Admission Date: 10/26/2019  2:36 AM  LOS: 0 days      ASSESSMENT and PLAN :   Progressive edema and SOB - renal dysfunction, prednisone, rule out CV dysfunction    PCKD    CKD~3.3 baseline creat    HTN    Anemia      REC:  Change diuretics to IV  Add Norvasc for BP control  Echo results pending  Serial labs              Thank you for consulting [facility] in the care of your patient. Subjective:   HPI: Sami Corea is a 54 y.o.  male who has been admitted to the hospital forprogressive SOB and edema; no real chest paoin. He has PCKD with CKD and baseline creat~3.3. Past Medical Hx:   Past Medical History:   Diagnosis Date    Heart failure ([facility] [address]     CHF 2007    Hypertension     Polycystic kidney disease         Past Surgical Hx:     Past Surgical History:   Procedure Laterality Date    HX HERNIA REPAIR      Supra umbilical hernia with mesh and Umbilical hernia repair by Dr. Yong Carranza. Allergies   Allergen Reactions    Shellfish Containing Products Swelling       Social Hx:  reports that he has never smoked. He has never used smokeless tobacco. He reports that he drank alcohol. He reports that he has current or past drug history. Drug: Marijuana. Family History   Problem Relation Age of Onset    Hypertension Mother     Hypertension Father     Other Sister         PKD, Fibromyalgia    No Known Problems Brother     No Known Problems Brother        Review of Systems:  A thorough twelve point review of system was performed today. Pertinent positives and negatives are mentioned in the HPI. The reminder of the ROS is negative and noncontributory. Objective:    Vitals:    Vitals:    10/26/19 0832 10/26/19 0958 10/26/19 1057 10/26/19 1202   BP: (!) 145/106 (!) 154/107 (!) 154/107 (!) 131/96   Pulse: (!) 104 (!) 102  92   Resp: 18   14   Temp: 98.5 °F (36.9 °C)   98.4 °F (36.9 °C)   SpO2: 100%   96%   Weight: 99.8 kg (220 lb 0.3 oz)  99.8 kg (220 lb)    Height: 5' 11\" (1.803 m)  5' 11\" (1.803 m)      I&O's:  10/25 0701 - 10/26 0700  In: -   Out: 1000 [Urine:1000]  Visit Vitals  BP (!) 131/96 (BP 1 Location: Right arm, BP Patient Position: At rest)   Pulse 92   Temp 98.4 °F (36.9 °C)   Resp 14   Ht 5' 11\" (1.803 m)   Wt 99.8 kg (220 lb)   SpO2 96%   BMI 30.68 kg/m²       Physical Exam:  General:  No apparent Distress  HEENT:  No Pallor , No Icterus  Neck: Supple,no mass palpable  Lungs : CTA  CVS: RRR, S1 S2 normal, No murmur   Abdomen: Soft, NT, BS +  Extremities: Edema 3+  Skin: No rash or lesions.   MS: No joint swelling, erythema, warmth  Neurologic: non focal, AAO x 3  Psych: normal affect    Laboratory Results:    Recent Labs     10/26/19  0907 10/26/19  0303   NA  --  146*   K  --  3.7   CL  --  115*   CO2  --  22   GLU  --  105*   BUN  --  55*   CREA  --  3.37*   CA  --  8.2*   MG 1.7 1.6   PHOS  --  3.4   ALB  --  2.6*   SGOT  --  68*   ALT  --  69   INR  --  1.1     Recent Labs     10/26/19  0303   WBC 11.1   HGB 8.0*   HCT 28.3*        Lab Results   Component Value Date/Time    Specimen Description: URINE 03/25/2012 07:00 AM    Specimen Description: URINE 09/19/2009 09:02 AM     Lab Results   Component Value Date/Time    Culture result: NO GROWTH 1 DAY 03/25/2012 07:00 AM    Culture result: NO GROWTH 1 DAY 09/19/2009 09:02 AM     Recent Results (from the past 24 hour(s))   EKG, 12 LEAD, INITIAL    Collection Time: 10/26/19  3:00 AM   Result Value Ref Range    Ventricular Rate 103 BPM    Atrial Rate 103 BPM    P-R Interval 180 ms    QRS Duration 88 ms    Q-T Interval 340 ms    QTC Calculation (Bezet) 445 ms    Calculated P Axis 67 degrees    Calculated R Axis -20 degrees    Calculated T Axis 130 degrees    Diagnosis       Sinus tachycardia  T wave abnormality, consider lateral ischemia  No previous ECGs available     CBC WITH AUTOMATED DIFF    Collection Time: 10/26/19  3:03 AM   Result Value Ref Range    WBC 11.1 4.1 - 11.1 K/uL    RBC 2.92 (L) 4.10 - 5.70 M/uL    HGB 8.0 (L) 12.1 - 17.0 g/dL    HCT 28.3 (L) 36.6 - 50.3 %    MCV 96.9 80.0 - 99.0 FL    MCH 27.4 26.0 - 34.0 PG    MCHC 28.3 (L) 30.0 - 36.5 g/dL    RDW 16.6 (H) 11.5 - 14.5 %    PLATELET 397 347 - 688 K/uL    MPV 10.6 8.9 - 12.9 FL    NRBC 0.0 0  WBC    ABSOLUTE NRBC 0.00 0.00 - 0.01 K/uL    NEUTROPHILS 77 (H) 32 - 75 %    LYMPHOCYTES 12 12 - 49 %    MONOCYTES 9 5 - 13 %    EOSINOPHILS 1 0 - 7 %    BASOPHILS 0 0 - 1 %    IMMATURE GRANULOCYTES 1 (H) 0.0 - 0.5 %    ABS. NEUTROPHILS 8.6 (H) 1.8 - 8.0 K/UL    ABS. LYMPHOCYTES 1.3 0.8 - 3.5 K/UL    ABS. MONOCYTES 1.0 0.0 - 1.0 K/UL    ABS. EOSINOPHILS 0.1 0.0 - 0.4 K/UL    ABS. BASOPHILS 0.0 0.0 - 0.1 K/UL    ABS. IMM.  GRANS. 0.1 (H) 0.00 - 0.04 K/UL    DF SMEAR SCANNED      RBC COMMENTS ANISOCYTOSIS  1+        RBC COMMENTS MACROCYTOSIS  1+        RBC COMMENTS OVALOCYTES  PRESENT        RBC COMMENTS KADI CELLS  PRESENT       CK W/ CKMB & INDEX    Collection Time: 10/26/19  3:03 AM   Result Value Ref Range    CK 87 39 - 308 U/L    CK - MB 1.7 <3.6 NG/ML    CK-MB Index 2.0 0.0 - 2.5     METABOLIC PANEL, COMPREHENSIVE    Collection Time: 10/26/19  3:03 AM   Result Value Ref Range    Sodium 146 (H) 136 - 145 mmol/L    Potassium 3.7 3.5 - 5.1 mmol/L    Chloride 115 (H) 97 - 108 mmol/L    CO2 22 21 - 32 mmol/L    Anion gap 9 5 - 15 mmol/L    Glucose 105 (H) 65 - 100 mg/dL    BUN 55 (H) 6 - 20 MG/DL    Creatinine 3.37 (H) 0.70 - 1.30 MG/DL    BUN/Creatinine ratio 16 12 - 20      GFR est AA 24 (L) >60 ml/min/1.73m2    GFR est non-AA 20 (L) >60 ml/min/1.73m2    Calcium 8.2 (L) 8.5 - 10.1 MG/DL    Bilirubin, total 0.2 0.2 - 1.0 MG/DL    ALT (SGPT) 69 12 - 78 U/L    AST (SGOT) 68 (H) 15 - 37 U/L    Alk. phosphatase 57 45 - 117 U/L    Protein, total 5.8 (L) 6.4 - 8.2 g/dL    Albumin 2.6 (L) 3.5 - 5.0 g/dL    Globulin 3.2 2.0 - 4.0 g/dL    A-G Ratio 0.8 (L) 1.1 - 2.2     NT-PRO BNP    Collection Time: 10/26/19  3:03 AM   Result Value Ref Range    NT pro-BNP 13,781 (H) <125 PG/ML   MAGNESIUM    Collection Time: 10/26/19  3:03 AM   Result Value Ref Range    Magnesium 1.6 1.6 - 2.4 mg/dL   PHOSPHORUS    Collection Time: 10/26/19  3:03 AM   Result Value Ref Range    Phosphorus 3.4 2.6 - 4.7 MG/DL   PTT    Collection Time: 10/26/19  3:03 AM   Result Value Ref Range    aPTT 27.9 22.1 - 32.0 sec    aPTT, therapeutic range     58.0 - 77.0 SECS   PROTHROMBIN TIME + INR    Collection Time: 10/26/19  3:03 AM   Result Value Ref Range    INR 1.1 0.9 - 1.1      Prothrombin time 10.8 9.0 - 11.1 sec   SAMPLES BEING HELD    Collection Time: 10/26/19  3:03 AM   Result Value Ref Range    SAMPLES BEING HELD 1RED     COMMENT        Add-on orders for these samples will be processed based on acceptable specimen integrity and analyte stability, which may vary by analyte.    TROPONIN I    Collection Time: 10/26/19  3:03 AM   Result Value Ref Range    Troponin-I, Qt. 0.12 (H) <0.05 ng/mL   TROPONIN I    Collection Time: 10/26/19  9:07 AM   Result Value Ref Range    Troponin-I, Qt. 0.11 (H) <0.05 ng/mL   MAGNESIUM    Collection Time: 10/26/19  9:07 AM   Result Value Ref Range    Magnesium 1.7 1.6 - 2.4 mg/dL   ECHO ADULT COMPLETE    Collection Time: 10/26/19 11:29 AM   Result Value Ref Range    LA Volume 131.93 18 - 58 mL    LV E' Lateral Velocity 4.25 cm/s    LV E' Septal Velocity 4.04 cm/s    Tapse 1.61 1.5 - 2.0 cm    Ao Root D 3.84 cm    Aortic Valve Systolic Peak Velocity 209.93 cm/s    Aortic Valve Area by Continuity of Peak Velocity 2.8 cm2    AoV PG 5.4 mmHg LVIDd 7.13 (A) 4.2 - 5.9 cm    LVPWd 0.89 0.6 - 1.0 cm    LVIDs 6.29 cm    IVSd 0.89 0.6 - 1.0 cm    LV ED Vol A2C 265.8 mL    LV ES Vol A4C 155.3 mL    LV ES Vol .3 (A) 22 - 58 mL    LVOT d 2.43 cm    LVOT Peak Velocity 69.66 cm/s    LVOT Peak Gradient 1.9 mmHg    MVA (PHT) 8.7 cm2    MV A Gasper 46.14 cm/s    MV E Gasper 114.58 cm/s    MV E/A 2.50     Left Atrium to Aortic Root Ratio 1.16     RVIDd 3.52 cm    BP EF 26.7 (A) 55 - 100 %    LV Ejection Fraction MOD 4C 26 %    LV Ejection Fraction MOD 2C 28 %    LA Vol 4C 122.90 (A) 18 - 58 mL    LA Vol 2C 114.92 (A) 18 - 58 mL    LA Area 4C 31.8 cm2    LV Mass .9 (A) 88 - 224 g    LV Mass AL Index 157.1 49 - 115 g/m2    E/E' lateral 26.96     E/E' septal 28.36     RVSP 56.2 mmHg    E/E' ratio (averaged) 27.66     LV ES Vol A2C 192.1 mL    LVES Vol Index BP 78.9 mL/m2    LV ED Vol A4C 210.5 mL    LVED Vol Index .7 mL/m2    Est. RA Pressure 15.0 mmHg    Mitral Valve E Wave Deceleration Time 87.0 ms    Mitral Valve Pressure Half-time 25.2 ms    Left Atrium Major Axis 4.46 cm    Triscuspid Valve Regurgitation Peak Gradient 41.2 mmHg    Aortic Regurgitant Pressure Half-time 404.8 cm    Pulmonic Valve Max Velocity 89.76 cm/s    LV ED Vol .5 (A) 67 - 155 ml    TR Max Velocity 320.91 cm/s    LA Vol Index 60.08 16 - 28 ml/m2    PASP 56.2 mmHg    LA Vol Index 52.33 16 - 28 ml/m2    LA Vol Index 55.96 16 - 28 ml/m2    LVED Vol Index A4C 95.9 mL/m2    LVED Vol Index A2C 121.0 mL/m2    LVES Vol Index A4C 70.7 mL/m2    LVES Vol Index A2C 87.5 mL/m2    AR Max Gasper 465.74 cm/s    Left Ventricular Fractional Shortening by 2D 42.07047168 %    PV End Diastolic Velocity 1.8 mmHg    Mitral Valve Deceleration Parke 76.78766788     AV Velocity Ratio 0.60     Pulmonary Artery Mean Presure 37.3 mmHg    PI End Diastolic Pressure 27.0 mmHg    AV peak gradient 48.32488172 mmHg    PV peak gradient 3.2 mmHg         Urine dipstick:   Lab Results   Component Value Date/Time Color YELLOW/STRAW 06/01/2019 07:58 PM    Appearance CLEAR 06/01/2019 07:58 PM    Specific gravity 1.016 06/01/2019 07:58 PM    Specific gravity 1.010 09/19/2009 09:02 AM    pH (UA) 6.0 06/01/2019 07:58 PM    Protein 300 (A) 06/01/2019 07:58 PM    Glucose NEGATIVE  06/01/2019 07:58 PM    Ketone NEGATIVE  06/01/2019 07:58 PM    Bilirubin NEGATIVE  06/01/2019 07:58 PM    Urobilinogen 1.0 06/01/2019 07:58 PM    Nitrites NEGATIVE  06/01/2019 07:58 PM    Leukocyte Esterase NEGATIVE  06/01/2019 07:58 PM    Epithelial cells FEW 06/01/2019 07:58 PM    Bacteria NEGATIVE  06/01/2019 07:58 PM    WBC 0-4 06/01/2019 07:58 PM    RBC 5-10 06/01/2019 07:58 PM       I have reviewed the following: All pertinent labs, microbiology data, radiology imaging for my assessment     Medications list Personally Reviewed   [x]      Yes     []               No       Medications:  Prior to Admission medications    Medication Sig Start Date End Date Taking? Authorizing Provider   predniSONE (DELTASONE) 20 mg tablet Take 1 tab by mouth daily for flare 8/28/19  Yes Naman Upton MD   losartan (COZAAR) 25 mg tablet Take  by mouth daily. Provider, Historical   bumetanide (BUMEX) 1 mg tablet Take 1 Tab by mouth daily. 10/17/19   Naman Upton MD   colchicine 0.6 mg tablet Take 1 Tab by mouth daily. 9/12/19   Naman Upton MD   leflunomide (ARAVA) 20 mg tablet Take 1 Tab by mouth daily. Take half tab daily for 7 days and then one tab daily if tolerated 9/12/19   Naman Upton MD   pegloticase [facility]) 8 mg/mL soln injection 8 mg by IntraVENous route Once every 2 weeks. Provider, Historical   carvedilol (COREG) 12.5 mg tablet Take 12.5 mg by mouth two (2) times daily (with meals). Kirk Bennett MD   POTASSIUM ASPARTATE/MAG ASP (POTASSIUM & MAGNESIUM ASPARTAT PO) Take  by mouth. Kirk Bennett MD        Thank you for allowing us to participate in the care of this patient. We will follow patient.  Please dont hesitate to call with any questions    Grace Hair MD  [facility]   [address] [facility] Marc Corbin [address] [place] [address]  Phone - [phone]   Fax - [phone]  [email or web address]

## 2019-10-26 NOTE — PROGRESS NOTES
The patient was given to me for admission by the triage person at 7:15am.  [id number]  SOB, chest pain, Hypertensive urgency  Jennifer Pedroza MD

## 2019-10-27 PROBLEM — I50.21 ACUTE SYSTOLIC CHF (CONGESTIVE HEART FAILURE) (HCC): Status: ACTIVE | Noted: 2019-10-27

## 2019-10-27 LAB
ALBUMIN SERPL-MCNC: 2.4 G/DL (ref 3.5–5)
ALBUMIN/GLOB SERPL: 0.8 {RATIO} (ref 1.1–2.2)
ALP SERPL-CCNC: 48 U/L (ref 45–117)
ALT SERPL-CCNC: 46 U/L (ref 12–78)
ANION GAP SERPL CALC-SCNC: 7 MMOL/L (ref 5–15)
AST SERPL-CCNC: 21 U/L (ref 15–37)
ATRIAL RATE: 90 BPM
BASOPHILS # BLD: 0 K/UL (ref 0–0.1)
BASOPHILS NFR BLD: 0 % (ref 0–1)
BILIRUB SERPL-MCNC: 0.3 MG/DL (ref 0.2–1)
BUN SERPL-MCNC: 51 MG/DL (ref 6–20)
BUN/CREAT SERPL: 16 (ref 12–20)
CALCIUM SERPL-MCNC: 8.3 MG/DL (ref 8.5–10.1)
CALCULATED P AXIS, ECG09: 49 DEGREES
CALCULATED R AXIS, ECG10: -22 DEGREES
CALCULATED T AXIS, ECG11: 147 DEGREES
CHLORIDE SERPL-SCNC: 112 MMOL/L (ref 97–108)
CHOLEST SERPL-MCNC: 141 MG/DL
CK MB CFR SERPL CALC: 2.3 % (ref 0–2.5)
CK MB SERPL-MCNC: 1.5 NG/ML (ref 5–25)
CK SERPL-CCNC: 65 U/L (ref 39–308)
CO2 SERPL-SCNC: 23 MMOL/L (ref 21–32)
CREAT SERPL-MCNC: 3.24 MG/DL (ref 0.7–1.3)
DIAGNOSIS, 93000: NORMAL
DIFFERENTIAL METHOD BLD: ABNORMAL
EOSINOPHIL # BLD: 0.1 K/UL (ref 0–0.4)
EOSINOPHIL NFR BLD: 2 % (ref 0–7)
ERYTHROCYTE [DISTWIDTH] IN BLOOD BY AUTOMATED COUNT: 16.7 % (ref 11.5–14.5)
FOLATE SERPL-MCNC: 8.1 NG/ML (ref 5–21)
GLOBULIN SER CALC-MCNC: 3.1 G/DL (ref 2–4)
GLUCOSE SERPL-MCNC: 90 MG/DL (ref 65–100)
HCT VFR BLD AUTO: 28.4 % (ref 36.6–50.3)
HDLC SERPL-MCNC: 47 MG/DL
HDLC SERPL: 3 {RATIO} (ref 0–5)
HGB BLD-MCNC: 8.2 G/DL (ref 12.1–17)
IMM GRANULOCYTES # BLD AUTO: 0 K/UL (ref 0–0.04)
IMM GRANULOCYTES NFR BLD AUTO: 0 % (ref 0–0.5)
IRON SATN MFR SERPL: 11 % (ref 20–50)
IRON SERPL-MCNC: 22 UG/DL (ref 35–150)
LDLC SERPL CALC-MCNC: 71.6 MG/DL (ref 0–100)
LIPID PROFILE,FLP: NORMAL
LYMPHOCYTES # BLD: 1.7 K/UL (ref 0.8–3.5)
LYMPHOCYTES NFR BLD: 24 % (ref 12–49)
MCH RBC QN AUTO: 27.4 PG (ref 26–34)
MCHC RBC AUTO-ENTMCNC: 28.9 G/DL (ref 30–36.5)
MCV RBC AUTO: 95 FL (ref 80–99)
MONOCYTES # BLD: 0.7 K/UL (ref 0–1)
MONOCYTES NFR BLD: 10 % (ref 5–13)
NEUTS SEG # BLD: 4.4 K/UL (ref 1.8–8)
NEUTS SEG NFR BLD: 64 % (ref 32–75)
NRBC # BLD: 0 K/UL (ref 0–0.01)
NRBC BLD-RTO: 0 PER 100 WBC
P-R INTERVAL, ECG05: 164 MS
PLATELET # BLD AUTO: 333 K/UL (ref 150–400)
PMV BLD AUTO: 10.8 FL (ref 8.9–12.9)
POTASSIUM SERPL-SCNC: 3.6 MMOL/L (ref 3.5–5.1)
PROT SERPL-MCNC: 5.5 G/DL (ref 6.4–8.2)
Q-T INTERVAL, ECG07: 384 MS
QRS DURATION, ECG06: 98 MS
QTC CALCULATION (BEZET), ECG08: 469 MS
RBC # BLD AUTO: 2.99 M/UL (ref 4.1–5.7)
RBC MORPH BLD: ABNORMAL
SODIUM SERPL-SCNC: 142 MMOL/L (ref 136–145)
T4 FREE SERPL-MCNC: 1.1 NG/DL (ref 0.8–1.5)
TIBC SERPL-MCNC: 195 UG/DL (ref 250–450)
TRIGL SERPL-MCNC: 112 MG/DL (ref ?–150)
TROPONIN I SERPL-MCNC: 0.08 NG/ML
TSH SERPL DL<=0.05 MIU/L-ACNC: 1.29 UIU/ML (ref 0.36–3.74)
VENTRICULAR RATE, ECG03: 90 BPM
VIT B12 SERPL-MCNC: 745 PG/ML (ref 193–986)
VLDLC SERPL CALC-MCNC: 22.4 MG/DL
WBC # BLD AUTO: 6.9 K/UL (ref 4.1–11.1)

## 2019-10-27 PROCEDURE — 80053 COMPREHEN METABOLIC PANEL: CPT

## 2019-10-27 PROCEDURE — 84443 ASSAY THYROID STIM HORMONE: CPT

## 2019-10-27 PROCEDURE — 80061 LIPID PANEL: CPT

## 2019-10-27 PROCEDURE — 82746 ASSAY OF FOLIC ACID SERUM: CPT

## 2019-10-27 PROCEDURE — 82607 VITAMIN B-12: CPT

## 2019-10-27 PROCEDURE — 65660000000 HC RM CCU STEPDOWN

## 2019-10-27 PROCEDURE — 84439 ASSAY OF FREE THYROXINE: CPT

## 2019-10-27 PROCEDURE — 74011250637 HC RX REV CODE- 250/637: Performed by: INTERNAL MEDICINE

## 2019-10-27 PROCEDURE — 82550 ASSAY OF CK (CPK): CPT

## 2019-10-27 PROCEDURE — 84484 ASSAY OF TROPONIN QUANT: CPT

## 2019-10-27 PROCEDURE — 83540 ASSAY OF IRON: CPT

## 2019-10-27 PROCEDURE — 93005 ELECTROCARDIOGRAM TRACING: CPT

## 2019-10-27 PROCEDURE — 74011250636 HC RX REV CODE- 250/636: Performed by: INTERNAL MEDICINE

## 2019-10-27 PROCEDURE — 85025 COMPLETE CBC W/AUTO DIFF WBC: CPT

## 2019-10-27 PROCEDURE — 36415 COLL VENOUS BLD VENIPUNCTURE: CPT

## 2019-10-27 PROCEDURE — 74011250636 HC RX REV CODE- 250/636: Performed by: HOSPITALIST

## 2019-10-27 PROCEDURE — 74011000250 HC RX REV CODE- 250: Performed by: INTERNAL MEDICINE

## 2019-10-27 PROCEDURE — 74011250637 HC RX REV CODE- 250/637: Performed by: HOSPITALIST

## 2019-10-27 PROCEDURE — 99218 HC RM OBSERVATION: CPT

## 2019-10-27 RX ORDER — COLCHICINE 0.6 MG/1
0.6 TABLET ORAL ONCE
Status: COMPLETED | OUTPATIENT
Start: 2019-10-27 | End: 2019-10-27

## 2019-10-27 RX ORDER — LOSARTAN POTASSIUM 50 MG/1
100 TABLET ORAL DAILY
Status: DISCONTINUED | OUTPATIENT
Start: 2019-10-28 | End: 2019-10-27

## 2019-10-27 RX ADMIN — ACETAMINOPHEN 650 MG: 325 TABLET, FILM COATED ORAL at 08:20

## 2019-10-27 RX ADMIN — COLCHICINE 0.6 MG: 0.6 TABLET, FILM COATED ORAL at 08:13

## 2019-10-27 RX ADMIN — HEPARIN SODIUM 5000 UNITS: 5000 INJECTION INTRAVENOUS; SUBCUTANEOUS at 11:01

## 2019-10-27 RX ADMIN — SACUBITRIL AND VALSARTAN 1 TABLET: 24; 26 TABLET, FILM COATED ORAL at 21:22

## 2019-10-27 RX ADMIN — Medication 10 ML: at 21:22

## 2019-10-27 RX ADMIN — Medication 10 ML: at 06:59

## 2019-10-27 RX ADMIN — CARVEDILOL 25 MG: 12.5 TABLET, FILM COATED ORAL at 17:23

## 2019-10-27 RX ADMIN — COLCHICINE 0.6 MG: 0.6 TABLET, FILM COATED ORAL at 14:10

## 2019-10-27 RX ADMIN — SODIUM CHLORIDE 300 MG: 900 INJECTION, SOLUTION INTRAVENOUS at 12:46

## 2019-10-27 RX ADMIN — AMLODIPINE BESYLATE 5 MG: 5 TABLET ORAL at 08:13

## 2019-10-27 RX ADMIN — HEPARIN SODIUM 5000 UNITS: 5000 INJECTION INTRAVENOUS; SUBCUTANEOUS at 03:32

## 2019-10-27 RX ADMIN — HEPARIN SODIUM 5000 UNITS: 5000 INJECTION INTRAVENOUS; SUBCUTANEOUS at 21:22

## 2019-10-27 RX ADMIN — BUMETANIDE 4 MG: 0.25 INJECTION INTRAMUSCULAR; INTRAVENOUS at 21:22

## 2019-10-27 RX ADMIN — LEFLUNOMIDE 20 MG: 10 TABLET ORAL at 08:20

## 2019-10-27 RX ADMIN — BUMETANIDE 4 MG: 0.25 INJECTION INTRAMUSCULAR; INTRAVENOUS at 08:14

## 2019-10-27 RX ADMIN — CARVEDILOL 25 MG: 12.5 TABLET, FILM COATED ORAL at 08:13

## 2019-10-27 RX ADMIN — ACETAMINOPHEN 650 MG: 325 TABLET, FILM COATED ORAL at 14:10

## 2019-10-27 RX ADMIN — SACUBITRIL AND VALSARTAN 1 TABLET: 24; 26 TABLET, FILM COATED ORAL at 14:10

## 2019-10-27 RX ADMIN — LOSARTAN POTASSIUM 50 MG: 50 TABLET ORAL at 08:14

## 2019-10-27 RX ADMIN — Medication 10 ML: at 14:14

## 2019-10-27 NOTE — PROGRESS NOTES
Pt A&Ox4. VSS. Pt O2 sats in the 90's on room air. Pt is ST/NSR on the heart monitor. Pt complained of pain and prn tylenol given x 2 with improvement. Pt with good urine output. IV iron administered. Pt up ad micheline. Pt remains afebrile, free from injury, and with no changes in skin integrity noted this shift. 36 Dr. Juan Rubio at bedside and told him that pt's gout pain in rt hand and rt knee is worse. He said it was ok to order a one time dose of colchicine and he will readdress it with pt tomorrow. 1930 Bedside shift change report given to Christie Anaya 31 (oncoming nurse) by Laurence BLACKMON (offgoing nurse). Report included the following information SBAR, Kardex, Intake/Output, MAR, Recent Results and Cardiac Rhythm ST/NS. Problem: Falls - Risk of  Goal: *Absence of Falls  Description  Document Rocio Carlson Fall Risk and appropriate interventions in the flowsheet.   Outcome: Progressing Towards Goal  Note:   Fall Risk Interventions:            Medication Interventions: Teach patient to arise slowly, Patient to call before getting OOB, Evaluate medications/consider consulting pharmacy                   Problem: Patient Education: Go to Patient Education Activity  Goal: Patient/Family Education  Outcome: Progressing Towards Goal     Problem: Patient Education: Go to Patient Education Activity  Goal: Patient/Family Education  Outcome: Progressing Towards Goal     Problem: Heart Failure: Day 1  Goal: Off Pathway (Use only if patient is Off Pathway)  Outcome: Progressing Towards Goal  Goal: Activity/Safety  Outcome: Progressing Towards Goal  Goal: Consults, if ordered  Outcome: Progressing Towards Goal  Goal: Diagnostic Test/Procedures  Outcome: Progressing Towards Goal  Goal: Nutrition/Diet  Outcome: Progressing Towards Goal  Goal: Discharge Planning  Outcome: Progressing Towards Goal  Goal: Medications  Outcome: Progressing Towards Goal  Goal: Respiratory  Outcome: Progressing Towards Goal  Goal: Treatments/Interventions/Procedures  Outcome: Progressing Towards Goal  Goal: Psychosocial  Outcome: Progressing Towards Goal  Goal: *Oxygen saturation within defined limits  Outcome: Progressing Towards Goal  Goal: *Hemodynamically stable  Outcome: Progressing Towards Goal  Goal: *Optimal pain control at patient's stated goal  Outcome: Progressing Towards Goal  Goal: *Anxiety reduced or absent  Outcome: Progressing Towards Goal     Problem: Heart Failure: Day 2  Goal: Off Pathway (Use only if patient is Off Pathway)  Outcome: Progressing Towards Goal  Goal: Activity/Safety  Outcome: Progressing Towards Goal  Goal: Consults, if ordered  Outcome: Progressing Towards Goal  Goal: Diagnostic Test/Procedures  Outcome: Progressing Towards Goal  Goal: Nutrition/Diet  Outcome: Progressing Towards Goal  Goal: Discharge Planning  Outcome: Progressing Towards Goal  Goal: Medications  Outcome: Progressing Towards Goal  Goal: Respiratory  Outcome: Progressing Towards Goal  Goal: Treatments/Interventions/Procedures  Outcome: Progressing Towards Goal  Goal: Psychosocial  Outcome: Progressing Towards Goal  Goal: *Oxygen saturation within defined limits  Outcome: Progressing Towards Goal  Goal: *Hemodynamically stable  Outcome: Progressing Towards Goal  Goal: *Optimal pain control at patient's stated goal  Outcome: Progressing Towards Goal  Goal: *Anxiety reduced or absent  Outcome: Progressing Towards Goal  Goal: *Demonstrates progressive activity  Outcome: Progressing Towards Goal     Problem: Heart Failure: Day 3  Goal: Off Pathway (Use only if patient is Off Pathway)  Outcome: Progressing Towards Goal  Goal: Activity/Safety  Outcome: Progressing Towards Goal  Goal: Diagnostic Test/Procedures  Outcome: Progressing Towards Goal  Goal: Nutrition/Diet  Outcome: Progressing Towards Goal  Goal: Discharge Planning  Outcome: Progressing Towards Goal  Goal: Medications  Outcome: Progressing Towards Goal  Goal: Respiratory  Outcome: Progressing Towards Goal  Goal: Treatments/Interventions/Procedures  Outcome: Progressing Towards Goal  Goal: Psychosocial  Outcome: Progressing Towards Goal  Goal: *Oxygen saturation within defined limits  Outcome: Progressing Towards Goal  Goal: *Hemodynamically stable  Outcome: Progressing Towards Goal  Goal: *Optimal pain control at patient's stated goal  Outcome: Progressing Towards Goal  Goal: *Anxiety reduced or absent  Outcome: Progressing Towards Goal  Goal: *Demonstrates progressive activity  Outcome: Progressing Towards Goal     Problem: Heart Failure: Day 4  Goal: Off Pathway (Use only if patient is Off Pathway)  Outcome: Progressing Towards Goal  Goal: Activity/Safety  Outcome: Progressing Towards Goal  Goal: Diagnostic Test/Procedures  Outcome: Progressing Towards Goal  Goal: Nutrition/Diet  Outcome: Progressing Towards Goal  Goal: Discharge Planning  Outcome: Progressing Towards Goal  Goal: Medications  Outcome: Progressing Towards Goal  Goal: Respiratory  Outcome: Progressing Towards Goal  Goal: Treatments/Interventions/Procedures  Outcome: Progressing Towards Goal  Goal: Psychosocial  Outcome: Progressing Towards Goal  Goal: *Oxygen saturation within defined limits  Outcome: Progressing Towards Goal  Goal: *Hemodynamically stable  Outcome: Progressing Towards Goal  Goal: *Optimal pain control at patient's stated goal  Outcome: Progressing Towards Goal  Goal: *Anxiety reduced or absent  Outcome: Progressing Towards Goal  Goal: *Demonstrates progressive activity  Outcome: Progressing Towards Goal     Problem: Heart Failure: Day 5  Goal: Off Pathway (Use only if patient is Off Pathway)  Outcome: Progressing Towards Goal  Goal: Activity/Safety  Outcome: Progressing Towards Goal  Goal: Diagnostic Test/Procedures  Outcome: Progressing Towards Goal  Goal: Nutrition/Diet  Outcome: Progressing Towards Goal  Goal: Discharge Planning  Outcome: Progressing Towards Goal  Goal: Medications  Outcome: Progressing Towards Goal  Goal: Respiratory  Outcome: Progressing Towards Goal  Goal: Treatments/Interventions/Procedures  Outcome: Progressing Towards Goal  Goal: Psychosocial  Outcome: Progressing Towards Goal     Problem: Heart Failure: Discharge Outcomes  Goal: *Demonstrates ability to perform prescribed activity without shortness of breath or discomfort  Outcome: Progressing Towards Goal  Goal: *Left ventricular function assessment completed prior to or during stay, or planned for post-discharge  Outcome: Progressing Towards Goal  Goal: *ACEI prescribed if LVEF less than 40% and no contraindications or ARB prescribed  Outcome: Progressing Towards Goal  Goal: *Verbalizes understanding and describes prescribed diet  Outcome: Progressing Towards Goal  Goal: *Verbalizes understanding/describes prescribed medications  Outcome: Progressing Towards Goal  Goal: *Describes available resources and support systems  Description  (eg: Home Health, Palliative Care, Advanced Medical Directive)  Outcome: Progressing Towards Goal  Goal: *Describes smoking cessation resources  Outcome: Progressing Towards Goal  Goal: *Understands and describes signs and symptoms to report to providers(Stroke Metric)  Outcome: Progressing Towards Goal  Goal: *Describes/verbalizes understanding of follow-up/return appt  Description  (eg: to physicians, diabetes treatment coordinator, and other resources  Outcome: Progressing Towards Goal  Goal: *Describes importance of continuing daily weights and changes to report to physician  Outcome: Progressing Towards Goal

## 2019-10-27 NOTE — PROGRESS NOTES
[facility]   [address] [facility], [address] [place] [address], [facility]  Phone: [phone]   [phone]       Nephrology Progress Note  Rhonda Young     1970     [id number]  Date of Admission : 10/26/2019  10/27/19    CC:  Follow up for CKD, PCKD, edema       Assessment and Plan   Progressive edema and SOB - renal dysfunction, CV dysfunction, prednisone, and possibly compression of IVC by organomegaly  -ECHO with LVEF 25-30% and marked IVC dilation, some increased PAP as well  -continue current diuretics but at some point his creat will rise once filling volumes fall too low     PCKD - large kidneys     CKD~3.3 baseline creat     HTN - stable  -increase Losartan given stable creat at this point     Anemia - iron sat=11%; folate and B12 WNL  -IV iron    Gout       Interval History: A+O, edema some better but a long way to go; Echo results as above      Review of Systems: Pertinent items are noted in HPI.     Current Medications:   Current Facility-Administered Medications   Medication Dose Route Frequency    pegloticase (KRYSTEXXA) injection soln 8 mg  8 mg IntraVENous EVERY 2 WEEKS    colchicine tablet 0.6 mg  0.6 mg Oral DAILY    leflunomide (ARAVA) tablet 20 mg  20 mg Oral DAILY    sodium chloride (NS) flush 5-40 mL  5-40 mL IntraVENous Q8H    sodium chloride (NS) flush 5-40 mL  5-40 mL IntraVENous PRN    acetaminophen (TYLENOL) tablet 650 mg  650 mg Oral Q4H PRN    ondansetron (ZOFRAN) injection 4 mg  4 mg IntraVENous Q4H PRN    heparin (porcine) injection 5,000 Units  5,000 Units SubCUTAneous Q8H    carvedilol (COREG) tablet 25 mg  25 mg Oral BID WITH MEALS    losartan (COZAAR) tablet 50 mg  50 mg Oral DAILY    bumetanide (BUMEX) injection 4 mg  4 mg IntraVENous Q12H    amLODIPine (NORVASC) tablet 5 mg  5 mg Oral DAILY      Allergies   Allergen Reactions    Shellfish Containing Products Swelling       Objective:  Vitals:    Vitals:    10/26/19 2317 10/27/19 8004 10/27/19 0206 10/27/19 1131   BP: (!) 139/99 (!) 142/98 (!) 145/97 (!) 127/93   Pulse: 92 99 99 87   Resp: 15 20 18 18   Temp: 98.1 °F (36.7 °C) 97.7 °F (36.5 °C) 98.4 °F (36.9 °C) 97.7 °F (36.5 °C)   SpO2: 100% 97% 98% 98%   Weight:  101 kg (222 lb 10.6 oz)     Height:         Intake and Output:  10/27 0701 - 10/27 1900  In: 240 [P.O.:240]  Out: 975 [Urine:975]  10/25 1901 - 10/27 0700  In: 1320 [P.O.:1320]  Out: 6100 [Urine:6100]    Physical Examination:  Pt intubated    No  General: NAD,Conversant   Neck:  Supple, full ROM  Resp:  Lungs CTA B/L, no wheezing , normal respiratory effort  CV:  RRR,  no murmur or rub, 3+ LE edema  GI:  Soft, NT, + Bowel sounds, palpable right kdiney  Neurologic:  Non focal  Psych:             AAO x 3 appropriate affect   Skin:  No Rash  :  No jacinto    []    High complexity decision making was performed  []    Patient is at high-risk of decompensation with multiple organ involvement    Lab Data Personally Reviewed: I have reviewed all the pertinent labs, microbiology data and radiology studies during assessment.     Recent Labs     10/27/19  0338 10/26/19  0907 10/26/19  0303     --  146*   K 3.6  --  3.7   *  --  115*   CO2 23  --  22   GLU 90  --  105*   BUN 51*  --  55*   CREA 3.24*  --  3.37*   CA 8.3*  --  8.2*   MG  --  1.7 1.6   PHOS  --   --  3.4   ALB 2.4*  --  2.6*   SGOT 21  --  68*   ALT 46  --  69   INR  --   --  1.1     Recent Labs     10/27/19  0338 10/26/19  0303   WBC 6.9 11.1   HGB 8.2* 8.0*   HCT 28.4* 28.3*    341     Lab Results   Component Value Date/Time    Specimen Description: URINE 03/25/2012 07:00 AM    Specimen Description: URINE 09/19/2009 09:02 AM     Lab Results   Component Value Date/Time    Culture result: NO GROWTH 1 DAY 03/25/2012 07:00 AM    Culture result: NO GROWTH 1 DAY 09/19/2009 09:02 AM     Recent Results (from the past 24 hour(s))   CBC WITH AUTOMATED DIFF    Collection Time: 10/27/19  3:38 AM   Result Value Ref Range    WBC 6.9 4.1 - 11.1 K/uL    RBC 2.99 (L) 4.10 - 5.70 M/uL    HGB 8.2 (L) 12.1 - 17.0 g/dL    HCT 28.4 (L) 36.6 - 50.3 %    MCV 95.0 80.0 - 99.0 FL    MCH 27.4 26.0 - 34.0 PG    MCHC 28.9 (L) 30.0 - 36.5 g/dL    RDW 16.7 (H) 11.5 - 14.5 %    PLATELET 083 141 - 121 K/uL    MPV 10.8 8.9 - 12.9 FL    NRBC 0.0 0  WBC    ABSOLUTE NRBC 0.00 0.00 - 0.01 K/uL    NEUTROPHILS 64 32 - 75 %    LYMPHOCYTES 24 12 - 49 %    MONOCYTES 10 5 - 13 %    EOSINOPHILS 2 0 - 7 %    BASOPHILS 0 0 - 1 %    IMMATURE GRANULOCYTES 0 0.0 - 0.5 %    ABS. NEUTROPHILS 4.4 1.8 - 8.0 K/UL    ABS. LYMPHOCYTES 1.7 0.8 - 3.5 K/UL    ABS. MONOCYTES 0.7 0.0 - 1.0 K/UL    ABS. EOSINOPHILS 0.1 0.0 - 0.4 K/UL    ABS. BASOPHILS 0.0 0.0 - 0.1 K/UL    ABS. IMM. GRANS. 0.0 0.00 - 0.04 K/UL    DF SMEAR SCANNED      RBC COMMENTS ANISOCYTOSIS  1+        RBC COMMENTS MACROCYTOSIS  1+        RBC COMMENTS OVALOCYTES  PRESENT        RBC COMMENTS HYPOCHROMIA  1+       METABOLIC PANEL, COMPREHENSIVE    Collection Time: 10/27/19  3:38 AM   Result Value Ref Range    Sodium 142 136 - 145 mmol/L    Potassium 3.6 3.5 - 5.1 mmol/L    Chloride 112 (H) 97 - 108 mmol/L    CO2 23 21 - 32 mmol/L    Anion gap 7 5 - 15 mmol/L    Glucose 90 65 - 100 mg/dL    BUN 51 (H) 6 - 20 MG/DL    Creatinine 3.24 (H) 0.70 - 1.30 MG/DL    BUN/Creatinine ratio 16 12 - 20      GFR est AA 25 (L) >60 ml/min/1.73m2    GFR est non-AA 21 (L) >60 ml/min/1.73m2    Calcium 8.3 (L) 8.5 - 10.1 MG/DL    Bilirubin, total 0.3 0.2 - 1.0 MG/DL    ALT (SGPT) 46 12 - 78 U/L    AST (SGOT) 21 15 - 37 U/L    Alk.  phosphatase 48 45 - 117 U/L    Protein, total 5.5 (L) 6.4 - 8.2 g/dL    Albumin 2.4 (L) 3.5 - 5.0 g/dL    Globulin 3.1 2.0 - 4.0 g/dL    A-G Ratio 0.8 (L) 1.1 - 2.2     TSH 3RD GENERATION    Collection Time: 10/27/19  3:38 AM   Result Value Ref Range    TSH 1.29 0.36 - 3.74 uIU/mL   T4, FREE    Collection Time: 10/27/19  3:38 AM   Result Value Ref Range    T4, Free 1.1 0.8 - 1.5 NG/DL   IRON PROFILE    Collection Time: 10/27/19  3:38 AM   Result Value Ref Range    Iron 22 (L) 35 - 150 ug/dL    TIBC 195 (L) 250 - 450 ug/dL    Iron % saturation 11 (L) 20 - 50 %   FOLATE    Collection Time: 10/27/19  3:38 AM   Result Value Ref Range    Folate 8.1 5.0 - 21.0 ng/mL   VITAMIN B12    Collection Time: 10/27/19  3:38 AM   Result Value Ref Range    Vitamin B12 745 193 - 986 pg/mL   CK W/ CKMB & INDEX    Collection Time: 10/27/19  3:38 AM   Result Value Ref Range    CK 65 39 - 308 U/L    CK - MB 1.5 <3.6 NG/ML    CK-MB Index 2.3 0.0 - 2.5     LIPID PANEL    Collection Time: 10/27/19  3:38 AM   Result Value Ref Range    LIPID PROFILE          Cholesterol, total 141 <200 MG/DL    Triglyceride 112 <150 MG/DL    HDL Cholesterol 47 MG/DL    LDL, calculated 71.6 0 - 100 MG/DL    VLDL, calculated 22.4 MG/DL    CHOL/HDL Ratio 3.0 0.0 - 5.0     TROPONIN I    Collection Time: 10/27/19  3:38 AM   Result Value Ref Range    Troponin-I, Qt. 0.08 (H) <0.05 ng/mL   EKG, 12 LEAD, INITIAL    Collection Time: 10/27/19  9:26 AM   Result Value Ref Range    Ventricular Rate 90 BPM    Atrial Rate 90 BPM    P-R Interval 164 ms    QRS Duration 98 ms    Q-T Interval 384 ms    QTC Calculation (Bezet) 469 ms    Calculated P Axis 49 degrees    Calculated R Axis -22 degrees    Calculated T Axis 147 degrees    Diagnosis       Normal sinus rhythm  T wave abnormality, consider lateral ischemia  Prolonged QT  When compared with ECG of 26-OCT-2019 03:00,  ST no longer depressed in Anterior leads             Total time spent with patient:  xxx   min. Care Plan discussed with:  Patient     Family      RN      Consulting Physician [facility],         I have reviewed the flowsheets. Chart and Pertinent Notes have been reviewed. No change in PMH ,family and social history from Consult note.       Domenico Green MD

## 2019-10-27 NOTE — PROGRESS NOTES
Cardiology Progress Note  10/27/2019     Admit Date: 10/26/2019  Admit Diagnosis: SOB (shortness of breath) [R06.02]  CC: none currently    Assessment:   Principal Problem:    SOB (shortness of breath) (10/26/2019)      Plan:   Diuresing well. Stable renal function. Will switch from [facility] to [facility]. Pharmacologic EST tomorrow. Volume status:euvolemic  Renal function: stable    For other plans, see orders. Subjective: Jeremy Caraballo reports   Chest Pain:  [x]   none,  consistent with  []   non-cardiac   []   atypical   []   angina             [x]   none now    []      on-going  Dyspnea: [x]   none  []   at rest  []   with exertion     []   improved   []   unchanged   []   worsening  PND:       [x]   none  []   overnight    Orthopnea: [x]   none  []   improved  []   unchanged  []   worsening  Presyncope: [x]   none   []   improved    []   unchanged    []   worsening  Ambulated in hallway without symptoms  []   Yes  Ambulated in room without symptoms  []   Yes    Objective:    Physical Exam:  Overall VSSAF;    Visit Vitals  BP (!) 127/93 (BP 1 Location: Right arm, BP Patient Position: At rest)   Pulse 87   Temp 97.7 °F (36.5 °C)   Resp 18   Ht 5' 11\" (1.803 m)   Wt 96.1 kg (211 lb 14.4 oz)   SpO2 98%   BMI 29.55 kg/m²     Temp (24hrs), Av.1 °F (36.7 °C), Min:97.6 °F (36.4 °C), Max:99.1 °F (37.3 °C)    Patient Vitals for the past 8 hrs:   Pulse   10/27/19 1131 87   10/27/19 0807 99    Patient Vitals for the past 8 hrs:   Resp   10/27/19 1131 18   10/27/19 0807 18    Patient Vitals for the past 8 hrs:   BP   10/27/19 1131 (!) 127/93   10/27/19 0807 (!) 145/97          Intake/Output Summary (Last 24 hours) at 10/27/2019 1320  Last data filed at 10/27/2019 1208  Gross per 24 hour   Intake 1320 ml   Output 6075 ml   Net -4755 ml       General Appearance: Well developed, well nourished, no acute distress. Ears/Nose/Mouth/Throat:   Normal MM; anicteric.      JVP: WNL   Resp:   Lungs clear to auscultation bilaterally. Nl resp effort. Cardiovascular:  RRR, S1, S2 normal, no new murmur. S3 and S4 present   Abdomen:   Soft, non-tender, bowel sounds are present. Extremities: 2+ edema bilaterally. Skin:  Neuro: Warm and dry. A/O x3, grossly nonfocal    []      cath site intact w/o hematoma or bruit; distal pulse unchanged. Data Review:     Telemetry independently reviewed : [x]   sinus  []   chronic afib   []   par afib  []      NSVT    ECG independently reviewed:  []   NSR   []   no significant changes  []   no new ECG provided for review  Lab results reviewed as noted below. Current medications reviewed as noted below. No results for input(s): PH, PCO2, PO2 in the last 72 hours.   Recent Labs     10/27/19  0338 10/26/19  0907 10/26/19  0303   CPK 65  --  87   CKMB 1.5  --  1.7   TROIQ 0.08* 0.11* 0.12*     Recent Labs     10/27/19  0338 10/26/19  0303    146*   K 3.6 3.7   * 115*   CO2 23 22   BUN 51* 55*   CREA 3.24* 3.37*   GLU 90 105*   PHOS  --  3.4   CA 8.3* 8.2*   ALB 2.4* 2.6*   WBC 6.9 11.1   HGB 8.2* 8.0*   HCT 28.4* 28.3*    341     Recent Labs     10/27/19  0338 10/26/19  0303   SGOT 21 68*   ALT 46 69   AP 48 57   TBILI 0.3 0.2   TP 5.5* 5.8*   ALB 2.4* 2.6*   GLOB 3.1 3.2     Recent Labs     10/26/19  0303   INR 1.1   PTP 10.8   APTT 27.9      Recent Labs     10/27/19  0338   TIBC 195*   PSAT 11*      No results found for: GLUCPOC    Current Facility-Administered Medications   Medication Dose Route Frequency    iron sucrose (VENOFER) 300 mg in 0.9% sodium chloride 250 mL IVPB  300 mg IntraVENous Q24H    sacubitril-valsartan (ENTRESTO) 24-26 mg tablet 1 Tab  1 Tab Oral Q12H    pegloticase (KRYSTEXXA) injection soln 8 mg  8 mg IntraVENous EVERY 2 WEEKS    colchicine tablet 0.6 mg  0.6 mg Oral DAILY    leflunomide (ARAVA) tablet 20 mg  20 mg Oral DAILY    sodium chloride (NS) flush 5-40 mL  5-40 mL IntraVENous Q8H    sodium chloride (NS) flush 5-40 mL  5-40 mL IntraVENous PRN    acetaminophen (TYLENOL) tablet 650 mg  650 mg Oral Q4H PRN    ondansetron (ZOFRAN) injection 4 mg  4 mg IntraVENous Q4H PRN    heparin (porcine) injection 5,000 Units  5,000 Units SubCUTAneous Q8H    carvedilol (COREG) tablet 25 mg  25 mg Oral BID WITH MEALS    bumetanide (BUMEX) injection 4 mg  4 mg IntraVENous Q12H    amLODIPine (NORVASC) tablet 5 mg  5 mg Oral DAILY        Mich Murrieta MD

## 2019-10-27 NOTE — PROGRESS NOTES
Hospitalist Progress Note  Ginger Edwards MD  Answering service: [phone] from in house phone        Date of Service:  10/27/2019  NAME:  Lucy Marie  :  1970  MRN:  [id number]      Admission Summary:   The patient claims that yesterday afternoon the patient went off to sleep. When he woke up, he felt some shortness of breath and chest tightness, and chest tightness was mainly in the retrosternal region, but it progressively got worse to the point that early morning the patient was having sharp, stabbing chest pain along with shortness of breath in the retrosternal region. Not associated with any vomiting, headache, dizziness, therefore, the patient was brought to the ER. The patient claims that when he was brought over here, he was having some significant shortness of breath especially on exertion, but by the time I saw the patient, the patient's chest pain as well as shortness of breath had already resolved.     The patient also claims that he has been having pedal edema since many months, but in the last few days it had gone worse. Again by the time I saw the patient, the patient had received Bumex and his pedal edema was much better according to him.     The patient says that he usually takes his medications regularly except for yesterday evening dose of his Coreg, he has taken all his medications.     No fever, cough, nausea, vomiting, headache, or dizziness.   No changes in bowel movements.       Interval history / Subjective:     F/u SOB/chest pain   No SOB, chest pain, pedal edema improved  Pain right forearm/wrist and right knee \"gout flare up\"  Assessment & Plan:     Acute systolic CHF NYHA III  -sec to ?  -Daily weight  -I/O  -Echo EF 26-30% with severe global hypokinesis  -On IV diuresis with good urine output, negative 5l since admission  -Appreciate Cardiology  -on Entresto/Coreg    Elevated troponins  -Echo as above  -Plan noted for stress test     Iron deficiency anemia  -on IV iron    Hypertensive urgency  -continue home meds  -Improved    History of gout with acute flare up  -Continue home meds  -Will give extra dose of colchicine  -If does not improve, will talk to Dr Oneal Craft tomorrow    Chronic kidney disease stage IV  -stable    Cardiac diet  -NPO from midnight    Code status: FULL CODE  DVT prophylaxis: heparin    Plan: Continue IV diuresis. Stress test ?tomorrow    Care Plan discussed with: Patient/Family  Disposition: TBD     Hospital Problems  Date Reviewed: 10/27/2019          Codes Class Noted POA    * (Principal) SOB (shortness of breath) ICD-10-CM: R06.02  ICD-9-CM: 786.05  10/26/2019 Yes                Review of Systems:   A comprehensive review of systems was negative except for that written in the HPI. Vital Signs:    Last 24hrs VS reviewed since prior progress note. Most recent are:  Visit Vitals  BP (!) 127/93 (BP 1 Location: Right arm, BP Patient Position: At rest)   Pulse 87   Temp 97.7 °F (36.5 °C)   Resp 18   Ht 5' 11\" (1.803 m)   Wt 96.1 kg (211 lb 14.4 oz)   SpO2 98%   BMI 29.55 kg/m²         Intake/Output Summary (Last 24 hours) at 10/27/2019 1341  Last data filed at 10/27/2019 1208  Gross per 24 hour   Intake 1320 ml   Output 6075 ml   Net -4755 ml        Physical Examination:             Constitutional:  No acute distress, cooperative, pleasant    ENT:  Oral mucous moist, oropharynx benign. Resp:  CTA bilaterally. No wheezing/rhonchi/rales. No accessory muscle use   CV:  Regular rhythm, normal rate, no murmurs, gallops, rubs    GI:  Soft, non distended, non tender. normoactive bowel sounds, no hepatosplenomegaly     Musculoskeletal:  No edema, warm, 2+ pulses throughout    Neurologic:  Moves all extremities.   AAOx3, CN II-XII reviewed     Skin:  Good turgor, no rashes or ulcers       Data Review:    Review and/or order of clinical lab test      Labs:     Recent Labs     10/27/19  6297 10/26/19  0303   WBC 6.9 11.1   HGB 8.2* 8.0*   HCT 28.4* 28.3*    341     Recent Labs     10/27/19  0338 10/26/19  0907 10/26/19  0303     --  146*   K 3.6  --  3.7   *  --  115*   CO2 23  --  22   BUN 51*  --  55*   CREA 3.24*  --  3.37*   GLU 90  --  105*   CA 8.3*  --  8.2*   MG  --  1.7 1.6   PHOS  --   --  3.4     Recent Labs     10/27/19  0338 10/26/19  0303   SGOT 21 68*   ALT 46 69   AP 48 57   TBILI 0.3 0.2   TP 5.5* 5.8*   ALB 2.4* 2.6*   GLOB 3.1 3.2     Recent Labs     10/26/19  0303   INR 1.1   PTP 10.8   APTT 27.9      Recent Labs     10/27/19  0338   TIBC 195*   PSAT 11*      Lab Results   Component Value Date/Time    Folate 8.1 10/27/2019 03:38 AM      No results for input(s): PH, PCO2, PO2 in the last 72 hours.   Recent Labs     10/27/19  0338 10/26/19  0907 10/26/19  0303   CPK 65  --  87   CKNDX 2.3  --  2.0   TROIQ 0.08* 0.11* 0.12*     Lab Results   Component Value Date/Time    Cholesterol, total 141 10/27/2019 03:38 AM    HDL Cholesterol 47 10/27/2019 03:38 AM    LDL, calculated 71.6 10/27/2019 03:38 AM    Triglyceride 112 10/27/2019 03:38 AM    CHOL/HDL Ratio 3.0 10/27/2019 03:38 AM     No results found for: [facility]  Lab Results   Component Value Date/Time    Color YELLOW/STRAW 06/01/2019 07:58 PM    Appearance CLEAR 06/01/2019 07:58 PM    Specific gravity 1.016 06/01/2019 07:58 PM    Specific gravity 1.010 09/19/2009 09:02 AM    pH (UA) 6.0 06/01/2019 07:58 PM    Protein 300 (A) 06/01/2019 07:58 PM    Glucose NEGATIVE  06/01/2019 07:58 PM    Ketone NEGATIVE  06/01/2019 07:58 PM    Bilirubin NEGATIVE  06/01/2019 07:58 PM    Urobilinogen 1.0 06/01/2019 07:58 PM    Nitrites NEGATIVE  06/01/2019 07:58 PM    Leukocyte Esterase NEGATIVE  06/01/2019 07:58 PM    Epithelial cells FEW 06/01/2019 07:58 PM    Bacteria NEGATIVE  06/01/2019 07:58 PM    WBC 0-4 06/01/2019 07:58 PM    RBC 5-10 06/01/2019 07:58 PM         Medications Reviewed:     Current Facility-Administered Medications Medication Dose Route Frequency    iron sucrose (VENOFER) 300 mg in 0.9% sodium chloride 250 mL IVPB  300 mg IntraVENous Q24H    sacubitril-valsartan (ENTRESTO) 24-26 mg tablet 1 Tab  1 Tab Oral Q12H    colchicine tablet 0.6 mg  0.6 mg Oral ONCE    pegloticase (KRYSTEXXA) injection soln 8 mg  8 mg IntraVENous EVERY 2 WEEKS    colchicine tablet 0.6 mg  0.6 mg Oral DAILY    leflunomide (ARAVA) tablet 20 mg  20 mg Oral DAILY    sodium chloride (NS) flush 5-40 mL  5-40 mL IntraVENous Q8H    sodium chloride (NS) flush 5-40 mL  5-40 mL IntraVENous PRN    acetaminophen (TYLENOL) tablet 650 mg  650 mg Oral Q4H PRN    ondansetron (ZOFRAN) injection 4 mg  4 mg IntraVENous Q4H PRN    heparin (porcine) injection 5,000 Units  5,000 Units SubCUTAneous Q8H    carvedilol (COREG) tablet 25 mg  25 mg Oral BID WITH MEALS    bumetanide (BUMEX) injection 4 mg  4 mg IntraVENous Q12H    amLODIPine (NORVASC) tablet 5 mg  5 mg Oral DAILY     ______________________________________________________________________  EXPECTED LENGTH OF STAY: - - -  ACTUAL LENGTH OF STAY:          Daylin Orozco MD

## 2019-10-27 NOTE — PROGRESS NOTES
Problem: Falls - Risk of  Goal: *Absence of Falls  Description  Document Renay Mauraankur Fall Risk and appropriate interventions in the flowsheet. Outcome: Progressing Towards Goal  Note:   Fall Risk Interventions:  Medication Interventions: Teach patient to arise slowly  Pt. To call before getting OOB, bed locked and in low position, urinal in reach. Pt. Educated on fall prevention. Problem: Heart Failure: Day 1  Goal: Medications  Outcome: Progressing Towards Goal  Goal: Respiratory  Outcome: Progressing Towards Goal    Last 3 Recorded Weights in this Encounter    10/26/19 0832 10/26/19 1057 10/27/19 0324   Weight: 99.8 kg (220 lb 0.3 oz) 99.8 kg (220 lb) 101 kg (222 lb 10.6 oz)     Pt. Weight source bed. Bedside shift change report given to HARPREET Gallardo RN (oncoming nurse) by Radha Smith RN (offgoing nurse). Report included the following information SBAR, Kardex, ED Summary, Procedure Summary, Intake/Output, MAR, Accordion, Recent Results, Med Rec Status, Cardiac Rhythm NSR, Sinus Tach, Alarm Parameters , Pre Procedure Checklist, Procedure Verification and Quality Measures.

## 2019-10-28 ENCOUNTER — APPOINTMENT (OUTPATIENT)
Dept: NUCLEAR MEDICINE | Age: 49
DRG: 291 | End: 2019-10-28
Attending: INTERNAL MEDICINE
Payer: COMMERCIAL

## 2019-10-28 ENCOUNTER — APPOINTMENT (OUTPATIENT)
Dept: NON INVASIVE DIAGNOSTICS | Age: 49
DRG: 291 | End: 2019-10-28
Attending: INTERNAL MEDICINE
Payer: COMMERCIAL

## 2019-10-28 LAB
ANION GAP SERPL CALC-SCNC: 7 MMOL/L (ref 5–15)
BASOPHILS # BLD: 0 K/UL (ref 0–0.1)
BASOPHILS NFR BLD: 0 % (ref 0–1)
BUN SERPL-MCNC: 51 MG/DL (ref 6–20)
BUN/CREAT SERPL: 15 (ref 12–20)
CALCIUM SERPL-MCNC: 8.2 MG/DL (ref 8.5–10.1)
CHLORIDE SERPL-SCNC: 111 MMOL/L (ref 97–108)
CO2 SERPL-SCNC: 23 MMOL/L (ref 21–32)
CREAT SERPL-MCNC: 3.41 MG/DL (ref 0.7–1.3)
DIFFERENTIAL METHOD BLD: ABNORMAL
EOSINOPHIL # BLD: 0.1 K/UL (ref 0–0.4)
EOSINOPHIL NFR BLD: 2 % (ref 0–7)
ERYTHROCYTE [DISTWIDTH] IN BLOOD BY AUTOMATED COUNT: 16.8 % (ref 11.5–14.5)
GLUCOSE SERPL-MCNC: 99 MG/DL (ref 65–100)
HCT VFR BLD AUTO: 31 % (ref 36.6–50.3)
HGB BLD-MCNC: 9.3 G/DL (ref 12.1–17)
IMM GRANULOCYTES # BLD AUTO: 0 K/UL (ref 0–0.04)
IMM GRANULOCYTES NFR BLD AUTO: 0 % (ref 0–0.5)
LYMPHOCYTES # BLD: 1.7 K/UL (ref 0.8–3.5)
LYMPHOCYTES NFR BLD: 19 % (ref 12–49)
MCH RBC QN AUTO: 27.8 PG (ref 26–34)
MCHC RBC AUTO-ENTMCNC: 30 G/DL (ref 30–36.5)
MCV RBC AUTO: 92.5 FL (ref 80–99)
MONOCYTES # BLD: 1 K/UL (ref 0–1)
MONOCYTES NFR BLD: 11 % (ref 5–13)
NEUTS SEG # BLD: 6.2 K/UL (ref 1.8–8)
NEUTS SEG NFR BLD: 68 % (ref 32–75)
NRBC # BLD: 0 K/UL (ref 0–0.01)
NRBC BLD-RTO: 0 PER 100 WBC
PLATELET # BLD AUTO: 370 K/UL (ref 150–400)
PMV BLD AUTO: 10.6 FL (ref 8.9–12.9)
POTASSIUM SERPL-SCNC: 3.4 MMOL/L (ref 3.5–5.1)
RBC # BLD AUTO: 3.35 M/UL (ref 4.1–5.7)
SODIUM SERPL-SCNC: 141 MMOL/L (ref 136–145)
WBC # BLD AUTO: 9.2 K/UL (ref 4.1–11.1)

## 2019-10-28 PROCEDURE — 85025 COMPLETE CBC W/AUTO DIFF WBC: CPT

## 2019-10-28 PROCEDURE — 74011250636 HC RX REV CODE- 250/636: Performed by: INTERNAL MEDICINE

## 2019-10-28 PROCEDURE — 80048 BASIC METABOLIC PNL TOTAL CA: CPT

## 2019-10-28 PROCEDURE — 78452 HT MUSCLE IMAGE SPECT MULT: CPT

## 2019-10-28 PROCEDURE — 74011000250 HC RX REV CODE- 250: Performed by: INTERNAL MEDICINE

## 2019-10-28 PROCEDURE — 65660000000 HC RM CCU STEPDOWN

## 2019-10-28 PROCEDURE — 36415 COLL VENOUS BLD VENIPUNCTURE: CPT

## 2019-10-28 PROCEDURE — 74011250637 HC RX REV CODE- 250/637: Performed by: INTERNAL MEDICINE

## 2019-10-28 PROCEDURE — 74011250637 HC RX REV CODE- 250/637: Performed by: HOSPITALIST

## 2019-10-28 PROCEDURE — 74011250636 HC RX REV CODE- 250/636: Performed by: HOSPITALIST

## 2019-10-28 PROCEDURE — A9500 TC99M SESTAMIBI: HCPCS

## 2019-10-28 RX ORDER — PREDNISONE 20 MG/1
20 TABLET ORAL
Status: DISCONTINUED | OUTPATIENT
Start: 2019-10-29 | End: 2019-10-29 | Stop reason: HOSPADM

## 2019-10-28 RX ORDER — SODIUM CHLORIDE 0.9 % (FLUSH) 0.9 %
20 SYRINGE (ML) INJECTION
Status: COMPLETED | OUTPATIENT
Start: 2019-10-28 | End: 2019-10-28

## 2019-10-28 RX ADMIN — COLCHICINE 0.6 MG: 0.6 TABLET, FILM COATED ORAL at 11:11

## 2019-10-28 RX ADMIN — BUMETANIDE 4 MG: 0.25 INJECTION INTRAMUSCULAR; INTRAVENOUS at 21:00

## 2019-10-28 RX ADMIN — Medication 10 ML: at 14:00

## 2019-10-28 RX ADMIN — SACUBITRIL AND VALSARTAN 1 TABLET: 24; 26 TABLET, FILM COATED ORAL at 20:59

## 2019-10-28 RX ADMIN — AMLODIPINE BESYLATE 5 MG: 5 TABLET ORAL at 11:10

## 2019-10-28 RX ADMIN — Medication 10 ML: at 21:00

## 2019-10-28 RX ADMIN — BUMETANIDE 4 MG: 0.25 INJECTION INTRAMUSCULAR; INTRAVENOUS at 11:11

## 2019-10-28 RX ADMIN — HEPARIN SODIUM 5000 UNITS: 5000 INJECTION INTRAVENOUS; SUBCUTANEOUS at 11:21

## 2019-10-28 RX ADMIN — SODIUM CHLORIDE 300 MG: 900 INJECTION, SOLUTION INTRAVENOUS at 11:21

## 2019-10-28 RX ADMIN — LEFLUNOMIDE 20 MG: 10 TABLET ORAL at 11:10

## 2019-10-28 RX ADMIN — Medication 20 ML: at 09:55

## 2019-10-28 RX ADMIN — SACUBITRIL AND VALSARTAN 1 TABLET: 24; 26 TABLET, FILM COATED ORAL at 11:11

## 2019-10-28 RX ADMIN — ACETAMINOPHEN 650 MG: 325 TABLET, FILM COATED ORAL at 11:17

## 2019-10-28 RX ADMIN — REGADENOSON 0.4 MG: 0.08 INJECTION, SOLUTION INTRAVENOUS at 09:55

## 2019-10-28 RX ADMIN — Medication 10 ML: at 06:02

## 2019-10-28 RX ADMIN — CARVEDILOL 25 MG: 12.5 TABLET, FILM COATED ORAL at 17:39

## 2019-10-28 RX ADMIN — HEPARIN SODIUM 5000 UNITS: 5000 INJECTION INTRAVENOUS; SUBCUTANEOUS at 21:00

## 2019-10-28 RX ADMIN — ACETAMINOPHEN 650 MG: 325 TABLET, FILM COATED ORAL at 20:59

## 2019-10-28 RX ADMIN — CARVEDILOL 25 MG: 12.5 TABLET, FILM COATED ORAL at 11:11

## 2019-10-28 NOTE — CONSULTS
[facility]   [address] [address], [facility] [address] [facility], [facility] [place]  Phone: [phone]   [phone]       Nephrology Progress Note  Kimberley Brown     1970     [id number]  Date of Admission : 10/26/2019  10/28/19    CC:  Follow up for CKD, PCKD, edema       Assessment and Plan   URI on CKD IV:  - slight rise in Cr likely from diuretics  - cont current dose of bumex  - will need to accept higher Cr to keep him out of HF    Hypervolemia:  -  CVS dysfunction, progressive CKD playing a role  - cont current diuretics    HFrEF:  - ECHO with LVEF 25-30% and marked IVC dilation, some increased PAP as well  - stress test today  - cont diuretics  - Entresto started 10/27  - per cardiology    CKD IV:  - baseline Cr around 3.3     PCKD - large kidneys     HTN:  - cont current BP meds    Anemia - iron sat=11%; folate and B12 WNL  - IV iron x 3 doses    Gout:  - on colchicine       Interval History:   Seen and examined. Feeling better. Still w/ sig edema. Good UOP, Cr relatively stable. Started Entresto yesterday evening. No cp, sob, n/v/d reported. Had stress test this AM.      Review of Systems: Pertinent items are noted in HPI.     Current Medications:   Current Facility-Administered Medications   Medication Dose Route Frequency    iron sucrose (VENOFER) 300 mg in 0.9% sodium chloride 250 mL IVPB  300 mg IntraVENous Q24H    sacubitril-valsartan (ENTRESTO) 24-26 mg tablet 1 Tab  1 Tab Oral Q12H    colchicine tablet 0.6 mg  0.6 mg Oral DAILY    leflunomide (ARAVA) tablet 20 mg  20 mg Oral DAILY    sodium chloride (NS) flush 5-40 mL  5-40 mL IntraVENous Q8H    sodium chloride (NS) flush 5-40 mL  5-40 mL IntraVENous PRN    acetaminophen (TYLENOL) tablet 650 mg  650 mg Oral Q4H PRN    ondansetron (ZOFRAN) injection 4 mg  4 mg IntraVENous Q4H PRN    heparin (porcine) injection 5,000 Units  5,000 Units SubCUTAneous Q8H    carvedilol (COREG) tablet 25 mg  25 mg Oral BID WITH MEALS    bumetanide (BUMEX) injection 4 mg  4 mg IntraVENous Q12H    amLODIPine (NORVASC) tablet 5 mg  5 mg Oral DAILY      Allergies   Allergen Reactions    Shellfish Containing Products Swelling       Objective:  Vitals:    Vitals:    10/28/19 0407 10/28/19 0619 10/28/19 0953 10/28/19 1110   BP: 127/90  128/86 (!) 134/92   Pulse: 92   99   Resp: 18   18   Temp: 99 °F (37.2 °C)   98.1 °F (36.7 °C)   SpO2: 95%   100%   Weight:  95.8 kg (211 lb 3.2 oz)     Height:         Intake and Output:  No intake/output data recorded. 10/26 1901 - 10/28 0700  In: 1825 [P.O.:1560; I.V.:265]  Out: 8225 [Urine:8225]    Physical Examination:  Pt intubated    No  General: NAD,Conversant   Neck:  Supple, full ROM  Resp:  Lungs CTA B/L, no wheezing , normal respiratory effort  CV:  RRR,  no murmur or rub, 3+ LE edema  GI:  Soft, NT, + Bowel sounds, palpable right kdiney  Neurologic:  Non focal  Psych:             AAO x 3 appropriate affect   Skin:  No Rash  :  No jacinto    []    High complexity decision making was performed  []    Patient is at high-risk of decompensation with multiple organ involvement    Lab Data Personally Reviewed: I have reviewed all the pertinent labs, microbiology data and radiology studies during assessment.     Recent Labs     10/28/19  0409 10/27/19  0338 10/26/19  0907 10/26/19  0303    142  --  146*   K 3.4* 3.6  --  3.7   * 112*  --  115*   CO2 23 23  --  22   GLU 99 90  --  105*   BUN 51* 51*  --  55*   CREA 3.41* 3.24*  --  3.37*   CA 8.2* 8.3*  --  8.2*   MG  --   --  1.7 1.6   PHOS  --   --   --  3.4   ALB  --  2.4*  --  2.6*   SGOT  --  21  --  68*   ALT  --  46  --  69   INR  --   --   --  1.1     Recent Labs     10/28/19  0409 10/27/19  0338 10/26/19  0303   WBC 9.2 6.9 11.1   HGB 9.3* 8.2* 8.0*   HCT 31.0* 28.4* 28.3*    333 341     Lab Results   Component Value Date/Time    Specimen Description: URINE 03/25/2012 07:00 AM    Specimen Description: URINE 09/19/2009 09:02 AM     Lab Results   Component Value Date/Time    Culture result: NO GROWTH 1 DAY 03/25/2012 07:00 AM    Culture result: NO GROWTH 1 DAY 09/19/2009 09:02 AM     Recent Results (from the past 24 hour(s))   CBC WITH AUTOMATED DIFF    Collection Time: 10/28/19  4:09 AM   Result Value Ref Range    WBC 9.2 4.1 - 11.1 K/uL    RBC 3.35 (L) 4.10 - 5.70 M/uL    HGB 9.3 (L) 12.1 - 17.0 g/dL    HCT 31.0 (L) 36.6 - 50.3 %    MCV 92.5 80.0 - 99.0 FL    MCH 27.8 26.0 - 34.0 PG    MCHC 30.0 30.0 - 36.5 g/dL    RDW 16.8 (H) 11.5 - 14.5 %    PLATELET 054 549 - 675 K/uL    MPV 10.6 8.9 - 12.9 FL    NRBC 0.0 0  WBC    ABSOLUTE NRBC 0.00 0.00 - 0.01 K/uL    NEUTROPHILS 68 32 - 75 %    LYMPHOCYTES 19 12 - 49 %    MONOCYTES 11 5 - 13 %    EOSINOPHILS 2 0 - 7 %    BASOPHILS 0 0 - 1 %    IMMATURE GRANULOCYTES 0 0.0 - 0.5 %    ABS. NEUTROPHILS 6.2 1.8 - 8.0 K/UL    ABS. LYMPHOCYTES 1.7 0.8 - 3.5 K/UL    ABS. MONOCYTES 1.0 0.0 - 1.0 K/UL    ABS. EOSINOPHILS 0.1 0.0 - 0.4 K/UL    ABS. BASOPHILS 0.0 0.0 - 0.1 K/UL    ABS. IMM.  GRANS. 0.0 0.00 - 0.04 K/UL    DF AUTOMATED     METABOLIC PANEL, BASIC    Collection Time: 10/28/19  4:09 AM   Result Value Ref Range    Sodium 141 136 - 145 mmol/L    Potassium 3.4 (L) 3.5 - 5.1 mmol/L    Chloride 111 (H) 97 - 108 mmol/L    CO2 23 21 - 32 mmol/L    Anion gap 7 5 - 15 mmol/L    Glucose 99 65 - 100 mg/dL    BUN 51 (H) 6 - 20 MG/DL    Creatinine 3.41 (H) 0.70 - 1.30 MG/DL    BUN/Creatinine ratio 15 12 - 20      GFR est AA 24 (L) >60 ml/min/1.73m2    GFR est non-AA 19 (L) >60 ml/min/1.73m2    Calcium 8.2 (L) 8.5 - 10.1 MG/DL   NUCLEAR CARDIAC STRESS TEST    Collection Time: 10/28/19  8:48 AM   Result Value Ref Range    Target  bpm    Exercise duration time 00:03:00     Stress Base Systolic  mmHg    Stress Base Diastolic BP 86 mmHg    Post peak  BPM    Baseline HR 93 BPM    Estimated workload 1.0 METS    Percent HR 58 %    Stress Rate Pressure Product 12,928 BPM*mmHg    Baseline  mmHg Stress Base Diastolic BP 86 mmHg           Total time spent with patient:  xxx   min. Care Plan discussed with:  Patient     Family      RN      Consulting Physician [address],         I have reviewed the flowsheets. Chart and Pertinent Notes have been reviewed. No change in PMH ,family and social history from Consult note.       Tea Russo MD

## 2019-10-28 NOTE — PROGRESS NOTES
A Spiritual Care Partner Volunteer visited patient in Rm [id number] on 10/28/2019.   Documented by:  Chaplain Crooks MDiv, MS, [place] [id number] PRA ([id number])

## 2019-10-28 NOTE — PROGRESS NOTES
Problem: Falls - Risk of  Goal: *Absence of Falls  Description  Document Mike Diaz Fall Risk and appropriate interventions in the flowsheet. Outcome: Progressing Towards Goal  Note:   Fall Risk Interventions:  Medication Interventions: Teach patient to arise slowly  Problem: Heart Failure: Day 1  Goal: Medications  Outcome: Progressing Towards Goal  Goal: Respiratory  Outcome: Progressing Towards Goal    Bedside shift change report given to John Carrera RN (oncoming nurse) by Duran Moeller RN (offgoing nurse).  Report included the following information SBAR, Kardex, ED Summary, Procedure Summary, Intake/Output, MAR, Accordion, Recent Results, Med Rec Status, Cardiac Rhythm NSR, Alarm Parameters  and Pre Procedure Checklist.

## 2019-10-28 NOTE — PROGRESS NOTES
Cardiology Progress Note  10/28/2019     Admit Date: 10/26/2019  Admit Diagnosis: SOB (shortness of breath) [K00.01]  Acute systolic CHF (congestive heart failure) ([facility]) [I50.21]  CC: none currently    Assessment:   Principal Problem:    SOB (shortness of breath) (10/26/2019)    Active Problems:    Acute systolic CHF (congestive heart failure) ([address] (10/27/2019)      Plan:   Improved. Reviewed nuclear study. I think the apical finidings represent attenuation rather than infarct. Clearly , no ischemia. Cath not indicated. Stop amlodipine and push doses of Coreg and Entresto as tolerated. Diuretics per Renal.    Volume status:euvolemic  Renal function: stable    For other plans, see orders. Subjective:  Oral Vazquez reports   Chest Pain:  [x]   none,  consistent with  []   non-cardiac   []   atypical   []   angina             [x]   none now    []      on-going  Dyspnea: [x]   none  []   at rest  []   with exertion     []   improved   []   unchanged   []   worsening  PND:       [x]   none  []   overnight    Orthopnea: [x]   none  []   improved  []   unchanged  []   worsening  Presyncope: [x]   none   []   improved    []   unchanged    []   worsening  Ambulated in hallway without symptoms  []   Yes  Ambulated in room without symptoms  []   Yes    Objective:    Physical Exam:  Overall VSSAF;    Visit Vitals  /61   Pulse (!) 102   Temp 97.9 °F (36.6 °C)   Resp 20   Ht 5' 11\" (1.803 m)   Wt 95.8 kg (211 lb 3.2 oz)   SpO2 97%   BMI 29.46 kg/m²     Temp (24hrs), Av.6 °F (37 °C), Min:97.9 °F (36.6 °C), Max:99.7 °F (37.6 °C)    Patient Vitals for the past 8 hrs:   Pulse   10/28/19 1739 (!) 102   10/28/19 1542 91   10/28/19 1200 94   10/28/19 1110 99    Patient Vitals for the past 8 hrs:   Resp   10/28/19 1542 20   10/28/19 1110 18    Patient Vitals for the past 8 hrs:   BP   10/28/19 1739 115/61   10/28/19 1542 110/77   10/28/19 1110 (!) 134/92   10/28/19 0953 128/86          Intake/Output Summary (Last 24 hours) at 10/28/2019 1742  Last data filed at 10/28/2019 1739  Gross per 24 hour   Intake 240 ml   Output 3750 ml   Net -3510 ml       General Appearance: Well developed, well nourished, no acute distress. Ears/Nose/Mouth/Throat:   Normal MM; anicteric. JVP: WNL   Resp:   Lungs clear to auscultation bilaterally. Nl resp effort. Cardiovascular:  RRR, S1, S2 normal, no new murmur. +S3 and S4 gallops. Abdomen:   Soft, non-tender, bowel sounds are present. Extremities: No edema bilaterally. Skin:  Neuro: Warm and dry. A/O x3, grossly nonfocal    []      cath site intact w/o hematoma or bruit; distal pulse unchanged. Data Review:     Telemetry independently reviewed : [x]   sinus  []   chronic afib   []   par afib  []      NSVT    ECG independently reviewed:  []   NSR   []   no significant changes  []   no new ECG provided for review  Lab results reviewed as noted below. Current medications reviewed as noted below. No results for input(s): PH, PCO2, PO2 in the last 72 hours.   Recent Labs     10/27/19  0338 10/26/19  0907 10/26/19  0303   CPK 65  --  87   CKMB 1.5  --  1.7   TROIQ 0.08* 0.11* 0.12*     Recent Labs     10/28/19  0409 10/27/19  0338 10/26/19  0303    142 146*   K 3.4* 3.6 3.7   * 112* 115*   CO2 23 23 22   BUN 51* 51* 55*   CREA 3.41* 3.24* 3.37*   GLU 99 90 105*   PHOS  --   --  3.4   CA 8.2* 8.3* 8.2*   ALB  --  2.4* 2.6*   WBC 9.2 6.9 11.1   HGB 9.3* 8.2* 8.0*   HCT 31.0* 28.4* 28.3*    333 341     Recent Labs     10/27/19  0338 10/26/19  0303   SGOT 21 68*   ALT 46 69   AP 48 57   TBILI 0.3 0.2   TP 5.5* 5.8*   ALB 2.4* 2.6*   GLOB 3.1 3.2     Recent Labs     10/26/19  0303   INR 1.1   PTP 10.8   APTT 27.9      Recent Labs     10/27/19  0338   TIBC 195*   PSAT 11*      No results found for: GLUCPOC    Current Facility-Administered Medications   Medication Dose Route Frequency    [START ON 10/29/2019] predniSONE (DELTASONE) tablet 20 mg  20 mg Oral DAILY WITH BREAKFAST    iron sucrose (VENOFER) 300 mg in 0.9% sodium chloride 250 mL IVPB  300 mg IntraVENous Q24H    sacubitril-valsartan (ENTRESTO) 24-26 mg tablet 1 Tab  1 Tab Oral Q12H    colchicine tablet 0.6 mg  0.6 mg Oral DAILY    leflunomide (ARAVA) tablet 20 mg  20 mg Oral DAILY    sodium chloride (NS) flush 5-40 mL  5-40 mL IntraVENous Q8H    sodium chloride (NS) flush 5-40 mL  5-40 mL IntraVENous PRN    acetaminophen (TYLENOL) tablet 650 mg  650 mg Oral Q4H PRN    ondansetron (ZOFRAN) injection 4 mg  4 mg IntraVENous Q4H PRN    heparin (porcine) injection 5,000 Units  5,000 Units SubCUTAneous Q8H    carvedilol (COREG) tablet 25 mg  25 mg Oral BID WITH MEALS    bumetanide (BUMEX) injection 4 mg  4 mg IntraVENous Q12H        Yaya Pearce MD

## 2019-10-28 NOTE — PROGRESS NOTES
Problem: Heart Failure: Day 2  Goal: Diagnostic Test/Procedures  Outcome: Progressing Towards Goal  Note:   Pt NPO for stress today. Cardiac diet restarted. Notified Dr. Laura Leija of pt potassium of 3.4, no orders received at this time.

## 2019-10-28 NOTE — NURSE NAVIGATOR
Chart reviewed by Heart Failure Nurse Navigator. Heart Failure database completed. EF:  26/30     ACEi/ARB/ARNi: Entresto    BB: Coreg    Aldosterone Antagonist: Contraindicated GFR 24. Obstructive Sleep Apnea Screening:No   STOP-BANG score:   Referred to Sleep Medicine:     CRT not indicated    NYHA Functional Class III     Heart Failure Teach Back in Patient Education. Heart Failure Avoiding Triggers on Discharge Instructions. Cardiologist: [facility]      Post discharge follow up phone call to be made within 48-72 hours of discharge.

## 2019-10-28 NOTE — PROGRESS NOTES
Hospitalist Progress Note  Alfredo Black MD  Answering service: [phone] from in house phone        Date of Service:  10/28/2019  NAME:  Italia Arguello  :  1970  MRN:  [id number]      Admission Summary:   The patient claims that yesterday afternoon the patient went off to sleep. When he woke up, he felt some shortness of breath and chest tightness, and chest tightness was mainly in the retrosternal region, but it progressively got worse to the point that early morning the patient was having sharp, stabbing chest pain along with shortness of breath in the retrosternal region. Not associated with any vomiting, headache, dizziness, therefore, the patient was brought to the ER. The patient claims that when he was brought over here, he was having some significant shortness of breath especially on exertion, but by the time I saw the patient, the patient's chest pain as well as shortness of breath had already resolved.     The patient also claims that he has been having pedal edema since many months, but in the last few days it had gone worse. Again by the time I saw the patient, the patient had received Bumex and his pedal edema was much better according to him.     The patient says that he usually takes his medications regularly except for yesterday evening dose of his Coreg, he has taken all his medications.     No fever, cough, nausea, vomiting, headache, or dizziness.   No changes in bowel movements.       Interval history / Subjective:     F/u SOB/chest pain   No SOB, chest pain, pedal edema improved  Pain right forearm/wrist and right knee \"gout flare up\" slightly better  Assessment & Plan:     Acute systolic CHF NYHA III  -sec to ?  -Daily weight  -I/O  -Echo EF 26-30% with severe global hypokinesis  -On IV diuresis with good urine output, negative 5l since admission  -Appreciate Cardiology  -on Entresto/Coreg    Elevated troponins  -Echo as above  -Nuclear stress test 10/28 Apical infarct. No evidence of ischemia. Left ventricular ejection fraction measures 17%    Iron deficiency anemia  -on IV iron    Hypertensive urgency  -continue home meds  -Improved    History of gout with acute flare up  -Continue home meds  -Will put the patient on prednisone  -Follow up with Dr Eldridge Essex    Chronic kidney disease stage IV  -stable    Cardiac diet    Code status: FULL CODE  DVT prophylaxis: heparin  PTA: home  Baseline: Independent    Plan: Continue IV diuresis. Follow Cardiology/Nephrology    Care Plan discussed with: Patient/Family  Disposition: TBD     Hospital Problems  Date Reviewed: 10/27/2019          Codes Class Noted POA    Acute systolic CHF (congestive heart failure) ([facility] [address] ICD-10-CM: I50.21  ICD-9-CM: 428.21, 428.0  10/27/2019 Unknown        * (Principal) SOB (shortness of breath) ICD-10-CM: R06.02  ICD-9-CM: 786.05  10/26/2019 Yes                Review of Systems:   A comprehensive review of systems was negative except for that written in the HPI. Vital Signs:    Last 24hrs VS reviewed since prior progress note. Most recent are:  Visit Vitals  BP (!) 134/92 (BP 1 Location: Right arm, BP Patient Position: At rest)   Pulse 99   Temp 98.1 °F (36.7 °C)   Resp 18   Ht 5' 11\" (1.803 m)   Wt 95.8 kg (211 lb 3.2 oz)   SpO2 100%   BMI 29.46 kg/m²         Intake/Output Summary (Last 24 hours) at 10/28/2019 1210  Last data filed at 10/28/2019 0800  Gross per 24 hour   Intake 505 ml   Output 3750 ml   Net -3245 ml        Physical Examination:             Constitutional:  No acute distress, cooperative, pleasant    ENT:  Oral mucous moist, oropharynx benign. Resp:  CTA bilaterally. No wheezing/rhonchi/rales. No accessory muscle use   CV:  Regular rhythm, normal rate, no murmurs, gallops, rubs    GI:  Soft, non distended, non tender.  normoactive bowel sounds, no hepatosplenomegaly     Musculoskeletal:  No edema, warm, 2+ pulses throughout Neurologic:  Moves all extremities. AAOx3, CN II-XII reviewed     Skin:  Good turgor, no rashes or ulcers       Data Review:    Review and/or order of clinical lab test      Labs:     Recent Labs     10/28/19  0409 10/27/19  0338   WBC 9.2 6.9   HGB 9.3* 8.2*   HCT 31.0* 28.4*    333     Recent Labs     10/28/19  0409 10/27/19  0338 10/26/19  0907 10/26/19  0303    142  --  146*   K 3.4* 3.6  --  3.7   * 112*  --  115*   CO2 23 23  --  22   BUN 51* 51*  --  55*   CREA 3.41* 3.24*  --  3.37*   GLU 99 90  --  105*   CA 8.2* 8.3*  --  8.2*   MG  --   --  1.7 1.6   PHOS  --   --   --  3.4     Recent Labs     10/27/19  0338 10/26/19  0303   SGOT 21 68*   ALT 46 69   AP 48 57   TBILI 0.3 0.2   TP 5.5* 5.8*   ALB 2.4* 2.6*   GLOB 3.1 3.2     Recent Labs     10/26/19  0303   INR 1.1   PTP 10.8   APTT 27.9      Recent Labs     10/27/19  0338   TIBC 195*   PSAT 11*      Lab Results   Component Value Date/Time    Folate 8.1 10/27/2019 03:38 AM      No results for input(s): PH, PCO2, PO2 in the last 72 hours.   Recent Labs     10/27/19  0338 10/26/19  0907 10/26/19  0303   CPK 65  --  87   CKNDX 2.3  --  2.0   TROIQ 0.08* 0.11* 0.12*     Lab Results   Component Value Date/Time    Cholesterol, total 141 10/27/2019 03:38 AM    HDL Cholesterol 47 10/27/2019 03:38 AM    LDL, calculated 71.6 10/27/2019 03:38 AM    Triglyceride 112 10/27/2019 03:38 AM    CHOL/HDL Ratio 3.0 10/27/2019 03:38 AM     No results found for: [facility]  Lab Results   Component Value Date/Time    Color YELLOW/STRAW 06/01/2019 07:58 PM    Appearance CLEAR 06/01/2019 07:58 PM    Specific gravity 1.016 06/01/2019 07:58 PM    Specific gravity 1.010 09/19/2009 09:02 AM    pH (UA) 6.0 06/01/2019 07:58 PM    Protein 300 (A) 06/01/2019 07:58 PM    Glucose NEGATIVE  06/01/2019 07:58 PM    Ketone NEGATIVE  06/01/2019 07:58 PM    Bilirubin NEGATIVE  06/01/2019 07:58 PM    Urobilinogen 1.0 06/01/2019 07:58 PM    Nitrites NEGATIVE  06/01/2019 07:58 PM Leukocyte Esterase NEGATIVE  06/01/2019 07:58 PM    Epithelial cells FEW 06/01/2019 07:58 PM    Bacteria NEGATIVE  06/01/2019 07:58 PM    WBC 0-4 06/01/2019 07:58 PM    RBC 5-10 06/01/2019 07:58 PM         Medications Reviewed:     Current Facility-Administered Medications   Medication Dose Route Frequency    iron sucrose (VENOFER) 300 mg in 0.9% sodium chloride 250 mL IVPB  300 mg IntraVENous Q24H    sacubitril-valsartan (ENTRESTO) 24-26 mg tablet 1 Tab  1 Tab Oral Q12H    colchicine tablet 0.6 mg  0.6 mg Oral DAILY    leflunomide (ARAVA) tablet 20 mg  20 mg Oral DAILY    sodium chloride (NS) flush 5-40 mL  5-40 mL IntraVENous Q8H    sodium chloride (NS) flush 5-40 mL  5-40 mL IntraVENous PRN    acetaminophen (TYLENOL) tablet 650 mg  650 mg Oral Q4H PRN    ondansetron (ZOFRAN) injection 4 mg  4 mg IntraVENous Q4H PRN    heparin (porcine) injection 5,000 Units  5,000 Units SubCUTAneous Q8H    carvedilol (COREG) tablet 25 mg  25 mg Oral BID WITH MEALS    bumetanide (BUMEX) injection 4 mg  4 mg IntraVENous Q12H    amLODIPine (NORVASC) tablet 5 mg  5 mg Oral DAILY     ______________________________________________________________________  EXPECTED LENGTH OF STAY: 4d 2h  ACTUAL LENGTH OF STAY:          1                 Jovon Ricardo MD

## 2019-10-29 VITALS
HEART RATE: 91 BPM | HEIGHT: 71 IN | DIASTOLIC BLOOD PRESSURE: 74 MMHG | OXYGEN SATURATION: 98 % | WEIGHT: 210.1 LBS | RESPIRATION RATE: 16 BRPM | BODY MASS INDEX: 29.41 KG/M2 | TEMPERATURE: 98.1 F | SYSTOLIC BLOOD PRESSURE: 115 MMHG

## 2019-10-29 LAB
ANION GAP SERPL CALC-SCNC: 7 MMOL/L (ref 5–15)
BASOPHILS # BLD: 0 K/UL (ref 0–0.1)
BASOPHILS NFR BLD: 0 % (ref 0–1)
BUN SERPL-MCNC: 53 MG/DL (ref 6–20)
BUN/CREAT SERPL: 14 (ref 12–20)
CALCIUM SERPL-MCNC: 7.9 MG/DL (ref 8.5–10.1)
CHLORIDE SERPL-SCNC: 110 MMOL/L (ref 97–108)
CO2 SERPL-SCNC: 24 MMOL/L (ref 21–32)
CREAT SERPL-MCNC: 3.73 MG/DL (ref 0.7–1.3)
DIFFERENTIAL METHOD BLD: ABNORMAL
EOSINOPHIL # BLD: 0.2 K/UL (ref 0–0.4)
EOSINOPHIL NFR BLD: 2 % (ref 0–7)
ERYTHROCYTE [DISTWIDTH] IN BLOOD BY AUTOMATED COUNT: 16.9 % (ref 11.5–14.5)
GLUCOSE SERPL-MCNC: 102 MG/DL (ref 65–100)
HCT VFR BLD AUTO: 27.2 % (ref 36.6–50.3)
HGB BLD-MCNC: 8.2 G/DL (ref 12.1–17)
IMM GRANULOCYTES # BLD AUTO: 0.1 K/UL (ref 0–0.04)
IMM GRANULOCYTES NFR BLD AUTO: 1 % (ref 0–0.5)
LYMPHOCYTES # BLD: 1.4 K/UL (ref 0.8–3.5)
LYMPHOCYTES NFR BLD: 15 % (ref 12–49)
MCH RBC QN AUTO: 28.3 PG (ref 26–34)
MCHC RBC AUTO-ENTMCNC: 30.1 G/DL (ref 30–36.5)
MCV RBC AUTO: 93.8 FL (ref 80–99)
MONOCYTES # BLD: 1.3 K/UL (ref 0–1)
MONOCYTES NFR BLD: 14 % (ref 5–13)
NEUTS SEG # BLD: 6.2 K/UL (ref 1.8–8)
NEUTS SEG NFR BLD: 68 % (ref 32–75)
NRBC # BLD: 0 K/UL (ref 0–0.01)
NRBC BLD-RTO: 0 PER 100 WBC
PLATELET # BLD AUTO: 343 K/UL (ref 150–400)
PMV BLD AUTO: 11.3 FL (ref 8.9–12.9)
POTASSIUM SERPL-SCNC: 3.4 MMOL/L (ref 3.5–5.1)
RBC # BLD AUTO: 2.9 M/UL (ref 4.1–5.7)
SODIUM SERPL-SCNC: 141 MMOL/L (ref 136–145)
STRESS BASELINE DIAS BP: 86 MMHG
STRESS BASELINE HR: 93 BPM
STRESS BASELINE SYS BP: 128 MMHG
STRESS ESTIMATED WORKLOAD: 1 METS
STRESS EXERCISE DUR MIN: NORMAL
STRESS PEAK DIAS BP: 86 MMHG
STRESS PEAK SYS BP: 128 MMHG
STRESS PERCENT HR ACHIEVED: 58 %
STRESS POST PEAK HR: 101 BPM
STRESS RATE PRESSURE PRODUCT: NORMAL BPM*MMHG
STRESS TARGET HR: 173 BPM
WBC # BLD AUTO: 9 K/UL (ref 4.1–11.1)

## 2019-10-29 PROCEDURE — 74011250637 HC RX REV CODE- 250/637: Performed by: INTERNAL MEDICINE

## 2019-10-29 PROCEDURE — 80048 BASIC METABOLIC PNL TOTAL CA: CPT

## 2019-10-29 PROCEDURE — 74011636637 HC RX REV CODE- 636/637: Performed by: HOSPITALIST

## 2019-10-29 PROCEDURE — 74011250637 HC RX REV CODE- 250/637: Performed by: HOSPITALIST

## 2019-10-29 PROCEDURE — 74011000250 HC RX REV CODE- 250: Performed by: INTERNAL MEDICINE

## 2019-10-29 PROCEDURE — 85025 COMPLETE CBC W/AUTO DIFF WBC: CPT

## 2019-10-29 PROCEDURE — 74011250636 HC RX REV CODE- 250/636: Performed by: HOSPITALIST

## 2019-10-29 PROCEDURE — 36415 COLL VENOUS BLD VENIPUNCTURE: CPT

## 2019-10-29 RX ORDER — BUMETANIDE 1 MG/1
4 TABLET ORAL 2 TIMES DAILY
Status: DISCONTINUED | OUTPATIENT
Start: 2019-10-29 | End: 2019-10-29 | Stop reason: HOSPADM

## 2019-10-29 RX ORDER — PREDNISONE 20 MG/1
TABLET ORAL
Qty: 20 TAB | Refills: 0 | Status: SHIPPED | OUTPATIENT
Start: 2019-10-29 | End: 2021-09-01 | Stop reason: ALTCHOICE

## 2019-10-29 RX ORDER — METOLAZONE 5 MG/1
5 TABLET ORAL ONCE
Status: COMPLETED | OUTPATIENT
Start: 2019-10-29 | End: 2019-10-29

## 2019-10-29 RX ORDER — POTASSIUM CHLORIDE 750 MG/1
40 TABLET, FILM COATED, EXTENDED RELEASE ORAL
Status: COMPLETED | OUTPATIENT
Start: 2019-10-29 | End: 2019-10-29

## 2019-10-29 RX ORDER — CARVEDILOL 25 MG/1
25 TABLET ORAL 2 TIMES DAILY WITH MEALS
Qty: 60 TAB | Refills: 1 | Status: SHIPPED | OUTPATIENT
Start: 2019-10-29 | End: 2022-08-05 | Stop reason: ALTCHOICE

## 2019-10-29 RX ADMIN — LEFLUNOMIDE 20 MG: 10 TABLET ORAL at 09:34

## 2019-10-29 RX ADMIN — SACUBITRIL AND VALSARTAN 1 TABLET: 24; 26 TABLET, FILM COATED ORAL at 09:32

## 2019-10-29 RX ADMIN — HEPARIN SODIUM 5000 UNITS: 5000 INJECTION INTRAVENOUS; SUBCUTANEOUS at 04:08

## 2019-10-29 RX ADMIN — PREDNISONE 20 MG: 20 TABLET ORAL at 09:32

## 2019-10-29 RX ADMIN — METOLAZONE 5 MG: 5 TABLET ORAL at 11:38

## 2019-10-29 RX ADMIN — Medication 10 ML: at 06:46

## 2019-10-29 RX ADMIN — POTASSIUM CHLORIDE 40 MEQ: 750 TABLET, FILM COATED, EXTENDED RELEASE ORAL at 11:38

## 2019-10-29 RX ADMIN — CARVEDILOL 25 MG: 12.5 TABLET, FILM COATED ORAL at 09:32

## 2019-10-29 RX ADMIN — BUMETANIDE 4 MG: 0.25 INJECTION INTRAMUSCULAR; INTRAVENOUS at 09:32

## 2019-10-29 RX ADMIN — COLCHICINE 0.6 MG: 0.6 TABLET, FILM COATED ORAL at 09:32

## 2019-10-29 NOTE — DISCHARGE INSTRUCTIONS
Discharge Instructions       PATIENT ID: Lucy Marie  MRN: [id number]   YOB: 1970    DATE OF ADMISSION: 10/26/2019  2:36 AM    DATE OF DISCHARGE: 10/29/2019    PRIMARY CARE PROVIDER: Nima Johnson NP     ATTENDING PHYSICIAN: Emy Perez MD  DISCHARGING PROVIDER: Ginger Edwards MD    To contact this individual call [phone] and ask the  to page. If unavailable ask to be transferred the Adult Hospitalist Department. DISCHARGE DIAGNOSES   Acute systolic CHF    CONSULTATIONS: IP CONSULT TO HOSPITALIST  IP CONSULT TO HOSPITALIST  IP CONSULT TO NEPHROLOGY    PROCEDURES/SURGERIES: * No surgery found *    PENDING TEST RESULTS:   At the time of discharge the following test results are still pending: none    FOLLOW UP APPOINTMENTS:   Follow-up Information     Follow up With Specialties Details Why Contact Info    Nima Johnson NP Nurse Practitioner On 11/5/2019 Hospital f/u PCP appointment Tuesday, 11/5/19 @ 1:15 p.m.  [address] [facility]  [address] [facility]  [phone]      Queen Tiffani MD Cardiology In 1 week  [address] [place] [address]  [address] [facility] 7 [address]      Erika Sorenson MD Nephrology On 11/1/2019  [address] [facility] [phone]             ADDITIONAL CARE RECOMMENDATIONS:   Follow up with PMD  Follow up with Cardiology  Follow up with Dr Dana Otero  Daily weight. If weight gain >2lbs in 3-4 days please call Cardiologist      DIET: Cardiac Diet    ACTIVITY: Activity as tolerated      DISCHARGE MEDICATIONS:   See Medication Reconciliation Form    · It is important that you take the medication exactly as they are prescribed. · Keep your medication in the bottles provided by the pharmacist and keep a list of the medication names, dosages, and times to be taken in your wallet. · Do not take other medications without consulting your doctor.        NOTIFY YOUR PHYSICIAN FOR ANY OF THE FOLLOWING: Fever over 101 degrees for 24 hours. Chest pain, shortness of breath, fever, chills, nausea, vomiting, diarrhea, change in mentation, falling, weakness, bleeding. Severe pain or pain not relieved by medications. Or, any other signs or symptoms that you may have questions about.       DISPOSITION:  x  Home With:   OT  PT  HH  RN       SNF/Inpatient Rehab/LTAC    Independent/assisted living    Hospice    Other:     CDMP Checked:   Yes x     PROBLEM LIST Updated:  Yes x       Signed:   Zackary Anders MD  10/29/2019  11:29 AM

## 2019-10-29 NOTE — PROGRESS NOTES
Problem: Falls - Risk of  Goal: *Absence of Falls  Description  Document Eldon Doan Fall Risk and appropriate interventions in the flowsheet.   Outcome: Progressing Towards Goal  Note:   Fall Risk Interventions:    Medication Interventions: Evaluate medications/consider consulting pharmacy, Patient to call before getting OOB, Teach patient to arise slowly     Problem: Heart Failure: Day 3  Goal: *Oxygen saturation within defined limits  Outcome: Progressing Towards Goal  Note:   O2 sats stable on room air  Goal: *Hemodynamically stable  Outcome: Progressing Towards Goal  Note:   Patient Vitals for the past 12 hrs:   Temp Pulse Resp BP SpO2   10/28/19 2310 98.1 °F (36.7 °C) 97 20 109/77 91 %   10/28/19 1901 98.5 °F (36.9 °C) 93 21 127/80 97 %   10/28/19 1739 -- (!) 102 -- 115/61 --   10/28/19 1542 97.9 °F (36.6 °C) 91 20 110/77 97 %       Goal: *Optimal pain control at patient's stated goal  Outcome: Progressing Towards Goal  Goal: *Anxiety reduced or absent  Outcome: Progressing Towards Goal

## 2019-10-29 NOTE — PROGRESS NOTES
Problem: Falls - Risk of  Goal: *Absence of Falls  Description  Document Mj Jose Alfredo Fall Risk and appropriate interventions in the flowsheet. Outcome: Progressing Towards Goal  Note:   Fall Risk Interventions:            Medication Interventions: Assess postural VS orthostatic hypotension, Patient to call before getting OOB, Teach patient to arise slowly                   Problem: Heart Failure: Day 3  Goal: Medications  Outcome: Progressing Towards Goal  Note:   Pt on Ra. Mild swelling in BLE. IV bumex given but switched to PO by Tesfaye Kelli. Cleared from Cardio standpoint.

## 2019-10-29 NOTE — PROGRESS NOTES
The patient Aimee Lee 1970 is a male who  has a past medical history of Heart failure ([address] Hypertension, and Polycystic kidney disease. Latoya Pass He was admitted on 10/26/2019. The patient's current medications were discussed by Mark0 Hanane Unger. The patient was interactive and did understand their medications. Methods used: verbal, teach back (verbal, videos, demonstrations, etc)    Resources provided: medication schedule, MEFs (medication schedule, brochures, monographs)    Additional Comments: I counseled Mr. Rosette Hernandez on medication schedule, side effects, the importance of adherence, checking weight daily, and monitoring salt/water intake. Previously he was using a pillbox to help him organize his medications so I encouraged him to continue. I also suggested taking medications with meals, using phone reminders, or putting a note or something that will help him remember in his bathroom. He was checking his weight before, but not daily so we discussed making sure he checks daily so he is able to accurately identify when he needs to contact his doctor. He was able to tell me the ~weight gain/days before he should call his doctor. He stated he doesn't like the taste of salt and he is a  so he doesn't feel he has a problem using alternatives to season his food. He was agreeable to the suggestions I made. I was able to assess knowledge with teach back.     Dominique Lawrence, PharmD Candidate [id number]

## 2019-10-29 NOTE — PROGRESS NOTES
Reason for Admission:   Chest pain, shortness of breath                   RRAT Score: 10-Low                    Plan for utilizing home health:    No needs                      Current Advanced Directive/Advance Care Plan: Patient does not have an AMD on file and is a Full code. Transition of Care Plan:  CM met with patient to inform of CM role and to assess needs. Patient verified demographic information. Patient reports independence with self-care and ADLs. Patient does not use any DME at home. Patient verified PCP as NP Bree Burrows whom he has seen within the last six months. Patient has an appt with Dr. Humberto Campos Friday. Patient is planned for discharge today and has confirmed that he has a ride. There are no CM needs.      Nina Blackmon MS

## 2019-10-29 NOTE — DISCHARGE SUMMARY
Discharge Summary       PATIENT ID: Sharmin Levy  MRN: [id number]   YOB: 1970    DATE OF ADMISSION: 10/26/2019  2:36 AM    DATE OF DISCHARGE: 10/29/2019   PRIMARY CARE PROVIDER: Blaine Hernandez NP     ATTENDING PHYSICIAN: Dr Blaine Malone  DISCHARGING PROVIDER: Blaine Malone MD    To contact this individual call [phone] and ask the  to page. If unavailable ask to be transferred the Adult Hospitalist Department. CONSULTATIONS: IP CONSULT TO HOSPITALIST  IP CONSULT TO HOSPITALIST  IP CONSULT TO NEPHROLOGY    PROCEDURES/SURGERIES: * No surgery found *    ADMITTING DIAGNOSES & HOSPITAL COURSE:   Acute systolic CHF NYHA III  -sec to ?  -Daily weight  -I/O  -Echo EF 26-30% with severe global hypokinesis  -On IV diuresis with good urine output, negative 10l since admission  -Appreciate Cardiology  -on Entresto/Coreg    Elevated troponins  -Echo as above  -Nuclear stress test 10/28 Apical infarct. No evidence of ischemia. Left ventricular ejection fraction measures 17%    Iron deficiency anemia  -on IV iron    Hypertensive urgency  -continue home meds  -Improved    History of gout with acute flare up  -Continue home meds  -Now on prednisone  -Follow up with Dr iNkki Olguin    Chronic kidney disease stage IV  -stable    Cardiac diet    Code status: FULL CODE  DVT prophylaxis: heparin  PTA: home  Baseline: Independent        DISCHARGE DIAGNOSES / PLAN:      1. Acute systolic CHF     ADDITIONAL CARE RECOMMENDATIONS:   Follow up with PMD  Follow up with Cardiology  Follow up with Dr Azam Hernandez  Daily weight.  If weight gain >2lbs in 3-4 days please call Cardiologist     PENDING TEST RESULTS:   At the time of discharge the following test results are still pending: none    FOLLOW UP APPOINTMENTS:    Follow-up Information     Follow up With Specialties Details Why Contact Info    Blaine Hernandez NP Nurse Practitioner On 11/5/2019 Hospital f/u PCP appointment Tuesday, 11/5/19 @ 1:15 p.m.  [id number] [address] [facility] [address]  [address] [facility] [address]  [address] [facility] Ritika Hoffmann MD Cardiology In 1 week  200 [facility]  109 [facility]  [place] [address]      Brian Payne MD Nephrology On 11/1/2019  [address] [facility]  [phone]               DIET: Cardiac Diet    ACTIVITY: Activity as tolerated      DISCHARGE MEDICATIONS:  Current Discharge Medication List      START taking these medications    Details   ferrous sulfate 325 mg (65 mg iron) cpER Take 1 Each by mouth daily. Qty: 30 Cap, Refills: 2      sacubitril-valsartan (ENTRESTO) 24 mg/26 mg tablet Take 1 Tab by mouth every twelve (12) hours. Qty: 60 Tab, Refills: 1         CONTINUE these medications which have CHANGED    Details   carvedilol (COREG) 25 mg tablet Take 1 Tab by mouth two (2) times daily (with meals). Qty: 60 Tab, Refills: 1         CONTINUE these medications which have NOT CHANGED    Details   predniSONE (DELTASONE) 20 mg tablet Take 1 tab by mouth daily for flare  Qty: 20 Tab, Refills: 0      bumetanide (BUMEX) 1 mg tablet Take 1 Tab by mouth daily. Qty: 30 Tab, Refills: 0    Associated Diagnoses: Bilateral lower extremity edema      colchicine 0.6 mg tablet Take 1 Tab by mouth daily. 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 POTASSIUM ASPARTATE/MAG ASP (POTASSIUM & MAGNESIUM ASPARTAT PO) Take  by mouth.          STOP taking these medications       losartan (COZAAR) 25 mg tablet Comments:   Reason for Stopping:                 NOTIFY [address] [facility] FOLLOWING:   Fever over 101 degrees for 24 hours. Chest pain, shortness of breath, fever, chills, nausea, vomiting, diarrhea, change in mentation, falling, weakness, bleeding. Severe pain or pain not relieved by medications. Or, any other signs or symptoms that you may have questions about.     DISPOSITION:   x Home With:   OT  PT  HH  RN       Long term SNF/Inpatient Rehab    Independent/assisted living    Hospice    Other:       PATIENT CONDITION AT DISCHARGE:     Functional status    Poor     Deconditioned    x Independent      Cognition    x Lucid     Forgetful     Dementia      Catheters/lines (plus indication)    Wong     PICC     PEG    x None      Code status    x Full code     DNR      PHYSICAL EXAMINATION AT DISCHARGE:  Please see progress note      CHRONIC MEDICAL DIAGNOSES:  Problem List as of 10/29/2019 Date Reviewed: 10/27/2019          Codes Class Noted - Resolved    Acute systolic CHF (congestive heart failure) ([facility] [address] ICD-10-CM: I50.21  ICD-9-CM: 428.21, 428.0  10/27/2019 - Present        * (Principal) SOB (shortness of breath) ICD-10-CM: R06.02  ICD-9-CM: 786.05  10/26/2019 - Present        Long-term use of immunosuppressant medication ICD-10-CM: Z79.899  ICD-9-CM: V58.69  9/12/2019 - Present        CKD (chronic kidney disease) stage 4, GFR 15-29 ml/min (HCC) ICD-10-CM: N18.4  ICD-9-CM: 585.4  9/12/2019 - Present        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 ICD-10-CM: M1A.39X1  ICD-9-CM: 274.03, 588.89  6/13/2019 - Present        Autosomal dominant adult polycystic kidney disease ICD-10-CM: Q61.2  ICD-9-CM: 753.13  6/13/2019 - Present        Essential hypertension ICD-10-CM: I10  ICD-9-CM: 401.9  6/13/2019 - Present        CKD (chronic kidney disease) stage 3, GFR 30-59 ml/min (HCC) ICD-10-CM: N18.3  ICD-9-CM: 585.3  6/13/2019 - Present        Incarcerated ventral hernia ICD-10-CM: K43.6  ICD-9-CM: 552.20  6/1/2019 - Present              Greater than 37 minutes were spent with the patient on counseling and coordination of care    Signed:   Blaine Malone MD  10/29/2019  11:32 AM

## 2019-10-29 NOTE — PROGRESS NOTES
Cardiology Progress Note  10/29/2019     Admit Date: 10/26/2019  Admit Diagnosis: SOB (shortness of breath) [H72.16]  Acute systolic CHF (congestive heart failure) ([facility]) [I50.21]  CC: none currently    Assessment:   Principal Problem:    SOB (shortness of breath) (10/26/2019)    Active Problems:    Acute systolic CHF (congestive heart failure) ([address] (10/27/2019)      Plan:   Stable on current regimen. Switch to oral diuretic with one dose of metolazone. ? Home soon. Volume status:euvolemic  Renal function: stable    For other plans, see orders. Subjective: Preethi Siri reports   Chest Pain:  [x]   none,  consistent with  []   non-cardiac   []   atypical   []   angina             [x]   none now    []      on-going  Dyspnea: [x]   none  []   at rest  []   with exertion     []   improved   []   unchanged   []   worsening  PND:       [x]   none  []   overnight    Orthopnea: [x]   none  []   improved  []   unchanged  []   worsening  Presyncope: [x]   none   []   improved    []   unchanged    []   worsening  Ambulated in hallway without symptoms  []   Yes  Ambulated in room without symptoms  []   Yes    Objective:    Physical Exam:  Overall VSSAF;    Visit Vitals  /89 (BP 1 Location: Right arm, BP Patient Position: At rest)   Pulse 95   Temp 98 °F (36.7 °C)   Resp 18   Ht 5' 11\" (1.803 m)   Wt 95.3 kg (210 lb 1.6 oz)   SpO2 97%   BMI 29.30 kg/m²     Temp (24hrs), Av.1 °F (36.7 °C), Min:97.9 °F (36.6 °C), Max:98.5 °F (36.9 °C)    Patient Vitals for the past 8 hrs:   Pulse   10/29/19 035 95    Patient Vitals for the past 8 hrs:   Resp   10/29/19 0356 18    Patient Vitals for the past 8 hrs:   BP   10/29/19 0356 132/89          Intake/Output Summary (Last 24 hours) at 10/29/2019 0957  Last data filed at 10/29/2019 0943  Gross per 24 hour   Intake 690 ml   Output 2890 ml   Net -2200 ml       General Appearance: Well developed, well nourished, no acute distress.    Ears/Nose/Mouth/Throat:   Normal MM; anicteric. JVP: WNL   Resp:   Lungs clear to auscultation bilaterally. Nl resp effort. Cardiovascular:  RRR, S1, S2 normal, no new murmur. No gallop or rub. Abdomen:   Soft, non-tender, bowel sounds are present. Extremities: 2+ edema bilaterally. Skin:  Neuro: Warm and dry. A/O x3, grossly nonfocal    []      cath site intact w/o hematoma or bruit; distal pulse unchanged. Data Review:     Telemetry independently reviewed : [x]   sinus  []   chronic afib   []   par afib  []      NSVT    ECG independently reviewed:  []   NSR   []   no significant changes  []   no new ECG provided for review  Lab results reviewed as noted below. Current medications reviewed as noted below. No results for input(s): PH, PCO2, PO2 in the last 72 hours. Recent Labs     10/27/19  0338   CPK 65   CKMB 1.5   TROIQ 0.08*     Recent Labs     10/29/19  0359 10/28/19  0409 10/27/19  0338    141 142   K 3.4* 3.4* 3.6   * 111* 112*   CO2 24 23 23   BUN 53* 51* 51*   CREA 3.73* 3.41* 3.24*   * 99 90   CA 7.9* 8.2* 8.3*   ALB  --   --  2.4*   WBC 9.0 9.2 6.9   HGB 8.2* 9.3* 8.2*   HCT 27.2* 31.0* 28.4*    370 333     Recent Labs     10/27/19  0338   SGOT 21   ALT 46   AP 48   TBILI 0.3   TP 5.5*   ALB 2.4*   GLOB 3.1     No results for input(s): INR, PTP, APTT, INREXT in the last 72 hours.    Recent Labs     10/27/19  0338   TIBC 195*   PSAT 11*      No results found for: GLUCPOC    Current Facility-Administered Medications   Medication Dose Route Frequency    bumetanide (BUMEX) tablet 4 mg  4 mg Oral BID    metOLazone (ZAROXOLYN) tablet 5 mg  5 mg Oral ONCE    potassium chloride SR (KLOR-CON 10) tablet 40 mEq  40 mEq Oral NOW    predniSONE (DELTASONE) tablet 20 mg  20 mg Oral DAILY WITH BREAKFAST    iron sucrose (VENOFER) 300 mg in 0.9% sodium chloride 250 mL IVPB  300 mg IntraVENous Q24H    sacubitril-valsartan (ENTRESTO) 24-26 mg tablet 1 Tab  1 Tab Oral Q12H    colchicine tablet 0.6 mg  0.6 mg Oral DAILY    leflunomide (ARAVA) tablet 20 mg  20 mg Oral DAILY    sodium chloride (NS) flush 5-40 mL  5-40 mL IntraVENous Q8H    sodium chloride (NS) flush 5-40 mL  5-40 mL IntraVENous PRN    acetaminophen (TYLENOL) tablet 650 mg  650 mg Oral Q4H PRN    ondansetron (ZOFRAN) injection 4 mg  4 mg IntraVENous Q4H PRN    heparin (porcine) injection 5,000 Units  5,000 Units SubCUTAneous Q8H    carvedilol (COREG) tablet 25 mg  25 mg Oral BID WITH MEALS        Andre Barnard MD

## 2019-10-29 NOTE — PROGRESS NOTES
Hospitalist Progress Note  Consuelo Interiano MD  Answering service: [phone] from in house phone        Date of Service:  10/29/2019  NAME:  Suzy Pierre  :  1970  MRN:  [id number]      Admission Summary:   The patient claims that yesterday afternoon the patient went off to sleep. When he woke up, he felt some shortness of breath and chest tightness, and chest tightness was mainly in the retrosternal region, but it progressively got worse to the point that early morning the patient was having sharp, stabbing chest pain along with shortness of breath in the retrosternal region. Not associated with any vomiting, headache, dizziness, therefore, the patient was brought to the ER. The patient claims that when he was brought over here, he was having some significant shortness of breath especially on exertion, but by the time I saw the patient, the patient's chest pain as well as shortness of breath had already resolved.     The patient also claims that he has been having pedal edema since many months, but in the last few days it had gone worse. Again by the time I saw the patient, the patient had received Bumex and his pedal edema was much better according to him.     The patient says that he usually takes his medications regularly except for yesterday evening dose of his Coreg, he has taken all his medications.     No fever, cough, nausea, vomiting, headache, or dizziness.   No changes in bowel movements.       Interval history / Subjective:     F/u SOB/chest pain   No SOB, chest pain, pedal edema improved  Pain right forearm/wrist and right knee \"gout flare up\" slightly better  Assessment & Plan:     Acute systolic CHF NYHA III  -sec to ?  -Daily weight  -I/O  -Echo EF 26-30% with severe global hypokinesis  -On IV diuresis with good urine output, negative 10l since admission  -Appreciate Cardiology  -on Entresto/Coreg    Elevated troponins  -Echo as above  -Nuclear stress test 10/28 Apical infarct. No evidence of ischemia. Left ventricular ejection fraction measures 17%    Iron deficiency anemia  -on IV iron    Hypertensive urgency  -continue home meds  -Improved    History of gout with acute flare up  -Continue home meds  -Now on prednisone  -Follow up with Dr Mccray Reasons    Chronic kidney disease stage IV  -stable    Cardiac diet    Code status: FULL CODE  DVT prophylaxis: heparin  PTA: home  Baseline: Independent    Plan: Discharge the patient home on oral diuretics. Would need close follow up with Cardiology. The patient already has an appointment with Dr Carrie Lomas discussed with: Patient/Family  Disposition: home     Hospital Problems  Date Reviewed: 10/27/2019          Codes Class Noted POA    Acute systolic CHF (congestive heart failure) ([facility] [address] ICD-10-CM: I50.21  ICD-9-CM: 428.21, 428.0  10/27/2019 Unknown        * (Principal) SOB (shortness of breath) ICD-10-CM: R06.02  ICD-9-CM: 786.05  10/26/2019 Yes                Review of Systems:   A comprehensive review of systems was negative except for that written in the HPI. Vital Signs:    Last 24hrs VS reviewed since prior progress note. Most recent are:  Visit Vitals  /89 (BP 1 Location: Right arm, BP Patient Position: At rest)   Pulse 95   Temp 98 °F (36.7 °C)   Resp 18   Ht 5' 11\" (1.803 m)   Wt 95.3 kg (210 lb 1.6 oz)   SpO2 97%   BMI 29.30 kg/m²         Intake/Output Summary (Last 24 hours) at 10/29/2019 1123  Last data filed at 10/29/2019 0943  Gross per 24 hour   Intake 930 ml   Output 2490 ml   Net -1560 ml        Physical Examination:             Constitutional:  No acute distress, cooperative, pleasant    ENT:  Oral mucous moist, oropharynx benign. Resp:  CTA bilaterally. No wheezing/rhonchi/rales. No accessory muscle use   CV:  Regular rhythm, normal rate, no murmurs, gallops, rubs    GI:  Soft, non distended, non tender.  normoactive bowel sounds, no hepatosplenomegaly     Musculoskeletal:  No edema, warm, 2+ pulses throughout    Neurologic:  Moves all extremities. AAOx3, CN II-XII reviewed     Skin:  Good turgor, no rashes or ulcers       Data Review:    Review and/or order of clinical lab test      Labs:     Recent Labs     10/29/19  0359 10/28/19  0409   WBC 9.0 9.2   HGB 8.2* 9.3*   HCT 27.2* 31.0*    370     Recent Labs     10/29/19  0359 10/28/19  0409 10/27/19  0338    141 142   K 3.4* 3.4* 3.6   * 111* 112*   CO2 24 23 23   BUN 53* 51* 51*   CREA 3.73* 3.41* 3.24*   * 99 90   CA 7.9* 8.2* 8.3*     Recent Labs     10/27/19  0338   SGOT 21   ALT 46   AP 48   TBILI 0.3   TP 5.5*   ALB 2.4*   GLOB 3.1     No results for input(s): INR, PTP, APTT, INREXT, INREXT in the last 72 hours. Recent Labs     10/27/19  0338   TIBC 195*   PSAT 11*      Lab Results   Component Value Date/Time    Folate 8.1 10/27/2019 03:38 AM      No results for input(s): PH, PCO2, PO2 in the last 72 hours.   Recent Labs     10/27/19  0338   CPK 65   CKNDX 2.3   TROIQ 0.08*     Lab Results   Component Value Date/Time    Cholesterol, total 141 10/27/2019 03:38 AM    HDL Cholesterol 47 10/27/2019 03:38 AM    LDL, calculated 71.6 10/27/2019 03:38 AM    Triglyceride 112 10/27/2019 03:38 AM    CHOL/HDL Ratio 3.0 10/27/2019 03:38 AM     No results found for: [facility]  Lab Results   Component Value Date/Time    Color YELLOW/STRAW 06/01/2019 07:58 PM    Appearance CLEAR 06/01/2019 07:58 PM    Specific gravity 1.016 06/01/2019 07:58 PM    Specific gravity 1.010 09/19/2009 09:02 AM    pH (UA) 6.0 06/01/2019 07:58 PM    Protein 300 (A) 06/01/2019 07:58 PM    Glucose NEGATIVE  06/01/2019 07:58 PM    Ketone NEGATIVE  06/01/2019 07:58 PM    Bilirubin NEGATIVE  06/01/2019 07:58 PM    Urobilinogen 1.0 06/01/2019 07:58 PM    Nitrites NEGATIVE  06/01/2019 07:58 PM    Leukocyte Esterase NEGATIVE  06/01/2019 07:58 PM    Epithelial cells FEW 06/01/2019 07:58 PM    Bacteria NEGATIVE 06/01/2019 07:58 PM    WBC 0-4 06/01/2019 07:58 PM    RBC 5-10 06/01/2019 07:58 PM         Medications Reviewed:     Current Facility-Administered Medications   Medication Dose Route Frequency    bumetanide (BUMEX) tablet 4 mg  4 mg Oral BID    metOLazone (ZAROXOLYN) tablet 5 mg  5 mg Oral ONCE    potassium chloride SR (KLOR-CON 10) tablet 40 mEq  40 mEq Oral NOW    predniSONE (DELTASONE) tablet 20 mg  20 mg Oral DAILY WITH BREAKFAST    iron sucrose (VENOFER) 300 mg in 0.9% sodium chloride 250 mL IVPB  300 mg IntraVENous Q24H    sacubitril-valsartan (ENTRESTO) 24-26 mg tablet 1 Tab  1 Tab Oral Q12H    colchicine tablet 0.6 mg  0.6 mg Oral DAILY    leflunomide (ARAVA) tablet 20 mg  20 mg Oral DAILY    sodium chloride (NS) flush 5-40 mL  5-40 mL IntraVENous Q8H    sodium chloride (NS) flush 5-40 mL  5-40 mL IntraVENous PRN    acetaminophen (TYLENOL) tablet 650 mg  650 mg Oral Q4H PRN    ondansetron (ZOFRAN) injection 4 mg  4 mg IntraVENous Q4H PRN    heparin (porcine) injection 5,000 Units  5,000 Units SubCUTAneous Q8H    carvedilol (COREG) tablet 25 mg  25 mg Oral BID WITH MEALS     ______________________________________________________________________  EXPECTED LENGTH OF STAY: 4d 2h  ACTUAL LENGTH OF STAY:          3                 Millie Ybarra MD

## 2019-10-29 NOTE — PROGRESS NOTES
[facility]   [address], [address], [facility]  Phone: [phone]   [phone]       Nephrology Progress Note  Emily Galarza     1970     [id number]  Date of Admission : 10/26/2019  10/29/19    CC:  Follow up for CKD, PCKD, edema       Assessment and Plan   URI on CKD IV:  - slight rise in Cr likely from diuretics  - agree with transition to oral diuretics today  - daily labs    Hypervolemia:  -  CVS dysfunction, progressive CKD playing a role  - improving    HFrEF:  - ECHO with LVEF 25-30% and marked IVC dilation, some increased PAP as well  - stress test showing apical infarct, EF 17%  - cont diuretics as above  - Entresto started 10/27  - per cardiology    CKD IV:  - baseline Cr around 3.3     PCKD - large kidneys     HTN:  - cont current BP meds    Anemia - iron sat=11%; folate and B12 WNL  - IV iron x 3 doses    Gout     Interval History:   Seen and examined. Feeling better. Still w/ sig edema. Wt down 10 lbs, good UOP,  Cr up today. No cp, sob, n/v/d. On RA, able to lay flat w/o SOB. Review of Systems: Pertinent items are noted in HPI.     Current Medications:   Current Facility-Administered Medications   Medication Dose Route Frequency    bumetanide (BUMEX) tablet 4 mg  4 mg Oral BID    metOLazone (ZAROXOLYN) tablet 5 mg  5 mg Oral ONCE    potassium chloride SR (KLOR-CON 10) tablet 40 mEq  40 mEq Oral NOW    predniSONE (DELTASONE) tablet 20 mg  20 mg Oral DAILY WITH BREAKFAST    iron sucrose (VENOFER) 300 mg in 0.9% sodium chloride 250 mL IVPB  300 mg IntraVENous Q24H    sacubitril-valsartan (ENTRESTO) 24-26 mg tablet 1 Tab  1 Tab Oral Q12H    colchicine tablet 0.6 mg  0.6 mg Oral DAILY    leflunomide (ARAVA) tablet 20 mg  20 mg Oral DAILY    sodium chloride (NS) flush 5-40 mL  5-40 mL IntraVENous Q8H    sodium chloride (NS) flush 5-40 mL  5-40 mL IntraVENous PRN    acetaminophen (TYLENOL) tablet 650 mg  650 mg Oral Q4H PRN    ondansetron [facility]) injection 4 mg  4 mg IntraVENous Q4H PRN    heparin (porcine) injection 5,000 Units  5,000 Units SubCUTAneous Q8H    carvedilol (COREG) tablet 25 mg  25 mg Oral BID WITH MEALS      Allergies   Allergen Reactions    Shellfish Containing Products Swelling       Objective:  Vitals:    Vitals:    10/28/19 1901 10/28/19 2310 10/29/19 0356 10/29/19 0721   BP: 127/80 109/77 132/89    Pulse: 93 97 95    Resp: 21 20 18    Temp: 98.5 °F (36.9 °C) 98.1 °F (36.7 °C) 98 °F (36.7 °C)    SpO2: 97% 91% 97%    Weight:    95.3 kg (210 lb 1.6 oz)   Height:         Intake and Output:  10/29 0701 - 10/29 1900  In: 240 [P.O.:240]  Out: 300 [Urine:300]  10/27 1901 - 10/29 0700  In: 690 [P.O.:690]  Out: 5126 [Urine:5140]    Physical Examination:  Pt intubated    No  General: NAD,Conversant   Neck:  Supple, full ROM  Resp:  Lungs CTA B/L, no wheezing , normal respiratory effort  CV:  RRR,  no murmur or rub, 2+ LE edema  GI:  Soft, NT, + Bowel sounds, palpable right kdiney  Neurologic:  Non focal  Psych:             AAO x 3 appropriate affect   Skin:  No Rash  :  No jacinto    []    High complexity decision making was performed  []    Patient is at high-risk of decompensation with multiple organ involvement    Lab Data Personally Reviewed: I have reviewed all the pertinent labs, microbiology data and radiology studies during assessment.     Recent Labs     10/29/19  0359 10/28/19  0409 10/27/19  0338    141 142   K 3.4* 3.4* 3.6   * 111* 112*   CO2 24 23 23   * 99 90   BUN 53* 51* 51*   CREA 3.73* 3.41* 3.24*   CA 7.9* 8.2* 8.3*   ALB  --   --  2.4*   SGOT  --   --  21   ALT  --   --  46     Recent Labs     10/29/19  0359 10/28/19  0409 10/27/19  0338   WBC 9.0 9.2 6.9   HGB 8.2* 9.3* 8.2*   HCT 27.2* 31.0* 28.4*    370 333     Lab Results   Component Value Date/Time    Specimen Description: URINE 03/25/2012 07:00 AM    Specimen Description: URINE 09/19/2009 09:02 AM     Lab Results   Component Value Date/Time    Culture result: NO GROWTH 1 DAY 03/25/2012 07:00 AM    Culture result: NO GROWTH 1 DAY 09/19/2009 09:02 AM     Recent Results (from the past 24 hour(s))   NUCLEAR CARDIAC STRESS TEST    Collection Time: 10/28/19 11:52 AM   Result Value Ref Range    Target  bpm    Exercise duration time 00:03:00     Stress Base Systolic  mmHg    Stress Base Diastolic BP 86 mmHg    Post peak  BPM    Baseline HR 93 BPM    Estimated workload 1.0 METS    Percent HR 58 %    Stress Rate Pressure Product 12,928 BPM*mmHg    Baseline  mmHg    Stress Base Diastolic BP 86 mmHg   CBC WITH AUTOMATED DIFF    Collection Time: 10/29/19  3:59 AM   Result Value Ref Range    WBC 9.0 4.1 - 11.1 K/uL    RBC 2.90 (L) 4.10 - 5.70 M/uL    HGB 8.2 (L) 12.1 - 17.0 g/dL    HCT 27.2 (L) 36.6 - 50.3 %    MCV 93.8 80.0 - 99.0 FL    MCH 28.3 26.0 - 34.0 PG    MCHC 30.1 30.0 - 36.5 g/dL    RDW 16.9 (H) 11.5 - 14.5 %    PLATELET 904 594 - 271 K/uL    MPV 11.3 8.9 - 12.9 FL    NRBC 0.0 0  WBC    ABSOLUTE NRBC 0.00 0.00 - 0.01 K/uL    NEUTROPHILS 68 32 - 75 %    LYMPHOCYTES 15 12 - 49 %    MONOCYTES 14 (H) 5 - 13 %    EOSINOPHILS 2 0 - 7 %    BASOPHILS 0 0 - 1 %    IMMATURE GRANULOCYTES 1 (H) 0.0 - 0.5 %    ABS. NEUTROPHILS 6.2 1.8 - 8.0 K/UL    ABS. LYMPHOCYTES 1.4 0.8 - 3.5 K/UL    ABS. MONOCYTES 1.3 (H) 0.0 - 1.0 K/UL    ABS. EOSINOPHILS 0.2 0.0 - 0.4 K/UL    ABS. BASOPHILS 0.0 0.0 - 0.1 K/UL    ABS. IMM.  GRANS. 0.1 (H) 0.00 - 0.04 K/UL    DF AUTOMATED     METABOLIC PANEL, BASIC    Collection Time: 10/29/19  3:59 AM   Result Value Ref Range    Sodium 141 136 - 145 mmol/L    Potassium 3.4 (L) 3.5 - 5.1 mmol/L    Chloride 110 (H) 97 - 108 mmol/L    CO2 24 21 - 32 mmol/L    Anion gap 7 5 - 15 mmol/L    Glucose 102 (H) 65 - 100 mg/dL    BUN 53 (H) 6 - 20 MG/DL    Creatinine 3.73 (H) 0.70 - 1.30 MG/DL    BUN/Creatinine ratio 14 12 - 20      GFR est AA 21 (L) >60 ml/min/1.73m2    GFR est non-AA 18 (L) >60 ml/min/1.73m2 Calcium 7.9 (L) 8.5 - 10.1 MG/DL           Total time spent with patient:  xxx   min. Care Plan discussed with:  Patient     Family      RN      Consulting Physician [address] [facility],         I have reviewed the flowsheets. Chart and Pertinent Notes have been reviewed. No change in PMH ,family and social history from Consult note.       Lalito Watts MD

## 2019-10-29 NOTE — CARDIO/PULMONARY
Cardiac Rehab: Living with Heart Failure Booklet given to Olivier Mathur. Educated using teach back method. Discussed diagnosis definition and assessed patient understanding. He has a good understanding. Reviewed importance of daily weight monitoring and Low Sodium diet (6474-5142 mg. daily). He does not like the taste of salt and does not use it. He has a scale and weighs but not every morning. He is able to state weight gain to report. He will begin daily weights. Reviewed coreg and bumex, purpose of medication, potential side effects, compliance. Discussed importance of reporting signs and symptoms of exacerbation, and when to report them to the doctor, to prevent re-hospitalization. Olivier Mathur was encouraged to keep all appointments with doctor. He sees Dr. Surinder Hernandez. Smoking history assessed. Patient is a non smoker. Discussed the Cardiac Rehab Program, benefits, format, and encouraged enrollment, when eligible. Patient is interested and we will follow up, by phone, once eligible.   Dutch Adrian RN

## 2019-10-29 NOTE — PROGRESS NOTES
Hospital follow-up PCP transitional care appointment has been scheduled with Miguel A Marroquin NP for Tuesday, 11/5/19 at 1:15 p.m. Pending patient discharge.   Sandhya Marquez, Care Management Specialist.

## 2019-10-30 ENCOUNTER — TELEPHONE (OUTPATIENT)
Dept: CASE MANAGEMENT | Age: 49
End: 2019-10-30

## 2019-10-30 ENCOUNTER — TELEPHONE (OUTPATIENT)
Dept: RHEUMATOLOGY | Age: 49
End: 2019-10-30

## 2019-10-30 NOTE — TELEPHONE ENCOUNTER
Spoke with Davy Bernal (pharmacist) and told him that we will send over an \"ok to treat\" for Mr. Lena Ryan. He provided the fax number for me to send it to.

## 2019-10-30 NOTE — TELEPHONE ENCOUNTER
Lilian Charlton, Pharmacist,[place] calling due to this patient was admitted to [facility] and the need a okay to treat for [place] for his Infusion appointment tomorrow at 2:00pm. His phone is [phone].

## 2019-10-30 NOTE — TELEPHONE ENCOUNTER
LAURA attempted to make post discharge followup call ([phone]) Per voicemail the mailbox has not been set up and therefore unable to leave message.   Megha Duncan RN-CHFN/LAURA

## 2019-10-31 ENCOUNTER — TELEPHONE (OUTPATIENT)
Dept: CASE MANAGEMENT | Age: 49
End: 2019-10-31

## 2019-11-01 ENCOUNTER — TELEPHONE (OUTPATIENT)
Dept: CASE MANAGEMENT | Age: 49
End: 2019-11-01

## 2019-11-15 ENCOUNTER — APPOINTMENT (OUTPATIENT)
Dept: INFUSION THERAPY | Age: 49
End: 2019-11-15

## 2019-11-26 ENCOUNTER — TELEPHONE (OUTPATIENT)
Dept: CARDIAC REHAB | Age: 49
End: 2019-11-26

## 2019-11-26 NOTE — TELEPHONE ENCOUNTER
Cardiac Rehab: [address] [place] to discuss participation in the Cardiac Rehab Program following his admission for heart failure on 10/26/2019. Unable to leave voice mail (recording spays voicemail is not set up). Will follow up call week of 12/2/19.  Doylene Osgood, RN

## 2019-12-02 ENCOUNTER — APPOINTMENT (OUTPATIENT)
Dept: INFUSION THERAPY | Age: 49
End: 2019-12-02

## 2019-12-02 ENCOUNTER — TELEPHONE (OUTPATIENT)
Dept: CARDIAC REHAB | Age: 49
End: 2019-12-02

## 2019-12-02 NOTE — TELEPHONE ENCOUNTER
12/2/2019 Cardiac Rehab: Called Mr. Nura Delcid  to discuss participation in the Cardiac Rehab Program . Spoke with the pt. Who asked for a call this afternoon after 3pm Chacorta Manzo RN    Cardiac Rehab: Soila Croft to discuss participation in the Cardiac Rehab Program following his admission for heart failure on 10/26/2019. Unable to leave voice mail (recording spays voicemail is not set up). Will follow up call week of 12/2/19.  Art Allen RN

## 2019-12-02 NOTE — TELEPHONE ENCOUNTER
12/2/2019 Cardiac Rehab: Called Mr. Marciano Yousif  to discuss participation in the Cardiac Rehab Program and his voice mail is not set up nor did he answer the call. Mallorie Winslow RN    12/2/2019 Cardiac Rehab: Called Mr. Marciano Yousif  to discuss participation in the Cardiac Rehab Program . Spoke with the pt. Who asked for a call this afternoon after 3pm Mallorie Winslow RN     Cardiac Rehab: Juan Dinh discuss participation in the Cardiac Rehab Program following his admission for heart failure on 10/26/2019. Unable to leave voice mail (recording spays voicemail is not set up). Will follow up call week of 12/2/19. Catie Zheng RN

## 2020-03-18 DIAGNOSIS — M1A.39X1 CHRONIC TOPHACEOUS GOUT OF MULTIPLE SITES DUE TO RENAL IMPAIRMENT: ICD-10-CM

## 2020-03-19 RX ORDER — COLCHICINE 0.6 MG/1
TABLET, FILM COATED ORAL
Qty: 30 TAB | Refills: 0 | Status: SHIPPED | OUTPATIENT
Start: 2020-03-19 | End: 2020-06-17 | Stop reason: SDUPTHER

## 2020-04-24 ENCOUNTER — HOSPITAL ENCOUNTER (OUTPATIENT)
Dept: INFUSION THERAPY | Age: 50
Discharge: HOME | End: 2020-04-24

## 2020-06-15 ENCOUNTER — TELEPHONE (OUTPATIENT)
Dept: RHEUMATOLOGY | Age: 50
End: 2020-06-15

## 2020-06-15 NOTE — TELEPHONE ENCOUNTER
CALLED PATIENT ON 6/15/2020 TO SETUP AN APPT COULD NOT LEAVE VOICE MESSAGE PATIENT VOICE MESSAGE IS NOT SETUP ANY REP CAN ASSIST PT. SDH

## 2020-06-15 NOTE — TELEPHONE ENCOUNTER
----- Message from Pop Turcios sent at 6/15/2020  3:27 PM EDT -----  Regarding: Dr. Tad Lala  Pt missed a call from the practice and request for another attempt to be made. He also states that he would like to discuss the recent denial of his medication. Best contact number is [phone].

## 2020-06-17 ENCOUNTER — OFFICE VISIT (OUTPATIENT)
Dept: RHEUMATOLOGY | Age: 50
End: 2020-06-17

## 2020-06-17 ENCOUNTER — TELEPHONE (OUTPATIENT)
Dept: RHEUMATOLOGY | Age: 50
End: 2020-06-17

## 2020-06-17 VITALS
HEIGHT: 71 IN | RESPIRATION RATE: 18 BRPM | HEART RATE: 80 BPM | DIASTOLIC BLOOD PRESSURE: 90 MMHG | SYSTOLIC BLOOD PRESSURE: 133 MMHG | WEIGHT: 192 LBS | BODY MASS INDEX: 26.88 KG/M2 | TEMPERATURE: 98 F

## 2020-06-17 DIAGNOSIS — M1A.39X1 CHRONIC TOPHACEOUS GOUT OF MULTIPLE SITES DUE TO RENAL IMPAIRMENT: Primary | ICD-10-CM

## 2020-06-17 DIAGNOSIS — N18.4 CKD (CHRONIC KIDNEY DISEASE) STAGE 4, GFR 15-29 ML/MIN (HCC): ICD-10-CM

## 2020-06-17 DIAGNOSIS — I50.21 ACUTE SYSTOLIC CHF (CONGESTIVE HEART FAILURE) (HCC): ICD-10-CM

## 2020-06-17 DIAGNOSIS — Z79.60 LONG-TERM USE OF IMMUNOSUPPRESSANT MEDICATION: ICD-10-CM

## 2020-06-17 RX ORDER — GLUCOSAMINE SULFATE 1500 MG
POWDER IN PACKET (EA) ORAL DAILY
COMMUNITY
End: 2022-08-19

## 2020-06-17 RX ORDER — COLCHICINE 0.6 MG/1
0.6 TABLET ORAL AS NEEDED
Qty: 90 TAB | Refills: 4 | Status: SHIPPED | OUTPATIENT
Start: 2020-06-17 | End: 2020-07-20

## 2020-06-17 RX ORDER — LEFLUNOMIDE 20 MG/1
20 TABLET ORAL DAILY
Qty: 90 TAB | Refills: 5 | Status: SHIPPED | OUTPATIENT
Start: 2020-06-17 | End: 2020-09-24 | Stop reason: SDUPTHER

## 2020-06-17 NOTE — PROGRESS NOTES
REASON FOR VISIT    This is a follow-up visit for Mr. Zari Daniel for     ICD-10-CM   1. 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     Gouty arthritis phenotype includes:  Tophi: yes  Erosions: yes  Tenosynovitis: yes  Double Contour: N/A     Therapy Includes:  NSAIDs: no (contra-indicated due to CKD)  Colchicine prophylaxis: yes  Current uricosuric therapy: none  Current urate-lowering therapy: none  Previous urate-lowering therapy:  Krystexxa 8 mg every 14 days (6/28/2019 to 10/18/2019)  Discontinued uricosuric because of inefficacy: none  Discontinued uricosuric because of side effects: none  Discontinued urate-lowering therapy because of inefficacy: allopurinol 50 mg daily  Discontinued urate-lowering therapy because of side effects: none  Contraindicated urate-lowering therapy because of CKD: allopurinol, probenecid  G6PD Status: normal  Current anti-immunogenicity DMARD therapy: none (previously leflunomide)    Active problems include:    Patient Active Problem List   Diagnosis Code    Incarcerated ventral hernia K43.6    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 Zari Daniel returns for a follow-up visit. On his last visit, I continued Krystexxa infusion 8 mg every 14 day/s and leflunomide 20 mg daily for immunogenicity prevention. His recent infusion was 10/18/2019. He was hospitalized on 63/86/4833 for acute systolic heart failure (Echo EF 26-30% with severe global hypokinesis).  He was treated with IV diuretics and Entresto/Coreg. The likely reason was volume overload due to steroids (prednisone/Medrol). Today, he feels well. He has several minor flares which were not like before. He has noticed a growth on his left eye lid. He felt that Kelly Marcelo was helping his hand mobility. He denies fever, weight loss, blurred vision, vision loss, oral ulcers, ankle swelling, dry cough, dyspnea, nausea, vomiting, dysphagia, abdominal pain, black or bloody stool, fall since last visit, rash, easy bruising and increased thirst.    Most recent uric acid from 5/29/2020 was 8.9 mg/dL (previously 0.8, 1.7, 4.5, 6.4, 8.2, 1.4, 2.4, 9.4 mg/dL). Last toxicity monitoring by blood work was done on 5/29/2020 DR. BLAS'S [facility]) and did not reveal any significant adverse effects, except creatinine 3.90, eGFR 20. The patient has not had any interval hospital admissions, infections, or surgeries. REVIEW OF SYSTEMS    A comprehensive review of systems was performed and pertinent results are documented in the HPI, review of systems is otherwise non-contributory. PAST MEDICAL HISTORY    He has a past medical history of Heart failure ([address] Hypertension, and Polycystic kidney disease. FAMILY HISTORY    His family history includes Hypertension in his father and mother; No Known Problems in his brother and brother; Other in his sister. SOCIAL HISTORY    He reports that he has never smoked. He has never used smokeless tobacco. He reports previous alcohol use. He reports current drug use. Drug: Marijuana. IMMUNIZATIONS    There is no immunization history on file for this patient. MEDICATIONS    Current Outpatient Medications   Medication Sig Dispense Refill    cholecalciferol (Vitamin D3) 25 mcg (1,000 unit) cap Take  by mouth daily.  colchicine (Colcrys) 0.6 mg tablet Take 1 Tab by mouth as needed for Gout. 90 Tab 4    leflunomide (ARAVA) 20 mg tablet Take 1 Tab by mouth daily.  Take half tab daily for 7 days and then one tab daily if tolerated 90 Tab 5    carvedilol (COREG) 25 mg tablet Take 1 Tab by mouth two (2) times daily (with meals). 60 Tab 1    ferrous sulfate 325 mg (65 mg iron) cpER Take 1 Each by mouth daily. 30 Cap 2    sacubitril-valsartan (ENTRESTO) 24 mg/26 mg tablet Take 1 Tab by mouth every twelve (12) hours. 60 Tab 1    bumetanide (BUMEX) 1 mg tablet Take 1 Tab by mouth daily. 30 Tab 0    POTASSIUM ASPARTATE/MAG ASP (POTASSIUM & MAGNESIUM ASPARTAT PO) Take  by mouth.  predniSONE (DELTASONE) 20 mg tablet Take 1 tab by mouth daily for flare 20 Tab 0    pegloticase (KRYSTEXXA) 8 mg/mL soln injection 8 mg by IntraVENous route Once every 2 weeks. Facility-Administered Medications Ordered in Other Visits   Medication Dose Route Frequency Provider Last Rate Last Dose    [START ON 6/19/2020] epoetin yodit-epbx (RETACRIT) injection 20,000 Units  20,000 Units SubCUTAneous ONCE Laury Glover, MIHAELA            ALLERGIES    Allergies   Allergen Reactions    Shellfish Containing Products Swelling     PHYSICAL EXAMINATION    Visit Vitals  /90   Pulse 80   Temp 98 °F (36.7 °C)   Resp 18   Ht 5' 11\" (1.803 m)   Wt 192 lb (87.1 kg)   BMI 26.78 kg/m²     Body mass index is 26.78 kg/m². General: Patient is alert, oriented x 3, not in acute distress    HEENT:   Sclerae are not injected and appear moist.  There is no alopecia. Neck is supple     Cardiovascular:  Heart is regular rate and rhythm, no murmurs. Chest:  Lungs are clear to auscultation bilaterally. No rhonchi, wheezes, or crackles.     Extremities:  Free of clubbing, cyanosis, edema    Neurological exam:  Muscle strength is full in upper and lower extremities     Skin exam:    Tophi: left eyelid left: left helix, bilateral olecranon (LEFT larger 15 cm), bilateral wrists, left 2nd MCP, IP, 2nd PIP, 3rd PIP, 4th PIP, right 2nd MCP, 5th MCP, IP, 2nd PIP, 5th PIP, RIGHT: patella, LEFT: achilles    Musculoskeletal exam:  A comprehensive musculoskeletal exam was performed for all joints of each upper and lower extremity and assessed for swelling, tenderness and range of motion. Positive results are documented as below:    Bilateral wrist, hand dorsum, MCP and digit swelling without tenderness    DATA REVIEW    Laboratory     Recent laboratory results were reviewed, summarized, and discussed with the patient. Imaging    Musculoskeletal Ultrasound    None    Radiographs    Bilateral Elbow 6/13/2019: RIGHT: normal alignment and bone mineral density. There is a well-circumscribed erosion within the olecranon. No definite effusion. There is high density material in the region of the olecranon bursa. LEFT: no fracture or effusion. No erosive change. There is high density material in the region of the olecranon bursa. Bilateral Hand 6/13/2019: RIGHT: normal alignment. Bone mineral density is normal. No fracture. There are extensive erosive changes of the distal ulna with adjacent high density soft tissue mass. There is erosive change within the triquetrum capitate and likely trapezoid. Erosive changes are noted the first second and third MCP joints. There is high density soft tissue adjacent to the second digit PIP joint. LEFT: normal alignment and no fracture. There are scattered erosive changes throughout the carpus second MTP joint and PIP joints of the second third and fourth digits. There is soft tissue swelling which is high density of the thumb and second MTP joints. Additional soft tissue swelling at the wrist.     Bilateral Foot 6/13/2019: RIGHT: normal alignment and bone mineral density. There are erosive changes throughout the ankle, talar head, navicular and Lisfranc joint as well as the medial base proximal phalanx of the great toe. There is adjacent soft tissue density at the first metatarsal head. No acute fracture. LEFT: normal alignment bone mineral densities.  There are periarticular erosions throughout the Lisfranc joint, between the navicular and cuneiforms and at the first MTP joint. There is mild soft tissue thickening and density medial to the first metatarsal head. There is suggestion of soft tissue density between the second and third metatarsal heads. No acute fracture.     CT Imaging    CT Abdomen and Pelvis without contrast 6/01/2019: Heart/vessels: Pericardial effusion and/or thickening measuring approximately 0.8 cm. Lungs/Pleura: Within normal limits. . ABDOMEN: Liver: Small, low-attenuation lesions in the liver which are incompletely characterized and statistically likely benign. Gallbladder/Biliary: Within normal limits. Spleen: Within normal limits. Pancreas: Within normal limits. Adrenals: Within normal limits. Kidneys: Innumerable low-attenuation lesions and high attenuation lesions throughout the kidneys with complete loss of normal-appearing parenchyma suggestive of polycystic kidney disease. Peritoneum/Mesenteries: Trace amount of fluid in the pelvis. Extraperitoneum: Within normal limits. Gastrointestinal tract: There is a short segment of small bowel within a supraumbilical hernia. The short segment of bowel within the hernia appears to have some mural thickening There are distended loops of small bowel with air-fluid levels associated with the hernia. The more distal small bowel is nearly entirely decompressed as is the large bowel. Normal-appearing appendix. Vascular: Within normal limits. PELVIS: Extraperitoneum: Within normal limits. Ureters: Within normal limits. Bladder: The bladder is nearly entirely decompressed. There is concentric mural thickening in the bladder. Reproductive System: Within normal limits. MSK:  There is a supraumbilical hernia containing a short segment of small bowel with adjacent stranding and inflammation and stranding/inflammation within the fat in the hernia. Tiny, fat-containing umbilical hernia. Degenerative changes in the lumbar spine. There is degenerative disc disease at L3/L4 and L4/L5.  Extensive degenerative changes in both hips. Dystrophic appearing calcifications at the origin of both hamstrings at the pubic rami. Degenerative changes in the pubic symphysis. MR Imaging    MRI Right Ankle without contrast 4/10/2012: There is osseous edema like signal at the inferior aspect of the lateral malleolus. There are also subcortical cysts in the anterior process of the calcaneus adjacent to the sinus tarsi. A mild to moderate osseous edema and the medial cuneiform bone is demonstrated subjacent to an area of distal high-grade chondral derangement. There is a moderate to large effusion of the ankle and posterior subtalar joints with demonstration of mild diffuse chondral thinning with small marginal osteophytes. There is a 7 mm loose body in the anterior joint recess. There is also heterogeneous signal in the posterior joint recesses which could contain small loose bodies as well. There is absent  visualization of the anterior talofibular ligament consistent with chronic tear. The other ankle ligaments appear intact. There is a small effusion of the talonavicular joint. Small dorsal marginal osteophytes throughout the midfoot region are noted. There is a moderate effusion in the posterior tibialis tendon sheath with diffuse mild tendon thickening and inframalleolar heterogeneous signal. There is a small effusion of the flexor digitorum longus and flexor hallucis longus tendon sheaths with normal appearing tendons. There is a small effusion of the peroneal tendon sheath with mild diffuse thickening of the peroneus longus tendon though uniform signal. Mild. Achilles peritendinitis is noted. There is a trace amount of fluid signal in the extensor digitorum longus tendon sheath. There is mild flatfoot deformity. No tarsal coalition is shown. There is no bone or soft tissue mass. DXA     None    ASSESSMENT AND PLAN    This is a follow-up visit for Mr. Lizeth Fam.     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 Probenecid and allopurinol are contraindicated. Uloric will be ineffective and given the new FDA box warning and his history of heart failure, I do not recommend it. He had acute heart failure likely due to steroid/Medrol induced volume overload. I discussed with his nephrologist, Dr. Tremaine Wills who reported that his volume stasis and been stable and was amenable to resume Krystexxa. He is off Krystexxa 8 mg every 2 weeks and leflunomide 20 mg daily for immunogenicity prevention. He had felt better on Heraclio Emms noticing improvement of his hand mobility but he had flares which were medicated with prednisone. He now uses colchicine without issues. He was amenable to resuming Krystexxa 8 mg every 2 weeks and leflunomide 20 mg daily, so I asked him to resume leflunomide and schedule his infusion 4 weeks later. I will submit for Medrol 30 mg IV as pre-medication instead of 125     2) Long Term Use of Immunosuppressants. The patient will resume immunomodulatory medications (leflunomide). 3) Chronic Kidney Disease Stage 3. The patient's creatinine 3.33 mg/dL, eGFR 24. He follows with Dr. William Gaona.        4) Autosomal Dominant Polycystic Kidney Disease. He follows with Dr. Tremaine Wills who will start [facility].     5) Bilateral Lower Extremity Edema. This was likely from pre-medication with Medrol 125 mg, oral prednisone for flares and CKD. I will lower his pre-medication Medrol to 30 mg for Krystexxa. The patient voiced understanding of the aforementioned assessment and plan. Summary of plan was provided in the After Visit Summary patient instructions.      TODAY'S ORDERS    Orders Placed This Encounter    colchicine (Colcrys) 0.6 mg tablet    leflunomide (ARAVA) 20 mg tablet     Future Appointments   Date Time Provider Barry Cano   6/19/2020  2:00 PM A1 [facility] [address] [facility]   7/15/2020  1:00 PM A1 [facility] [address] [place] [address]   7/17/2020  2:00 PM A1 [facility] [address] [place] [address]   7/29/2020  1:00 PM A1 [facility] [address] [place] [address]   8/17/2020 10:00 AM Rosaura Rabago MD [place], MD, [address] [place] [address]    Adult Rheumatology   Rheumatology Ultrasound Certified  [facility]  A Part of [facility], [address] [place] [address] Road   Phone [phone]  Fax [phone]

## 2020-06-17 NOTE — LETTER
6/17/20 Patient: Jennie Serrato YOB: 1970 Date of Visit: 6/17/2020 Ollie Garg NP 
[address] [facility] [address] [address] [place] [address] VIA In Basket Dear Ollie Garg NP, Thank you for referring Mr. Gabe Hudson to [facility] for evaluation. My notes for this consultation are attached. If you have questions, please do not hesitate to call me. I look forward to following your patient along with you.  
 
 
Sincerely, 
 
Morgan Tom MD

## 2020-07-10 RX ORDER — DIPHENHYDRAMINE HYDROCHLORIDE 50 MG/ML
25 INJECTION, SOLUTION INTRAMUSCULAR; INTRAVENOUS
Status: DISCONTINUED | OUTPATIENT
Start: 2020-07-15 | End: 2020-07-16 | Stop reason: HOSPADM

## 2020-07-10 RX ORDER — ACETAMINOPHEN 325 MG/1
650 TABLET ORAL
Status: DISCONTINUED | OUTPATIENT
Start: 2020-07-15 | End: 2020-07-16 | Stop reason: HOSPADM

## 2020-07-10 RX ORDER — DIPHENHYDRAMINE HCL 25 MG
25 CAPSULE ORAL ONCE
Status: ACTIVE | OUTPATIENT
Start: 2020-07-15 | End: 2020-07-15

## 2020-07-10 RX ORDER — ACETAMINOPHEN 325 MG/1
650 TABLET ORAL ONCE
Status: ACTIVE | OUTPATIENT
Start: 2020-07-15 | End: 2020-07-15

## 2020-07-10 RX ORDER — SODIUM CHLORIDE 9 MG/ML
25 INJECTION, SOLUTION INTRAVENOUS CONTINUOUS
Status: DISCONTINUED | OUTPATIENT
Start: 2020-07-15 | End: 2020-07-16 | Stop reason: HOSPADM

## 2020-07-15 ENCOUNTER — HOSPITAL ENCOUNTER (OUTPATIENT)
Dept: INFUSION THERAPY | Age: 50
Discharge: HOME | End: 2020-07-15

## 2020-07-17 ENCOUNTER — HOSPITAL ENCOUNTER (OUTPATIENT)
Dept: INFUSION THERAPY | Age: 50
Discharge: HOME | End: 2020-07-17

## 2020-08-07 RX ORDER — ACETAMINOPHEN 325 MG/1
650 TABLET ORAL
Status: DISCONTINUED | OUTPATIENT
Start: 2020-08-12 | End: 2020-08-13 | Stop reason: HOSPADM

## 2020-08-07 RX ORDER — DIPHENHYDRAMINE HYDROCHLORIDE 50 MG/ML
25 INJECTION, SOLUTION INTRAMUSCULAR; INTRAVENOUS
Status: DISCONTINUED | OUTPATIENT
Start: 2020-08-12 | End: 2020-08-13 | Stop reason: HOSPADM

## 2020-08-07 RX ORDER — SODIUM CHLORIDE 9 MG/ML
25 INJECTION, SOLUTION INTRAVENOUS CONTINUOUS
Status: DISCONTINUED | OUTPATIENT
Start: 2020-08-12 | End: 2020-08-13 | Stop reason: HOSPADM

## 2020-08-07 RX ORDER — DIPHENHYDRAMINE HCL 25 MG
25 CAPSULE ORAL ONCE
Status: ACTIVE | OUTPATIENT
Start: 2020-08-12 | End: 2020-08-12

## 2020-08-07 RX ORDER — ACETAMINOPHEN 325 MG/1
650 TABLET ORAL ONCE
Status: ACTIVE | OUTPATIENT
Start: 2020-08-12 | End: 2020-08-12

## 2020-08-12 ENCOUNTER — HOSPITAL ENCOUNTER (OUTPATIENT)
Dept: INFUSION THERAPY | Age: 50
Discharge: HOME | End: 2020-08-12
Payer: COMMERCIAL

## 2020-08-14 RX ORDER — SPIRONOLACTONE 25 MG/1
25 TABLET ORAL DAILY
COMMUNITY

## 2020-08-17 ENCOUNTER — VIRTUAL VISIT (OUTPATIENT)
Dept: RHEUMATOLOGY | Age: 50
End: 2020-08-17
Payer: COMMERCIAL

## 2020-08-17 DIAGNOSIS — Q61.2 AUTOSOMAL DOMINANT ADULT POLYCYSTIC KIDNEY DISEASE: ICD-10-CM

## 2020-08-17 DIAGNOSIS — Z79.60 LONG-TERM USE OF IMMUNOSUPPRESSANT MEDICATION: Primary | ICD-10-CM

## 2020-08-17 DIAGNOSIS — N18.4 CKD (CHRONIC KIDNEY DISEASE) STAGE 4, GFR 15-29 ML/MIN (HCC): ICD-10-CM

## 2020-08-17 DIAGNOSIS — M1A.39X1 CHRONIC TOPHACEOUS GOUT OF MULTIPLE SITES DUE TO RENAL IMPAIRMENT: ICD-10-CM

## 2020-08-17 PROCEDURE — 99214 OFFICE O/P EST MOD 30 MIN: CPT | Performed by: INTERNAL MEDICINE

## 2020-08-17 RX ORDER — COLCHICINE 0.6 MG/1
TABLET ORAL
Qty: 90 TAB | Refills: 2 | Status: SHIPPED | OUTPATIENT
Start: 2020-08-17 | End: 2020-10-14

## 2020-08-17 NOTE — PROGRESS NOTES
REASON FOR VISIT    This is a follow-up visit for Mr. Alicja Luevano for     ICD-10-CM   1. 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 Services were provided through a video synchronous discussion virtually to substitute for in-person clinic visit.     Gouty arthritis phenotype includes:  Tophi: yes  Erosions: yes  Tenosynovitis: yes  Double Contour: N/A     Therapy Includes:  NSAIDs: no (contra-indicated due to CKD)  Colchicine prophylaxis: yes  Current uricosuric therapy: none  Current urate-lowering therapy: none  Previous urate-lowering therapy:  Krystexxa 8 mg every 14 days (6/28/2019 to 10/18/2019)  Discontinued uricosuric because of inefficacy: none  Discontinued uricosuric because of side effects: none  Discontinued urate-lowering therapy because of inefficacy: allopurinol 50 mg daily  Discontinued urate-lowering therapy because of side effects: none  Contraindicated urate-lowering therapy because of CKD: allopurinol, probenecid  G6PD Status: normal  Current anti-immunogenicity DMARD therapy: none (previously leflunomide)    Active problems include:    Patient Active Problem List   Diagnosis Code    Incarcerated ventral hernia K43.6    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) N18.3    Long-term use of immunosuppressant medication Z79.899    CKD (chronic kidney disease) stage 4, GFR 15-29 ml/min ([facility]) N18.4    SOB (shortness of breath) R29.48    Acute systolic CHF (congestive heart failure) ([facility]) I50.21     HISTORY OF PRESENT ILLNESS    Mr. Paris Hobbs returns for a follow-up visit. On his last visit, I asked him to resume Krystexxa infusion 8 mg every 14 days and continued leflunomide 20 mg daily for immunogenicity prevention. I lowered his Medrol dose to 30 mg due to volume overload. He was unable to get his infusion since his last visit due to family issues but is scheduled for 8/26/2020. Today, he feels well. He has noticed his elbow is starting to bother him. He denies fever, weight loss, blurred vision, vision loss, oral ulcers, ankle swelling, dry cough, dyspnea, nausea, vomiting, dysphagia, abdominal pain, black or bloody stool, fall since last visit, rash, easy bruising and increased thirst.    Most recent uric acid from 5/29/2020 was 8.9 mg/dL (previously 0.8, 1.7, 4.5, 6.4, 8.2, 1.4, 2.4, 9.4 mg/dL). Last toxicity monitoring by blood work was done on 5/29/2020 DR. [facility]) and did not reveal any significant adverse effects, except creatinine 3.90, eGFR 20. The patient has not had any interval hospital admissions, infections, or surgeries. REVIEW OF SYSTEMS    A comprehensive review of systems was performed and pertinent results are documented in the HPI, review of systems is otherwise non-contributory. PAST MEDICAL HISTORY    He has a past medical history of Heart failure ([address] Hypertension, and Polycystic kidney disease. FAMILY HISTORY    His family history includes Hypertension in his father and mother; No Known Problems in his brother and brother; Other in his sister. SOCIAL HISTORY    He reports that he has never smoked.  He has never used smokeless tobacco. He reports previous alcohol use. He reports current drug use. Drug: Marijuana. IMMUNIZATIONS    There is no immunization history on file for this patient. MEDICATIONS    Current Outpatient Medications   Medication Sig Dispense Refill    colchicine (Colcrys) 0.6 mg tablet TAKE ONE TABLET BY MOUTH DAILY AS NEEDED FOR GOUT FLARE 90 Tab 2    spironolactone (ALDACTONE) 25 mg tablet Take 25 mg by mouth daily.  cholecalciferol (Vitamin D3) 25 mcg (1,000 unit) cap Take  by mouth daily.  leflunomide (ARAVA) 20 mg tablet Take 1 Tab by mouth daily. Take half tab daily for 7 days and then one tab daily if tolerated 90 Tab 5    carvedilol (COREG) 25 mg tablet Take 1 Tab by mouth two (2) times daily (with meals). 60 Tab 1    ferrous sulfate 325 mg (65 mg iron) cpER Take 1 Each by mouth daily. 30 Cap 2    sacubitril-valsartan (ENTRESTO) 24 mg/26 mg tablet Take 1 Tab by mouth every twelve (12) hours. 60 Tab 1    bumetanide (BUMEX) 1 mg tablet Take 1 Tab by mouth daily. 30 Tab 0    predniSONE (DELTASONE) 20 mg tablet Take 1 tab by mouth daily for flare 20 Tab 0    pegloticase (KRYSTEXXA) 8 mg/mL soln injection 8 mg by IntraVENous route Once every 2 weeks.  POTASSIUM ASPARTATE/MAG ASP (POTASSIUM & MAGNESIUM ASPARTAT PO) Take  by mouth. ALLERGIES    Allergies   Allergen Reactions    Shellfish Containing Products Swelling     PHYSICAL EXAMINATION    There were no vitals taken for this visit. There is no height or weight on file to calculate BMI. General: Patient is alert, oriented x 3, not in acute distress    HEENT:   Sclerae are not injected and appear moist.  There is no alopecia.  Neck is supple       Chest:  Breathing comfortably at room air    Skin exam:    Tophi: left eyelid left: left helix, bilateral olecranon (LEFT larger 15 cm), bilateral wrists, left 2nd MCP, IP, 2nd PIP, 3rd PIP, 4th PIP, right 2nd MCP, 5th MCP, IP, 2nd PIP, 5th PIP, RIGHT: patella, LEFT: achilles    Musculoskeletal exam:  A comprehensive musculoskeletal exam was NOT performed for all joints of each upper and lower extremity and assessed for swelling, tenderness and range of motion. Positive results are documented as below:    Previous Exam    Bilateral wrist, hand dorsum, MCP and digit swelling without tenderness    DATA REVIEW    Laboratory     Recent laboratory results were reviewed, summarized, and discussed with the patient. Imaging    Musculoskeletal Ultrasound    None    Radiographs    Bilateral Elbow 6/13/2019: RIGHT: normal alignment and bone mineral density. There is a well-circumscribed erosion within the olecranon. No definite effusion. There is high density material in the region of the olecranon bursa. LEFT: no fracture or effusion. No erosive change. There is high density material in the region of the olecranon bursa. Bilateral Hand 6/13/2019: RIGHT: normal alignment. Bone mineral density is normal. No fracture. There are extensive erosive changes of the distal ulna with adjacent high density soft tissue mass. There is erosive change within the triquetrum capitate and likely trapezoid. Erosive changes are noted the first second and third MCP joints. There is high density soft tissue adjacent to the second digit PIP joint. LEFT: normal alignment and no fracture. There are scattered erosive changes throughout the carpus second MTP joint and PIP joints of the second third and fourth digits. There is soft tissue swelling which is high density of the thumb and second MTP joints. Additional soft tissue swelling at the wrist.     Bilateral Foot 6/13/2019: RIGHT: normal alignment and bone mineral density. There are erosive changes throughout the ankle, talar head, navicular and Lisfranc joint as well as the medial base proximal phalanx of the great toe. There is adjacent soft tissue density at the first metatarsal head. No acute fracture. LEFT: normal alignment bone mineral densities.  There are periarticular erosions throughout the Lisfranc joint, between the navicular and cuneiforms and at the first MTP joint. There is mild soft tissue thickening and density medial to the first metatarsal head. There is suggestion of soft tissue density between the second and third metatarsal heads. No acute fracture.     CT Imaging    CT Abdomen and Pelvis without contrast 6/01/2019: Heart/vessels: Pericardial effusion and/or thickening measuring approximately 0.8 cm. Lungs/Pleura: Within normal limits. . ABDOMEN: Liver: Small, low-attenuation lesions in the liver which are incompletely characterized and statistically likely benign. Gallbladder/Biliary: Within normal limits. Spleen: Within normal limits. Pancreas: Within normal limits. Adrenals: Within normal limits. Kidneys: Innumerable low-attenuation lesions and high attenuation lesions throughout the kidneys with complete loss of normal-appearing parenchyma suggestive of polycystic kidney disease. Peritoneum/Mesenteries: Trace amount of fluid in the pelvis. Extraperitoneum: Within normal limits. Gastrointestinal tract: There is a short segment of small bowel within a supraumbilical hernia. The short segment of bowel within the hernia appears to have some mural thickening There are distended loops of small bowel with air-fluid levels associated with the hernia. The more distal small bowel is nearly entirely decompressed as is the large bowel. Normal-appearing appendix. Vascular: Within normal limits. PELVIS: Extraperitoneum: Within normal limits. Ureters: Within normal limits. Bladder: The bladder is nearly entirely decompressed. There is concentric mural thickening in the bladder. Reproductive System: Within normal limits. MSK:  There is a supraumbilical hernia containing a short segment of small bowel with adjacent stranding and inflammation and stranding/inflammation within the fat in the hernia. Tiny, fat-containing umbilical hernia. Degenerative changes in the lumbar spine.  There is degenerative disc disease at L3/L4 and L4/L5. Extensive degenerative changes in both hips. Dystrophic appearing calcifications at the origin of both hamstrings at the pubic rami. Degenerative changes in the pubic symphysis. MR Imaging    MRI Right Ankle without contrast 4/10/2012: There is osseous edema like signal at the inferior aspect of the lateral malleolus. There are also subcortical cysts in the anterior process of the calcaneus adjacent to the sinus tarsi. A mild to moderate osseous edema and the medial cuneiform bone is demonstrated subjacent to an area of distal high-grade chondral derangement. There is a moderate to large effusion of the ankle and posterior subtalar joints with demonstration of mild diffuse chondral thinning with small marginal osteophytes. There is a 7 mm loose body in the anterior joint recess. There is also heterogeneous signal in the posterior joint recesses which could contain small loose bodies as well. There is absent  visualization of the anterior talofibular ligament consistent with chronic tear. The other ankle ligaments appear intact. There is a small effusion of the talonavicular joint. Small dorsal marginal osteophytes throughout the midfoot region are noted. There is a moderate effusion in the posterior tibialis tendon sheath with diffuse mild tendon thickening and inframalleolar heterogeneous signal. There is a small effusion of the flexor digitorum longus and flexor hallucis longus tendon sheaths with normal appearing tendons. There is a small effusion of the peroneal tendon sheath with mild diffuse thickening of the peroneus longus tendon though uniform signal. Mild. Achilles peritendinitis is noted. There is a trace amount of fluid signal in the extensor digitorum longus tendon sheath. There is mild flatfoot deformity. No tarsal coalition is shown. There is no bone or soft tissue mass. DXA     None    ASSESSMENT AND PLAN    This is a follow-up visit for Estephanie Naida Prisca.     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 Probenecid and allopurinol are contraindicated. Uloric will be ineffective and given the new FDA box warning and his history of heart failure, I do not recommend it. He is will resume Krystexxa 8 mg every 2 weeks on 8/26/2020 and continue leflunomide 20 mg daily for immunogenicity prevention. I lowered his pre-medication Medrol to 30 mg due to hypervolemia. I asked him to weight himself daily and if his weight increased by 5 lbs to contact me or Dr. Negrito Klein to adjust his Bumex dose. He understood. 2) Long Term Use of Immunosuppressants. The patient remains on immunomodulatory medications (leflunomide) and requires frequent toxicity monitoring by blood work. Respective labs were ordered (CBC and CMP) with each scheduled Krystexxa infusion. 3) Chronic Kidney Disease Stage 3. The patient's creatinine 3.33 mg/dL, eGFR 24. He follows with Dr. Nuris Alcantara.        4) Autosomal Dominant Polycystic Kidney Disease. He follows with Dr. Negrito Klein who will start [facility].     5) Bilateral Lower Extremity Edema. This was likely from pre-medication with Medrol 125 mg, oral prednisone for flares and CKD. I lowered his pre-medication Medrol to 30 mg for Krystexxa. The patient voiced understanding of the aforementioned assessment and plan. Summary of plan was provided in the After Visit Summary patient instructions.      TODAY'S ORDERS    Orders Placed This Encounter    colchicine (Colcrys) 0.6 mg tablet     Future Appointments   Date Time Provider Barry Cano   8/26/2020  1:00 PM 1910 Tyrone Yost MD, [address]    Adult Rheumatology   Rheumatology Ultrasound Certified  [facility], [address] [place] [address]   Phone [phone]  Fax [phone]

## 2020-08-21 RX ORDER — ACETAMINOPHEN 325 MG/1
650 TABLET ORAL ONCE
Status: COMPLETED | OUTPATIENT
Start: 2020-08-26 | End: 2020-08-26

## 2020-08-21 RX ORDER — DIPHENHYDRAMINE HYDROCHLORIDE 50 MG/ML
25 INJECTION, SOLUTION INTRAMUSCULAR; INTRAVENOUS
Status: DISCONTINUED | OUTPATIENT
Start: 2020-08-26 | End: 2020-08-27 | Stop reason: HOSPADM

## 2020-08-21 RX ORDER — SODIUM CHLORIDE 9 MG/ML
25 INJECTION, SOLUTION INTRAVENOUS CONTINUOUS
Status: DISCONTINUED | OUTPATIENT
Start: 2020-08-26 | End: 2020-08-27 | Stop reason: HOSPADM

## 2020-08-21 RX ORDER — ACETAMINOPHEN 325 MG/1
650 TABLET ORAL
Status: DISCONTINUED | OUTPATIENT
Start: 2020-08-26 | End: 2020-08-27 | Stop reason: HOSPADM

## 2020-08-21 RX ORDER — DIPHENHYDRAMINE HYDROCHLORIDE 50 MG/ML
25 INJECTION, SOLUTION INTRAMUSCULAR; INTRAVENOUS
Status: DISCONTINUED | OUTPATIENT
Start: 2020-08-26 | End: 2020-08-21 | Stop reason: SDUPTHER

## 2020-08-21 RX ORDER — DIPHENHYDRAMINE HCL 25 MG
25 CAPSULE ORAL ONCE
Status: COMPLETED | OUTPATIENT
Start: 2020-08-26 | End: 2020-08-26

## 2020-08-26 ENCOUNTER — HOSPITAL ENCOUNTER (OUTPATIENT)
Dept: INFUSION THERAPY | Age: 50
Discharge: HOME | End: 2020-08-26
Payer: COMMERCIAL

## 2020-08-26 VITALS
TEMPERATURE: 97.1 F | DIASTOLIC BLOOD PRESSURE: 92 MMHG | SYSTOLIC BLOOD PRESSURE: 150 MMHG | RESPIRATION RATE: 16 BRPM | HEART RATE: 90 BPM

## 2020-08-26 LAB
ALBUMIN SERPL-MCNC: 3.4 G/DL (ref 3.5–5)
ALBUMIN/GLOB SERPL: 1 {RATIO} (ref 1.1–2.2)
ALP SERPL-CCNC: 78 U/L (ref 45–117)
ALT SERPL-CCNC: 24 U/L (ref 12–78)
ANION GAP SERPL CALC-SCNC: 10 MMOL/L (ref 5–15)
AST SERPL-CCNC: 14 U/L (ref 15–37)
BASOPHILS # BLD: 0 K/UL (ref 0–0.1)
BASOPHILS NFR BLD: 1 % (ref 0–1)
BILIRUB SERPL-MCNC: 0.3 MG/DL (ref 0.2–1)
BUN SERPL-MCNC: 73 MG/DL (ref 6–20)
BUN/CREAT SERPL: 15 (ref 12–20)
CALCIUM SERPL-MCNC: 8.3 MG/DL (ref 8.5–10.1)
CHLORIDE SERPL-SCNC: 113 MMOL/L (ref 97–108)
CO2 SERPL-SCNC: 17 MMOL/L (ref 21–32)
CREAT SERPL-MCNC: 4.84 MG/DL (ref 0.7–1.3)
DIFFERENTIAL METHOD BLD: ABNORMAL
EOSINOPHIL # BLD: 0.7 K/UL (ref 0–0.4)
EOSINOPHIL NFR BLD: 12 % (ref 0–7)
ERYTHROCYTE [DISTWIDTH] IN BLOOD BY AUTOMATED COUNT: 13.6 % (ref 11.5–14.5)
ERYTHROCYTE [SEDIMENTATION RATE] IN BLOOD: 22 MM/HR (ref 0–15)
GLOBULIN SER CALC-MCNC: 3.5 G/DL (ref 2–4)
GLUCOSE SERPL-MCNC: 87 MG/DL (ref 65–100)
HCT VFR BLD AUTO: 35.8 % (ref 36.6–50.3)
HGB BLD-MCNC: 10.8 G/DL (ref 12.1–17)
IMM GRANULOCYTES # BLD AUTO: 0 K/UL (ref 0–0.04)
IMM GRANULOCYTES NFR BLD AUTO: 0 % (ref 0–0.5)
LYMPHOCYTES # BLD: 1.4 K/UL (ref 0.8–3.5)
LYMPHOCYTES NFR BLD: 25 % (ref 12–49)
MCH RBC QN AUTO: 28.9 PG (ref 26–34)
MCHC RBC AUTO-ENTMCNC: 30.2 G/DL (ref 30–36.5)
MCV RBC AUTO: 95.7 FL (ref 80–99)
MONOCYTES # BLD: 0.5 K/UL (ref 0–1)
MONOCYTES NFR BLD: 8 % (ref 5–13)
NEUTS SEG # BLD: 3.1 K/UL (ref 1.8–8)
NEUTS SEG NFR BLD: 54 % (ref 32–75)
NRBC # BLD: 0 K/UL (ref 0–0.01)
NRBC BLD-RTO: 0 PER 100 WBC
PLATELET # BLD AUTO: 250 K/UL (ref 150–400)
PMV BLD AUTO: 10.5 FL (ref 8.9–12.9)
POTASSIUM SERPL-SCNC: 4.3 MMOL/L (ref 3.5–5.1)
PROT SERPL-MCNC: 6.9 G/DL (ref 6.4–8.2)
RBC # BLD AUTO: 3.74 M/UL (ref 4.1–5.7)
SODIUM SERPL-SCNC: 140 MMOL/L (ref 136–145)
URATE SERPL-MCNC: 9.8 MG/DL (ref 3.5–7.2)
WBC # BLD AUTO: 5.8 K/UL (ref 4.1–11.1)

## 2020-08-26 PROCEDURE — 84550 ASSAY OF BLOOD/URIC ACID: CPT

## 2020-08-26 PROCEDURE — 74011250637 HC RX REV CODE- 250/637: Performed by: INTERNAL MEDICINE

## 2020-08-26 PROCEDURE — 74011000258 HC RX REV CODE- 258: Performed by: INTERNAL MEDICINE

## 2020-08-26 PROCEDURE — 80053 COMPREHEN METABOLIC PANEL: CPT

## 2020-08-26 PROCEDURE — 96365 THER/PROPH/DIAG IV INF INIT: CPT

## 2020-08-26 PROCEDURE — 96366 THER/PROPH/DIAG IV INF ADDON: CPT

## 2020-08-26 PROCEDURE — 85025 COMPLETE CBC W/AUTO DIFF WBC: CPT

## 2020-08-26 PROCEDURE — 74011250636 HC RX REV CODE- 250/636: Performed by: INTERNAL MEDICINE

## 2020-08-26 PROCEDURE — 74011000250 HC RX REV CODE- 250: Performed by: INTERNAL MEDICINE

## 2020-08-26 PROCEDURE — 36415 COLL VENOUS BLD VENIPUNCTURE: CPT

## 2020-08-26 PROCEDURE — 96375 TX/PRO/DX INJ NEW DRUG ADDON: CPT

## 2020-08-26 PROCEDURE — 85652 RBC SED RATE AUTOMATED: CPT

## 2020-08-26 RX ORDER — SODIUM CHLORIDE 0.9 % (FLUSH) 0.9 %
5-10 SYRINGE (ML) INJECTION AS NEEDED
Status: DISCONTINUED | OUTPATIENT
Start: 2020-08-26 | End: 2020-08-27 | Stop reason: HOSPADM

## 2020-08-26 RX ADMIN — SODIUM CHLORIDE 25 ML/HR: 900 INJECTION, SOLUTION INTRAVENOUS at 15:08

## 2020-08-26 RX ADMIN — Medication 10 ML: at 13:18

## 2020-08-26 RX ADMIN — METHYLPREDNISOLONE SODIUM SUCCINATE 30 MG: 40 INJECTION, POWDER, FOR SOLUTION INTRAMUSCULAR; INTRAVENOUS at 15:03

## 2020-08-26 RX ADMIN — PEGLOTICASE 8 MG: 8 INJECTION, SOLUTION INTRAVENOUS at 15:23

## 2020-08-26 RX ADMIN — ACETAMINOPHEN 650 MG: 325 TABLET ORAL at 15:03

## 2020-08-26 RX ADMIN — FAMOTIDINE 20 MG: 10 INJECTION, SOLUTION INTRAVENOUS at 15:04

## 2020-08-26 RX ADMIN — DIPHENHYDRAMINE HYDROCHLORIDE 25 MG: 25 CAPSULE ORAL at 15:03

## 2020-08-26 NOTE — PROGRESS NOTES
[facility] Short Note                       Date: 2020    Name: Julianna Hillman    MRN: [id number]         : 1970      1315 Pt admit to [facility] for [address] ambulatory in stable condition. Assessment completed. No concerns voiced. Pt states he hasn't been coming to [facility] for Krystexxa infusions for a \"few reasons\" he was hospitalized he states for a while and just got thrown off. But further advises, \"when I did get the infusion my swelling went down\". Spoke with Merrill Lucio, Luis EduardoD who advised we would need to wait for Uric Acid level today and next infusion before proceeding. Peripheral IV established with positive blood return. Labs drawn and sent for processing. Line flushed and capped. Mr. Tan Avila vitals were reviewed prior to and after treatment. Patient Vitals for the past 12 hrs:   Temp Pulse Resp BP   20 1733 -- 90 16 (!) 150/92   20 1315 97.1 °F (36.2 °C) 83 16 118/82     Lab results were obtained and reviewed. Line connected to NS at [facility]. Recent Results (from the past 12 hour(s))   CBC WITH AUTOMATED DIFF    Collection Time: 20  1:15 PM   Result Value Ref Range    WBC 5.8 4.1 - 11.1 K/uL    RBC 3.74 (L) 4.10 - 5.70 M/uL    HGB 10.8 (L) 12.1 - 17.0 g/dL    HCT 35.8 (L) 36.6 - 50.3 %    MCV 95.7 80.0 - 99.0 FL    MCH 28.9 26.0 - 34.0 PG    MCHC 30.2 30.0 - 36.5 g/dL    RDW 13.6 11.5 - 14.5 %    PLATELET 601 396 - 988 K/uL    MPV 10.5 8.9 - 12.9 FL    NRBC 0.0 0  WBC    ABSOLUTE NRBC 0.00 0.00 - 0.01 K/uL    NEUTROPHILS 54 32 - 75 %    LYMPHOCYTES 25 12 - 49 %    MONOCYTES 8 5 - 13 %    EOSINOPHILS 12 (H) 0 - 7 %    BASOPHILS 1 0 - 1 %    IMMATURE GRANULOCYTES 0 0.0 - 0.5 %    ABS. NEUTROPHILS 3.1 1.8 - 8.0 K/UL    ABS. LYMPHOCYTES 1.4 0.8 - 3.5 K/UL    ABS. MONOCYTES 0.5 0.0 - 1.0 K/UL    ABS. EOSINOPHILS 0.7 (H) 0.0 - 0.4 K/UL    ABS. BASOPHILS 0.0 0.0 - 0.1 K/UL    ABS. IMM.  GRANS. 0.0 0.00 - 0.04 K/UL    DF AUTOMATED     METABOLIC PANEL, COMPREHENSIVE Collection Time: 08/26/20  1:15 PM   Result Value Ref Range    Sodium 140 136 - 145 mmol/L    Potassium 4.3 3.5 - 5.1 mmol/L    Chloride 113 (H) 97 - 108 mmol/L    CO2 17 (L) 21 - 32 mmol/L    Anion gap 10 5 - 15 mmol/L    Glucose 87 65 - 100 mg/dL    BUN 73 (H) 6 - 20 MG/DL    Creatinine 4.84 (H) 0.70 - 1.30 MG/DL    BUN/Creatinine ratio 15 12 - 20      GFR est AA 16 (L) >60 ml/min/1.73m2    GFR est non-AA 13 (L) >60 ml/min/1.73m2    Calcium 8.3 (L) 8.5 - 10.1 MG/DL    Bilirubin, total 0.3 0.2 - 1.0 MG/DL    ALT (SGPT) 24 12 - 78 U/L    AST (SGOT) 14 (L) 15 - 37 U/L    Alk.  phosphatase 78 45 - 117 U/L    Protein, total 6.9 6.4 - 8.2 g/dL    Albumin 3.4 (L) 3.5 - 5.0 g/dL    Globulin 3.5 2.0 - 4.0 g/dL    A-G Ratio 1.0 (L) 1.1 - 2.2     SED RATE (ESR)    Collection Time: 08/26/20  1:15 PM   Result Value Ref Range    Sed rate, automated 22 (H) 0 - 15 mm/hr   URIC ACID    Collection Time: 08/26/20  1:15 PM   Result Value Ref Range    Uric acid 9.8 (H) 3.5 - 7.2 MG/DL     Medications given:   Medications Administered     0.9% sodium chloride infusion     Admin Date  08/26/2020 Action  New Bag Dose  25 mL/hr Rate  25 mL/hr Route  IntraVENous Administered By  Ria Brunner RN          acetaminophen (TYLENOL) tablet 650 mg     Admin Date  08/26/2020 Action  Given Dose  650 mg Route  Oral Administered By  Ria Brunner RN          diphenhydrAMINE (BENADRYL) capsule 25 mg     Admin Date  08/26/2020 Action  Given Dose  25 mg Route  Oral Administered By  Ria Brunner RN          famotidine (PF) (PEPCID) 20 mg in 0.9% sodium chloride 10 mL injection     Admin Date  08/26/2020 Action  Given Dose  20 mg Route  IntraVENous Administered By  Ria Brunner RN          methylPREDNISolone (PF) (SOLU-MEDROL) injection 30 mg     Admin Date  08/26/2020 Action  Given Dose  30 mg Route  IntraVENous Administered By  Ria Brunner RN          pegloticase (KRYSTEXXA) 8 mg, overfill volume 25 mL in 0.9% sodium chloride 250 mL infusion     Admin Date  08/26/2020 Action  Given Dose  8 mg Rate  138 mL/hr Route  IntraVENous Administered By  Sean Marley RN          sodium chloride (NS) flush 5-10 mL     Admin Date  08/26/2020 Action  Given Dose  10 mL Route  IntraVENous Administered By  Sean Marley RN              PIV maintained positive blood return. Line flushed and removed per protocol. Mr. Petar Cotter tolerated the infusion, had no complaints, and was discharged from [place] [id number] in stable condition at 1735.      Future Appointments   Date Time Provider Barry Cano   11/17/2020  1:40 PM Vitaliy Mathias MD [facility] BS MARILOU Rucker RN  August 26, 2020  7:01 PM

## 2020-09-03 RX ORDER — ACETAMINOPHEN 325 MG/1
650 TABLET ORAL
Status: DISCONTINUED | OUTPATIENT
Start: 2020-09-09 | End: 2020-09-10 | Stop reason: HOSPADM

## 2020-09-03 RX ORDER — DIPHENHYDRAMINE HCL 25 MG
25 CAPSULE ORAL ONCE
Status: COMPLETED | OUTPATIENT
Start: 2020-09-09 | End: 2020-09-09

## 2020-09-03 RX ORDER — DIPHENHYDRAMINE HYDROCHLORIDE 50 MG/ML
25 INJECTION, SOLUTION INTRAMUSCULAR; INTRAVENOUS
Status: DISCONTINUED | OUTPATIENT
Start: 2020-09-09 | End: 2020-09-10 | Stop reason: HOSPADM

## 2020-09-03 RX ORDER — ACETAMINOPHEN 325 MG/1
650 TABLET ORAL ONCE
Status: COMPLETED | OUTPATIENT
Start: 2020-09-09 | End: 2020-09-09

## 2020-09-03 RX ORDER — SODIUM CHLORIDE 9 MG/ML
25 INJECTION, SOLUTION INTRAVENOUS CONTINUOUS
Status: DISCONTINUED | OUTPATIENT
Start: 2020-09-09 | End: 2020-09-10 | Stop reason: HOSPADM

## 2020-09-09 ENCOUNTER — HOSPITAL ENCOUNTER (OUTPATIENT)
Dept: INFUSION THERAPY | Age: 50
Discharge: HOME | End: 2020-09-09
Payer: COMMERCIAL

## 2020-09-09 VITALS
TEMPERATURE: 97.1 F | SYSTOLIC BLOOD PRESSURE: 114 MMHG | HEART RATE: 73 BPM | DIASTOLIC BLOOD PRESSURE: 75 MMHG | RESPIRATION RATE: 18 BRPM

## 2020-09-09 LAB
ALBUMIN SERPL-MCNC: 3.4 G/DL (ref 3.5–5)
ALBUMIN/GLOB SERPL: 1.1 {RATIO} (ref 1.1–2.2)
ALP SERPL-CCNC: 77 U/L (ref 45–117)
ALT SERPL-CCNC: 30 U/L (ref 12–78)
ANION GAP SERPL CALC-SCNC: 10 MMOL/L (ref 5–15)
AST SERPL-CCNC: 21 U/L (ref 15–37)
BASOPHILS # BLD: 0.1 K/UL (ref 0–0.1)
BASOPHILS NFR BLD: 1 % (ref 0–1)
BILIRUB SERPL-MCNC: 0.4 MG/DL (ref 0.2–1)
BUN SERPL-MCNC: 95 MG/DL (ref 6–20)
BUN/CREAT SERPL: 17 (ref 12–20)
CALCIUM SERPL-MCNC: 8.4 MG/DL (ref 8.5–10.1)
CHLORIDE SERPL-SCNC: 111 MMOL/L (ref 97–108)
CO2 SERPL-SCNC: 19 MMOL/L (ref 21–32)
CREAT SERPL-MCNC: 5.5 MG/DL (ref 0.7–1.3)
DIFFERENTIAL METHOD BLD: ABNORMAL
EOSINOPHIL # BLD: 0.6 K/UL (ref 0–0.4)
EOSINOPHIL NFR BLD: 11 % (ref 0–7)
ERYTHROCYTE [DISTWIDTH] IN BLOOD BY AUTOMATED COUNT: 13.4 % (ref 11.5–14.5)
ERYTHROCYTE [SEDIMENTATION RATE] IN BLOOD: 16 MM/HR (ref 0–15)
GLOBULIN SER CALC-MCNC: 3.2 G/DL (ref 2–4)
GLUCOSE SERPL-MCNC: 95 MG/DL (ref 65–100)
HCT VFR BLD AUTO: 33.2 % (ref 36.6–50.3)
HGB BLD-MCNC: 10 G/DL (ref 12.1–17)
IMM GRANULOCYTES # BLD AUTO: 0 K/UL (ref 0–0.04)
IMM GRANULOCYTES NFR BLD AUTO: 0 % (ref 0–0.5)
LYMPHOCYTES # BLD: 1.7 K/UL (ref 0.8–3.5)
LYMPHOCYTES NFR BLD: 30 % (ref 12–49)
MCH RBC QN AUTO: 29 PG (ref 26–34)
MCHC RBC AUTO-ENTMCNC: 30.1 G/DL (ref 30–36.5)
MCV RBC AUTO: 96.2 FL (ref 80–99)
MONOCYTES # BLD: 0.4 K/UL (ref 0–1)
MONOCYTES NFR BLD: 7 % (ref 5–13)
NEUTS SEG # BLD: 2.8 K/UL (ref 1.8–8)
NEUTS SEG NFR BLD: 51 % (ref 32–75)
NRBC # BLD: 0 K/UL (ref 0–0.01)
NRBC BLD-RTO: 0 PER 100 WBC
PLATELET # BLD AUTO: 222 K/UL (ref 150–400)
PMV BLD AUTO: 10.8 FL (ref 8.9–12.9)
POTASSIUM SERPL-SCNC: 3.9 MMOL/L (ref 3.5–5.1)
PROT SERPL-MCNC: 6.6 G/DL (ref 6.4–8.2)
RBC # BLD AUTO: 3.45 M/UL (ref 4.1–5.7)
RBC MORPH BLD: ABNORMAL
SODIUM SERPL-SCNC: 140 MMOL/L (ref 136–145)
URATE SERPL-MCNC: 6.2 MG/DL (ref 3.5–7.2)
WBC # BLD AUTO: 5.6 K/UL (ref 4.1–11.1)

## 2020-09-09 PROCEDURE — 74011000250 HC RX REV CODE- 250: Performed by: INTERNAL MEDICINE

## 2020-09-09 PROCEDURE — 85025 COMPLETE CBC W/AUTO DIFF WBC: CPT

## 2020-09-09 PROCEDURE — 84550 ASSAY OF BLOOD/URIC ACID: CPT

## 2020-09-09 PROCEDURE — 74011250636 HC RX REV CODE- 250/636: Performed by: INTERNAL MEDICINE

## 2020-09-09 PROCEDURE — 96365 THER/PROPH/DIAG IV INF INIT: CPT

## 2020-09-09 PROCEDURE — 85652 RBC SED RATE AUTOMATED: CPT

## 2020-09-09 PROCEDURE — 36415 COLL VENOUS BLD VENIPUNCTURE: CPT

## 2020-09-09 PROCEDURE — 74011250637 HC RX REV CODE- 250/637: Performed by: INTERNAL MEDICINE

## 2020-09-09 PROCEDURE — 80053 COMPREHEN METABOLIC PANEL: CPT

## 2020-09-09 PROCEDURE — 96366 THER/PROPH/DIAG IV INF ADDON: CPT

## 2020-09-09 PROCEDURE — 96375 TX/PRO/DX INJ NEW DRUG ADDON: CPT

## 2020-09-09 RX ORDER — ACETAMINOPHEN 325 MG/1
TABLET ORAL
Status: DISCONTINUED
Start: 2020-09-09 | End: 2020-09-10 | Stop reason: HOSPADM

## 2020-09-09 RX ORDER — DIPHENHYDRAMINE HCL 25 MG
CAPSULE ORAL
Status: DISCONTINUED
Start: 2020-09-09 | End: 2020-09-10 | Stop reason: HOSPADM

## 2020-09-09 RX ORDER — SODIUM CHLORIDE 9 MG/ML
INJECTION INTRAMUSCULAR; INTRAVENOUS; SUBCUTANEOUS
Status: DISCONTINUED
Start: 2020-09-09 | End: 2020-09-10 | Stop reason: HOSPADM

## 2020-09-09 RX ORDER — FAMOTIDINE 10 MG/ML
INJECTION INTRAVENOUS
Status: DISCONTINUED
Start: 2020-09-09 | End: 2020-09-10 | Stop reason: HOSPADM

## 2020-09-09 RX ADMIN — FAMOTIDINE 20 MG: 10 INJECTION INTRAVENOUS at 15:37

## 2020-09-09 RX ADMIN — ACETAMINOPHEN 650 MG: 325 TABLET ORAL at 15:35

## 2020-09-09 RX ADMIN — SODIUM CHLORIDE 25 ML/HR: 900 INJECTION, SOLUTION INTRAVENOUS at 15:34

## 2020-09-09 RX ADMIN — PEGLOTICASE 8 MG: 8 INJECTION, SOLUTION INTRAVENOUS at 16:47

## 2020-09-09 RX ADMIN — METHYLPREDNISOLONE SODIUM SUCCINATE 30 MG: 40 INJECTION, POWDER, FOR SOLUTION INTRAMUSCULAR; INTRAVENOUS at 15:44

## 2020-09-09 RX ADMIN — DIPHENHYDRAMINE HYDROCHLORIDE 25 MG: 25 CAPSULE ORAL at 15:35

## 2020-09-09 NOTE — PROGRESS NOTES
[facility] VISIT NOTE    1410  Pt arrived at [facility] ambulatory and in no distress for Krystexxa infusion. Assessment completed, no new complaints at this time. PIV started with no difficulty in left forearm. Positive blood return noted and labs drawn. Recent Results (from the past 12 hour(s))   CBC WITH AUTOMATED DIFF    Collection Time: 09/09/20  2:26 PM   Result Value Ref Range    WBC 5.6 4.1 - 11.1 K/uL    RBC 3.45 (L) 4.10 - 5.70 M/uL    HGB 10.0 (L) 12.1 - 17.0 g/dL    HCT 33.2 (L) 36.6 - 50.3 %    MCV 96.2 80.0 - 99.0 FL    MCH 29.0 26.0 - 34.0 PG    MCHC 30.1 30.0 - 36.5 g/dL    RDW 13.4 11.5 - 14.5 %    PLATELET 421 673 - 569 K/uL    MPV 10.8 8.9 - 12.9 FL    NRBC 0.0 0  WBC    ABSOLUTE NRBC 0.00 0.00 - 0.01 K/uL    NEUTROPHILS 51 32 - 75 %    LYMPHOCYTES 30 12 - 49 %    MONOCYTES 7 5 - 13 %    EOSINOPHILS 11 (H) 0 - 7 %    BASOPHILS 1 0 - 1 %    IMMATURE GRANULOCYTES 0 0.0 - 0.5 %    ABS. NEUTROPHILS 2.8 1.8 - 8.0 K/UL    ABS. LYMPHOCYTES 1.7 0.8 - 3.5 K/UL    ABS. MONOCYTES 0.4 0.0 - 1.0 K/UL    ABS. EOSINOPHILS 0.6 (H) 0.0 - 0.4 K/UL    ABS. BASOPHILS 0.1 0.0 - 0.1 K/UL    ABS. IMM. GRANS. 0.0 0.00 - 0.04 K/UL    DF SMEAR SCANNED      RBC COMMENTS NORMOCYTIC, NORMOCHROMIC     METABOLIC PANEL, COMPREHENSIVE    Collection Time: 09/09/20  2:26 PM   Result Value Ref Range    Sodium 140 136 - 145 mmol/L    Potassium 3.9 3.5 - 5.1 mmol/L    Chloride 111 (H) 97 - 108 mmol/L    CO2 19 (L) 21 - 32 mmol/L    Anion gap 10 5 - 15 mmol/L    Glucose 95 65 - 100 mg/dL    BUN 95 (H) 6 - 20 MG/DL    Creatinine 5.50 (H) 0.70 - 1.30 MG/DL    BUN/Creatinine ratio 17 12 - 20      GFR est AA 14 (L) >60 ml/min/1.73m2    GFR est non-AA 11 (L) >60 ml/min/1.73m2    Calcium 8.4 (L) 8.5 - 10.1 MG/DL    Bilirubin, total 0.4 0.2 - 1.0 MG/DL    ALT (SGPT) 30 12 - 78 U/L    AST (SGOT) 21 15 - 37 U/L    Alk.  phosphatase 77 45 - 117 U/L    Protein, total 6.6 6.4 - 8.2 g/dL    Albumin 3.4 (L) 3.5 - 5.0 g/dL    Globulin 3.2 2.0 - 4.0 g/dL    A-G Ratio 1.1 1.1 - 2.2     URIC ACID    Collection Time: 09/09/20  2:26 PM   Result Value Ref Range    Uric acid 6.2 3.5 - 7.2 MG/DL   SED RATE (ESR)    Collection Time: 09/09/20  2:26 PM   Result Value Ref Range    Sed rate, automated 16 (H) 0 - 15 mm/hr     Medications received:  NS at [facility]  Tylenol PO  Benadryl PO  Pepcid IVP  Solu-Medrol IVP  Krystexxa IV    Patient Vitals for the past 12 hrs:   Temp Pulse Resp BP   09/09/20 1847 97.1 °F (36.2 °C) 73 18 114/75   09/09/20 1732 97.1 °F (36.2 °C) 67 18 102/69   09/09/20 1411 (!) 96.4 °F (35.8 °C) 88 18 104/77     Tolerated treatment well, no adverse reaction noted. Pt declined 1 hour observation. PIV removed per protocol. 1900  D/C'd from [facility] ambulatory and in no distress. Next appointment is 9/23/20.

## 2020-09-09 NOTE — PROGRESS NOTES
The results were reviewed. Uric acid dropped to 6.2 mg/dL from 9.8 mg/dL. Remaining labs are stable.

## 2020-09-18 RX ORDER — DIPHENHYDRAMINE HYDROCHLORIDE 50 MG/ML
25 INJECTION, SOLUTION INTRAMUSCULAR; INTRAVENOUS
Status: DISCONTINUED | OUTPATIENT
Start: 2020-09-23 | End: 2020-09-24 | Stop reason: HOSPADM

## 2020-09-18 RX ORDER — DIPHENHYDRAMINE HCL 25 MG
25 CAPSULE ORAL ONCE
Status: COMPLETED | OUTPATIENT
Start: 2020-09-23 | End: 2020-09-23

## 2020-09-18 RX ORDER — ACETAMINOPHEN 325 MG/1
650 TABLET ORAL ONCE
Status: COMPLETED | OUTPATIENT
Start: 2020-09-23 | End: 2020-09-23

## 2020-09-18 RX ORDER — SODIUM CHLORIDE 9 MG/ML
25 INJECTION, SOLUTION INTRAVENOUS CONTINUOUS
Status: DISCONTINUED | OUTPATIENT
Start: 2020-09-23 | End: 2020-09-24 | Stop reason: HOSPADM

## 2020-09-18 RX ORDER — ACETAMINOPHEN 325 MG/1
650 TABLET ORAL
Status: DISCONTINUED | OUTPATIENT
Start: 2020-09-23 | End: 2020-09-24 | Stop reason: HOSPADM

## 2020-09-23 ENCOUNTER — HOSPITAL ENCOUNTER (OUTPATIENT)
Dept: INFUSION THERAPY | Age: 50
Discharge: HOME | End: 2020-09-23
Payer: COMMERCIAL

## 2020-09-23 VITALS
DIASTOLIC BLOOD PRESSURE: 94 MMHG | HEART RATE: 72 BPM | SYSTOLIC BLOOD PRESSURE: 133 MMHG | RESPIRATION RATE: 16 BRPM | TEMPERATURE: 98.5 F

## 2020-09-23 LAB
ALBUMIN SERPL-MCNC: 3.1 G/DL (ref 3.5–5)
ALBUMIN/GLOB SERPL: 0.9 {RATIO} (ref 1.1–2.2)
ALP SERPL-CCNC: 63 U/L (ref 45–117)
ALT SERPL-CCNC: 24 U/L (ref 12–78)
ANION GAP SERPL CALC-SCNC: 11 MMOL/L (ref 5–15)
AST SERPL-CCNC: 13 U/L (ref 15–37)
BASOPHILS # BLD: 0.1 K/UL (ref 0–0.1)
BASOPHILS NFR BLD: 1 % (ref 0–1)
BILIRUB SERPL-MCNC: 0.3 MG/DL (ref 0.2–1)
BUN SERPL-MCNC: 79 MG/DL (ref 6–20)
BUN/CREAT SERPL: 15 (ref 12–20)
CALCIUM SERPL-MCNC: 8.1 MG/DL (ref 8.5–10.1)
CHLORIDE SERPL-SCNC: 115 MMOL/L (ref 97–108)
CO2 SERPL-SCNC: 17 MMOL/L (ref 21–32)
CREAT SERPL-MCNC: 5.17 MG/DL (ref 0.7–1.3)
DIFFERENTIAL METHOD BLD: ABNORMAL
EOSINOPHIL # BLD: 0.6 K/UL (ref 0–0.4)
EOSINOPHIL NFR BLD: 10 % (ref 0–7)
ERYTHROCYTE [DISTWIDTH] IN BLOOD BY AUTOMATED COUNT: 13.6 % (ref 11.5–14.5)
ERYTHROCYTE [SEDIMENTATION RATE] IN BLOOD: 52 MM/HR (ref 0–15)
GLOBULIN SER CALC-MCNC: 3.5 G/DL (ref 2–4)
GLUCOSE SERPL-MCNC: 90 MG/DL (ref 65–100)
HCT VFR BLD AUTO: 30.4 % (ref 36.6–50.3)
HGB BLD-MCNC: 9 G/DL (ref 12.1–17)
IMM GRANULOCYTES # BLD AUTO: 0 K/UL (ref 0–0.04)
IMM GRANULOCYTES NFR BLD AUTO: 1 % (ref 0–0.5)
LYMPHOCYTES # BLD: 1 K/UL (ref 0.8–3.5)
LYMPHOCYTES NFR BLD: 17 % (ref 12–49)
MCH RBC QN AUTO: 28.8 PG (ref 26–34)
MCHC RBC AUTO-ENTMCNC: 29.6 G/DL (ref 30–36.5)
MCV RBC AUTO: 97.1 FL (ref 80–99)
MONOCYTES # BLD: 0.5 K/UL (ref 0–1)
MONOCYTES NFR BLD: 10 % (ref 5–13)
NEUTS SEG # BLD: 3.5 K/UL (ref 1.8–8)
NEUTS SEG NFR BLD: 61 % (ref 32–75)
NRBC # BLD: 0 K/UL (ref 0–0.01)
NRBC BLD-RTO: 0 PER 100 WBC
PLATELET # BLD AUTO: 317 K/UL (ref 150–400)
PMV BLD AUTO: 10.5 FL (ref 8.9–12.9)
POTASSIUM SERPL-SCNC: 5 MMOL/L (ref 3.5–5.1)
PROT SERPL-MCNC: 6.6 G/DL (ref 6.4–8.2)
RBC # BLD AUTO: 3.13 M/UL (ref 4.1–5.7)
SODIUM SERPL-SCNC: 143 MMOL/L (ref 136–145)
URATE SERPL-MCNC: 9.1 MG/DL (ref 3.5–7.2)
WBC # BLD AUTO: 5.7 K/UL (ref 4.1–11.1)

## 2020-09-23 PROCEDURE — 74011250637 HC RX REV CODE- 250/637: Performed by: INTERNAL MEDICINE

## 2020-09-23 PROCEDURE — 96365 THER/PROPH/DIAG IV INF INIT: CPT

## 2020-09-23 PROCEDURE — 84550 ASSAY OF BLOOD/URIC ACID: CPT

## 2020-09-23 PROCEDURE — 74011000250 HC RX REV CODE- 250: Performed by: INTERNAL MEDICINE

## 2020-09-23 PROCEDURE — 85025 COMPLETE CBC W/AUTO DIFF WBC: CPT

## 2020-09-23 PROCEDURE — 80053 COMPREHEN METABOLIC PANEL: CPT

## 2020-09-23 PROCEDURE — 85652 RBC SED RATE AUTOMATED: CPT

## 2020-09-23 PROCEDURE — 74011250636 HC RX REV CODE- 250/636: Performed by: INTERNAL MEDICINE

## 2020-09-23 PROCEDURE — 96375 TX/PRO/DX INJ NEW DRUG ADDON: CPT

## 2020-09-23 PROCEDURE — 36415 COLL VENOUS BLD VENIPUNCTURE: CPT

## 2020-09-23 PROCEDURE — 96366 THER/PROPH/DIAG IV INF ADDON: CPT

## 2020-09-23 RX ADMIN — SODIUM CHLORIDE 20 MG: 9 INJECTION INTRAMUSCULAR; INTRAVENOUS; SUBCUTANEOUS at 16:45

## 2020-09-23 RX ADMIN — DIPHENHYDRAMINE HYDROCHLORIDE 25 MG: 25 CAPSULE ORAL at 16:42

## 2020-09-23 RX ADMIN — METHYLPREDNISOLONE SODIUM SUCCINATE 30 MG: 40 INJECTION, POWDER, FOR SOLUTION INTRAMUSCULAR; INTRAVENOUS at 16:48

## 2020-09-23 RX ADMIN — ACETAMINOPHEN 650 MG: 325 TABLET ORAL at 16:42

## 2020-09-23 RX ADMIN — SODIUM CHLORIDE 25 ML/HR: 900 INJECTION, SOLUTION INTRAVENOUS at 16:40

## 2020-09-23 RX ADMIN — PEGLOTICASE 8 MG: 8 INJECTION, SOLUTION INTRAVENOUS at 17:06

## 2020-09-23 NOTE — PROGRESS NOTES
OPIC Short Note                   1400 Pt admit to [facility] for [address] ambulatory in stable condition. Assessment completed. No new concerns voiced. Please review pending lab results in [facility]. PIV established in [facility] with good blood return. Labs collected and sent for processing. Uric acid level is elevated - Dr. Ollie Knox notified and OK to proceed with today's tx. Mr. Dang Prior vitals were reviewed prior to and after treatment. Patient Vitals for the past 12 hrs:   Temp Pulse Resp BP   09/23/20 1914 -- 72 -- (!) 133/94   09/23/20 1404 98.5 °F (36.9 °C) 84 16 106/69         Lab results were obtained and reviewed. Recent Results (from the past 12 hour(s))   CBC WITH AUTOMATED DIFF    Collection Time: 09/23/20  2:07 PM   Result Value Ref Range    WBC 5.7 4.1 - 11.1 K/uL    RBC 3.13 (L) 4.10 - 5.70 M/uL    HGB 9.0 (L) 12.1 - 17.0 g/dL    HCT 30.4 (L) 36.6 - 50.3 %    MCV 97.1 80.0 - 99.0 FL    MCH 28.8 26.0 - 34.0 PG    MCHC 29.6 (L) 30.0 - 36.5 g/dL    RDW 13.6 11.5 - 14.5 %    PLATELET 591 981 - 368 K/uL    MPV 10.5 8.9 - 12.9 FL    NRBC 0.0 0  WBC    ABSOLUTE NRBC 0.00 0.00 - 0.01 K/uL    NEUTROPHILS 61 32 - 75 %    LYMPHOCYTES 17 12 - 49 %    MONOCYTES 10 5 - 13 %    EOSINOPHILS 10 (H) 0 - 7 %    BASOPHILS 1 0 - 1 %    IMMATURE GRANULOCYTES 1 (H) 0.0 - 0.5 %    ABS. NEUTROPHILS 3.5 1.8 - 8.0 K/UL    ABS. LYMPHOCYTES 1.0 0.8 - 3.5 K/UL    ABS. MONOCYTES 0.5 0.0 - 1.0 K/UL    ABS. EOSINOPHILS 0.6 (H) 0.0 - 0.4 K/UL    ABS. BASOPHILS 0.1 0.0 - 0.1 K/UL    ABS. IMM.  GRANS. 0.0 0.00 - 0.04 K/UL    DF AUTOMATED     METABOLIC PANEL, COMPREHENSIVE    Collection Time: 09/23/20  2:07 PM   Result Value Ref Range    Sodium 143 136 - 145 mmol/L    Potassium 5.0 3.5 - 5.1 mmol/L    Chloride 115 (H) 97 - 108 mmol/L    CO2 17 (L) 21 - 32 mmol/L    Anion gap 11 5 - 15 mmol/L    Glucose 90 65 - 100 mg/dL    BUN 79 (H) 6 - 20 MG/DL    Creatinine 5.17 (H) 0.70 - 1.30 MG/DL    BUN/Creatinine ratio 15 12 - 20      GFR est AA 15 (L) >60 ml/min/1.73m2    GFR est non-AA 12 (L) >60 ml/min/1.73m2    Calcium 8.1 (L) 8.5 - 10.1 MG/DL    Bilirubin, total 0.3 0.2 - 1.0 MG/DL    ALT (SGPT) 24 12 - 78 U/L    AST (SGOT) 13 (L) 15 - 37 U/L    Alk. phosphatase 63 45 - 117 U/L    Protein, total 6.6 6.4 - 8.2 g/dL    Albumin 3.1 (L) 3.5 - 5.0 g/dL    Globulin 3.5 2.0 - 4.0 g/dL    A-G Ratio 0.9 (L) 1.1 - 2.2     SED RATE (ESR)    Collection Time: 09/23/20  2:07 PM   Result Value Ref Range    Sed rate, automated 52 (H) 0 - 15 mm/hr   URIC ACID    Collection Time: 09/23/20  2:07 PM   Result Value Ref Range    Uric acid 9.1 (H) 3.5 - 7.2 MG/DL       Medications Administered     0.9% sodium chloride infusion     Admin Date  09/23/2020 Action  New Bag Dose  25 mL/hr Rate  25 mL/hr Route  IntraVENous Administered By  Andrea Johnson RN          acetaminophen (TYLENOL) tablet 650 mg     Admin Date  09/23/2020 Action  Given Dose  650 mg Route  Oral Administered By  Andrea Johnson RN          diphenhydrAMINE (BENADRYL) capsule 25 mg     Admin Date  09/23/2020 Action  Given Dose  25 mg Route  Oral Administered By  Andrea Johnson RN          famotidine (PF) (PEPCID) 20 mg in 0.9% sodium chloride 10 mL injection     Admin Date  09/23/2020 Action  Given Dose  20 mg Route  IntraVENous Administered By  Andrea Johnson RN          methylPREDNISolone (PF) (SOLU-MEDROL) injection 30 mg     Admin Date  09/23/2020 Action  Given Dose  30 mg Route  IntraVENous Administered By  Andrea Johnson RN          [facility]) 8 mg, overfill volume 25 mL in 0.9% sodium chloride 250 mL infusion     Admin Date  09/23/2020 Action  Given Dose  8 mg Rate  138 mL/hr Route  IntraVENous Administered By  Sheldon Rose RN              Mr. Danny Dan tolerated the infusion, and had no complaints PIV removed without difficulty. Mr. Danny Dan was discharged from [place] [address] in stable condition at [address] [place] [address].      Future Appointments   Date Time Provider Barry Cano 10/7/2020  2:00 PM A3 [facility] [address] [place] [address]   10/21/2020  2:00 PM A[facility] [address] [place] [address]   11/17/2020  1:40 PM Sarai Hidalgo MD AOCR BS AMB       Jeoffrey Cooks, RN  September 23, 2020  7:15 PM

## 2020-09-24 DIAGNOSIS — M1A.39X1 CHRONIC TOPHACEOUS GOUT OF MULTIPLE SITES DUE TO RENAL IMPAIRMENT: ICD-10-CM

## 2020-09-24 NOTE — PATIENT INSTRUCTIONS
STOP CHLORTHALIDONE    STOP ALLOPURINOL    ARAVA (leflunomide)    I prescribed you Arava (leflunomide) 20 mg tablets. Please start with half a tablet (10 mg) daily for 7 days and if you have no side effects, such as diarrhea or upset stomach, please increase to one full tablet daily (20 mg). Potential Adverse Affects    - Nausea  - Vomiting  - Upset stomach  - Diarrhea  - Oral ulcers  - Hair thinning  - Infection  - Liver function abnormalities  - Blood count abnormalities  - Numbness and tingling  - Burning sensation  - Itchiness  - Rash    Medication Monitoring    You will need routine blood counts and kidney and liver testing as a measure of monitoring for long term use of immunosuppressants. Things You Should Do    You should avoid ill contacts     You should get annual influenza vaccines and the pneumonia vaccines. You should apply SPF 50 sunscreen when out in the sun. You should avoid alcohol as it may hasten hepatotoxicity. You should avoid pregnancy due to risk for birth defects. You should contact me if you are sick. You should hold leflunomide if you are sick and resume after your sickness resolves. If you have any problems or side effects with this medication, please contact me immediately to inform me. Leflunomide may take 4 to 12 weeks to be effective. Pegloticase (By injection)   Pegloticase (peg-JACKELIN-ti-frank)  Treats chronic gout. Brand Name(s): Krystexxa   There may be other brand names for this medicine. When This Medicine Should Not Be Used: This medicine is not right for everyone. You should not receive it if you had an allergic reaction to pegloticase. How to Use This Medicine:   Injectable  · Your doctor will prescribe your dose and schedule. This medicine is given through a needle placed in a vein. This medicine is usually given every 2 weeks. · A nurse or other health provider will give you this medicine.   · This medicine must be given slowly, so the needle will stay in place for 2 hours or longer. · This medicine should come with a Medication Guide. Ask your pharmacist for a copy if you do not have one. · Missed dose: You must use this medicine on a fixed schedule. Call your doctor or pharmacist if you miss a dose. Drugs and Foods to Avoid:   Ask your doctor or pharmacist before using any other medicine, including over-the-counter medicines, vitamins, and herbal products. · Tell your doctor if you use other medicines to lower uric acid levels, including allopurinol or febuxostat. Warnings While Using This Medicine:   · Tell your doctor if you are pregnant or breastfeeding or if you have congestive heart failure, or glucose-6-phosphate dehydrogenase (G6PD) deficiency or favism. · Tell your doctor if you have relatives from [place], [address] [place] [address] or  [address] [facility]. People with relatives from these areas are more likely to have a genetic G6PD deficiency. · Gout flares may occur in the first 3 months after you start receiving this medicine. Do not stop receiving this medicine even if you have a gout flare. Your doctor may give you medicines to reduce and prevent worsening of gout. · Your doctor will do lab tests at regular visits to check on the effects of this medicine. Keep all appointments.   Possible Side Effects While Using This Medicine:   Call your doctor right away if you notice any of these side effects:  · Allergic reaction: Itching or hives, swelling in your face or hands, swelling or tingling in your mouth or throat, chest tightness, trouble breathing  · Chest pain or uneven heartbeat, trouble breathing  · Swelling in your hands, ankles, or feet  · Warmth or redness in your face, neck, arms, or upper chest  · Worsening symptoms of gout  If you notice these less serious side effects, talk with your doctor:   · Bruises or purplish patches on the skin  · Nausea  · Pain, itching, burning, swelling, or a lump under your skin where the needle is placed  If you notice other side effects that you think are caused by this medicine, tell your doctor. Call your doctor for medical advice about side effects. You may report side effects to FDA at 4-210-BBD-1319  © 2017 [facility] Information is for End User's use only and may not be sold, redistributed or otherwise used for commercial purposes. The above information is an  only. It is not intended as medical advice for individual conditions or treatments. Talk to your doctor, nurse or pharmacist before following any medical regimen to see if it is safe and effective for you. electronic

## 2020-09-27 RX ORDER — LEFLUNOMIDE 20 MG/1
20 TABLET ORAL DAILY
Qty: 90 TAB | Refills: 5 | Status: SHIPPED | OUTPATIENT
Start: 2020-09-27 | End: 2021-06-21

## 2020-09-28 ENCOUNTER — HOSPITAL ENCOUNTER (OUTPATIENT)
Dept: INFUSION THERAPY | Age: 50
Discharge: HOME | End: 2020-09-28
Payer: COMMERCIAL

## 2020-09-28 VITALS
SYSTOLIC BLOOD PRESSURE: 137 MMHG | TEMPERATURE: 97.9 F | RESPIRATION RATE: 18 BRPM | DIASTOLIC BLOOD PRESSURE: 88 MMHG | HEART RATE: 71 BPM

## 2020-09-28 LAB
ALBUMIN SERPL-MCNC: 3 G/DL (ref 3.5–5)
ANION GAP SERPL CALC-SCNC: 7 MMOL/L (ref 5–15)
BASOPHILS # BLD: 0 K/UL (ref 0–0.1)
BASOPHILS NFR BLD: 1 % (ref 0–1)
BUN SERPL-MCNC: 73 MG/DL (ref 6–20)
BUN/CREAT SERPL: 15 (ref 12–20)
CALCIUM SERPL-MCNC: 8.6 MG/DL (ref 8.5–10.1)
CHLORIDE SERPL-SCNC: 112 MMOL/L (ref 97–108)
CO2 SERPL-SCNC: 18 MMOL/L (ref 21–32)
CREAT SERPL-MCNC: 4.81 MG/DL (ref 0.7–1.3)
DIFFERENTIAL METHOD BLD: ABNORMAL
EOSINOPHIL # BLD: 0.4 K/UL (ref 0–0.4)
EOSINOPHIL NFR BLD: 7 % (ref 0–7)
ERYTHROCYTE [DISTWIDTH] IN BLOOD BY AUTOMATED COUNT: 13.7 % (ref 11.5–14.5)
GLUCOSE SERPL-MCNC: 107 MG/DL (ref 65–100)
HCT VFR BLD AUTO: 29 % (ref 36.6–50.3)
HGB BLD-MCNC: 8.9 G/DL (ref 12.1–17)
IMM GRANULOCYTES # BLD AUTO: 0 K/UL (ref 0–0.04)
IMM GRANULOCYTES NFR BLD AUTO: 0 % (ref 0–0.5)
LYMPHOCYTES # BLD: 1.1 K/UL (ref 0.8–3.5)
LYMPHOCYTES NFR BLD: 20 % (ref 12–49)
MCH RBC QN AUTO: 29.5 PG (ref 26–34)
MCHC RBC AUTO-ENTMCNC: 30.7 G/DL (ref 30–36.5)
MCV RBC AUTO: 96 FL (ref 80–99)
MONOCYTES # BLD: 0.4 K/UL (ref 0–1)
MONOCYTES NFR BLD: 8 % (ref 5–13)
NEUTS SEG # BLD: 3.6 K/UL (ref 1.8–8)
NEUTS SEG NFR BLD: 64 % (ref 32–75)
NRBC # BLD: 0 K/UL (ref 0–0.01)
NRBC BLD-RTO: 0 PER 100 WBC
PHOSPHATE SERPL-MCNC: 4.2 MG/DL (ref 2.6–4.7)
PLATELET # BLD AUTO: 364 K/UL (ref 150–400)
PMV BLD AUTO: 11.3 FL (ref 8.9–12.9)
POTASSIUM SERPL-SCNC: 5.4 MMOL/L (ref 3.5–5.1)
RBC # BLD AUTO: 3.02 M/UL (ref 4.1–5.7)
SODIUM SERPL-SCNC: 137 MMOL/L (ref 136–145)
WBC # BLD AUTO: 5.5 K/UL (ref 4.1–11.1)

## 2020-09-28 PROCEDURE — 96361 HYDRATE IV INFUSION ADD-ON: CPT

## 2020-09-28 PROCEDURE — 36415 COLL VENOUS BLD VENIPUNCTURE: CPT

## 2020-09-28 PROCEDURE — 85025 COMPLETE CBC W/AUTO DIFF WBC: CPT

## 2020-09-28 PROCEDURE — 74011250636 HC RX REV CODE- 250/636: Performed by: INTERNAL MEDICINE

## 2020-09-28 PROCEDURE — 96360 HYDRATION IV INFUSION INIT: CPT

## 2020-09-28 PROCEDURE — 80069 RENAL FUNCTION PANEL: CPT

## 2020-09-28 RX ORDER — SODIUM CHLORIDE 0.9 % (FLUSH) 0.9 %
5-10 SYRINGE (ML) INJECTION AS NEEDED
Status: DISCONTINUED | OUTPATIENT
Start: 2020-09-28 | End: 2020-09-29 | Stop reason: HOSPADM

## 2020-09-28 RX ADMIN — SODIUM CHLORIDE 1000 ML: 900 INJECTION, SOLUTION INTRAVENOUS at 10:47

## 2020-09-28 RX ADMIN — Medication 10 ML: at 10:40

## 2020-09-28 NOTE — PROGRESS NOTES
[address] [facility] [place] [facility] Visit Note    9354 Patient arrives for Hydration without acute problems. Please see [facility] for complete assessment and education provided. Vital signs stable throughout and prior to discharge. Patient tolerated procedure well and was discharged without incident. Patient has no further appointments with this infusion center and knows to follow up with his provider for further treatments. Medications verified by Gladys Chapman RN via [employer]:    1. NS 1000 ml IV    Vital signs:  Patient Vitals for the past 12 hrs:   Temp Pulse Resp BP   09/28/20 1510 97.9 °F (36.6 °C) 71 18 137/88   09/28/20 1033 98.3 °F (36.8 °C) 77 16 100/68       Lab work:   Recent Results (from the past 12 hour(s))   CBC WITH AUTOMATED DIFF    Collection Time: 09/28/20 10:41 AM   Result Value Ref Range    WBC 5.5 4.1 - 11.1 K/uL    RBC 3.02 (L) 4.10 - 5.70 M/uL    HGB 8.9 (L) 12.1 - 17.0 g/dL    HCT 29.0 (L) 36.6 - 50.3 %    MCV 96.0 80.0 - 99.0 FL    MCH 29.5 26.0 - 34.0 PG    MCHC 30.7 30.0 - 36.5 g/dL    RDW 13.7 11.5 - 14.5 %    PLATELET 191 970 - 806 K/uL    MPV 11.3 8.9 - 12.9 FL    NRBC 0.0 0  WBC    ABSOLUTE NRBC 0.00 0.00 - 0.01 K/uL    NEUTROPHILS 64 32 - 75 %    LYMPHOCYTES 20 12 - 49 %    MONOCYTES 8 5 - 13 %    EOSINOPHILS 7 0 - 7 %    BASOPHILS 1 0 - 1 %    IMMATURE GRANULOCYTES 0 0.0 - 0.5 %    ABS. NEUTROPHILS 3.6 1.8 - 8.0 K/UL    ABS. LYMPHOCYTES 1.1 0.8 - 3.5 K/UL    ABS. MONOCYTES 0.4 0.0 - 1.0 K/UL    ABS. EOSINOPHILS 0.4 0.0 - 0.4 K/UL    ABS. BASOPHILS 0.0 0.0 - 0.1 K/UL    ABS. IMM.  GRANS. 0.0 0.00 - 0.04 K/UL    DF AUTOMATED     RENAL FUNCTION PANEL    Collection Time: 09/28/20 10:41 AM   Result Value Ref Range    Sodium 137 136 - 145 mmol/L    Potassium 5.4 (H) 3.5 - 5.1 mmol/L    Chloride 112 (H) 97 - 108 mmol/L    CO2 18 (L) 21 - 32 mmol/L    Anion gap 7 5 - 15 mmol/L    Glucose 107 (H) 65 - 100 mg/dL    BUN 73 (H) 6 - 20 MG/DL    Creatinine 4.81 (H) 0.70 - 1.30 MG/DL    BUN/Creatinine ratio 15 12 - 20      GFR est AA 16 (L) >60 ml/min/1.73m2    GFR est non-AA 13 (L) >60 ml/min/1.73m2    Calcium 8.6 8.5 - 10.1 MG/DL    Phosphorus 4.2 2.6 - 4.7 MG/DL    Albumin 3.0 (L) 3.5 - 5.0 g/dL

## 2020-10-01 RX ORDER — DIPHENHYDRAMINE HYDROCHLORIDE 50 MG/ML
25 INJECTION, SOLUTION INTRAMUSCULAR; INTRAVENOUS
Status: DISCONTINUED | OUTPATIENT
Start: 2020-10-07 | End: 2020-10-08 | Stop reason: HOSPADM

## 2020-10-01 RX ORDER — ACETAMINOPHEN 325 MG/1
650 TABLET ORAL ONCE
Status: COMPLETED | OUTPATIENT
Start: 2020-10-07 | End: 2020-10-07

## 2020-10-01 RX ORDER — DIPHENHYDRAMINE HCL 25 MG
25 CAPSULE ORAL ONCE
Status: COMPLETED | OUTPATIENT
Start: 2020-10-07 | End: 2020-10-07

## 2020-10-01 RX ORDER — SODIUM CHLORIDE 9 MG/ML
25 INJECTION, SOLUTION INTRAVENOUS CONTINUOUS
Status: DISCONTINUED | OUTPATIENT
Start: 2020-10-07 | End: 2020-10-08 | Stop reason: HOSPADM

## 2020-10-01 RX ORDER — ACETAMINOPHEN 325 MG/1
650 TABLET ORAL
Status: DISCONTINUED | OUTPATIENT
Start: 2020-10-07 | End: 2020-10-08 | Stop reason: HOSPADM

## 2020-10-07 ENCOUNTER — HOSPITAL ENCOUNTER (OUTPATIENT)
Dept: INFUSION THERAPY | Age: 50
Discharge: HOME | End: 2020-10-07
Payer: COMMERCIAL

## 2020-10-07 VITALS
SYSTOLIC BLOOD PRESSURE: 109 MMHG | TEMPERATURE: 98 F | DIASTOLIC BLOOD PRESSURE: 77 MMHG | HEART RATE: 77 BPM | RESPIRATION RATE: 18 BRPM

## 2020-10-07 LAB
ALBUMIN SERPL-MCNC: 3 G/DL (ref 3.5–5)
ALBUMIN/GLOB SERPL: 0.8 {RATIO} (ref 1.1–2.2)
ALP SERPL-CCNC: 58 U/L (ref 45–117)
ALT SERPL-CCNC: 22 U/L (ref 12–78)
ANION GAP SERPL CALC-SCNC: 9 MMOL/L (ref 5–15)
AST SERPL-CCNC: 15 U/L (ref 15–37)
BASOPHILS # BLD: 0 K/UL (ref 0–0.1)
BASOPHILS NFR BLD: 0 % (ref 0–1)
BILIRUB SERPL-MCNC: 0.3 MG/DL (ref 0.2–1)
BUN SERPL-MCNC: 67 MG/DL (ref 6–20)
BUN/CREAT SERPL: 12 (ref 12–20)
CALCIUM SERPL-MCNC: 8.2 MG/DL (ref 8.5–10.1)
CHLORIDE SERPL-SCNC: 113 MMOL/L (ref 97–108)
CO2 SERPL-SCNC: 16 MMOL/L (ref 21–32)
CREAT SERPL-MCNC: 5.37 MG/DL (ref 0.7–1.3)
DIFFERENTIAL METHOD BLD: ABNORMAL
EOSINOPHIL # BLD: 0.6 K/UL (ref 0–0.4)
EOSINOPHIL NFR BLD: 9 % (ref 0–7)
ERYTHROCYTE [DISTWIDTH] IN BLOOD BY AUTOMATED COUNT: 13.7 % (ref 11.5–14.5)
ERYTHROCYTE [SEDIMENTATION RATE] IN BLOOD: 106 MM/HR (ref 0–15)
GLOBULIN SER CALC-MCNC: 3.8 G/DL (ref 2–4)
GLUCOSE SERPL-MCNC: 85 MG/DL (ref 65–100)
HCT VFR BLD AUTO: 26.5 % (ref 36.6–50.3)
HGB BLD-MCNC: 7.7 G/DL (ref 12.1–17)
IMM GRANULOCYTES # BLD AUTO: 0 K/UL (ref 0–0.04)
IMM GRANULOCYTES NFR BLD AUTO: 0 % (ref 0–0.5)
LYMPHOCYTES # BLD: 1 K/UL (ref 0.8–3.5)
LYMPHOCYTES NFR BLD: 15 % (ref 12–49)
MCH RBC QN AUTO: 28.5 PG (ref 26–34)
MCHC RBC AUTO-ENTMCNC: 29.1 G/DL (ref 30–36.5)
MCV RBC AUTO: 98.1 FL (ref 80–99)
MONOCYTES # BLD: 0.5 K/UL (ref 0–1)
MONOCYTES NFR BLD: 7 % (ref 5–13)
NEUTS SEG # BLD: 4.5 K/UL (ref 1.8–8)
NEUTS SEG NFR BLD: 69 % (ref 32–75)
NRBC # BLD: 0 K/UL (ref 0–0.01)
NRBC BLD-RTO: 0 PER 100 WBC
PLATELET # BLD AUTO: 270 K/UL (ref 150–400)
PMV BLD AUTO: 10.3 FL (ref 8.9–12.9)
POTASSIUM SERPL-SCNC: 4.6 MMOL/L (ref 3.5–5.1)
PROT SERPL-MCNC: 6.8 G/DL (ref 6.4–8.2)
RBC # BLD AUTO: 2.7 M/UL (ref 4.1–5.7)
SODIUM SERPL-SCNC: 138 MMOL/L (ref 136–145)
URATE SERPL-MCNC: 4.2 MG/DL (ref 3.5–7.2)
WBC # BLD AUTO: 6.6 K/UL (ref 4.1–11.1)

## 2020-10-07 PROCEDURE — 85025 COMPLETE CBC W/AUTO DIFF WBC: CPT

## 2020-10-07 PROCEDURE — 74011250637 HC RX REV CODE- 250/637: Performed by: INTERNAL MEDICINE

## 2020-10-07 PROCEDURE — 80053 COMPREHEN METABOLIC PANEL: CPT

## 2020-10-07 PROCEDURE — 96366 THER/PROPH/DIAG IV INF ADDON: CPT

## 2020-10-07 PROCEDURE — 74011250636 HC RX REV CODE- 250/636: Performed by: INTERNAL MEDICINE

## 2020-10-07 PROCEDURE — 85652 RBC SED RATE AUTOMATED: CPT

## 2020-10-07 PROCEDURE — 36415 COLL VENOUS BLD VENIPUNCTURE: CPT

## 2020-10-07 PROCEDURE — 84550 ASSAY OF BLOOD/URIC ACID: CPT

## 2020-10-07 PROCEDURE — 74011000258 HC RX REV CODE- 258: Performed by: INTERNAL MEDICINE

## 2020-10-07 PROCEDURE — 96375 TX/PRO/DX INJ NEW DRUG ADDON: CPT

## 2020-10-07 PROCEDURE — 96365 THER/PROPH/DIAG IV INF INIT: CPT

## 2020-10-07 RX ADMIN — DIPHENHYDRAMINE HYDROCHLORIDE 25 MG: 25 CAPSULE ORAL at 15:53

## 2020-10-07 RX ADMIN — SODIUM CHLORIDE 25 ML/HR: 900 INJECTION, SOLUTION INTRAVENOUS at 14:35

## 2020-10-07 RX ADMIN — FAMOTIDINE 20 MG: 10 INJECTION INTRAVENOUS at 16:00

## 2020-10-07 RX ADMIN — PEGLOTICASE 8 MG: 8 INJECTION, SOLUTION INTRAVENOUS at 16:38

## 2020-10-07 RX ADMIN — ACETAMINOPHEN 650 MG: 325 TABLET ORAL at 15:53

## 2020-10-07 RX ADMIN — METHYLPREDNISOLONE SODIUM SUCCINATE 30 MG: 40 INJECTION, POWDER, FOR SOLUTION INTRAMUSCULAR; INTRAVENOUS at 15:56

## 2020-10-07 NOTE — PROGRESS NOTES
OPIC Short Note                   6897 Pt admit to [facility] for [address] ambulatory in stable condition. Assessment completed. No new concerns voiced. PIV established in [facility] with good blood return. Labs collected and sent for processing. Uric acid level has been elevated - pharmacist asking to wait for uric acid result before infusing. Pt in agreement. Uric acid=4.2; ok to proceed. Mr. Constance Nunez vitals were reviewed prior to and after treatment. Patient Vitals for the past 12 hrs:   Temp Pulse Resp BP   10/07/20 1904 -- 77 -- 109/77   10/07/20 1414 98 °F (36.7 °C) 96 18 99/71         Lab results were obtained and reviewed. Recent Results (from the past 12 hour(s))   CBC WITH AUTOMATED DIFF    Collection Time: 10/07/20  2:26 PM   Result Value Ref Range    WBC 6.6 4.1 - 11.1 K/uL    RBC 2.70 (L) 4.10 - 5.70 M/uL    HGB 7.7 (L) 12.1 - 17.0 g/dL    HCT 26.5 (L) 36.6 - 50.3 %    MCV 98.1 80.0 - 99.0 FL    MCH 28.5 26.0 - 34.0 PG    MCHC 29.1 (L) 30.0 - 36.5 g/dL    RDW 13.7 11.5 - 14.5 %    PLATELET 385 850 - 973 K/uL    MPV 10.3 8.9 - 12.9 FL    NRBC 0.0 0  WBC    ABSOLUTE NRBC 0.00 0.00 - 0.01 K/uL    NEUTROPHILS 69 32 - 75 %    LYMPHOCYTES 15 12 - 49 %    MONOCYTES 7 5 - 13 %    EOSINOPHILS 9 (H) 0 - 7 %    BASOPHILS 0 0 - 1 %    IMMATURE GRANULOCYTES 0 0.0 - 0.5 %    ABS. NEUTROPHILS 4.5 1.8 - 8.0 K/UL    ABS. LYMPHOCYTES 1.0 0.8 - 3.5 K/UL    ABS. MONOCYTES 0.5 0.0 - 1.0 K/UL    ABS. EOSINOPHILS 0.6 (H) 0.0 - 0.4 K/UL    ABS. BASOPHILS 0.0 0.0 - 0.1 K/UL    ABS. IMM.  GRANS. 0.0 0.00 - 0.04 K/UL    DF AUTOMATED     METABOLIC PANEL, COMPREHENSIVE    Collection Time: 10/07/20  2:26 PM   Result Value Ref Range    Sodium 138 136 - 145 mmol/L    Potassium 4.6 3.5 - 5.1 mmol/L    Chloride 113 (H) 97 - 108 mmol/L    CO2 16 (L) 21 - 32 mmol/L    Anion gap 9 5 - 15 mmol/L    Glucose 85 65 - 100 mg/dL    BUN 67 (H) 6 - 20 MG/DL    Creatinine 5.37 (H) 0.70 - 1.30 MG/DL    BUN/Creatinine ratio 12 12 - 20 GFR est AA 14 (L) >60 ml/min/1.73m2    GFR est non-AA 11 (L) >60 ml/min/1.73m2    Calcium 8.2 (L) 8.5 - 10.1 MG/DL    Bilirubin, total 0.3 0.2 - 1.0 MG/DL    ALT (SGPT) 22 12 - 78 U/L    AST (SGOT) 15 15 - 37 U/L    Alk. phosphatase 58 45 - 117 U/L    Protein, total 6.8 6.4 - 8.2 g/dL    Albumin 3.0 (L) 3.5 - 5.0 g/dL    Globulin 3.8 2.0 - 4.0 g/dL    A-G Ratio 0.8 (L) 1.1 - 2.2     SED RATE (ESR)    Collection Time: 10/07/20  2:26 PM   Result Value Ref Range    Sed rate, automated 106 (H) 0 - 15 mm/hr   URIC ACID    Collection Time: 10/07/20  2:26 PM   Result Value Ref Range    Uric acid 4.2 3.5 - 7.2 MG/DL       Medications Administered     0.9% sodium chloride infusion     Admin Date  10/07/2020 Action  New Bag Dose  25 mL/hr Rate  25 mL/hr Route  IntraVENous Administered By  Aminta Lancaster RN          acetaminophen (TYLENOL) tablet 650 mg     Admin Date  10/07/2020 Action  Given Dose  650 mg Route  Oral Administered By  Aminta Lancaster RN          diphenhydrAMINE (BENADRYL) capsule 25 mg     Admin Date  10/07/2020 Action  Given Dose  25 mg Route  Oral Administered By  Aminta Lancaster RN          famotidine (PF) (PEPCID) 20 mg in 0.9% sodium chloride 10 mL injection     Admin Date  10/07/2020 Action  Given Dose  20 mg Route  IntraVENous Administered By  Aminta Lancaster RN          methylPREDNISolone (PF) (SOLU-MEDROL) injection 30 mg     Admin Date  10/07/2020 Action  Given Dose  30 mg Route  IntraVENous Administered By  Aminta Lancaster RN          [facility]) 8 mg, overfill volume 25 mL in 0.9% sodium chloride 250 mL infusion     Admin Date  10/07/2020 Action  Given Dose  8 mg Rate  138 mL/hr Route  IntraVENous Administered By  Aminta Lancaster RN                   Mr. Jenna Smith tolerated the infusion, and had no complaints PIV removed without difficulty.      Mr. Jenna Smith was discharged from [facility] in stable condition at [address]   Date Time Provider Barry Cano   10/21/2020  2:00 PM A[facility] [address] [place] [address]   11/4/2020  2:00 PM A3 [facility] [address] [place] [address]   11/17/2020  1:40 PM Filemon Rabago MD AOCR BS AMB   11/18/2020  2:00 PM A3 [facility] [address] [place] [address], RN  October 7, 2020  7:15 PM

## 2020-10-07 NOTE — PROGRESS NOTES
The results were reviewed. Uric acid 4.1 mg/dL down from 9.1 mg/dL. Slight worsening anemia (Hct 26.5%).  Remaining labs are stable

## 2020-10-16 RX ORDER — DIPHENHYDRAMINE HYDROCHLORIDE 50 MG/ML
25 INJECTION, SOLUTION INTRAMUSCULAR; INTRAVENOUS
Status: ACTIVE | OUTPATIENT
Start: 2020-10-21 | End: 2020-10-21

## 2020-10-16 RX ORDER — SODIUM CHLORIDE 9 MG/ML
25 INJECTION, SOLUTION INTRAVENOUS CONTINUOUS
Status: DISPENSED | OUTPATIENT
Start: 2020-10-21 | End: 2020-10-21

## 2020-10-16 RX ORDER — ACETAMINOPHEN 325 MG/1
650 TABLET ORAL
Status: ACTIVE | OUTPATIENT
Start: 2020-10-21 | End: 2020-10-21

## 2020-10-16 RX ORDER — DIPHENHYDRAMINE HCL 25 MG
25 CAPSULE ORAL ONCE
Status: COMPLETED | OUTPATIENT
Start: 2020-10-21 | End: 2020-10-21

## 2020-10-16 RX ORDER — ACETAMINOPHEN 325 MG/1
650 TABLET ORAL ONCE
Status: COMPLETED | OUTPATIENT
Start: 2020-10-21 | End: 2020-10-21

## 2020-10-21 ENCOUNTER — HOSPITAL ENCOUNTER (OUTPATIENT)
Dept: INFUSION THERAPY | Age: 50
Discharge: HOME | End: 2020-10-21
Payer: COMMERCIAL

## 2020-10-21 VITALS
DIASTOLIC BLOOD PRESSURE: 81 MMHG | TEMPERATURE: 98.2 F | RESPIRATION RATE: 18 BRPM | SYSTOLIC BLOOD PRESSURE: 129 MMHG | HEART RATE: 76 BPM

## 2020-10-21 LAB
ALBUMIN SERPL-MCNC: 3 G/DL (ref 3.5–5)
ALBUMIN/GLOB SERPL: 0.9 {RATIO} (ref 1.1–2.2)
ALP SERPL-CCNC: 57 U/L (ref 45–117)
ALT SERPL-CCNC: 19 U/L (ref 12–78)
ANION GAP SERPL CALC-SCNC: 10 MMOL/L (ref 5–15)
AST SERPL-CCNC: 14 U/L (ref 15–37)
BASOPHILS # BLD: 0 K/UL (ref 0–0.1)
BASOPHILS NFR BLD: 1 % (ref 0–1)
BILIRUB SERPL-MCNC: 0.3 MG/DL (ref 0.2–1)
BUN SERPL-MCNC: 69 MG/DL (ref 6–20)
BUN/CREAT SERPL: 14 (ref 12–20)
CALCIUM SERPL-MCNC: 8.4 MG/DL (ref 8.5–10.1)
CHLORIDE SERPL-SCNC: 114 MMOL/L (ref 97–108)
CO2 SERPL-SCNC: 17 MMOL/L (ref 21–32)
CREAT SERPL-MCNC: 4.93 MG/DL (ref 0.7–1.3)
DIFFERENTIAL METHOD BLD: ABNORMAL
EOSINOPHIL # BLD: 0.5 K/UL (ref 0–0.4)
EOSINOPHIL NFR BLD: 9 % (ref 0–7)
ERYTHROCYTE [DISTWIDTH] IN BLOOD BY AUTOMATED COUNT: 15.2 % (ref 11.5–14.5)
ERYTHROCYTE [SEDIMENTATION RATE] IN BLOOD: 106 MM/HR (ref 0–15)
GLOBULIN SER CALC-MCNC: 3.5 G/DL (ref 2–4)
GLUCOSE SERPL-MCNC: 85 MG/DL (ref 65–100)
HCT VFR BLD AUTO: 25.2 % (ref 36.6–50.3)
HGB BLD-MCNC: 7.3 G/DL (ref 12.1–17)
IMM GRANULOCYTES # BLD AUTO: 0 K/UL (ref 0–0.04)
IMM GRANULOCYTES NFR BLD AUTO: 0 % (ref 0–0.5)
LYMPHOCYTES # BLD: 1.2 K/UL (ref 0.8–3.5)
LYMPHOCYTES NFR BLD: 21 % (ref 12–49)
MCH RBC QN AUTO: 28.5 PG (ref 26–34)
MCHC RBC AUTO-ENTMCNC: 29 G/DL (ref 30–36.5)
MCV RBC AUTO: 98.4 FL (ref 80–99)
MONOCYTES # BLD: 0.5 K/UL (ref 0–1)
MONOCYTES NFR BLD: 8 % (ref 5–13)
NEUTS SEG # BLD: 3.5 K/UL (ref 1.8–8)
NEUTS SEG NFR BLD: 61 % (ref 32–75)
NRBC # BLD: 0 K/UL (ref 0–0.01)
NRBC BLD-RTO: 0 PER 100 WBC
PLATELET # BLD AUTO: 312 K/UL (ref 150–400)
PMV BLD AUTO: 9.7 FL (ref 8.9–12.9)
POTASSIUM SERPL-SCNC: 4.6 MMOL/L (ref 3.5–5.1)
PROT SERPL-MCNC: 6.5 G/DL (ref 6.4–8.2)
RBC # BLD AUTO: 2.56 M/UL (ref 4.1–5.7)
SODIUM SERPL-SCNC: 141 MMOL/L (ref 136–145)
URATE SERPL-MCNC: 1.6 MG/DL (ref 3.5–7.2)
WBC # BLD AUTO: 5.6 K/UL (ref 4.1–11.1)

## 2020-10-21 PROCEDURE — 96365 THER/PROPH/DIAG IV INF INIT: CPT

## 2020-10-21 PROCEDURE — 96375 TX/PRO/DX INJ NEW DRUG ADDON: CPT

## 2020-10-21 PROCEDURE — 85652 RBC SED RATE AUTOMATED: CPT

## 2020-10-21 PROCEDURE — 74011250636 HC RX REV CODE- 250/636: Performed by: INTERNAL MEDICINE

## 2020-10-21 PROCEDURE — 36415 COLL VENOUS BLD VENIPUNCTURE: CPT

## 2020-10-21 PROCEDURE — 74011250637 HC RX REV CODE- 250/637: Performed by: INTERNAL MEDICINE

## 2020-10-21 PROCEDURE — 85025 COMPLETE CBC W/AUTO DIFF WBC: CPT

## 2020-10-21 PROCEDURE — 80053 COMPREHEN METABOLIC PANEL: CPT

## 2020-10-21 PROCEDURE — 84550 ASSAY OF BLOOD/URIC ACID: CPT

## 2020-10-21 PROCEDURE — 96366 THER/PROPH/DIAG IV INF ADDON: CPT

## 2020-10-21 RX ADMIN — DIPHENHYDRAMINE HYDROCHLORIDE 25 MG: 25 CAPSULE ORAL at 14:33

## 2020-10-21 RX ADMIN — FAMOTIDINE 20 MG: 10 INJECTION INTRAVENOUS at 14:33

## 2020-10-21 RX ADMIN — METHYLPREDNISOLONE SODIUM SUCCINATE 30 MG: 40 INJECTION, POWDER, FOR SOLUTION INTRAMUSCULAR; INTRAVENOUS at 14:33

## 2020-10-21 RX ADMIN — ACETAMINOPHEN 650 MG: 325 TABLET ORAL at 14:33

## 2020-10-21 RX ADMIN — SODIUM CHLORIDE 25 ML/HR: 900 INJECTION, SOLUTION INTRAVENOUS at 14:32

## 2020-10-21 RX ADMIN — PEGLOTICASE 8 MG: 8 INJECTION, SOLUTION INTRAVENOUS at 14:54

## 2020-10-21 NOTE — PROGRESS NOTES
Outpatient Infusion Center Progress Note    1400 Pt admit to [facility] for [address] ambulatory in stable condition. Assessment completed. No new concerns voiced. Peripheral IV established in the LEFT arm with positive blood return. Labs were drawn and sent for processing. Visit Vitals  /75 (BP 1 Location: Left arm, BP Patient Position: Sitting)   Pulse 72   Temp 98.1 °F (36.7 °C)   Resp 18       Medications Administered     0.9% sodium chloride infusion     Admin Date  10/21/2020 Action  New Bag Dose  25 mL/hr Rate  25 mL/hr Route  IntraVENous Administered By  Pj Whitten          acetaminophen (TYLENOL) tablet 650 mg     Admin Date  10/21/2020 Action  Given Dose  650 mg Route  Oral Administered By  Pj Whitten          diphenhydrAMINE (BENADRYL) capsule 25 mg     Admin Date  10/21/2020 Action  Given Dose  25 mg Route  Oral Administered By  Pj Whitten          famotidine (PF) (PEPCID) 20 mg in 0.9% sodium chloride 10 mL injection     Admin Date  10/21/2020 Action  Given Dose  20 mg Route  IntraVENous Administered By  Pj Whitten          methylPREDNISolone (PF) (SOLU-MEDROL) injection 30 mg     Admin Date  10/21/2020 Action  Given Dose  30 mg Route  IntraVENous Administered By  Pj Whitten          pegloticase [facility]) 8 mg, overfill volume 25 mL in 0.9% sodium chloride 250 mL infusion     Admin Date  10/21/2020 Action  Given Dose  8 mg Rate  138 mL/hr Route  IntraVENous Administered By  [address] Pt tolerated treatment well. Peripheral IV maintained positive blood return throughout the course of treatment and was removed at the conclsuion of therapy. D/c home ambulatory in no distress. Pt aware of next appointment scheduled.     Loan Haywood RN

## 2020-10-30 RX ORDER — DIPHENHYDRAMINE HCL 25 MG
25 CAPSULE ORAL ONCE
Status: COMPLETED | OUTPATIENT
Start: 2020-11-04 | End: 2020-11-04

## 2020-10-30 RX ORDER — SODIUM CHLORIDE 9 MG/ML
25 INJECTION, SOLUTION INTRAVENOUS CONTINUOUS
Status: DISPENSED | OUTPATIENT
Start: 2020-11-04 | End: 2020-11-04

## 2020-10-30 RX ORDER — ACETAMINOPHEN 325 MG/1
650 TABLET ORAL
Status: ACTIVE | OUTPATIENT
Start: 2020-11-04 | End: 2020-11-04

## 2020-10-30 RX ORDER — ACETAMINOPHEN 325 MG/1
650 TABLET ORAL ONCE
Status: COMPLETED | OUTPATIENT
Start: 2020-11-04 | End: 2020-11-04

## 2020-10-30 RX ORDER — DIPHENHYDRAMINE HYDROCHLORIDE 50 MG/ML
25 INJECTION, SOLUTION INTRAMUSCULAR; INTRAVENOUS
Status: ACTIVE | OUTPATIENT
Start: 2020-11-04 | End: 2020-11-04

## 2020-11-04 ENCOUNTER — HOSPITAL ENCOUNTER (OUTPATIENT)
Dept: INFUSION THERAPY | Age: 50
Discharge: HOME | End: 2020-11-04
Payer: COMMERCIAL

## 2020-11-04 VITALS
TEMPERATURE: 98.6 F | SYSTOLIC BLOOD PRESSURE: 134 MMHG | DIASTOLIC BLOOD PRESSURE: 85 MMHG | HEART RATE: 81 BPM | RESPIRATION RATE: 16 BRPM

## 2020-11-04 LAB
ALBUMIN SERPL-MCNC: 3.4 G/DL (ref 3.5–5)
ALBUMIN/GLOB SERPL: 1 {RATIO} (ref 1.1–2.2)
ALP SERPL-CCNC: 70 U/L (ref 45–117)
ALT SERPL-CCNC: 26 U/L (ref 12–78)
ANION GAP SERPL CALC-SCNC: 8 MMOL/L (ref 5–15)
AST SERPL-CCNC: 15 U/L (ref 15–37)
BASOPHILS # BLD: 0 K/UL (ref 0–0.1)
BASOPHILS NFR BLD: 1 % (ref 0–1)
BILIRUB SERPL-MCNC: 0.4 MG/DL (ref 0.2–1)
BUN SERPL-MCNC: 75 MG/DL (ref 6–20)
BUN/CREAT SERPL: 15 (ref 12–20)
CALCIUM SERPL-MCNC: 8.4 MG/DL (ref 8.5–10.1)
CHLORIDE SERPL-SCNC: 112 MMOL/L (ref 97–108)
CO2 SERPL-SCNC: 21 MMOL/L (ref 21–32)
CREAT SERPL-MCNC: 4.99 MG/DL (ref 0.7–1.3)
CRP SERPL-MCNC: <0.29 MG/DL (ref 0–0.6)
DIFFERENTIAL METHOD BLD: ABNORMAL
EOSINOPHIL # BLD: 0.5 K/UL (ref 0–0.4)
EOSINOPHIL NFR BLD: 8 % (ref 0–7)
ERYTHROCYTE [DISTWIDTH] IN BLOOD BY AUTOMATED COUNT: 15.5 % (ref 11.5–14.5)
ERYTHROCYTE [SEDIMENTATION RATE] IN BLOOD: 65 MM/HR (ref 0–15)
GLOBULIN SER CALC-MCNC: 3.4 G/DL (ref 2–4)
GLUCOSE SERPL-MCNC: 100 MG/DL (ref 65–100)
HCT VFR BLD AUTO: 25.7 % (ref 36.6–50.3)
HGB BLD-MCNC: 7.6 G/DL (ref 12.1–17)
IMM GRANULOCYTES # BLD AUTO: 0 K/UL (ref 0–0.04)
IMM GRANULOCYTES NFR BLD AUTO: 0 % (ref 0–0.5)
LYMPHOCYTES # BLD: 1.5 K/UL (ref 0.8–3.5)
LYMPHOCYTES NFR BLD: 23 % (ref 12–49)
MCH RBC QN AUTO: 28.8 PG (ref 26–34)
MCHC RBC AUTO-ENTMCNC: 29.6 G/DL (ref 30–36.5)
MCV RBC AUTO: 97.3 FL (ref 80–99)
MONOCYTES # BLD: 0.4 K/UL (ref 0–1)
MONOCYTES NFR BLD: 6 % (ref 5–13)
NEUTS SEG # BLD: 3.8 K/UL (ref 1.8–8)
NEUTS SEG NFR BLD: 62 % (ref 32–75)
NRBC # BLD: 0 K/UL (ref 0–0.01)
NRBC BLD-RTO: 0 PER 100 WBC
PLATELET # BLD AUTO: 260 K/UL (ref 150–400)
PMV BLD AUTO: 10.6 FL (ref 8.9–12.9)
POTASSIUM SERPL-SCNC: 4.1 MMOL/L (ref 3.5–5.1)
PROT SERPL-MCNC: 6.8 G/DL (ref 6.4–8.2)
RBC # BLD AUTO: 2.64 M/UL (ref 4.1–5.7)
SODIUM SERPL-SCNC: 141 MMOL/L (ref 136–145)
WBC # BLD AUTO: 6.2 K/UL (ref 4.1–11.1)

## 2020-11-04 PROCEDURE — 80053 COMPREHEN METABOLIC PANEL: CPT

## 2020-11-04 PROCEDURE — 96375 TX/PRO/DX INJ NEW DRUG ADDON: CPT

## 2020-11-04 PROCEDURE — 96366 THER/PROPH/DIAG IV INF ADDON: CPT

## 2020-11-04 PROCEDURE — 86140 C-REACTIVE PROTEIN: CPT

## 2020-11-04 PROCEDURE — 74011250636 HC RX REV CODE- 250/636: Performed by: INTERNAL MEDICINE

## 2020-11-04 PROCEDURE — 74011250637 HC RX REV CODE- 250/637: Performed by: INTERNAL MEDICINE

## 2020-11-04 PROCEDURE — 96365 THER/PROPH/DIAG IV INF INIT: CPT

## 2020-11-04 PROCEDURE — 36415 COLL VENOUS BLD VENIPUNCTURE: CPT

## 2020-11-04 PROCEDURE — 85025 COMPLETE CBC W/AUTO DIFF WBC: CPT

## 2020-11-04 PROCEDURE — 74011000250 HC RX REV CODE- 250: Performed by: INTERNAL MEDICINE

## 2020-11-04 PROCEDURE — 85652 RBC SED RATE AUTOMATED: CPT

## 2020-11-04 RX ORDER — SODIUM CHLORIDE 0.9 % (FLUSH) 0.9 %
5-10 SYRINGE (ML) INJECTION AS NEEDED
Status: DISCONTINUED | OUTPATIENT
Start: 2020-11-04 | End: 2020-11-05 | Stop reason: HOSPADM

## 2020-11-04 RX ADMIN — PEGLOTICASE 8 MG: 8 INJECTION, SOLUTION INTRAVENOUS at 15:05

## 2020-11-04 RX ADMIN — Medication 10 ML: at 14:35

## 2020-11-04 RX ADMIN — ACETAMINOPHEN 650 MG: 325 TABLET ORAL at 14:29

## 2020-11-04 RX ADMIN — SODIUM CHLORIDE 25 ML/HR: 900 INJECTION, SOLUTION INTRAVENOUS at 14:38

## 2020-11-04 RX ADMIN — DIPHENHYDRAMINE HYDROCHLORIDE 25 MG: 25 CAPSULE ORAL at 14:29

## 2020-11-04 RX ADMIN — Medication 10 ML: at 14:37

## 2020-11-04 RX ADMIN — METHYLPREDNISOLONE SODIUM SUCCINATE 30 MG: 40 INJECTION, POWDER, FOR SOLUTION INTRAMUSCULAR; INTRAVENOUS at 14:41

## 2020-11-04 RX ADMIN — FAMOTIDINE 20 MG: 10 INJECTION INTRAVENOUS at 14:39

## 2020-11-05 NOTE — PROGRESS NOTES
[facility] Progress Note    Date: 2020    Name: Radha Sanchez    MRN: [id number]         : 1970  1405 Mr. Lucile Merlin Arrived to [address] [place] [address] for [address] ambulatory in stable condition. Assessment was completed, no acute issues at this time, no new complaints voiced. Peripheral IV established with positive blood return. Labs drawn and sent for processing. Line flushed and connected to NS at [facility]. Mr. Cheri Hoffman vitals were reviewed. Patient Vitals for the past 12 hrs:   Temp Pulse Resp BP   20 1730 -- 81 16 134/85   20 1405 98.6 °F (37 °C) 94 18 129/85       Recent Results (from the past 12 hour(s))   CBC WITH AUTOMATED DIFF    Collection Time: 20  2:34 PM   Result Value Ref Range    WBC 6.2 4.1 - 11.1 K/uL    RBC 2.64 (L) 4.10 - 5.70 M/uL    HGB 7.6 (L) 12.1 - 17.0 g/dL    HCT 25.7 (L) 36.6 - 50.3 %    MCV 97.3 80.0 - 99.0 FL    MCH 28.8 26.0 - 34.0 PG    MCHC 29.6 (L) 30.0 - 36.5 g/dL    RDW 15.5 (H) 11.5 - 14.5 %    PLATELET 892 985 - 689 K/uL    MPV 10.6 8.9 - 12.9 FL    NRBC 0.0 0  WBC    ABSOLUTE NRBC 0.00 0.00 - 0.01 K/uL    NEUTROPHILS 62 32 - 75 %    LYMPHOCYTES 23 12 - 49 %    MONOCYTES 6 5 - 13 %    EOSINOPHILS 8 (H) 0 - 7 %    BASOPHILS 1 0 - 1 %    IMMATURE GRANULOCYTES 0 0.0 - 0.5 %    ABS. NEUTROPHILS 3.8 1.8 - 8.0 K/UL    ABS. LYMPHOCYTES 1.5 0.8 - 3.5 K/UL    ABS. MONOCYTES 0.4 0.0 - 1.0 K/UL    ABS. EOSINOPHILS 0.5 (H) 0.0 - 0.4 K/UL    ABS. BASOPHILS 0.0 0.0 - 0.1 K/UL    ABS. IMM.  GRANS. 0.0 0.00 - 0.04 K/UL    DF AUTOMATED     METABOLIC PANEL, COMPREHENSIVE    Collection Time: 20  2:34 PM   Result Value Ref Range    Sodium 141 136 - 145 mmol/L    Potassium 4.1 3.5 - 5.1 mmol/L    Chloride 112 (H) 97 - 108 mmol/L    CO2 21 21 - 32 mmol/L    Anion gap 8 5 - 15 mmol/L    Glucose 100 65 - 100 mg/dL    BUN 75 (H) 6 - 20 MG/DL    Creatinine 4.99 (H) 0.70 - 1.30 MG/DL    BUN/Creatinine ratio 15 12 - 20      GFR est AA 15 (L) >60 ml/min/1.73m2    GFR est non-AA 12 (L) >60 ml/min/1.73m2    Calcium 8.4 (L) 8.5 - 10.1 MG/DL    Bilirubin, total 0.4 0.2 - 1.0 MG/DL    ALT (SGPT) 26 12 - 78 U/L    AST (SGOT) 15 15 - 37 U/L    Alk. phosphatase 70 45 - 117 U/L    Protein, total 6.8 6.4 - 8.2 g/dL    Albumin 3.4 (L) 3.5 - 5.0 g/dL    Globulin 3.4 2.0 - 4.0 g/dL    A-G Ratio 1.0 (L) 1.1 - 2.2     C REACTIVE PROTEIN, QT    Collection Time: 11/04/20  2:34 PM   Result Value Ref Range    C-Reactive protein <0.29 0.00 - 0.60 mg/dL   SED RATE (ESR)    Collection Time: 11/04/20  2:34 PM   Result Value Ref Range    Sed rate, automated 65 (H) 0 - 15 mm/hr     Pre-medications  were administered as ordered and therapy was initiated.   Medications Administered     0.9% sodium chloride infusion     Admin Date  11/04/2020 Action  New Bag Dose  25 mL/hr Rate  25 mL/hr Route  IntraVENous Administered By  Lenin Kiser RN          acetaminophen (TYLENOL) tablet 650 mg     Admin Date  11/04/2020 Action  Given Dose  650 mg Route  Oral Administered By  Lenin Kiser RN          diphenhydrAMINE (BENADRYL) capsule 25 mg     Admin Date  11/04/2020 Action  Given Dose  25 mg Route  Oral Administered By  Lenin Kiser RN          famotidine (PF) (PEPCID) 20 mg in 0.9% sodium chloride 10 mL injection     Admin Date  11/04/2020 Action  Given Dose  20 mg Route  IntraVENous Administered By  Lenin Kiser RN          methylPREDNISolone (PF) (SOLU-MEDROL) injection 30 mg     Admin Date  11/04/2020 Action  Given Dose  30 mg Route  IntraVENous Administered By  Lenin Kiser RN          [facility]) 8 mg, overfill volume 25 mL in 0.9% sodium chloride 250 mL infusion     Admin Date  11/04/2020 Action  Given Dose  8 mg Rate  138 mL/hr Route  IntraVENous Administered By  Lenin Kiser RN          sodium chloride (NS) flush 5-10 mL     Admin Date  11/04/2020 Action  Given Dose  10 mL Route  IntraVENous Administered By  Lenin Kiser RN           Admin Date  11/04/2020 Action  Given Dose  10 mL Route  IntraVENous Administered By  Clara Cespedes, RN              0020 Patient tolerated treatment well. PIV maintained positive blood return throughout treatment. Line flushed and removed per protocol. Patient was discharged from [facility] ambulatory and in no distress.      Future Appointments   Date Time Provider Barry Cano   11/17/2020  1:40 PM Liliam Hendricks MD AO BS AMB   11/18/2020  2:00 PM [address], RN  November 4, 2020

## 2020-11-12 RX ORDER — DIPHENHYDRAMINE HYDROCHLORIDE 50 MG/ML
25 INJECTION, SOLUTION INTRAMUSCULAR; INTRAVENOUS
Status: DISCONTINUED | OUTPATIENT
Start: 2020-11-18 | End: 2020-11-19 | Stop reason: HOSPADM

## 2020-11-12 RX ORDER — DIPHENHYDRAMINE HCL 25 MG
25 CAPSULE ORAL ONCE
Status: COMPLETED | OUTPATIENT
Start: 2020-11-18 | End: 2020-11-18

## 2020-11-12 RX ORDER — ACETAMINOPHEN 325 MG/1
650 TABLET ORAL
Status: DISCONTINUED | OUTPATIENT
Start: 2020-11-18 | End: 2020-11-19 | Stop reason: HOSPADM

## 2020-11-12 RX ORDER — SODIUM CHLORIDE 9 MG/ML
25 INJECTION, SOLUTION INTRAVENOUS CONTINUOUS
Status: DISCONTINUED | OUTPATIENT
Start: 2020-11-18 | End: 2020-11-19 | Stop reason: HOSPADM

## 2020-11-12 RX ORDER — ACETAMINOPHEN 325 MG/1
650 TABLET ORAL ONCE
Status: COMPLETED | OUTPATIENT
Start: 2020-11-18 | End: 2020-11-18

## 2020-11-17 ENCOUNTER — VIRTUAL VISIT (OUTPATIENT)
Dept: RHEUMATOLOGY | Age: 50
End: 2020-11-17
Payer: COMMERCIAL

## 2020-11-17 DIAGNOSIS — Z79.60 LONG-TERM USE OF IMMUNOSUPPRESSANT MEDICATION: ICD-10-CM

## 2020-11-17 DIAGNOSIS — M1A.39X1 CHRONIC TOPHACEOUS GOUT OF MULTIPLE SITES DUE TO RENAL IMPAIRMENT: Primary | ICD-10-CM

## 2020-11-17 DIAGNOSIS — N18.4 CKD (CHRONIC KIDNEY DISEASE) STAGE 4, GFR 15-29 ML/MIN (HCC): ICD-10-CM

## 2020-11-17 DIAGNOSIS — R60.0 BILATERAL LOWER EXTREMITY EDEMA: ICD-10-CM

## 2020-11-17 PROCEDURE — 99214 OFFICE O/P EST MOD 30 MIN: CPT | Performed by: INTERNAL MEDICINE

## 2020-11-17 NOTE — PROGRESS NOTES
REASON FOR VISIT    [facility] a [facility]     ICD-10-CM   1. 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 Services were provided through a video synchronous discussion virtually to substitute for in-person clinic visit. Gouty arthritis phenotype includes:  Tophi: yes  Erosions: yes  Tenosynovitis: yes  Double Contour: N/A     Therapy Includes:  NSAIDs: no (contra-indicated due to CKD)  Colchicine prophylaxis: yes  Current uricosuric therapy: none  Current urate-lowering therapy: none  Previous urate-lowering therapy:  Krystexxa 8 mg every 14 days (6/28/2019 to 10/18/2019; 8/26/2020 to present)  Discontinued uricosuric because of inefficacy: none  Discontinued uricosuric because of side effects: none  Discontinued urate-lowering therapy because of inefficacy: allopurinol 50 mg daily  Discontinued urate-lowering therapy because of side effects: none  Contraindicated urate-lowering therapy because of CKD: allopurinol, probenecid  Contra-Indicated urate-lowering therapy because of FDA warning of all-cause mortality and cardiac death: Uloric (febuxostat)  G6PD Status: normal  Current anti-immunogenicity DMARD therapy: leflunomide 20 mg daily    HISTORY OF PRESENT ILLNESS    Mr. CARLOS COFFEY returns for a follow-up visit.     On his last visit, I resumed Krystexxa infusion 8 mg every 14 days and continued leflunomide 20 mg daily for immunogenicity prevention. I lowered his Medrol dose to 30 mg due to volume overload. I asked him to weight himself daily and if his weight increased by 5 lbs to contact me or Dr. Angela Rascon to adjust his Bumex dose. He understood. He resumed it on 8/26/2020. His most recent infusion was 11/04/2020, which he has received with good tolerance. Today, he feels a lot better. He has had a 3 flares in his left ankle, bilateral hands and wrists that were self limiting, which he has been medicating with colchicine and APAP which helps. He feels that even if he does not flare, he feels aching discomfort. He no longer has swelling in his legs. He no longer has aching pain in his feet. He has no pain after his infusion. He asks about duration of treatment and about his renal disability. He denies fever, weight loss, blurred vision, vision loss, oral ulcers, ankle swelling, dry cough, dyspnea, nausea, vomiting, dysphagia, abdominal pain, black or bloody stool, fall since last visit, rash, easy bruising and increased thirst.    Most recent uric acid from 11/04/2020 was 1.6 mg/dL. Last toxicity monitoring by blood work was done on 11/04/2020 and did not reveal any significant adverse effects, except creatinine 4.99, eGFR 15. The patient has not had any interval hospital admissions, infections, or surgeries. REVIEW OF SYSTEMS    A comprehensive review of systems was performed and pertinent results are documented in the HPI, review of systems is otherwise non-contributory. PAST MEDICAL HISTORY    He has a past medical history of Heart failure ([address] Hypertension, and Polycystic kidney disease. FAMILY HISTORY    His family history includes Hypertension in his father and mother; No Known Problems in his brother and brother; Other in his sister. SOCIAL HISTORY    He reports that he has never smoked. He has never used smokeless tobacco. He reports previous alcohol use. He reports current drug use. Drug: Marijuana. IMMUNIZATIONS    There is no immunization history on file for this patient. MEDICATIONS    Current Outpatient Medications   Medication Sig    colchicine (Colcrys) 0.6 mg tablet TAKE ONE TABLET BY MOUTH DAILY AS NEEDED FOR GOUT FLARE    leflunomide (ARAVA) 20 mg tablet Take 1 Tab by mouth daily.  pegloticase (KRYSTEXXA) 8 mg/mL soln injection 8 mg by IntraVENous route Once every 2 weeks.  spironolactone (ALDACTONE) 25 mg tablet Take 25 mg by mouth daily.  cholecalciferol (Vitamin D3) 25 mcg (1,000 unit) cap Take  by mouth daily.  carvedilol (COREG) 25 mg tablet Take 1 Tab by mouth two (2) times daily (with meals).  ferrous sulfate 325 mg (65 mg iron) cpER Take 1 Each by mouth daily.  sacubitril-valsartan (ENTRESTO) 24 mg/26 mg tablet Take 1 Tab by mouth every twelve (12) hours.  predniSONE (DELTASONE) 20 mg tablet Take 1 tab by mouth daily for flare    bumetanide (BUMEX) 1 mg tablet Take 1 Tab by mouth daily.  POTASSIUM ASPARTATE/MAG ASP (POTASSIUM & MAGNESIUM ASPARTAT PO) Take  by mouth. No current facility-administered medications for this visit.       Facility-Administered Medications Ordered in Other Visits   Medication    [START ON 11/18/2020] pegloticase (KRYSTEXXA) 8 mg, overfill volume 25 mL in 0.9% sodium chloride 250 mL infusion    [START ON 11/18/2020] acetaminophen (TYLENOL) tablet 650 mg    [START ON 11/18/2020] famotidine (PF) (PEPCID) 20 mg in 0.9% sodium chloride 10 mL injection    [START ON 11/18/2020] diphenhydrAMINE (BENADRYL) capsule 25 mg    [START ON 11/18/2020] methylPREDNISolone (PF) (SOLU-MEDROL) injection 30 mg    [START ON 11/18/2020] acetaminophen (TYLENOL) tablet 650 mg    [START ON 11/18/2020] diphenhydrAMINE (BENADRYL) injection 25 mg    [START ON 11/18/2020] 0.9% sodium chloride infusion     ALLERGIES    Allergies   Allergen Reactions    Shellfish Containing Products Swelling     PHYSICAL EXAMINATION    There were no vitals taken for this visit. There is no height or weight on file to calculate BMI. General: Patient is alert, oriented x 3, not in acute distress    HEENT:   Sclerae are not injected and appear moist.  There is no alopecia. Chest:  Breathing comfortably at room air    Skin exam:    Tophi: left eyelid left: left helix, bilateral olecranon (LEFT larger 15 cm), bilateral wrists, left 2nd MCP, IP, 2nd PIP, 3rd PIP, 4th PIP, right 2nd MCP, 5th MCP, IP, 2nd PIP, 5th PIP, RIGHT: patella, LEFT: achilles    Musculoskeletal exam:  A comprehensive musculoskeletal exam was NOT performed for all joints of each upper and lower extremity and assessed for swelling, tenderness and range of motion. Positive results are documented as below:    Previous Exam    Bilateral wrist, hand dorsum, MCP and digit swelling without tenderness    DATA REVIEW    Laboratory     Recent laboratory results were reviewed, summarized, and discussed with the patient. Imaging    Musculoskeletal Ultrasound    None    Radiographs    Bilateral Elbow 6/13/2019: RIGHT: normal alignment and bone mineral density. There is a well-circumscribed erosion within the olecranon. No definite effusion. There is high density material in the region of the olecranon bursa. LEFT: no fracture or effusion. No erosive change. There is high density material in the region of the olecranon bursa. Bilateral Hand 6/13/2019: RIGHT: normal alignment. Bone mineral density is normal. No fracture. There are extensive erosive changes of the distal ulna with adjacent high density soft tissue mass. There is erosive change within the triquetrum capitate and likely trapezoid. Erosive changes are noted the first second and third MCP joints. There is high density soft tissue adjacent to the second digit PIP joint. LEFT: normal alignment and no fracture.  There are scattered erosive changes throughout the carpus second MTP joint and PIP joints of the second third and fourth digits. There is soft tissue swelling which is high density of the thumb and second MTP joints. Additional soft tissue swelling at the wrist.     Bilateral Foot 6/13/2019: RIGHT: normal alignment and bone mineral density. There are erosive changes throughout the ankle, talar head, navicular and Lisfranc joint as well as the medial base proximal phalanx of the great toe. There is adjacent soft tissue density at the first metatarsal head. No acute fracture. LEFT: normal alignment bone mineral densities. There are periarticular erosions throughout the Lisfranc joint, between the navicular and cuneiforms and at the first MTP joint. There is mild soft tissue thickening and density medial to the first metatarsal head. There is suggestion of soft tissue density between the second and third metatarsal heads. No acute fracture.     CT Imaging    CT Abdomen and Pelvis without contrast 6/01/2019: Heart/vessels: Pericardial effusion and/or thickening measuring approximately 0.8 cm. Lungs/Pleura: Within normal limits. . ABDOMEN: Liver: Small, low-attenuation lesions in the liver which are incompletely characterized and statistically likely benign. Gallbladder/Biliary: Within normal limits. Spleen: Within normal limits. Pancreas: Within normal limits. Adrenals: Within normal limits. Kidneys: Innumerable low-attenuation lesions and high attenuation lesions throughout the kidneys with complete loss of normal-appearing parenchyma suggestive of polycystic kidney disease. Peritoneum/Mesenteries: Trace amount of fluid in the pelvis. Extraperitoneum: Within normal limits. Gastrointestinal tract: There is a short segment of small bowel within a supraumbilical hernia. The short segment of bowel within the hernia appears to have some mural thickening There are distended loops of small bowel with air-fluid levels associated with the hernia.  The more distal small bowel is nearly entirely decompressed as is the large bowel. Normal-appearing appendix. Vascular: Within normal limits. PELVIS: Extraperitoneum: Within normal limits. Ureters: Within normal limits. Bladder: The bladder is nearly entirely decompressed. There is concentric mural thickening in the bladder. Reproductive System: Within normal limits. MSK:  There is a supraumbilical hernia containing a short segment of small bowel with adjacent stranding and inflammation and stranding/inflammation within the fat in the hernia. Tiny, fat-containing umbilical hernia. Degenerative changes in the lumbar spine. There is degenerative disc disease at L3/L4 and L4/L5. Extensive degenerative changes in both hips. Dystrophic appearing calcifications at the origin of both hamstrings at the pubic rami. Degenerative changes in the pubic symphysis. MR Imaging    MRI Right Ankle without contrast 4/10/2012: There is osseous edema like signal at the inferior aspect of the lateral malleolus. There are also subcortical cysts in the anterior process of the calcaneus adjacent to the sinus tarsi. A mild to moderate osseous edema and the medial cuneiform bone is demonstrated subjacent to an area of distal high-grade chondral derangement. There is a moderate to large effusion of the ankle and posterior subtalar joints with demonstration of mild diffuse chondral thinning with small marginal osteophytes. There is a 7 mm loose body in the anterior joint recess. There is also heterogeneous signal in the posterior joint recesses which could contain small loose bodies as well. There is absent  visualization of the anterior talofibular ligament consistent with chronic tear. The other ankle ligaments appear intact. There is a small effusion of the talonavicular joint. Small dorsal marginal osteophytes throughout the midfoot region are noted.  There is a moderate effusion in the posterior tibialis tendon sheath with diffuse mild tendon thickening and inframalleolar heterogeneous signal. There is a small effusion of the flexor digitorum longus and flexor hallucis longus tendon sheaths with normal appearing tendons. There is a small effusion of the peroneal tendon sheath with mild diffuse thickening of the peroneus longus tendon though uniform signal. Mild. Achilles peritendinitis is noted. There is a trace amount of fluid signal in the extensor digitorum longus tendon sheath. There is mild flatfoot deformity. No tarsal coalition is shown. There is no bone or soft tissue mass. DXA     None    ASSESSMENT AND PLAN    This is a follow-up visit for Mr. Medina. 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 Probenecid and allopurinol are contraindicated. Uloric will be ineffective and given the new FDA box warning and his history of heart failure, I do not recommend it. He is maintained on Krystexxa 8 mg every 2 weeks on 8/26/2020 and leflunomide 20 mg daily for immunogenicity prevention. He initially was on Bertis Needle from 6/28/2019 to 10/18/2019 but then stopped it due to hypervolemia related to CKD progression and likely Medrol. He resumed treatment on 8/26/2020 with good tolerance and lower Medrol pre-medication at 30 mg without lower extremity edema. I recommend at least 12 months of Bertis Needle and removal of all visible tophi starting from 8/26/2020. I will continue treatment. 2) Long Term Use of Immunosuppressants. The patient remains on immunomodulatory medications (leflunomide) and requires frequent toxicity monitoring by blood work. Respective labs were ordered (CBC and CMP) with each scheduled Krystexxa infusion. 3) Chronic Kidney Disease Stage 3. The patient's creatinine 4.99 mg/dL, eGFR 15. He follows with Dr. Apoorva Waite.        4) Autosomal Dominant Polycystic Kidney Disease. He follows with Dr. Liset Carrillo who will start [facility].     5) Bilateral Lower Extremity Edema.  This resolved with lower pre-medication Medrol to 30 mg for Krystexxa. The patient voiced understanding of the aforementioned assessment and plan. Summary of plan was provided in the After Visit Summary patient instructions. TODAY'S ORDERS    No orders of the defined types were placed in this encounter.     Future Appointments   Date Time Provider Barry Cano   11/18/2020  2:00 PM G3 [facility] [address] [place] [address]   12/2/2020  3:30 PM G3 [facility] [address] [place] [address]   12/16/2020  2:00 PM G3 [facility] [address] [place] [address]   2/4/2021 10:20 AM Abad Rabago MD AOCR BS AMB     Crayton Phalen, MD, [address] [place] [address]    Adult Rheumatology   Rheumatology Ultrasound Certified  [facility]  [facility] of [facility], [address] [place] [address]   Phone [phone]  Fax [phone]

## 2020-11-18 ENCOUNTER — HOSPITAL ENCOUNTER (OUTPATIENT)
Dept: INFUSION THERAPY | Age: 50
Discharge: HOME | End: 2020-11-18
Payer: COMMERCIAL

## 2020-11-18 VITALS
RESPIRATION RATE: 18 BRPM | HEART RATE: 85 BPM | SYSTOLIC BLOOD PRESSURE: 140 MMHG | DIASTOLIC BLOOD PRESSURE: 91 MMHG | TEMPERATURE: 97.8 F

## 2020-11-18 LAB
ALBUMIN SERPL-MCNC: 3.4 G/DL (ref 3.5–5)
ALBUMIN/GLOB SERPL: 1 {RATIO} (ref 1.1–2.2)
ALP SERPL-CCNC: 68 U/L (ref 45–117)
ALT SERPL-CCNC: 21 U/L (ref 12–78)
ANION GAP SERPL CALC-SCNC: 7 MMOL/L (ref 5–15)
AST SERPL-CCNC: 16 U/L (ref 15–37)
BASOPHILS # BLD: 0 K/UL (ref 0–0.1)
BASOPHILS NFR BLD: 1 % (ref 0–1)
BILIRUB SERPL-MCNC: 0.3 MG/DL (ref 0.2–1)
BUN SERPL-MCNC: 80 MG/DL (ref 6–20)
BUN/CREAT SERPL: 16 (ref 12–20)
CALCIUM SERPL-MCNC: 7.7 MG/DL (ref 8.5–10.1)
CHLORIDE SERPL-SCNC: 115 MMOL/L (ref 97–108)
CO2 SERPL-SCNC: 19 MMOL/L (ref 21–32)
CREAT SERPL-MCNC: 4.86 MG/DL (ref 0.7–1.3)
DIFFERENTIAL METHOD BLD: ABNORMAL
EOSINOPHIL # BLD: 0.6 K/UL (ref 0–0.4)
EOSINOPHIL NFR BLD: 10 % (ref 0–7)
ERYTHROCYTE [DISTWIDTH] IN BLOOD BY AUTOMATED COUNT: 15.6 % (ref 11.5–14.5)
ERYTHROCYTE [SEDIMENTATION RATE] IN BLOOD: 60 MM/HR (ref 0–15)
GLOBULIN SER CALC-MCNC: 3.4 G/DL (ref 2–4)
GLUCOSE SERPL-MCNC: 81 MG/DL (ref 65–100)
HCT VFR BLD AUTO: 25.2 % (ref 36.6–50.3)
HGB BLD-MCNC: 7.4 G/DL (ref 12.1–17)
IMM GRANULOCYTES # BLD AUTO: 0 K/UL (ref 0–0.04)
IMM GRANULOCYTES NFR BLD AUTO: 0 % (ref 0–0.5)
LYMPHOCYTES # BLD: 1.3 K/UL (ref 0.8–3.5)
LYMPHOCYTES NFR BLD: 21 % (ref 12–49)
MCH RBC QN AUTO: 28.6 PG (ref 26–34)
MCHC RBC AUTO-ENTMCNC: 29.4 G/DL (ref 30–36.5)
MCV RBC AUTO: 97.3 FL (ref 80–99)
MONOCYTES # BLD: 0.5 K/UL (ref 0–1)
MONOCYTES NFR BLD: 9 % (ref 5–13)
NEUTS SEG # BLD: 3.6 K/UL (ref 1.8–8)
NEUTS SEG NFR BLD: 59 % (ref 32–75)
NRBC # BLD: 0 K/UL (ref 0–0.01)
NRBC BLD-RTO: 0 PER 100 WBC
PLATELET # BLD AUTO: 236 K/UL (ref 150–400)
PMV BLD AUTO: 11.5 FL (ref 8.9–12.9)
POTASSIUM SERPL-SCNC: 4.6 MMOL/L (ref 3.5–5.1)
PROT SERPL-MCNC: 6.8 G/DL (ref 6.4–8.2)
RBC # BLD AUTO: 2.59 M/UL (ref 4.1–5.7)
SODIUM SERPL-SCNC: 141 MMOL/L (ref 136–145)
URATE SERPL-MCNC: 1 MG/DL (ref 3.5–7.2)
WBC # BLD AUTO: 6 K/UL (ref 4.1–11.1)

## 2020-11-18 PROCEDURE — 80053 COMPREHEN METABOLIC PANEL: CPT

## 2020-11-18 PROCEDURE — 74011250636 HC RX REV CODE- 250/636: Performed by: INTERNAL MEDICINE

## 2020-11-18 PROCEDURE — 85025 COMPLETE CBC W/AUTO DIFF WBC: CPT

## 2020-11-18 PROCEDURE — 74011000250 HC RX REV CODE- 250: Performed by: INTERNAL MEDICINE

## 2020-11-18 PROCEDURE — 85652 RBC SED RATE AUTOMATED: CPT

## 2020-11-18 PROCEDURE — 74011250637 HC RX REV CODE- 250/637: Performed by: INTERNAL MEDICINE

## 2020-11-18 PROCEDURE — 36415 COLL VENOUS BLD VENIPUNCTURE: CPT

## 2020-11-18 PROCEDURE — 96375 TX/PRO/DX INJ NEW DRUG ADDON: CPT

## 2020-11-18 PROCEDURE — 96366 THER/PROPH/DIAG IV INF ADDON: CPT

## 2020-11-18 PROCEDURE — 84550 ASSAY OF BLOOD/URIC ACID: CPT

## 2020-11-18 PROCEDURE — 96365 THER/PROPH/DIAG IV INF INIT: CPT

## 2020-11-18 RX ADMIN — ACETAMINOPHEN 650 MG: 325 TABLET ORAL at 15:30

## 2020-11-18 RX ADMIN — FAMOTIDINE 20 MG: 10 INJECTION INTRAVENOUS at 15:38

## 2020-11-18 RX ADMIN — PEGLOTICASE 8 MG: 8 INJECTION, SOLUTION INTRAVENOUS at 16:10

## 2020-11-18 RX ADMIN — SODIUM CHLORIDE 25 ML/HR: 900 INJECTION, SOLUTION INTRAVENOUS at 15:30

## 2020-11-18 RX ADMIN — METHYLPREDNISOLONE SODIUM SUCCINATE 30 MG: 40 INJECTION, POWDER, FOR SOLUTION INTRAMUSCULAR; INTRAVENOUS at 15:35

## 2020-11-18 RX ADMIN — DIPHENHYDRAMINE HYDROCHLORIDE 25 MG: 25 CAPSULE ORAL at 15:30

## 2020-11-18 NOTE — PROGRESS NOTES
[facility] Short Note                       Date: November 18, 2020        1400 Pt admit to [facility] for [address] ambulatory in stable condition. Assessment completed. No new concerns voiced. PIV placed to left AC with positive blood return. Labs drawn and sent for processing. [address] Uric acid was 1.0 mg/dL. Okay to proceed with treatment. Mr. Luke Hernandez vitals were reviewed prior to and after treatment. Patient Vitals for the past 12 hrs:   Temp Pulse Resp BP   11/18/20 1833 -- 85 18 (!) 140/91   11/18/20 1403 97.8 °F (36.6 °C) 75 16 (!) 135/92       Lab results were obtained and reviewed. Recent Results (from the past 12 hour(s))   CBC WITH AUTOMATED DIFF    Collection Time: 11/18/20  2:08 PM   Result Value Ref Range    WBC 6.0 4.1 - 11.1 K/uL    RBC 2.59 (L) 4.10 - 5.70 M/uL    HGB 7.4 (L) 12.1 - 17.0 g/dL    HCT 25.2 (L) 36.6 - 50.3 %    MCV 97.3 80.0 - 99.0 FL    MCH 28.6 26.0 - 34.0 PG    MCHC 29.4 (L) 30.0 - 36.5 g/dL    RDW 15.6 (H) 11.5 - 14.5 %    PLATELET 414 289 - 848 K/uL    MPV 11.5 8.9 - 12.9 FL    NRBC 0.0 0  WBC    ABSOLUTE NRBC 0.00 0.00 - 0.01 K/uL    NEUTROPHILS 59 32 - 75 %    LYMPHOCYTES 21 12 - 49 %    MONOCYTES 9 5 - 13 %    EOSINOPHILS 10 (H) 0 - 7 %    BASOPHILS 1 0 - 1 %    IMMATURE GRANULOCYTES 0 0.0 - 0.5 %    ABS. NEUTROPHILS 3.6 1.8 - 8.0 K/UL    ABS. LYMPHOCYTES 1.3 0.8 - 3.5 K/UL    ABS. MONOCYTES 0.5 0.0 - 1.0 K/UL    ABS. EOSINOPHILS 0.6 (H) 0.0 - 0.4 K/UL    ABS. BASOPHILS 0.0 0.0 - 0.1 K/UL    ABS. IMM.  GRANS. 0.0 0.00 - 0.04 K/UL    DF AUTOMATED     METABOLIC PANEL, COMPREHENSIVE    Collection Time: 11/18/20  2:08 PM   Result Value Ref Range    Sodium 141 136 - 145 mmol/L    Potassium 4.6 3.5 - 5.1 mmol/L    Chloride 115 (H) 97 - 108 mmol/L    CO2 19 (L) 21 - 32 mmol/L    Anion gap 7 5 - 15 mmol/L    Glucose 81 65 - 100 mg/dL    BUN 80 (H) 6 - 20 MG/DL    Creatinine 4.86 (H) 0.70 - 1.30 MG/DL    BUN/Creatinine ratio 16 12 - 20      GFR est AA 16 (L) >60 ml/min/1.73m2    GFR est non-AA 13 (L) >60 ml/min/1.73m2    Calcium 7.7 (L) 8.5 - 10.1 MG/DL    Bilirubin, total 0.3 0.2 - 1.0 MG/DL    ALT (SGPT) 21 12 - 78 U/L    AST (SGOT) 16 15 - 37 U/L    Alk. phosphatase 68 45 - 117 U/L    Protein, total 6.8 6.4 - 8.2 g/dL    Albumin 3.4 (L) 3.5 - 5.0 g/dL    Globulin 3.4 2.0 - 4.0 g/dL    A-G Ratio 1.0 (L) 1.1 - 2.2     SED RATE (ESR)    Collection Time: 11/18/20  2:08 PM   Result Value Ref Range    Sed rate, automated 60 (H) 0 - 15 mm/hr   URIC ACID    Collection Time: 11/18/20  2:08 PM   Result Value Ref Range    Uric acid 1.0 (L) 3.5 - 7.2 MG/DL       Medications given:   Medications Administered     0.9% sodium chloride infusion     Admin Date  11/18/2020 Action  New Bag Dose  25 mL/hr Rate  25 mL/hr Route  IntraVENous Administered By  Arnaud Worthy          acetaminophen (TYLENOL) tablet 650 mg     Admin Date  11/18/2020 Action  Given Dose  650 mg Route  Oral Administered By  Arnaud Worthy          diphenhydrAMINE (BENADRYL) capsule 25 mg     Admin Date  11/18/2020 Action  Given Dose  25 mg Route  Oral Administered By  Arnaud Worthy          famotidine (PF) (PEPCID) 20 mg in 0.9% sodium chloride 10 mL injection     Admin Date  11/18/2020 Action  Given Dose  20 mg Route  IntraVENous Administered By  Arnaud Worthy          methylPREDNISolone (PF) (SOLU-MEDROL) injection 30 mg     Admin Date  11/18/2020 Action  Given Dose  30 mg Route  IntraVENous Administered By  Arnaud Worthy          [facility] 8 mg, overfill volume 25 mL in 0.9% sodium chloride 250 mL infusion     Admin Date  11/18/2020 Action  Given Dose  8 mg Rate  138 mL/hr Route  IntraVENous Administered By  Arnaud Worthy                Mr. Jenna Smith tolerated the infusion, and had no complaints. PIV maintained blood return throughout treatment. PIV flushed and removed. Mr. Jenna Smith was discharged from [place] [address] in stable condition at [address]  Patient is aware of next scheduled appointment on 12/2/2020. Will also be receiving Retacrit at that appointment.     Future Appointments   Date Time Provider Barry Rachael   12/2/2020  3:30 PM G3 [facility] [address] [place] [address]   12/2/2020  6:30 PM H1 DEEPA MUNOZ [facility]   12/16/2020  2:00 PM G3 DEEPA MED [address] [place] [address]   2/4/2021 10:20 AM Lorie Ricketts MD AOCR BS AMB       Mike Fonseca RN  November 18, 2020

## 2020-11-27 RX ORDER — DIPHENHYDRAMINE HYDROCHLORIDE 50 MG/ML
25 INJECTION, SOLUTION INTRAMUSCULAR; INTRAVENOUS
Status: DISCONTINUED | OUTPATIENT
Start: 2020-12-02 | End: 2020-12-03 | Stop reason: HOSPADM

## 2020-11-27 RX ORDER — ACETAMINOPHEN 325 MG/1
650 TABLET ORAL
Status: DISCONTINUED | OUTPATIENT
Start: 2020-12-02 | End: 2020-12-03 | Stop reason: HOSPADM

## 2020-11-27 RX ORDER — SODIUM CHLORIDE 9 MG/ML
25 INJECTION, SOLUTION INTRAVENOUS CONTINUOUS
Status: DISCONTINUED | OUTPATIENT
Start: 2020-12-02 | End: 2020-12-03 | Stop reason: HOSPADM

## 2020-11-27 RX ORDER — DIPHENHYDRAMINE HCL 25 MG
25 CAPSULE ORAL ONCE
Status: COMPLETED | OUTPATIENT
Start: 2020-12-02 | End: 2020-12-02

## 2020-11-27 RX ORDER — ACETAMINOPHEN 325 MG/1
650 TABLET ORAL ONCE
Status: COMPLETED | OUTPATIENT
Start: 2020-12-02 | End: 2020-12-02

## 2020-12-02 ENCOUNTER — HOSPITAL ENCOUNTER (OUTPATIENT)
Dept: INFUSION THERAPY | Age: 50
Discharge: HOME | End: 2020-12-02
Payer: COMMERCIAL

## 2020-12-02 VITALS
SYSTOLIC BLOOD PRESSURE: 139 MMHG | HEART RATE: 78 BPM | RESPIRATION RATE: 18 BRPM | TEMPERATURE: 97.7 F | DIASTOLIC BLOOD PRESSURE: 96 MMHG

## 2020-12-02 LAB
ALBUMIN SERPL-MCNC: 3.7 G/DL (ref 3.5–5)
ALBUMIN/GLOB SERPL: 1.1 {RATIO} (ref 1.1–2.2)
ALP SERPL-CCNC: 82 U/L (ref 45–117)
ALT SERPL-CCNC: 28 U/L (ref 12–78)
ANION GAP SERPL CALC-SCNC: 10 MMOL/L (ref 5–15)
AST SERPL-CCNC: 14 U/L (ref 15–37)
BASOPHILS # BLD: 0.1 K/UL (ref 0–0.1)
BASOPHILS NFR BLD: 1 % (ref 0–1)
BILIRUB SERPL-MCNC: 0.2 MG/DL (ref 0.2–1)
BUN SERPL-MCNC: 83 MG/DL (ref 6–20)
BUN/CREAT SERPL: 14 (ref 12–20)
CALCIUM SERPL-MCNC: 7.4 MG/DL (ref 8.5–10.1)
CHLORIDE SERPL-SCNC: 115 MMOL/L (ref 97–108)
CO2 SERPL-SCNC: 18 MMOL/L (ref 21–32)
CREAT SERPL-MCNC: 5.79 MG/DL (ref 0.7–1.3)
DIFFERENTIAL METHOD BLD: ABNORMAL
EOSINOPHIL # BLD: 0.8 K/UL (ref 0–0.4)
EOSINOPHIL NFR BLD: 11 % (ref 0–7)
ERYTHROCYTE [DISTWIDTH] IN BLOOD BY AUTOMATED COUNT: 15.2 % (ref 11.5–14.5)
ERYTHROCYTE [SEDIMENTATION RATE] IN BLOOD: 24 MM/HR (ref 0–15)
GLOBULIN SER CALC-MCNC: 3.3 G/DL (ref 2–4)
GLUCOSE SERPL-MCNC: 68 MG/DL (ref 65–100)
HCT VFR BLD AUTO: 28.3 % (ref 36.6–50.3)
HGB BLD-MCNC: 8.3 G/DL (ref 12.1–17)
IMM GRANULOCYTES # BLD AUTO: 0 K/UL (ref 0–0.04)
IMM GRANULOCYTES NFR BLD AUTO: 0 % (ref 0–0.5)
LYMPHOCYTES # BLD: 1.8 K/UL (ref 0.8–3.5)
LYMPHOCYTES NFR BLD: 25 % (ref 12–49)
MCH RBC QN AUTO: 29 PG (ref 26–34)
MCHC RBC AUTO-ENTMCNC: 29.3 G/DL (ref 30–36.5)
MCV RBC AUTO: 99 FL (ref 80–99)
MONOCYTES # BLD: 0.8 K/UL (ref 0–1)
MONOCYTES NFR BLD: 11 % (ref 5–13)
NEUTS SEG # BLD: 3.7 K/UL (ref 1.8–8)
NEUTS SEG NFR BLD: 52 % (ref 32–75)
NRBC # BLD: 0 K/UL (ref 0–0.01)
NRBC BLD-RTO: 0 PER 100 WBC
PLATELET # BLD AUTO: 250 K/UL (ref 150–400)
PMV BLD AUTO: 10.9 FL (ref 8.9–12.9)
POTASSIUM SERPL-SCNC: 4.8 MMOL/L (ref 3.5–5.1)
PROT SERPL-MCNC: 7 G/DL (ref 6.4–8.2)
RBC # BLD AUTO: 2.86 M/UL (ref 4.1–5.7)
SODIUM SERPL-SCNC: 143 MMOL/L (ref 136–145)
URATE SERPL-MCNC: 0.8 MG/DL (ref 3.5–7.2)
WBC # BLD AUTO: 7.1 K/UL (ref 4.1–11.1)

## 2020-12-02 PROCEDURE — 74011250636 HC RX REV CODE- 250/636: Performed by: INTERNAL MEDICINE

## 2020-12-02 PROCEDURE — 96375 TX/PRO/DX INJ NEW DRUG ADDON: CPT

## 2020-12-02 PROCEDURE — 96365 THER/PROPH/DIAG IV INF INIT: CPT

## 2020-12-02 PROCEDURE — 85652 RBC SED RATE AUTOMATED: CPT

## 2020-12-02 PROCEDURE — 96366 THER/PROPH/DIAG IV INF ADDON: CPT

## 2020-12-02 PROCEDURE — 80053 COMPREHEN METABOLIC PANEL: CPT

## 2020-12-02 PROCEDURE — 36415 COLL VENOUS BLD VENIPUNCTURE: CPT

## 2020-12-02 PROCEDURE — 84550 ASSAY OF BLOOD/URIC ACID: CPT

## 2020-12-02 PROCEDURE — 74011000250 HC RX REV CODE- 250: Performed by: INTERNAL MEDICINE

## 2020-12-02 PROCEDURE — 74011250637 HC RX REV CODE- 250/637: Performed by: INTERNAL MEDICINE

## 2020-12-02 PROCEDURE — 96372 THER/PROPH/DIAG INJ SC/IM: CPT

## 2020-12-02 PROCEDURE — 85025 COMPLETE CBC W/AUTO DIFF WBC: CPT

## 2020-12-02 RX ADMIN — ACETAMINOPHEN 650 MG: 325 TABLET ORAL at 15:55

## 2020-12-02 RX ADMIN — EPOETIN ALFA-EPBX 40000 UNITS: 40000 INJECTION, SOLUTION INTRAVENOUS; SUBCUTANEOUS at 19:20

## 2020-12-02 RX ADMIN — METHYLPREDNISOLONE SODIUM SUCCINATE 30 MG: 40 INJECTION, POWDER, FOR SOLUTION INTRAMUSCULAR; INTRAVENOUS at 15:56

## 2020-12-02 RX ADMIN — FAMOTIDINE 20 MG: 10 INJECTION INTRAVENOUS at 15:58

## 2020-12-02 RX ADMIN — SODIUM CHLORIDE 25 ML/HR: 900 INJECTION, SOLUTION INTRAVENOUS at 15:55

## 2020-12-02 RX ADMIN — DIPHENHYDRAMINE HYDROCHLORIDE 25 MG: 25 CAPSULE ORAL at 15:55

## 2020-12-02 RX ADMIN — PEGLOTICASE 8 MG: 8 INJECTION, SOLUTION INTRAVENOUS at 16:40

## 2020-12-03 NOTE — PROGRESS NOTES
[facility] Short Note                   4702 Pt admit to [facility] for Bertis Needle (& Retacrit) ambulatory in stable condition. Assessment completed. No new concerns voiced. PIV established in left arm with good blood return. Labs collected and sent for processing. Mr. [place] Mendoza vitals were reviewed prior to and after treatment. Patient Vitals for the past 12 hrs:   Temp Pulse Resp BP   12/02/20 1929 -- 78 -- (!) 139/96   12/02/20 1535 97.7 °F (36.5 °C) 85 18 (!) 144/92         Lab results were obtained and reviewed. Recent Results (from the past 12 hour(s))   CBC WITH AUTOMATED DIFF    Collection Time: 12/02/20  3:49 PM   Result Value Ref Range    WBC 7.1 4.1 - 11.1 K/uL    RBC 2.86 (L) 4.10 - 5.70 M/uL    HGB 8.3 (L) 12.1 - 17.0 g/dL    HCT 28.3 (L) 36.6 - 50.3 %    MCV 99.0 80.0 - 99.0 FL    MCH 29.0 26.0 - 34.0 PG    MCHC 29.3 (L) 30.0 - 36.5 g/dL    RDW 15.2 (H) 11.5 - 14.5 %    PLATELET 762 253 - 642 K/uL    MPV 10.9 8.9 - 12.9 FL    NRBC 0.0 0  WBC    ABSOLUTE NRBC 0.00 0.00 - 0.01 K/uL    NEUTROPHILS 52 32 - 75 %    LYMPHOCYTES 25 12 - 49 %    MONOCYTES 11 5 - 13 %    EOSINOPHILS 11 (H) 0 - 7 %    BASOPHILS 1 0 - 1 %    IMMATURE GRANULOCYTES 0 0.0 - 0.5 %    ABS. NEUTROPHILS 3.7 1.8 - 8.0 K/UL    ABS. LYMPHOCYTES 1.8 0.8 - 3.5 K/UL    ABS. MONOCYTES 0.8 0.0 - 1.0 K/UL    ABS. EOSINOPHILS 0.8 (H) 0.0 - 0.4 K/UL    ABS. BASOPHILS 0.1 0.0 - 0.1 K/UL    ABS. IMM.  GRANS. 0.0 0.00 - 0.04 K/UL    DF AUTOMATED     METABOLIC PANEL, COMPREHENSIVE    Collection Time: 12/02/20  3:49 PM   Result Value Ref Range    Sodium 143 136 - 145 mmol/L    Potassium 4.8 3.5 - 5.1 mmol/L    Chloride 115 (H) 97 - 108 mmol/L    CO2 18 (L) 21 - 32 mmol/L    Anion gap 10 5 - 15 mmol/L    Glucose 68 65 - 100 mg/dL    BUN 83 (H) 6 - 20 MG/DL    Creatinine 5.79 (H) 0.70 - 1.30 MG/DL    BUN/Creatinine ratio 14 12 - 20      GFR est AA 13 (L) >60 ml/min/1.73m2    GFR est non-AA 11 (L) >60 ml/min/1.73m2    Calcium 7.4 (L) 8.5 - 10.1 MG/DL Bilirubin, total 0.2 0.2 - 1.0 MG/DL    ALT (SGPT) 28 12 - 78 U/L    AST (SGOT) 14 (L) 15 - 37 U/L    Alk. phosphatase 82 45 - 117 U/L    Protein, total 7.0 6.4 - 8.2 g/dL    Albumin 3.7 3.5 - 5.0 g/dL    Globulin 3.3 2.0 - 4.0 g/dL    A-G Ratio 1.1 1.1 - 2.2     SED RATE (ESR)    Collection Time: 12/02/20  3:49 PM   Result Value Ref Range    Sed rate, automated 24 (H) 0 - 15 mm/hr   URIC ACID    Collection Time: 12/02/20  3:49 PM   Result Value Ref Range    Uric acid 0.8 (L) 3.5 - 7.2 MG/DL     Medications Administered     0.9% sodium chloride infusion     Admin Date  12/02/2020 Action  New Bag Dose  25 mL/hr Rate  25 mL/hr Route  IntraVENous Administered By  Gwyn Mckeon RN          acetaminophen (TYLENOL) tablet 650 mg     Admin Date  12/02/2020 Action  Given Dose  650 mg Route  Oral Administered By  Gwyn Mckeon RN          diphenhydrAMINE (BENADRYL) capsule 25 mg     Admin Date  12/02/2020 Action  Given Dose  25 mg Route  Oral Administered By  Gwyn Mckeon RN          famotidine (PF) (PEPCID) 20 mg in 0.9% sodium chloride 10 mL injection     Admin Date  12/02/2020 Action  Given Dose  20 mg Route  IntraVENous Administered By  Gwyn Mckeon RN          methylPREDNISolone (PF) (SOLU-MEDROL) injection 30 mg     Admin Date  12/02/2020 Action  Given Dose  30 mg Route  IntraVENous Administered By  Gwyn Mckeon RN          [facility] 8 mg, overfill volume 25 mL in 0.9% sodium chloride 250 mL infusion     Admin Date  12/02/2020 Action  Given Dose  8 mg Rate  138 mL/hr Route  IntraVENous Administered By  Gwyn Mckeon RN                  Mr. Medina tolerated the infusion, and had no complaints PIV removed without difficulty.      [facility] at [address]   Date Time Provider [facility] [place]   12/16/2020  2:00 PM [id number] [facility]   2/4/2021 10:20 AM Kartki Rabago MD AOCR BS AMB Jaimee Nielsen RN  December 2, 2020  7:15 PM

## 2020-12-03 NOTE — PROGRESS NOTES
Outpatient Infusion Center Progress Note    1218 Pt admit to [facility] for Retacrit [facility] DONNIE) ambulatory in stable condition. Assessment completed. No new concerns voiced. Medications:  Medications Administered     epoetin yodit-epbx (RETACRIT) injection 40,000 Units     Admin Date  12/02/2020 Action  Given Dose  60862 Units Route  SubCUTAneous Administered By  Modesta Goff RN            SC L arm       1930 Pt tolerated treatment well. D/c home ambulatory in no distress. Pt aware of next appointment scheduled for   Future Appointments   Date Time Provider Barry Cano   12/16/2020  2:00 PM [address] [facility]   2/4/2021 10:20 AM Cl Zayas MD AOCR BS AMB     Recent Results (from the past 12 hour(s))   CBC WITH AUTOMATED DIFF    Collection Time: 12/02/20  3:49 PM   Result Value Ref Range    WBC 7.1 4.1 - 11.1 K/uL    RBC 2.86 (L) 4.10 - 5.70 M/uL    HGB 8.3 (L) 12.1 - 17.0 g/dL    HCT 28.3 (L) 36.6 - 50.3 %    MCV 99.0 80.0 - 99.0 FL    MCH 29.0 26.0 - 34.0 PG    MCHC 29.3 (L) 30.0 - 36.5 g/dL    RDW 15.2 (H) 11.5 - 14.5 %    PLATELET 285 806 - 626 K/uL    MPV 10.9 8.9 - 12.9 FL    NRBC 0.0 0  WBC    ABSOLUTE NRBC 0.00 0.00 - 0.01 K/uL    NEUTROPHILS 52 32 - 75 %    LYMPHOCYTES 25 12 - 49 %    MONOCYTES 11 5 - 13 %    EOSINOPHILS 11 (H) 0 - 7 %    BASOPHILS 1 0 - 1 %    IMMATURE GRANULOCYTES 0 0.0 - 0.5 %    ABS. NEUTROPHILS 3.7 1.8 - 8.0 K/UL    ABS. LYMPHOCYTES 1.8 0.8 - 3.5 K/UL    ABS. MONOCYTES 0.8 0.0 - 1.0 K/UL    ABS. EOSINOPHILS 0.8 (H) 0.0 - 0.4 K/UL    ABS. BASOPHILS 0.1 0.0 - 0.1 K/UL    ABS. IMM.  GRANS. 0.0 0.00 - 0.04 K/UL    DF AUTOMATED     METABOLIC PANEL, COMPREHENSIVE    Collection Time: 12/02/20  3:49 PM   Result Value Ref Range    Sodium 143 136 - 145 mmol/L    Potassium 4.8 3.5 - 5.1 mmol/L    Chloride 115 (H) 97 - 108 mmol/L    CO2 18 (L) 21 - 32 mmol/L    Anion gap 10 5 - 15 mmol/L    Glucose 68 65 - 100 mg/dL    BUN 83 (H) 6 - 20 MG/DL    Creatinine 5.79 (H) 0.70 - 1.30 MG/DL    BUN/Creatinine ratio 14 12 - 20      GFR est AA 13 (L) >60 ml/min/1.73m2    GFR est non-AA 11 (L) >60 ml/min/1.73m2    Calcium 7.4 (L) 8.5 - 10.1 MG/DL    Bilirubin, total 0.2 0.2 - 1.0 MG/DL    ALT (SGPT) 28 12 - 78 U/L    AST (SGOT) 14 (L) 15 - 37 U/L    Alk.  phosphatase 82 45 - 117 U/L    Protein, total 7.0 6.4 - 8.2 g/dL    Albumin 3.7 3.5 - 5.0 g/dL    Globulin 3.3 2.0 - 4.0 g/dL    A-G Ratio 1.1 1.1 - 2.2     SED RATE (ESR)    Collection Time: 12/02/20  3:49 PM   Result Value Ref Range    Sed rate, automated 24 (H) 0 - 15 mm/hr   URIC ACID    Collection Time: 12/02/20  3:49 PM   Result Value Ref Range    Uric acid 0.8 (L) 3.5 - 7.2 MG/DL

## 2020-12-11 RX ORDER — SODIUM CHLORIDE 9 MG/ML
25 INJECTION, SOLUTION INTRAVENOUS CONTINUOUS
Status: DISCONTINUED | OUTPATIENT
Start: 2020-12-16 | End: 2020-12-17 | Stop reason: HOSPADM

## 2020-12-11 RX ORDER — DIPHENHYDRAMINE HYDROCHLORIDE 50 MG/ML
25 INJECTION, SOLUTION INTRAMUSCULAR; INTRAVENOUS
Status: DISCONTINUED | OUTPATIENT
Start: 2020-12-16 | End: 2020-12-17 | Stop reason: HOSPADM

## 2020-12-11 RX ORDER — ACETAMINOPHEN 325 MG/1
650 TABLET ORAL
Status: DISCONTINUED | OUTPATIENT
Start: 2020-12-16 | End: 2020-12-17 | Stop reason: HOSPADM

## 2020-12-11 RX ORDER — ACETAMINOPHEN 325 MG/1
650 TABLET ORAL ONCE
Status: COMPLETED | OUTPATIENT
Start: 2020-12-16 | End: 2020-12-16

## 2020-12-11 RX ORDER — DIPHENHYDRAMINE HCL 25 MG
25 CAPSULE ORAL ONCE
Status: COMPLETED | OUTPATIENT
Start: 2020-12-16 | End: 2020-12-16

## 2020-12-16 ENCOUNTER — HOSPITAL ENCOUNTER (OUTPATIENT)
Dept: INFUSION THERAPY | Age: 50
Discharge: HOME | End: 2020-12-16
Payer: COMMERCIAL

## 2020-12-16 VITALS
TEMPERATURE: 96.8 F | SYSTOLIC BLOOD PRESSURE: 121 MMHG | RESPIRATION RATE: 18 BRPM | DIASTOLIC BLOOD PRESSURE: 84 MMHG | HEART RATE: 76 BPM

## 2020-12-16 LAB
ALBUMIN SERPL-MCNC: 3.8 G/DL (ref 3.5–5)
ALBUMIN/GLOB SERPL: 1.1 {RATIO} (ref 1.1–2.2)
ALP SERPL-CCNC: 80 U/L (ref 45–117)
ALT SERPL-CCNC: 20 U/L (ref 12–78)
ANION GAP SERPL CALC-SCNC: 10 MMOL/L (ref 5–15)
AST SERPL-CCNC: 15 U/L (ref 15–37)
BASOPHILS # BLD: 0 K/UL (ref 0–0.1)
BASOPHILS NFR BLD: 1 % (ref 0–1)
BILIRUB SERPL-MCNC: 0.4 MG/DL (ref 0.2–1)
BUN SERPL-MCNC: 91 MG/DL (ref 6–20)
BUN/CREAT SERPL: 17 (ref 12–20)
CALCIUM SERPL-MCNC: 7.6 MG/DL (ref 8.5–10.1)
CHLORIDE SERPL-SCNC: 112 MMOL/L (ref 97–108)
CO2 SERPL-SCNC: 17 MMOL/L (ref 21–32)
CREAT SERPL-MCNC: 5.45 MG/DL (ref 0.7–1.3)
DIFFERENTIAL METHOD BLD: ABNORMAL
EOSINOPHIL # BLD: 0.6 K/UL (ref 0–0.4)
EOSINOPHIL NFR BLD: 11 % (ref 0–7)
ERYTHROCYTE [DISTWIDTH] IN BLOOD BY AUTOMATED COUNT: 15.5 % (ref 11.5–14.5)
ERYTHROCYTE [SEDIMENTATION RATE] IN BLOOD: 12 MM/HR (ref 0–15)
GLOBULIN SER CALC-MCNC: 3.6 G/DL (ref 2–4)
GLUCOSE SERPL-MCNC: 102 MG/DL (ref 65–100)
HCT VFR BLD AUTO: 36.9 % (ref 36.6–50.3)
HGB BLD-MCNC: 11.1 G/DL (ref 12.1–17)
IMM GRANULOCYTES # BLD AUTO: 0 K/UL (ref 0–0.04)
IMM GRANULOCYTES NFR BLD AUTO: 0 % (ref 0–0.5)
LYMPHOCYTES # BLD: 1.2 K/UL (ref 0.8–3.5)
LYMPHOCYTES NFR BLD: 23 % (ref 12–49)
MCH RBC QN AUTO: 29.8 PG (ref 26–34)
MCHC RBC AUTO-ENTMCNC: 30.1 G/DL (ref 30–36.5)
MCV RBC AUTO: 99.2 FL (ref 80–99)
MONOCYTES # BLD: 0.5 K/UL (ref 0–1)
MONOCYTES NFR BLD: 9 % (ref 5–13)
NEUTS SEG # BLD: 2.9 K/UL (ref 1.8–8)
NEUTS SEG NFR BLD: 56 % (ref 32–75)
NRBC # BLD: 0 K/UL (ref 0–0.01)
NRBC BLD-RTO: 0 PER 100 WBC
PLATELET # BLD AUTO: 235 K/UL (ref 150–400)
PMV BLD AUTO: 11.1 FL (ref 8.9–12.9)
POTASSIUM SERPL-SCNC: 5 MMOL/L (ref 3.5–5.1)
PROT SERPL-MCNC: 7.4 G/DL (ref 6.4–8.2)
RBC # BLD AUTO: 3.72 M/UL (ref 4.1–5.7)
SODIUM SERPL-SCNC: 139 MMOL/L (ref 136–145)
URATE SERPL-MCNC: 0.4 MG/DL (ref 3.5–7.2)
WBC # BLD AUTO: 5.2 K/UL (ref 4.1–11.1)

## 2020-12-16 PROCEDURE — 96366 THER/PROPH/DIAG IV INF ADDON: CPT

## 2020-12-16 PROCEDURE — 74011000250 HC RX REV CODE- 250: Performed by: INTERNAL MEDICINE

## 2020-12-16 PROCEDURE — 84550 ASSAY OF BLOOD/URIC ACID: CPT

## 2020-12-16 PROCEDURE — 74011250637 HC RX REV CODE- 250/637: Performed by: INTERNAL MEDICINE

## 2020-12-16 PROCEDURE — 85025 COMPLETE CBC W/AUTO DIFF WBC: CPT

## 2020-12-16 PROCEDURE — 96375 TX/PRO/DX INJ NEW DRUG ADDON: CPT

## 2020-12-16 PROCEDURE — 74011250636 HC RX REV CODE- 250/636: Performed by: INTERNAL MEDICINE

## 2020-12-16 PROCEDURE — 85652 RBC SED RATE AUTOMATED: CPT

## 2020-12-16 PROCEDURE — 96365 THER/PROPH/DIAG IV INF INIT: CPT

## 2020-12-16 PROCEDURE — 96374 THER/PROPH/DIAG INJ IV PUSH: CPT

## 2020-12-16 PROCEDURE — 36415 COLL VENOUS BLD VENIPUNCTURE: CPT

## 2020-12-16 PROCEDURE — 80053 COMPREHEN METABOLIC PANEL: CPT

## 2020-12-16 RX ADMIN — DIPHENHYDRAMINE HYDROCHLORIDE 25 MG: 25 CAPSULE ORAL at 14:46

## 2020-12-16 RX ADMIN — FAMOTIDINE 20 MG: 10 INJECTION INTRAVENOUS at 14:47

## 2020-12-16 RX ADMIN — PEGLOTICASE 8 MG: 8 INJECTION, SOLUTION INTRAVENOUS at 15:41

## 2020-12-16 RX ADMIN — METHYLPREDNISOLONE SODIUM SUCCINATE 30 MG: 40 INJECTION, POWDER, FOR SOLUTION INTRAMUSCULAR; INTRAVENOUS at 14:49

## 2020-12-16 RX ADMIN — ACETAMINOPHEN 650 MG: 325 TABLET ORAL at 14:46

## 2020-12-16 RX ADMIN — SODIUM CHLORIDE 25 ML/HR: 900 INJECTION, SOLUTION INTRAVENOUS at 14:45

## 2020-12-16 NOTE — PROGRESS NOTES
[facility] Short Note                       Date: 2020    Name: Jd Serrato    MRN: [id number]         : 1970    1420 Pt admit to [facility] for [facility] ambulatory in stable condition. Assessment completed. No new concerns voiced. Please review pending lab results in [facility]. Patient denies SOB, fever, cough, general not feeling well. Patient denies recent exposure to someone who has tested positive for COVID-19. Patient denies having contact with anyone who has a pending COVID test.    Mr. Baron Liter vitals were reviewed prior to treatment. Patient Vitals for the past 12 hrs:   Temp Pulse Resp BP   20 1426 96.8 °F (36 °C) 76 18 121/84       PIV with positive blood return. Lab drawn, flushed, heparinized and de-accessed per protocol. Patient did not need retacrit     Medications given:   Medications Administered     0.9% sodium chloride infusion     Admin Date  2020 Action  New Bag Dose  25 mL/hr Rate  25 mL/hr Route  IntraVENous Administered By  Izabela Yost RN          acetaminophen (TYLENOL) tablet 650 mg     Admin Date  2020 Action  Given Dose  650 mg Route  Oral Administered By  Izabela Yost RN          diphenhydrAMINE (BENADRYL) capsule 25 mg     Admin Date  2020 Action  Given Dose  25 mg Route  Oral Administered By  Izabela Yost RN          famotidine (PF) (PEPCID) 20 mg in 0.9% sodium chloride 10 mL injection     Admin Date  2020 Action  Given Dose  20 mg Route  IntraVENous Administered By  Izabela Yost RN          methylPREDNISolone (PF) (SOLU-MEDROL) injection 30 mg     Admin Date  2020 Action  Given Dose  30 mg Route  IntraVENous Administered By  Izabela Yost RN          pegloticase [facility]) 8 mg, overfill volume 25 mL in 0.9% sodium chloride 250 mL infusion     Admin Date  2020 Action  Given Dose  8 mg Rate  138 mL/hr Route  IntraVENous Administered By  Izabela Yost RN                   1800 Pt tolerated treatment well.  D/c home ambulatory in no distress.      Future Appointments   Date Time Provider [facility] Rachael   1/6/2021  2:00 PM G3 DEEPA MED [address] [facility]   1/6/2021  5:00 PM G2 DEEPA [facility]   1/20/2021  2:00 PM G3 DEEPA MED [place] [facility]   1/20/2021  5:00 PM G2 DEEPA [facility]   2/4/2021 10:20 AM Masri, Severo Roan, MD AOCR BS [address], RN  December 16, 2020  6:23 PM

## 2020-12-16 NOTE — PROGRESS NOTES
\[facility]\"" Short Note Date: 2020 Name: Luis F Wren MRN: [id number] : 1970 
 
1420 Pt admit to [facility] for [address] [facility] ambulatory in stable condition. Assessment completed. No new concerns voiced. Please review pending lab results in [address] [facility]. Patient denies SOB, fever, cough, general not feeling well. Patient denies recent exposure to someone who has tested positive for COVID-19. Patient denies having contact with anyone who has a pending COVID test. 
 
Mr. Linda Willson vitals were reviewed prior to treatment. No data found. *** with positive blood return. Lab drawn, flushed, heparinized and de-accessed per protocol. Medications given: *** 
 
  
 
*** Pt tolerated treatment well. D/c home ambulatory in no distress. Pt aware of next appointment scheduled for ***. Future Appointments Date Time Provider Barry Cano 2021  2:00 PM G3 [facility]EE[facility] MED [address]  
2021  5:00 PM [facility]  
2021  2:00 PM G3 DEEPA MED [address]  
2021  5:00 PM G2 DEEPA [address]  
2021 10:20 AM Joan Rabago MD AOCR BS AMB Summer Urban RN 2020 
6:19 PM

## 2020-12-30 RX ORDER — ACETAMINOPHEN 325 MG/1
650 TABLET ORAL ONCE
Status: COMPLETED | OUTPATIENT
Start: 2021-01-06 | End: 2021-01-06

## 2020-12-30 RX ORDER — ACETAMINOPHEN 325 MG/1
650 TABLET ORAL
Status: DISCONTINUED | OUTPATIENT
Start: 2021-01-06 | End: 2021-01-07 | Stop reason: HOSPADM

## 2020-12-30 RX ORDER — SODIUM CHLORIDE 9 MG/ML
25 INJECTION, SOLUTION INTRAVENOUS CONTINUOUS
Status: DISCONTINUED | OUTPATIENT
Start: 2021-01-06 | End: 2021-01-07 | Stop reason: HOSPADM

## 2020-12-30 RX ORDER — DIPHENHYDRAMINE HCL 25 MG
25 CAPSULE ORAL ONCE
Status: COMPLETED | OUTPATIENT
Start: 2021-01-06 | End: 2021-01-06

## 2020-12-30 RX ORDER — DIPHENHYDRAMINE HYDROCHLORIDE 50 MG/ML
25 INJECTION, SOLUTION INTRAMUSCULAR; INTRAVENOUS
Status: DISCONTINUED | OUTPATIENT
Start: 2021-01-06 | End: 2021-01-07 | Stop reason: HOSPADM

## 2021-01-06 ENCOUNTER — HOSPITAL ENCOUNTER (OUTPATIENT)
Dept: INFUSION THERAPY | Age: 51
Discharge: HOME | End: 2021-01-06
Payer: COMMERCIAL

## 2021-01-06 VITALS
TEMPERATURE: 97.3 F | RESPIRATION RATE: 18 BRPM | DIASTOLIC BLOOD PRESSURE: 89 MMHG | HEART RATE: 80 BPM | SYSTOLIC BLOOD PRESSURE: 123 MMHG

## 2021-01-06 LAB
ALBUMIN SERPL-MCNC: 3.8 G/DL (ref 3.5–5)
ALBUMIN/GLOB SERPL: 1 {RATIO} (ref 1.1–2.2)
ALP SERPL-CCNC: 81 U/L (ref 45–117)
ALT SERPL-CCNC: 27 U/L (ref 12–78)
ANION GAP SERPL CALC-SCNC: 7 MMOL/L (ref 5–15)
AST SERPL-CCNC: 14 U/L (ref 15–37)
BASOPHILS # BLD: 0.1 K/UL (ref 0–0.1)
BASOPHILS NFR BLD: 1 % (ref 0–1)
BILIRUB SERPL-MCNC: 0.4 MG/DL (ref 0.2–1)
BUN SERPL-MCNC: 76 MG/DL (ref 6–20)
BUN/CREAT SERPL: 14 (ref 12–20)
CALCIUM SERPL-MCNC: 8.4 MG/DL (ref 8.5–10.1)
CHLORIDE SERPL-SCNC: 112 MMOL/L (ref 97–108)
CO2 SERPL-SCNC: 19 MMOL/L (ref 21–32)
CREAT SERPL-MCNC: 5.57 MG/DL (ref 0.7–1.3)
DIFFERENTIAL METHOD BLD: ABNORMAL
EOSINOPHIL # BLD: 0.7 K/UL (ref 0–0.4)
EOSINOPHIL NFR BLD: 13 % (ref 0–7)
ERYTHROCYTE [DISTWIDTH] IN BLOOD BY AUTOMATED COUNT: 13.7 % (ref 11.5–14.5)
ERYTHROCYTE [SEDIMENTATION RATE] IN BLOOD: 12 MM/HR (ref 0–15)
GLOBULIN SER CALC-MCNC: 3.7 G/DL (ref 2–4)
GLUCOSE SERPL-MCNC: 83 MG/DL (ref 65–100)
HCT VFR BLD AUTO: 36 % (ref 36.6–50.3)
HGB BLD-MCNC: 10.9 G/DL (ref 12.1–17)
IMM GRANULOCYTES # BLD AUTO: 0 K/UL (ref 0–0.04)
IMM GRANULOCYTES NFR BLD AUTO: 0 % (ref 0–0.5)
LYMPHOCYTES # BLD: 1.3 K/UL (ref 0.8–3.5)
LYMPHOCYTES NFR BLD: 24 % (ref 12–49)
MCH RBC QN AUTO: 29.3 PG (ref 26–34)
MCHC RBC AUTO-ENTMCNC: 30.3 G/DL (ref 30–36.5)
MCV RBC AUTO: 96.8 FL (ref 80–99)
MONOCYTES # BLD: 0.5 K/UL (ref 0–1)
MONOCYTES NFR BLD: 9 % (ref 5–13)
NEUTS SEG # BLD: 2.8 K/UL (ref 1.8–8)
NEUTS SEG NFR BLD: 53 % (ref 32–75)
NRBC # BLD: 0 K/UL (ref 0–0.01)
NRBC BLD-RTO: 0 PER 100 WBC
PLATELET # BLD AUTO: 211 K/UL (ref 150–400)
PMV BLD AUTO: 11.8 FL (ref 8.9–12.9)
POTASSIUM SERPL-SCNC: 4.6 MMOL/L (ref 3.5–5.1)
PROT SERPL-MCNC: 7.5 G/DL (ref 6.4–8.2)
RBC # BLD AUTO: 3.72 M/UL (ref 4.1–5.7)
SODIUM SERPL-SCNC: 138 MMOL/L (ref 136–145)
URATE SERPL-MCNC: 1.3 MG/DL (ref 3.5–7.2)
WBC # BLD AUTO: 5.3 K/UL (ref 4.1–11.1)

## 2021-01-06 PROCEDURE — 85652 RBC SED RATE AUTOMATED: CPT

## 2021-01-06 PROCEDURE — 36415 COLL VENOUS BLD VENIPUNCTURE: CPT

## 2021-01-06 PROCEDURE — 84550 ASSAY OF BLOOD/URIC ACID: CPT

## 2021-01-06 PROCEDURE — 74011250637 HC RX REV CODE- 250/637: Performed by: INTERNAL MEDICINE

## 2021-01-06 PROCEDURE — 96366 THER/PROPH/DIAG IV INF ADDON: CPT

## 2021-01-06 PROCEDURE — 74011250636 HC RX REV CODE- 250/636: Performed by: INTERNAL MEDICINE

## 2021-01-06 PROCEDURE — 96372 THER/PROPH/DIAG INJ SC/IM: CPT

## 2021-01-06 PROCEDURE — 96375 TX/PRO/DX INJ NEW DRUG ADDON: CPT

## 2021-01-06 PROCEDURE — 96365 THER/PROPH/DIAG IV INF INIT: CPT

## 2021-01-06 PROCEDURE — 80053 COMPREHEN METABOLIC PANEL: CPT

## 2021-01-06 PROCEDURE — 74011000250 HC RX REV CODE- 250: Performed by: INTERNAL MEDICINE

## 2021-01-06 PROCEDURE — 85025 COMPLETE CBC W/AUTO DIFF WBC: CPT

## 2021-01-06 RX ADMIN — FAMOTIDINE 20 MG: 10 INJECTION INTRAVENOUS at 14:37

## 2021-01-06 RX ADMIN — EPOETIN ALFA-EPBX 40000 UNITS: 40000 INJECTION, SOLUTION INTRAVENOUS; SUBCUTANEOUS at 15:48

## 2021-01-06 RX ADMIN — PEGLOTICASE 8 MG: 8 INJECTION, SOLUTION INTRAVENOUS at 15:04

## 2021-01-06 RX ADMIN — ACETAMINOPHEN 650 MG: 325 TABLET ORAL at 14:33

## 2021-01-06 RX ADMIN — METHYLPREDNISOLONE SODIUM SUCCINATE 30 MG: 40 INJECTION, POWDER, FOR SOLUTION INTRAMUSCULAR; INTRAVENOUS at 14:36

## 2021-01-06 RX ADMIN — SODIUM CHLORIDE 25 ML/HR: 900 INJECTION, SOLUTION INTRAVENOUS at 14:40

## 2021-01-06 RX ADMIN — DIPHENHYDRAMINE HYDROCHLORIDE 25 MG: 25 CAPSULE ORAL at 14:33

## 2021-01-06 NOTE — PROGRESS NOTES
Outpatient Infusion Center Progress Note    1410 Pt admit to [facility] for [address] ambulatory in stable condition. Assessment completed. No new concerns voiced. Piv established in left ac, labs drawn and sent for processing, IV maintained good blood return throughout infusion. Patient denies Covid exposures, denies, fever, cough, sob, and feeling un well. Visit Vitals  /89   Pulse 80   Temp 97.3 °F (36.3 °C)   Resp 18       Medications:  Recent Results (from the past 12 hour(s))   CBC WITH AUTOMATED DIFF    Collection Time: 01/06/21  2:12 PM   Result Value Ref Range    WBC 5.3 4.1 - 11.1 K/uL    RBC 3.72 (L) 4.10 - 5.70 M/uL    HGB 10.9 (L) 12.1 - 17.0 g/dL    HCT 36.0 (L) 36.6 - 50.3 %    MCV 96.8 80.0 - 99.0 FL    MCH 29.3 26.0 - 34.0 PG    MCHC 30.3 30.0 - 36.5 g/dL    RDW 13.7 11.5 - 14.5 %    PLATELET 222 294 - 562 K/uL    MPV 11.8 8.9 - 12.9 FL    NRBC 0.0 0  WBC    ABSOLUTE NRBC 0.00 0.00 - 0.01 K/uL    NEUTROPHILS 53 32 - 75 %    LYMPHOCYTES 24 12 - 49 %    MONOCYTES 9 5 - 13 %    EOSINOPHILS 13 (H) 0 - 7 %    BASOPHILS 1 0 - 1 %    IMMATURE GRANULOCYTES 0 0.0 - 0.5 %    ABS. NEUTROPHILS 2.8 1.8 - 8.0 K/UL    ABS. LYMPHOCYTES 1.3 0.8 - 3.5 K/UL    ABS. MONOCYTES 0.5 0.0 - 1.0 K/UL    ABS. EOSINOPHILS 0.7 (H) 0.0 - 0.4 K/UL    ABS. BASOPHILS 0.1 0.0 - 0.1 K/UL    ABS. IMM.  GRANS. 0.0 0.00 - 0.04 K/UL    DF AUTOMATED     METABOLIC PANEL, COMPREHENSIVE    Collection Time: 01/06/21  2:12 PM   Result Value Ref Range    Sodium 138 136 - 145 mmol/L    Potassium 4.6 3.5 - 5.1 mmol/L    Chloride 112 (H) 97 - 108 mmol/L    CO2 19 (L) 21 - 32 mmol/L    Anion gap 7 5 - 15 mmol/L    Glucose 83 65 - 100 mg/dL    BUN 76 (H) 6 - 20 MG/DL    Creatinine 5.57 (H) 0.70 - 1.30 MG/DL    BUN/Creatinine ratio 14 12 - 20      GFR est AA 13 (L) >60 ml/min/1.73m2    GFR est non-AA 11 (L) >60 ml/min/1.73m2    Calcium 8.4 (L) 8.5 - 10.1 MG/DL    Bilirubin, total 0.4 0.2 - 1.0 MG/DL    ALT (SGPT) 27 12 - 78 U/L    AST (SGOT) 14 (L) 15 - 37 U/L    Alk. phosphatase 81 45 - 117 U/L    Protein, total 7.5 6.4 - 8.2 g/dL    Albumin 3.8 3.5 - 5.0 g/dL    Globulin 3.7 2.0 - 4.0 g/dL    A-G Ratio 1.0 (L) 1.1 - 2.2     SED RATE (ESR)    Collection Time: 01/06/21  2:12 PM   Result Value Ref Range    Sed rate, automated 12 0 - 15 mm/hr   URIC ACID    Collection Time: 01/06/21  2:12 PM   Result Value Ref Range    Uric acid 1.3 (L) 3.5 - 7.2 MG/DL       1720 Pt tolerated treatment well. D/c home ambulatory in no distress. Pt aware of next appointment scheduled for 1/20/21.

## 2021-01-15 RX ORDER — SODIUM CHLORIDE 9 MG/ML
25 INJECTION, SOLUTION INTRAVENOUS CONTINUOUS
Status: DISCONTINUED | OUTPATIENT
Start: 2021-01-20 | End: 2021-01-21 | Stop reason: HOSPADM

## 2021-01-15 RX ORDER — ACETAMINOPHEN 325 MG/1
650 TABLET ORAL
Status: DISCONTINUED | OUTPATIENT
Start: 2021-01-20 | End: 2021-01-21 | Stop reason: HOSPADM

## 2021-01-15 RX ORDER — ACETAMINOPHEN 325 MG/1
650 TABLET ORAL ONCE
Status: COMPLETED | OUTPATIENT
Start: 2021-01-20 | End: 2021-01-20

## 2021-01-15 RX ORDER — DIPHENHYDRAMINE HYDROCHLORIDE 50 MG/ML
25 INJECTION, SOLUTION INTRAMUSCULAR; INTRAVENOUS
Status: DISCONTINUED | OUTPATIENT
Start: 2021-01-20 | End: 2021-01-21 | Stop reason: HOSPADM

## 2021-01-15 RX ORDER — DIPHENHYDRAMINE HCL 25 MG
25 CAPSULE ORAL ONCE
Status: COMPLETED | OUTPATIENT
Start: 2021-01-20 | End: 2021-01-20

## 2021-01-20 ENCOUNTER — HOSPITAL ENCOUNTER (OUTPATIENT)
Dept: INFUSION THERAPY | Age: 51
Discharge: HOME | End: 2021-01-20
Payer: COMMERCIAL

## 2021-01-20 VITALS
HEART RATE: 74 BPM | DIASTOLIC BLOOD PRESSURE: 79 MMHG | TEMPERATURE: 97.8 F | RESPIRATION RATE: 18 BRPM | SYSTOLIC BLOOD PRESSURE: 120 MMHG

## 2021-01-20 LAB
ALBUMIN SERPL-MCNC: 3.5 G/DL (ref 3.5–5)
ALBUMIN/GLOB SERPL: 1 {RATIO} (ref 1.1–2.2)
ALP SERPL-CCNC: 67 U/L (ref 45–117)
ALT SERPL-CCNC: 20 U/L (ref 12–78)
ANION GAP SERPL CALC-SCNC: 13 MMOL/L (ref 5–15)
AST SERPL-CCNC: 14 U/L (ref 15–37)
BASOPHILS # BLD: 0 K/UL (ref 0–0.1)
BASOPHILS NFR BLD: 1 % (ref 0–1)
BILIRUB SERPL-MCNC: 0.4 MG/DL (ref 0.2–1)
BUN SERPL-MCNC: 101 MG/DL (ref 6–20)
BUN/CREAT SERPL: 17 (ref 12–20)
CALCIUM SERPL-MCNC: 7.9 MG/DL (ref 8.5–10.1)
CHLORIDE SERPL-SCNC: 113 MMOL/L (ref 97–108)
CO2 SERPL-SCNC: 13 MMOL/L (ref 21–32)
CREAT SERPL-MCNC: 5.87 MG/DL (ref 0.7–1.3)
DIFFERENTIAL METHOD BLD: ABNORMAL
EOSINOPHIL # BLD: 0.6 K/UL (ref 0–0.4)
EOSINOPHIL NFR BLD: 11 % (ref 0–7)
ERYTHROCYTE [DISTWIDTH] IN BLOOD BY AUTOMATED COUNT: 14.5 % (ref 11.5–14.5)
ERYTHROCYTE [SEDIMENTATION RATE] IN BLOOD: 22 MM/HR (ref 0–15)
GLOBULIN SER CALC-MCNC: 3.6 G/DL (ref 2–4)
GLUCOSE SERPL-MCNC: 87 MG/DL (ref 65–100)
HCT VFR BLD AUTO: 35.4 % (ref 36.6–50.3)
HGB BLD-MCNC: 10.6 G/DL (ref 12.1–17)
IMM GRANULOCYTES # BLD AUTO: 0 K/UL (ref 0–0.04)
IMM GRANULOCYTES NFR BLD AUTO: 0 % (ref 0–0.5)
LYMPHOCYTES # BLD: 1.1 K/UL (ref 0.8–3.5)
LYMPHOCYTES NFR BLD: 20 % (ref 12–49)
MCH RBC QN AUTO: 28.9 PG (ref 26–34)
MCHC RBC AUTO-ENTMCNC: 29.9 G/DL (ref 30–36.5)
MCV RBC AUTO: 96.5 FL (ref 80–99)
MONOCYTES # BLD: 0.4 K/UL (ref 0–1)
MONOCYTES NFR BLD: 8 % (ref 5–13)
NEUTS SEG # BLD: 3.2 K/UL (ref 1.8–8)
NEUTS SEG NFR BLD: 60 % (ref 32–75)
NRBC # BLD: 0 K/UL (ref 0–0.01)
NRBC BLD-RTO: 0 PER 100 WBC
PLATELET # BLD AUTO: 282 K/UL (ref 150–400)
PMV BLD AUTO: 10.1 FL (ref 8.9–12.9)
POTASSIUM SERPL-SCNC: 4.6 MMOL/L (ref 3.5–5.1)
PROT SERPL-MCNC: 7.1 G/DL (ref 6.4–8.2)
RBC # BLD AUTO: 3.67 M/UL (ref 4.1–5.7)
SODIUM SERPL-SCNC: 139 MMOL/L (ref 136–145)
URATE SERPL-MCNC: <0.2 MG/DL (ref 3.5–7.2)
WBC # BLD AUTO: 5.3 K/UL (ref 4.1–11.1)

## 2021-01-20 PROCEDURE — 80053 COMPREHEN METABOLIC PANEL: CPT

## 2021-01-20 PROCEDURE — 74011000250 HC RX REV CODE- 250: Performed by: INTERNAL MEDICINE

## 2021-01-20 PROCEDURE — 74011250636 HC RX REV CODE- 250/636: Performed by: INTERNAL MEDICINE

## 2021-01-20 PROCEDURE — 96365 THER/PROPH/DIAG IV INF INIT: CPT

## 2021-01-20 PROCEDURE — 36415 COLL VENOUS BLD VENIPUNCTURE: CPT

## 2021-01-20 PROCEDURE — 85652 RBC SED RATE AUTOMATED: CPT

## 2021-01-20 PROCEDURE — 74011250637 HC RX REV CODE- 250/637: Performed by: INTERNAL MEDICINE

## 2021-01-20 PROCEDURE — 96375 TX/PRO/DX INJ NEW DRUG ADDON: CPT

## 2021-01-20 PROCEDURE — 84550 ASSAY OF BLOOD/URIC ACID: CPT

## 2021-01-20 PROCEDURE — 96372 THER/PROPH/DIAG INJ SC/IM: CPT

## 2021-01-20 PROCEDURE — 85025 COMPLETE CBC W/AUTO DIFF WBC: CPT

## 2021-01-20 PROCEDURE — 96366 THER/PROPH/DIAG IV INF ADDON: CPT

## 2021-01-20 RX ADMIN — PEGLOTICASE 8 MG: 8 INJECTION, SOLUTION INTRAVENOUS at 15:04

## 2021-01-20 RX ADMIN — METHYLPREDNISOLONE SODIUM SUCCINATE 30 MG: 40 INJECTION, POWDER, FOR SOLUTION INTRAMUSCULAR; INTRAVENOUS at 14:35

## 2021-01-20 RX ADMIN — DIPHENHYDRAMINE HYDROCHLORIDE 25 MG: 25 CAPSULE ORAL at 14:28

## 2021-01-20 RX ADMIN — EPOETIN ALFA-EPBX 40000 UNITS: 40000 INJECTION, SOLUTION INTRAVENOUS; SUBCUTANEOUS at 15:46

## 2021-01-20 RX ADMIN — SODIUM CHLORIDE 25 ML/HR: 900 INJECTION, SOLUTION INTRAVENOUS at 14:40

## 2021-01-20 RX ADMIN — ACETAMINOPHEN 650 MG: 325 TABLET ORAL at 14:28

## 2021-01-20 RX ADMIN — FAMOTIDINE 20 MG: 10 INJECTION, SOLUTION INTRAVENOUS at 14:29

## 2021-01-20 NOTE — PROGRESS NOTES
Outpatient Infusion Center Progress Note    1400 Pt admit to [facility] for Kyrystexxa  infusion ambulatory in stable condition. Assessment completed. No new concerns voiced. PIV established in his left Ac with good blood return, labs drawn and sent for processing, PIv maintained good blood return throughout infusion. Patient denies any Covid contact, denies sob, cough , fever or feeling un well    Visit Vitals  /79   Pulse 74   Temp 97.8 °F (36.6 °C)   Resp 18       Medications:  Medications Administered     0.9% sodium chloride infusion     Admin Date  01/20/2021 Action  New Bag Dose  25 mL/hr Rate  25 mL/hr Route  IntraVENous Administered By  Erika Mike, LORRI          acetaminophen (TYLENOL) tablet 650 mg     Admin Date  01/20/2021 Action  Given Dose  650 mg Route  Oral Administered By  Erika Mike, LORRI          diphenhydrAMINE (BENADRYL) capsule 25 mg     Admin Date  01/20/2021 Action  Given Dose  25 mg Route  Oral Administered By  Erika Mike, LORRI          famotidine (PF) (PEPCID) 20 mg in 0.9% sodium chloride 10 mL injection     Admin Date  01/20/2021 Action  Given Dose  20 mg Route  IntraVENous Administered By  Erika Mike, LORRI          methylPREDNISolone (PF) (SOLU-MEDROL) injection 30 mg     Admin Date  01/20/2021 Action  Given Dose  30 mg Route  IntraVENous Administered By  Erika Mike, LORRI          pegloticase [facility]) 8 mg, overfill volume 25 mL in 0.9% sodium chloride 250 mL infusion     Admin Date  01/20/2021 Action  Given Dose  8 mg Rate  138 mL/hr Route  IntraVENous Administered By  Erika Mike, RN                4047 Pt tolerated treatment well. D/c home ambulatory in no distress. Pt aware of next appointment scheduled for patient will call to make more appointments.     Recent Results (from the past 12 hour(s))   CBC WITH AUTOMATED DIFF    Collection Time: 01/20/21  2:12 PM   Result Value Ref Range    WBC 5.3 4.1 - 11.1 K/uL    RBC 3.67 (L) 4.10 - 5.70 M/uL    HGB 10.6 (L) 12.1 - 17.0 g/dL    HCT 35.4 (L) 36.6 - 50.3 %    MCV 96.5 80.0 - 99.0 FL    MCH 28.9 26.0 - 34.0 PG    MCHC 29.9 (L) 30.0 - 36.5 g/dL    RDW 14.5 11.5 - 14.5 %    PLATELET 916 278 - 355 K/uL    MPV 10.1 8.9 - 12.9 FL    NRBC 0.0 0  WBC    ABSOLUTE NRBC 0.00 0.00 - 0.01 K/uL    NEUTROPHILS 60 32 - 75 %    LYMPHOCYTES 20 12 - 49 %    MONOCYTES 8 5 - 13 %    EOSINOPHILS 11 (H) 0 - 7 %    BASOPHILS 1 0 - 1 %    IMMATURE GRANULOCYTES 0 0.0 - 0.5 %    ABS. NEUTROPHILS 3.2 1.8 - 8.0 K/UL    ABS. LYMPHOCYTES 1.1 0.8 - 3.5 K/UL    ABS. MONOCYTES 0.4 0.0 - 1.0 K/UL    ABS. EOSINOPHILS 0.6 (H) 0.0 - 0.4 K/UL    ABS. BASOPHILS 0.0 0.0 - 0.1 K/UL    ABS. IMM. GRANS. 0.0 0.00 - 0.04 K/UL    DF AUTOMATED     METABOLIC PANEL, COMPREHENSIVE    Collection Time: 01/20/21  2:12 PM   Result Value Ref Range    Sodium 139 136 - 145 mmol/L    Potassium 4.6 3.5 - 5.1 mmol/L    Chloride 113 (H) 97 - 108 mmol/L    CO2 13 (LL) 21 - 32 mmol/L    Anion gap 13 5 - 15 mmol/L    Glucose 87 65 - 100 mg/dL     (H) 6 - 20 MG/DL    Creatinine 5.87 (H) 0.70 - 1.30 MG/DL    BUN/Creatinine ratio 17 12 - 20      GFR est AA 12 (L) >60 ml/min/1.73m2    GFR est non-AA 10 (L) >60 ml/min/1.73m2    Calcium 7.9 (L) 8.5 - 10.1 MG/DL    Bilirubin, total 0.4 0.2 - 1.0 MG/DL    ALT (SGPT) 20 12 - 78 U/L    AST (SGOT) 14 (L) 15 - 37 U/L    Alk.  phosphatase 67 45 - 117 U/L    Protein, total 7.1 6.4 - 8.2 g/dL    Albumin 3.5 3.5 - 5.0 g/dL    Globulin 3.6 2.0 - 4.0 g/dL    A-G Ratio 1.0 (L) 1.1 - 2.2     SED RATE (ESR)    Collection Time: 01/20/21  2:12 PM   Result Value Ref Range    Sed rate, automated 22 (H) 0 - 15 mm/hr   URIC ACID    Collection Time: 01/20/21  2:12 PM   Result Value Ref Range    Uric acid <0.2 (L) 3.5 - 7.2 MG/DL

## 2021-01-20 NOTE — PROGRESS NOTES
Outpatient Infusion Center Progress Note    1400 Pt admit to [facility] for Retacrit injection ambulatory in stable condition. Assessment completed. No new concerns voiced. There were no vitals taken for this visit. Injection given SC in left arm  Medications:  Medications Administered     epoetin yodit-epbx (RETACRIT) injection 40,000 Units     Admin Date  01/20/2021 Action  Given Dose  40,000 Units Route  SubCUTAneous Administered By  Eduar De La O RN              Recent Results (from the past 12 hour(s))   CBC WITH AUTOMATED DIFF    Collection Time: 01/20/21  2:12 PM   Result Value Ref Range    WBC 5.3 4.1 - 11.1 K/uL    RBC 3.67 (L) 4.10 - 5.70 M/uL    HGB 10.6 (L) 12.1 - 17.0 g/dL    HCT 35.4 (L) 36.6 - 50.3 %    MCV 96.5 80.0 - 99.0 FL    MCH 28.9 26.0 - 34.0 PG    MCHC 29.9 (L) 30.0 - 36.5 g/dL    RDW 14.5 11.5 - 14.5 %    PLATELET 203 270 - 201 K/uL    MPV 10.1 8.9 - 12.9 FL    NRBC 0.0 0  WBC    ABSOLUTE NRBC 0.00 0.00 - 0.01 K/uL    NEUTROPHILS 60 32 - 75 %    LYMPHOCYTES 20 12 - 49 %    MONOCYTES 8 5 - 13 %    EOSINOPHILS 11 (H) 0 - 7 %    BASOPHILS 1 0 - 1 %    IMMATURE GRANULOCYTES 0 0.0 - 0.5 %    ABS. NEUTROPHILS 3.2 1.8 - 8.0 K/UL    ABS. LYMPHOCYTES 1.1 0.8 - 3.5 K/UL    ABS. MONOCYTES 0.4 0.0 - 1.0 K/UL    ABS. EOSINOPHILS 0.6 (H) 0.0 - 0.4 K/UL    ABS. BASOPHILS 0.0 0.0 - 0.1 K/UL    ABS. IMM.  GRANS. 0.0 0.00 - 0.04 K/UL    DF AUTOMATED     METABOLIC PANEL, COMPREHENSIVE    Collection Time: 01/20/21  2:12 PM   Result Value Ref Range    Sodium 139 136 - 145 mmol/L    Potassium 4.6 3.5 - 5.1 mmol/L    Chloride 113 (H) 97 - 108 mmol/L    CO2 13 (LL) 21 - 32 mmol/L    Anion gap 13 5 - 15 mmol/L    Glucose 87 65 - 100 mg/dL     (H) 6 - 20 MG/DL    Creatinine 5.87 (H) 0.70 - 1.30 MG/DL    BUN/Creatinine ratio 17 12 - 20      GFR est AA 12 (L) >60 ml/min/1.73m2    GFR est non-AA 10 (L) >60 ml/min/1.73m2    Calcium 7.9 (L) 8.5 - 10.1 MG/DL    Bilirubin, total 0.4 0.2 - 1.0 MG/DL    ALT (SGPT) 20 12 - 78 U/L    AST (SGOT) 14 (L) 15 - 37 U/L    Alk. phosphatase 67 45 - 117 U/L    Protein, total 7.1 6.4 - 8.2 g/dL    Albumin 3.5 3.5 - 5.0 g/dL    Globulin 3.6 2.0 - 4.0 g/dL    A-G Ratio 1.0 (L) 1.1 - 2.2         1600 Pt tolerated treatment well. D/c home ambulatory in no distress. Pt aware of next appointment scheduled for Patient needs to make more appointments.

## 2021-02-03 ENCOUNTER — HOSPITAL ENCOUNTER (OUTPATIENT)
Dept: INFUSION THERAPY | Age: 51
Discharge: HOME | End: 2021-02-03
Payer: COMMERCIAL

## 2021-02-03 ENCOUNTER — HOSPITAL ENCOUNTER (OUTPATIENT)
Dept: INFUSION THERAPY | Age: 51
Discharge: HOME | End: 2021-02-03

## 2021-02-03 VITALS
HEART RATE: 70 BPM | SYSTOLIC BLOOD PRESSURE: 146 MMHG | DIASTOLIC BLOOD PRESSURE: 90 MMHG | TEMPERATURE: 96.9 F | RESPIRATION RATE: 20 BRPM

## 2021-02-03 LAB
ALBUMIN SERPL-MCNC: 3.6 G/DL (ref 3.5–5)
ALBUMIN/GLOB SERPL: 1 {RATIO} (ref 1.1–2.2)
ALP SERPL-CCNC: 76 U/L (ref 45–117)
ALT SERPL-CCNC: 24 U/L (ref 12–78)
ANION GAP SERPL CALC-SCNC: 13 MMOL/L (ref 5–15)
AST SERPL-CCNC: 14 U/L (ref 15–37)
BASOPHILS # BLD: 0.1 K/UL (ref 0–0.1)
BASOPHILS NFR BLD: 1 % (ref 0–1)
BILIRUB SERPL-MCNC: 0.6 MG/DL (ref 0.2–1)
BUN SERPL-MCNC: 80 MG/DL (ref 6–20)
BUN/CREAT SERPL: 15 (ref 12–20)
CALCIUM SERPL-MCNC: 8 MG/DL (ref 8.5–10.1)
CHLORIDE SERPL-SCNC: 113 MMOL/L (ref 97–108)
CO2 SERPL-SCNC: 17 MMOL/L (ref 21–32)
CREAT SERPL-MCNC: 5.39 MG/DL (ref 0.7–1.3)
DIFFERENTIAL METHOD BLD: ABNORMAL
EOSINOPHIL # BLD: 0.6 K/UL (ref 0–0.4)
EOSINOPHIL NFR BLD: 12 % (ref 0–7)
ERYTHROCYTE [DISTWIDTH] IN BLOOD BY AUTOMATED COUNT: 16.5 % (ref 11.5–14.5)
ERYTHROCYTE [SEDIMENTATION RATE] IN BLOOD: 8 MM/HR (ref 0–15)
GLOBULIN SER CALC-MCNC: 3.6 G/DL (ref 2–4)
GLUCOSE SERPL-MCNC: 76 MG/DL (ref 65–100)
HCT VFR BLD AUTO: 38.7 % (ref 36.6–50.3)
HGB BLD-MCNC: 11.8 G/DL (ref 12.1–17)
IMM GRANULOCYTES # BLD AUTO: 0 K/UL (ref 0–0.04)
IMM GRANULOCYTES NFR BLD AUTO: 0 % (ref 0–0.5)
LYMPHOCYTES # BLD: 1.1 K/UL (ref 0.8–3.5)
LYMPHOCYTES NFR BLD: 23 % (ref 12–49)
MCH RBC QN AUTO: 29.9 PG (ref 26–34)
MCHC RBC AUTO-ENTMCNC: 30.5 G/DL (ref 30–36.5)
MCV RBC AUTO: 98.2 FL (ref 80–99)
MONOCYTES # BLD: 0.4 K/UL (ref 0–1)
MONOCYTES NFR BLD: 8 % (ref 5–13)
NEUTS SEG # BLD: 2.8 K/UL (ref 1.8–8)
NEUTS SEG NFR BLD: 56 % (ref 32–75)
NRBC # BLD: 0 K/UL (ref 0–0.01)
NRBC BLD-RTO: 0 PER 100 WBC
PLATELET # BLD AUTO: 242 K/UL (ref 150–400)
PMV BLD AUTO: 10.7 FL (ref 8.9–12.9)
POTASSIUM SERPL-SCNC: 4.6 MMOL/L (ref 3.5–5.1)
PROT SERPL-MCNC: 7.2 G/DL (ref 6.4–8.2)
RBC # BLD AUTO: 3.94 M/UL (ref 4.1–5.7)
SODIUM SERPL-SCNC: 143 MMOL/L (ref 136–145)
WBC # BLD AUTO: 4.9 K/UL (ref 4.1–11.1)

## 2021-02-03 PROCEDURE — 96366 THER/PROPH/DIAG IV INF ADDON: CPT

## 2021-02-03 PROCEDURE — 96365 THER/PROPH/DIAG IV INF INIT: CPT

## 2021-02-03 PROCEDURE — 74011250636 HC RX REV CODE- 250/636: Performed by: INTERNAL MEDICINE

## 2021-02-03 PROCEDURE — 80053 COMPREHEN METABOLIC PANEL: CPT

## 2021-02-03 PROCEDURE — 85025 COMPLETE CBC W/AUTO DIFF WBC: CPT

## 2021-02-03 PROCEDURE — 96375 TX/PRO/DX INJ NEW DRUG ADDON: CPT

## 2021-02-03 PROCEDURE — 74011250637 HC RX REV CODE- 250/637: Performed by: INTERNAL MEDICINE

## 2021-02-03 PROCEDURE — 74011000250 HC RX REV CODE- 250: Performed by: INTERNAL MEDICINE

## 2021-02-03 PROCEDURE — 85652 RBC SED RATE AUTOMATED: CPT

## 2021-02-03 PROCEDURE — 36415 COLL VENOUS BLD VENIPUNCTURE: CPT

## 2021-02-03 RX ORDER — SODIUM CHLORIDE 9 MG/ML
25 INJECTION, SOLUTION INTRAVENOUS CONTINUOUS
Status: DISCONTINUED | OUTPATIENT
Start: 2021-02-03 | End: 2021-02-04 | Stop reason: HOSPADM

## 2021-02-03 RX ORDER — ACETAMINOPHEN 325 MG/1
650 TABLET ORAL
Status: DISCONTINUED | OUTPATIENT
Start: 2021-02-03 | End: 2021-02-04 | Stop reason: HOSPADM

## 2021-02-03 RX ORDER — DIPHENHYDRAMINE HCL 25 MG
25 CAPSULE ORAL ONCE
Status: COMPLETED | OUTPATIENT
Start: 2021-02-03 | End: 2021-02-03

## 2021-02-03 RX ORDER — ACETAMINOPHEN 325 MG/1
650 TABLET ORAL ONCE
Status: COMPLETED | OUTPATIENT
Start: 2021-02-03 | End: 2021-02-03

## 2021-02-03 RX ORDER — DIPHENHYDRAMINE HYDROCHLORIDE 50 MG/ML
25 INJECTION, SOLUTION INTRAMUSCULAR; INTRAVENOUS
Status: DISCONTINUED | OUTPATIENT
Start: 2021-02-03 | End: 2021-02-04 | Stop reason: HOSPADM

## 2021-02-03 RX ADMIN — ACETAMINOPHEN 650 MG: 325 TABLET ORAL at 15:01

## 2021-02-03 RX ADMIN — METHYLPREDNISOLONE SODIUM SUCCINATE 30 MG: 40 INJECTION, POWDER, FOR SOLUTION INTRAMUSCULAR; INTRAVENOUS at 15:02

## 2021-02-03 RX ADMIN — DIPHENHYDRAMINE HYDROCHLORIDE 25 MG: 25 CAPSULE ORAL at 15:01

## 2021-02-03 RX ADMIN — FAMOTIDINE 20 MG: 10 INJECTION INTRAVENOUS at 15:03

## 2021-02-03 RX ADMIN — PEGLOTICASE 8 MG: 8 INJECTION, SOLUTION INTRAVENOUS at 15:23

## 2021-02-03 RX ADMIN — SODIUM CHLORIDE 25 ML/HR: 900 INJECTION, SOLUTION INTRAVENOUS at 14:58

## 2021-02-03 NOTE — PROGRESS NOTES
[facility] Short Visit Note:    4346:  Pt arrived ambulatory and in no distress for REtacrit injection. Labs drawn and sent for processing. Retacrit injection not needed today as hgb was 11.8 D/Cd from [facility] ambulatory and in no distress. Next visit 2/17/21.    [phone] Patient denies any COVID contacts, denies cough, sob, fever and feeling un well.

## 2021-02-03 NOTE — PROGRESS NOTES
Outpatient Infusion Center Progress Note    1325 Pt admit to [address] [place] [address] for Krystexxa infusion ambulatory in stable condition. Assessment completed. No new concerns voiced. PIV established in [facility], labs drawn and sent for processing  IV maintained good blood return throughout infusion and was taken out prior to discharge per protocol. Patient denies any COVID contact, denies fever, cough , sob, and feeling un well    Visit Vitals  BP (!) 146/90   Pulse 70   Temp 96.9 °F (36.1 °C)   Resp 20       Medications:  Medications Administered     0.9% sodium chloride infusion     Admin Date  02/03/2021 Action  New Bag Dose  25 mL/hr Rate  25 mL/hr Route  IntraVENous Administered By  Lyndsey Muñoz RN          acetaminophen (TYLENOL) tablet 650 mg     Admin Date  02/03/2021 Action  Given Dose  650 mg Route  Oral Administered By  Lyndsey Muñoz RN          diphenhydrAMINE (BENADRYL) capsule 25 mg     Admin Date  02/03/2021 Action  Given Dose  25 mg Route  Oral Administered By  Lyndsey Muñoz RN          famotidine (PF) (PEPCID) 20 mg in 0.9% sodium chloride 10 mL injection     Admin Date  02/03/2021 Action  Given Dose  20 mg Route  IntraVENous Administered By  Lyndsey Muñoz RN          methylPREDNISolone (PF) (SOLU-MEDROL) injection 30 mg     Admin Date  02/03/2021 Action  Given Dose  30 mg Route  IntraVENous Administered By  Lyndsey Muñoz RN          [facility]) 8 mg, overfill volume 25 mL in 0.9% sodium chloride 250 mL infusion     Admin Date  02/03/2021 Action  Given Dose  8 mg Rate  138 mL/hr Route  IntraVENous Administered By  Lyndsey Muñoz RN                1851 Pt tolerated treatment well. D/c home ambulatory in no distress. Pt aware of next appointment scheduled for 2/17/21.     Recent Results (from the past 12 hour(s))   CBC WITH AUTOMATED DIFF    Collection Time: 02/03/21  2:29 PM   Result Value Ref Range    WBC 4.9 4.1 - 11.1 K/uL    RBC 3.94 (L) 4.10 - 5.70 M/uL    HGB 11.8 (L) 12.1 - 17.0 g/dL    HCT 38.7 36.6 - 50.3 %    MCV 98.2 80.0 - 99.0 FL    MCH 29.9 26.0 - 34.0 PG    MCHC 30.5 30.0 - 36.5 g/dL    RDW 16.5 (H) 11.5 - 14.5 %    PLATELET 128 263 - 005 K/uL    MPV 10.7 8.9 - 12.9 FL    NRBC 0.0 0  WBC    ABSOLUTE NRBC 0.00 0.00 - 0.01 K/uL    NEUTROPHILS 56 32 - 75 %    LYMPHOCYTES 23 12 - 49 %    MONOCYTES 8 5 - 13 %    EOSINOPHILS 12 (H) 0 - 7 %    BASOPHILS 1 0 - 1 %    IMMATURE GRANULOCYTES 0 0.0 - 0.5 %    ABS. NEUTROPHILS 2.8 1.8 - 8.0 K/UL    ABS. LYMPHOCYTES 1.1 0.8 - 3.5 K/UL    ABS. MONOCYTES 0.4 0.0 - 1.0 K/UL    ABS. EOSINOPHILS 0.6 (H) 0.0 - 0.4 K/UL    ABS. BASOPHILS 0.1 0.0 - 0.1 K/UL    ABS. IMM. GRANS. 0.0 0.00 - 0.04 K/UL    DF AUTOMATED     METABOLIC PANEL, COMPREHENSIVE    Collection Time: 02/03/21  2:29 PM   Result Value Ref Range    Sodium 143 136 - 145 mmol/L    Potassium 4.6 3.5 - 5.1 mmol/L    Chloride 113 (H) 97 - 108 mmol/L    CO2 17 (L) 21 - 32 mmol/L    Anion gap 13 5 - 15 mmol/L    Glucose 76 65 - 100 mg/dL    BUN 80 (H) 6 - 20 MG/DL    Creatinine 5.39 (H) 0.70 - 1.30 MG/DL    BUN/Creatinine ratio 15 12 - 20      GFR est AA 14 (L) >60 ml/min/1.73m2    GFR est non-AA 11 (L) >60 ml/min/1.73m2    Calcium 8.0 (L) 8.5 - 10.1 MG/DL    Bilirubin, total 0.6 0.2 - 1.0 MG/DL    ALT (SGPT) 24 12 - 78 U/L    AST (SGOT) 14 (L) 15 - 37 U/L    Alk.  phosphatase 76 45 - 117 U/L    Protein, total 7.2 6.4 - 8.2 g/dL    Albumin 3.6 3.5 - 5.0 g/dL    Globulin 3.6 2.0 - 4.0 g/dL    A-G Ratio 1.0 (L) 1.1 - 2.2     SED RATE (ESR)    Collection Time: 02/03/21  2:29 PM   Result Value Ref Range    Sed rate, automated 8 0 - 15 mm/hr

## 2021-02-04 ENCOUNTER — VIRTUAL VISIT (OUTPATIENT)
Dept: RHEUMATOLOGY | Age: 51
End: 2021-02-04
Payer: COMMERCIAL

## 2021-02-04 DIAGNOSIS — Z79.60 LONG-TERM USE OF IMMUNOSUPPRESSANT MEDICATION: ICD-10-CM

## 2021-02-04 DIAGNOSIS — N18.4 CKD (CHRONIC KIDNEY DISEASE) STAGE 4, GFR 15-29 ML/MIN (HCC): ICD-10-CM

## 2021-02-04 DIAGNOSIS — M1A.39X1 CHRONIC TOPHACEOUS GOUT OF MULTIPLE SITES DUE TO RENAL IMPAIRMENT: Primary | ICD-10-CM

## 2021-02-04 PROCEDURE — 99215 OFFICE O/P EST HI 40 MIN: CPT | Performed by: INTERNAL MEDICINE

## 2021-02-04 NOTE — PROGRESS NOTES
REASON FOR VISIT    This is a follow-up visit for Mr. Anne for     ICD-10-CM   1. 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     Services were provided through a video synchronous discussion virtually to substitute for in-person clinic visit.    Gouty arthritis phenotype includes:  Tophi: yes  Erosions: yes  Tenosynovitis: yes  Double Contour: N/A     Therapy Includes:  NSAIDs: no (contra-indicated due to CKD)  Colchicine prophylaxis: PRN  Current uricosuric therapy: none  Current urate-lowering therapy: Krystexxa 8 mg every 14 days (6/28/2019 to 10/18/2019; 8/26/2020 to present)  Previous urate-lowering therapy: none  Discontinued uricosuric because of inefficacy: none  Discontinued uricosuric because of side effects: none  Discontinued urate-lowering therapy because of inefficacy: allopurinol 50 mg daily  Discontinued urate-lowering therapy because of side effects: none  Contraindicated urate-lowering therapy because of CKD: allopurinol, probenecid  Contra-Indicated urate-lowering therapy because of FDA warning of all-cause mortality and cardiac death: Uloric (febuxostat)  G6PD Status: normal  Current anti-immunogenicity DMARD therapy: leflunomide 20 mg daily    HISTORY OF PRESENT ILLNESS    Mr. Anne returns for a follow-up visit.    On his last visit, I continued Krystexxa infusion 8 mg every 14 days and  leflunomide 20 mg daily for immunogenicity prevention. He resumed it on 8/26/2020. His most recent infusion was 2/03/2021, which he has received with good tolerance. Today, he feels well. He denies interval flares. He met with his renal transplant team on 1/26/2021 at [facility] and will start transplant list testing on 2/19/2021. He has a living donor. He denies fever, weight loss, blurred vision, vision loss, oral ulcers, ankle swelling, dry cough, dyspnea, nausea, vomiting, dysphagia, abdominal pain, black or bloody stool, fall since last visit, rash, easy bruising and increased thirst.    Most recent uric acid from 1/20/2021 was 0.2 mg/dL. Last toxicity monitoring by blood work was done on 2/03/2021 and did not reveal any significant adverse effects, except creatinine 5.39, eGFR 14. The patient has not had any interval hospital admissions, infections, or surgeries. REVIEW OF SYSTEMS    A comprehensive review of systems was performed and pertinent results are documented in the HPI, review of systems is otherwise non-contributory. PAST MEDICAL HISTORY    He has a past medical history of Heart failure ([address] Hypertension, and Polycystic kidney disease. FAMILY HISTORY    His family history includes Hypertension in his father and mother; No Known Problems in his brother and brother; Other in his sister. SOCIAL HISTORY    He reports that he has never smoked. He has never used smokeless tobacco. He reports previous alcohol use. He reports current drug use. Drug: Marijuana. MEDICATIONS    Current Outpatient Medications   Medication Sig    colchicine (Colcrys) 0.6 mg tablet TAKE ONE TABLET BY MOUTH DAILY AS NEEDED FOR GOUT FLARE    leflunomide (ARAVA) 20 mg tablet Take 1 Tab by mouth daily.  pegloticase (KRYSTEXXA) 8 mg/mL soln injection 8 mg by IntraVENous route Once every 2 weeks.  spironolactone (ALDACTONE) 25 mg tablet Take 25 mg by mouth daily.     cholecalciferol (Vitamin D3) 25 mcg (1,000 unit) cap Take  by mouth daily.  carvedilol (COREG) 25 mg tablet Take 1 Tab by mouth two (2) times daily (with meals).  ferrous sulfate 325 mg (65 mg iron) cpER Take 1 Each by mouth daily.  sacubitril-valsartan (ENTRESTO) 24 mg/26 mg tablet Take 1 Tab by mouth every twelve (12) hours.  predniSONE (DELTASONE) 20 mg tablet Take 1 tab by mouth daily for flare    bumetanide (BUMEX) 1 mg tablet Take 1 Tab by mouth daily.  POTASSIUM ASPARTATE/MAG ASP (POTASSIUM & MAGNESIUM ASPARTAT PO) Take  by mouth. No current facility-administered medications for this visit. Facility-Administered Medications Ordered in Other Visits   Medication    acetaminophen (TYLENOL) tablet 650 mg    diphenhydrAMINE (BENADRYL) injection 25 mg    0.9% sodium chloride infusion     ALLERGIES    Allergies   Allergen Reactions    Shellfish Containing Products Swelling     PHYSICAL EXAMINATION    There were no vitals taken for this visit. There is no height or weight on file to calculate BMI. General: Patient is alert, oriented x 3, not in acute distress    HEENT:   Sclerae are not injected and appear moist.  There is no alopecia. Chest:  Breathing comfortably at room air    Skin exam:    Tophi: left eyelid left: left helix, bilateral olecranon (LEFT larger 15 cm), bilateral wrists, left 2nd MCP, IP, 2nd PIP, 3rd PIP, 4th PIP, right 2nd MCP, 5th MCP, IP, 2nd PIP, 5th PIP, RIGHT: patella, LEFT: achilles    Musculoskeletal exam:  A comprehensive musculoskeletal exam was NOT performed for all joints of each upper and lower extremity and assessed for swelling, tenderness and range of motion. Positive results are documented as below:    Previous Exam    Bilateral wrist, hand dorsum, MCP and digit swelling without tenderness    DATA REVIEW    Laboratory     Recent laboratory results were reviewed, summarized, and discussed with the patient.     Imaging    Musculoskeletal Ultrasound    None    Radiographs    Bilateral Elbow 6/13/2019: RIGHT: normal alignment and bone mineral density. There is a well-circumscribed erosion within the olecranon. No definite effusion. There is high density material in the region of the olecranon bursa. LEFT: no fracture or effusion. No erosive change. There is high density material in the region of the olecranon bursa. Bilateral Hand 6/13/2019: RIGHT: normal alignment. Bone mineral density is normal. No fracture. There are extensive erosive changes of the distal ulna with adjacent high density soft tissue mass. There is erosive change within the triquetrum capitate and likely trapezoid. Erosive changes are noted the first second and third MCP joints. There is high density soft tissue adjacent to the second digit PIP joint. LEFT: normal alignment and no fracture. There are scattered erosive changes throughout the carpus second MTP joint and PIP joints of the second third and fourth digits. There is soft tissue swelling which is high density of the thumb and second MTP joints. Additional soft tissue swelling at the wrist.     Bilateral Foot 6/13/2019: RIGHT: normal alignment and bone mineral density. There are erosive changes throughout the ankle, talar head, navicular and Lisfranc joint as well as the medial base proximal phalanx of the great toe. There is adjacent soft tissue density at the first metatarsal head. No acute fracture. LEFT: normal alignment bone mineral densities. There are periarticular erosions throughout the Lisfranc joint, between the navicular and cuneiforms and at the first MTP joint. There is mild soft tissue thickening and density medial to the first metatarsal head. There is suggestion of soft tissue density between the second and third metatarsal heads. No acute fracture.     CT Imaging    CT Abdomen and Pelvis without contrast 6/01/2019: Heart/vessels: Pericardial effusion and/or thickening measuring approximately 0.8 cm. Lungs/Pleura: Within normal limits.  . ABDOMEN: Liver: Small, low-attenuation lesions in the liver which are incompletely characterized and statistically likely benign. Gallbladder/Biliary: Within normal limits. Spleen: Within normal limits. Pancreas: Within normal limits. Adrenals: Within normal limits. Kidneys: Innumerable low-attenuation lesions and high attenuation lesions throughout the kidneys with complete loss of normal-appearing parenchyma suggestive of polycystic kidney disease. Peritoneum/Mesenteries: Trace amount of fluid in the pelvis. Extraperitoneum: Within normal limits. Gastrointestinal tract: There is a short segment of small bowel within a supraumbilical hernia. The short segment of bowel within the hernia appears to have some mural thickening There are distended loops of small bowel with air-fluid levels associated with the hernia. The more distal small bowel is nearly entirely decompressed as is the large bowel. Normal-appearing appendix. Vascular: Within normal limits. PELVIS: Extraperitoneum: Within normal limits. Ureters: Within normal limits. Bladder: The bladder is nearly entirely decompressed. There is concentric mural thickening in the bladder. Reproductive System: Within normal limits. MSK:  There is a supraumbilical hernia containing a short segment of small bowel with adjacent stranding and inflammation and stranding/inflammation within the fat in the hernia. Tiny, fat-containing umbilical hernia. Degenerative changes in the lumbar spine. There is degenerative disc disease at L3/L4 and L4/L5. Extensive degenerative changes in both hips. Dystrophic appearing calcifications at the origin of both hamstrings at the pubic rami. Degenerative changes in the pubic symphysis. MR Imaging    MRI Right Ankle without contrast 4/10/2012: There is osseous edema like signal at the inferior aspect of the lateral malleolus. There are also subcortical cysts in the anterior process of the calcaneus adjacent to the sinus tarsi.  A mild to moderate osseous edema and the medial cuneiform bone is demonstrated subjacent to an area of distal high-grade chondral derangement. There is a moderate to large effusion of the ankle and posterior subtalar joints with demonstration of mild diffuse chondral thinning with small marginal osteophytes. There is a 7 mm loose body in the anterior joint recess. There is also heterogeneous signal in the posterior joint recesses which could contain small loose bodies as well. There is absent  visualization of the anterior talofibular ligament consistent with chronic tear. The other ankle ligaments appear intact. There is a small effusion of the talonavicular joint. Small dorsal marginal osteophytes throughout the midfoot region are noted. There is a moderate effusion in the posterior tibialis tendon sheath with diffuse mild tendon thickening and inframalleolar heterogeneous signal. There is a small effusion of the flexor digitorum longus and flexor hallucis longus tendon sheaths with normal appearing tendons. There is a small effusion of the peroneal tendon sheath with mild diffuse thickening of the peroneus longus tendon though uniform signal. Mild. Achilles peritendinitis is noted. There is a trace amount of fluid signal in the extensor digitorum longus tendon sheath. There is mild flatfoot deformity. No tarsal coalition is shown. There is no bone or soft tissue mass. DXA     None    ASSESSMENT AND PLAN    This is a follow-up visit for Mr. Lizeth Fam. 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 Probenecid and allopurinol are contraindicated. Uloric will be ineffective and given the new FDA box warning and his history of heart failure, I do not recommend it. He is maintained on Krystexxa 8 mg every 2 weeks on 8/26/2020 and leflunomide 20 mg daily for immunogenicity prevention.  He initially was on Apurva Osorio from 6/28/2019 to 10/18/2019 but then stopped it due to hypervolemia related to CKD progression and likely Medrol. He resumed treatment on 8/26/2020 with good tolerance and lower Medrol pre-medication at 30 mg without lower extremity edema. His most recent infusion was 2/03/2021. I recommend at least 12 months of Ced Pi and removal of all visible tophi starting from 8/26/2020. He is planning on renal transplant, and I explained that they use anti-rejection medications, so I would need to discuss with them about his gout management prior to moving forward. I will continue treatment. 2) Long Term Use of Immunosuppressants. The patient remains on immunomodulatory medications (leflunomide) and requires frequent toxicity monitoring by blood work. Respective labs were ordered (CBC and CMP) with each scheduled Krystexxa infusion. 3) Chronic Kidney Disease Stage 3. The patient's creatinine 5.39, eGFR 14. He follows with Dr. Rito Sauceda.        4) Autosomal Dominant Polycystic Kidney Disease. He follows with Dr. Navjot Kennedy. He is planning on renal transplant. He has a donor.     5) Bilateral Lower Extremity Edema. This resolved with lower pre-medication Medrol to 30 mg for Krystexxa. The patient voiced understanding of the aforementioned assessment and plan. Summary of plan was provided in the After Visit Summary patient instructions. TODAY'S ORDERS    No orders of the defined types were placed in this encounter.     Future Appointments   Date Time Provider Barry Cano   2/17/2021  3:00 PM B3 [facility]PA MED [place] [facility]   2/17/2021  6:00 PM G1 DEEPA [facility]   3/3/2021  3:00 PM B3 [facility]EEPA MED TX [facility]   3/3/2021  6:00 PM G1 [facility]EEPA [facility]   3/17/2021  3:00 PM B3 [facility]EEPA MED TX [facility]   3/17/2021  6:00 PM G1 [facility]EEPA [facility]   3/31/2021  2:30 PM B3 [facility]EEPA MED TX [facility]   3/31/2021  6:00 PM G1 [facility]   7/7/2021 8:20 AM Jose Jarrett MD AOCR BS AMB     Al Pinon MD, [address] [facility]    Adult Rheumatology   Rheumatology Ultrasound Certified  [address], [address]   Phone [phone]  Fax [phone]

## 2021-02-12 RX ORDER — ACETAMINOPHEN 325 MG/1
650 TABLET ORAL ONCE
Status: COMPLETED | OUTPATIENT
Start: 2021-02-17 | End: 2021-02-17

## 2021-02-12 RX ORDER — DIPHENHYDRAMINE HCL 25 MG
25 CAPSULE ORAL ONCE
Status: COMPLETED | OUTPATIENT
Start: 2021-02-17 | End: 2021-02-17

## 2021-02-12 RX ORDER — ACETAMINOPHEN 325 MG/1
650 TABLET ORAL
Status: DISCONTINUED | OUTPATIENT
Start: 2021-02-17 | End: 2021-02-18 | Stop reason: HOSPADM

## 2021-02-12 RX ORDER — SODIUM CHLORIDE 9 MG/ML
25 INJECTION, SOLUTION INTRAVENOUS CONTINUOUS
Status: DISCONTINUED | OUTPATIENT
Start: 2021-02-17 | End: 2021-02-18 | Stop reason: HOSPADM

## 2021-02-12 RX ORDER — DIPHENHYDRAMINE HYDROCHLORIDE 50 MG/ML
25 INJECTION, SOLUTION INTRAMUSCULAR; INTRAVENOUS
Status: DISCONTINUED | OUTPATIENT
Start: 2021-02-17 | End: 2021-02-18 | Stop reason: HOSPADM

## 2021-02-17 ENCOUNTER — HOSPITAL ENCOUNTER (OUTPATIENT)
Dept: INFUSION THERAPY | Age: 51
Discharge: HOME | End: 2021-02-17
Payer: COMMERCIAL

## 2021-02-17 ENCOUNTER — HOSPITAL ENCOUNTER (OUTPATIENT)
Dept: INFUSION THERAPY | Age: 51
Discharge: HOME | End: 2021-02-17

## 2021-02-17 VITALS — DIASTOLIC BLOOD PRESSURE: 85 MMHG | HEART RATE: 81 BPM | SYSTOLIC BLOOD PRESSURE: 132 MMHG

## 2021-02-17 LAB
ALBUMIN SERPL-MCNC: 3.8 G/DL (ref 3.5–5)
ALBUMIN/GLOB SERPL: 1 {RATIO} (ref 1.1–2.2)
ALP SERPL-CCNC: 89 U/L (ref 45–117)
ALT SERPL-CCNC: 26 U/L (ref 12–78)
ANION GAP SERPL CALC-SCNC: 6 MMOL/L (ref 5–15)
AST SERPL-CCNC: 16 U/L (ref 15–37)
BASOPHILS # BLD: 0 K/UL (ref 0–0.1)
BASOPHILS NFR BLD: 1 % (ref 0–1)
BILIRUB SERPL-MCNC: 0.4 MG/DL (ref 0.2–1)
BUN SERPL-MCNC: 77 MG/DL (ref 6–20)
BUN/CREAT SERPL: 14 (ref 12–20)
CALCIUM SERPL-MCNC: 8.1 MG/DL (ref 8.5–10.1)
CHLORIDE SERPL-SCNC: 116 MMOL/L (ref 97–108)
CO2 SERPL-SCNC: 20 MMOL/L (ref 21–32)
CREAT SERPL-MCNC: 5.7 MG/DL (ref 0.7–1.3)
DIFFERENTIAL METHOD BLD: ABNORMAL
EOSINOPHIL # BLD: 0.6 K/UL (ref 0–0.4)
EOSINOPHIL NFR BLD: 10 % (ref 0–7)
ERYTHROCYTE [DISTWIDTH] IN BLOOD BY AUTOMATED COUNT: 15.2 % (ref 11.5–14.5)
ERYTHROCYTE [SEDIMENTATION RATE] IN BLOOD: 5 MM/HR (ref 0–15)
GLOBULIN SER CALC-MCNC: 3.7 G/DL (ref 2–4)
GLUCOSE SERPL-MCNC: 103 MG/DL (ref 65–100)
HCT VFR BLD AUTO: 39.6 % (ref 36.6–50.3)
HGB BLD-MCNC: 11.7 G/DL (ref 12.1–17)
IMM GRANULOCYTES # BLD AUTO: 0 K/UL (ref 0–0.04)
IMM GRANULOCYTES NFR BLD AUTO: 0 % (ref 0–0.5)
LYMPHOCYTES # BLD: 1.4 K/UL (ref 0.8–3.5)
LYMPHOCYTES NFR BLD: 23 % (ref 12–49)
MCH RBC QN AUTO: 29.1 PG (ref 26–34)
MCHC RBC AUTO-ENTMCNC: 29.5 G/DL (ref 30–36.5)
MCV RBC AUTO: 98.5 FL (ref 80–99)
MONOCYTES # BLD: 0.5 K/UL (ref 0–1)
MONOCYTES NFR BLD: 8 % (ref 5–13)
NEUTS SEG # BLD: 3.5 K/UL (ref 1.8–8)
NEUTS SEG NFR BLD: 58 % (ref 32–75)
NRBC # BLD: 0 K/UL (ref 0–0.01)
NRBC BLD-RTO: 0 PER 100 WBC
PLATELET # BLD AUTO: 205 K/UL (ref 150–400)
PMV BLD AUTO: 11.1 FL (ref 8.9–12.9)
POTASSIUM SERPL-SCNC: 4.5 MMOL/L (ref 3.5–5.1)
PROT SERPL-MCNC: 7.5 G/DL (ref 6.4–8.2)
RBC # BLD AUTO: 4.02 M/UL (ref 4.1–5.7)
SODIUM SERPL-SCNC: 142 MMOL/L (ref 136–145)
URATE SERPL-MCNC: <0.2 MG/DL (ref 3.5–7.2)
WBC # BLD AUTO: 6.1 K/UL (ref 4.1–11.1)

## 2021-02-17 PROCEDURE — 84550 ASSAY OF BLOOD/URIC ACID: CPT

## 2021-02-17 PROCEDURE — 80053 COMPREHEN METABOLIC PANEL: CPT

## 2021-02-17 PROCEDURE — 74011000250 HC RX REV CODE- 250: Performed by: INTERNAL MEDICINE

## 2021-02-17 PROCEDURE — 85652 RBC SED RATE AUTOMATED: CPT

## 2021-02-17 PROCEDURE — 96365 THER/PROPH/DIAG IV INF INIT: CPT

## 2021-02-17 PROCEDURE — 74011250636 HC RX REV CODE- 250/636: Performed by: INTERNAL MEDICINE

## 2021-02-17 PROCEDURE — 36415 COLL VENOUS BLD VENIPUNCTURE: CPT

## 2021-02-17 PROCEDURE — 96375 TX/PRO/DX INJ NEW DRUG ADDON: CPT

## 2021-02-17 PROCEDURE — 85025 COMPLETE CBC W/AUTO DIFF WBC: CPT

## 2021-02-17 PROCEDURE — 96366 THER/PROPH/DIAG IV INF ADDON: CPT

## 2021-02-17 PROCEDURE — 74011250637 HC RX REV CODE- 250/637: Performed by: INTERNAL MEDICINE

## 2021-02-17 RX ADMIN — PEGLOTICASE 8 MG: 8 INJECTION, SOLUTION INTRAVENOUS at 16:22

## 2021-02-17 RX ADMIN — METHYLPREDNISOLONE SODIUM SUCCINATE 30 MG: 40 INJECTION, POWDER, FOR SOLUTION INTRAMUSCULAR; INTRAVENOUS at 15:58

## 2021-02-17 RX ADMIN — DIPHENHYDRAMINE HYDROCHLORIDE 25 MG: 25 CAPSULE ORAL at 15:54

## 2021-02-17 RX ADMIN — SODIUM CHLORIDE 25 ML/HR: 900 INJECTION, SOLUTION INTRAVENOUS at 15:54

## 2021-02-17 RX ADMIN — ACETAMINOPHEN 650 MG: 325 TABLET ORAL at 15:54

## 2021-02-17 RX ADMIN — FAMOTIDINE 20 MG: 10 INJECTION, SOLUTION INTRAVENOUS at 15:55

## 2021-02-17 NOTE — PROGRESS NOTES
[facility] [place] Progress Note    Date: 2021    Name: Richie Butler    MRN: [id number]         : 1970        1500: Pt arrived ambulatory to [facility] for Marcelino Saavedra in stable condition. Assessment completed. No other complaints voiced at this time. Peripheral IV established with positive blood return. Normal Saline started at [facility]. Patient denies SOB, fever, cough, general not feeling well. Patient denies recent exposure to someone who has tested positive for COVID-19. Patient denies having contact with anyone who has a pending COVID test.      Patient Vitals for the past 12 hrs:   Pulse BP   21 1500 81 132/85         Medications Administered     0.9% sodium chloride infusion     Admin Date  2021 Action  New Bag Dose  25 mL/hr Rate  25 mL/hr Route  IntraVENous Administered By  Susan Manzo RN          acetaminophen (TYLENOL) tablet 650 mg     Admin Date  2021 Action  Given Dose  650 mg Route  Oral Administered By  Susan Manzo RN          diphenhydrAMINE (BENADRYL) capsule 25 mg     Admin Date  2021 Action  Given Dose  25 mg Route  Oral Administered By  Susan Manzo RN          famotidine (PF) (PEPCID) 20 mg in 0.9% sodium chloride 10 mL injection     Admin Date  2021 Action  Given Dose  20 mg Route  IntraVENous Administered By  Susan Manzo RN          methylPREDNISolone (PF) (SOLU-MEDROL) injection 30 mg     Admin Date  2021 Action  Given Dose  30 mg Route  IntraVENous Administered By  Susan Manzo RN          pegloticase [facility]) 8 mg, overfill volume 25 mL in 0.9% sodium chloride 250 mL infusion     Admin Date  2021 Action  Given Dose  8 mg Rate  138 mL/hr Route  IntraVENous Administered By  Susan Manzo RN                  6241: Tolerated treatment well, no adverse reactions noted. D/Cd from [facility] ambulatory and in no distress.       Future Appointments   Date Time Provider Barry Cano   3/3/2021  3:00 PM [facility] MED [facility]   3/3/2021  6:00 PM G1 [facility]EEPA [facility]   3/17/2021  3:00 PM B3 DEEPA  [facility]   3/17/2021  6:00 PM G1 [facility]   3/31/2021  2:30 PM [facility]   3/31/2021  6:00 PM G1 [facility]EEPA [facility]   7/7/2021  8:20 AM Mary Rabago MD AOCR BS MARILOU Jackson RN  February 17, 2021

## 2021-02-17 NOTE — PROGRESS NOTES
[facility] [place] Progress Note    Date: 2021    Name: Holland Cedeño    MRN: [id number]         : 1970        1500: Pt arrived ambulatory to [facility] for Retacrit Held in stable condition. Assessment completed. No other complaints voiced at this time. Per order treatment not needed. Patient denies SOB, fever, cough, general not feeling well. Patient denies recent exposure to someone who has tested positive for COVID-19. Patient denies having contact with anyone who has a pending COVID test.            2728: Tolerated treatment well, no adverse reactions noted. D/Cd from [facility] ambulatory and in no distress.       Future Appointments   Date Time Provider Barry Cano   3/3/2021  3:00 PM B3 [facility] MED [facility]   3/3/2021  6:00 PM G1 [facility]EEPA [facility]   3/17/2021  3:00 PM B3 [facility] MED [facility]   3/17/2021  6:00 PM G1 [facility]EEPA [facility]   3/31/2021  2:30 PM B3 [facility]EE[facility] MED TX [facility]   3/31/2021  6:00 PM G1 [facility]EEPA [facility]   2021  8:20 AM Zehra Rabago MD AOCR BS MARILOU Ribeiro RN  2021

## 2021-02-25 RX ORDER — DIPHENHYDRAMINE HCL 25 MG
25 CAPSULE ORAL ONCE
Status: COMPLETED | OUTPATIENT
Start: 2021-03-03 | End: 2021-03-03

## 2021-02-25 RX ORDER — ACETAMINOPHEN 325 MG/1
650 TABLET ORAL
Status: DISCONTINUED | OUTPATIENT
Start: 2021-03-03 | End: 2021-03-04 | Stop reason: HOSPADM

## 2021-02-25 RX ORDER — DIPHENHYDRAMINE HYDROCHLORIDE 50 MG/ML
25 INJECTION, SOLUTION INTRAMUSCULAR; INTRAVENOUS
Status: DISCONTINUED | OUTPATIENT
Start: 2021-03-03 | End: 2021-03-04 | Stop reason: HOSPADM

## 2021-02-25 RX ORDER — SODIUM CHLORIDE 9 MG/ML
25 INJECTION, SOLUTION INTRAVENOUS CONTINUOUS
Status: DISCONTINUED | OUTPATIENT
Start: 2021-03-03 | End: 2021-03-04 | Stop reason: HOSPADM

## 2021-02-25 RX ORDER — ACETAMINOPHEN 325 MG/1
650 TABLET ORAL ONCE
Status: COMPLETED | OUTPATIENT
Start: 2021-03-03 | End: 2021-03-03

## 2021-03-03 ENCOUNTER — HOSPITAL ENCOUNTER (OUTPATIENT)
Dept: INFUSION THERAPY | Age: 51
Discharge: HOME | End: 2021-03-03
Payer: COMMERCIAL

## 2021-03-03 VITALS
HEART RATE: 72 BPM | DIASTOLIC BLOOD PRESSURE: 84 MMHG | SYSTOLIC BLOOD PRESSURE: 126 MMHG | RESPIRATION RATE: 18 BRPM | TEMPERATURE: 97.3 F

## 2021-03-03 LAB
ALBUMIN SERPL-MCNC: 3.4 G/DL (ref 3.5–5)
ALBUMIN SERPL-MCNC: 3.4 G/DL (ref 3.5–5)
ALBUMIN/GLOB SERPL: 0.9 {RATIO} (ref 1.1–2.2)
ALBUMIN/GLOB SERPL: 1.1 {RATIO} (ref 1.1–2.2)
ALP SERPL-CCNC: 65 U/L (ref 45–117)
ALP SERPL-CCNC: 65 U/L (ref 45–117)
ALT SERPL-CCNC: 27 U/L (ref 12–78)
ALT SERPL-CCNC: 29 U/L (ref 12–78)
ANION GAP SERPL CALC-SCNC: 9 MMOL/L (ref 5–15)
ANION GAP SERPL CALC-SCNC: 9 MMOL/L (ref 5–15)
AST SERPL-CCNC: 18 U/L (ref 15–37)
AST SERPL-CCNC: 27 U/L (ref 15–37)
BASOPHILS # BLD: 0 K/UL (ref 0–0.1)
BASOPHILS NFR BLD: 1 % (ref 0–1)
BILIRUB SERPL-MCNC: 0.4 MG/DL (ref 0.2–1)
BILIRUB SERPL-MCNC: 0.6 MG/DL (ref 0.2–1)
BUN SERPL-MCNC: 76 MG/DL (ref 6–20)
BUN SERPL-MCNC: 77 MG/DL (ref 6–20)
BUN/CREAT SERPL: 15 (ref 12–20)
BUN/CREAT SERPL: 16 (ref 12–20)
CALCIUM SERPL-MCNC: 7.8 MG/DL (ref 8.5–10.1)
CALCIUM SERPL-MCNC: 7.8 MG/DL (ref 8.5–10.1)
CHLORIDE SERPL-SCNC: 114 MMOL/L (ref 97–108)
CHLORIDE SERPL-SCNC: 116 MMOL/L (ref 97–108)
CO2 SERPL-SCNC: 13 MMOL/L (ref 21–32)
CO2 SERPL-SCNC: 14 MMOL/L (ref 21–32)
CREAT SERPL-MCNC: 4.88 MG/DL (ref 0.7–1.3)
CREAT SERPL-MCNC: 5.28 MG/DL (ref 0.7–1.3)
DIFFERENTIAL METHOD BLD: ABNORMAL
EOSINOPHIL # BLD: 0.5 K/UL (ref 0–0.4)
EOSINOPHIL NFR BLD: 8 % (ref 0–7)
ERYTHROCYTE [DISTWIDTH] IN BLOOD BY AUTOMATED COUNT: 15 % (ref 11.5–14.5)
ERYTHROCYTE [SEDIMENTATION RATE] IN BLOOD: 7 MM/HR (ref 0–15)
GLOBULIN SER CALC-MCNC: 3.2 G/DL (ref 2–4)
GLOBULIN SER CALC-MCNC: 3.7 G/DL (ref 2–4)
GLUCOSE SERPL-MCNC: 117 MG/DL (ref 65–100)
GLUCOSE SERPL-MCNC: 91 MG/DL (ref 65–100)
HCT VFR BLD AUTO: 33.3 % (ref 36.6–50.3)
HGB BLD-MCNC: 10 G/DL (ref 12.1–17)
IMM GRANULOCYTES # BLD AUTO: 0 K/UL (ref 0–0.04)
IMM GRANULOCYTES NFR BLD AUTO: 0 % (ref 0–0.5)
LYMPHOCYTES # BLD: 1.7 K/UL (ref 0.8–3.5)
LYMPHOCYTES NFR BLD: 29 % (ref 12–49)
MCH RBC QN AUTO: 29.1 PG (ref 26–34)
MCHC RBC AUTO-ENTMCNC: 30 G/DL (ref 30–36.5)
MCV RBC AUTO: 96.8 FL (ref 80–99)
MONOCYTES # BLD: 0.6 K/UL (ref 0–1)
MONOCYTES NFR BLD: 10 % (ref 5–13)
NEUTS SEG # BLD: 3 K/UL (ref 1.8–8)
NEUTS SEG NFR BLD: 52 % (ref 32–75)
NRBC # BLD: 0 K/UL (ref 0–0.01)
NRBC BLD-RTO: 0 PER 100 WBC
PLATELET # BLD AUTO: 187 K/UL (ref 150–400)
PMV BLD AUTO: 11.1 FL (ref 8.9–12.9)
POTASSIUM SERPL-SCNC: 5.2 MMOL/L (ref 3.5–5.1)
POTASSIUM SERPL-SCNC: 6.5 MMOL/L (ref 3.5–5.1)
PROT SERPL-MCNC: 6.6 G/DL (ref 6.4–8.2)
PROT SERPL-MCNC: 7.1 G/DL (ref 6.4–8.2)
RBC # BLD AUTO: 3.44 M/UL (ref 4.1–5.7)
SODIUM SERPL-SCNC: 136 MMOL/L (ref 136–145)
SODIUM SERPL-SCNC: 139 MMOL/L (ref 136–145)
URATE SERPL-MCNC: <0.2 MG/DL (ref 3.5–7.2)
WBC # BLD AUTO: 5.9 K/UL (ref 4.1–11.1)

## 2021-03-03 PROCEDURE — 74011000250 HC RX REV CODE- 250: Performed by: INTERNAL MEDICINE

## 2021-03-03 PROCEDURE — 74011250636 HC RX REV CODE- 250/636: Performed by: INTERNAL MEDICINE

## 2021-03-03 PROCEDURE — 85025 COMPLETE CBC W/AUTO DIFF WBC: CPT

## 2021-03-03 PROCEDURE — 85652 RBC SED RATE AUTOMATED: CPT

## 2021-03-03 PROCEDURE — 96372 THER/PROPH/DIAG INJ SC/IM: CPT

## 2021-03-03 PROCEDURE — 84550 ASSAY OF BLOOD/URIC ACID: CPT

## 2021-03-03 PROCEDURE — 36415 COLL VENOUS BLD VENIPUNCTURE: CPT

## 2021-03-03 PROCEDURE — 96365 THER/PROPH/DIAG IV INF INIT: CPT

## 2021-03-03 PROCEDURE — 96366 THER/PROPH/DIAG IV INF ADDON: CPT

## 2021-03-03 PROCEDURE — 74011250637 HC RX REV CODE- 250/637: Performed by: INTERNAL MEDICINE

## 2021-03-03 PROCEDURE — 96375 TX/PRO/DX INJ NEW DRUG ADDON: CPT

## 2021-03-03 PROCEDURE — 80053 COMPREHEN METABOLIC PANEL: CPT

## 2021-03-03 RX ADMIN — ACETAMINOPHEN 650 MG: 325 TABLET ORAL at 15:32

## 2021-03-03 RX ADMIN — EPOETIN ALFA-EPBX 40000 UNITS: 40000 INJECTION, SOLUTION INTRAVENOUS; SUBCUTANEOUS at 18:25

## 2021-03-03 RX ADMIN — PEGLOTICASE 8 MG: 8 INJECTION, SOLUTION INTRAVENOUS at 16:13

## 2021-03-03 RX ADMIN — METHYLPREDNISOLONE SODIUM SUCCINATE 30 MG: 40 INJECTION, POWDER, FOR SOLUTION INTRAMUSCULAR; INTRAVENOUS at 15:33

## 2021-03-03 RX ADMIN — SODIUM CHLORIDE 25 ML/HR: 900 INJECTION, SOLUTION INTRAVENOUS at 15:32

## 2021-03-03 RX ADMIN — FAMOTIDINE 20 MG: 10 INJECTION INTRAVENOUS at 15:39

## 2021-03-03 RX ADMIN — DIPHENHYDRAMINE HYDROCHLORIDE 25 MG: 25 CAPSULE ORAL at 15:32

## 2021-03-03 NOTE — PROGRESS NOTES
[facility] Progress Note    Date: March 3, 2021        1500: Pt arrived ambulatory to [facility] for Gwenevere Haus in stable condition. Assessment completed. No other complaints voiced at this time. PIV placed to right hand with positive blood return. Labs drawn and sent for processing. Premedications given as ordered. Patient denies any symptoms of COVID-19, including SOB, coughing, fever, or generally not feeling well. Patient denies any recent exposure to COVID-19. Patient denies any recent contact with family or friends that have a pending COVID-19 test.     Patient Vitals for the past 12 hrs:   Temp Pulse Resp BP   03/03/21 1833 -- 72 18 126/84   03/03/21 1504 -- 72 18 (!) 141/93   03/03/21 1502 97.3 °F (36.3 °C) -- -- --     Recent Results (from the past 12 hour(s))   METABOLIC PANEL, COMPREHENSIVE    Collection Time: 03/03/21  3:10 PM   Result Value Ref Range    Sodium 136 136 - 145 mmol/L    Potassium 6.5 (H) 3.5 - 5.1 mmol/L    Chloride 114 (H) 97 - 108 mmol/L    CO2 13 (LL) 21 - 32 mmol/L    Anion gap 9 5 - 15 mmol/L    Glucose 91 65 - 100 mg/dL    BUN 77 (H) 6 - 20 MG/DL    Creatinine 5.28 (H) 0.70 - 1.30 MG/DL    BUN/Creatinine ratio 15 12 - 20      GFR est AA 14 (L) >60 ml/min/1.73m2    GFR est non-AA 12 (L) >60 ml/min/1.73m2    Calcium 7.8 (L) 8.5 - 10.1 MG/DL    Bilirubin, total 0.6 0.2 - 1.0 MG/DL    ALT (SGPT) 29 12 - 78 U/L    AST (SGOT) 27 15 - 37 U/L    Alk.  phosphatase 65 45 - 117 U/L    Protein, total 7.1 6.4 - 8.2 g/dL    Albumin 3.4 (L) 3.5 - 5.0 g/dL    Globulin 3.7 2.0 - 4.0 g/dL    A-G Ratio 0.9 (L) 1.1 - 2.2     URIC ACID    Collection Time: 03/03/21  3:10 PM   Result Value Ref Range    Uric acid <0.2 (L) 3.5 - 7.2 MG/DL   CBC WITH AUTOMATED DIFF    Collection Time: 03/03/21  4:14 PM   Result Value Ref Range    WBC 5.9 4.1 - 11.1 K/uL    RBC 3.44 (L) 4.10 - 5.70 M/uL    HGB 10.0 (L) 12.1 - 17.0 g/dL    HCT 33.3 (L) 36.6 - 50.3 %    MCV 96.8 80.0 - 99.0 FL    MCH 29.1 26.0 - 34.0 PG    MCHC 30.0 30.0 - 36.5 g/dL    RDW 15.0 (H) 11.5 - 14.5 %    PLATELET 786 436 - 257 K/uL    MPV 11.1 8.9 - 12.9 FL    NRBC 0.0 0  WBC    ABSOLUTE NRBC 0.00 0.00 - 0.01 K/uL    NEUTROPHILS 52 32 - 75 %    LYMPHOCYTES 29 12 - 49 %    MONOCYTES 10 5 - 13 %    EOSINOPHILS 8 (H) 0 - 7 %    BASOPHILS 1 0 - 1 %    IMMATURE GRANULOCYTES 0 0.0 - 0.5 %    ABS. NEUTROPHILS 3.0 1.8 - 8.0 K/UL    ABS. LYMPHOCYTES 1.7 0.8 - 3.5 K/UL    ABS. MONOCYTES 0.6 0.0 - 1.0 K/UL    ABS. EOSINOPHILS 0.5 (H) 0.0 - 0.4 K/UL    ABS. BASOPHILS 0.0 0.0 - 0.1 K/UL    ABS. IMM. GRANS. 0.0 0.00 - 0.04 K/UL    DF AUTOMATED         Medications Administered     0.9% sodium chloride infusion     Admin Date  03/03/2021 Action  New Bag Dose  25 mL/hr Rate  25 mL/hr Route  IntraVENous Administered By  [employer]          acetaminophen (TYLENOL) tablet 650 mg     Admin Date  03/03/2021 Action  Given Dose  650 mg Route  Oral Administered By  [employer]          diphenhydrAMINE (BENADRYL) capsule 25 mg     Admin Date  03/03/2021 Action  Given Dose  25 mg Route  Oral Administered By  [employer]          famotidine (PF) (PEPCID) 20 mg in 0.9% sodium chloride 10 mL injection     Admin Date  03/03/2021 Action  Given Dose  20 mg Route  IntraVENous Administered By  [employer]          methylPREDNISolone (PF) (SOLU-MEDROL) injection 30 mg     Admin Date  03/03/2021 Action  Given Dose  30 mg Route  IntraVENous Administered By  [employer]          pegloticase [facility]) 8 mg, overfill volume 25 mL in 0.9% sodium chloride 250 mL infusion     Admin Date  03/03/2021 Action  Given Dose  8 mg Rate  138 mL/hr Route  IntraVENous Administered By  Sera Pinon, [address]:  CMP redrawn prior to discharge per Dr. Reba Gowers. Tolerated treatment well, no adverse reactions noted. PIV flushed and removed. 2x2 and coban placed. D/Cd from [facility] ambulatory and in no distress.   Patient is aware of next scheduled [facility] appointment on 3/17/21.     Future Appointments   Date Time Provider Barry Cano   3/3/2021  6:00 PM G1 [facility]EEPA [facility]   3/17/2021  3:00 PM B3 [facility]EEPA MED TX [facility]   3/17/2021  6:00 PM G1 [facility]EEPA [facility]   3/31/2021  2:30 PM B3 [facility]EEPA MED TX [facility]   3/31/2021  6:00 PM G1 [facility]EEPA [facility]   7/7/2021  8:20 AM Bob Rabago MD AOCR BS AMB           Tray Bermeo RN  March 3, 2021

## 2021-03-03 NOTE — PROGRESS NOTES
[facility] Progress Note    Date: March 3, 2021        1500: Pt arrived ambulatory to [facility] for Retacrit in stable condition. Assessment completed. No other complaints voiced at this time. Patient denies any symptoms of COVID-19, including SOB, coughing, fever, or generally not feeling well. Patient denies any recent exposure to COVID-19. Patient denies any recent contact with family or friends that have a pending COVID-19 test.    See Veda Ax encounter for vital sign data. Hgb = 10. Met criteria for treatment today. Recent Results (from the past 12 hour(s))   CBC WITH AUTOMATED DIFF    Collection Time: 03/03/21  4:14 PM   Result Value Ref Range    WBC 5.9 4.1 - 11.1 K/uL    RBC 3.44 (L) 4.10 - 5.70 M/uL    HGB 10.0 (L) 12.1 - 17.0 g/dL    HCT 33.3 (L) 36.6 - 50.3 %    MCV 96.8 80.0 - 99.0 FL    MCH 29.1 26.0 - 34.0 PG    MCHC 30.0 30.0 - 36.5 g/dL    RDW 15.0 (H) 11.5 - 14.5 %    PLATELET 350 985 - 457 K/uL    MPV 11.1 8.9 - 12.9 FL    NRBC 0.0 0  WBC    ABSOLUTE NRBC 0.00 0.00 - 0.01 K/uL    NEUTROPHILS 52 32 - 75 %    LYMPHOCYTES 29 12 - 49 %    MONOCYTES 10 5 - 13 %    EOSINOPHILS 8 (H) 0 - 7 %    BASOPHILS 1 0 - 1 %    IMMATURE GRANULOCYTES 0 0.0 - 0.5 %    ABS. NEUTROPHILS 3.0 1.8 - 8.0 K/UL    ABS. LYMPHOCYTES 1.7 0.8 - 3.5 K/UL    ABS. MONOCYTES 0.6 0.0 - 1.0 K/UL    ABS. EOSINOPHILS 0.5 (H) 0.0 - 0.4 K/UL    ABS. BASOPHILS 0.0 0.0 - 0.1 K/UL    ABS. IMM. GRANS. 0.0 0.00 - 0.04 K/UL    DF AUTOMATED       Medications Administered     epoetin yodit-epbx (RETACRIT) injection 40,000 Units     Admin Date  03/03/2021 Action  Given Dose  40,000 Units Route  SubCUTAneous Administered By  Lima Scar              Retacrit given SQ to left upper arm    1835: Tolerated treatment well, no adverse reactions noted. D/Cd from [facility] ambulatory and in no distress. Patient is aware of next scheduled [facility] appointment on 3/17/21.     Future Appointments   Date Time Provider Barry Cano   3/3/2021  6:00 PM G1 [facility]   3/17/2021  3:00 PM B3 DEEPA MED [address] [place] [address]   3/17/2021  6:00 PM G1 [facility]EEPA [facility]   3/31/2021  2:30 PM B3 DEEPA MED [address] [place] [address]   3/31/2021  6:00 PM G1 [facility]EEPA [facility]   7/7/2021  8:20 AM Dacia Rabago MD AOCR BS AMB         Samantha Levy RN  March 3, 2021

## 2021-03-04 NOTE — PROGRESS NOTES
The results were reviewed. CO2 14 --> informed Dr. Lisa Rodríguez who will address.  Uric acid at target (0.2)

## 2021-03-12 RX ORDER — SODIUM CHLORIDE 9 MG/ML
25 INJECTION, SOLUTION INTRAVENOUS CONTINUOUS
Status: DISCONTINUED | OUTPATIENT
Start: 2021-03-17 | End: 2021-03-18 | Stop reason: HOSPADM

## 2021-03-12 RX ORDER — ACETAMINOPHEN 325 MG/1
650 TABLET ORAL
Status: DISCONTINUED | OUTPATIENT
Start: 2021-03-17 | End: 2021-03-18 | Stop reason: HOSPADM

## 2021-03-12 RX ORDER — DIPHENHYDRAMINE HYDROCHLORIDE 50 MG/ML
25 INJECTION, SOLUTION INTRAMUSCULAR; INTRAVENOUS
Status: DISCONTINUED | OUTPATIENT
Start: 2021-03-17 | End: 2021-03-18 | Stop reason: HOSPADM

## 2021-03-12 RX ORDER — DIPHENHYDRAMINE HCL 25 MG
25 CAPSULE ORAL ONCE
Status: ACTIVE | OUTPATIENT
Start: 2021-03-17 | End: 2021-03-17

## 2021-03-12 RX ORDER — ACETAMINOPHEN 325 MG/1
650 TABLET ORAL ONCE
Status: ACTIVE | OUTPATIENT
Start: 2021-03-17 | End: 2021-03-17

## 2021-03-17 ENCOUNTER — HOSPITAL ENCOUNTER (OUTPATIENT)
Dept: INFUSION THERAPY | Age: 51
Discharge: HOME | End: 2021-03-17

## 2021-03-26 RX ORDER — DIPHENHYDRAMINE HYDROCHLORIDE 50 MG/ML
25 INJECTION, SOLUTION INTRAMUSCULAR; INTRAVENOUS
Status: DISCONTINUED | OUTPATIENT
Start: 2021-03-31 | End: 2021-04-01 | Stop reason: HOSPADM

## 2021-03-26 RX ORDER — SODIUM CHLORIDE 9 MG/ML
25 INJECTION, SOLUTION INTRAVENOUS CONTINUOUS
Status: DISCONTINUED | OUTPATIENT
Start: 2021-03-31 | End: 2021-04-01 | Stop reason: HOSPADM

## 2021-03-26 RX ORDER — DIPHENHYDRAMINE HCL 25 MG
25 CAPSULE ORAL ONCE
Status: COMPLETED | OUTPATIENT
Start: 2021-03-31 | End: 2021-03-31

## 2021-03-26 RX ORDER — ACETAMINOPHEN 325 MG/1
650 TABLET ORAL ONCE
Status: COMPLETED | OUTPATIENT
Start: 2021-03-31 | End: 2021-03-31

## 2021-03-26 RX ORDER — ACETAMINOPHEN 325 MG/1
650 TABLET ORAL
Status: DISCONTINUED | OUTPATIENT
Start: 2021-03-31 | End: 2021-04-01 | Stop reason: HOSPADM

## 2021-03-31 ENCOUNTER — HOSPITAL ENCOUNTER (OUTPATIENT)
Dept: INFUSION THERAPY | Age: 51
Discharge: HOME | End: 2021-03-31

## 2021-03-31 ENCOUNTER — HOSPITAL ENCOUNTER (OUTPATIENT)
Dept: INFUSION THERAPY | Age: 51
Discharge: HOME | End: 2021-03-31
Payer: COMMERCIAL

## 2021-03-31 VITALS
DIASTOLIC BLOOD PRESSURE: 84 MMHG | SYSTOLIC BLOOD PRESSURE: 127 MMHG | RESPIRATION RATE: 18 BRPM | HEART RATE: 90 BPM | TEMPERATURE: 97.3 F

## 2021-03-31 LAB
ALBUMIN SERPL-MCNC: 3.7 G/DL (ref 3.5–5)
ALBUMIN/GLOB SERPL: 1.2 {RATIO} (ref 1.1–2.2)
ALP SERPL-CCNC: 65 U/L (ref 45–117)
ALT SERPL-CCNC: 22 U/L (ref 12–78)
ANION GAP SERPL CALC-SCNC: 8 MMOL/L (ref 5–15)
AST SERPL-CCNC: 14 U/L (ref 15–37)
BASOPHILS # BLD: 0 K/UL (ref 0–0.1)
BASOPHILS NFR BLD: 0 % (ref 0–1)
BILIRUB SERPL-MCNC: 0.3 MG/DL (ref 0.2–1)
BUN SERPL-MCNC: 75 MG/DL (ref 6–20)
BUN/CREAT SERPL: 13 (ref 12–20)
CALCIUM SERPL-MCNC: 8.1 MG/DL (ref 8.5–10.1)
CALCIUM SERPL-MCNC: 8.4 MG/DL (ref 8.5–10.1)
CHLORIDE SERPL-SCNC: 111 MMOL/L (ref 97–108)
CO2 SERPL-SCNC: 20 MMOL/L (ref 21–32)
CREAT SERPL-MCNC: 5.56 MG/DL (ref 0.7–1.3)
DIFFERENTIAL METHOD BLD: ABNORMAL
EOSINOPHIL # BLD: 0.6 K/UL (ref 0–0.4)
EOSINOPHIL NFR BLD: 15 % (ref 0–7)
ERYTHROCYTE [DISTWIDTH] IN BLOOD BY AUTOMATED COUNT: 14.9 % (ref 11.5–14.5)
ERYTHROCYTE [SEDIMENTATION RATE] IN BLOOD: 21 MM/HR (ref 0–15)
GLOBULIN SER CALC-MCNC: 3.1 G/DL (ref 2–4)
GLUCOSE SERPL-MCNC: 95 MG/DL (ref 65–100)
HCT VFR BLD AUTO: 35.2 % (ref 36.6–50.3)
HGB BLD-MCNC: 10.7 G/DL (ref 12.1–17)
IMM GRANULOCYTES # BLD AUTO: 0 K/UL (ref 0–0.04)
IMM GRANULOCYTES NFR BLD AUTO: 0 % (ref 0–0.5)
IRON SATN MFR SERPL: 24 % (ref 20–50)
IRON SERPL-MCNC: 56 UG/DL (ref 35–150)
LYMPHOCYTES # BLD: 1.1 K/UL (ref 0.8–3.5)
LYMPHOCYTES NFR BLD: 27 % (ref 12–49)
MCH RBC QN AUTO: 29.1 PG (ref 26–34)
MCHC RBC AUTO-ENTMCNC: 30.4 G/DL (ref 30–36.5)
MCV RBC AUTO: 95.7 FL (ref 80–99)
MONOCYTES # BLD: 0.5 K/UL (ref 0–1)
MONOCYTES NFR BLD: 12 % (ref 5–13)
NEUTS SEG # BLD: 1.9 K/UL (ref 1.8–8)
NEUTS SEG NFR BLD: 46 % (ref 32–75)
NRBC # BLD: 0 K/UL (ref 0–0.01)
NRBC BLD-RTO: 0 PER 100 WBC
PHOSPHATE SERPL-MCNC: 4.8 MG/DL (ref 2.6–4.7)
PLATELET # BLD AUTO: 199 K/UL (ref 150–400)
PMV BLD AUTO: 11.3 FL (ref 8.9–12.9)
POTASSIUM SERPL-SCNC: 4.6 MMOL/L (ref 3.5–5.1)
PROT SERPL-MCNC: 6.8 G/DL (ref 6.4–8.2)
PTH-INTACT SERPL-MCNC: 840.4 PG/ML (ref 18.4–88)
RBC # BLD AUTO: 3.68 M/UL (ref 4.1–5.7)
SODIUM SERPL-SCNC: 139 MMOL/L (ref 136–145)
TIBC SERPL-MCNC: 235 UG/DL (ref 250–450)
URATE SERPL-MCNC: 3.3 MG/DL (ref 3.5–7.2)
WBC # BLD AUTO: 4.1 K/UL (ref 4.1–11.1)

## 2021-03-31 PROCEDURE — 84550 ASSAY OF BLOOD/URIC ACID: CPT

## 2021-03-31 PROCEDURE — 36415 COLL VENOUS BLD VENIPUNCTURE: CPT

## 2021-03-31 PROCEDURE — 85025 COMPLETE CBC W/AUTO DIFF WBC: CPT

## 2021-03-31 PROCEDURE — 74011250636 HC RX REV CODE- 250/636: Performed by: INTERNAL MEDICINE

## 2021-03-31 PROCEDURE — 84100 ASSAY OF PHOSPHORUS: CPT

## 2021-03-31 PROCEDURE — 96365 THER/PROPH/DIAG IV INF INIT: CPT

## 2021-03-31 PROCEDURE — 74011250637 HC RX REV CODE- 250/637: Performed by: INTERNAL MEDICINE

## 2021-03-31 PROCEDURE — 96375 TX/PRO/DX INJ NEW DRUG ADDON: CPT

## 2021-03-31 PROCEDURE — 96366 THER/PROPH/DIAG IV INF ADDON: CPT

## 2021-03-31 PROCEDURE — 80053 COMPREHEN METABOLIC PANEL: CPT

## 2021-03-31 PROCEDURE — 74011000250 HC RX REV CODE- 250: Performed by: INTERNAL MEDICINE

## 2021-03-31 PROCEDURE — 85652 RBC SED RATE AUTOMATED: CPT

## 2021-03-31 PROCEDURE — 83540 ASSAY OF IRON: CPT

## 2021-03-31 PROCEDURE — 83970 ASSAY OF PARATHORMONE: CPT

## 2021-03-31 RX ADMIN — PEGLOTICASE 8 MG: 8 INJECTION, SOLUTION INTRAVENOUS at 15:21

## 2021-03-31 RX ADMIN — DIPHENHYDRAMINE HYDROCHLORIDE 25 MG: 25 CAPSULE ORAL at 14:37

## 2021-03-31 RX ADMIN — SODIUM CHLORIDE 25 ML/HR: 900 INJECTION, SOLUTION INTRAVENOUS at 14:36

## 2021-03-31 RX ADMIN — ACETAMINOPHEN 650 MG: 325 TABLET ORAL at 14:37

## 2021-03-31 RX ADMIN — METHYLPREDNISOLONE SODIUM SUCCINATE 30 MG: 40 INJECTION, POWDER, FOR SOLUTION INTRAMUSCULAR; INTRAVENOUS at 14:46

## 2021-03-31 RX ADMIN — FAMOTIDINE 20 MG: 10 INJECTION INTRAVENOUS at 14:37

## 2021-03-31 NOTE — PROGRESS NOTES
[facility] Progress Note    Date: 2021    Name: Neha Cobian    MRN: [id number]         : 1970        1400: Pt arrived ambulatory to [facility] for Sean Minors in stable condition. Assessment completed. No other complaints voiced at this time. Peripheral IV  with positive blood return. Labs drawn per order and sent for processing. Normal Saline started at [facility]. Patient denies SOB, fever, cough, general not feeling well. Patient denies recent exposure to someone who has tested positive for COVID-19. Patient denies having contact with anyone who has a pending COVID test.      Patient Vitals for the past 12 hrs:   Temp Pulse Resp BP   21 1404 97.3 °F (36.3 °C) 90 18 127/84         Medications Administered     0.9% sodium chloride infusion     Admin Date  2021 Action  New Bag Dose  25 mL/hr Rate  25 mL/hr Route  IntraVENous Administered By  Ricardo Dinh RN          acetaminophen (TYLENOL) tablet 650 mg     Admin Date  2021 Action  Given Dose  650 mg Route  Oral Administered By  Ricardo Dinh RN          diphenhydrAMINE (BENADRYL) capsule 25 mg     Admin Date  2021 Action  Given Dose  25 mg Route  Oral Administered By  Ricardo Dinh RN          famotidine (PF) (PEPCID) 20 mg in 0.9% sodium chloride 10 mL injection     Admin Date  2021 Action  Given Dose  20 mg Route  IntraVENous Administered By  Ricardo Dinh RN          methylPREDNISolone (PF) (SOLU-MEDROL) injection 30 mg     Admin Date  2021 Action  Given Dose  30 mg Route  IntraVENous Administered By  Ricardo Dinh RN          pegloticase [facility]) 8 mg, overfill volume 25 mL in 0.9% sodium chloride 250 mL infusion     Admin Date  2021 Action  Given Dose  8 mg Rate  138 mL/hr Route  IntraVENous Administered By  Ricrado Dinh RN                  1630: Tolerated treatment well, no adverse reactions noted. D/Cd from [facility] ambulatory and in no distress.       Future Appointments   Date Time Provider Barry Cano   4/14/2021  3:00 PM B3 [facility] MED [address]   4/28/2021  2:00 PM B3 [facility]EE[facility] MED [address]   5/12/2021  2:00 PM B3 DEEPA MED [address]   7/7/2021  8:20 AM Kirill Rabago MD AOCR BS AMB           Yessy Montes De Oca RN  March 31, 2021

## 2021-03-31 NOTE — PROGRESS NOTES
The results were reviewed. Uric acid 3.3 mg/dL, elevated. Last dose one month ago. Remaining labs are stable.

## 2021-04-09 RX ORDER — DIPHENHYDRAMINE HCL 25 MG
25 CAPSULE ORAL ONCE
Status: COMPLETED | OUTPATIENT
Start: 2021-04-14 | End: 2021-04-14

## 2021-04-09 RX ORDER — ACETAMINOPHEN 325 MG/1
650 TABLET ORAL ONCE
Status: COMPLETED | OUTPATIENT
Start: 2021-04-14 | End: 2021-04-14

## 2021-04-09 RX ORDER — SODIUM CHLORIDE 9 MG/ML
25 INJECTION, SOLUTION INTRAVENOUS CONTINUOUS
Status: DISCONTINUED | OUTPATIENT
Start: 2021-04-14 | End: 2021-04-15 | Stop reason: HOSPADM

## 2021-04-09 RX ORDER — DIPHENHYDRAMINE HYDROCHLORIDE 50 MG/ML
25 INJECTION, SOLUTION INTRAMUSCULAR; INTRAVENOUS
Status: DISCONTINUED | OUTPATIENT
Start: 2021-04-14 | End: 2021-04-15 | Stop reason: HOSPADM

## 2021-04-09 RX ORDER — ACETAMINOPHEN 325 MG/1
650 TABLET ORAL
Status: DISCONTINUED | OUTPATIENT
Start: 2021-04-14 | End: 2021-04-15 | Stop reason: HOSPADM

## 2021-04-14 ENCOUNTER — HOSPITAL ENCOUNTER (OUTPATIENT)
Dept: INFUSION THERAPY | Age: 51
Discharge: HOME | End: 2021-04-14
Payer: COMMERCIAL

## 2021-04-14 ENCOUNTER — APPOINTMENT (OUTPATIENT)
Dept: INFUSION THERAPY | Age: 51
End: 2021-04-14

## 2021-04-14 VITALS
RESPIRATION RATE: 18 BRPM | HEART RATE: 75 BPM | SYSTOLIC BLOOD PRESSURE: 122 MMHG | DIASTOLIC BLOOD PRESSURE: 80 MMHG | TEMPERATURE: 96.8 F

## 2021-04-14 LAB
ALBUMIN SERPL-MCNC: 3.4 G/DL (ref 3.5–5)
ALBUMIN/GLOB SERPL: 1 {RATIO} (ref 1.1–2.2)
ALP SERPL-CCNC: 59 U/L (ref 45–117)
ALT SERPL-CCNC: 22 U/L (ref 12–78)
ANION GAP SERPL CALC-SCNC: 11 MMOL/L (ref 5–15)
AST SERPL-CCNC: 15 U/L (ref 15–37)
BASOPHILS # BLD: 0 K/UL (ref 0–0.1)
BASOPHILS NFR BLD: 1 % (ref 0–1)
BILIRUB SERPL-MCNC: 0.3 MG/DL (ref 0.2–1)
BUN SERPL-MCNC: 73 MG/DL (ref 6–20)
BUN/CREAT SERPL: 14 (ref 12–20)
CALCIUM SERPL-MCNC: 8.2 MG/DL (ref 8.5–10.1)
CHLORIDE SERPL-SCNC: 116 MMOL/L (ref 97–108)
CO2 SERPL-SCNC: 16 MMOL/L (ref 21–32)
CREAT SERPL-MCNC: 5.05 MG/DL (ref 0.7–1.3)
DIFFERENTIAL METHOD BLD: ABNORMAL
EOSINOPHIL # BLD: 0.6 K/UL (ref 0–0.4)
EOSINOPHIL NFR BLD: 10 % (ref 0–7)
ERYTHROCYTE [DISTWIDTH] IN BLOOD BY AUTOMATED COUNT: 14.6 % (ref 11.5–14.5)
ERYTHROCYTE [SEDIMENTATION RATE] IN BLOOD: 8 MM/HR (ref 0–15)
GLOBULIN SER CALC-MCNC: 3.4 G/DL (ref 2–4)
GLUCOSE SERPL-MCNC: 97 MG/DL (ref 65–100)
HCT VFR BLD AUTO: 35 % (ref 36.6–50.3)
HGB BLD-MCNC: 10.6 G/DL (ref 12.1–17)
IMM GRANULOCYTES # BLD AUTO: 0 K/UL (ref 0–0.04)
IMM GRANULOCYTES NFR BLD AUTO: 0 % (ref 0–0.5)
LYMPHOCYTES # BLD: 1.4 K/UL (ref 0.8–3.5)
LYMPHOCYTES NFR BLD: 21 % (ref 12–49)
MCH RBC QN AUTO: 29.2 PG (ref 26–34)
MCHC RBC AUTO-ENTMCNC: 30.3 G/DL (ref 30–36.5)
MCV RBC AUTO: 96.4 FL (ref 80–99)
MONOCYTES # BLD: 0.6 K/UL (ref 0–1)
MONOCYTES NFR BLD: 9 % (ref 5–13)
NEUTS SEG # BLD: 3.7 K/UL (ref 1.8–8)
NEUTS SEG NFR BLD: 59 % (ref 32–75)
NRBC # BLD: 0 K/UL (ref 0–0.01)
NRBC BLD-RTO: 0 PER 100 WBC
PLATELET # BLD AUTO: 220 K/UL (ref 150–400)
PMV BLD AUTO: 10.2 FL (ref 8.9–12.9)
POTASSIUM SERPL-SCNC: 3.8 MMOL/L (ref 3.5–5.1)
PROT SERPL-MCNC: 6.8 G/DL (ref 6.4–8.2)
RBC # BLD AUTO: 3.63 M/UL (ref 4.1–5.7)
SODIUM SERPL-SCNC: 143 MMOL/L (ref 136–145)
URATE SERPL-MCNC: 0.3 MG/DL (ref 3.5–7.2)
WBC # BLD AUTO: 6.3 K/UL (ref 4.1–11.1)

## 2021-04-14 PROCEDURE — 96375 TX/PRO/DX INJ NEW DRUG ADDON: CPT

## 2021-04-14 PROCEDURE — 36415 COLL VENOUS BLD VENIPUNCTURE: CPT

## 2021-04-14 PROCEDURE — 80053 COMPREHEN METABOLIC PANEL: CPT

## 2021-04-14 PROCEDURE — 96366 THER/PROPH/DIAG IV INF ADDON: CPT

## 2021-04-14 PROCEDURE — 84550 ASSAY OF BLOOD/URIC ACID: CPT

## 2021-04-14 PROCEDURE — 85025 COMPLETE CBC W/AUTO DIFF WBC: CPT

## 2021-04-14 PROCEDURE — 85652 RBC SED RATE AUTOMATED: CPT

## 2021-04-14 PROCEDURE — 74011000250 HC RX REV CODE- 250: Performed by: INTERNAL MEDICINE

## 2021-04-14 PROCEDURE — 74011250636 HC RX REV CODE- 250/636: Performed by: INTERNAL MEDICINE

## 2021-04-14 PROCEDURE — 74011250637 HC RX REV CODE- 250/637: Performed by: INTERNAL MEDICINE

## 2021-04-14 PROCEDURE — 96365 THER/PROPH/DIAG IV INF INIT: CPT

## 2021-04-14 RX ADMIN — METHYLPREDNISOLONE SODIUM SUCCINATE 30 MG: 40 INJECTION, POWDER, FOR SOLUTION INTRAMUSCULAR; INTRAVENOUS at 15:32

## 2021-04-14 RX ADMIN — DIPHENHYDRAMINE HYDROCHLORIDE 25 MG: 25 CAPSULE ORAL at 15:29

## 2021-04-14 RX ADMIN — FAMOTIDINE 20 MG: 10 INJECTION INTRAVENOUS at 15:31

## 2021-04-14 RX ADMIN — SODIUM CHLORIDE 25 ML/HR: 900 INJECTION, SOLUTION INTRAVENOUS at 15:22

## 2021-04-14 RX ADMIN — ACETAMINOPHEN 650 MG: 325 TABLET ORAL at 15:29

## 2021-04-14 RX ADMIN — PEGLOTICASE 8 MG: 8 INJECTION, SOLUTION INTRAVENOUS at 16:00

## 2021-04-14 NOTE — PROGRESS NOTES
Outpatient Infusion Center Progress Note    1510 Pt admit to [facility] for [address] ambulatory in stable condition. Assessment completed. No new concerns voiced. Piv established in [facility] with good blood return, labs drawn and sent for processing. Patient denies Covid contact, denies sob, fever, cough and feeling un well    Visit Vitals  /80   Pulse 75   Temp 96.8 °F (36 °C)   Resp 18       Medications:  Medications Administered     0.9% sodium chloride infusion     Admin Date  04/14/2021 Action  New Bag Dose  25 mL/hr Rate  25 mL/hr Route  IntraVENous Administered By  Rae Fox RN          acetaminophen (TYLENOL) tablet 650 mg     Admin Date  04/14/2021 Action  Given Dose  650 mg Route  Oral Administered By  Rae Fox RN          diphenhydrAMINE (BENADRYL) capsule 25 mg     Admin Date  04/14/2021 Action  Given Dose  25 mg Route  Oral Administered By  Rae Fox RN          famotidine (PF) (PEPCID) 20 mg in 0.9% sodium chloride 10 mL injection     Admin Date  04/14/2021 Action  Given Dose  20 mg Route  IntraVENous Administered By  Rae Fox RN          methylPREDNISolone (PF) (SOLU-MEDROL) injection 30 mg     Admin Date  04/14/2021 Action  Given Dose  30 mg Route  IntraVENous Administered By  LORRI Gonzalez [facility]) 8 mg, overfill volume 25 mL in 0.9% sodium chloride 250 mL infusion     Admin Date  04/14/2021 Action  Given Dose  8 mg Rate  138 mL/hr Route  IntraVENous Administered By  Rae Fox, LORRI                5307 Pt tolerated treatment well. Piv maintained good blood return throughout infusion. Removed per protocol at the end of infusion D/c home ambulatory in no distress. Pt aware of next appointment scheduled for 4/28/21.     Recent Results (from the past 12 hour(s))   CBC WITH AUTOMATED DIFF    Collection Time: 04/14/21  3:15 PM   Result Value Ref Range    WBC 6.3 4.1 - 11.1 K/uL    RBC 3.63 (L) 4.10 - 5.70 M/uL    HGB 10.6 (L) 12.1 - 17.0 g/dL    HCT 35.0 (L) 36.6 - 50.3 %    MCV 96.4 80.0 - 99.0 FL    MCH 29.2 26.0 - 34.0 PG    MCHC 30.3 30.0 - 36.5 g/dL    RDW 14.6 (H) 11.5 - 14.5 %    PLATELET 230 031 - 106 K/uL    MPV 10.2 8.9 - 12.9 FL    NRBC 0.0 0  WBC    ABSOLUTE NRBC 0.00 0.00 - 0.01 K/uL    NEUTROPHILS 59 32 - 75 %    LYMPHOCYTES 21 12 - 49 %    MONOCYTES 9 5 - 13 %    EOSINOPHILS 10 (H) 0 - 7 %    BASOPHILS 1 0 - 1 %    IMMATURE GRANULOCYTES 0 0.0 - 0.5 %    ABS. NEUTROPHILS 3.7 1.8 - 8.0 K/UL    ABS. LYMPHOCYTES 1.4 0.8 - 3.5 K/UL    ABS. MONOCYTES 0.6 0.0 - 1.0 K/UL    ABS. EOSINOPHILS 0.6 (H) 0.0 - 0.4 K/UL    ABS. BASOPHILS 0.0 0.0 - 0.1 K/UL    ABS. IMM. GRANS. 0.0 0.00 - 0.04 K/UL    DF AUTOMATED     METABOLIC PANEL, COMPREHENSIVE    Collection Time: 04/14/21  3:15 PM   Result Value Ref Range    Sodium 143 136 - 145 mmol/L    Potassium 3.8 3.5 - 5.1 mmol/L    Chloride 116 (H) 97 - 108 mmol/L    CO2 16 (L) 21 - 32 mmol/L    Anion gap 11 5 - 15 mmol/L    Glucose 97 65 - 100 mg/dL    BUN 73 (H) 6 - 20 MG/DL    Creatinine 5.05 (H) 0.70 - 1.30 MG/DL    BUN/Creatinine ratio 14 12 - 20      GFR est AA 15 (L) >60 ml/min/1.73m2    GFR est non-AA 12 (L) >60 ml/min/1.73m2    Calcium 8.2 (L) 8.5 - 10.1 MG/DL    Bilirubin, total 0.3 0.2 - 1.0 MG/DL    ALT (SGPT) 22 12 - 78 U/L    AST (SGOT) 15 15 - 37 U/L    Alk.  phosphatase 59 45 - 117 U/L    Protein, total 6.8 6.4 - 8.2 g/dL    Albumin 3.4 (L) 3.5 - 5.0 g/dL    Globulin 3.4 2.0 - 4.0 g/dL    A-G Ratio 1.0 (L) 1.1 - 2.2     URIC ACID    Collection Time: 04/14/21  3:15 PM   Result Value Ref Range    Uric acid 0.3 (L) 3.5 - 7.2 MG/DL

## 2021-04-23 RX ORDER — DIPHENHYDRAMINE HYDROCHLORIDE 50 MG/ML
25 INJECTION, SOLUTION INTRAMUSCULAR; INTRAVENOUS
Status: DISCONTINUED | OUTPATIENT
Start: 2021-04-28 | End: 2021-04-29 | Stop reason: HOSPADM

## 2021-04-23 RX ORDER — DIPHENHYDRAMINE HCL 25 MG
25 CAPSULE ORAL ONCE
Status: COMPLETED | OUTPATIENT
Start: 2021-04-28 | End: 2021-04-28

## 2021-04-23 RX ORDER — SODIUM CHLORIDE 9 MG/ML
25 INJECTION, SOLUTION INTRAVENOUS CONTINUOUS
Status: DISCONTINUED | OUTPATIENT
Start: 2021-04-28 | End: 2021-04-29 | Stop reason: HOSPADM

## 2021-04-23 RX ORDER — ACETAMINOPHEN 325 MG/1
650 TABLET ORAL
Status: DISCONTINUED | OUTPATIENT
Start: 2021-04-28 | End: 2021-04-29 | Stop reason: HOSPADM

## 2021-04-23 RX ORDER — ACETAMINOPHEN 325 MG/1
650 TABLET ORAL ONCE
Status: COMPLETED | OUTPATIENT
Start: 2021-04-28 | End: 2021-04-28

## 2021-04-28 ENCOUNTER — APPOINTMENT (OUTPATIENT)
Dept: INFUSION THERAPY | Age: 51
End: 2021-04-28

## 2021-04-28 ENCOUNTER — HOSPITAL ENCOUNTER (OUTPATIENT)
Dept: INFUSION THERAPY | Age: 51
Discharge: HOME | End: 2021-04-28
Payer: COMMERCIAL

## 2021-04-28 VITALS
SYSTOLIC BLOOD PRESSURE: 130 MMHG | RESPIRATION RATE: 16 BRPM | DIASTOLIC BLOOD PRESSURE: 82 MMHG | TEMPERATURE: 98.7 F | HEART RATE: 76 BPM

## 2021-04-28 LAB
ALBUMIN SERPL-MCNC: 3.6 G/DL (ref 3.5–5)
ALBUMIN/GLOB SERPL: 1.1 {RATIO} (ref 1.1–2.2)
ALP SERPL-CCNC: 56 U/L (ref 45–117)
ALT SERPL-CCNC: 25 U/L (ref 12–78)
ANION GAP SERPL CALC-SCNC: 9 MMOL/L (ref 5–15)
AST SERPL-CCNC: 14 U/L (ref 15–37)
BASOPHILS # BLD: 0 K/UL (ref 0–0.1)
BASOPHILS NFR BLD: 1 % (ref 0–1)
BILIRUB SERPL-MCNC: 0.4 MG/DL (ref 0.2–1)
BUN SERPL-MCNC: 68 MG/DL (ref 6–20)
BUN/CREAT SERPL: 14 (ref 12–20)
CALCIUM SERPL-MCNC: 8.4 MG/DL (ref 8.5–10.1)
CHLORIDE SERPL-SCNC: 118 MMOL/L (ref 97–108)
CO2 SERPL-SCNC: 14 MMOL/L (ref 21–32)
CREAT SERPL-MCNC: 4.84 MG/DL (ref 0.7–1.3)
CRP SERPL-MCNC: <0.29 MG/DL (ref 0–0.6)
DIFFERENTIAL METHOD BLD: ABNORMAL
EOSINOPHIL # BLD: 0.7 K/UL (ref 0–0.4)
EOSINOPHIL NFR BLD: 12 % (ref 0–7)
ERYTHROCYTE [DISTWIDTH] IN BLOOD BY AUTOMATED COUNT: 14.8 % (ref 11.5–14.5)
ERYTHROCYTE [SEDIMENTATION RATE] IN BLOOD: 11 MM/HR (ref 0–15)
GLOBULIN SER CALC-MCNC: 3.2 G/DL (ref 2–4)
GLUCOSE SERPL-MCNC: 71 MG/DL (ref 65–100)
HCT VFR BLD AUTO: 31.3 % (ref 36.6–50.3)
HGB BLD-MCNC: 9.4 G/DL (ref 12.1–17)
IMM GRANULOCYTES # BLD AUTO: 0 K/UL (ref 0–0.04)
IMM GRANULOCYTES NFR BLD AUTO: 0 % (ref 0–0.5)
LYMPHOCYTES # BLD: 1.3 K/UL (ref 0.8–3.5)
LYMPHOCYTES NFR BLD: 24 % (ref 12–49)
MCH RBC QN AUTO: 29.3 PG (ref 26–34)
MCHC RBC AUTO-ENTMCNC: 30 G/DL (ref 30–36.5)
MCV RBC AUTO: 97.5 FL (ref 80–99)
MONOCYTES # BLD: 0.6 K/UL (ref 0–1)
MONOCYTES NFR BLD: 10 % (ref 5–13)
NEUTS SEG # BLD: 2.9 K/UL (ref 1.8–8)
NEUTS SEG NFR BLD: 53 % (ref 32–75)
NRBC # BLD: 0 K/UL (ref 0–0.01)
NRBC BLD-RTO: 0 PER 100 WBC
PLATELET # BLD AUTO: 193 K/UL (ref 150–400)
PMV BLD AUTO: 10.9 FL (ref 8.9–12.9)
POTASSIUM SERPL-SCNC: 3.9 MMOL/L (ref 3.5–5.1)
PROT SERPL-MCNC: 6.8 G/DL (ref 6.4–8.2)
RBC # BLD AUTO: 3.21 M/UL (ref 4.1–5.7)
SODIUM SERPL-SCNC: 141 MMOL/L (ref 136–145)
WBC # BLD AUTO: 5.5 K/UL (ref 4.1–11.1)

## 2021-04-28 PROCEDURE — 96365 THER/PROPH/DIAG IV INF INIT: CPT

## 2021-04-28 PROCEDURE — 85025 COMPLETE CBC W/AUTO DIFF WBC: CPT

## 2021-04-28 PROCEDURE — 36415 COLL VENOUS BLD VENIPUNCTURE: CPT

## 2021-04-28 PROCEDURE — 74011000250 HC RX REV CODE- 250: Performed by: INTERNAL MEDICINE

## 2021-04-28 PROCEDURE — 85652 RBC SED RATE AUTOMATED: CPT

## 2021-04-28 PROCEDURE — 74011250636 HC RX REV CODE- 250/636: Performed by: INTERNAL MEDICINE

## 2021-04-28 PROCEDURE — 74011250637 HC RX REV CODE- 250/637: Performed by: INTERNAL MEDICINE

## 2021-04-28 PROCEDURE — 80053 COMPREHEN METABOLIC PANEL: CPT

## 2021-04-28 PROCEDURE — 96366 THER/PROPH/DIAG IV INF ADDON: CPT

## 2021-04-28 PROCEDURE — 86140 C-REACTIVE PROTEIN: CPT

## 2021-04-28 PROCEDURE — 84550 ASSAY OF BLOOD/URIC ACID: CPT

## 2021-04-28 PROCEDURE — 96375 TX/PRO/DX INJ NEW DRUG ADDON: CPT

## 2021-04-28 RX ADMIN — SODIUM CHLORIDE 25 ML/HR: 900 INJECTION, SOLUTION INTRAVENOUS at 14:30

## 2021-04-28 RX ADMIN — METHYLPREDNISOLONE SODIUM SUCCINATE 30 MG: 40 INJECTION, POWDER, FOR SOLUTION INTRAMUSCULAR; INTRAVENOUS at 14:35

## 2021-04-28 RX ADMIN — PEGLOTICASE 8 MG: 8 INJECTION, SOLUTION INTRAVENOUS at 15:05

## 2021-04-28 RX ADMIN — DIPHENHYDRAMINE HYDROCHLORIDE 25 MG: 25 CAPSULE ORAL at 14:30

## 2021-04-28 RX ADMIN — FAMOTIDINE 20 MG: 10 INJECTION INTRAVENOUS at 14:33

## 2021-04-28 RX ADMIN — ACETAMINOPHEN 650 MG: 325 TABLET ORAL at 14:30

## 2021-04-28 NOTE — PROGRESS NOTES
Outpatient Infusion Center Progress Note    1530 Report received from Bahman Riggins RN. Mr. Jessica Mcfarland is resting comfortably in a chair. PIV is infusing with Krystexxa per order. Visit Vitals  /82 (BP 1 Location: Right arm, BP Patient Position: Sitting)   Pulse 76   Temp 98.7 °F (37.1 °C)   Resp 16         1725 Pt tolerated treatment well. PIV maintained positive blood return throughout treatment. PIV flushed and de-accessed per protocol. D/c home ambulatory in no distress. Pt aware of next appointment scheduled for 5/12/21.

## 2021-04-29 LAB — URATE SERPL-MCNC: <0.2 MG/DL (ref 3.5–7.2)

## 2021-04-30 NOTE — PROGRESS NOTES
[facility] Short Note                  4748 Pt admit to [facility] for [address] ambulatory in stable condition. Assessment completed. No new concerns voiced. PIV established in right wrist with good blood return. Labs collected and sent for processing. Patient denies SOB, fever, cough, and/or general not feeling well. Patient denies recent exposure to someone who has tested positive for COVID-19. Patient denies contact with anyone who has a pending COVID test.       Visit Vitals  /82 (BP 1 Location: Right arm, BP Patient Position: Sitting)   Pulse 76   Temp 98.7 °F (37.1 °C)   Resp 16       Medications Administered     0.9% sodium chloride infusion     Admin Date  04/28/2021 Action  New Bag Dose  25 mL/hr Rate  25 mL/hr Route  IntraVENous Administered By  Raman Elkins RN          acetaminophen (TYLENOL) tablet 650 mg     Admin Date  04/28/2021 Action  Given Dose  650 mg Route  Oral Administered By  Raman Elkins RN          diphenhydrAMINE (BENADRYL) capsule 25 mg     Admin Date  04/28/2021 Action  Given Dose  25 mg Route  Oral Administered By  Raman Elkins RN          famotidine (PF) (PEPCID) 20 mg in 0.9% sodium chloride 10 mL injection     Admin Date  04/28/2021 Action  Given Dose  20 mg Route  IntraVENous Administered By  Raman Elkins RN          methylPREDNISolone (PF) (SOLU-MEDROL) injection 30 mg     Admin Date  04/28/2021 Action  Given Dose  30 mg Route  IntraVENous Administered By  Raman Elkins RN          pegloticase [facility]) 8 mg, overfill volume 25 mL in 0.9% sodium chloride 250 mL infusion     Admin Date  04/28/2021 Action  Given Dose  8 mg Rate  138 mL/hr Route  IntraVENous Administered By  Raman Elkins RN              3244 Report given to Shelby Champion RN.     Future Appointments   Date Time Provider Barry Rachael   5/12/2021  2:00 PM [address] [facility]   7/7/2021  8:20 AM Greyson Rabago MD AOCR BS AMB       Molly Adkins RN  April 30, 2021  9:15 AM

## 2021-05-03 DIAGNOSIS — M1A.39X1 CHRONIC TOPHACEOUS GOUT OF MULTIPLE SITES DUE TO RENAL IMPAIRMENT: Primary | ICD-10-CM

## 2021-05-03 RX ORDER — HEPARIN 100 UNIT/ML
300-500 SYRINGE INTRAVENOUS AS NEEDED
Status: CANCELLED
Start: 2021-05-03

## 2021-05-03 RX ORDER — EPINEPHRINE 1 MG/ML
0.3 INJECTION, SOLUTION, CONCENTRATE INTRAVENOUS AS NEEDED
Status: CANCELLED | OUTPATIENT
Start: 2021-05-03

## 2021-05-03 RX ORDER — DIPHENHYDRAMINE HCL 25 MG
25 CAPSULE ORAL ONCE
Status: CANCELLED | OUTPATIENT
Start: 2021-05-03 | End: 2021-05-03

## 2021-05-03 RX ORDER — SODIUM CHLORIDE 9 MG/ML
10 INJECTION INTRAMUSCULAR; INTRAVENOUS; SUBCUTANEOUS AS NEEDED
Status: CANCELLED | OUTPATIENT
Start: 2021-05-03

## 2021-05-03 RX ORDER — ACETAMINOPHEN 325 MG/1
650 TABLET ORAL ONCE
Status: CANCELLED | OUTPATIENT
Start: 2021-05-03 | End: 2021-05-03

## 2021-05-03 RX ORDER — ACETAMINOPHEN 325 MG/1
650 TABLET ORAL AS NEEDED
Status: CANCELLED
Start: 2021-05-03

## 2021-05-03 RX ORDER — ALBUTEROL SULFATE 0.83 MG/ML
2.5 SOLUTION RESPIRATORY (INHALATION) AS NEEDED
Status: CANCELLED
Start: 2021-05-03

## 2021-05-03 RX ORDER — ONDANSETRON 2 MG/ML
8 INJECTION INTRAMUSCULAR; INTRAVENOUS AS NEEDED
Status: CANCELLED | OUTPATIENT
Start: 2021-05-03

## 2021-05-03 RX ORDER — SODIUM CHLORIDE 9 MG/ML
25 INJECTION, SOLUTION INTRAVENOUS CONTINUOUS
Status: CANCELLED | OUTPATIENT
Start: 2021-05-03

## 2021-05-03 RX ORDER — HYDROCORTISONE SODIUM SUCCINATE 100 MG/2ML
100 INJECTION, POWDER, FOR SOLUTION INTRAMUSCULAR; INTRAVENOUS AS NEEDED
Status: CANCELLED | OUTPATIENT
Start: 2021-05-03

## 2021-05-03 RX ORDER — DIPHENHYDRAMINE HYDROCHLORIDE 50 MG/ML
25 INJECTION, SOLUTION INTRAMUSCULAR; INTRAVENOUS AS NEEDED
Status: CANCELLED
Start: 2021-05-03

## 2021-05-03 RX ORDER — SODIUM CHLORIDE 0.9 % (FLUSH) 0.9 %
10 SYRINGE (ML) INJECTION AS NEEDED
Status: CANCELLED | OUTPATIENT
Start: 2021-05-03

## 2021-05-03 RX ORDER — DIPHENHYDRAMINE HYDROCHLORIDE 50 MG/ML
50 INJECTION, SOLUTION INTRAMUSCULAR; INTRAVENOUS AS NEEDED
Status: CANCELLED
Start: 2021-05-03

## 2021-05-12 ENCOUNTER — HOSPITAL ENCOUNTER (OUTPATIENT)
Dept: INFUSION THERAPY | Age: 51
Discharge: HOME | End: 2021-05-12
Payer: COMMERCIAL

## 2021-05-12 ENCOUNTER — APPOINTMENT (OUTPATIENT)
Dept: INFUSION THERAPY | Age: 51
End: 2021-05-12

## 2021-05-12 VITALS
TEMPERATURE: 97.6 F | DIASTOLIC BLOOD PRESSURE: 84 MMHG | SYSTOLIC BLOOD PRESSURE: 125 MMHG | HEART RATE: 78 BPM | RESPIRATION RATE: 16 BRPM

## 2021-05-12 DIAGNOSIS — M1A.39X1 CHRONIC TOPHACEOUS GOUT OF MULTIPLE SITES DUE TO RENAL IMPAIRMENT: Primary | ICD-10-CM

## 2021-05-12 LAB
ALBUMIN SERPL-MCNC: 3.7 G/DL (ref 3.5–5)
ALBUMIN/GLOB SERPL: 1.3 {RATIO} (ref 1.1–2.2)
ALP SERPL-CCNC: 54 U/L (ref 45–117)
ALT SERPL-CCNC: 31 U/L (ref 12–78)
ANION GAP SERPL CALC-SCNC: 9 MMOL/L (ref 5–15)
AST SERPL-CCNC: 18 U/L (ref 15–37)
BASOPHILS # BLD: 0 K/UL (ref 0–0.1)
BASOPHILS NFR BLD: 1 % (ref 0–1)
BILIRUB SERPL-MCNC: 0.3 MG/DL (ref 0.2–1)
BUN SERPL-MCNC: 84 MG/DL (ref 6–20)
BUN/CREAT SERPL: 16 (ref 12–20)
CALCIUM SERPL-MCNC: 7.2 MG/DL (ref 8.5–10.1)
CHLORIDE SERPL-SCNC: 115 MMOL/L (ref 97–108)
CO2 SERPL-SCNC: 16 MMOL/L (ref 21–32)
CREAT SERPL-MCNC: 5.27 MG/DL (ref 0.7–1.3)
DIFFERENTIAL METHOD BLD: ABNORMAL
EOSINOPHIL # BLD: 0.7 K/UL (ref 0–0.4)
EOSINOPHIL NFR BLD: 8 % (ref 0–7)
ERYTHROCYTE [DISTWIDTH] IN BLOOD BY AUTOMATED COUNT: 14.1 % (ref 11.5–14.5)
ERYTHROCYTE [SEDIMENTATION RATE] IN BLOOD: 15 MM/HR (ref 0–15)
GLOBULIN SER CALC-MCNC: 2.9 G/DL (ref 2–4)
GLUCOSE SERPL-MCNC: 103 MG/DL (ref 65–100)
HCT VFR BLD AUTO: 30.1 % (ref 36.6–50.3)
HGB BLD-MCNC: 9 G/DL (ref 12.1–17)
IMM GRANULOCYTES # BLD AUTO: 0 K/UL (ref 0–0.04)
IMM GRANULOCYTES NFR BLD AUTO: 1 % (ref 0–0.5)
LYMPHOCYTES # BLD: 1.2 K/UL (ref 0.8–3.5)
LYMPHOCYTES NFR BLD: 14 % (ref 12–49)
MCH RBC QN AUTO: 29.8 PG (ref 26–34)
MCHC RBC AUTO-ENTMCNC: 29.9 G/DL (ref 30–36.5)
MCV RBC AUTO: 99.7 FL (ref 80–99)
MONOCYTES # BLD: 0.9 K/UL (ref 0–1)
MONOCYTES NFR BLD: 10 % (ref 5–13)
NEUTS SEG # BLD: 5.7 K/UL (ref 1.8–8)
NEUTS SEG NFR BLD: 66 % (ref 32–75)
NRBC # BLD: 0 K/UL (ref 0–0.01)
NRBC BLD-RTO: 0 PER 100 WBC
PLATELET # BLD AUTO: 198 K/UL (ref 150–400)
PMV BLD AUTO: 11.1 FL (ref 8.9–12.9)
POTASSIUM SERPL-SCNC: 4.6 MMOL/L (ref 3.5–5.1)
PROT SERPL-MCNC: 6.6 G/DL (ref 6.4–8.2)
RBC # BLD AUTO: 3.02 M/UL (ref 4.1–5.7)
SODIUM SERPL-SCNC: 140 MMOL/L (ref 136–145)
URATE SERPL-MCNC: 0.2 MG/DL (ref 3.5–7.2)
WBC # BLD AUTO: 8.6 K/UL (ref 4.1–11.1)

## 2021-05-12 PROCEDURE — 74011250636 HC RX REV CODE- 250/636: Performed by: INTERNAL MEDICINE

## 2021-05-12 PROCEDURE — 84550 ASSAY OF BLOOD/URIC ACID: CPT

## 2021-05-12 PROCEDURE — 85025 COMPLETE CBC W/AUTO DIFF WBC: CPT

## 2021-05-12 PROCEDURE — 96365 THER/PROPH/DIAG IV INF INIT: CPT

## 2021-05-12 PROCEDURE — 85652 RBC SED RATE AUTOMATED: CPT

## 2021-05-12 PROCEDURE — 96366 THER/PROPH/DIAG IV INF ADDON: CPT

## 2021-05-12 PROCEDURE — 36415 COLL VENOUS BLD VENIPUNCTURE: CPT

## 2021-05-12 PROCEDURE — 96375 TX/PRO/DX INJ NEW DRUG ADDON: CPT

## 2021-05-12 PROCEDURE — 80053 COMPREHEN METABOLIC PANEL: CPT

## 2021-05-12 PROCEDURE — 74011250637 HC RX REV CODE- 250/637: Performed by: INTERNAL MEDICINE

## 2021-05-12 RX ORDER — ACETAMINOPHEN 325 MG/1
650 TABLET ORAL AS NEEDED
Status: CANCELLED
Start: 2021-05-25

## 2021-05-12 RX ORDER — EPINEPHRINE 1 MG/ML
0.3 INJECTION, SOLUTION, CONCENTRATE INTRAVENOUS AS NEEDED
Status: CANCELLED | OUTPATIENT
Start: 2021-05-25

## 2021-05-12 RX ORDER — DIPHENHYDRAMINE HCL 25 MG
25 CAPSULE ORAL ONCE
Status: CANCELLED | OUTPATIENT
Start: 2021-05-25 | End: 2021-05-25

## 2021-05-12 RX ORDER — ACETAMINOPHEN 325 MG/1
650 TABLET ORAL ONCE
Status: CANCELLED | OUTPATIENT
Start: 2021-05-25 | End: 2021-05-25

## 2021-05-12 RX ORDER — SODIUM CHLORIDE 9 MG/ML
25 INJECTION, SOLUTION INTRAVENOUS CONTINUOUS
Status: DISCONTINUED | OUTPATIENT
Start: 2021-05-12 | End: 2021-05-13 | Stop reason: HOSPADM

## 2021-05-12 RX ORDER — DIPHENHYDRAMINE HYDROCHLORIDE 50 MG/ML
25 INJECTION, SOLUTION INTRAMUSCULAR; INTRAVENOUS AS NEEDED
Status: CANCELLED
Start: 2021-05-25

## 2021-05-12 RX ORDER — DIPHENHYDRAMINE HCL 25 MG
25 CAPSULE ORAL ONCE
Status: COMPLETED | OUTPATIENT
Start: 2021-05-12 | End: 2021-05-12

## 2021-05-12 RX ORDER — DIPHENHYDRAMINE HYDROCHLORIDE 50 MG/ML
50 INJECTION, SOLUTION INTRAMUSCULAR; INTRAVENOUS AS NEEDED
Status: CANCELLED
Start: 2021-05-25

## 2021-05-12 RX ORDER — SODIUM CHLORIDE 9 MG/ML
25 INJECTION, SOLUTION INTRAVENOUS CONTINUOUS
Status: CANCELLED | OUTPATIENT
Start: 2021-05-25

## 2021-05-12 RX ORDER — ALBUTEROL SULFATE 0.83 MG/ML
2.5 SOLUTION RESPIRATORY (INHALATION) AS NEEDED
Status: CANCELLED
Start: 2021-05-25

## 2021-05-12 RX ORDER — SODIUM CHLORIDE 0.9 % (FLUSH) 0.9 %
10 SYRINGE (ML) INJECTION AS NEEDED
Status: CANCELLED | OUTPATIENT
Start: 2021-05-25

## 2021-05-12 RX ORDER — ONDANSETRON 2 MG/ML
8 INJECTION INTRAMUSCULAR; INTRAVENOUS AS NEEDED
Status: CANCELLED | OUTPATIENT
Start: 2021-05-25

## 2021-05-12 RX ORDER — ACETAMINOPHEN 325 MG/1
650 TABLET ORAL ONCE
Status: COMPLETED | OUTPATIENT
Start: 2021-05-12 | End: 2021-05-12

## 2021-05-12 RX ORDER — SODIUM CHLORIDE 9 MG/ML
10 INJECTION INTRAMUSCULAR; INTRAVENOUS; SUBCUTANEOUS AS NEEDED
Status: CANCELLED | OUTPATIENT
Start: 2021-05-25

## 2021-05-12 RX ORDER — HEPARIN 100 UNIT/ML
300-500 SYRINGE INTRAVENOUS AS NEEDED
Status: CANCELLED
Start: 2021-05-25

## 2021-05-12 RX ORDER — HYDROCORTISONE SODIUM SUCCINATE 100 MG/2ML
100 INJECTION, POWDER, FOR SOLUTION INTRAMUSCULAR; INTRAVENOUS AS NEEDED
Status: CANCELLED | OUTPATIENT
Start: 2021-05-25

## 2021-05-12 RX ADMIN — SODIUM CHLORIDE 25 ML/HR: 900 INJECTION, SOLUTION INTRAVENOUS at 15:40

## 2021-05-12 RX ADMIN — FAMOTIDINE 20 MG: 10 INJECTION INTRAVENOUS at 15:24

## 2021-05-12 RX ADMIN — DIPHENHYDRAMINE HYDROCHLORIDE 25 MG: 25 CAPSULE ORAL at 15:22

## 2021-05-12 RX ADMIN — METHYLPREDNISOLONE SODIUM SUCCINATE 125 MG: 125 INJECTION, POWDER, FOR SOLUTION INTRAMUSCULAR; INTRAVENOUS at 15:23

## 2021-05-12 RX ADMIN — PEGLOTICASE 8 MG: 8 INJECTION, SOLUTION INTRAVENOUS at 15:41

## 2021-05-12 RX ADMIN — ACETAMINOPHEN 650 MG: 325 TABLET ORAL at 15:22

## 2021-05-12 NOTE — PROGRESS NOTES
[facility] Progress Note    Date: May 12, 2021    Name: Sherlyn Petty    MRN: [id number]         : 1970        1445: Pt arrived ambulatory to [facility] for Diya Triana in stable condition. Assessment completed. No other complaints voiced at this time. Peripheral IV with positive blood return. Labs drawn drawn per order and sent for processing. Normal Saline started at [facility]. Patient denies SOB, fever, cough, general not feeling well. Patient denies recent exposure to someone who has tested positive for COVID-19. Patient denies having contact with anyone who has a pending COVID test.      Patient Vitals for the past 12 hrs:   Temp Pulse Resp BP   21 1445 97.6 °F (36.4 °C) 78 16 125/84         Medications Administered     0.9% sodium chloride infusion     Admin Date  2021 Action  New Bag Dose  25 mL/hr Rate  25 mL/hr Route  IntraVENous Administered By  Minoo Hernadez RN          acetaminophen (TYLENOL) tablet 650 mg     Admin Date  2021 Action  Given Dose  650 mg Route  Oral Administered By  Minoo Hernadez RN          diphenhydrAMINE (BENADRYL) capsule 25 mg     Admin Date  2021 Action  Given Dose  25 mg Route  Oral Administered By  Minoo Hernadez RN          famotidine (PF) (PEPCID) 20 mg in 0.9% sodium chloride 10 mL injection     Admin Date  2021 Action  Given Dose  20 mg Route  IntraVENous Administered By  Minoo Hernadez RN          methylPREDNISolone (PF) (Solu-MEDROL) injection 125 mg     Admin Date  2021 Action  Given Dose  125 mg Route  IntraVENous Administered By  Minoo Hernadez RN          pegloticase [facility]) 8 mg in 0.9% sodium chloride 250 mL, overfill volume 25 mL infusion     Admin Date  2021 Action  Given Dose  8 mg Rate  137.5 mL/hr Route  IntraVENous Administered By  Minoo Hernadez RN                  0645: Tolerated treatment well, no adverse reactions noted. D/Cd from [facility] ambulatory and in no distress.       Future Appointments   Date Time Provider Barry Rachael   5/26/2021  3:00 PM B3 [facility] MED [address]   6/9/2021  3:00 PM B3 DEEPA MED [address]   6/23/2021  3:00 PM B3 DEEPA MED [address] [facility]   7/7/2021  8:20 AM Lashonda Rabago MD AOCR BS AMB   7/7/2021  3:00 [facility]   7/21/2021  3:00 PM B3 DEEPA MED [address], RN  May 12, 2021

## 2021-05-18 DIAGNOSIS — M1A.39X1 CHRONIC TOPHACEOUS GOUT OF MULTIPLE SITES DUE TO RENAL IMPAIRMENT: ICD-10-CM

## 2021-05-25 DIAGNOSIS — M1A.39X1 CHRONIC TOPHACEOUS GOUT OF MULTIPLE SITES DUE TO RENAL IMPAIRMENT: ICD-10-CM

## 2021-05-26 ENCOUNTER — HOSPITAL ENCOUNTER (OUTPATIENT)
Dept: INFUSION THERAPY | Age: 51
Discharge: HOME | End: 2021-05-26
Payer: COMMERCIAL

## 2021-05-26 VITALS
HEART RATE: 75 BPM | TEMPERATURE: 96.8 F | SYSTOLIC BLOOD PRESSURE: 126 MMHG | RESPIRATION RATE: 18 BRPM | DIASTOLIC BLOOD PRESSURE: 85 MMHG

## 2021-05-26 DIAGNOSIS — M1A.39X1 CHRONIC TOPHACEOUS GOUT OF MULTIPLE SITES DUE TO RENAL IMPAIRMENT: Primary | ICD-10-CM

## 2021-05-26 LAB
ALBUMIN SERPL-MCNC: 3.6 G/DL (ref 3.5–5)
ALBUMIN/GLOB SERPL: 1.1 {RATIO} (ref 1.1–2.2)
ALP SERPL-CCNC: 54 U/L (ref 45–117)
ALT SERPL-CCNC: 27 U/L (ref 12–78)
ANION GAP SERPL CALC-SCNC: 11 MMOL/L (ref 5–15)
AST SERPL-CCNC: 17 U/L (ref 15–37)
BASOPHILS # BLD: 0.1 K/UL (ref 0–0.1)
BASOPHILS NFR BLD: 1 % (ref 0–1)
BILIRUB SERPL-MCNC: 0.3 MG/DL (ref 0.2–1)
BUN SERPL-MCNC: 79 MG/DL (ref 6–20)
BUN/CREAT SERPL: 14 (ref 12–20)
CALCIUM SERPL-MCNC: 8 MG/DL (ref 8.5–10.1)
CHLORIDE SERPL-SCNC: 114 MMOL/L (ref 97–108)
CO2 SERPL-SCNC: 15 MMOL/L (ref 21–32)
CREAT SERPL-MCNC: 5.7 MG/DL (ref 0.7–1.3)
DIFFERENTIAL METHOD BLD: ABNORMAL
EOSINOPHIL # BLD: 0.6 K/UL (ref 0–0.4)
EOSINOPHIL NFR BLD: 9 % (ref 0–7)
ERYTHROCYTE [DISTWIDTH] IN BLOOD BY AUTOMATED COUNT: 14 % (ref 11.5–14.5)
ERYTHROCYTE [SEDIMENTATION RATE] IN BLOOD: 25 MM/HR (ref 0–15)
GLOBULIN SER CALC-MCNC: 3.4 G/DL (ref 2–4)
GLUCOSE SERPL-MCNC: 81 MG/DL (ref 65–100)
HCT VFR BLD AUTO: 30.3 % (ref 36.6–50.3)
HGB BLD-MCNC: 9 G/DL (ref 12.1–17)
IMM GRANULOCYTES # BLD AUTO: 0 K/UL (ref 0–0.04)
IMM GRANULOCYTES NFR BLD AUTO: 0 % (ref 0–0.5)
LYMPHOCYTES # BLD: 2 K/UL (ref 0.8–3.5)
LYMPHOCYTES NFR BLD: 29 % (ref 12–49)
MCH RBC QN AUTO: 29.7 PG (ref 26–34)
MCHC RBC AUTO-ENTMCNC: 29.7 G/DL (ref 30–36.5)
MCV RBC AUTO: 100 FL (ref 80–99)
MONOCYTES # BLD: 0.5 K/UL (ref 0–1)
MONOCYTES NFR BLD: 7 % (ref 5–13)
NEUTS SEG # BLD: 3.7 K/UL (ref 1.8–8)
NEUTS SEG NFR BLD: 54 % (ref 32–75)
NRBC # BLD: 0 K/UL (ref 0–0.01)
NRBC BLD-RTO: 0 PER 100 WBC
PLATELET # BLD AUTO: 209 K/UL (ref 150–400)
PMV BLD AUTO: 11.3 FL (ref 8.9–12.9)
POTASSIUM SERPL-SCNC: 4 MMOL/L (ref 3.5–5.1)
PROT SERPL-MCNC: 7 G/DL (ref 6.4–8.2)
RBC # BLD AUTO: 3.03 M/UL (ref 4.1–5.7)
SODIUM SERPL-SCNC: 140 MMOL/L (ref 136–145)
URATE SERPL-MCNC: 0.4 MG/DL (ref 3.5–7.2)
URATE SERPL-MCNC: <0.2 MG/DL (ref 3.5–7.2)
WBC # BLD AUTO: 6.9 K/UL (ref 4.1–11.1)

## 2021-05-26 PROCEDURE — 84550 ASSAY OF BLOOD/URIC ACID: CPT

## 2021-05-26 PROCEDURE — 74011250636 HC RX REV CODE- 250/636: Performed by: INTERNAL MEDICINE

## 2021-05-26 PROCEDURE — 36415 COLL VENOUS BLD VENIPUNCTURE: CPT

## 2021-05-26 PROCEDURE — 80053 COMPREHEN METABOLIC PANEL: CPT

## 2021-05-26 PROCEDURE — 96366 THER/PROPH/DIAG IV INF ADDON: CPT

## 2021-05-26 PROCEDURE — 74011250637 HC RX REV CODE- 250/637: Performed by: INTERNAL MEDICINE

## 2021-05-26 PROCEDURE — 96365 THER/PROPH/DIAG IV INF INIT: CPT

## 2021-05-26 PROCEDURE — 74011000250 HC RX REV CODE- 250: Performed by: INTERNAL MEDICINE

## 2021-05-26 PROCEDURE — 85652 RBC SED RATE AUTOMATED: CPT

## 2021-05-26 PROCEDURE — 96375 TX/PRO/DX INJ NEW DRUG ADDON: CPT

## 2021-05-26 PROCEDURE — 85025 COMPLETE CBC W/AUTO DIFF WBC: CPT

## 2021-05-26 RX ORDER — ALBUTEROL SULFATE 0.83 MG/ML
2.5 SOLUTION RESPIRATORY (INHALATION) AS NEEDED
Status: CANCELLED
Start: 2021-06-08

## 2021-05-26 RX ORDER — ACETAMINOPHEN 325 MG/1
650 TABLET ORAL AS NEEDED
Status: CANCELLED
Start: 2021-06-08

## 2021-05-26 RX ORDER — DIPHENHYDRAMINE HCL 25 MG
25 CAPSULE ORAL ONCE
Status: COMPLETED | OUTPATIENT
Start: 2021-05-26 | End: 2021-05-26

## 2021-05-26 RX ORDER — SODIUM CHLORIDE 9 MG/ML
25 INJECTION, SOLUTION INTRAVENOUS CONTINUOUS
Status: DISCONTINUED | OUTPATIENT
Start: 2021-05-26 | End: 2021-05-27 | Stop reason: HOSPADM

## 2021-05-26 RX ORDER — DIPHENHYDRAMINE HCL 25 MG
25 CAPSULE ORAL ONCE
Status: CANCELLED | OUTPATIENT
Start: 2021-06-08 | End: 2021-06-08

## 2021-05-26 RX ORDER — EPINEPHRINE 1 MG/ML
0.3 INJECTION, SOLUTION, CONCENTRATE INTRAVENOUS AS NEEDED
Status: CANCELLED | OUTPATIENT
Start: 2021-06-08

## 2021-05-26 RX ORDER — SODIUM CHLORIDE 0.9 % (FLUSH) 0.9 %
10 SYRINGE (ML) INJECTION AS NEEDED
Status: CANCELLED | OUTPATIENT
Start: 2021-06-08

## 2021-05-26 RX ORDER — HEPARIN 100 UNIT/ML
300-500 SYRINGE INTRAVENOUS AS NEEDED
Status: CANCELLED
Start: 2021-06-08

## 2021-05-26 RX ORDER — DIPHENHYDRAMINE HYDROCHLORIDE 50 MG/ML
25 INJECTION, SOLUTION INTRAMUSCULAR; INTRAVENOUS AS NEEDED
Status: CANCELLED
Start: 2021-06-08

## 2021-05-26 RX ORDER — DIPHENHYDRAMINE HYDROCHLORIDE 50 MG/ML
50 INJECTION, SOLUTION INTRAMUSCULAR; INTRAVENOUS AS NEEDED
Status: CANCELLED
Start: 2021-06-08

## 2021-05-26 RX ORDER — SODIUM CHLORIDE 9 MG/ML
25 INJECTION, SOLUTION INTRAVENOUS CONTINUOUS
Status: CANCELLED | OUTPATIENT
Start: 2021-06-08

## 2021-05-26 RX ORDER — ACETAMINOPHEN 325 MG/1
650 TABLET ORAL ONCE
Status: COMPLETED | OUTPATIENT
Start: 2021-05-26 | End: 2021-05-26

## 2021-05-26 RX ORDER — ONDANSETRON 2 MG/ML
8 INJECTION INTRAMUSCULAR; INTRAVENOUS AS NEEDED
Status: CANCELLED | OUTPATIENT
Start: 2021-06-08

## 2021-05-26 RX ORDER — HYDROCORTISONE SODIUM SUCCINATE 100 MG/2ML
100 INJECTION, POWDER, FOR SOLUTION INTRAMUSCULAR; INTRAVENOUS AS NEEDED
Status: CANCELLED | OUTPATIENT
Start: 2021-06-08

## 2021-05-26 RX ORDER — SODIUM CHLORIDE 9 MG/ML
10 INJECTION INTRAMUSCULAR; INTRAVENOUS; SUBCUTANEOUS AS NEEDED
Status: CANCELLED | OUTPATIENT
Start: 2021-06-08

## 2021-05-26 RX ORDER — ACETAMINOPHEN 325 MG/1
650 TABLET ORAL ONCE
Status: CANCELLED | OUTPATIENT
Start: 2021-06-08 | End: 2021-06-08

## 2021-05-26 RX ADMIN — FAMOTIDINE 20 MG: 10 INJECTION INTRAVENOUS at 15:31

## 2021-05-26 RX ADMIN — DIPHENHYDRAMINE HYDROCHLORIDE 25 MG: 25 CAPSULE ORAL at 15:26

## 2021-05-26 RX ADMIN — SODIUM CHLORIDE 25 ML/HR: 900 INJECTION, SOLUTION INTRAVENOUS at 15:26

## 2021-05-26 RX ADMIN — ACETAMINOPHEN 650 MG: 325 TABLET ORAL at 15:26

## 2021-05-26 RX ADMIN — METHYLPREDNISOLONE SODIUM SUCCINATE 30 MG: 40 INJECTION, POWDER, FOR SOLUTION INTRAMUSCULAR; INTRAVENOUS at 15:29

## 2021-05-26 RX ADMIN — PEGLOTICASE 8 MG: 8 INJECTION, SOLUTION INTRAVENOUS at 15:59

## 2021-05-26 NOTE — PROGRESS NOTES
Outpatient Infusion Center Progress Note    1500 Pt admit to [address] [place] [address] for Krystexxa infusion ambulatory in stable condition. Assessment completed. No new concerns voiced. PIv established in right FA with good blood return, labs drawn and sent for processing, a post Uric acid was also sent as ordered. Patient denies any Covid contact , denies sob, cough , fever and feeling unwell    Visit Vitals  /85   Pulse 75   Temp 96.8 °F (36 °C)   Resp 18       Medications:  Medications Administered     0.9% sodium chloride infusion     Admin Date  05/26/2021 Action  New Bag Dose  25 mL/hr Rate  25 mL/hr Route  IntraVENous Administered By  Javon Guerra RN          acetaminophen (TYLENOL) tablet 650 mg     Admin Date  05/26/2021 Action  Given Dose  650 mg Route  Oral Administered By  Javon Guerra RN          diphenhydrAMINE (BENADRYL) capsule 25 mg     Admin Date  05/26/2021 Action  Given Dose  25 mg Route  Oral Administered By  Javon Guerra RN          famotidine (PF) (PEPCID) 20 mg in 0.9% sodium chloride 10 mL injection     Admin Date  05/26/2021 Action  Given Dose  20 mg Route  IntraVENous Administered By  Javon Guerra RN          methylPREDNISolone (PF) (Solu-MEDROL) injection 30 mg     Admin Date  05/26/2021 Action  Given Dose  30 mg Route  IntraVENous Administered By  Javon Guerra, LORRI          [facility]) 8 mg in 0.9% sodium chloride 250 mL, overfill volume 25 mL infusion     Admin Date  05/26/2021 Action  New Bag Dose  8 mg Rate  138 mL/hr Route  IntraVENous Administered By  Javon Guerra, LORRI                3715 Pt tolerated treatment well. Piv maintained good blood return , removed per protocol at end of infusion D/c home ambulatory in no distress. Pt aware of next appointment scheduled for 6/9/21.     Recent Results (from the past 12 hour(s))   CBC WITH AUTOMATED DIFF    Collection Time: 05/26/21  3:14 PM   Result Value Ref Range    WBC 6.9 4.1 - 11.1 K/uL    RBC 3.03 (L) 4.10 - 5.70 M/uL    HGB 9.0 (L) 12.1 - 17.0 g/dL    HCT 30.3 (L) 36.6 - 50.3 %    .0 (H) 80.0 - 99.0 FL    MCH 29.7 26.0 - 34.0 PG    MCHC 29.7 (L) 30.0 - 36.5 g/dL    RDW 14.0 11.5 - 14.5 %    PLATELET 784 206 - 019 K/uL    MPV 11.3 8.9 - 12.9 FL    NRBC 0.0 0  WBC    ABSOLUTE NRBC 0.00 0.00 - 0.01 K/uL    NEUTROPHILS 54 32 - 75 %    LYMPHOCYTES 29 12 - 49 %    MONOCYTES 7 5 - 13 %    EOSINOPHILS 9 (H) 0 - 7 %    BASOPHILS 1 0 - 1 %    IMMATURE GRANULOCYTES 0 0.0 - 0.5 %    ABS. NEUTROPHILS 3.7 1.8 - 8.0 K/UL    ABS. LYMPHOCYTES 2.0 0.8 - 3.5 K/UL    ABS. MONOCYTES 0.5 0.0 - 1.0 K/UL    ABS. EOSINOPHILS 0.6 (H) 0.0 - 0.4 K/UL    ABS. BASOPHILS 0.1 0.0 - 0.1 K/UL    ABS. IMM. GRANS. 0.0 0.00 - 0.04 K/UL    DF AUTOMATED     METABOLIC PANEL, COMPREHENSIVE    Collection Time: 05/26/21  3:14 PM   Result Value Ref Range    Sodium 140 136 - 145 mmol/L    Potassium 4.0 3.5 - 5.1 mmol/L    Chloride 114 (H) 97 - 108 mmol/L    CO2 15 (LL) 21 - 32 mmol/L    Anion gap 11 5 - 15 mmol/L    Glucose 81 65 - 100 mg/dL    BUN 79 (H) 6 - 20 MG/DL    Creatinine 5.70 (H) 0.70 - 1.30 MG/DL    BUN/Creatinine ratio 14 12 - 20      GFR est AA 13 (L) >60 ml/min/1.73m2    GFR est non-AA 11 (L) >60 ml/min/1.73m2    Calcium 8.0 (L) 8.5 - 10.1 MG/DL    Bilirubin, total 0.3 0.2 - 1.0 MG/DL    ALT (SGPT) 27 12 - 78 U/L    AST (SGOT) 17 15 - 37 U/L    Alk.  phosphatase 54 45 - 117 U/L    Protein, total 7.0 6.4 - 8.2 g/dL    Albumin 3.6 3.5 - 5.0 g/dL    Globulin 3.4 2.0 - 4.0 g/dL    A-G Ratio 1.1 1.1 - 2.2     URIC ACID    Collection Time: 05/26/21  3:14 PM   Result Value Ref Range    Uric acid 0.4 (L) 3.5 - 7.2 MG/DL

## 2021-06-08 DIAGNOSIS — M1A.39X1 CHRONIC TOPHACEOUS GOUT OF MULTIPLE SITES DUE TO RENAL IMPAIRMENT: ICD-10-CM

## 2021-06-09 ENCOUNTER — HOSPITAL ENCOUNTER (OUTPATIENT)
Dept: INFUSION THERAPY | Age: 51
End: 2021-06-09

## 2021-06-10 ENCOUNTER — HOSPITAL ENCOUNTER (OUTPATIENT)
Dept: INFUSION THERAPY | Age: 51
Discharge: HOME | End: 2021-06-10
Payer: COMMERCIAL

## 2021-06-10 VITALS
DIASTOLIC BLOOD PRESSURE: 50 MMHG | TEMPERATURE: 97 F | HEART RATE: 74 BPM | SYSTOLIC BLOOD PRESSURE: 109 MMHG | RESPIRATION RATE: 18 BRPM

## 2021-06-10 DIAGNOSIS — M1A.39X1 CHRONIC TOPHACEOUS GOUT OF MULTIPLE SITES DUE TO RENAL IMPAIRMENT: Primary | ICD-10-CM

## 2021-06-10 LAB
ALBUMIN SERPL-MCNC: 3.6 G/DL (ref 3.5–5)
ALBUMIN/GLOB SERPL: 1.1 {RATIO} (ref 1.1–2.2)
ALP SERPL-CCNC: 46 U/L (ref 45–117)
ALT SERPL-CCNC: 26 U/L (ref 12–78)
ANION GAP SERPL CALC-SCNC: 10 MMOL/L (ref 5–15)
AST SERPL-CCNC: 24 U/L (ref 15–37)
BASOPHILS # BLD: 0.1 K/UL (ref 0–0.1)
BASOPHILS NFR BLD: 1 % (ref 0–1)
BILIRUB SERPL-MCNC: 0.4 MG/DL (ref 0.2–1)
BUN SERPL-MCNC: 82 MG/DL (ref 6–20)
BUN/CREAT SERPL: 14 (ref 12–20)
CALCIUM SERPL-MCNC: 8.2 MG/DL (ref 8.5–10.1)
CHLORIDE SERPL-SCNC: 112 MMOL/L (ref 97–108)
CO2 SERPL-SCNC: 17 MMOL/L (ref 21–32)
CREAT SERPL-MCNC: 5.87 MG/DL (ref 0.7–1.3)
DIFFERENTIAL METHOD BLD: ABNORMAL
EOSINOPHIL # BLD: 0.6 K/UL (ref 0–0.4)
EOSINOPHIL NFR BLD: 9 % (ref 0–7)
ERYTHROCYTE [DISTWIDTH] IN BLOOD BY AUTOMATED COUNT: 13.5 % (ref 11.5–14.5)
ERYTHROCYTE [SEDIMENTATION RATE] IN BLOOD: 17 MM/HR (ref 0–15)
GLOBULIN SER CALC-MCNC: 3.2 G/DL (ref 2–4)
GLUCOSE SERPL-MCNC: 92 MG/DL (ref 65–100)
HCT VFR BLD AUTO: 30.1 % (ref 36.6–50.3)
HGB BLD-MCNC: 8.9 G/DL (ref 12.1–17)
IMM GRANULOCYTES # BLD AUTO: 0 K/UL (ref 0–0.04)
IMM GRANULOCYTES NFR BLD AUTO: 0 % (ref 0–0.5)
LYMPHOCYTES # BLD: 1.2 K/UL (ref 0.8–3.5)
LYMPHOCYTES NFR BLD: 17 % (ref 12–49)
MCH RBC QN AUTO: 30.2 PG (ref 26–34)
MCHC RBC AUTO-ENTMCNC: 29.6 G/DL (ref 30–36.5)
MCV RBC AUTO: 102 FL (ref 80–99)
MONOCYTES # BLD: 0.8 K/UL (ref 0–1)
MONOCYTES NFR BLD: 11 % (ref 5–13)
NEUTS SEG # BLD: 4.2 K/UL (ref 1.8–8)
NEUTS SEG NFR BLD: 62 % (ref 32–75)
NRBC # BLD: 0 K/UL (ref 0–0.01)
NRBC BLD-RTO: 0 PER 100 WBC
PLATELET # BLD AUTO: 235 K/UL (ref 150–400)
PMV BLD AUTO: 10.8 FL (ref 8.9–12.9)
POTASSIUM SERPL-SCNC: 6 MMOL/L (ref 3.5–5.1)
PROT SERPL-MCNC: 6.8 G/DL (ref 6.4–8.2)
RBC # BLD AUTO: 2.95 M/UL (ref 4.1–5.7)
SODIUM SERPL-SCNC: 139 MMOL/L (ref 136–145)
URATE SERPL-MCNC: <0.2 MG/DL (ref 3.5–7.2)
URATE SERPL-MCNC: <0.2 MG/DL (ref 3.5–7.2)
WBC # BLD AUTO: 6.7 K/UL (ref 4.1–11.1)

## 2021-06-10 PROCEDURE — 74011250636 HC RX REV CODE- 250/636: Performed by: INTERNAL MEDICINE

## 2021-06-10 PROCEDURE — 96375 TX/PRO/DX INJ NEW DRUG ADDON: CPT

## 2021-06-10 PROCEDURE — 96365 THER/PROPH/DIAG IV INF INIT: CPT

## 2021-06-10 PROCEDURE — 74011250637 HC RX REV CODE- 250/637: Performed by: INTERNAL MEDICINE

## 2021-06-10 PROCEDURE — 85025 COMPLETE CBC W/AUTO DIFF WBC: CPT

## 2021-06-10 PROCEDURE — 96366 THER/PROPH/DIAG IV INF ADDON: CPT

## 2021-06-10 PROCEDURE — 74011000250 HC RX REV CODE- 250: Performed by: INTERNAL MEDICINE

## 2021-06-10 PROCEDURE — 84550 ASSAY OF BLOOD/URIC ACID: CPT

## 2021-06-10 PROCEDURE — 80053 COMPREHEN METABOLIC PANEL: CPT

## 2021-06-10 PROCEDURE — 36415 COLL VENOUS BLD VENIPUNCTURE: CPT

## 2021-06-10 PROCEDURE — 85652 RBC SED RATE AUTOMATED: CPT

## 2021-06-10 RX ORDER — SODIUM CHLORIDE 9 MG/ML
25 INJECTION, SOLUTION INTRAVENOUS CONTINUOUS
Status: DISCONTINUED | OUTPATIENT
Start: 2021-06-10 | End: 2021-06-11 | Stop reason: HOSPADM

## 2021-06-10 RX ORDER — DIPHENHYDRAMINE HCL 25 MG
25 CAPSULE ORAL ONCE
Status: CANCELLED | OUTPATIENT
Start: 2021-06-22 | End: 2021-06-22

## 2021-06-10 RX ORDER — DIPHENHYDRAMINE HCL 25 MG
25 CAPSULE ORAL ONCE
Status: COMPLETED | OUTPATIENT
Start: 2021-06-10 | End: 2021-06-10

## 2021-06-10 RX ORDER — ACETAMINOPHEN 325 MG/1
650 TABLET ORAL ONCE
Status: CANCELLED | OUTPATIENT
Start: 2021-06-22 | End: 2021-06-22

## 2021-06-10 RX ORDER — SODIUM CHLORIDE 9 MG/ML
25 INJECTION, SOLUTION INTRAVENOUS CONTINUOUS
Status: CANCELLED | OUTPATIENT
Start: 2021-06-22

## 2021-06-10 RX ORDER — ACETAMINOPHEN 325 MG/1
650 TABLET ORAL ONCE
Status: COMPLETED | OUTPATIENT
Start: 2021-06-10 | End: 2021-06-10

## 2021-06-10 RX ORDER — ACETAMINOPHEN 325 MG/1
650 TABLET ORAL AS NEEDED
Status: CANCELLED
Start: 2021-06-22

## 2021-06-10 RX ORDER — HYDROCORTISONE SODIUM SUCCINATE 100 MG/2ML
100 INJECTION, POWDER, FOR SOLUTION INTRAMUSCULAR; INTRAVENOUS AS NEEDED
Status: CANCELLED | OUTPATIENT
Start: 2021-06-22

## 2021-06-10 RX ORDER — DIPHENHYDRAMINE HYDROCHLORIDE 50 MG/ML
25 INJECTION, SOLUTION INTRAMUSCULAR; INTRAVENOUS AS NEEDED
Status: CANCELLED
Start: 2021-06-22

## 2021-06-10 RX ORDER — EPINEPHRINE 1 MG/ML
0.3 INJECTION, SOLUTION, CONCENTRATE INTRAVENOUS AS NEEDED
Status: CANCELLED | OUTPATIENT
Start: 2021-06-22

## 2021-06-10 RX ORDER — ONDANSETRON 2 MG/ML
8 INJECTION INTRAMUSCULAR; INTRAVENOUS AS NEEDED
Status: CANCELLED | OUTPATIENT
Start: 2021-06-22

## 2021-06-10 RX ORDER — DIPHENHYDRAMINE HYDROCHLORIDE 50 MG/ML
50 INJECTION, SOLUTION INTRAMUSCULAR; INTRAVENOUS AS NEEDED
Status: CANCELLED
Start: 2021-06-22

## 2021-06-10 RX ORDER — HEPARIN 100 UNIT/ML
300-500 SYRINGE INTRAVENOUS AS NEEDED
Status: CANCELLED
Start: 2021-06-22

## 2021-06-10 RX ORDER — ALBUTEROL SULFATE 0.83 MG/ML
2.5 SOLUTION RESPIRATORY (INHALATION) AS NEEDED
Status: CANCELLED
Start: 2021-06-22

## 2021-06-10 RX ORDER — SODIUM CHLORIDE 9 MG/ML
10 INJECTION INTRAMUSCULAR; INTRAVENOUS; SUBCUTANEOUS AS NEEDED
Status: CANCELLED | OUTPATIENT
Start: 2021-06-22

## 2021-06-10 RX ORDER — SODIUM CHLORIDE 0.9 % (FLUSH) 0.9 %
10 SYRINGE (ML) INJECTION AS NEEDED
Status: CANCELLED | OUTPATIENT
Start: 2021-06-22

## 2021-06-10 RX ADMIN — ACETAMINOPHEN 650 MG: 325 TABLET ORAL at 15:34

## 2021-06-10 RX ADMIN — FAMOTIDINE 20 MG: 10 INJECTION INTRAVENOUS at 15:37

## 2021-06-10 RX ADMIN — DIPHENHYDRAMINE HYDROCHLORIDE 25 MG: 25 CAPSULE ORAL at 15:34

## 2021-06-10 RX ADMIN — METHYLPREDNISOLONE SODIUM SUCCINATE 30 MG: 125 INJECTION, POWDER, FOR SOLUTION INTRAMUSCULAR; INTRAVENOUS at 15:37

## 2021-06-10 RX ADMIN — SODIUM CHLORIDE 25 ML/HR: 900 INJECTION, SOLUTION INTRAVENOUS at 15:35

## 2021-06-10 RX ADMIN — PEGLOTICASE 8 MG: 8 INJECTION, SOLUTION INTRAVENOUS at 16:00

## 2021-06-10 NOTE — PROGRESS NOTES
[facility] Short Note                   1500 Pt admit to [facility] for [address] ambulatory in stable condition. Assessment completed. No new concerns voiced. PIV established in left forearm with good blood return. Labs collected and sent for processing. PIV connected to NS. Mr. Jeannie Huff vitals were reviewed prior to and after treatment. Patient Vitals for the past 12 hrs:   Temp Pulse Resp BP   06/10/21 1820 -- 74 -- (!) 109/50   06/10/21 1516 97 °F (36.1 °C) 84 18 129/82         Lab results were obtained and reviewed. Recent Results (from the past 12 hour(s))   CBC WITH AUTOMATED DIFF    Collection Time: 06/10/21  3:25 PM   Result Value Ref Range    WBC 6.7 4.1 - 11.1 K/uL    RBC 2.95 (L) 4.10 - 5.70 M/uL    HGB 8.9 (L) 12.1 - 17.0 g/dL    HCT 30.1 (L) 36.6 - 50.3 %    .0 (H) 80.0 - 99.0 FL    MCH 30.2 26.0 - 34.0 PG    MCHC 29.6 (L) 30.0 - 36.5 g/dL    RDW 13.5 11.5 - 14.5 %    PLATELET 153 983 - 216 K/uL    MPV 10.8 8.9 - 12.9 FL    NRBC 0.0 0  WBC    ABSOLUTE NRBC 0.00 0.00 - 0.01 K/uL    NEUTROPHILS 62 32 - 75 %    LYMPHOCYTES 17 12 - 49 %    MONOCYTES 11 5 - 13 %    EOSINOPHILS 9 (H) 0 - 7 %    BASOPHILS 1 0 - 1 %    IMMATURE GRANULOCYTES 0 0.0 - 0.5 %    ABS. NEUTROPHILS 4.2 1.8 - 8.0 K/UL    ABS. LYMPHOCYTES 1.2 0.8 - 3.5 K/UL    ABS. MONOCYTES 0.8 0.0 - 1.0 K/UL    ABS. EOSINOPHILS 0.6 (H) 0.0 - 0.4 K/UL    ABS. BASOPHILS 0.1 0.0 - 0.1 K/UL    ABS. IMM.  GRANS. 0.0 0.00 - 0.04 K/UL    DF AUTOMATED     METABOLIC PANEL, COMPREHENSIVE    Collection Time: 06/10/21  3:25 PM   Result Value Ref Range    Sodium 139 136 - 145 mmol/L    Potassium 6.0 (H) 3.5 - 5.1 mmol/L    Chloride 112 (H) 97 - 108 mmol/L    CO2 17 (L) 21 - 32 mmol/L    Anion gap 10 5 - 15 mmol/L    Glucose 92 65 - 100 mg/dL    BUN 82 (H) 6 - 20 MG/DL    Creatinine 5.87 (H) 0.70 - 1.30 MG/DL    BUN/Creatinine ratio 14 12 - 20      GFR est AA 12 (L) >60 ml/min/1.73m2    GFR est non-AA 10 (L) >60 ml/min/1.73m2    Calcium 8.2 (L) 8.5 - 10.1 MG/DL    Bilirubin, total 0.4 0.2 - 1.0 MG/DL    ALT (SGPT) 26 12 - 78 U/L    AST (SGOT) 24 15 - 37 U/L    Alk. phosphatase 46 45 - 117 U/L    Protein, total 6.8 6.4 - 8.2 g/dL    Albumin 3.6 3.5 - 5.0 g/dL    Globulin 3.2 2.0 - 4.0 g/dL    A-G Ratio 1.1 1.1 - 2.2     SED RATE (ESR)    Collection Time: 06/10/21  3:25 PM   Result Value Ref Range    Sed rate, automated 17 (H) 0 - 15 mm/hr   URIC ACID    Collection Time: 06/10/21  3:25 PM   Result Value Ref Range    Uric acid <0.2 (L) 3.5 - 7.2 MG/DL       Medications Administered     0.9% sodium chloride infusion     Admin Date  06/10/2021 Action  New Bag Dose  25 mL/hr Rate  25 mL/hr Route  IntraVENous Administered By  Keely Willson RN          acetaminophen (TYLENOL) tablet 650 mg     Admin Date  06/10/2021 Action  Given Dose  650 mg Route  Oral Administered By  Keely Willson RN          diphenhydrAMINE (BENADRYL) capsule 25 mg     Admin Date  06/10/2021 Action  Given Dose  25 mg Route  Oral Administered By  Keely Willson RN          famotidine (PF) (PEPCID) 20 mg in 0.9% sodium chloride 10 mL injection     Admin Date  06/10/2021 Action  Given Dose  20 mg Route  IntraVENous Administered By  Keely Willson RN          methylPREDNISolone (PF) (Solu-MEDROL) injection 30 mg     Admin Date  06/10/2021 Action  Given Dose  30 mg Route  IntraVENous Administered By  Keely Willson RN          [facility]) 8 mg in 0.9% sodium chloride 250 mL, overfill volume 25 mL infusion     Admin Date  06/10/2021 Action  New Bag Dose  8 mg Rate  138 mL/hr Route  IntraVENous Administered By  Keely Willson RN              Mr. Filemon Crowe tolerated the infusion, and had no complaints. Line flushed following infusion. Post uric acid level drawn and sent for processing. PIV removed without difficulty. Mr. Filemon Crowe was discharged from [place] [address] in stable condition at [phone]. Patient is aware of next appointment.     Future Appointments   Date Time Provider Department [place]   6/23/2021  3:00 PM B3 DEEPA MED [address] [facility]   7/7/2021  8:20 AM Zaida Ramos MD AOCR BS AMB   7/7/2021  3:00 PM B3 DEEPA MED [place] [facility]   7/21/2021  3:00 PM B3 DEEPA MED Barry Adkins RN  Stacy 10, 2021  6:27 PM

## 2021-06-22 DIAGNOSIS — M1A.39X1 CHRONIC TOPHACEOUS GOUT OF MULTIPLE SITES DUE TO RENAL IMPAIRMENT: ICD-10-CM

## 2021-06-23 ENCOUNTER — HOSPITAL ENCOUNTER (OUTPATIENT)
Dept: INFUSION THERAPY | Age: 51
Discharge: HOME | End: 2021-06-23
Payer: COMMERCIAL

## 2021-06-23 VITALS
RESPIRATION RATE: 18 BRPM | SYSTOLIC BLOOD PRESSURE: 118 MMHG | TEMPERATURE: 97 F | DIASTOLIC BLOOD PRESSURE: 77 MMHG | HEART RATE: 70 BPM

## 2021-06-23 DIAGNOSIS — M1A.39X1 CHRONIC TOPHACEOUS GOUT OF MULTIPLE SITES DUE TO RENAL IMPAIRMENT: Primary | ICD-10-CM

## 2021-06-23 LAB
ALBUMIN SERPL-MCNC: 3.3 G/DL (ref 3.5–5)
ALBUMIN/GLOB SERPL: 1.1 {RATIO} (ref 1.1–2.2)
ALP SERPL-CCNC: 44 U/L (ref 45–117)
ALT SERPL-CCNC: 21 U/L (ref 12–78)
ANION GAP SERPL CALC-SCNC: 9 MMOL/L (ref 5–15)
AST SERPL-CCNC: 19 U/L (ref 15–37)
BASOPHILS # BLD: 0 K/UL (ref 0–0.1)
BASOPHILS NFR BLD: 1 % (ref 0–1)
BILIRUB SERPL-MCNC: 0.3 MG/DL (ref 0.2–1)
BUN SERPL-MCNC: 78 MG/DL (ref 6–20)
BUN/CREAT SERPL: 14 (ref 12–20)
CALCIUM SERPL-MCNC: 7.8 MG/DL (ref 8.5–10.1)
CHLORIDE SERPL-SCNC: 114 MMOL/L (ref 97–108)
CO2 SERPL-SCNC: 17 MMOL/L (ref 21–32)
CREAT SERPL-MCNC: 5.49 MG/DL (ref 0.7–1.3)
DIFFERENTIAL METHOD BLD: ABNORMAL
EOSINOPHIL # BLD: 0.5 K/UL (ref 0–0.4)
EOSINOPHIL NFR BLD: 10 % (ref 0–7)
ERYTHROCYTE [DISTWIDTH] IN BLOOD BY AUTOMATED COUNT: 13.2 % (ref 11.5–14.5)
ERYTHROCYTE [SEDIMENTATION RATE] IN BLOOD: 14 MM/HR (ref 0–15)
GLOBULIN SER CALC-MCNC: 3.1 G/DL (ref 2–4)
GLUCOSE SERPL-MCNC: 110 MG/DL (ref 65–100)
HCT VFR BLD AUTO: 27.8 % (ref 36.6–50.3)
HGB BLD-MCNC: 8.1 G/DL (ref 12.1–17)
IMM GRANULOCYTES # BLD AUTO: 0 K/UL (ref 0–0.04)
IMM GRANULOCYTES NFR BLD AUTO: 0 % (ref 0–0.5)
LYMPHOCYTES # BLD: 1.2 K/UL (ref 0.8–3.5)
LYMPHOCYTES NFR BLD: 22 % (ref 12–49)
MCH RBC QN AUTO: 29.8 PG (ref 26–34)
MCHC RBC AUTO-ENTMCNC: 29.1 G/DL (ref 30–36.5)
MCV RBC AUTO: 102.2 FL (ref 80–99)
MONOCYTES # BLD: 0.6 K/UL (ref 0–1)
MONOCYTES NFR BLD: 12 % (ref 5–13)
NEUTS SEG # BLD: 2.9 K/UL (ref 1.8–8)
NEUTS SEG NFR BLD: 55 % (ref 32–75)
NRBC # BLD: 0 K/UL (ref 0–0.01)
NRBC BLD-RTO: 0 PER 100 WBC
PLATELET # BLD AUTO: 204 K/UL (ref 150–400)
PMV BLD AUTO: 10.7 FL (ref 8.9–12.9)
POTASSIUM SERPL-SCNC: 4.1 MMOL/L (ref 3.5–5.1)
PROT SERPL-MCNC: 6.4 G/DL (ref 6.4–8.2)
RBC # BLD AUTO: 2.72 M/UL (ref 4.1–5.7)
SODIUM SERPL-SCNC: 140 MMOL/L (ref 136–145)
URATE SERPL-MCNC: <0.2 MG/DL (ref 3.5–7.2)
URATE SERPL-MCNC: <0.2 MG/DL (ref 3.5–7.2)
WBC # BLD AUTO: 5.2 K/UL (ref 4.1–11.1)

## 2021-06-23 PROCEDURE — 74011250636 HC RX REV CODE- 250/636: Performed by: INTERNAL MEDICINE

## 2021-06-23 PROCEDURE — 80053 COMPREHEN METABOLIC PANEL: CPT

## 2021-06-23 PROCEDURE — 36415 COLL VENOUS BLD VENIPUNCTURE: CPT

## 2021-06-23 PROCEDURE — 74011250637 HC RX REV CODE- 250/637: Performed by: INTERNAL MEDICINE

## 2021-06-23 PROCEDURE — 96366 THER/PROPH/DIAG IV INF ADDON: CPT

## 2021-06-23 PROCEDURE — 96365 THER/PROPH/DIAG IV INF INIT: CPT

## 2021-06-23 PROCEDURE — 96375 TX/PRO/DX INJ NEW DRUG ADDON: CPT

## 2021-06-23 PROCEDURE — 85025 COMPLETE CBC W/AUTO DIFF WBC: CPT

## 2021-06-23 PROCEDURE — 85652 RBC SED RATE AUTOMATED: CPT

## 2021-06-23 PROCEDURE — 74011000250 HC RX REV CODE- 250: Performed by: INTERNAL MEDICINE

## 2021-06-23 PROCEDURE — 84550 ASSAY OF BLOOD/URIC ACID: CPT

## 2021-06-23 RX ORDER — SODIUM CHLORIDE 9 MG/ML
10 INJECTION INTRAMUSCULAR; INTRAVENOUS; SUBCUTANEOUS AS NEEDED
Status: CANCELLED | OUTPATIENT
Start: 2021-07-06

## 2021-06-23 RX ORDER — DIPHENHYDRAMINE HYDROCHLORIDE 50 MG/ML
50 INJECTION, SOLUTION INTRAMUSCULAR; INTRAVENOUS AS NEEDED
Status: CANCELLED
Start: 2021-07-06

## 2021-06-23 RX ORDER — SODIUM CHLORIDE 9 MG/ML
25 INJECTION, SOLUTION INTRAVENOUS CONTINUOUS
Status: DISCONTINUED | OUTPATIENT
Start: 2021-06-23 | End: 2021-06-24 | Stop reason: HOSPADM

## 2021-06-23 RX ORDER — SODIUM CHLORIDE 9 MG/ML
25 INJECTION, SOLUTION INTRAVENOUS CONTINUOUS
Status: CANCELLED | OUTPATIENT
Start: 2021-07-06

## 2021-06-23 RX ORDER — ACETAMINOPHEN 325 MG/1
650 TABLET ORAL AS NEEDED
Status: CANCELLED
Start: 2021-07-06

## 2021-06-23 RX ORDER — DIPHENHYDRAMINE HCL 25 MG
25 CAPSULE ORAL ONCE
Status: COMPLETED | OUTPATIENT
Start: 2021-06-23 | End: 2021-06-23

## 2021-06-23 RX ORDER — ONDANSETRON 2 MG/ML
8 INJECTION INTRAMUSCULAR; INTRAVENOUS AS NEEDED
Status: CANCELLED | OUTPATIENT
Start: 2021-07-06

## 2021-06-23 RX ORDER — SODIUM CHLORIDE 0.9 % (FLUSH) 0.9 %
10 SYRINGE (ML) INJECTION AS NEEDED
Status: CANCELLED | OUTPATIENT
Start: 2021-07-06

## 2021-06-23 RX ORDER — EPINEPHRINE 1 MG/ML
0.3 INJECTION, SOLUTION, CONCENTRATE INTRAVENOUS AS NEEDED
Status: CANCELLED | OUTPATIENT
Start: 2021-07-06

## 2021-06-23 RX ORDER — HYDROCORTISONE SODIUM SUCCINATE 100 MG/2ML
100 INJECTION, POWDER, FOR SOLUTION INTRAMUSCULAR; INTRAVENOUS AS NEEDED
Status: CANCELLED | OUTPATIENT
Start: 2021-07-06

## 2021-06-23 RX ORDER — HEPARIN 100 UNIT/ML
300-500 SYRINGE INTRAVENOUS AS NEEDED
Status: CANCELLED
Start: 2021-07-06

## 2021-06-23 RX ORDER — DIPHENHYDRAMINE HYDROCHLORIDE 50 MG/ML
25 INJECTION, SOLUTION INTRAMUSCULAR; INTRAVENOUS AS NEEDED
Status: CANCELLED
Start: 2021-07-06

## 2021-06-23 RX ORDER — ACETAMINOPHEN 325 MG/1
650 TABLET ORAL ONCE
Status: CANCELLED | OUTPATIENT
Start: 2021-07-06 | End: 2021-07-06

## 2021-06-23 RX ORDER — DIPHENHYDRAMINE HCL 25 MG
25 CAPSULE ORAL ONCE
Status: CANCELLED | OUTPATIENT
Start: 2021-07-06 | End: 2021-07-06

## 2021-06-23 RX ORDER — ALBUTEROL SULFATE 0.83 MG/ML
2.5 SOLUTION RESPIRATORY (INHALATION) AS NEEDED
Status: CANCELLED
Start: 2021-07-06

## 2021-06-23 RX ORDER — ACETAMINOPHEN 325 MG/1
650 TABLET ORAL ONCE
Status: COMPLETED | OUTPATIENT
Start: 2021-06-23 | End: 2021-06-23

## 2021-06-23 RX ADMIN — PEGLOTICASE 8 MG: 8 INJECTION, SOLUTION INTRAVENOUS at 16:20

## 2021-06-23 RX ADMIN — METHYLPREDNISOLONE SODIUM SUCCINATE 30 MG: 40 INJECTION, POWDER, FOR SOLUTION INTRAMUSCULAR; INTRAVENOUS at 15:57

## 2021-06-23 RX ADMIN — ACETAMINOPHEN 650 MG: 325 TABLET ORAL at 15:55

## 2021-06-23 RX ADMIN — DIPHENHYDRAMINE HYDROCHLORIDE 25 MG: 25 CAPSULE ORAL at 15:55

## 2021-06-23 RX ADMIN — SODIUM CHLORIDE 25 ML/HR: 900 INJECTION, SOLUTION INTRAVENOUS at 15:55

## 2021-06-23 RX ADMIN — FAMOTIDINE 20 MG: 10 INJECTION INTRAVENOUS at 15:58

## 2021-06-23 NOTE — PROGRESS NOTES
Outpatient Infusion Center Progress Note    1520 Pt admit to [facility] for [address] ambulatory in stable condition. Assessment completed. No new concerns voiced. PIV established in the left hand with positive blood return. Labs drawn and sent for processing. IV attached to NS at [facility]  /77   Pulse 70   Temp 97 °F (36.1 °C)   Resp 18       Medications:  Medications Administered     0.9% sodium chloride infusion     Admin Date  06/23/2021 Action  New Bag Dose  25 mL/hr Rate  25 mL/hr Route  IntraVENous Administered By  Keynatta Enciso RN          acetaminophen (TYLENOL) tablet 650 mg     Admin Date  06/23/2021 Action  Given Dose  650 mg Route  Oral Administered By  Kenyatta Enciso RN          diphenhydrAMINE (BENADRYL) capsule 25 mg     Admin Date  06/23/2021 Action  Given Dose  25 mg Route  Oral Administered By  Kenyatta Enciso RN          famotidine (PF) (PEPCID) 20 mg in 0.9% sodium chloride 10 mL injection     Admin Date  06/23/2021 Action  Given Dose  20 mg Route  IntraVENous Administered By  Kenyatta Enciso RN          methylPREDNISolone (PF) (Solu-MEDROL) injection 30 mg     Admin Date  06/23/2021 Action  Given Dose  30 mg Route  IntraVENous Administered By  Kenyatta Enciso RN          [facility]) 8 mg in 0.9% sodium chloride 250 mL, overfill volume 25 mL infusion     Admin Date  06/23/2021 Action  New Bag Dose  8 mg Rate  138 mL/hr Route  IntraVENous Administered By  Kenyatta Enciso RN                4103 Pt tolerated treatment well. Uric acid drawn at completion of treatment. D/c home ambulatory in no distress.  Pt aware of next appointment scheduled for 7/7/21    Recent Results (from the past 24 hour(s))   CBC WITH AUTOMATED DIFF    Collection Time: 06/23/21  3:30 PM   Result Value Ref Range    WBC 5.2 4.1 - 11.1 K/uL    RBC 2.72 (L) 4.10 - 5.70 M/uL    HGB 8.1 (L) 12.1 - 17.0 g/dL    HCT 27.8 (L) 36.6 - 50.3 %    .2 (H) 80.0 - 99.0 FL    MCH 29.8 26.0 - 34.0 PG    MCHC 29.1 (L) 30.0 - 36.5 g/dL    RDW 13.2 11.5 - 14.5 %    PLATELET 903 274 - 679 K/uL    MPV 10.7 8.9 - 12.9 FL    NRBC 0.0 0  WBC    ABSOLUTE NRBC 0.00 0.00 - 0.01 K/uL    NEUTROPHILS 55 32 - 75 %    LYMPHOCYTES 22 12 - 49 %    MONOCYTES 12 5 - 13 %    EOSINOPHILS 10 (H) 0 - 7 %    BASOPHILS 1 0 - 1 %    IMMATURE GRANULOCYTES 0 0.0 - 0.5 %    ABS. NEUTROPHILS 2.9 1.8 - 8.0 K/UL    ABS. LYMPHOCYTES 1.2 0.8 - 3.5 K/UL    ABS. MONOCYTES 0.6 0.0 - 1.0 K/UL    ABS. EOSINOPHILS 0.5 (H) 0.0 - 0.4 K/UL    ABS. BASOPHILS 0.0 0.0 - 0.1 K/UL    ABS. IMM. GRANS. 0.0 0.00 - 0.04 K/UL    DF AUTOMATED     METABOLIC PANEL, COMPREHENSIVE    Collection Time: 06/23/21  3:30 PM   Result Value Ref Range    Sodium 140 136 - 145 mmol/L    Potassium 4.1 3.5 - 5.1 mmol/L    Chloride 114 (H) 97 - 108 mmol/L    CO2 17 (L) 21 - 32 mmol/L    Anion gap 9 5 - 15 mmol/L    Glucose 110 (H) 65 - 100 mg/dL    BUN 78 (H) 6 - 20 MG/DL    Creatinine 5.49 (H) 0.70 - 1.30 MG/DL    BUN/Creatinine ratio 14 12 - 20      GFR est AA 13 (L) >60 ml/min/1.73m2    GFR est non-AA 11 (L) >60 ml/min/1.73m2    Calcium 7.8 (L) 8.5 - 10.1 MG/DL    Bilirubin, total 0.3 0.2 - 1.0 MG/DL    ALT (SGPT) 21 12 - 78 U/L    AST (SGOT) 19 15 - 37 U/L    Alk.  phosphatase 44 (L) 45 - 117 U/L    Protein, total 6.4 6.4 - 8.2 g/dL    Albumin 3.3 (L) 3.5 - 5.0 g/dL    Globulin 3.1 2.0 - 4.0 g/dL    A-G Ratio 1.1 1.1 - 2.2     URIC ACID    Collection Time: 06/23/21  3:30 PM   Result Value Ref Range    Uric acid <0.2 (L) 3.5 - 7.2 MG/DL

## 2021-07-06 DIAGNOSIS — M1A.39X1 CHRONIC TOPHACEOUS GOUT OF MULTIPLE SITES DUE TO RENAL IMPAIRMENT: ICD-10-CM

## 2021-07-07 ENCOUNTER — VIRTUAL VISIT (OUTPATIENT)
Dept: RHEUMATOLOGY | Age: 51
End: 2021-07-07
Payer: COMMERCIAL

## 2021-07-07 ENCOUNTER — HOSPITAL ENCOUNTER (OUTPATIENT)
Dept: INFUSION THERAPY | Age: 51
Discharge: HOME | End: 2021-07-07
Payer: COMMERCIAL

## 2021-07-07 VITALS
RESPIRATION RATE: 18 BRPM | SYSTOLIC BLOOD PRESSURE: 121 MMHG | HEART RATE: 85 BPM | DIASTOLIC BLOOD PRESSURE: 75 MMHG | TEMPERATURE: 97.7 F

## 2021-07-07 DIAGNOSIS — M1A.39X1 CHRONIC TOPHACEOUS GOUT OF MULTIPLE SITES DUE TO RENAL IMPAIRMENT: ICD-10-CM

## 2021-07-07 DIAGNOSIS — Z79.60 LONG-TERM USE OF IMMUNOSUPPRESSANT MEDICATION: ICD-10-CM

## 2021-07-07 DIAGNOSIS — M1A.39X1 CHRONIC TOPHACEOUS GOUT OF MULTIPLE SITES DUE TO RENAL IMPAIRMENT: Primary | ICD-10-CM

## 2021-07-07 DIAGNOSIS — N18.5 CKD (CHRONIC KIDNEY DISEASE) STAGE 5, GFR LESS THAN 15 ML/MIN (HCC): ICD-10-CM

## 2021-07-07 LAB
ALBUMIN SERPL-MCNC: 3.6 G/DL (ref 3.5–5)
ALBUMIN/GLOB SERPL: 1.1 {RATIO} (ref 1.1–2.2)
ALP SERPL-CCNC: 40 U/L (ref 45–117)
ALT SERPL-CCNC: 22 U/L (ref 12–78)
ANION GAP SERPL CALC-SCNC: 11 MMOL/L (ref 5–15)
AST SERPL-CCNC: 14 U/L (ref 15–37)
BASOPHILS # BLD: 0 K/UL (ref 0–0.1)
BASOPHILS NFR BLD: 1 % (ref 0–1)
BILIRUB SERPL-MCNC: 0.3 MG/DL (ref 0.2–1)
BUN SERPL-MCNC: 84 MG/DL (ref 6–20)
BUN/CREAT SERPL: 15 (ref 12–20)
CALCIUM SERPL-MCNC: 8.1 MG/DL (ref 8.5–10.1)
CHLORIDE SERPL-SCNC: 113 MMOL/L (ref 97–108)
CO2 SERPL-SCNC: 16 MMOL/L (ref 21–32)
CREAT SERPL-MCNC: 5.77 MG/DL (ref 0.7–1.3)
DIFFERENTIAL METHOD BLD: ABNORMAL
EOSINOPHIL # BLD: 0.7 K/UL (ref 0–0.4)
EOSINOPHIL NFR BLD: 11 % (ref 0–7)
ERYTHROCYTE [DISTWIDTH] IN BLOOD BY AUTOMATED COUNT: 13.2 % (ref 11.5–14.5)
ERYTHROCYTE [SEDIMENTATION RATE] IN BLOOD: 22 MM/HR (ref 0–15)
GLOBULIN SER CALC-MCNC: 3.2 G/DL (ref 2–4)
GLUCOSE SERPL-MCNC: 110 MG/DL (ref 65–100)
HCT VFR BLD AUTO: 29.4 % (ref 36.6–50.3)
HGB BLD-MCNC: 8.6 G/DL (ref 12.1–17)
IMM GRANULOCYTES # BLD AUTO: 0 K/UL (ref 0–0.04)
IMM GRANULOCYTES NFR BLD AUTO: 0 % (ref 0–0.5)
LYMPHOCYTES # BLD: 1.5 K/UL (ref 0.8–3.5)
LYMPHOCYTES NFR BLD: 24 % (ref 12–49)
MCH RBC QN AUTO: 29.9 PG (ref 26–34)
MCHC RBC AUTO-ENTMCNC: 29.3 G/DL (ref 30–36.5)
MCV RBC AUTO: 102.1 FL (ref 80–99)
MONOCYTES # BLD: 0.7 K/UL (ref 0–1)
MONOCYTES NFR BLD: 11 % (ref 5–13)
NEUTS SEG # BLD: 3.4 K/UL (ref 1.8–8)
NEUTS SEG NFR BLD: 53 % (ref 32–75)
NRBC # BLD: 0 K/UL (ref 0–0.01)
NRBC BLD-RTO: 0 PER 100 WBC
PLATELET # BLD AUTO: 207 K/UL (ref 150–400)
PMV BLD AUTO: 10.2 FL (ref 8.9–12.9)
POTASSIUM SERPL-SCNC: 4.7 MMOL/L (ref 3.5–5.1)
PROT SERPL-MCNC: 6.8 G/DL (ref 6.4–8.2)
RBC # BLD AUTO: 2.88 M/UL (ref 4.1–5.7)
SODIUM SERPL-SCNC: 140 MMOL/L (ref 136–145)
URATE SERPL-MCNC: <0.2 MG/DL (ref 3.5–7.2)
URATE SERPL-MCNC: <0.2 MG/DL (ref 3.5–7.2)
WBC # BLD AUTO: 6.4 K/UL (ref 4.1–11.1)

## 2021-07-07 PROCEDURE — 96366 THER/PROPH/DIAG IV INF ADDON: CPT

## 2021-07-07 PROCEDURE — 80053 COMPREHEN METABOLIC PANEL: CPT

## 2021-07-07 PROCEDURE — 99215 OFFICE O/P EST HI 40 MIN: CPT | Performed by: INTERNAL MEDICINE

## 2021-07-07 PROCEDURE — 74011250637 HC RX REV CODE- 250/637: Performed by: INTERNAL MEDICINE

## 2021-07-07 PROCEDURE — 74011250636 HC RX REV CODE- 250/636: Performed by: INTERNAL MEDICINE

## 2021-07-07 PROCEDURE — 36415 COLL VENOUS BLD VENIPUNCTURE: CPT

## 2021-07-07 PROCEDURE — 96365 THER/PROPH/DIAG IV INF INIT: CPT

## 2021-07-07 PROCEDURE — 96375 TX/PRO/DX INJ NEW DRUG ADDON: CPT

## 2021-07-07 PROCEDURE — 85025 COMPLETE CBC W/AUTO DIFF WBC: CPT

## 2021-07-07 PROCEDURE — 74011000250 HC RX REV CODE- 250: Performed by: INTERNAL MEDICINE

## 2021-07-07 PROCEDURE — 85652 RBC SED RATE AUTOMATED: CPT

## 2021-07-07 PROCEDURE — 84550 ASSAY OF BLOOD/URIC ACID: CPT

## 2021-07-07 RX ORDER — DIPHENHYDRAMINE HYDROCHLORIDE 50 MG/ML
50 INJECTION, SOLUTION INTRAMUSCULAR; INTRAVENOUS ONCE
Status: COMPLETED | OUTPATIENT
Start: 2021-07-07 | End: 2021-07-07

## 2021-07-07 RX ORDER — DIPHENHYDRAMINE HYDROCHLORIDE 50 MG/ML
50 INJECTION, SOLUTION INTRAMUSCULAR; INTRAVENOUS AS NEEDED
Status: CANCELLED
Start: 2021-07-21

## 2021-07-07 RX ORDER — ACETAMINOPHEN 325 MG/1
650 TABLET ORAL AS NEEDED
Status: CANCELLED
Start: 2021-07-07

## 2021-07-07 RX ORDER — ONDANSETRON 2 MG/ML
8 INJECTION INTRAMUSCULAR; INTRAVENOUS AS NEEDED
Status: CANCELLED | OUTPATIENT
Start: 2021-07-07

## 2021-07-07 RX ORDER — HEPARIN 100 UNIT/ML
300-500 SYRINGE INTRAVENOUS AS NEEDED
Status: CANCELLED
Start: 2021-07-21

## 2021-07-07 RX ORDER — EPINEPHRINE 1 MG/ML
0.3 INJECTION, SOLUTION, CONCENTRATE INTRAVENOUS AS NEEDED
Status: CANCELLED | OUTPATIENT
Start: 2021-07-21

## 2021-07-07 RX ORDER — SODIUM CHLORIDE 9 MG/ML
25 INJECTION, SOLUTION INTRAVENOUS CONTINUOUS
Status: CANCELLED | OUTPATIENT
Start: 2021-07-21

## 2021-07-07 RX ORDER — HYDROCORTISONE SODIUM SUCCINATE 100 MG/2ML
100 INJECTION, POWDER, FOR SOLUTION INTRAMUSCULAR; INTRAVENOUS AS NEEDED
Status: CANCELLED | OUTPATIENT
Start: 2021-07-21

## 2021-07-07 RX ORDER — SODIUM CHLORIDE 0.9 % (FLUSH) 0.9 %
10 SYRINGE (ML) INJECTION AS NEEDED
Status: CANCELLED | OUTPATIENT
Start: 2021-07-21

## 2021-07-07 RX ORDER — SODIUM CHLORIDE 9 MG/ML
10 INJECTION INTRAMUSCULAR; INTRAVENOUS; SUBCUTANEOUS AS NEEDED
Status: CANCELLED | OUTPATIENT
Start: 2021-07-07

## 2021-07-07 RX ORDER — ACETAMINOPHEN 325 MG/1
650 TABLET ORAL ONCE
Status: COMPLETED | OUTPATIENT
Start: 2021-07-07 | End: 2021-07-07

## 2021-07-07 RX ORDER — DIPHENHYDRAMINE HYDROCHLORIDE 50 MG/ML
50 INJECTION, SOLUTION INTRAMUSCULAR; INTRAVENOUS ONCE
Status: CANCELLED | OUTPATIENT
Start: 2021-07-21 | End: 2021-07-21

## 2021-07-07 RX ORDER — DIPHENHYDRAMINE HYDROCHLORIDE 50 MG/ML
50 INJECTION, SOLUTION INTRAMUSCULAR; INTRAVENOUS AS NEEDED
Status: CANCELLED
Start: 2021-07-07

## 2021-07-07 RX ORDER — ALBUTEROL SULFATE 0.83 MG/ML
2.5 SOLUTION RESPIRATORY (INHALATION) AS NEEDED
Status: CANCELLED
Start: 2021-07-07

## 2021-07-07 RX ORDER — EPINEPHRINE 1 MG/ML
0.3 INJECTION, SOLUTION, CONCENTRATE INTRAVENOUS AS NEEDED
Status: CANCELLED | OUTPATIENT
Start: 2021-07-07

## 2021-07-07 RX ORDER — DIPHENHYDRAMINE HYDROCHLORIDE 50 MG/ML
50 INJECTION, SOLUTION INTRAMUSCULAR; INTRAVENOUS ONCE
Status: CANCELLED | OUTPATIENT
Start: 2021-07-07 | End: 2021-07-07

## 2021-07-07 RX ORDER — ACETAMINOPHEN 325 MG/1
650 TABLET ORAL ONCE
Status: CANCELLED | OUTPATIENT
Start: 2021-07-21 | End: 2021-07-21

## 2021-07-07 RX ORDER — ACETAMINOPHEN 325 MG/1
650 TABLET ORAL ONCE
Status: CANCELLED | OUTPATIENT
Start: 2021-07-07 | End: 2021-07-07

## 2021-07-07 RX ORDER — SODIUM CHLORIDE 9 MG/ML
25 INJECTION, SOLUTION INTRAVENOUS CONTINUOUS
Status: CANCELLED | OUTPATIENT
Start: 2021-07-07

## 2021-07-07 RX ORDER — ONDANSETRON 2 MG/ML
8 INJECTION INTRAMUSCULAR; INTRAVENOUS AS NEEDED
Status: CANCELLED | OUTPATIENT
Start: 2021-07-21

## 2021-07-07 RX ORDER — HEPARIN 100 UNIT/ML
300-500 SYRINGE INTRAVENOUS AS NEEDED
Status: CANCELLED
Start: 2021-07-07

## 2021-07-07 RX ORDER — HYDROCORTISONE SODIUM SUCCINATE 100 MG/2ML
100 INJECTION, POWDER, FOR SOLUTION INTRAMUSCULAR; INTRAVENOUS AS NEEDED
Status: CANCELLED | OUTPATIENT
Start: 2021-07-07

## 2021-07-07 RX ORDER — ALBUTEROL SULFATE 0.83 MG/ML
2.5 SOLUTION RESPIRATORY (INHALATION) AS NEEDED
Status: CANCELLED
Start: 2021-07-21

## 2021-07-07 RX ORDER — SODIUM CHLORIDE 9 MG/ML
25 INJECTION, SOLUTION INTRAVENOUS CONTINUOUS
Status: DISCONTINUED | OUTPATIENT
Start: 2021-07-07 | End: 2021-07-08 | Stop reason: HOSPADM

## 2021-07-07 RX ORDER — ACETAMINOPHEN 325 MG/1
650 TABLET ORAL AS NEEDED
Status: CANCELLED
Start: 2021-07-21

## 2021-07-07 RX ORDER — DIPHENHYDRAMINE HYDROCHLORIDE 50 MG/ML
25 INJECTION, SOLUTION INTRAMUSCULAR; INTRAVENOUS AS NEEDED
Status: CANCELLED
Start: 2021-07-21

## 2021-07-07 RX ORDER — SODIUM CHLORIDE 9 MG/ML
10 INJECTION INTRAMUSCULAR; INTRAVENOUS; SUBCUTANEOUS AS NEEDED
Status: CANCELLED | OUTPATIENT
Start: 2021-07-21

## 2021-07-07 RX ORDER — SODIUM CHLORIDE 0.9 % (FLUSH) 0.9 %
10 SYRINGE (ML) INJECTION AS NEEDED
Status: CANCELLED | OUTPATIENT
Start: 2021-07-07

## 2021-07-07 RX ORDER — DIPHENHYDRAMINE HYDROCHLORIDE 50 MG/ML
25 INJECTION, SOLUTION INTRAMUSCULAR; INTRAVENOUS AS NEEDED
Status: CANCELLED
Start: 2021-07-07

## 2021-07-07 RX ADMIN — ACETAMINOPHEN 650 MG: 325 TABLET ORAL at 15:33

## 2021-07-07 RX ADMIN — PEGLOTICASE 8 MG: 8 INJECTION, SOLUTION INTRAVENOUS at 16:06

## 2021-07-07 RX ADMIN — FAMOTIDINE 40 MG: 10 INJECTION INTRAVENOUS at 15:34

## 2021-07-07 RX ADMIN — SODIUM CHLORIDE 25 ML/HR: 900 INJECTION, SOLUTION INTRAVENOUS at 15:30

## 2021-07-07 RX ADMIN — DIPHENHYDRAMINE HYDROCHLORIDE 50 MG: 50 INJECTION INTRAMUSCULAR; INTRAVENOUS at 15:36

## 2021-07-07 NOTE — PROGRESS NOTES
REASON FOR VISIT    This is a follow-up visit for Mr. Efraín Mcdowell for     ICD-10-CM   1. 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 Services were provided through a video synchronous discussion virtually to substitute for in-person clinic visit. Gouty arthritis phenotype includes:  Tophi: yes  Erosions: yes  Tenosynovitis: yes  Double Contour: N/A     Therapy Includes:  NSAIDs: no (contra-indicated due to CKD)  Colchicine prophylaxis: PRN  Current uricosuric therapy: none  Current urate-lowering therapy: Krystexxa 8 mg every 14 days (6/28/2019 to 10/18/2019; 8/26/2020 to present)  Previous urate-lowering therapy: none  Discontinued uricosuric because of inefficacy: none  Discontinued uricosuric because of side effects: none  Discontinued urate-lowering therapy because of inefficacy: allopurinol 50 mg daily  Discontinued urate-lowering therapy because of side effects: none  Contraindicated urate-lowering therapy because of CKD: allopurinol, probenecid  Contra-Indicated urate-lowering therapy because of FDA warning of all-cause mortality and cardiac death: Uloric (febuxostat)  G6PD Status: normal  Current anti-immunogenicity DMARD therapy: leflunomide 20 mg daily    HISTORY OF PRESENT ILLNESS    Mr. Efraín Mcdowell returns for a follow-up visit.     On his last visit, I continued Krystexxa infusion 8 mg every 14 days and leflunomide 20 mg daily for immunogenicity prevention. His most recent infusion was 6/23/2021, which he has received with good tolerance and is scheduled for today. I modified his orders today to remove pre-med SoluMedrol 30 mg IV and change oral diphenhydramine to IV 50 mg and oral famotidine to IV 40 mg. He has not been taking an oral antihistamine. Today, he feels great. He denies interval flares. His tophi have markedly improved (thumbs, eyelids, elbows). He met with his renal transplant team on 1/26/2021 at [facility] and is on the list but is waiting due to active renal function without dialysis. He has a living donor. He denies fever, weight loss, blurred vision, vision loss, oral ulcers, ankle swelling, dry cough, dyspnea, nausea, vomiting, dysphagia, abdominal pain, black or bloody stool, fall since last visit, rash, easy bruising and increased thirst.    Most recent uric acid from 6/23/2021 was 0.2 mg/dL. Last toxicity monitoring by blood work was done on 6/23/2021 and did not reveal any significant adverse effects, except creatinine 5.49, eGFR 13. The patient has not had any interval hospital admissions, infections, or surgeries. REVIEW OF SYSTEMS    A comprehensive review of systems was performed and pertinent results are documented in the HPI, review of systems is otherwise non-contributory. PAST MEDICAL HISTORY    He has a past medical history of Heart failure ([address] Hypertension, and Polycystic kidney disease. FAMILY HISTORY    His family history includes Hypertension in his father and mother; No Known Problems in his brother and brother; Other in his sister. SOCIAL HISTORY    He reports that he has never smoked. He has never used smokeless tobacco. He reports previous alcohol use. He reports current drug use. Drug: Marijuana. MEDICATIONS    Current Outpatient Medications   Medication Sig    leflunomide (ARAVA) 20 mg tablet Take 1 Tablet by mouth daily.     colchicine (Colcrys) 0.6 mg tablet TAKE ONE TABLET BY MOUTH DAILY AS NEEDED FOR GOUT FLARE    spironolactone (ALDACTONE) 25 mg tablet Take 25 mg by mouth daily.  cholecalciferol (Vitamin D3) 25 mcg (1,000 unit) cap Take  by mouth daily.  carvedilol (COREG) 25 mg tablet Take 1 Tab by mouth two (2) times daily (with meals).  ferrous sulfate 325 mg (65 mg iron) cpER Take 1 Each by mouth daily.  sacubitril-valsartan (ENTRESTO) 24 mg/26 mg tablet Take 1 Tab by mouth every twelve (12) hours.  predniSONE (DELTASONE) 20 mg tablet Take 1 tab by mouth daily for flare    bumetanide (BUMEX) 1 mg tablet Take 1 Tab by mouth daily.  pegloticase (KRYSTEXXA) 8 mg/mL soln injection 8 mg by IntraVENous route Once every 2 weeks.  POTASSIUM ASPARTATE/MAG ASP (POTASSIUM & MAGNESIUM ASPARTAT PO) Take  by mouth. No current facility-administered medications for this visit. ALLERGIES    Allergies   Allergen Reactions    Shellfish Containing Products Swelling     PHYSICAL EXAMINATION    There were no vitals taken for this visit. There is no height or weight on file to calculate BMI. General: Patient is alert, oriented x 3, not in acute distress    HEENT:   Sclerae are not injected and appear moist.  There is no alopecia. Chest:  Breathing comfortably at room air    Skin exam:    Tophi: left eyelid left: left helix, bilateral olecranon (LEFT larger 15 cm), bilateral wrists, left 2nd MCP, IP, 2nd PIP, 3rd PIP, 4th PIP, right 2nd MCP, 5th MCP, IP, 2nd PIP, 5th PIP, RIGHT: patella, LEFT: achilles    Musculoskeletal exam:  A comprehensive musculoskeletal exam was NOT performed for all joints of each upper and lower extremity and assessed for swelling, tenderness and range of motion.  Positive results are documented as below:    Previous Exam    Bilateral wrist, hand dorsum, MCP and digit swelling without tenderness    DATA REVIEW    Laboratory     Recent laboratory results were reviewed, summarized, and discussed with the patient. Imaging    Musculoskeletal Ultrasound    None    Radiographs    Bilateral Elbow 6/13/2019: RIGHT: normal alignment and bone mineral density. There is a well-circumscribed erosion within the olecranon. No definite effusion. There is high density material in the region of the olecranon bursa. LEFT: no fracture or effusion. No erosive change. There is high density material in the region of the olecranon bursa. Bilateral Hand 6/13/2019: RIGHT: normal alignment. Bone mineral density is normal. No fracture. There are extensive erosive changes of the distal ulna with adjacent high density soft tissue mass. There is erosive change within the triquetrum capitate and likely trapezoid. Erosive changes are noted the first second and third MCP joints. There is high density soft tissue adjacent to the second digit PIP joint. LEFT: normal alignment and no fracture. There are scattered erosive changes throughout the carpus second MTP joint and PIP joints of the second third and fourth digits. There is soft tissue swelling which is high density of the thumb and second MTP joints. Additional soft tissue swelling at the wrist.     Bilateral Foot 6/13/2019: RIGHT: normal alignment and bone mineral density. There are erosive changes throughout the ankle, talar head, navicular and Lisfranc joint as well as the medial base proximal phalanx of the great toe. There is adjacent soft tissue density at the first metatarsal head. No acute fracture. LEFT: normal alignment bone mineral densities. There are periarticular erosions throughout the Lisfranc joint, between the navicular and cuneiforms and at the first MTP joint. There is mild soft tissue thickening and density medial to the first metatarsal head. There is suggestion of soft tissue density between the second and third metatarsal heads.  No acute fracture.     CT Imaging    CT Abdomen and Pelvis without contrast 6/01/2019: Heart/vessels: Pericardial effusion and/or thickening measuring approximately 0.8 cm. Lungs/Pleura: Within normal limits. . ABDOMEN: Liver: Small, low-attenuation lesions in the liver which are incompletely characterized and statistically likely benign. Gallbladder/Biliary: Within normal limits. Spleen: Within normal limits. Pancreas: Within normal limits. Adrenals: Within normal limits. Kidneys: Innumerable low-attenuation lesions and high attenuation lesions throughout the kidneys with complete loss of normal-appearing parenchyma suggestive of polycystic kidney disease. Peritoneum/Mesenteries: Trace amount of fluid in the pelvis. Extraperitoneum: Within normal limits. Gastrointestinal tract: There is a short segment of small bowel within a supraumbilical hernia. The short segment of bowel within the hernia appears to have some mural thickening There are distended loops of small bowel with air-fluid levels associated with the hernia. The more distal small bowel is nearly entirely decompressed as is the large bowel. Normal-appearing appendix. Vascular: Within normal limits. PELVIS: Extraperitoneum: Within normal limits. Ureters: Within normal limits. Bladder: The bladder is nearly entirely decompressed. There is concentric mural thickening in the bladder. Reproductive System: Within normal limits. MSK:  There is a supraumbilical hernia containing a short segment of small bowel with adjacent stranding and inflammation and stranding/inflammation within the fat in the hernia. Tiny, fat-containing umbilical hernia. Degenerative changes in the lumbar spine. There is degenerative disc disease at L3/L4 and L4/L5. Extensive degenerative changes in both hips. Dystrophic appearing calcifications at the origin of both hamstrings at the pubic rami. Degenerative changes in the pubic symphysis. MR Imaging    MRI Right Ankle without contrast 4/10/2012: There is osseous edema like signal at the inferior aspect of the lateral malleolus.  There are also subcortical cysts in the anterior process of the calcaneus adjacent to the sinus tarsi. A mild to moderate osseous edema and the medial cuneiform bone is demonstrated subjacent to an area of distal high-grade chondral derangement. There is a moderate to large effusion of the ankle and posterior subtalar joints with demonstration of mild diffuse chondral thinning with small marginal osteophytes. There is a 7 mm loose body in the anterior joint recess. There is also heterogeneous signal in the posterior joint recesses which could contain small loose bodies as well. There is absent  visualization of the anterior talofibular ligament consistent with chronic tear. The other ankle ligaments appear intact. There is a small effusion of the talonavicular joint. Small dorsal marginal osteophytes throughout the midfoot region are noted. There is a moderate effusion in the posterior tibialis tendon sheath with diffuse mild tendon thickening and inframalleolar heterogeneous signal. There is a small effusion of the flexor digitorum longus and flexor hallucis longus tendon sheaths with normal appearing tendons. There is a small effusion of the peroneal tendon sheath with mild diffuse thickening of the peroneus longus tendon though uniform signal. Mild. Achilles peritendinitis is noted. There is a trace amount of fluid signal in the extensor digitorum longus tendon sheath. There is mild flatfoot deformity. No tarsal coalition is shown. There is no bone or soft tissue mass. DXA     None    ASSESSMENT AND PLAN    [facility]. 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 Probenecid and allopurinol are contraindicated. Uloric will be ineffective and given the new FDA box warning and his history of heart failure, I do not recommend it.      He is maintained on Krystexxa 8 mg every 2 weeks since 8/26/2020 and leflunomide 20 mg daily for immunogenicity prevention. He initially was on Geoffry Ba from 6/28/2019 to 10/18/2019 but then stopped it due to hypervolemia related to CKD progression and likely Medrol. He resumed treatment on 8/26/2020 with good tolerance and lower Medrol pre-medication at 30 mg without lower extremity edema. His most recent infusion was 2/03/2021. I recommend at least 12 months of Geoffry Ba and removal of all visible tophi starting from 8/26/2020. I will continue treatment but will discontinue IV Solu-Medrol 30 mg premedication and change oral diphenhydramine to 50 mg IV and oral famotidine to 40 mg IV as pre-medication. 2) Long Term Use of Immunosuppressants. The patient remains on immunomodulatory medications (leflunomide) and requires frequent toxicity monitoring by blood work. Respective labs were ordered (CBC and CMP) with each scheduled Krystexxa infusion. 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 The patient's creatinine 5.49, eGFR 13. He follows with Dr. Sivan Diaz. He is on the transplant list but pending since he is not on dialysis. He follows with [facility] transplant team.      The patient voiced understanding of the aforementioned assessment and plan. A total of 41 minutes was spent on this visit, reviewing interval notes, interval testing results, ordering tests, refilling medications, documenting the findings in the note, patient education, counseling, and coordination of care as described above. All questions asked and answered. TODAY'S ORDERS    No orders of the defined types were placed in this encounter.     Future Appointments   Date Time Provider Barry Cano   7/7/2021  3:00 PM B3 [facility] MED [place] [facility]   7/21/2021  3:00 PM B3 DEEPA MED [facility]   9/1/2021  1:00 PM Inocencio Calvo MD AOCR BS AMB     Sami Castillo MD, [address] [facility] [place][note 2]    Adult Rheumatology   Rheumatology Ultrasound Certified  [address] [facility] [place] [address] [place] [address], [address]   Phone [phone]  Fax [phone]

## 2021-07-07 NOTE — PROGRESS NOTES
[facility] Progress Note    Date: July 7, 2021        1510: Pt arrived ambulatory to [address] [place] [address] for Gloriann Mercury in stable condition. Assessment completed. PIV placed to right lower forearm with positive blood return. Labs drawn and sent for processing. Patient denies any symptoms of COVID-19, including SOB, coughing, fever, or generally not feeling well. Patient denies any recent exposure to COVID-19. Patient denies any recent contact with family or friends that have a pending COVID-19 test.    Patient Vitals for the past 12 hrs:   Temp Pulse Resp BP   07/07/21 1513 97.7 °F (36.5 °C) 85 18 121/75       Medications Administered     0.9% sodium chloride infusion     Admin Date  07/07/2021 Action  New Bag Dose  25 mL/hr Rate  25 mL/hr Route  IntraVENous Administered By  Nora Robetrs          acetaminophen (TYLENOL) tablet 650 mg     Admin Date  07/07/2021 Action  Given Dose  650 mg Route  Oral Administered By  Nora Roberts          diphenhydrAMINE (BENADRYL) injection 50 mg     Admin Date  07/07/2021 Action  Given Dose  50 mg Route  IntraVENous Administered By  Nora Roberts          famotidine (PF) (PEPCID) 40 mg in 0.9% sodium chloride 10 mL injection     Admin Date  07/07/2021 Action  Given Dose  40 mg Route  IntraVENous Administered By  Nora Roberts          pegloticase [facility]) 8 mg in 0.9% sodium chloride 250 mL, overfill volume 25 mL infusion     Admin Date  07/07/2021 Action  New Bag Dose  8 mg Rate  137.5 mL/hr Route  IntraVENous Administered By  Natalia Steinberg, [address] [place] [address]: Tolerated treatment well, no adverse reactions noted. Post treatment uric acid drawn and sent for processing. PIV flushed and removed. 2x2 and coban placed. D/Cd from [address] [place] [address] ambulatory and in no distress. Patient is aware of next scheduled [facility] appointment on 7/21/21.     Future Appointments   Date Time Provider Barry Cano   7/21/2021  3:00 PM [address]   9/1/2021  1:00 PM Buddy Rabago, MD AOCR BS AMB           Burt Ramsey RN  July 7, 2021

## 2021-07-21 ENCOUNTER — HOSPITAL ENCOUNTER (OUTPATIENT)
Dept: INFUSION THERAPY | Age: 51
Discharge: HOME | End: 2021-07-21
Payer: COMMERCIAL

## 2021-07-21 VITALS
DIASTOLIC BLOOD PRESSURE: 83 MMHG | RESPIRATION RATE: 18 BRPM | SYSTOLIC BLOOD PRESSURE: 120 MMHG | TEMPERATURE: 97.7 F | HEART RATE: 83 BPM

## 2021-07-21 DIAGNOSIS — M1A.39X1 CHRONIC TOPHACEOUS GOUT OF MULTIPLE SITES DUE TO RENAL IMPAIRMENT: ICD-10-CM

## 2021-07-21 DIAGNOSIS — M1A.39X1 CHRONIC TOPHACEOUS GOUT OF MULTIPLE SITES DUE TO RENAL IMPAIRMENT: Primary | ICD-10-CM

## 2021-07-21 LAB
ALBUMIN SERPL-MCNC: 3.6 G/DL (ref 3.5–5)
ALBUMIN/GLOB SERPL: 1.2 {RATIO} (ref 1.1–2.2)
ALP SERPL-CCNC: 37 U/L (ref 45–117)
ALT SERPL-CCNC: 29 U/L (ref 12–78)
ANION GAP SERPL CALC-SCNC: 15 MMOL/L (ref 5–15)
AST SERPL-CCNC: 21 U/L (ref 15–37)
BASOPHILS # BLD: 0 K/UL (ref 0–0.1)
BASOPHILS NFR BLD: 1 % (ref 0–1)
BILIRUB SERPL-MCNC: 0.5 MG/DL (ref 0.2–1)
BUN SERPL-MCNC: 78 MG/DL (ref 6–20)
BUN/CREAT SERPL: 13 (ref 12–20)
CALCIUM SERPL-MCNC: 8.4 MG/DL (ref 8.5–10.1)
CHLORIDE SERPL-SCNC: 112 MMOL/L (ref 97–108)
CO2 SERPL-SCNC: 17 MMOL/L (ref 21–32)
CREAT SERPL-MCNC: 6.04 MG/DL (ref 0.7–1.3)
DIFFERENTIAL METHOD BLD: ABNORMAL
EOSINOPHIL # BLD: 0.6 K/UL (ref 0–0.4)
EOSINOPHIL NFR BLD: 11 % (ref 0–7)
ERYTHROCYTE [DISTWIDTH] IN BLOOD BY AUTOMATED COUNT: 13.3 % (ref 11.5–14.5)
ERYTHROCYTE [SEDIMENTATION RATE] IN BLOOD: 28 MM/HR (ref 0–15)
GLOBULIN SER CALC-MCNC: 3.1 G/DL (ref 2–4)
GLUCOSE SERPL-MCNC: 106 MG/DL (ref 65–100)
HCT VFR BLD AUTO: 30.2 % (ref 36.6–50.3)
HGB BLD-MCNC: 9.1 G/DL (ref 12.1–17)
IMM GRANULOCYTES # BLD AUTO: 0 K/UL (ref 0–0.04)
IMM GRANULOCYTES NFR BLD AUTO: 0 % (ref 0–0.5)
LYMPHOCYTES # BLD: 1.4 K/UL (ref 0.8–3.5)
LYMPHOCYTES NFR BLD: 25 % (ref 12–49)
MCH RBC QN AUTO: 30 PG (ref 26–34)
MCHC RBC AUTO-ENTMCNC: 30.1 G/DL (ref 30–36.5)
MCV RBC AUTO: 99.7 FL (ref 80–99)
MONOCYTES # BLD: 0.7 K/UL (ref 0–1)
MONOCYTES NFR BLD: 12 % (ref 5–13)
NEUTS SEG # BLD: 2.9 K/UL (ref 1.8–8)
NEUTS SEG NFR BLD: 51 % (ref 32–75)
NRBC # BLD: 0 K/UL (ref 0–0.01)
NRBC BLD-RTO: 0 PER 100 WBC
PLATELET # BLD AUTO: 234 K/UL (ref 150–400)
PMV BLD AUTO: 11.1 FL (ref 8.9–12.9)
POTASSIUM SERPL-SCNC: 5 MMOL/L (ref 3.5–5.1)
PROT SERPL-MCNC: 6.7 G/DL (ref 6.4–8.2)
RBC # BLD AUTO: 3.03 M/UL (ref 4.1–5.7)
SODIUM SERPL-SCNC: 144 MMOL/L (ref 136–145)
URATE SERPL-MCNC: <0.2 MG/DL (ref 3.5–7.2)
URATE SERPL-MCNC: <0.2 MG/DL (ref 3.5–7.2)
WBC # BLD AUTO: 5.5 K/UL (ref 4.1–11.1)

## 2021-07-21 PROCEDURE — 84550 ASSAY OF BLOOD/URIC ACID: CPT

## 2021-07-21 PROCEDURE — 74011250636 HC RX REV CODE- 250/636: Performed by: INTERNAL MEDICINE

## 2021-07-21 PROCEDURE — 85652 RBC SED RATE AUTOMATED: CPT

## 2021-07-21 PROCEDURE — 80053 COMPREHEN METABOLIC PANEL: CPT

## 2021-07-21 PROCEDURE — 96375 TX/PRO/DX INJ NEW DRUG ADDON: CPT

## 2021-07-21 PROCEDURE — 74011250637 HC RX REV CODE- 250/637: Performed by: INTERNAL MEDICINE

## 2021-07-21 PROCEDURE — 85025 COMPLETE CBC W/AUTO DIFF WBC: CPT

## 2021-07-21 PROCEDURE — 96365 THER/PROPH/DIAG IV INF INIT: CPT

## 2021-07-21 PROCEDURE — 36415 COLL VENOUS BLD VENIPUNCTURE: CPT

## 2021-07-21 PROCEDURE — 96366 THER/PROPH/DIAG IV INF ADDON: CPT

## 2021-07-21 PROCEDURE — 74011000250 HC RX REV CODE- 250: Performed by: INTERNAL MEDICINE

## 2021-07-21 RX ORDER — ONDANSETRON 2 MG/ML
8 INJECTION INTRAMUSCULAR; INTRAVENOUS AS NEEDED
Status: CANCELLED | OUTPATIENT
Start: 2021-08-04

## 2021-07-21 RX ORDER — DIPHENHYDRAMINE HYDROCHLORIDE 50 MG/ML
25 INJECTION, SOLUTION INTRAMUSCULAR; INTRAVENOUS AS NEEDED
Status: CANCELLED
Start: 2021-08-04

## 2021-07-21 RX ORDER — SODIUM CHLORIDE 9 MG/ML
25 INJECTION, SOLUTION INTRAVENOUS CONTINUOUS
Status: CANCELLED | OUTPATIENT
Start: 2021-08-04

## 2021-07-21 RX ORDER — EPINEPHRINE 1 MG/ML
0.3 INJECTION, SOLUTION, CONCENTRATE INTRAVENOUS AS NEEDED
Status: CANCELLED | OUTPATIENT
Start: 2021-08-04

## 2021-07-21 RX ORDER — HYDROCORTISONE SODIUM SUCCINATE 100 MG/2ML
100 INJECTION, POWDER, FOR SOLUTION INTRAMUSCULAR; INTRAVENOUS AS NEEDED
Status: CANCELLED | OUTPATIENT
Start: 2021-08-04

## 2021-07-21 RX ORDER — SODIUM CHLORIDE 0.9 % (FLUSH) 0.9 %
10 SYRINGE (ML) INJECTION AS NEEDED
Status: CANCELLED | OUTPATIENT
Start: 2021-08-04

## 2021-07-21 RX ORDER — ACETAMINOPHEN 325 MG/1
650 TABLET ORAL ONCE
Status: COMPLETED | OUTPATIENT
Start: 2021-07-21 | End: 2021-07-21

## 2021-07-21 RX ORDER — ACETAMINOPHEN 325 MG/1
650 TABLET ORAL AS NEEDED
Status: CANCELLED
Start: 2021-08-04

## 2021-07-21 RX ORDER — ACETAMINOPHEN 325 MG/1
650 TABLET ORAL ONCE
Status: CANCELLED | OUTPATIENT
Start: 2021-08-04 | End: 2021-08-04

## 2021-07-21 RX ORDER — HEPARIN 100 UNIT/ML
300-500 SYRINGE INTRAVENOUS AS NEEDED
Status: CANCELLED
Start: 2021-08-04

## 2021-07-21 RX ORDER — DIPHENHYDRAMINE HYDROCHLORIDE 50 MG/ML
50 INJECTION, SOLUTION INTRAMUSCULAR; INTRAVENOUS AS NEEDED
Status: CANCELLED
Start: 2021-08-04

## 2021-07-21 RX ORDER — DIPHENHYDRAMINE HYDROCHLORIDE 50 MG/ML
50 INJECTION, SOLUTION INTRAMUSCULAR; INTRAVENOUS ONCE
Status: CANCELLED | OUTPATIENT
Start: 2021-08-04 | End: 2021-08-04

## 2021-07-21 RX ORDER — SODIUM CHLORIDE 9 MG/ML
25 INJECTION, SOLUTION INTRAVENOUS CONTINUOUS
Status: DISCONTINUED | OUTPATIENT
Start: 2021-07-21 | End: 2021-07-22 | Stop reason: HOSPADM

## 2021-07-21 RX ORDER — SODIUM CHLORIDE 9 MG/ML
10 INJECTION INTRAMUSCULAR; INTRAVENOUS; SUBCUTANEOUS AS NEEDED
Status: CANCELLED | OUTPATIENT
Start: 2021-08-04

## 2021-07-21 RX ORDER — DIPHENHYDRAMINE HYDROCHLORIDE 50 MG/ML
50 INJECTION, SOLUTION INTRAMUSCULAR; INTRAVENOUS ONCE
Status: COMPLETED | OUTPATIENT
Start: 2021-07-21 | End: 2021-07-21

## 2021-07-21 RX ORDER — ALBUTEROL SULFATE 0.83 MG/ML
2.5 SOLUTION RESPIRATORY (INHALATION) AS NEEDED
Status: CANCELLED
Start: 2021-08-04

## 2021-07-21 RX ADMIN — ACETAMINOPHEN 650 MG: 325 TABLET ORAL at 15:35

## 2021-07-21 RX ADMIN — PEGLOTICASE 8 MG: 8 INJECTION, SOLUTION INTRAVENOUS at 15:50

## 2021-07-21 RX ADMIN — SODIUM CHLORIDE 25 ML/HR: 900 INJECTION, SOLUTION INTRAVENOUS at 15:30

## 2021-07-21 RX ADMIN — FAMOTIDINE 40 MG: 10 INJECTION INTRAVENOUS at 15:36

## 2021-07-21 RX ADMIN — DIPHENHYDRAMINE HYDROCHLORIDE 50 MG: 50 INJECTION INTRAMUSCULAR; INTRAVENOUS at 15:39

## 2021-07-21 NOTE — PROGRESS NOTES
[facility] Progress Note    Date: 2021    Name: Ellen Pearson    MRN: [id number]         : 1970        1510: Pt arrived ambulatory to [facility] for Elissa Serra in stable condition. Assessment completed. No other complaints voiced at this time. Peripheral IV with positive blood return. Labs drawn per order and sent for processing. Normal Saline started at [facility]. Patient denies SOB, fever, cough, general not feeling well. Patient denie recent exposure to someone who has tested positive for COVID-19. Patient denies having contact with anyone who has a pending COVID test.      Patient Vitals for the past 12 hrs:   Temp Pulse Resp BP   21 1512 97.7 °F (36.5 °C) 83 18 120/83       Medications Administered     0.9% sodium chloride infusion     Admin Date  2021 Action  New Bag Dose  25 mL/hr Rate  25 mL/hr Route  IntraVENous Administered By  Jennifer Quinn, RN          acetaminophen (TYLENOL) tablet 650 mg     Admin Date  2021 Action  Given Dose  650 mg Route  Oral Administered By  Jennifer Quinn, RN          diphenhydrAMINE (BENADRYL) injection 50 mg     Admin Date  2021 Action  Given Dose  50 mg Route  IntraVENous Administered By  Jennifer Quinn, LORRI          famotidine (PF) (PEPCID) 40 mg in 0.9% sodium chloride 10 mL injection     Admin Date  2021 Action  Given Dose  40 mg Route  IntraVENous Administered By  Jennifer Quinn, LORRI          pegloticase [facility]) 8 mg in 0.9% sodium chloride 250 mL, overfill volume 25 mL infusion     Admin Date  2021 Action  New Bag Dose  8 mg Rate  138 mL/hr Route  IntraVENous Administered By  Jennifer Sensor, RN                  1800: Tolerated treatment well, no adverse reactions noted. D/Cd from [facility] ambulatory and in no distress.       Future Appointments   Date Time Provider Barry Cano   2021  3:00 PM B3 [facility] MED [address]   2021  3:00 PM B3 DEE[facility] MED [address]   2021  1:00 PM Mayo Rabago, MD AOSTEPHEN BS AMB   9/1/2021  3:00 PM B3 [facility] MED [address], RN  July 21, 2021

## 2021-08-04 ENCOUNTER — HOSPITAL ENCOUNTER (OUTPATIENT)
Dept: INFUSION THERAPY | Age: 51
Discharge: HOME | End: 2021-08-04
Payer: COMMERCIAL

## 2021-08-04 VITALS
DIASTOLIC BLOOD PRESSURE: 90 MMHG | HEART RATE: 75 BPM | TEMPERATURE: 97.7 F | RESPIRATION RATE: 18 BRPM | SYSTOLIC BLOOD PRESSURE: 149 MMHG

## 2021-08-04 DIAGNOSIS — M1A.39X1 CHRONIC TOPHACEOUS GOUT OF MULTIPLE SITES DUE TO RENAL IMPAIRMENT: Primary | ICD-10-CM

## 2021-08-04 DIAGNOSIS — M1A.39X1 CHRONIC TOPHACEOUS GOUT OF MULTIPLE SITES DUE TO RENAL IMPAIRMENT: ICD-10-CM

## 2021-08-04 LAB
ALBUMIN SERPL-MCNC: 3.7 G/DL (ref 3.5–5)
ALBUMIN/GLOB SERPL: 1.2 {RATIO} (ref 1.1–2.2)
ALP SERPL-CCNC: 37 U/L (ref 45–117)
ALT SERPL-CCNC: 26 U/L (ref 12–78)
ANION GAP SERPL CALC-SCNC: 8 MMOL/L (ref 5–15)
AST SERPL-CCNC: 14 U/L (ref 15–37)
BASOPHILS # BLD: 0.1 K/UL (ref 0–0.1)
BASOPHILS NFR BLD: 1 % (ref 0–1)
BILIRUB SERPL-MCNC: 0.3 MG/DL (ref 0.2–1)
BUN SERPL-MCNC: 71 MG/DL (ref 6–20)
BUN/CREAT SERPL: 12 (ref 12–20)
CALCIUM SERPL-MCNC: 8.4 MG/DL (ref 8.5–10.1)
CHLORIDE SERPL-SCNC: 116 MMOL/L (ref 97–108)
CO2 SERPL-SCNC: 19 MMOL/L (ref 21–32)
CREAT SERPL-MCNC: 5.97 MG/DL (ref 0.7–1.3)
DIFFERENTIAL METHOD BLD: ABNORMAL
EOSINOPHIL # BLD: 0.7 K/UL (ref 0–0.4)
EOSINOPHIL NFR BLD: 10 % (ref 0–7)
ERYTHROCYTE [DISTWIDTH] IN BLOOD BY AUTOMATED COUNT: 13.2 % (ref 11.5–14.5)
GLOBULIN SER CALC-MCNC: 3.2 G/DL (ref 2–4)
GLUCOSE SERPL-MCNC: 70 MG/DL (ref 65–100)
HCT VFR BLD AUTO: 31.2 % (ref 36.6–50.3)
HGB BLD-MCNC: 9.3 G/DL (ref 12.1–17)
IMM GRANULOCYTES # BLD AUTO: 0 K/UL (ref 0–0.04)
IMM GRANULOCYTES NFR BLD AUTO: 0 % (ref 0–0.5)
LYMPHOCYTES # BLD: 1.6 K/UL (ref 0.8–3.5)
LYMPHOCYTES NFR BLD: 23 % (ref 12–49)
MCH RBC QN AUTO: 29.6 PG (ref 26–34)
MCHC RBC AUTO-ENTMCNC: 29.8 G/DL (ref 30–36.5)
MCV RBC AUTO: 99.4 FL (ref 80–99)
MONOCYTES # BLD: 0.7 K/UL (ref 0–1)
MONOCYTES NFR BLD: 10 % (ref 5–13)
NEUTS SEG # BLD: 4 K/UL (ref 1.8–8)
NEUTS SEG NFR BLD: 56 % (ref 32–75)
NRBC # BLD: 0 K/UL (ref 0–0.01)
NRBC BLD-RTO: 0 PER 100 WBC
PLATELET # BLD AUTO: 256 K/UL (ref 150–400)
PMV BLD AUTO: 10.7 FL (ref 8.9–12.9)
POTASSIUM SERPL-SCNC: 3.9 MMOL/L (ref 3.5–5.1)
PROT SERPL-MCNC: 6.9 G/DL (ref 6.4–8.2)
RBC # BLD AUTO: 3.14 M/UL (ref 4.1–5.7)
SODIUM SERPL-SCNC: 143 MMOL/L (ref 136–145)
URATE SERPL-MCNC: 0.2 MG/DL (ref 3.5–7.2)
URATE SERPL-MCNC: <0.2 MG/DL (ref 3.5–7.2)
WBC # BLD AUTO: 7.2 K/UL (ref 4.1–11.1)

## 2021-08-04 PROCEDURE — 80053 COMPREHEN METABOLIC PANEL: CPT

## 2021-08-04 PROCEDURE — 74011000250 HC RX REV CODE- 250: Performed by: INTERNAL MEDICINE

## 2021-08-04 PROCEDURE — 74011250637 HC RX REV CODE- 250/637: Performed by: INTERNAL MEDICINE

## 2021-08-04 PROCEDURE — 96366 THER/PROPH/DIAG IV INF ADDON: CPT

## 2021-08-04 PROCEDURE — 96375 TX/PRO/DX INJ NEW DRUG ADDON: CPT

## 2021-08-04 PROCEDURE — 36415 COLL VENOUS BLD VENIPUNCTURE: CPT

## 2021-08-04 PROCEDURE — 85025 COMPLETE CBC W/AUTO DIFF WBC: CPT

## 2021-08-04 PROCEDURE — 84550 ASSAY OF BLOOD/URIC ACID: CPT

## 2021-08-04 PROCEDURE — 74011250636 HC RX REV CODE- 250/636: Performed by: INTERNAL MEDICINE

## 2021-08-04 PROCEDURE — 96365 THER/PROPH/DIAG IV INF INIT: CPT

## 2021-08-04 RX ORDER — DIPHENHYDRAMINE HYDROCHLORIDE 50 MG/ML
50 INJECTION, SOLUTION INTRAMUSCULAR; INTRAVENOUS ONCE
Status: CANCELLED | OUTPATIENT
Start: 2021-08-18 | End: 2021-08-18

## 2021-08-04 RX ORDER — SODIUM CHLORIDE 9 MG/ML
25 INJECTION, SOLUTION INTRAVENOUS CONTINUOUS
Status: CANCELLED | OUTPATIENT
Start: 2021-08-18

## 2021-08-04 RX ORDER — HEPARIN 100 UNIT/ML
300-500 SYRINGE INTRAVENOUS AS NEEDED
Status: CANCELLED
Start: 2021-08-18

## 2021-08-04 RX ORDER — SODIUM CHLORIDE 9 MG/ML
25 INJECTION, SOLUTION INTRAVENOUS CONTINUOUS
Status: DISCONTINUED | OUTPATIENT
Start: 2021-08-04 | End: 2021-08-05 | Stop reason: HOSPADM

## 2021-08-04 RX ORDER — ALBUTEROL SULFATE 0.83 MG/ML
2.5 SOLUTION RESPIRATORY (INHALATION) AS NEEDED
Status: CANCELLED
Start: 2021-08-18

## 2021-08-04 RX ORDER — ACETAMINOPHEN 325 MG/1
650 TABLET ORAL ONCE
Status: COMPLETED | OUTPATIENT
Start: 2021-08-04 | End: 2021-08-04

## 2021-08-04 RX ORDER — EPINEPHRINE 1 MG/ML
0.3 INJECTION, SOLUTION, CONCENTRATE INTRAVENOUS AS NEEDED
Status: CANCELLED | OUTPATIENT
Start: 2021-08-18

## 2021-08-04 RX ORDER — HYDROCORTISONE SODIUM SUCCINATE 100 MG/2ML
100 INJECTION, POWDER, FOR SOLUTION INTRAMUSCULAR; INTRAVENOUS AS NEEDED
Status: CANCELLED | OUTPATIENT
Start: 2021-08-18

## 2021-08-04 RX ORDER — ACETAMINOPHEN 325 MG/1
650 TABLET ORAL ONCE
Status: CANCELLED | OUTPATIENT
Start: 2021-08-18 | End: 2021-08-18

## 2021-08-04 RX ORDER — SODIUM CHLORIDE 0.9 % (FLUSH) 0.9 %
10 SYRINGE (ML) INJECTION AS NEEDED
Status: CANCELLED | OUTPATIENT
Start: 2021-08-18

## 2021-08-04 RX ORDER — DIPHENHYDRAMINE HYDROCHLORIDE 50 MG/ML
50 INJECTION, SOLUTION INTRAMUSCULAR; INTRAVENOUS AS NEEDED
Status: CANCELLED
Start: 2021-08-18

## 2021-08-04 RX ORDER — SODIUM CHLORIDE 9 MG/ML
10 INJECTION INTRAMUSCULAR; INTRAVENOUS; SUBCUTANEOUS AS NEEDED
Status: CANCELLED | OUTPATIENT
Start: 2021-08-18

## 2021-08-04 RX ORDER — DIPHENHYDRAMINE HYDROCHLORIDE 50 MG/ML
50 INJECTION, SOLUTION INTRAMUSCULAR; INTRAVENOUS ONCE
Status: COMPLETED | OUTPATIENT
Start: 2021-08-04 | End: 2021-08-04

## 2021-08-04 RX ORDER — ONDANSETRON 2 MG/ML
8 INJECTION INTRAMUSCULAR; INTRAVENOUS AS NEEDED
Status: CANCELLED | OUTPATIENT
Start: 2021-08-18

## 2021-08-04 RX ORDER — DIPHENHYDRAMINE HYDROCHLORIDE 50 MG/ML
25 INJECTION, SOLUTION INTRAMUSCULAR; INTRAVENOUS AS NEEDED
Status: CANCELLED
Start: 2021-08-18

## 2021-08-04 RX ORDER — ACETAMINOPHEN 325 MG/1
650 TABLET ORAL AS NEEDED
Status: CANCELLED
Start: 2021-08-18

## 2021-08-04 RX ADMIN — DIPHENHYDRAMINE HYDROCHLORIDE 50 MG: 50 INJECTION INTRAMUSCULAR; INTRAVENOUS at 15:31

## 2021-08-04 RX ADMIN — FAMOTIDINE 40 MG: 10 INJECTION INTRAVENOUS at 15:34

## 2021-08-04 RX ADMIN — PEGLOTICASE 8 MG: 8 INJECTION, SOLUTION INTRAVENOUS at 16:00

## 2021-08-04 RX ADMIN — SODIUM CHLORIDE 25 ML/HR: 900 INJECTION, SOLUTION INTRAVENOUS at 15:20

## 2021-08-04 RX ADMIN — ACETAMINOPHEN 650 MG: 325 TABLET ORAL at 15:30

## 2021-08-04 NOTE — PROGRESS NOTES
OPIC Short Note                   7693 Pt admit to [facility] for [address] ambulatory in stable condition. Assessment completed. No new concerns voiced. PIV established in [facility] with good blood return. Labs collected and sent for processing. PIV connected to NS KVO. Mr. Reza Melo vitals were reviewed prior to and after treatment. Patient Vitals for the past 12 hrs:   Temp Pulse Resp BP   08/04/21 1820 -- 75 -- (!) 149/90   08/04/21 1515 97.7 °F (36.5 °C) 82 18 117/81         Lab results were obtained and reviewed. Recent Results (from the past 12 hour(s))   CBC WITH AUTOMATED DIFF    Collection Time: 08/04/21  3:23 PM   Result Value Ref Range    WBC 7.2 4.1 - 11.1 K/uL    RBC 3.14 (L) 4.10 - 5.70 M/uL    HGB 9.3 (L) 12.1 - 17.0 g/dL    HCT 31.2 (L) 36.6 - 50.3 %    MCV 99.4 (H) 80.0 - 99.0 FL    MCH 29.6 26.0 - 34.0 PG    MCHC 29.8 (L) 30.0 - 36.5 g/dL    RDW 13.2 11.5 - 14.5 %    PLATELET 529 473 - 647 K/uL    MPV 10.7 8.9 - 12.9 FL    NRBC 0.0 0  WBC    ABSOLUTE NRBC 0.00 0.00 - 0.01 K/uL    NEUTROPHILS 56 32 - 75 %    LYMPHOCYTES 23 12 - 49 %    MONOCYTES 10 5 - 13 %    EOSINOPHILS 10 (H) 0 - 7 %    BASOPHILS 1 0 - 1 %    IMMATURE GRANULOCYTES 0 0.0 - 0.5 %    ABS. NEUTROPHILS 4.0 1.8 - 8.0 K/UL    ABS. LYMPHOCYTES 1.6 0.8 - 3.5 K/UL    ABS. MONOCYTES 0.7 0.0 - 1.0 K/UL    ABS. EOSINOPHILS 0.7 (H) 0.0 - 0.4 K/UL    ABS. BASOPHILS 0.1 0.0 - 0.1 K/UL    ABS. IMM.  GRANS. 0.0 0.00 - 0.04 K/UL    DF AUTOMATED     METABOLIC PANEL, COMPREHENSIVE    Collection Time: 08/04/21  3:23 PM   Result Value Ref Range    Sodium 143 136 - 145 mmol/L    Potassium 3.9 3.5 - 5.1 mmol/L    Chloride 116 (H) 97 - 108 mmol/L    CO2 19 (L) 21 - 32 mmol/L    Anion gap 8 5 - 15 mmol/L    Glucose 70 65 - 100 mg/dL    BUN 71 (H) 6 - 20 MG/DL    Creatinine 5.97 (H) 0.70 - 1.30 MG/DL    BUN/Creatinine ratio 12 12 - 20      GFR est AA 12 (L) >60 ml/min/1.73m2    GFR est non-AA 10 (L) >60 ml/min/1.73m2    Calcium 8.4 (L) 8.5 - 10.1 MG/DL    Bilirubin, total 0.3 0.2 - 1.0 MG/DL    ALT (SGPT) 26 12 - 78 U/L    AST (SGOT) 14 (L) 15 - 37 U/L    Alk. phosphatase 37 (L) 45 - 117 U/L    Protein, total 6.9 6.4 - 8.2 g/dL    Albumin 3.7 3.5 - 5.0 g/dL    Globulin 3.2 2.0 - 4.0 g/dL    A-G Ratio 1.2 1.1 - 2.2     URIC ACID    Collection Time: 08/04/21  3:23 PM   Result Value Ref Range    Uric acid 0.2 (L) 3.5 - 7.2 MG/DL       Medications Administered     0.9% sodium chloride infusion     Admin Date  08/04/2021 Action  New Bag Dose  25 mL/hr Rate  25 mL/hr Route  IntraVENous Administered By  Surinder Engle, LORRI          acetaminophen (TYLENOL) tablet 650 mg     Admin Date  08/04/2021 Action  Given Dose  650 mg Route  Oral Administered By  Surinder Engle, LORRI          diphenhydrAMINE (BENADRYL) injection 50 mg     Admin Date  08/04/2021 Action  Given Dose  50 mg Route  IntraVENous Administered By  Surinder Engle, LORRI          famotidine (PF) (PEPCID) 40 mg in 0.9% sodium chloride 10 mL injection     Admin Date  08/04/2021 Action  Given Dose  40 mg Route  IntraVENous Administered By  Surinder Engle, LORRI          [facility] 8 mg in 0.9% sodium chloride 250 mL, overfill volume 25 mL infusion     Admin Date  08/04/2021 Action  New Bag Dose  8 mg Rate  137.5 mL/hr Route  IntraVENous Administered By  Surinder Engle, LORRI              Mr. Medina tolerated the infusion, and had no complaints. Line flushed following infusion. Post uric acid level sent for processing. PIV removed without difficulty. Mr. Medina was discharged from [place] [address] in stable condition at [phone]. Patient is aware of next appointment.     Future Appointments   Date Time Provider Barry Cano   8/18/2021  3:00 PM B3 DEEPA MED 1370 [facility]   9/1/2021  1:00 PM Freddy Kemp MD AOCR BS AMB   9/1/2021  3:00 PM B3 DEEPA MED 1050 [facility] LORRI Adkins  August 4, 2021  6:31 PM

## 2021-08-18 ENCOUNTER — HOSPITAL ENCOUNTER (OUTPATIENT)
Dept: INFUSION THERAPY | Age: 51
Discharge: HOME | End: 2021-08-18
Payer: COMMERCIAL

## 2021-08-18 VITALS — HEART RATE: 69 BPM | RESPIRATION RATE: 16 BRPM | SYSTOLIC BLOOD PRESSURE: 116 MMHG | DIASTOLIC BLOOD PRESSURE: 75 MMHG

## 2021-08-18 DIAGNOSIS — M1A.39X1 CHRONIC TOPHACEOUS GOUT OF MULTIPLE SITES DUE TO RENAL IMPAIRMENT: ICD-10-CM

## 2021-08-18 DIAGNOSIS — M1A.39X1 CHRONIC TOPHACEOUS GOUT OF MULTIPLE SITES DUE TO RENAL IMPAIRMENT: Primary | ICD-10-CM

## 2021-08-18 LAB
ALBUMIN SERPL-MCNC: 3.4 G/DL (ref 3.5–5)
ALBUMIN/GLOB SERPL: 1 {RATIO} (ref 1.1–2.2)
ALP SERPL-CCNC: 33 U/L (ref 45–117)
ALT SERPL-CCNC: 26 U/L (ref 12–78)
ANION GAP SERPL CALC-SCNC: 8 MMOL/L (ref 5–15)
AST SERPL-CCNC: 11 U/L (ref 15–37)
BASOPHILS # BLD: 0 K/UL (ref 0–0.1)
BASOPHILS NFR BLD: 0 % (ref 0–1)
BILIRUB SERPL-MCNC: 0.3 MG/DL (ref 0.2–1)
BUN SERPL-MCNC: 74 MG/DL (ref 6–20)
BUN/CREAT SERPL: 12 (ref 12–20)
CALCIUM SERPL-MCNC: 8 MG/DL (ref 8.5–10.1)
CHLORIDE SERPL-SCNC: 116 MMOL/L (ref 97–108)
CO2 SERPL-SCNC: 17 MMOL/L (ref 21–32)
CREAT SERPL-MCNC: 6.2 MG/DL (ref 0.7–1.3)
DIFFERENTIAL METHOD BLD: ABNORMAL
EOSINOPHIL # BLD: 0.6 K/UL (ref 0–0.4)
EOSINOPHIL NFR BLD: 11 % (ref 0–7)
ERYTHROCYTE [DISTWIDTH] IN BLOOD BY AUTOMATED COUNT: 13.5 % (ref 11.5–14.5)
ERYTHROCYTE [SEDIMENTATION RATE] IN BLOOD: 17 MM/HR (ref 0–15)
GLOBULIN SER CALC-MCNC: 3.4 G/DL (ref 2–4)
GLUCOSE SERPL-MCNC: 96 MG/DL (ref 65–100)
HCT VFR BLD AUTO: 28.5 % (ref 36.6–50.3)
HGB BLD-MCNC: 8.7 G/DL (ref 12.1–17)
IMM GRANULOCYTES # BLD AUTO: 0 K/UL (ref 0–0.04)
IMM GRANULOCYTES NFR BLD AUTO: 0 % (ref 0–0.5)
LYMPHOCYTES # BLD: 1.3 K/UL (ref 0.8–3.5)
LYMPHOCYTES NFR BLD: 26 % (ref 12–49)
MCH RBC QN AUTO: 30.2 PG (ref 26–34)
MCHC RBC AUTO-ENTMCNC: 30.5 G/DL (ref 30–36.5)
MCV RBC AUTO: 99 FL (ref 80–99)
MONOCYTES # BLD: 0.6 K/UL (ref 0–1)
MONOCYTES NFR BLD: 11 % (ref 5–13)
NEUTS SEG # BLD: 2.7 K/UL (ref 1.8–8)
NEUTS SEG NFR BLD: 52 % (ref 32–75)
NRBC # BLD: 0 K/UL (ref 0–0.01)
NRBC BLD-RTO: 0 PER 100 WBC
PLATELET # BLD AUTO: 207 K/UL (ref 150–400)
PMV BLD AUTO: 10.4 FL (ref 8.9–12.9)
POTASSIUM SERPL-SCNC: 3.9 MMOL/L (ref 3.5–5.1)
PROT SERPL-MCNC: 6.8 G/DL (ref 6.4–8.2)
RBC # BLD AUTO: 2.88 M/UL (ref 4.1–5.7)
SODIUM SERPL-SCNC: 141 MMOL/L (ref 136–145)
URATE SERPL-MCNC: <0.2 MG/DL (ref 3.5–7.2)
URATE SERPL-MCNC: <0.2 MG/DL (ref 3.5–7.2)
WBC # BLD AUTO: 5.2 K/UL (ref 4.1–11.1)

## 2021-08-18 PROCEDURE — 74011250636 HC RX REV CODE- 250/636: Performed by: INTERNAL MEDICINE

## 2021-08-18 PROCEDURE — 84550 ASSAY OF BLOOD/URIC ACID: CPT

## 2021-08-18 PROCEDURE — 36415 COLL VENOUS BLD VENIPUNCTURE: CPT

## 2021-08-18 PROCEDURE — 96365 THER/PROPH/DIAG IV INF INIT: CPT

## 2021-08-18 PROCEDURE — 85652 RBC SED RATE AUTOMATED: CPT

## 2021-08-18 PROCEDURE — 74011000250 HC RX REV CODE- 250: Performed by: INTERNAL MEDICINE

## 2021-08-18 PROCEDURE — 85025 COMPLETE CBC W/AUTO DIFF WBC: CPT

## 2021-08-18 PROCEDURE — 80053 COMPREHEN METABOLIC PANEL: CPT

## 2021-08-18 PROCEDURE — 96366 THER/PROPH/DIAG IV INF ADDON: CPT

## 2021-08-18 PROCEDURE — 96375 TX/PRO/DX INJ NEW DRUG ADDON: CPT

## 2021-08-18 PROCEDURE — 74011250637 HC RX REV CODE- 250/637: Performed by: INTERNAL MEDICINE

## 2021-08-18 RX ORDER — ACETAMINOPHEN 325 MG/1
650 TABLET ORAL AS NEEDED
Status: CANCELLED
Start: 2021-09-01

## 2021-08-18 RX ORDER — DIPHENHYDRAMINE HYDROCHLORIDE 50 MG/ML
25 INJECTION, SOLUTION INTRAMUSCULAR; INTRAVENOUS AS NEEDED
Status: CANCELLED
Start: 2021-09-01

## 2021-08-18 RX ORDER — DIPHENHYDRAMINE HYDROCHLORIDE 50 MG/ML
50 INJECTION, SOLUTION INTRAMUSCULAR; INTRAVENOUS ONCE
Status: COMPLETED | OUTPATIENT
Start: 2021-08-18 | End: 2021-08-18

## 2021-08-18 RX ORDER — SODIUM CHLORIDE 9 MG/ML
25 INJECTION, SOLUTION INTRAVENOUS CONTINUOUS
Status: DISCONTINUED | OUTPATIENT
Start: 2021-08-18 | End: 2021-08-19 | Stop reason: HOSPADM

## 2021-08-18 RX ORDER — ACETAMINOPHEN 325 MG/1
650 TABLET ORAL ONCE
Status: COMPLETED | OUTPATIENT
Start: 2021-08-18 | End: 2021-08-18

## 2021-08-18 RX ORDER — DIPHENHYDRAMINE HYDROCHLORIDE 50 MG/ML
50 INJECTION, SOLUTION INTRAMUSCULAR; INTRAVENOUS ONCE
Status: CANCELLED | OUTPATIENT
Start: 2021-09-01 | End: 2021-09-01

## 2021-08-18 RX ORDER — HYDROCORTISONE SODIUM SUCCINATE 100 MG/2ML
100 INJECTION, POWDER, FOR SOLUTION INTRAMUSCULAR; INTRAVENOUS AS NEEDED
Status: CANCELLED | OUTPATIENT
Start: 2021-09-01

## 2021-08-18 RX ORDER — SODIUM CHLORIDE 0.9 % (FLUSH) 0.9 %
10 SYRINGE (ML) INJECTION AS NEEDED
Status: CANCELLED | OUTPATIENT
Start: 2021-09-01

## 2021-08-18 RX ORDER — ACETAMINOPHEN 325 MG/1
650 TABLET ORAL ONCE
Status: CANCELLED | OUTPATIENT
Start: 2021-09-01 | End: 2021-09-01

## 2021-08-18 RX ORDER — HEPARIN 100 UNIT/ML
300-500 SYRINGE INTRAVENOUS AS NEEDED
Status: CANCELLED
Start: 2021-09-01

## 2021-08-18 RX ORDER — EPINEPHRINE 1 MG/ML
0.3 INJECTION, SOLUTION, CONCENTRATE INTRAVENOUS AS NEEDED
Status: CANCELLED | OUTPATIENT
Start: 2021-09-01

## 2021-08-18 RX ORDER — ALBUTEROL SULFATE 0.83 MG/ML
2.5 SOLUTION RESPIRATORY (INHALATION) AS NEEDED
Status: CANCELLED
Start: 2021-09-01

## 2021-08-18 RX ORDER — DIPHENHYDRAMINE HYDROCHLORIDE 50 MG/ML
50 INJECTION, SOLUTION INTRAMUSCULAR; INTRAVENOUS AS NEEDED
Status: CANCELLED
Start: 2021-09-01

## 2021-08-18 RX ORDER — SODIUM CHLORIDE 9 MG/ML
10 INJECTION INTRAMUSCULAR; INTRAVENOUS; SUBCUTANEOUS AS NEEDED
Status: CANCELLED | OUTPATIENT
Start: 2021-09-01

## 2021-08-18 RX ORDER — SODIUM CHLORIDE 9 MG/ML
25 INJECTION, SOLUTION INTRAVENOUS CONTINUOUS
Status: CANCELLED | OUTPATIENT
Start: 2021-09-01

## 2021-08-18 RX ORDER — ONDANSETRON 2 MG/ML
8 INJECTION INTRAMUSCULAR; INTRAVENOUS AS NEEDED
Status: CANCELLED | OUTPATIENT
Start: 2021-09-01

## 2021-08-18 RX ADMIN — PEGLOTICASE 8 MG: 8 INJECTION, SOLUTION INTRAVENOUS at 16:05

## 2021-08-18 RX ADMIN — SODIUM CHLORIDE 25 ML/HR: 900 INJECTION, SOLUTION INTRAVENOUS at 15:25

## 2021-08-18 RX ADMIN — FAMOTIDINE 40 MG: 10 INJECTION INTRAVENOUS at 15:42

## 2021-08-18 RX ADMIN — DIPHENHYDRAMINE HYDROCHLORIDE 50 MG: 50 INJECTION INTRAMUSCULAR; INTRAVENOUS at 15:42

## 2021-08-18 RX ADMIN — ACETAMINOPHEN 650 MG: 325 TABLET ORAL at 15:42

## 2021-08-18 NOTE — PROGRESS NOTES
[facility] Short Note                       Date: 2021    Name: Rosemary De Los Santos    MRN: [id number]         : 1970      1500 Pt admit to [facility] for [address] 89 ambulatory in stable condition. Assessment completed. No new concerns voiced. Please review pending lab results in [address] [place] [address]. Mr. Heidi Schmidt vitals were reviewed prior to and after treatment. Patient Vitals for the past 12 hrs:   Pulse BP   21 1832 69 116/75         Lab results were obtained and reviewed. Recent Results (from the past 12 hour(s))   CBC WITH AUTOMATED DIFF    Collection Time: 21  3:14 PM   Result Value Ref Range    WBC 5.2 4.1 - 11.1 K/uL    RBC 2.88 (L) 4.10 - 5.70 M/uL    HGB 8.7 (L) 12.1 - 17.0 g/dL    HCT 28.5 (L) 36.6 - 50.3 %    MCV 99.0 80.0 - 99.0 FL    MCH 30.2 26.0 - 34.0 PG    MCHC 30.5 30.0 - 36.5 g/dL    RDW 13.5 11.5 - 14.5 %    PLATELET 924 229 - 726 K/uL    MPV 10.4 8.9 - 12.9 FL    NRBC 0.0 0  WBC    ABSOLUTE NRBC 0.00 0.00 - 0.01 K/uL    NEUTROPHILS 52 32 - 75 %    LYMPHOCYTES 26 12 - 49 %    MONOCYTES 11 5 - 13 %    EOSINOPHILS 11 (H) 0 - 7 %    BASOPHILS 0 0 - 1 %    IMMATURE GRANULOCYTES 0 0.0 - 0.5 %    ABS. NEUTROPHILS 2.7 1.8 - 8.0 K/UL    ABS. LYMPHOCYTES 1.3 0.8 - 3.5 K/UL    ABS. MONOCYTES 0.6 0.0 - 1.0 K/UL    ABS. EOSINOPHILS 0.6 (H) 0.0 - 0.4 K/UL    ABS. BASOPHILS 0.0 0.0 - 0.1 K/UL    ABS. IMM.  GRANS. 0.0 0.00 - 0.04 K/UL    DF AUTOMATED     METABOLIC PANEL, COMPREHENSIVE    Collection Time: 21  3:14 PM   Result Value Ref Range    Sodium 141 136 - 145 mmol/L    Potassium 3.9 3.5 - 5.1 mmol/L    Chloride 116 (H) 97 - 108 mmol/L    CO2 17 (L) 21 - 32 mmol/L    Anion gap 8 5 - 15 mmol/L    Glucose 96 65 - 100 mg/dL    BUN 74 (H) 6 - 20 MG/DL    Creatinine 6.20 (H) 0.70 - 1.30 MG/DL    BUN/Creatinine ratio 12 12 - 20      GFR est AA 12 (L) >60 ml/min/1.73m2    GFR est non-AA 10 (L) >60 ml/min/1.73m2    Calcium 8.0 (L) 8.5 - 10.1 MG/DL    Bilirubin, total 0.3 0.2 - 1.0 MG/DL    ALT (SGPT) PENDING U/L    AST (SGOT) 11 (L) 15 - 37 U/L    Alk. phosphatase 33 (L) 45 - 117 U/L    Protein, total 6.8 6.4 - 8.2 g/dL    Albumin 3.4 (L) 3.5 - 5.0 g/dL    Globulin 3.4 2.0 - 4.0 g/dL    A-G Ratio 1.0 (L) 1.1 - 2.2     URIC ACID    Collection Time: 08/18/21  3:14 PM   Result Value Ref Range    Uric acid <0.2 (L) 3.5 - 7.2 MG/DL   SED RATE (ESR)    Collection Time: 08/18/21  3:14 PM   Result Value Ref Range    Sed rate, automated 17 (H) 0 - 15 mm/hr       Medications given:   Medications Administered     0.9% sodium chloride infusion     Admin Date  08/18/2021 Action  New Bag Dose  25 mL/hr Rate  25 mL/hr Route  IntraVENous Administered By  Tony Sousa RN          acetaminophen (TYLENOL) tablet 650 mg     Admin Date  08/18/2021 Action  Given Dose  650 mg Route  Oral Administered By  Tony Sousa RN          diphenhydrAMINE (BENADRYL) injection 50 mg     Admin Date  08/18/2021 Action  Given Dose  50 mg Route  IntraVENous Administered By  Tony Sousa RN          famotidine (PF) (PEPCID) 40 mg in 0.9% sodium chloride 10 mL injection     Admin Date  08/18/2021 Action  Given Dose  40 mg Route  IntraVENous Administered By  Tony Sousa RN          [facility] 8 mg in 0.9% sodium chloride 250 mL, overfill volume 25 mL infusion     Admin Date  08/18/2021 Action  New Bag Dose  8 mg Rate  138 mL/hr Route  IntraVENous Administered By  Tony Sousa RN                PIV accessed with positive blood return. PIV flushed and removed    Mr. Lois Beckett tolerated the infusion, and had no complaints. Mr. Lois Beckett was discharged from [place] [address] in stable condition at [address]  [address]      Future Appointments   Date Time Provider [facility] Rachael   9/1/2021  1:00 PM Jaclyn Stanton MD AOCR BS AMB   9/1/2021  3:00 PM B3 DEEPA Rodriguez RN  August 18, 2021  6:31 PM

## 2021-09-01 ENCOUNTER — OFFICE VISIT (OUTPATIENT)
Dept: RHEUMATOLOGY | Age: 51
End: 2021-09-01
Payer: COMMERCIAL

## 2021-09-01 ENCOUNTER — HOSPITAL ENCOUNTER (OUTPATIENT)
Dept: INFUSION THERAPY | Age: 51
Discharge: HOME | End: 2021-09-01
Payer: COMMERCIAL

## 2021-09-01 VITALS
RESPIRATION RATE: 18 BRPM | HEART RATE: 79 BPM | DIASTOLIC BLOOD PRESSURE: 84 MMHG | BODY MASS INDEX: 28.31 KG/M2 | WEIGHT: 203 LBS | TEMPERATURE: 98.1 F | SYSTOLIC BLOOD PRESSURE: 121 MMHG

## 2021-09-01 VITALS
TEMPERATURE: 97.1 F | DIASTOLIC BLOOD PRESSURE: 82 MMHG | HEART RATE: 70 BPM | SYSTOLIC BLOOD PRESSURE: 132 MMHG | RESPIRATION RATE: 18 BRPM

## 2021-09-01 DIAGNOSIS — M1A.39X1 CHRONIC TOPHACEOUS GOUT OF MULTIPLE SITES DUE TO RENAL IMPAIRMENT: ICD-10-CM

## 2021-09-01 DIAGNOSIS — M1A.39X1 CHRONIC TOPHACEOUS GOUT OF MULTIPLE SITES DUE TO RENAL IMPAIRMENT: Primary | ICD-10-CM

## 2021-09-01 DIAGNOSIS — M10.362 ACUTE GOUT DUE TO RENAL IMPAIRMENT INVOLVING LEFT KNEE: ICD-10-CM

## 2021-09-01 LAB
ALBUMIN SERPL-MCNC: 3.6 G/DL (ref 3.5–5)
ALBUMIN/GLOB SERPL: 1.1 {RATIO} (ref 1.1–2.2)
ALP SERPL-CCNC: 31 U/L (ref 45–117)
ALT SERPL-CCNC: 24 U/L (ref 12–78)
ANION GAP SERPL CALC-SCNC: 8 MMOL/L (ref 5–15)
AST SERPL-CCNC: 13 U/L (ref 15–37)
BASOPHILS # BLD: 0.1 K/UL (ref 0–0.1)
BASOPHILS NFR BLD: 1 % (ref 0–1)
BILIRUB SERPL-MCNC: 0.4 MG/DL (ref 0.2–1)
BUN SERPL-MCNC: 69 MG/DL (ref 6–20)
BUN/CREAT SERPL: 11 (ref 12–20)
CALCIUM SERPL-MCNC: 8.4 MG/DL (ref 8.5–10.1)
CHLORIDE SERPL-SCNC: 118 MMOL/L (ref 97–108)
CO2 SERPL-SCNC: 19 MMOL/L (ref 21–32)
CREAT SERPL-MCNC: 6.41 MG/DL (ref 0.7–1.3)
DIFFERENTIAL METHOD BLD: ABNORMAL
EOSINOPHIL # BLD: 0.5 K/UL (ref 0–0.4)
EOSINOPHIL NFR BLD: 9 % (ref 0–7)
ERYTHROCYTE [DISTWIDTH] IN BLOOD BY AUTOMATED COUNT: 13.3 % (ref 11.5–14.5)
ERYTHROCYTE [SEDIMENTATION RATE] IN BLOOD: 25 MM/HR (ref 0–15)
GLOBULIN SER CALC-MCNC: 3.2 G/DL (ref 2–4)
GLUCOSE SERPL-MCNC: 75 MG/DL (ref 65–100)
HCT VFR BLD AUTO: 31.2 % (ref 36.6–50.3)
HGB BLD-MCNC: 9.4 G/DL (ref 12.1–17)
IMM GRANULOCYTES # BLD AUTO: 0 K/UL (ref 0–0.04)
IMM GRANULOCYTES NFR BLD AUTO: 0 % (ref 0–0.5)
LYMPHOCYTES # BLD: 0.7 K/UL (ref 0.8–3.5)
LYMPHOCYTES NFR BLD: 14 % (ref 12–49)
MCH RBC QN AUTO: 29.9 PG (ref 26–34)
MCHC RBC AUTO-ENTMCNC: 30.1 G/DL (ref 30–36.5)
MCV RBC AUTO: 99.4 FL (ref 80–99)
MONOCYTES # BLD: 0.3 K/UL (ref 0–1)
MONOCYTES NFR BLD: 6 % (ref 5–13)
NEUTS SEG # BLD: 3.6 K/UL (ref 1.8–8)
NEUTS SEG NFR BLD: 70 % (ref 32–75)
NRBC # BLD: 0 K/UL (ref 0–0.01)
NRBC BLD-RTO: 0 PER 100 WBC
PLATELET # BLD AUTO: 214 K/UL (ref 150–400)
PMV BLD AUTO: 10.9 FL (ref 8.9–12.9)
POTASSIUM SERPL-SCNC: 4.1 MMOL/L (ref 3.5–5.1)
PROT SERPL-MCNC: 6.8 G/DL (ref 6.4–8.2)
RBC # BLD AUTO: 3.14 M/UL (ref 4.1–5.7)
RBC MORPH BLD: ABNORMAL
RBC MORPH BLD: ABNORMAL
SODIUM SERPL-SCNC: 145 MMOL/L (ref 136–145)
URATE SERPL-MCNC: <0.2 MG/DL (ref 3.5–7.2)
URATE SERPL-MCNC: <0.2 MG/DL (ref 3.5–7.2)
WBC # BLD AUTO: 5.2 K/UL (ref 4.1–11.1)

## 2021-09-01 PROCEDURE — 74011250636 HC RX REV CODE- 250/636: Performed by: INTERNAL MEDICINE

## 2021-09-01 PROCEDURE — 20610 DRAIN/INJ JOINT/BURSA W/O US: CPT | Performed by: INTERNAL MEDICINE

## 2021-09-01 PROCEDURE — 96366 THER/PROPH/DIAG IV INF ADDON: CPT

## 2021-09-01 PROCEDURE — 85025 COMPLETE CBC W/AUTO DIFF WBC: CPT

## 2021-09-01 PROCEDURE — 36415 COLL VENOUS BLD VENIPUNCTURE: CPT

## 2021-09-01 PROCEDURE — 85652 RBC SED RATE AUTOMATED: CPT

## 2021-09-01 PROCEDURE — 80053 COMPREHEN METABOLIC PANEL: CPT

## 2021-09-01 PROCEDURE — 96375 TX/PRO/DX INJ NEW DRUG ADDON: CPT

## 2021-09-01 PROCEDURE — 74011000250 HC RX REV CODE- 250: Performed by: INTERNAL MEDICINE

## 2021-09-01 PROCEDURE — 74011250637 HC RX REV CODE- 250/637: Performed by: INTERNAL MEDICINE

## 2021-09-01 PROCEDURE — 84550 ASSAY OF BLOOD/URIC ACID: CPT

## 2021-09-01 PROCEDURE — 99215 OFFICE O/P EST HI 40 MIN: CPT | Performed by: INTERNAL MEDICINE

## 2021-09-01 PROCEDURE — 96365 THER/PROPH/DIAG IV INF INIT: CPT

## 2021-09-01 RX ORDER — ACETAMINOPHEN 325 MG/1
650 TABLET ORAL AS NEEDED
Status: CANCELLED
Start: 2021-09-15

## 2021-09-01 RX ORDER — DIPHENHYDRAMINE HYDROCHLORIDE 50 MG/ML
50 INJECTION, SOLUTION INTRAMUSCULAR; INTRAVENOUS ONCE
Status: DISCONTINUED | OUTPATIENT
Start: 2021-09-01 | End: 2021-09-01

## 2021-09-01 RX ORDER — EPINEPHRINE 1 MG/ML
0.3 INJECTION, SOLUTION, CONCENTRATE INTRAVENOUS AS NEEDED
Status: CANCELLED | OUTPATIENT
Start: 2021-09-15

## 2021-09-01 RX ORDER — ACETAMINOPHEN 325 MG/1
650 TABLET ORAL ONCE
Status: COMPLETED | OUTPATIENT
Start: 2021-09-01 | End: 2021-09-01

## 2021-09-01 RX ORDER — SODIUM BICARBONATE 325 MG/1
650 TABLET ORAL 2 TIMES DAILY
COMMUNITY

## 2021-09-01 RX ORDER — TRIAMCINOLONE ACETONIDE 40 MG/ML
40 INJECTION, SUSPENSION INTRA-ARTICULAR; INTRAMUSCULAR ONCE
Qty: 1 ML | Refills: 0
Start: 2021-09-01 | End: 2021-09-01

## 2021-09-01 RX ORDER — HYDROCORTISONE SODIUM SUCCINATE 100 MG/2ML
100 INJECTION, POWDER, FOR SOLUTION INTRAMUSCULAR; INTRAVENOUS AS NEEDED
Status: CANCELLED | OUTPATIENT
Start: 2021-09-15

## 2021-09-01 RX ORDER — ONDANSETRON 2 MG/ML
8 INJECTION INTRAMUSCULAR; INTRAVENOUS AS NEEDED
Status: CANCELLED | OUTPATIENT
Start: 2021-09-15

## 2021-09-01 RX ORDER — ALBUTEROL SULFATE 0.83 MG/ML
2.5 SOLUTION RESPIRATORY (INHALATION) AS NEEDED
Status: CANCELLED
Start: 2021-09-15

## 2021-09-01 RX ORDER — SODIUM CHLORIDE 9 MG/ML
25 INJECTION, SOLUTION INTRAVENOUS CONTINUOUS
Status: DISCONTINUED | OUTPATIENT
Start: 2021-09-01 | End: 2021-09-02 | Stop reason: HOSPADM

## 2021-09-01 RX ORDER — DIPHENHYDRAMINE HYDROCHLORIDE 50 MG/ML
50 INJECTION, SOLUTION INTRAMUSCULAR; INTRAVENOUS AS NEEDED
Status: CANCELLED
Start: 2021-09-15

## 2021-09-01 RX ORDER — DIPHENHYDRAMINE HYDROCHLORIDE 50 MG/ML
50 INJECTION, SOLUTION INTRAMUSCULAR; INTRAVENOUS ONCE
Status: CANCELLED | OUTPATIENT
Start: 2021-09-15 | End: 2021-09-15

## 2021-09-01 RX ORDER — DIPHENHYDRAMINE HCL 25 MG
50 CAPSULE ORAL ONCE
Status: COMPLETED | OUTPATIENT
Start: 2021-09-01 | End: 2021-09-01

## 2021-09-01 RX ORDER — SODIUM CHLORIDE 0.9 % (FLUSH) 0.9 %
10 SYRINGE (ML) INJECTION AS NEEDED
Status: CANCELLED | OUTPATIENT
Start: 2021-09-15

## 2021-09-01 RX ORDER — DIPHENHYDRAMINE HYDROCHLORIDE 50 MG/ML
25 INJECTION, SOLUTION INTRAMUSCULAR; INTRAVENOUS AS NEEDED
Status: CANCELLED
Start: 2021-09-15

## 2021-09-01 RX ORDER — ACETAMINOPHEN 325 MG/1
650 TABLET ORAL ONCE
Status: CANCELLED | OUTPATIENT
Start: 2021-09-15 | End: 2021-09-15

## 2021-09-01 RX ORDER — SODIUM CHLORIDE 9 MG/ML
25 INJECTION, SOLUTION INTRAVENOUS CONTINUOUS
Status: CANCELLED | OUTPATIENT
Start: 2021-09-15

## 2021-09-01 RX ORDER — HEPARIN 100 UNIT/ML
300-500 SYRINGE INTRAVENOUS AS NEEDED
Status: CANCELLED
Start: 2021-09-15

## 2021-09-01 RX ORDER — SODIUM CHLORIDE 9 MG/ML
10 INJECTION INTRAMUSCULAR; INTRAVENOUS; SUBCUTANEOUS AS NEEDED
Status: CANCELLED | OUTPATIENT
Start: 2021-09-15

## 2021-09-01 RX ORDER — LIDOCAINE HYDROCHLORIDE 10 MG/ML
1 INJECTION INFILTRATION; PERINEURAL ONCE
Qty: 1 ML | Refills: 0
Start: 2021-09-01 | End: 2021-09-01

## 2021-09-01 RX ADMIN — PEGLOTICASE 8 MG: 8 INJECTION, SOLUTION INTRAVENOUS at 16:08

## 2021-09-01 RX ADMIN — ACETAMINOPHEN 650 MG: 325 TABLET ORAL at 15:47

## 2021-09-01 RX ADMIN — FAMOTIDINE 40 MG: 10 INJECTION, SOLUTION INTRAVENOUS at 15:51

## 2021-09-01 RX ADMIN — DIPHENHYDRAMINE HYDROCHLORIDE 50 MG: 25 CAPSULE ORAL at 15:54

## 2021-09-01 RX ADMIN — SODIUM CHLORIDE 25 ML/HR: 900 INJECTION, SOLUTION INTRAVENOUS at 15:47

## 2021-09-01 NOTE — LETTER
9/1/2021    Patient: Billy Gonzalez   YOB: 1970   Date of Visit: 9/1/2021     Lizette Davis NP  [address]  [address] [address]  [place] [facility] [address]    Dear Lizette Davis NP,      Thank you for referring Mr. Deyanira Villanueva to [address] [place] [address] for evaluation. My notes for this consultation are attached. If you have questions, please do not hesitate to call me. I look forward to following your patient along with you.       Sincerely,    Jaskaran Calderon MD

## 2021-09-01 NOTE — PROGRESS NOTES
[facility]                       Date: 2021    Name: Alma Maurer    MRN: [id number]         : 1970    1515 Pt admit to [facility] for [address] 89 ambulatory in stable condition. Assessment completed. No new concerns voiced. Please review pending lab results in [address] [place] [address]. Pre and post uric acid was drawn and sent for processing    Patient denies SOB, fever, cough, general not feeling well. Patient denies recent exposure to someone who has tested positive for COVID-19. Patient denies having contact with anyone who has a pending COVID test.    Mr. Marvin Nolasco vitals were reviewed prior to treatment. Patient Vitals for the past 12 hrs:   Temp Pulse Resp BP   21 1823 -- 70 -- 132/82   21 1515 97.1 °F (36.2 °C) 80 18 118/79       PIV with positive blood return. Lab drawn, flushed, heparinized and de-accessed per protocol. Medications given:   Medications Administered     0.9% sodium chloride infusion     Admin Date  2021 Action  New Bag Dose  25 mL/hr Rate  25 mL/hr Route  IntraVENous Administered By  Carmelita Shukla RN          acetaminophen (TYLENOL) tablet 650 mg     Admin Date  2021 Action  Given Dose  650 mg Route  Oral Administered By  Carmelita Shukla RN          diphenhydrAMINE (BENADRYL) capsule 50 mg     Admin Date  2021 Action  Given Dose  50 mg Route  Oral Administered By  Carmelita Shukla RN          famotidine (PF) (PEPCID) 40 mg in 0.9% sodium chloride 10 mL injection     Admin Date  2021 Action  Given Dose  40 mg Route  IntraVENous Administered By  Carmelita Shukla RN          pegloticase [facility]) 8 mg in 0.9% sodium chloride 250 mL infusion     Admin Date  2021 Action  New Bag Dose  8 mg Rate  125 mL/hr Route  IntraVENous Administered By  Carmelita Shukla RN                   2408 Pt tolerated treatment well. D/c home ambulatory in no distress.  P  Future Appointments   Date Time Provider Barry Cano   9/15/2021  3:00 PM [address] [facility]   9/29/2021  3:00 PM D4 DEEPA MED 1370 [facility] H   10/13/2021  3:00 PM D4 DEEPA MED 1370 [facility] H   10/27/2021  3:00 PM A1 DEEPA MED 1370 [facility] H   3/2/2022 10:00 AM Nancy Rabago MD AO BS [address], RN  September 1, 2021  6:55 PM

## 2021-09-01 NOTE — PROGRESS NOTES
REASON FOR VISIT    This is a follow-up visit for Mr. Kaylie Ibrahim for     ICD-10-CM   1. 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     Gouty arthritis phenotype includes:  Tophi: yes  Erosions: yes  Tenosynovitis: yes  Double Contour: N/A     Therapy Includes:  NSAIDs: no (contra-indicated due to CKD)  Colchicine prophylaxis: PRN  Current uricosuric therapy: none  Current urate-lowering therapy: Krystexxa 8 mg every 14 days (6/28/2019 to 10/18/2019; 8/26/2020 to present)  Previous urate-lowering therapy: none  Discontinued uricosuric because of inefficacy: none  Discontinued uricosuric because of side effects: none  Discontinued urate-lowering therapy because of inefficacy: allopurinol 50 mg daily  Discontinued urate-lowering therapy because of side effects: none  Contraindicated urate-lowering therapy because of CKD: allopurinol, probenecid  Contra-Indicated urate-lowering therapy because of FDA warning of all-cause mortality and cardiac death: Uloric (febuxostat)  G6PD Status: normal  Current anti-immunogenicity DMARD therapy: leflunomide 20 mg daily    HISTORY OF PRESENT ILLNESS    Mr. Kaylie Ibrahim returns for a follow-up visit. On his last visit, I continued Krystexxa infusion 8 mg every 14 days and leflunomide 20 mg daily for immunogenicity prevention. His most recent infusion was 8/18/2021, which he has received with good tolerance and is scheduled for today. I discontinued pre-med SoluMedrol 30 mg IV and changed oral diphenhydramine to IV 50 mg and oral famotidine to IV 40 mg. Today, he feels great. He noticed pain and swelling in his left knee last week that has improved. He noticed after stopping Medrol pre-medication, he had a flare in his left ankle. He denies other interval flares. His tophi have markedly improved (thumbs, eyelids, elbows).     He denies fever, weight loss, blurred vision, vision loss, oral ulcers, ankle swelling, dry cough, dyspnea, nausea, vomiting, dysphagia, abdominal pain, black or bloody stool, fall since last visit, rash, easy bruising and increased thirst.    Most recent uric acid from 8/18/2021 was <0.2 mg/dL. Last toxicity monitoring by blood work was done on 8/18/2021 and did not reveal any significant adverse effects, except creatinine 6.20, eGFR 12. The patient has not had any interval hospital admissions, infections, or surgeries. REVIEW OF SYSTEMS    A comprehensive review of systems was performed and pertinent results are documented in the HPI, review of systems is otherwise non-contributory. PAST MEDICAL HISTORY    He has a past medical history of Heart failure ([address] Hypertension, and Polycystic kidney disease. FAMILY HISTORY    His family history includes Hypertension in his father and mother; No Known Problems in his brother and brother; Other in his sister. SOCIAL HISTORY    He reports that he has never smoked. He has never used smokeless tobacco. He reports previous alcohol use. He reports current drug use. Drug: Marijuana. MEDICATIONS    Current Outpatient Medications   Medication Sig    sodium bicarbonate 325 mg tablet Take 650 mg by mouth two (2) times a day.  triamcinolone acetonide (Kenalog) 40 mg/mL injection 1 mL by Intra artICUlar route once for 1 dose.  lidocaine (XYLOCAINE) 10 mg/mL (1 %) injection 1 mL by Intra artICUlar route once for 1 dose.  leflunomide (ARAVA) 20 mg tablet Take 1 Tablet by mouth daily.  colchicine (Colcrys) 0.6 mg tablet TAKE ONE TABLET BY MOUTH DAILY AS NEEDED FOR GOUT FLARE    spironolactone (ALDACTONE) 25 mg tablet Take 25 mg by mouth daily.  cholecalciferol (Vitamin D3) 25 mcg (1,000 unit) cap Take  by mouth daily.  carvedilol (COREG) 25 mg tablet Take 1 Tab by mouth two (2) times daily (with meals).  bumetanide (BUMEX) 1 mg tablet Take 1 Tab by mouth daily.  pegloticase (KRYSTEXXA) 8 mg/mL soln injection 8 mg by IntraVENous route Once every 2 weeks.      No current facility-administered medications for this visit. ALLERGIES    Allergies   Allergen Reactions    Shellfish Containing Products Swelling     PHYSICAL EXAMINATION    Visit Vitals  /84   Pulse 79   Temp 98.1 °F (36.7 °C)   Resp 18   Wt 203 lb (92.1 kg)   BMI 28.31 kg/m²     Body mass index is 28.31 kg/m². General: Patient is alert, oriented x 3, not in acute distress    HEENT:   Sclerae are not injected and appear moist.  There is no alopecia. Chest:  Breathing comfortably at room air    Skin exam:    Tophi: left eyelid left: left helix, bilateral olecranon (LEFT larger 15 cm), bilateral wrists, left 2nd MCP, IP, 2nd PIP, 3rd PIP, 4th PIP, right 2nd MCP, 5th MCP, IP, 2nd PIP, 5th PIP, RIGHT: patella, LEFT: achilles IMPROVED    Musculoskeletal exam:  A comprehensive musculoskeletal exam was performed for all joints of each upper and lower extremity and assessed for swelling, tenderness and range of motion. Positive results are documented as below:    Bilateral wrist, hand dorsum, MCP and digit swelling without tenderness  Left knee swelling and warmth    DATA REVIEW    Laboratory     Recent laboratory results were reviewed, summarized, and discussed with the patient. Imaging    Musculoskeletal Ultrasound    None    Radiographs    Bilateral Elbow 6/13/2019: RIGHT: normal alignment and bone mineral density. There is a well-circumscribed erosion within the olecranon. No definite effusion. There is high density material in the region of the olecranon bursa. LEFT: no fracture or effusion. No erosive change. There is high density material in the region of the olecranon bursa. Bilateral Hand 6/13/2019: RIGHT: normal alignment. Bone mineral density is normal. No fracture. There are extensive erosive changes of the distal ulna with adjacent high density soft tissue mass. There is erosive change within the triquetrum capitate and likely trapezoid.  Erosive changes are noted the first second and third MCP joints. There is high density soft tissue adjacent to the second digit PIP joint. LEFT: normal alignment and no fracture. There are scattered erosive changes throughout the carpus second MTP joint and PIP joints of the second third and fourth digits. There is soft tissue swelling which is high density of the thumb and second MTP joints. Additional soft tissue swelling at the wrist.     Bilateral Foot 6/13/2019: RIGHT: normal alignment and bone mineral density. There are erosive changes throughout the ankle, talar head, navicular and Lisfranc joint as well as the medial base proximal phalanx of the great toe. There is adjacent soft tissue density at the first metatarsal head. No acute fracture. LEFT: normal alignment bone mineral densities. There are periarticular erosions throughout the Lisfranc joint, between the navicular and cuneiforms and at the first MTP joint. There is mild soft tissue thickening and density medial to the first metatarsal head. There is suggestion of soft tissue density between the second and third metatarsal heads. No acute fracture.     CT Imaging    CT Abdomen and Pelvis without contrast 6/01/2019: Heart/vessels: Pericardial effusion and/or thickening measuring approximately 0.8 cm. Lungs/Pleura: Within normal limits. . ABDOMEN: Liver: Small, low-attenuation lesions in the liver which are incompletely characterized and statistically likely benign. Gallbladder/Biliary: Within normal limits. Spleen: Within normal limits. Pancreas: Within normal limits. Adrenals: Within normal limits. Kidneys: Innumerable low-attenuation lesions and high attenuation lesions throughout the kidneys with complete loss of normal-appearing parenchyma suggestive of polycystic kidney disease. Peritoneum/Mesenteries: Trace amount of fluid in the pelvis. Extraperitoneum: Within normal limits. Gastrointestinal tract: There is a short segment of small bowel within a supraumbilical hernia.  The short segment of bowel within the hernia appears to have some mural thickening There are distended loops of small bowel with air-fluid levels associated with the hernia. The more distal small bowel is nearly entirely decompressed as is the large bowel. Normal-appearing appendix. Vascular: Within normal limits. PELVIS: Extraperitoneum: Within normal limits. Ureters: Within normal limits. Bladder: The bladder is nearly entirely decompressed. There is concentric mural thickening in the bladder. Reproductive System: Within normal limits. MSK:  There is a supraumbilical hernia containing a short segment of small bowel with adjacent stranding and inflammation and stranding/inflammation within the fat in the hernia. Tiny, fat-containing umbilical hernia. Degenerative changes in the lumbar spine. There is degenerative disc disease at L3/L4 and L4/L5. Extensive degenerative changes in both hips. Dystrophic appearing calcifications at the origin of both hamstrings at the pubic rami. Degenerative changes in the pubic symphysis. MR Imaging    MRI Right Ankle without contrast 4/10/2012: There is osseous edema like signal at the inferior aspect of the lateral malleolus. There are also subcortical cysts in the anterior process of the calcaneus adjacent to the sinus tarsi. A mild to moderate osseous edema and the medial cuneiform bone is demonstrated subjacent to an area of distal high-grade chondral derangement. There is a moderate to large effusion of the ankle and posterior subtalar joints with demonstration of mild diffuse chondral thinning with small marginal osteophytes. There is a 7 mm loose body in the anterior joint recess. There is also heterogeneous signal in the posterior joint recesses which could contain small loose bodies as well. There is absent  visualization of the anterior talofibular ligament consistent with chronic tear. The other ankle ligaments appear intact. There is a small effusion of the talonavicular joint.  Small dorsal marginal osteophytes throughout the midfoot region are noted. There is a moderate effusion in the posterior tibialis tendon sheath with diffuse mild tendon thickening and inframalleolar heterogeneous signal. There is a small effusion of the flexor digitorum longus and flexor hallucis longus tendon sheaths with normal appearing tendons. There is a small effusion of the peroneal tendon sheath with mild diffuse thickening of the peroneus longus tendon though uniform signal. Mild. Achilles peritendinitis is noted. There is a trace amount of fluid signal in the extensor digitorum longus tendon sheath. There is mild flatfoot deformity. No tarsal coalition is shown. There is no bone or soft tissue mass. DXA     None    PROCEDURE     [facility]  OFFICE PROCEDURE PROGRESS NOTE      Symptom relief from Left Knee Gouty Arthritis.  (09/01/21)     Chart reviewed for the following:   Migue BOWERS MD, have reviewed the History, Physical and updated the Allergic reactions for [address] performed immediately prior to start of procedure:   Migue BOWERS MD, have performed the following reviews on Rosemary Adamsing prior to the start of the procedure            * Patient was identified by name and date of birth   * Agreement on procedure being performed was verified  * Risks and Benefits explained to the patient  * Procedure site verified and marked as necessary  * Patient was positioned for comfort  * Consent was signed and verified     Time: 1:27 PM    Date of procedure: 9/1/2021    Procedure performed by: Migue Crum MD    Provider assisted by: self    Patient assisted by: self    How tolerated by patient: tolerated the procedure well with no complications    Post Procedural Pain Scale: 0 - No Hurt    Comments: none    Indications:   Symptom relief from Left knee gouty arthritis. (9/1/2021)     Procedure:   After consent was obtained, and timeout performed, using sterile technique the Left anteromedial knee joint was prepped using Chlorprep. Local anesthetic used: Ethyl Chloride. The joint was entered and 0 ml's of fluid was withdrawn. Kenalog 40 mg was mixed with 1% lidocaine 1 ml and injected into the joint and the needle withdrawn. The procedure was well tolerated. The patient was asked to continue to rest the joint for a few more days before resuming regular activities. It may be more painful for the first 1-2 days. Watch for fever, or increased swelling or persistent pain in the joint. Call or return to clinic as needed if such symptoms occur or there is failure to improve as anticipated. ASSESSMENT AND PLAN    This is a follow-up visit for Mr. Kadeem Carter. 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 Probenecid and allopurinol are contraindicated. Uloric will be ineffective and given the new FDA box warning and his history of heart failure, I do not recommend it. He is maintained on Krystexxa 8 mg every 2 weeks since 8/26/2020 and leflunomide 20 mg daily for immunogenicity prevention. He initially was on Tere Ida from 6/28/2019 to 10/18/2019 but then stopped it due to hypervolemia related to CKD progression and likely Medrol. He resumed treatment on 8/26/2020 with good tolerance. His most recent infusion was 8/18/2021. He is off pre-medication SoluMedrol without issues. His most recent uric acid was <0.2 mg/dL    He has had two flares since his last visit, mostly recently and current is his left knee, which I injected with good tolerance today. He continues to have visible tophi that have improved and are softer. Therefore, he needs to continue treatment. I will reassess in in 6 months but I expect no less than an another 12 months of treatment. 2) Long Term Use of Immunosuppressants.  The patient remains on immunomodulatory medications (leflunomide) and requires frequent toxicity monitoring by blood work. Respective labs were ordered (CBC and CMP) with each scheduled Krystexxa infusion. 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 He follows with Dr. David Mathew. He is on the transplant list but pending since he is not on dialysis. He follows with [facility] transplant team.      The patient voiced understanding of the aforementioned assessment and plan. A total of 42 minutes was spent on this visit, reviewing interval notes, interval testing results, ordering tests, refilling medications, documenting the findings in the note, patient education, counseling, and coordination of care as described above. All questions asked and answered. The aforementioned excludes the injection time.      TODAY'S ORDERS    Orders Placed This Encounter    TRIAMCINOLONE ACETONIDE INJ    MD DRAIN/INJECT LARGE JOINT/BURSA  - 20610    triamcinolone acetonide (Kenalog) 40 mg/mL injection    lidocaine (XYLOCAINE) 10 mg/mL (1 %) injection     Future Appointments   Date Time Provider Barry Cano   9/1/2021  3:00 PM B3 [facility] [address] [place] [address]   9/15/2021  3:00 PM F4 [facility] [address] [place] [address]   9/29/2021  3:00 PM D4 [facility] [address] [place] [address]   10/13/2021  3:00 PM D4 [facility] [address] [place] [address]   10/27/2021  3:00 PM A1 [facility] [address] [place] [address]   3/2/2022 10:00 AM Isha Rabago MD AOCR BS AMB     Alexander Perez MD, [address] [facility]    Adult Rheumatology   Rheumatology Ultrasound Certified  [facility]  A Part of [facility], [facility], [address] [place] [address]   Phone [phone]  Fax [phone]

## 2021-09-15 ENCOUNTER — HOSPITAL ENCOUNTER (OUTPATIENT)
Dept: INFUSION THERAPY | Age: 51
Discharge: HOME | End: 2021-09-15
Payer: COMMERCIAL

## 2021-09-15 VITALS
TEMPERATURE: 97 F | DIASTOLIC BLOOD PRESSURE: 74 MMHG | HEART RATE: 72 BPM | SYSTOLIC BLOOD PRESSURE: 126 MMHG | RESPIRATION RATE: 16 BRPM

## 2021-09-15 DIAGNOSIS — M1A.39X1 CHRONIC TOPHACEOUS GOUT OF MULTIPLE SITES DUE TO RENAL IMPAIRMENT: ICD-10-CM

## 2021-09-15 DIAGNOSIS — M1A.39X1 CHRONIC TOPHACEOUS GOUT OF MULTIPLE SITES DUE TO RENAL IMPAIRMENT: Primary | ICD-10-CM

## 2021-09-15 LAB
ALBUMIN SERPL-MCNC: 3.6 G/DL (ref 3.5–5)
ALBUMIN/GLOB SERPL: 1.2 {RATIO} (ref 1.1–2.2)
ALP SERPL-CCNC: 32 U/L (ref 45–117)
ALT SERPL-CCNC: 22 U/L (ref 12–78)
ANION GAP SERPL CALC-SCNC: 11 MMOL/L (ref 5–15)
AST SERPL-CCNC: 12 U/L (ref 15–37)
BASOPHILS # BLD: 0.1 K/UL (ref 0–0.1)
BASOPHILS NFR BLD: 1 % (ref 0–1)
BILIRUB SERPL-MCNC: 0.4 MG/DL (ref 0.2–1)
BUN SERPL-MCNC: 83 MG/DL (ref 6–20)
BUN/CREAT SERPL: 13 (ref 12–20)
CALCIUM SERPL-MCNC: 8.8 MG/DL (ref 8.5–10.1)
CHLORIDE SERPL-SCNC: 112 MMOL/L (ref 97–108)
CO2 SERPL-SCNC: 18 MMOL/L (ref 21–32)
CREAT SERPL-MCNC: 6.59 MG/DL (ref 0.7–1.3)
DIFFERENTIAL METHOD BLD: ABNORMAL
EOSINOPHIL # BLD: 0.7 K/UL (ref 0–0.4)
EOSINOPHIL NFR BLD: 8 % (ref 0–7)
ERYTHROCYTE [DISTWIDTH] IN BLOOD BY AUTOMATED COUNT: 13.2 % (ref 11.5–14.5)
ERYTHROCYTE [SEDIMENTATION RATE] IN BLOOD: 23 MM/HR (ref 0–15)
GLOBULIN SER CALC-MCNC: 3.1 G/DL (ref 2–4)
GLUCOSE SERPL-MCNC: 105 MG/DL (ref 65–100)
HCT VFR BLD AUTO: 31.8 % (ref 36.6–50.3)
HGB BLD-MCNC: 9.6 G/DL (ref 12.1–17)
IMM GRANULOCYTES # BLD AUTO: 0 K/UL (ref 0–0.04)
IMM GRANULOCYTES NFR BLD AUTO: 0 % (ref 0–0.5)
LYMPHOCYTES # BLD: 2 K/UL (ref 0.8–3.5)
LYMPHOCYTES NFR BLD: 25 % (ref 12–49)
MCH RBC QN AUTO: 30 PG (ref 26–34)
MCHC RBC AUTO-ENTMCNC: 30.2 G/DL (ref 30–36.5)
MCV RBC AUTO: 99.4 FL (ref 80–99)
MONOCYTES # BLD: 0.7 K/UL (ref 0–1)
MONOCYTES NFR BLD: 9 % (ref 5–13)
NEUTS SEG # BLD: 4.6 K/UL (ref 1.8–8)
NEUTS SEG NFR BLD: 57 % (ref 32–75)
NRBC # BLD: 0 K/UL (ref 0–0.01)
NRBC BLD-RTO: 0 PER 100 WBC
PLATELET # BLD AUTO: 251 K/UL (ref 150–400)
PMV BLD AUTO: 10.7 FL (ref 8.9–12.9)
POTASSIUM SERPL-SCNC: 3.8 MMOL/L (ref 3.5–5.1)
PROT SERPL-MCNC: 6.7 G/DL (ref 6.4–8.2)
RBC # BLD AUTO: 3.2 M/UL (ref 4.1–5.7)
SODIUM SERPL-SCNC: 141 MMOL/L (ref 136–145)
URATE SERPL-MCNC: <0.2 MG/DL (ref 3.5–7.2)
URATE SERPL-MCNC: <0.2 MG/DL (ref 3.5–7.2)
WBC # BLD AUTO: 8.1 K/UL (ref 4.1–11.1)

## 2021-09-15 PROCEDURE — 84550 ASSAY OF BLOOD/URIC ACID: CPT

## 2021-09-15 PROCEDURE — 74011250636 HC RX REV CODE- 250/636: Performed by: INTERNAL MEDICINE

## 2021-09-15 PROCEDURE — 96375 TX/PRO/DX INJ NEW DRUG ADDON: CPT

## 2021-09-15 PROCEDURE — 36415 COLL VENOUS BLD VENIPUNCTURE: CPT

## 2021-09-15 PROCEDURE — 80053 COMPREHEN METABOLIC PANEL: CPT

## 2021-09-15 PROCEDURE — 85652 RBC SED RATE AUTOMATED: CPT

## 2021-09-15 PROCEDURE — 96366 THER/PROPH/DIAG IV INF ADDON: CPT

## 2021-09-15 PROCEDURE — 96365 THER/PROPH/DIAG IV INF INIT: CPT

## 2021-09-15 PROCEDURE — 85025 COMPLETE CBC W/AUTO DIFF WBC: CPT

## 2021-09-15 PROCEDURE — 74011000250 HC RX REV CODE- 250: Performed by: INTERNAL MEDICINE

## 2021-09-15 PROCEDURE — 74011250637 HC RX REV CODE- 250/637: Performed by: INTERNAL MEDICINE

## 2021-09-15 RX ORDER — DIPHENHYDRAMINE HYDROCHLORIDE 50 MG/ML
25 INJECTION, SOLUTION INTRAMUSCULAR; INTRAVENOUS AS NEEDED
Status: CANCELLED
Start: 2021-09-29

## 2021-09-15 RX ORDER — SODIUM CHLORIDE 9 MG/ML
25 INJECTION, SOLUTION INTRAVENOUS CONTINUOUS
Status: DISCONTINUED | OUTPATIENT
Start: 2021-09-15 | End: 2021-09-16 | Stop reason: HOSPADM

## 2021-09-15 RX ORDER — HYDROCORTISONE SODIUM SUCCINATE 100 MG/2ML
100 INJECTION, POWDER, FOR SOLUTION INTRAMUSCULAR; INTRAVENOUS AS NEEDED
Status: CANCELLED | OUTPATIENT
Start: 2021-09-29

## 2021-09-15 RX ORDER — HEPARIN 100 UNIT/ML
300-500 SYRINGE INTRAVENOUS AS NEEDED
Status: CANCELLED
Start: 2021-09-29

## 2021-09-15 RX ORDER — ACETAMINOPHEN 325 MG/1
650 TABLET ORAL ONCE
Status: CANCELLED | OUTPATIENT
Start: 2021-09-29 | End: 2021-09-29

## 2021-09-15 RX ORDER — EPINEPHRINE 1 MG/ML
0.3 INJECTION, SOLUTION, CONCENTRATE INTRAVENOUS AS NEEDED
Status: CANCELLED | OUTPATIENT
Start: 2021-09-29

## 2021-09-15 RX ORDER — ONDANSETRON 2 MG/ML
8 INJECTION INTRAMUSCULAR; INTRAVENOUS AS NEEDED
Status: CANCELLED | OUTPATIENT
Start: 2021-09-29

## 2021-09-15 RX ORDER — ACETAMINOPHEN 325 MG/1
650 TABLET ORAL AS NEEDED
Status: CANCELLED
Start: 2021-09-29

## 2021-09-15 RX ORDER — SODIUM CHLORIDE 0.9 % (FLUSH) 0.9 %
10 SYRINGE (ML) INJECTION AS NEEDED
Status: CANCELLED | OUTPATIENT
Start: 2021-09-29

## 2021-09-15 RX ORDER — ALBUTEROL SULFATE 0.83 MG/ML
2.5 SOLUTION RESPIRATORY (INHALATION) AS NEEDED
Status: CANCELLED
Start: 2021-09-29

## 2021-09-15 RX ORDER — DIPHENHYDRAMINE HYDROCHLORIDE 50 MG/ML
50 INJECTION, SOLUTION INTRAMUSCULAR; INTRAVENOUS ONCE
Status: DISCONTINUED | OUTPATIENT
Start: 2021-09-15 | End: 2021-09-15

## 2021-09-15 RX ORDER — SODIUM CHLORIDE 9 MG/ML
10 INJECTION INTRAMUSCULAR; INTRAVENOUS; SUBCUTANEOUS AS NEEDED
Status: CANCELLED | OUTPATIENT
Start: 2021-09-29

## 2021-09-15 RX ORDER — DIPHENHYDRAMINE HYDROCHLORIDE 50 MG/ML
50 INJECTION, SOLUTION INTRAMUSCULAR; INTRAVENOUS ONCE
Status: CANCELLED | OUTPATIENT
Start: 2021-09-29 | End: 2021-09-29

## 2021-09-15 RX ORDER — SODIUM CHLORIDE 9 MG/ML
25 INJECTION, SOLUTION INTRAVENOUS CONTINUOUS
Status: CANCELLED | OUTPATIENT
Start: 2021-09-29

## 2021-09-15 RX ORDER — ACETAMINOPHEN 325 MG/1
650 TABLET ORAL ONCE
Status: COMPLETED | OUTPATIENT
Start: 2021-09-15 | End: 2021-09-15

## 2021-09-15 RX ORDER — DIPHENHYDRAMINE HCL 25 MG
50 CAPSULE ORAL ONCE
Status: COMPLETED | OUTPATIENT
Start: 2021-09-15 | End: 2021-09-15

## 2021-09-15 RX ORDER — DIPHENHYDRAMINE HYDROCHLORIDE 50 MG/ML
50 INJECTION, SOLUTION INTRAMUSCULAR; INTRAVENOUS AS NEEDED
Status: CANCELLED
Start: 2021-09-29

## 2021-09-15 RX ADMIN — ACETAMINOPHEN 650 MG: 325 TABLET ORAL at 16:29

## 2021-09-15 RX ADMIN — DIPHENHYDRAMINE HYDROCHLORIDE 50 MG: 25 CAPSULE ORAL at 16:29

## 2021-09-15 RX ADMIN — FAMOTIDINE 40 MG: 10 INJECTION INTRAVENOUS at 16:30

## 2021-09-15 RX ADMIN — SODIUM CHLORIDE 25 ML/HR: 900 INJECTION, SOLUTION INTRAVENOUS at 16:48

## 2021-09-15 RX ADMIN — PEGLOTICASE 8 MG: 8 INJECTION, SOLUTION INTRAVENOUS at 16:48

## 2021-09-15 NOTE — PROGRESS NOTES
[facility] Progress Note    Date: September 15, 2021    Name: Miryam Caputo    MRN: [id number]         : 1970        1500: Pt arrived ambulatory to [facility] for [facility] in stable condition. Assessment completed. No other complaints voiced at this time. Peripheral IV with positive blood return. Labs drawn per order and sent for processing. Normal Saline started at [facility]. Patient denies SOB, fever, cough, general not feeling well. Patient denies recent exposure to someone who has tested positive for COVID-19. Patient denies having contact with anyone who has a pending COVID test.      Patient Vitals for the past 12 hrs:   Temp Pulse Resp BP   09/15/21 1500 97 °F (36.1 °C) 72 16 126/74         Medications Administered     0.9% sodium chloride infusion     Admin Date  09/15/2021 Action  New Bag Dose  25 mL/hr Rate  25 mL/hr Route  IntraVENous Administered By  Carroll Steinberg, LORRI          acetaminophen (TYLENOL) tablet 650 mg     Admin Date  09/15/2021 Action  Given Dose  650 mg Route  Oral Administered By  Carroll Steinberg, LORRI          diphenhydrAMINE (BENADRYL) capsule 50 mg     Admin Date  09/15/2021 Action  Given Dose  50 mg Route  Oral Administered By  Carroll Steinberg RN          famotidine (PF) (PEPCID) 40 mg in 0.9% sodium chloride 10 mL injection     Admin Date  09/15/2021 Action  Given Dose  40 mg Route  IntraVENous Administered By  Carroll Steinberg, LORRI          pegloticase [facility]) 8 mg in 0.9% sodium chloride 250 mL, overfill volume 25 mL infusion     Admin Date  09/15/2021 Action  New Bag Dose  8 mg Rate  138 mL/hr Route  IntraVENous Administered By  Carroll Steinberg, LORRI                  1900: Tolerated treatment well, no adverse reactions noted. D/Cd from [facility] ambulatory and in no distress.       Future Appointments   Date Time Provider Barry Cano   2021  3:00 PM C1 [facility] [address] [place] [address]   10/13/2021  3:00 PM D4 [facility] [address] [place] [address]   10/27/2021  3:00 PM A1 [facility] [place] [address]    3/2/2022 10:00 AM Boyd Rabago MD AOCR BS AMB           Anne Preston RN  September 15, 2021

## 2021-09-29 ENCOUNTER — HOSPITAL ENCOUNTER (OUTPATIENT)
Dept: INFUSION THERAPY | Age: 51
Discharge: HOME | End: 2021-09-29
Payer: COMMERCIAL

## 2021-09-29 VITALS
HEART RATE: 61 BPM | TEMPERATURE: 97.9 F | SYSTOLIC BLOOD PRESSURE: 140 MMHG | RESPIRATION RATE: 16 BRPM | DIASTOLIC BLOOD PRESSURE: 93 MMHG

## 2021-09-29 DIAGNOSIS — M1A.39X1 CHRONIC TOPHACEOUS GOUT OF MULTIPLE SITES DUE TO RENAL IMPAIRMENT: Primary | ICD-10-CM

## 2021-09-29 DIAGNOSIS — M1A.39X1 CHRONIC TOPHACEOUS GOUT OF MULTIPLE SITES DUE TO RENAL IMPAIRMENT: ICD-10-CM

## 2021-09-29 LAB
ALBUMIN SERPL-MCNC: 3.2 G/DL (ref 3.5–5)
ALBUMIN/GLOB SERPL: 1 {RATIO} (ref 1.1–2.2)
ALP SERPL-CCNC: 30 U/L (ref 45–117)
ALT SERPL-CCNC: 21 U/L (ref 12–78)
ANION GAP SERPL CALC-SCNC: 9 MMOL/L (ref 5–15)
AST SERPL-CCNC: 13 U/L (ref 15–37)
BASOPHILS # BLD: 0 K/UL (ref 0–0.1)
BASOPHILS NFR BLD: 1 % (ref 0–1)
BILIRUB SERPL-MCNC: 0.4 MG/DL (ref 0.2–1)
BUN SERPL-MCNC: 75 MG/DL (ref 6–20)
BUN/CREAT SERPL: 11 (ref 12–20)
CALCIUM SERPL-MCNC: 8.2 MG/DL (ref 8.5–10.1)
CHLORIDE SERPL-SCNC: 114 MMOL/L (ref 97–108)
CO2 SERPL-SCNC: 19 MMOL/L (ref 21–32)
CREAT SERPL-MCNC: 6.92 MG/DL (ref 0.7–1.3)
DIFFERENTIAL METHOD BLD: ABNORMAL
EOSINOPHIL # BLD: 0.6 K/UL (ref 0–0.4)
EOSINOPHIL NFR BLD: 10 % (ref 0–7)
ERYTHROCYTE [DISTWIDTH] IN BLOOD BY AUTOMATED COUNT: 13.5 % (ref 11.5–14.5)
GLOBULIN SER CALC-MCNC: 3.2 G/DL (ref 2–4)
GLUCOSE SERPL-MCNC: 100 MG/DL (ref 65–100)
HCT VFR BLD AUTO: 31 % (ref 36.6–50.3)
HGB BLD-MCNC: 9.2 G/DL (ref 12.1–17)
IMM GRANULOCYTES # BLD AUTO: 0 K/UL (ref 0–0.04)
IMM GRANULOCYTES NFR BLD AUTO: 0 % (ref 0–0.5)
LYMPHOCYTES # BLD: 1.7 K/UL (ref 0.8–3.5)
LYMPHOCYTES NFR BLD: 28 % (ref 12–49)
MCH RBC QN AUTO: 30.2 PG (ref 26–34)
MCHC RBC AUTO-ENTMCNC: 29.7 G/DL (ref 30–36.5)
MCV RBC AUTO: 101.6 FL (ref 80–99)
MONOCYTES # BLD: 0.7 K/UL (ref 0–1)
MONOCYTES NFR BLD: 12 % (ref 5–13)
NEUTS SEG # BLD: 3.1 K/UL (ref 1.8–8)
NEUTS SEG NFR BLD: 49 % (ref 32–75)
NRBC # BLD: 0 K/UL (ref 0–0.01)
NRBC BLD-RTO: 0 PER 100 WBC
PLATELET # BLD AUTO: 209 K/UL (ref 150–400)
PMV BLD AUTO: 10.6 FL (ref 8.9–12.9)
POTASSIUM SERPL-SCNC: 4.5 MMOL/L (ref 3.5–5.1)
PROT SERPL-MCNC: 6.4 G/DL (ref 6.4–8.2)
RBC # BLD AUTO: 3.05 M/UL (ref 4.1–5.7)
SODIUM SERPL-SCNC: 142 MMOL/L (ref 136–145)
URATE SERPL-MCNC: <0.2 MG/DL (ref 3.5–7.2)
URATE SERPL-MCNC: <0.2 MG/DL (ref 3.5–7.2)
WBC # BLD AUTO: 6.3 K/UL (ref 4.1–11.1)

## 2021-09-29 PROCEDURE — 74011000250 HC RX REV CODE- 250: Performed by: INTERNAL MEDICINE

## 2021-09-29 PROCEDURE — 96366 THER/PROPH/DIAG IV INF ADDON: CPT

## 2021-09-29 PROCEDURE — 74011250636 HC RX REV CODE- 250/636: Performed by: INTERNAL MEDICINE

## 2021-09-29 PROCEDURE — 85025 COMPLETE CBC W/AUTO DIFF WBC: CPT

## 2021-09-29 PROCEDURE — 36415 COLL VENOUS BLD VENIPUNCTURE: CPT

## 2021-09-29 PROCEDURE — 74011250637 HC RX REV CODE- 250/637: Performed by: INTERNAL MEDICINE

## 2021-09-29 PROCEDURE — 84550 ASSAY OF BLOOD/URIC ACID: CPT

## 2021-09-29 PROCEDURE — 80053 COMPREHEN METABOLIC PANEL: CPT

## 2021-09-29 PROCEDURE — 96365 THER/PROPH/DIAG IV INF INIT: CPT

## 2021-09-29 PROCEDURE — 96375 TX/PRO/DX INJ NEW DRUG ADDON: CPT

## 2021-09-29 RX ORDER — SODIUM CHLORIDE 9 MG/ML
25 INJECTION, SOLUTION INTRAVENOUS CONTINUOUS
Status: DISCONTINUED | OUTPATIENT
Start: 2021-09-29 | End: 2021-09-30 | Stop reason: HOSPADM

## 2021-09-29 RX ORDER — SODIUM CHLORIDE 0.9 % (FLUSH) 0.9 %
10 SYRINGE (ML) INJECTION AS NEEDED
Status: DISCONTINUED | OUTPATIENT
Start: 2021-09-29 | End: 2021-09-30 | Stop reason: HOSPADM

## 2021-09-29 RX ORDER — HEPARIN 100 UNIT/ML
300-500 SYRINGE INTRAVENOUS AS NEEDED
Status: CANCELLED
Start: 2021-10-13

## 2021-09-29 RX ORDER — ACETAMINOPHEN 325 MG/1
650 TABLET ORAL AS NEEDED
Status: CANCELLED
Start: 2021-10-13

## 2021-09-29 RX ORDER — ALBUTEROL SULFATE 0.83 MG/ML
2.5 SOLUTION RESPIRATORY (INHALATION) AS NEEDED
Status: CANCELLED
Start: 2021-10-13

## 2021-09-29 RX ORDER — ACETAMINOPHEN 325 MG/1
650 TABLET ORAL ONCE
Status: CANCELLED | OUTPATIENT
Start: 2021-10-13 | End: 2021-10-13

## 2021-09-29 RX ORDER — DIPHENHYDRAMINE HYDROCHLORIDE 50 MG/ML
25 INJECTION, SOLUTION INTRAMUSCULAR; INTRAVENOUS AS NEEDED
Status: CANCELLED
Start: 2021-10-13

## 2021-09-29 RX ORDER — HYDROCORTISONE SODIUM SUCCINATE 100 MG/2ML
100 INJECTION, POWDER, FOR SOLUTION INTRAMUSCULAR; INTRAVENOUS AS NEEDED
Status: CANCELLED | OUTPATIENT
Start: 2021-10-13

## 2021-09-29 RX ORDER — DIPHENHYDRAMINE HCL 25 MG
50 CAPSULE ORAL ONCE
Status: CANCELLED
Start: 2021-09-29 | End: 2021-09-29

## 2021-09-29 RX ORDER — DIPHENHYDRAMINE HYDROCHLORIDE 50 MG/ML
50 INJECTION, SOLUTION INTRAMUSCULAR; INTRAVENOUS ONCE
Status: CANCELLED | OUTPATIENT
Start: 2021-10-13 | End: 2021-10-13

## 2021-09-29 RX ORDER — DIPHENHYDRAMINE HCL 25 MG
50 CAPSULE ORAL ONCE
Status: CANCELLED
Start: 2021-10-13 | End: 2021-10-13

## 2021-09-29 RX ORDER — EPINEPHRINE 1 MG/ML
0.3 INJECTION, SOLUTION, CONCENTRATE INTRAVENOUS AS NEEDED
Status: CANCELLED | OUTPATIENT
Start: 2021-10-13

## 2021-09-29 RX ORDER — SODIUM CHLORIDE 0.9 % (FLUSH) 0.9 %
10 SYRINGE (ML) INJECTION AS NEEDED
Status: CANCELLED | OUTPATIENT
Start: 2021-10-13

## 2021-09-29 RX ORDER — ACETAMINOPHEN 325 MG/1
650 TABLET ORAL ONCE
Status: COMPLETED | OUTPATIENT
Start: 2021-09-29 | End: 2021-09-29

## 2021-09-29 RX ORDER — DIPHENHYDRAMINE HCL 25 MG
50 CAPSULE ORAL ONCE
Status: COMPLETED | OUTPATIENT
Start: 2021-09-29 | End: 2021-09-29

## 2021-09-29 RX ORDER — ONDANSETRON 2 MG/ML
8 INJECTION INTRAMUSCULAR; INTRAVENOUS AS NEEDED
Status: CANCELLED | OUTPATIENT
Start: 2021-10-13

## 2021-09-29 RX ORDER — DIPHENHYDRAMINE HYDROCHLORIDE 50 MG/ML
50 INJECTION, SOLUTION INTRAMUSCULAR; INTRAVENOUS AS NEEDED
Status: CANCELLED
Start: 2021-10-13

## 2021-09-29 RX ORDER — SODIUM CHLORIDE 9 MG/ML
25 INJECTION, SOLUTION INTRAVENOUS CONTINUOUS
Status: CANCELLED | OUTPATIENT
Start: 2021-10-13

## 2021-09-29 RX ORDER — SODIUM CHLORIDE 9 MG/ML
10 INJECTION INTRAMUSCULAR; INTRAVENOUS; SUBCUTANEOUS AS NEEDED
Status: CANCELLED | OUTPATIENT
Start: 2021-10-13

## 2021-09-29 RX ORDER — DIPHENHYDRAMINE HYDROCHLORIDE 50 MG/ML
50 INJECTION, SOLUTION INTRAMUSCULAR; INTRAVENOUS ONCE
Status: DISCONTINUED | OUTPATIENT
Start: 2021-09-29 | End: 2021-09-29

## 2021-09-29 RX ADMIN — ACETAMINOPHEN 650 MG: 325 TABLET ORAL at 15:56

## 2021-09-29 RX ADMIN — Medication 10 ML: at 15:33

## 2021-09-29 RX ADMIN — PEGLOTICASE 8 MG: 8 INJECTION, SOLUTION INTRAVENOUS at 16:31

## 2021-09-29 RX ADMIN — DIPHENHYDRAMINE HYDROCHLORIDE 50 MG: 25 CAPSULE ORAL at 15:56

## 2021-09-29 RX ADMIN — Medication 10 ML: at 15:31

## 2021-09-29 RX ADMIN — SODIUM CHLORIDE 25 ML/HR: 900 INJECTION, SOLUTION INTRAVENOUS at 16:20

## 2021-09-29 RX ADMIN — FAMOTIDINE 40 MG: 10 INJECTION, SOLUTION INTRAVENOUS at 15:57

## 2021-09-29 NOTE — PROGRESS NOTES
[facility] Short Note                       Date: 2021    Name: Brenda Otero    MRN: [id number]         : 1970      1510 Pt admit to [facility] for [address] ambulatory in stable condition. Assessment completed. No new concerns voiced. Peripheral IV established with positive blood return. Labs drawn and sent for processing line flushed and connected to NS at [facility]. Mr. Joselito Galvez vitals were reviewed prior to and after treatment. Patient Vitals for the past 12 hrs:   Temp Pulse Resp BP   21 1900 -- 61 16 (!) 140/93   21 1512 97.9 °F (36.6 °C) 73 16 123/74     Lab results were obtained and reviewed. Recent Results (from the past 12 hour(s))   CBC WITH AUTOMATED DIFF    Collection Time: 21  3:27 PM   Result Value Ref Range    WBC 6.3 4.1 - 11.1 K/uL    RBC 3.05 (L) 4.10 - 5.70 M/uL    HGB 9.2 (L) 12.1 - 17.0 g/dL    HCT 31.0 (L) 36.6 - 50.3 %    .6 (H) 80.0 - 99.0 FL    MCH 30.2 26.0 - 34.0 PG    MCHC 29.7 (L) 30.0 - 36.5 g/dL    RDW 13.5 11.5 - 14.5 %    PLATELET 499 797 - 020 K/uL    MPV 10.6 8.9 - 12.9 FL    NRBC 0.0 0  WBC    ABSOLUTE NRBC 0.00 0.00 - 0.01 K/uL    NEUTROPHILS 49 32 - 75 %    LYMPHOCYTES 28 12 - 49 %    MONOCYTES 12 5 - 13 %    EOSINOPHILS 10 (H) 0 - 7 %    BASOPHILS 1 0 - 1 %    IMMATURE GRANULOCYTES 0 0.0 - 0.5 %    ABS. NEUTROPHILS 3.1 1.8 - 8.0 K/UL    ABS. LYMPHOCYTES 1.7 0.8 - 3.5 K/UL    ABS. MONOCYTES 0.7 0.0 - 1.0 K/UL    ABS. EOSINOPHILS 0.6 (H) 0.0 - 0.4 K/UL    ABS. BASOPHILS 0.0 0.0 - 0.1 K/UL    ABS. IMM.  GRANS. 0.0 0.00 - 0.04 K/UL    DF AUTOMATED     METABOLIC PANEL, COMPREHENSIVE    Collection Time: 21  3:27 PM   Result Value Ref Range    Sodium 142 136 - 145 mmol/L    Potassium 4.5 3.5 - 5.1 mmol/L    Chloride 114 (H) 97 - 108 mmol/L    CO2 19 (L) 21 - 32 mmol/L    Anion gap 9 5 - 15 mmol/L    Glucose 100 65 - 100 mg/dL    BUN 75 (H) 6 - 20 MG/DL    Creatinine 6.92 (H) 0.70 - 1.30 MG/DL    BUN/Creatinine ratio 11 (L) 12 - 20 GFR est AA 10 (L) >60 ml/min/1.73m2    GFR est non-AA 9 (L) >60 ml/min/1.73m2    Calcium 8.2 (L) 8.5 - 10.1 MG/DL    Bilirubin, total 0.4 0.2 - 1.0 MG/DL    ALT (SGPT) 21 12 - 78 U/L    AST (SGOT) 13 (L) 15 - 37 U/L    Alk. phosphatase 30 (L) 45 - 117 U/L    Protein, total 6.4 6.4 - 8.2 g/dL    Albumin 3.2 (L) 3.5 - 5.0 g/dL    Globulin 3.2 2.0 - 4.0 g/dL    A-G Ratio 1.0 (L) 1.1 - 2.2     URIC ACID    Collection Time: 09/29/21  3:27 PM   Result Value Ref Range    Uric acid <0.2 (L) 3.5 - 7.2 MG/DL     Medications given:   Medications Administered     0.9% sodium chloride infusion     Admin Date  09/29/2021 Action  New Bag Dose  25 mL/hr Rate  25 mL/hr Route  IntraVENous Administered By  Yola Sierra RN          acetaminophen (TYLENOL) tablet 650 mg     Admin Date  09/29/2021 Action  Given Dose  650 mg Route  Oral Administered By  Yola Sierra RN          diphenhydrAMINE (BENADRYL) capsule 50 mg     Admin Date  09/29/2021 Action  Given Dose  50 mg Route  Oral Administered By  Yola Sierra RN          famotidine (PF) (PEPCID) 40 mg in 0.9% sodium chloride 10 mL injection     Admin Date  09/29/2021 Action  Given Dose  40 mg Route  IntraVENous Administered By  Yola Sierra RN          pegloticase (KRYSTEXXA) 8 mg in 0.9% sodium chloride 250 mL, overfill volume 25 mL infusion     Admin Date  09/29/2021 Action  New Bag Dose  8 mg Rate  138 mL/hr Route  IntraVENous Administered By  Yola Sierra RN          sodium chloride (NS) flush 10 mL     Admin Date  09/29/2021 Action  Given Dose  10 mL Route  IntraVENous Administered By  Yola Sierra RN           Admin Date  09/29/2021 Action  Given Dose  10 mL Route  IntraVENous Administered By  Yola Sierra RN              PIV maintained positive blood return. Post uric acid drawn and sent for processing. Line flushed and removed per protocol.      Mr. Ilda Hall tolerated the infusion, had no complaints, and [facility].      Future Appointments   Date Time Provider Barry Ellisi   10/13/2021  3:00 PM D4 [address]   10/27/2021  3:00 PM A1 [facility] MED [address] [place] [address]   3/2/2022 10:00 AM Mary Rabago MD AOCR BS AMB     Mike Peterson RN  September 29, 2021  6:04 PM

## 2021-10-11 ENCOUNTER — TRANSCRIBE ORDER (OUTPATIENT)
Dept: REGISTRATION | Age: 51
End: 2021-10-11

## 2021-10-11 DIAGNOSIS — Z01.812 PRE-PROCEDURE LAB EXAM: Primary | ICD-10-CM

## 2021-10-13 ENCOUNTER — HOSPITAL ENCOUNTER (OUTPATIENT)
Dept: PREADMISSION TESTING | Age: 51
Discharge: HOME | End: 2021-10-13
Payer: COMMERCIAL

## 2021-10-13 ENCOUNTER — HOSPITAL ENCOUNTER (OUTPATIENT)
Dept: GENERAL RADIOLOGY | Age: 51
Discharge: HOME | End: 2021-10-13
Payer: COMMERCIAL

## 2021-10-13 ENCOUNTER — HOSPITAL ENCOUNTER (OUTPATIENT)
Dept: INFUSION THERAPY | Age: 51
Discharge: HOME | End: 2021-10-13
Payer: COMMERCIAL

## 2021-10-13 VITALS
WEIGHT: 203.71 LBS | HEIGHT: 71 IN | BODY MASS INDEX: 28.52 KG/M2 | TEMPERATURE: 98.1 F | RESPIRATION RATE: 16 BRPM | HEART RATE: 77 BPM | SYSTOLIC BLOOD PRESSURE: 120 MMHG | DIASTOLIC BLOOD PRESSURE: 83 MMHG

## 2021-10-13 VITALS
SYSTOLIC BLOOD PRESSURE: 144 MMHG | DIASTOLIC BLOOD PRESSURE: 97 MMHG | RESPIRATION RATE: 16 BRPM | TEMPERATURE: 96.9 F | HEART RATE: 67 BPM

## 2021-10-13 DIAGNOSIS — M1A.39X1 CHRONIC TOPHACEOUS GOUT OF MULTIPLE SITES DUE TO RENAL IMPAIRMENT: Primary | ICD-10-CM

## 2021-10-13 DIAGNOSIS — Z01.812 PRE-PROCEDURE LAB EXAM: ICD-10-CM

## 2021-10-13 LAB
ALBUMIN SERPL-MCNC: 3.4 G/DL (ref 3.5–5)
ALBUMIN SERPL-MCNC: 3.5 G/DL (ref 3.5–5)
ALBUMIN/GLOB SERPL: 1 {RATIO} (ref 1.1–2.2)
ALBUMIN/GLOB SERPL: 1 {RATIO} (ref 1.1–2.2)
ALP SERPL-CCNC: 26 U/L (ref 45–117)
ALP SERPL-CCNC: 30 U/L (ref 45–117)
ALT SERPL-CCNC: 24 U/L (ref 12–78)
ALT SERPL-CCNC: 25 U/L (ref 12–78)
ANION GAP SERPL CALC-SCNC: 12 MMOL/L (ref 5–15)
ANION GAP SERPL CALC-SCNC: 12 MMOL/L (ref 5–15)
AST SERPL-CCNC: 12 U/L (ref 15–37)
AST SERPL-CCNC: 13 U/L (ref 15–37)
ATRIAL RATE: 70 BPM
BASOPHILS # BLD: 0 K/UL (ref 0–0.1)
BASOPHILS # BLD: 0 K/UL (ref 0–0.1)
BASOPHILS NFR BLD: 1 % (ref 0–1)
BASOPHILS NFR BLD: 1 % (ref 0–1)
BILIRUB SERPL-MCNC: 0.3 MG/DL (ref 0.2–1)
BILIRUB SERPL-MCNC: 0.4 MG/DL (ref 0.2–1)
BUN SERPL-MCNC: 91 MG/DL (ref 6–20)
BUN SERPL-MCNC: 94 MG/DL (ref 6–20)
BUN/CREAT SERPL: 13 (ref 12–20)
BUN/CREAT SERPL: 13 (ref 12–20)
CALCIUM SERPL-MCNC: 8.5 MG/DL (ref 8.5–10.1)
CALCIUM SERPL-MCNC: 8.9 MG/DL (ref 8.5–10.1)
CALCULATED P AXIS, ECG09: 59 DEGREES
CALCULATED R AXIS, ECG10: -53 DEGREES
CALCULATED T AXIS, ECG11: 23 DEGREES
CHLORIDE SERPL-SCNC: 110 MMOL/L (ref 97–108)
CHLORIDE SERPL-SCNC: 110 MMOL/L (ref 97–108)
CO2 SERPL-SCNC: 15 MMOL/L (ref 21–32)
CO2 SERPL-SCNC: 18 MMOL/L (ref 21–32)
CREAT SERPL-MCNC: 7.01 MG/DL (ref 0.7–1.3)
CREAT SERPL-MCNC: 7.16 MG/DL (ref 0.7–1.3)
DIAGNOSIS, 93000: NORMAL
DIFFERENTIAL METHOD BLD: ABNORMAL
DIFFERENTIAL METHOD BLD: ABNORMAL
EOSINOPHIL # BLD: 0.5 K/UL (ref 0–0.4)
EOSINOPHIL # BLD: 0.6 K/UL (ref 0–0.4)
EOSINOPHIL NFR BLD: 8 % (ref 0–7)
EOSINOPHIL NFR BLD: 9 % (ref 0–7)
ERYTHROCYTE [DISTWIDTH] IN BLOOD BY AUTOMATED COUNT: 13.2 % (ref 11.5–14.5)
ERYTHROCYTE [DISTWIDTH] IN BLOOD BY AUTOMATED COUNT: 13.3 % (ref 11.5–14.5)
GLOBULIN SER CALC-MCNC: 3.3 G/DL (ref 2–4)
GLOBULIN SER CALC-MCNC: 3.6 G/DL (ref 2–4)
GLUCOSE SERPL-MCNC: 101 MG/DL (ref 65–100)
GLUCOSE SERPL-MCNC: 87 MG/DL (ref 65–100)
HCT VFR BLD AUTO: 30.8 % (ref 36.6–50.3)
HCT VFR BLD AUTO: 31.5 % (ref 36.6–50.3)
HGB BLD-MCNC: 9.6 G/DL (ref 12.1–17)
HGB BLD-MCNC: 9.6 G/DL (ref 12.1–17)
IMM GRANULOCYTES # BLD AUTO: 0 K/UL (ref 0–0.04)
IMM GRANULOCYTES # BLD AUTO: 0 K/UL (ref 0–0.04)
IMM GRANULOCYTES NFR BLD AUTO: 0 % (ref 0–0.5)
IMM GRANULOCYTES NFR BLD AUTO: 0 % (ref 0–0.5)
LYMPHOCYTES # BLD: 1.7 K/UL (ref 0.8–3.5)
LYMPHOCYTES # BLD: 2 K/UL (ref 0.8–3.5)
LYMPHOCYTES NFR BLD: 27 % (ref 12–49)
LYMPHOCYTES NFR BLD: 31 % (ref 12–49)
MCH RBC QN AUTO: 30.1 PG (ref 26–34)
MCH RBC QN AUTO: 30.3 PG (ref 26–34)
MCHC RBC AUTO-ENTMCNC: 30.5 G/DL (ref 30–36.5)
MCHC RBC AUTO-ENTMCNC: 31.2 G/DL (ref 30–36.5)
MCV RBC AUTO: 97.2 FL (ref 80–99)
MCV RBC AUTO: 98.7 FL (ref 80–99)
MONOCYTES # BLD: 0.7 K/UL (ref 0–1)
MONOCYTES # BLD: 0.7 K/UL (ref 0–1)
MONOCYTES NFR BLD: 11 % (ref 5–13)
MONOCYTES NFR BLD: 11 % (ref 5–13)
NEUTS SEG # BLD: 3.1 K/UL (ref 1.8–8)
NEUTS SEG # BLD: 3.5 K/UL (ref 1.8–8)
NEUTS SEG NFR BLD: 48 % (ref 32–75)
NEUTS SEG NFR BLD: 53 % (ref 32–75)
NRBC # BLD: 0 K/UL (ref 0–0.01)
NRBC # BLD: 0 K/UL (ref 0–0.01)
NRBC BLD-RTO: 0 PER 100 WBC
NRBC BLD-RTO: 0 PER 100 WBC
P-R INTERVAL, ECG05: 218 MS
PLATELET # BLD AUTO: 249 K/UL (ref 150–400)
PLATELET # BLD AUTO: 255 K/UL (ref 150–400)
PMV BLD AUTO: 10.1 FL (ref 8.9–12.9)
PMV BLD AUTO: 10.7 FL (ref 8.9–12.9)
POTASSIUM SERPL-SCNC: 4.2 MMOL/L (ref 3.5–5.1)
POTASSIUM SERPL-SCNC: 4.3 MMOL/L (ref 3.5–5.1)
PROT SERPL-MCNC: 6.7 G/DL (ref 6.4–8.2)
PROT SERPL-MCNC: 7.1 G/DL (ref 6.4–8.2)
Q-T INTERVAL, ECG07: 400 MS
QRS DURATION, ECG06: 106 MS
QTC CALCULATION (BEZET), ECG08: 432 MS
RBC # BLD AUTO: 3.17 M/UL (ref 4.1–5.7)
RBC # BLD AUTO: 3.19 M/UL (ref 4.1–5.7)
SODIUM SERPL-SCNC: 137 MMOL/L (ref 136–145)
SODIUM SERPL-SCNC: 140 MMOL/L (ref 136–145)
URATE SERPL-MCNC: <0.2 MG/DL (ref 3.5–7.2)
URATE SERPL-MCNC: <0.2 MG/DL (ref 3.5–7.2)
VENTRICULAR RATE, ECG03: 70 BPM
WBC # BLD AUTO: 6.5 K/UL (ref 4.1–11.1)
WBC # BLD AUTO: 6.5 K/UL (ref 4.1–11.1)

## 2021-10-13 PROCEDURE — U0005 INFEC AGEN DETEC AMPLI PROBE: HCPCS

## 2021-10-13 PROCEDURE — 71046 X-RAY EXAM CHEST 2 VIEWS: CPT

## 2021-10-13 PROCEDURE — 80053 COMPREHEN METABOLIC PANEL: CPT

## 2021-10-13 PROCEDURE — 96375 TX/PRO/DX INJ NEW DRUG ADDON: CPT

## 2021-10-13 PROCEDURE — 36415 COLL VENOUS BLD VENIPUNCTURE: CPT

## 2021-10-13 PROCEDURE — 74011250636 HC RX REV CODE- 250/636: Performed by: INTERNAL MEDICINE

## 2021-10-13 PROCEDURE — 85025 COMPLETE CBC W/AUTO DIFF WBC: CPT

## 2021-10-13 PROCEDURE — 96365 THER/PROPH/DIAG IV INF INIT: CPT

## 2021-10-13 PROCEDURE — 84550 ASSAY OF BLOOD/URIC ACID: CPT

## 2021-10-13 PROCEDURE — 93005 ELECTROCARDIOGRAM TRACING: CPT

## 2021-10-13 PROCEDURE — 74011000250 HC RX REV CODE- 250: Performed by: INTERNAL MEDICINE

## 2021-10-13 PROCEDURE — 96366 THER/PROPH/DIAG IV INF ADDON: CPT

## 2021-10-13 PROCEDURE — 74011250637 HC RX REV CODE- 250/637: Performed by: INTERNAL MEDICINE

## 2021-10-13 RX ORDER — ACETAMINOPHEN 325 MG/1
650 TABLET ORAL ONCE
Status: CANCELLED | OUTPATIENT
Start: 2021-10-27 | End: 2021-10-27

## 2021-10-13 RX ORDER — SODIUM CHLORIDE 9 MG/ML
25 INJECTION, SOLUTION INTRAVENOUS CONTINUOUS
Status: CANCELLED | OUTPATIENT
Start: 2021-10-27

## 2021-10-13 RX ORDER — DIPHENHYDRAMINE HCL 25 MG
50 CAPSULE ORAL ONCE
Status: CANCELLED
Start: 2021-10-27 | End: 2021-10-27

## 2021-10-13 RX ORDER — HYDROCORTISONE SODIUM SUCCINATE 100 MG/2ML
100 INJECTION, POWDER, FOR SOLUTION INTRAMUSCULAR; INTRAVENOUS AS NEEDED
Status: CANCELLED | OUTPATIENT
Start: 2021-10-27

## 2021-10-13 RX ORDER — DIPHENHYDRAMINE HYDROCHLORIDE 50 MG/ML
25 INJECTION, SOLUTION INTRAMUSCULAR; INTRAVENOUS AS NEEDED
Status: CANCELLED
Start: 2021-10-27

## 2021-10-13 RX ORDER — ONDANSETRON 2 MG/ML
8 INJECTION INTRAMUSCULAR; INTRAVENOUS AS NEEDED
Status: CANCELLED | OUTPATIENT
Start: 2021-10-27

## 2021-10-13 RX ORDER — SODIUM CHLORIDE 9 MG/ML
10 INJECTION INTRAMUSCULAR; INTRAVENOUS; SUBCUTANEOUS AS NEEDED
Status: CANCELLED | OUTPATIENT
Start: 2021-10-27

## 2021-10-13 RX ORDER — CALCITRIOL 0.5 UG/1
1 CAPSULE ORAL EVERY MORNING
COMMUNITY
End: 2022-08-19

## 2021-10-13 RX ORDER — DIPHENHYDRAMINE HCL 25 MG
50 CAPSULE ORAL ONCE
Status: COMPLETED | OUTPATIENT
Start: 2021-10-13 | End: 2021-10-13

## 2021-10-13 RX ORDER — ACETAMINOPHEN 325 MG/1
650 TABLET ORAL ONCE
Status: COMPLETED | OUTPATIENT
Start: 2021-10-13 | End: 2021-10-13

## 2021-10-13 RX ORDER — ACETAMINOPHEN 325 MG/1
650 TABLET ORAL AS NEEDED
Status: CANCELLED
Start: 2021-10-27

## 2021-10-13 RX ORDER — EPINEPHRINE 1 MG/ML
0.3 INJECTION, SOLUTION, CONCENTRATE INTRAVENOUS AS NEEDED
Status: CANCELLED | OUTPATIENT
Start: 2021-10-27

## 2021-10-13 RX ORDER — ALBUTEROL SULFATE 0.83 MG/ML
2.5 SOLUTION RESPIRATORY (INHALATION) AS NEEDED
Status: CANCELLED
Start: 2021-10-27

## 2021-10-13 RX ORDER — HEPARIN 100 UNIT/ML
300-500 SYRINGE INTRAVENOUS AS NEEDED
Status: CANCELLED
Start: 2021-10-27

## 2021-10-13 RX ORDER — SODIUM CHLORIDE 0.9 % (FLUSH) 0.9 %
10 SYRINGE (ML) INJECTION AS NEEDED
Status: CANCELLED | OUTPATIENT
Start: 2021-10-27

## 2021-10-13 RX ORDER — SODIUM CHLORIDE 9 MG/ML
25 INJECTION, SOLUTION INTRAVENOUS CONTINUOUS
Status: DISCONTINUED | OUTPATIENT
Start: 2021-10-13 | End: 2021-10-14 | Stop reason: HOSPADM

## 2021-10-13 RX ORDER — DIPHENHYDRAMINE HYDROCHLORIDE 50 MG/ML
50 INJECTION, SOLUTION INTRAMUSCULAR; INTRAVENOUS AS NEEDED
Status: CANCELLED
Start: 2021-10-27

## 2021-10-13 RX ADMIN — SODIUM CHLORIDE 25 ML/HR: 900 INJECTION, SOLUTION INTRAVENOUS at 16:00

## 2021-10-13 RX ADMIN — FAMOTIDINE 40 MG: 10 INJECTION INTRAVENOUS at 16:09

## 2021-10-13 RX ADMIN — DIPHENHYDRAMINE HYDROCHLORIDE 50 MG: 25 CAPSULE ORAL at 16:09

## 2021-10-13 RX ADMIN — ACETAMINOPHEN 650 MG: 325 TABLET ORAL at 16:09

## 2021-10-13 RX ADMIN — PEGLOTICASE 8 MG: 8 INJECTION, SOLUTION INTRAVENOUS at 16:40

## 2021-10-13 NOTE — PROGRESS NOTES
[facility] Short Note                   1500 Pt admit to [facility] for [address] ambulatory in stable condition. Assessment completed. No new concerns voiced. PIV established in right wrist with good blood return. Labs collected and sent for processing. PIV connected to NS. See CC for lab results. Mr. Jean Khan vitals were reviewed prior to and after treatment. Patient Vitals for the past 12 hrs:   Temp Pulse Resp BP   10/13/21 1856 -- 67 -- (!) 144/97   10/13/21 1506 96.9 °F (36.1 °C) 80 16 121/81       Medications Administered     0.9% sodium chloride infusion     Admin Date  10/13/2021 Action  New Bag Dose  25 mL/hr Rate  25 mL/hr Route  IntraVENous Administered By  Royce Jones RN          acetaminophen (TYLENOL) tablet 650 mg     Admin Date  10/13/2021 Action  Given Dose  650 mg Route  Oral Administered By  Royce Jones RN          diphenhydrAMINE (BENADRYL) capsule 50 mg     Admin Date  10/13/2021 Action  Given Dose  50 mg Route  Oral Administered By  Royce Jones RN          famotidine (PF) (PEPCID) 40 mg in 0.9% sodium chloride 10 mL injection     Admin Date  10/13/2021 Action  Given Dose  40 mg Route  IntraVENous Administered By  Royce Jones RN          [facility]) 8 mg in 0.9% sodium chloride 250 mL, overfill volume 25 mL infusion     Admin Date  10/13/2021 Action  New Bag Dose  8 mg Rate  138 mL/hr Route  IntraVENous Administered By  Royce Jones RN              Mr. Yong Joshua tolerated the infusion, and had no complaints. Post uric acid level drawn and sent for processing. PIV removed without difficulty. Mr. Yong Joshua was discharged from [place] [address] in stable condition.      Future Appointments   Date Time Provider Barry Rachael   10/27/2021  3:00 PM [address]   3/2/2022 10:00 AM Janae Rabago MD AOCR BS AMB       John Joshi RN  October 13, 2021  6:57 PM

## 2021-10-13 NOTE — PERIOP NOTES
Patient given surgical site infection FAQs handout and hand hygiene tips sheet. Pre-operative instructions reviewed and patient verbalizes understanding of instructions. Patient has been given the opportunity to ask additional questions. Pt given 2 bottles of CHG soap and instructed in use. PATIENT GIVEN WRITTEN INSTRUCTIONS TO GO TO THE IMAGING CENTER[facility]CANCER CENTER [address] [place] [address] [address] TO HAVE CHEST XRAY COMPLETED. Pt instructed that they will be scheduled for COVID 19 test 4 days prior to surgery. COVID instruction sheet and instructions given to PT. Pt instructed they must self quarantine between  COVID 19 test and day of surgery. Visitation policy  given to patient. Policy reviewed with patient. COPY OF COVID 19 VACCINE CARD PLACED ON CHART. CARDIOLOGY NOTES AND NEPHROLOGY NOTES RECEIVED AND PLACED ON CHART.

## 2021-10-14 LAB
SARS-COV-2, XPLCVT: NOT DETECTED
SOURCE, COVRS: NORMAL

## 2021-10-14 NOTE — PERIOP NOTES
CALLED DR. SENA'S OFFICE SPOKE WITH MEME GAVE UPDATE ON ABNORMAL LABS     CHLORIDE H110  CO2 L18  BUN H 94  CREATININE H 7.16  H- L9.6  H-L31.5    ORDERS RECEIVED TO DO T&S STAT DOS     ORDER PLACED     ABNORMAL LABS FAXED TO PCP VIA [facility]

## 2021-10-15 ENCOUNTER — ANESTHESIA EVENT (OUTPATIENT)
Dept: SURGERY | Age: 51
End: 2021-10-15
Payer: COMMERCIAL

## 2021-10-18 ENCOUNTER — HOSPITAL ENCOUNTER (OUTPATIENT)
Age: 51
Setting detail: OUTPATIENT SURGERY
Discharge: HOME | End: 2021-10-18
Payer: COMMERCIAL

## 2021-10-18 ENCOUNTER — ANESTHESIA (OUTPATIENT)
Dept: SURGERY | Age: 51
End: 2021-10-18
Payer: COMMERCIAL

## 2021-10-18 VITALS
RESPIRATION RATE: 11 BRPM | BODY MASS INDEX: 28.52 KG/M2 | OXYGEN SATURATION: 99 % | TEMPERATURE: 97.7 F | HEART RATE: 68 BPM | HEIGHT: 71 IN | WEIGHT: 203.71 LBS | DIASTOLIC BLOOD PRESSURE: 94 MMHG | SYSTOLIC BLOOD PRESSURE: 138 MMHG

## 2021-10-18 DIAGNOSIS — N18.4 CKD (CHRONIC KIDNEY DISEASE) STAGE 4, GFR 15-29 ML/MIN (HCC): Primary | ICD-10-CM

## 2021-10-18 PROCEDURE — 76010000138 HC OR TIME 0.5 TO 1 HR

## 2021-10-18 PROCEDURE — 77030002987 HC SUT PROL J&J -B

## 2021-10-18 PROCEDURE — 77030031139 HC SUT VCRL2 J&J -A

## 2021-10-18 PROCEDURE — 74011250637 HC RX REV CODE- 250/637: Performed by: ANESTHESIOLOGY

## 2021-10-18 PROCEDURE — 74011250636 HC RX REV CODE- 250/636: Performed by: ANESTHESIOLOGY

## 2021-10-18 PROCEDURE — 76210000016 HC OR PH I REC 1 TO 1.5 HR

## 2021-10-18 PROCEDURE — 74011250636 HC RX REV CODE- 250/636

## 2021-10-18 PROCEDURE — 77030040361 HC SLV COMPR DVT MDII -B

## 2021-10-18 PROCEDURE — 74011250636 HC RX REV CODE- 250/636: Performed by: NURSE ANESTHETIST, CERTIFIED REGISTERED

## 2021-10-18 PROCEDURE — 77030003601 HC NDL NRV BLK BBMI -A

## 2021-10-18 PROCEDURE — 76210000020 HC REC RM PH II FIRST 0.5 HR

## 2021-10-18 PROCEDURE — 2709999900 HC NON-CHARGEABLE SUPPLY

## 2021-10-18 PROCEDURE — 76060000032 HC ANESTHESIA 0.5 TO 1 HR

## 2021-10-18 PROCEDURE — 77030040922 HC BLNKT HYPOTHRM STRY -A

## 2021-10-18 RX ORDER — SODIUM CHLORIDE 9 MG/ML
25 INJECTION, SOLUTION INTRAVENOUS CONTINUOUS
Status: DISCONTINUED | OUTPATIENT
Start: 2021-10-18 | End: 2021-10-18 | Stop reason: HOSPADM

## 2021-10-18 RX ORDER — SODIUM CHLORIDE 0.9 % (FLUSH) 0.9 %
5-40 SYRINGE (ML) INJECTION EVERY 8 HOURS
Status: DISCONTINUED | OUTPATIENT
Start: 2021-10-18 | End: 2021-10-18 | Stop reason: HOSPADM

## 2021-10-18 RX ORDER — FENTANYL CITRATE 50 UG/ML
50 INJECTION, SOLUTION INTRAMUSCULAR; INTRAVENOUS AS NEEDED
Status: DISCONTINUED | OUTPATIENT
Start: 2021-10-18 | End: 2021-10-18 | Stop reason: HOSPADM

## 2021-10-18 RX ORDER — SODIUM CHLORIDE 0.9 % (FLUSH) 0.9 %
5-40 SYRINGE (ML) INJECTION AS NEEDED
Status: DISCONTINUED | OUTPATIENT
Start: 2021-10-18 | End: 2021-10-18 | Stop reason: HOSPADM

## 2021-10-18 RX ORDER — MORPHINE SULFATE 2 MG/ML
2 INJECTION, SOLUTION INTRAMUSCULAR; INTRAVENOUS
Status: DISCONTINUED | OUTPATIENT
Start: 2021-10-18 | End: 2021-10-18 | Stop reason: HOSPADM

## 2021-10-18 RX ORDER — ONDANSETRON 2 MG/ML
4 INJECTION INTRAMUSCULAR; INTRAVENOUS AS NEEDED
Status: DISCONTINUED | OUTPATIENT
Start: 2021-10-18 | End: 2021-10-18 | Stop reason: HOSPADM

## 2021-10-18 RX ORDER — FENTANYL CITRATE 50 UG/ML
25 INJECTION, SOLUTION INTRAMUSCULAR; INTRAVENOUS
Status: DISCONTINUED | OUTPATIENT
Start: 2021-10-18 | End: 2021-10-18 | Stop reason: HOSPADM

## 2021-10-18 RX ORDER — SODIUM CHLORIDE, SODIUM LACTATE, POTASSIUM CHLORIDE, CALCIUM CHLORIDE 600; 310; 30; 20 MG/100ML; MG/100ML; MG/100ML; MG/100ML
125 INJECTION, SOLUTION INTRAVENOUS CONTINUOUS
Status: DISCONTINUED | OUTPATIENT
Start: 2021-10-18 | End: 2021-10-18 | Stop reason: HOSPADM

## 2021-10-18 RX ORDER — HYDROMORPHONE HYDROCHLORIDE 1 MG/ML
0.2 INJECTION, SOLUTION INTRAMUSCULAR; INTRAVENOUS; SUBCUTANEOUS
Status: DISCONTINUED | OUTPATIENT
Start: 2021-10-18 | End: 2021-10-18 | Stop reason: HOSPADM

## 2021-10-18 RX ORDER — PROPOFOL 10 MG/ML
INJECTION, EMULSION INTRAVENOUS AS NEEDED
Status: DISCONTINUED | OUTPATIENT
Start: 2021-10-18 | End: 2021-10-18 | Stop reason: HOSPADM

## 2021-10-18 RX ORDER — DIPHENHYDRAMINE HYDROCHLORIDE 50 MG/ML
12.5 INJECTION, SOLUTION INTRAMUSCULAR; INTRAVENOUS AS NEEDED
Status: DISCONTINUED | OUTPATIENT
Start: 2021-10-18 | End: 2021-10-18 | Stop reason: HOSPADM

## 2021-10-18 RX ORDER — LIDOCAINE HYDROCHLORIDE 10 MG/ML
0.1 INJECTION, SOLUTION EPIDURAL; INFILTRATION; INTRACAUDAL; PERINEURAL AS NEEDED
Status: DISCONTINUED | OUTPATIENT
Start: 2021-10-18 | End: 2021-10-18 | Stop reason: HOSPADM

## 2021-10-18 RX ORDER — ACETAMINOPHEN 325 MG/1
650 TABLET ORAL ONCE
Status: COMPLETED | OUTPATIENT
Start: 2021-10-18 | End: 2021-10-18

## 2021-10-18 RX ORDER — OXYCODONE AND ACETAMINOPHEN 5; 325 MG/1; MG/1
1 TABLET ORAL AS NEEDED
Status: DISCONTINUED | OUTPATIENT
Start: 2021-10-18 | End: 2021-10-18 | Stop reason: HOSPADM

## 2021-10-18 RX ORDER — MIDAZOLAM HYDROCHLORIDE 1 MG/ML
0.5 INJECTION, SOLUTION INTRAMUSCULAR; INTRAVENOUS
Status: DISCONTINUED | OUTPATIENT
Start: 2021-10-18 | End: 2021-10-18 | Stop reason: HOSPADM

## 2021-10-18 RX ORDER — PROPOFOL 10 MG/ML
INJECTION, EMULSION INTRAVENOUS
Status: DISCONTINUED | OUTPATIENT
Start: 2021-10-18 | End: 2021-10-18 | Stop reason: HOSPADM

## 2021-10-18 RX ORDER — HYDROCODONE BITARTRATE AND ACETAMINOPHEN 7.5; 325 MG/1; MG/1
1 TABLET ORAL
Qty: 15 TABLET | Refills: 0 | Status: SHIPPED | OUTPATIENT
Start: 2021-10-18 | End: 2021-10-21

## 2021-10-18 RX ORDER — MIDAZOLAM HYDROCHLORIDE 1 MG/ML
1 INJECTION, SOLUTION INTRAMUSCULAR; INTRAVENOUS AS NEEDED
Status: DISCONTINUED | OUTPATIENT
Start: 2021-10-18 | End: 2021-10-18 | Stop reason: HOSPADM

## 2021-10-18 RX ADMIN — PROPOFOL 50 MG: 10 INJECTION, EMULSION INTRAVENOUS at 10:09

## 2021-10-18 RX ADMIN — PROPOFOL 50 MCG/KG/MIN: 10 INJECTION, EMULSION INTRAVENOUS at 10:09

## 2021-10-18 RX ADMIN — SODIUM CHLORIDE 25 ML/HR: 9 INJECTION, SOLUTION INTRAVENOUS at 09:43

## 2021-10-18 RX ADMIN — MEPIVACAINE HYDROCHLORIDE 30 ML: 15 INJECTION, SOLUTION EPIDURAL; INFILTRATION at 09:55

## 2021-10-18 RX ADMIN — PROPOFOL 30 MG: 10 INJECTION, EMULSION INTRAVENOUS at 10:14

## 2021-10-18 RX ADMIN — ACETAMINOPHEN 650 MG: 325 TABLET ORAL at 09:45

## 2021-10-18 NOTE — BRIEF OP NOTE
Brief Postoperative Note    Patient: Walker Palomares  YOB: 1970  MRN: [id number]    Date of Procedure: 10/18/2021     Pre-Op Diagnosis: ESRD    Post-Op Diagnosis: Same as preoperative diagnosis.       Procedure(s):  CREATION LEFT PROXIMAL RADIAL CEPHALIC ARTERIO VENOUS FISTULA (REGIONAL BLOCK)    Surgeon(s):  Mckinley Conde MD    Surgical Assistant: Surg Asst-1: Salma Peters    Anesthesia: Regional     Estimated Blood Loss (mL): Minimal    Complications: None    Specimens: * No specimens in log *     Implants: * No implants in log *    Drains: * No LDAs found *    Findings: none    Electronically Signed by Ken Nichols MD on 10/18/2021 at 10:50 AM

## 2021-10-18 NOTE — ANESTHESIA PREPROCEDURE EVALUATION
Relevant Problems   RESPIRATORY SYSTEM   (+) SOB (shortness of breath)      CARDIOVASCULAR   (+) Essential hypertension      RENAL FAILURE   (+) Autosomal dominant adult polycystic kidney disease   (+) CKD (chronic kidney disease) stage 3, GFR 30-59 ml/min (HCC)   (+) CKD (chronic kidney disease) stage 4, GFR 15-29 ml/min ([facility])       Anesthetic History   No history of anesthetic complications            Review of Systems / Medical History  Patient summary reviewed, nursing notes reviewed and pertinent labs reviewed    Pulmonary            Asthma        Neuro/Psych   Within defined limits           Cardiovascular    Hypertension      CHF             GI/Hepatic/Renal         Renal disease: ESRD       Endo/Other  Within defined limits           Other Findings              Physical Exam    Airway  Mallampati: II  TM Distance: > 6 cm  Neck ROM: normal range of motion   Mouth opening: Normal     Cardiovascular  Regular rate and rhythm,  S1 and S2 normal,  no murmur, click, rub, or gallop             Dental  No notable dental hx       Pulmonary  Breath sounds clear to auscultation               Abdominal  GI exam deferred       Other Findings            Anesthetic Plan    ASA: 3  Anesthesia type: MAC      Post-op pain plan if not by surgeon: peripheral nerve block single    Induction: Intravenous  Anesthetic plan and risks discussed with: Patient

## 2021-10-18 NOTE — DISCHARGE INSTRUCTIONS
Patient Discharge Instructions    Emily Renee / [id number] : 1970    Admitted 10/18/2021 Discharged: 10/18/2021     Take Home Medications     . · It is important that you take the medication exactly as they are prescribed. · Keep your medication in the bottles provided by the pharmacist and keep a list of the medication names, dosages, and times to be taken in your wallet. · Do not take other medications without consulting your doctor. What to do at Home    Recommended diet: Renal Diet,     Recommended activity: Activity as tolerated,     Additional Instructions: remove dressing Wed and leave open, OK to get wet on Wed    Follow-up with Dr Porsha Austin in 2 weeks, call [phone] for appt        Information obtained by :  I understand that if any problems occur once I am at home I am to contact my physician. I understand and acknowledge receipt of the instructions indicated above. Physician's or R.N.'s Signature                                                                  Date/Time                                                                                                                                              Patient or Representative Signature                                                          Date/Time    ______________________________________________________________________    Anesthesia Discharge Instructions    After general anesthesia or intervenous sedation, for 24 hours or while taking prescription Narcotics:  · Limit your activities  · Do not drive or operate hazardous machinery  · If you have not urinated within 8 hours after discharge, please contact your surgeon on call.   · Do not make important personal or business decisions  · Do not drink alcoholic beverages    Report the following to your surgeon:  · Excessive pain, swelling, redness or odor of or around the surgical area  · Temperature over 100.5 degrees  · Nausea and vomiting lasting longer than 4 hours or if unable to take medication  · Any signs of decreased circulation or nerve impairment to extremity:  Change in color, persistent numbness, tingling, coldness or increased pain.   · Any questions

## 2021-10-18 NOTE — ANESTHESIA PROCEDURE NOTES
Peripheral Block    Performed by: Rose Mary Durham DO  Authorized by: Rose Mary Durham DO       Pre-procedure:    Indications: at surgeon's request and post-op pain management    Preanesthetic Checklist: patient identified, risks and benefits discussed, site marked, timeout performed, anesthesia consent given and patient being monitored      Block Type:   Block Type:  Supraclavicular  Laterality:  Left  Monitoring:  Standard ASA monitoring, responsive to questions, continuous pulse ox, oxygen, heart rate and frequent vital sign checks  Injection Technique:  Single shot  Procedures: ultrasound guided    Patient Position: supine  Prep: chlorhexidine    Location:  Supraclavicular  Needle Type:  Stimuplex  Needle Gauge:  22 G  Needle Localization:  Ultrasound guidance  Medication Injected:  Mepivacaine PF (CARBOCAINE) 1.5 % injection, 30 mL    Assessment:  Number of attempts:  1  Injection Assessment:  Incremental injection every 5 mL, negative aspiration for CSF, no paresthesia, negative aspiration for blood, local visualized surrounding nerve on ultrasound and no intravascular symptoms  Patient tolerance:  Patient tolerated the procedure well with no immediate complications

## 2021-10-18 NOTE — ANESTHESIA POSTPROCEDURE EVALUATION
Procedure(s):  CREATION LEFT PROXIMAL RADIAL CEPHALIC ARTERIO VENOUS FISTULA (REGIONAL BLOCK). MAC    Anesthesia Post Evaluation        Patient participation: complete - patient participated  Level of consciousness: awake  Pain management: adequate  Airway patency: patent  Anesthetic complications: no  Cardiovascular status: hemodynamically stable  Respiratory status: acceptable  Hydration status: acceptable  Comments: The patient is ready for PACU discharge. Alma Gusman DO                   Post anesthesia nausea and vomiting:  controlled      INITIAL Post-op Vital signs:   Vitals Value Taken Time   /91 10/18/21 1115   Temp 36.5 °C (97.7 °F) 10/18/21 1056   Pulse 64 10/18/21 1118   Resp 11 10/18/21 1118   SpO2 99 % 10/18/21 1118   Vitals shown include unvalidated device data.

## 2021-10-18 NOTE — OP NOTES
[facility]  OPERATIVE REPORT    Name:  Garry Mittal  MR#:  [id number]  :  1970  ACCOUNT #:  [de-identified]  DATE OF SERVICE:  10/18/2021      PREOPERATIVE DIAGNOSIS:  Renal failure. POSTOPERATIVE DIAGNOSIS:  Renal failure. PROCEDURE PERFORMED:  Left proximal radial artery to cephalic vein dialysis fistula. SURGEON:  Alphonse Bryson MD    ASSISTANT:  Corinne Molina. ANESTHESIA:  Regional block. COMPLICATIONS:  None. SPECIMENS REMOVED:  None. IMPLANTS:  None. ESTIMATED BLOOD LOSS:  Minimal.    INDICATIONS:  The patient is a 72-year-old male with progressive renal insufficiency, not yet requiring dialysis. He will have a left arm fistula placed. PROCEDURE:  The patient's left arm was prepped and draped. A longitudinal incision was made in the proximal anterior forearm. The cephalic vein was identified and dissected free throughout the length of the incision. It was ligated and divided in the distal portion of the incision. The branch to the basilic vein and the deep branch were ligated. The proximal radial artery was dissected free. An end-to-side anastomosis was performed between the artery and vein using running 6-0 Prolene. Following this, there was an augmentable pulse in the fistula. The incision was closed with Vicryl subcutaneous suture and Vicryl subcuticular skin closure. Dressings were applied and the patient was returned to the recovery room in stable condition.         Claudia Good MD      [id number]  D:  10/18/2021 10:53  T:  10/18/2021 11:25  JOB #:  [id number]

## 2021-10-27 ENCOUNTER — HOSPITAL ENCOUNTER (OUTPATIENT)
Dept: INFUSION THERAPY | Age: 51
Discharge: HOME | End: 2021-10-27
Payer: COMMERCIAL

## 2021-10-27 VITALS
DIASTOLIC BLOOD PRESSURE: 79 MMHG | HEART RATE: 69 BPM | TEMPERATURE: 97.3 F | SYSTOLIC BLOOD PRESSURE: 124 MMHG | RESPIRATION RATE: 18 BRPM

## 2021-10-27 DIAGNOSIS — M1A.39X1 CHRONIC TOPHACEOUS GOUT OF MULTIPLE SITES DUE TO RENAL IMPAIRMENT: Primary | ICD-10-CM

## 2021-10-27 LAB
ALBUMIN SERPL-MCNC: 3.3 G/DL (ref 3.5–5)
ALBUMIN/GLOB SERPL: 0.9 {RATIO} (ref 1.1–2.2)
ALP SERPL-CCNC: 26 U/L (ref 45–117)
ALT SERPL-CCNC: 18 U/L (ref 12–78)
ANION GAP SERPL CALC-SCNC: 10 MMOL/L (ref 5–15)
AST SERPL-CCNC: 11 U/L (ref 15–37)
BASOPHILS # BLD: 0.1 K/UL (ref 0–0.1)
BASOPHILS NFR BLD: 1 % (ref 0–1)
BILIRUB SERPL-MCNC: 0.3 MG/DL (ref 0.2–1)
BUN SERPL-MCNC: 89 MG/DL (ref 6–20)
BUN/CREAT SERPL: 12 (ref 12–20)
CALCIUM SERPL-MCNC: 8.9 MG/DL (ref 8.5–10.1)
CHLORIDE SERPL-SCNC: 112 MMOL/L (ref 97–108)
CO2 SERPL-SCNC: 16 MMOL/L (ref 21–32)
CREAT SERPL-MCNC: 7.29 MG/DL (ref 0.7–1.3)
DIFFERENTIAL METHOD BLD: ABNORMAL
EOSINOPHIL # BLD: 0.6 K/UL (ref 0–0.4)
EOSINOPHIL NFR BLD: 11 % (ref 0–7)
ERYTHROCYTE [DISTWIDTH] IN BLOOD BY AUTOMATED COUNT: 13.4 % (ref 11.5–14.5)
GLOBULIN SER CALC-MCNC: 3.7 G/DL (ref 2–4)
GLUCOSE SERPL-MCNC: 96 MG/DL (ref 65–100)
HCT VFR BLD AUTO: 29.8 % (ref 36.6–50.3)
HGB BLD-MCNC: 9.1 G/DL (ref 12.1–17)
IMM GRANULOCYTES # BLD AUTO: 0 K/UL (ref 0–0.04)
IMM GRANULOCYTES NFR BLD AUTO: 0 % (ref 0–0.5)
LYMPHOCYTES # BLD: 1.5 K/UL (ref 0.8–3.5)
LYMPHOCYTES NFR BLD: 25 % (ref 12–49)
MCH RBC QN AUTO: 30.4 PG (ref 26–34)
MCHC RBC AUTO-ENTMCNC: 30.5 G/DL (ref 30–36.5)
MCV RBC AUTO: 99.7 FL (ref 80–99)
MONOCYTES # BLD: 0.7 K/UL (ref 0–1)
MONOCYTES NFR BLD: 11 % (ref 5–13)
NEUTS SEG # BLD: 3.2 K/UL (ref 1.8–8)
NEUTS SEG NFR BLD: 52 % (ref 32–75)
NRBC # BLD: 0 K/UL (ref 0–0.01)
NRBC BLD-RTO: 0 PER 100 WBC
PLATELET # BLD AUTO: 221 K/UL (ref 150–400)
PMV BLD AUTO: 10.1 FL (ref 8.9–12.9)
POTASSIUM SERPL-SCNC: 4.2 MMOL/L (ref 3.5–5.1)
PROT SERPL-MCNC: 7 G/DL (ref 6.4–8.2)
RBC # BLD AUTO: 2.99 M/UL (ref 4.1–5.7)
SODIUM SERPL-SCNC: 138 MMOL/L (ref 136–145)
URATE SERPL-MCNC: <0.2 MG/DL (ref 3.5–7.2)
URATE SERPL-MCNC: <0.2 MG/DL (ref 3.5–7.2)
WBC # BLD AUTO: 6.1 K/UL (ref 4.1–11.1)

## 2021-10-27 PROCEDURE — 74011250636 HC RX REV CODE- 250/636: Performed by: INTERNAL MEDICINE

## 2021-10-27 PROCEDURE — 96375 TX/PRO/DX INJ NEW DRUG ADDON: CPT

## 2021-10-27 PROCEDURE — 96365 THER/PROPH/DIAG IV INF INIT: CPT

## 2021-10-27 PROCEDURE — 36415 COLL VENOUS BLD VENIPUNCTURE: CPT

## 2021-10-27 PROCEDURE — 74011250637 HC RX REV CODE- 250/637: Performed by: INTERNAL MEDICINE

## 2021-10-27 PROCEDURE — 96366 THER/PROPH/DIAG IV INF ADDON: CPT

## 2021-10-27 PROCEDURE — 85025 COMPLETE CBC W/AUTO DIFF WBC: CPT

## 2021-10-27 PROCEDURE — 84550 ASSAY OF BLOOD/URIC ACID: CPT

## 2021-10-27 PROCEDURE — 80053 COMPREHEN METABOLIC PANEL: CPT

## 2021-10-27 PROCEDURE — 74011000250 HC RX REV CODE- 250: Performed by: INTERNAL MEDICINE

## 2021-10-27 RX ORDER — DIPHENHYDRAMINE HCL 25 MG
50 CAPSULE ORAL ONCE
Status: CANCELLED
Start: 2021-11-10 | End: 2021-11-10

## 2021-10-27 RX ORDER — DIPHENHYDRAMINE HYDROCHLORIDE 50 MG/ML
50 INJECTION, SOLUTION INTRAMUSCULAR; INTRAVENOUS AS NEEDED
Status: CANCELLED
Start: 2021-11-10

## 2021-10-27 RX ORDER — SODIUM CHLORIDE 0.9 % (FLUSH) 0.9 %
10 SYRINGE (ML) INJECTION AS NEEDED
Status: CANCELLED | OUTPATIENT
Start: 2021-11-10

## 2021-10-27 RX ORDER — ACETAMINOPHEN 325 MG/1
650 TABLET ORAL AS NEEDED
Status: CANCELLED
Start: 2021-11-10

## 2021-10-27 RX ORDER — SODIUM CHLORIDE 9 MG/ML
10 INJECTION INTRAMUSCULAR; INTRAVENOUS; SUBCUTANEOUS AS NEEDED
Status: CANCELLED | OUTPATIENT
Start: 2021-11-10

## 2021-10-27 RX ORDER — DIPHENHYDRAMINE HYDROCHLORIDE 50 MG/ML
25 INJECTION, SOLUTION INTRAMUSCULAR; INTRAVENOUS AS NEEDED
Status: CANCELLED
Start: 2021-11-10

## 2021-10-27 RX ORDER — EPINEPHRINE 1 MG/ML
0.3 INJECTION, SOLUTION, CONCENTRATE INTRAVENOUS AS NEEDED
Status: CANCELLED | OUTPATIENT
Start: 2021-11-10

## 2021-10-27 RX ORDER — ACETAMINOPHEN 325 MG/1
650 TABLET ORAL ONCE
Status: COMPLETED | OUTPATIENT
Start: 2021-10-27 | End: 2021-10-27

## 2021-10-27 RX ORDER — SODIUM CHLORIDE 9 MG/ML
25 INJECTION, SOLUTION INTRAVENOUS CONTINUOUS
Status: CANCELLED | OUTPATIENT
Start: 2021-11-10

## 2021-10-27 RX ORDER — DIPHENHYDRAMINE HCL 25 MG
50 CAPSULE ORAL ONCE
Status: COMPLETED | OUTPATIENT
Start: 2021-10-27 | End: 2021-10-27

## 2021-10-27 RX ORDER — HEPARIN 100 UNIT/ML
300-500 SYRINGE INTRAVENOUS AS NEEDED
Status: CANCELLED
Start: 2021-11-10

## 2021-10-27 RX ORDER — SODIUM CHLORIDE 9 MG/ML
25 INJECTION, SOLUTION INTRAVENOUS CONTINUOUS
Status: DISCONTINUED | OUTPATIENT
Start: 2021-10-27 | End: 2021-10-28 | Stop reason: HOSPADM

## 2021-10-27 RX ORDER — ALBUTEROL SULFATE 0.83 MG/ML
2.5 SOLUTION RESPIRATORY (INHALATION) AS NEEDED
Status: CANCELLED
Start: 2021-11-10

## 2021-10-27 RX ORDER — ACETAMINOPHEN 325 MG/1
650 TABLET ORAL ONCE
Status: CANCELLED | OUTPATIENT
Start: 2021-11-10 | End: 2021-11-10

## 2021-10-27 RX ORDER — HYDROCORTISONE SODIUM SUCCINATE 100 MG/2ML
100 INJECTION, POWDER, FOR SOLUTION INTRAMUSCULAR; INTRAVENOUS AS NEEDED
Status: CANCELLED | OUTPATIENT
Start: 2021-11-10

## 2021-10-27 RX ORDER — ONDANSETRON 2 MG/ML
8 INJECTION INTRAMUSCULAR; INTRAVENOUS AS NEEDED
Status: CANCELLED | OUTPATIENT
Start: 2021-11-10

## 2021-10-27 RX ADMIN — SODIUM CHLORIDE 25 ML/HR: 900 INJECTION, SOLUTION INTRAVENOUS at 15:30

## 2021-10-27 RX ADMIN — ACETAMINOPHEN 650 MG: 325 TABLET ORAL at 15:38

## 2021-10-27 RX ADMIN — PEGLOTICASE 8 MG: 8 INJECTION, SOLUTION INTRAVENOUS at 16:18

## 2021-10-27 RX ADMIN — DIPHENHYDRAMINE HYDROCHLORIDE 50 MG: 25 CAPSULE ORAL at 15:38

## 2021-10-27 RX ADMIN — FAMOTIDINE 40 MG: 10 INJECTION INTRAVENOUS at 15:38

## 2021-10-27 NOTE — PROGRESS NOTES
[facility] Progress Note    Date: October 27, 2021        1510: Pt arrived ambulatory to [facility] for Deidre Jerome in stable condition. Assessment completed. Fistula placed to left lower arm for future dialysis needs. PIV placed to right hand with positive blood return. Labs drawn and sent for processing. Pre medications given as ordered. Patient denies any symptoms of COVID-19, including SOB, coughing, fever, or generally not feeling well. Patient denies any recent exposure to COVID-19. Patient denies any recent contact with family or friends that have a pending COVID-19 test.    Patient Vitals for the past 12 hrs:   Temp Pulse Resp BP   10/27/21 1832 -- 69 18 124/79   10/27/21 1514 97.3 °F (36.3 °C) 79 18 131/84       Recent Results (from the past 12 hour(s))   CBC WITH AUTOMATED DIFF    Collection Time: 10/27/21  3:21 PM   Result Value Ref Range    WBC 6.1 4.1 - 11.1 K/uL    RBC 2.99 (L) 4.10 - 5.70 M/uL    HGB 9.1 (L) 12.1 - 17.0 g/dL    HCT 29.8 (L) 36.6 - 50.3 %    MCV 99.7 (H) 80.0 - 99.0 FL    MCH 30.4 26.0 - 34.0 PG    MCHC 30.5 30.0 - 36.5 g/dL    RDW 13.4 11.5 - 14.5 %    PLATELET 184 758 - 262 K/uL    MPV 10.1 8.9 - 12.9 FL    NRBC 0.0 0  WBC    ABSOLUTE NRBC 0.00 0.00 - 0.01 K/uL    NEUTROPHILS 52 32 - 75 %    LYMPHOCYTES 25 12 - 49 %    MONOCYTES 11 5 - 13 %    EOSINOPHILS 11 (H) 0 - 7 %    BASOPHILS 1 0 - 1 %    IMMATURE GRANULOCYTES 0 0.0 - 0.5 %    ABS. NEUTROPHILS 3.2 1.8 - 8.0 K/UL    ABS. LYMPHOCYTES 1.5 0.8 - 3.5 K/UL    ABS. MONOCYTES 0.7 0.0 - 1.0 K/UL    ABS. EOSINOPHILS 0.6 (H) 0.0 - 0.4 K/UL    ABS. BASOPHILS 0.1 0.0 - 0.1 K/UL    ABS. IMM.  GRANS. 0.0 0.00 - 0.04 K/UL    DF AUTOMATED     METABOLIC PANEL, COMPREHENSIVE    Collection Time: 10/27/21  3:21 PM   Result Value Ref Range    Sodium 138 136 - 145 mmol/L    Potassium 4.2 3.5 - 5.1 mmol/L    Chloride 112 (H) 97 - 108 mmol/L    CO2 16 (L) 21 - 32 mmol/L    Anion gap 10 5 - 15 mmol/L    Glucose 96 65 - 100 mg/dL    BUN 89 (H) 6 - 20 MG/DL Creatinine 7.29 (H) 0.70 - 1.30 MG/DL    BUN/Creatinine ratio 12 12 - 20      GFR est AA 10 (L) >60 ml/min/1.73m2    GFR est non-AA 8 (L) >60 ml/min/1.73m2    Calcium 8.9 8.5 - 10.1 MG/DL    Bilirubin, total 0.3 0.2 - 1.0 MG/DL    ALT (SGPT) 18 12 - 78 U/L    AST (SGOT) 11 (L) 15 - 37 U/L    Alk. phosphatase 26 (L) 45 - 117 U/L    Protein, total 7.0 6.4 - 8.2 g/dL    Albumin 3.3 (L) 3.5 - 5.0 g/dL    Globulin 3.7 2.0 - 4.0 g/dL    A-G Ratio 0.9 (L) 1.1 - 2.2     URIC ACID    Collection Time: 10/27/21  3:21 PM   Result Value Ref Range    Uric acid <0.2 (L) 3.5 - 7.2 MG/DL       Medications Administered     0.9% sodium chloride infusion     Admin Date  10/27/2021 Action  New Bag Dose  25 mL/hr Rate  25 mL/hr Route  IntraVENous Administered By  Amy Burt          acetaminophen (TYLENOL) tablet 650 mg     Admin Date  10/27/2021 Action  Given Dose  650 mg Route  Oral Administered By  Amy Burt          diphenhydrAMINE (BENADRYL) capsule 50 mg     Admin Date  10/27/2021 Action  Given Dose  50 mg Route  Oral Administered By  Amy Burt          famotidine (PF) (PEPCID) 40 mg in 0.9% sodium chloride 10 mL injection     Admin Date  10/27/2021 Action  Given Dose  40 mg Route  IntraVENous Administered By  Amy Burt          pegloticase [facility]) 8 mg in 0.9% sodium chloride 250 mL, overfill volume 25 mL infusion     Admin Date  10/27/2021 Action  New Bag Dose  8 mg Rate  138 mL/hr Route  IntraVENous Administered By  [address] [employer] [facility] [address], [address]: Tolerated treatment well, no adverse reactions noted. Post infusion uric acid drawn and sent for processing. PIV flushed and removed. 2x2 and coban placed. D/Cd from [facility] ambulatory and in no distress. Patient is aware of next scheduled [facility] appointment on 11/10/21.     Future Appointments   Date Time Provider Barry Cano   11/10/2021  2:00 PM B3 [facility] [place] [facility]    11/24/2021  3:30 PM C1 [facility] 12/8/2021  2:00 PM B3 [facility] MED [address] [place] [address]   12/22/2021  1:00 PM A3 [facility] MED [address] [place] [address]   3/2/2022 10:00 AM Thom Rabago MD AOCR BS AMB           Dario Rey RN  October 27, 2021

## 2021-11-10 ENCOUNTER — HOSPITAL ENCOUNTER (OUTPATIENT)
Dept: INFUSION THERAPY | Age: 51
Discharge: HOME | End: 2021-11-10
Payer: COMMERCIAL

## 2021-11-10 VITALS
HEART RATE: 70 BPM | RESPIRATION RATE: 18 BRPM | SYSTOLIC BLOOD PRESSURE: 127 MMHG | DIASTOLIC BLOOD PRESSURE: 82 MMHG | TEMPERATURE: 97.6 F

## 2021-11-10 DIAGNOSIS — M1A.39X1 CHRONIC TOPHACEOUS GOUT OF MULTIPLE SITES DUE TO RENAL IMPAIRMENT: Primary | ICD-10-CM

## 2021-11-10 LAB
ALBUMIN SERPL-MCNC: 3.3 G/DL (ref 3.5–5)
ALBUMIN/GLOB SERPL: 0.9 {RATIO} (ref 1.1–2.2)
ALP SERPL-CCNC: 23 U/L (ref 45–117)
ALT SERPL-CCNC: 23 U/L (ref 12–78)
ANION GAP SERPL CALC-SCNC: 8 MMOL/L (ref 5–15)
AST SERPL-CCNC: 15 U/L (ref 15–37)
BASOPHILS # BLD: 0.1 K/UL (ref 0–0.1)
BASOPHILS NFR BLD: 1 % (ref 0–1)
BILIRUB SERPL-MCNC: 0.3 MG/DL (ref 0.2–1)
BUN SERPL-MCNC: 90 MG/DL (ref 6–20)
BUN/CREAT SERPL: 12 (ref 12–20)
CALCIUM SERPL-MCNC: 8.5 MG/DL (ref 8.5–10.1)
CHLORIDE SERPL-SCNC: 111 MMOL/L (ref 97–108)
CO2 SERPL-SCNC: 20 MMOL/L (ref 21–32)
CREAT SERPL-MCNC: 7.42 MG/DL (ref 0.7–1.3)
DIFFERENTIAL METHOD BLD: ABNORMAL
EOSINOPHIL # BLD: 0.5 K/UL (ref 0–0.4)
EOSINOPHIL NFR BLD: 9 % (ref 0–7)
ERYTHROCYTE [DISTWIDTH] IN BLOOD BY AUTOMATED COUNT: 13.2 % (ref 11.5–14.5)
ERYTHROCYTE [SEDIMENTATION RATE] IN BLOOD: 13 MM/HR (ref 0–15)
GLOBULIN SER CALC-MCNC: 3.5 G/DL (ref 2–4)
GLUCOSE SERPL-MCNC: 95 MG/DL (ref 65–100)
HCT VFR BLD AUTO: 29.1 % (ref 36.6–50.3)
HGB BLD-MCNC: 8.8 G/DL (ref 12.1–17)
IMM GRANULOCYTES # BLD AUTO: 0 K/UL (ref 0–0.04)
IMM GRANULOCYTES NFR BLD AUTO: 0 % (ref 0–0.5)
LYMPHOCYTES # BLD: 1.6 K/UL (ref 0.8–3.5)
LYMPHOCYTES NFR BLD: 31 % (ref 12–49)
MCH RBC QN AUTO: 30.1 PG (ref 26–34)
MCHC RBC AUTO-ENTMCNC: 30.2 G/DL (ref 30–36.5)
MCV RBC AUTO: 99.7 FL (ref 80–99)
MONOCYTES # BLD: 0.6 K/UL (ref 0–1)
MONOCYTES NFR BLD: 12 % (ref 5–13)
NEUTS SEG # BLD: 2.3 K/UL (ref 1.8–8)
NEUTS SEG NFR BLD: 47 % (ref 32–75)
NRBC # BLD: 0 K/UL (ref 0–0.01)
NRBC BLD-RTO: 0 PER 100 WBC
PLATELET # BLD AUTO: 217 K/UL (ref 150–400)
PMV BLD AUTO: 10.6 FL (ref 8.9–12.9)
POTASSIUM SERPL-SCNC: 4.2 MMOL/L (ref 3.5–5.1)
PROT SERPL-MCNC: 6.8 G/DL (ref 6.4–8.2)
RBC # BLD AUTO: 2.92 M/UL (ref 4.1–5.7)
SODIUM SERPL-SCNC: 139 MMOL/L (ref 136–145)
URATE SERPL-MCNC: 0.3 MG/DL (ref 3.5–7.2)
URATE SERPL-MCNC: <0.2 MG/DL (ref 3.5–7.2)
WBC # BLD AUTO: 5 K/UL (ref 4.1–11.1)

## 2021-11-10 PROCEDURE — 85025 COMPLETE CBC W/AUTO DIFF WBC: CPT

## 2021-11-10 PROCEDURE — 96366 THER/PROPH/DIAG IV INF ADDON: CPT

## 2021-11-10 PROCEDURE — 80053 COMPREHEN METABOLIC PANEL: CPT

## 2021-11-10 PROCEDURE — 96375 TX/PRO/DX INJ NEW DRUG ADDON: CPT

## 2021-11-10 PROCEDURE — 74011250637 HC RX REV CODE- 250/637: Performed by: INTERNAL MEDICINE

## 2021-11-10 PROCEDURE — 36415 COLL VENOUS BLD VENIPUNCTURE: CPT

## 2021-11-10 PROCEDURE — 74011250636 HC RX REV CODE- 250/636: Performed by: INTERNAL MEDICINE

## 2021-11-10 PROCEDURE — 96365 THER/PROPH/DIAG IV INF INIT: CPT

## 2021-11-10 PROCEDURE — 85652 RBC SED RATE AUTOMATED: CPT

## 2021-11-10 PROCEDURE — 74011000250 HC RX REV CODE- 250: Performed by: INTERNAL MEDICINE

## 2021-11-10 PROCEDURE — 84550 ASSAY OF BLOOD/URIC ACID: CPT

## 2021-11-10 RX ORDER — SODIUM CHLORIDE 9 MG/ML
10 INJECTION INTRAMUSCULAR; INTRAVENOUS; SUBCUTANEOUS AS NEEDED
Status: CANCELLED | OUTPATIENT
Start: 2021-11-24

## 2021-11-10 RX ORDER — DIPHENHYDRAMINE HYDROCHLORIDE 50 MG/ML
50 INJECTION, SOLUTION INTRAMUSCULAR; INTRAVENOUS AS NEEDED
Status: CANCELLED
Start: 2021-11-24

## 2021-11-10 RX ORDER — EPINEPHRINE 1 MG/ML
0.3 INJECTION, SOLUTION, CONCENTRATE INTRAVENOUS AS NEEDED
Status: CANCELLED | OUTPATIENT
Start: 2021-11-24

## 2021-11-10 RX ORDER — SODIUM CHLORIDE 9 MG/ML
25 INJECTION, SOLUTION INTRAVENOUS CONTINUOUS
Status: DISCONTINUED | OUTPATIENT
Start: 2021-11-10 | End: 2021-11-11 | Stop reason: HOSPADM

## 2021-11-10 RX ORDER — SODIUM CHLORIDE 0.9 % (FLUSH) 0.9 %
10 SYRINGE (ML) INJECTION AS NEEDED
Status: CANCELLED | OUTPATIENT
Start: 2021-11-24

## 2021-11-10 RX ORDER — ACETAMINOPHEN 325 MG/1
650 TABLET ORAL AS NEEDED
Status: CANCELLED
Start: 2021-11-24

## 2021-11-10 RX ORDER — ACETAMINOPHEN 325 MG/1
650 TABLET ORAL ONCE
Status: COMPLETED | OUTPATIENT
Start: 2021-11-10 | End: 2021-11-10

## 2021-11-10 RX ORDER — ACETAMINOPHEN 325 MG/1
650 TABLET ORAL ONCE
Status: CANCELLED | OUTPATIENT
Start: 2021-11-24 | End: 2021-11-24

## 2021-11-10 RX ORDER — DIPHENHYDRAMINE HCL 25 MG
50 CAPSULE ORAL ONCE
Status: COMPLETED | OUTPATIENT
Start: 2021-11-10 | End: 2021-11-10

## 2021-11-10 RX ORDER — ONDANSETRON 2 MG/ML
8 INJECTION INTRAMUSCULAR; INTRAVENOUS AS NEEDED
Status: CANCELLED | OUTPATIENT
Start: 2021-11-24

## 2021-11-10 RX ORDER — ALBUTEROL SULFATE 0.83 MG/ML
2.5 SOLUTION RESPIRATORY (INHALATION) AS NEEDED
Status: CANCELLED
Start: 2021-11-24

## 2021-11-10 RX ORDER — DIPHENHYDRAMINE HCL 25 MG
50 CAPSULE ORAL ONCE
Status: CANCELLED
Start: 2021-11-24 | End: 2021-11-24

## 2021-11-10 RX ORDER — HYDROCORTISONE SODIUM SUCCINATE 100 MG/2ML
100 INJECTION, POWDER, FOR SOLUTION INTRAMUSCULAR; INTRAVENOUS AS NEEDED
Status: CANCELLED | OUTPATIENT
Start: 2021-11-24

## 2021-11-10 RX ORDER — DIPHENHYDRAMINE HYDROCHLORIDE 50 MG/ML
25 INJECTION, SOLUTION INTRAMUSCULAR; INTRAVENOUS AS NEEDED
Status: CANCELLED
Start: 2021-11-24

## 2021-11-10 RX ORDER — HEPARIN 100 UNIT/ML
300-500 SYRINGE INTRAVENOUS AS NEEDED
Status: CANCELLED
Start: 2021-11-24

## 2021-11-10 RX ORDER — SODIUM CHLORIDE 9 MG/ML
25 INJECTION, SOLUTION INTRAVENOUS CONTINUOUS
Status: CANCELLED | OUTPATIENT
Start: 2021-11-24

## 2021-11-10 RX ADMIN — FAMOTIDINE 40 MG: 10 INJECTION INTRAVENOUS at 14:56

## 2021-11-10 RX ADMIN — DIPHENHYDRAMINE HYDROCHLORIDE 50 MG: 25 CAPSULE ORAL at 14:56

## 2021-11-10 RX ADMIN — PEGLOTICASE 8 MG: 8 INJECTION, SOLUTION INTRAVENOUS at 15:34

## 2021-11-10 RX ADMIN — SODIUM CHLORIDE 25 ML/HR: 9 INJECTION, SOLUTION INTRAVENOUS at 14:58

## 2021-11-10 RX ADMIN — ACETAMINOPHEN 650 MG: 325 TABLET ORAL at 14:56

## 2021-11-10 NOTE — PROGRESS NOTES
Outpatient Infusion Center Progress Note    2898 Pt admit to [facility] for Krystexxa infusion ambulatory in stable condition. Assessment completed. No new concerns voiced. Patient denies Covid contact, PIV established in [facility] with good blood return, labs drawn and sent for processing, as well as a post infusion Uric acid    Visit Vitals  /68 (BP 1 Location: Right upper arm)   Pulse 76   Temp 97.6 °F (36.4 °C)   Resp 18       Medications:  Medications Administered     0.9% sodium chloride infusion     Admin Date  11/10/2021 Action  New Bag Dose  25 mL/hr Rate  25 mL/hr Route  IntraVENous Administered By  Jose Jj RN          acetaminophen (TYLENOL) tablet 650 mg     Admin Date  11/10/2021 Action  Given Dose  650 mg Route  Oral Administered By  Jose Jj RN          diphenhydrAMINE (BENADRYL) capsule 50 mg     Admin Date  11/10/2021 Action  Given Dose  50 mg Route  Oral Administered By  Jose Jj RN          famotidine (PF) (PEPCID) 40 mg in 0.9% sodium chloride 10 mL injection     Admin Date  11/10/2021 Action  Given Dose  40 mg Route  IntraVENous Administered By  Jose Jj RN          pegloticase [facility]) 8 mg in 0.9% sodium chloride 250 mL, overfill volume 25 mL infusion     Admin Date  11/10/2021 Action  New Bag Dose  8 mg Rate  138 mL/hr Route  IntraVENous Administered By  Jose Jj, LORRI                2540 Pt tolerated treatment well. PIV maintained good blood return until infusion was completed and it was removed per protocol D/c home ambulatory in no distress. Pt aware of next appointment scheduled for 11/24/21.     Recent Results (from the past 12 hour(s))   CBC WITH AUTOMATED DIFF    Collection Time: 11/10/21  2:41 PM   Result Value Ref Range    WBC 5.0 4.1 - 11.1 K/uL    RBC 2.92 (L) 4.10 - 5.70 M/uL    HGB 8.8 (L) 12.1 - 17.0 g/dL    HCT 29.1 (L) 36.6 - 50.3 %    MCV 99.7 (H) 80.0 - 99.0 FL    MCH 30.1 26.0 - 34.0 PG    MCHC 30.2 30.0 - 36.5 g/dL    RDW 13.2 11.5 - 14.5 %    PLATELET 820 976 - 744 K/uL    MPV 10.6 8.9 - 12.9 FL    NRBC 0.0 0  WBC    ABSOLUTE NRBC 0.00 0.00 - 0.01 K/uL    NEUTROPHILS 47 32 - 75 %    LYMPHOCYTES 31 12 - 49 %    MONOCYTES 12 5 - 13 %    EOSINOPHILS 9 (H) 0 - 7 %    BASOPHILS 1 0 - 1 %    IMMATURE GRANULOCYTES 0 0.0 - 0.5 %    ABS. NEUTROPHILS 2.3 1.8 - 8.0 K/UL    ABS. LYMPHOCYTES 1.6 0.8 - 3.5 K/UL    ABS. MONOCYTES 0.6 0.0 - 1.0 K/UL    ABS. EOSINOPHILS 0.5 (H) 0.0 - 0.4 K/UL    ABS. BASOPHILS 0.1 0.0 - 0.1 K/UL    ABS. IMM. GRANS. 0.0 0.00 - 0.04 K/UL    DF AUTOMATED     METABOLIC PANEL, COMPREHENSIVE    Collection Time: 11/10/21  2:41 PM   Result Value Ref Range    Sodium 139 136 - 145 mmol/L    Potassium 4.2 3.5 - 5.1 mmol/L    Chloride 111 (H) 97 - 108 mmol/L    CO2 20 (L) 21 - 32 mmol/L    Anion gap 8 5 - 15 mmol/L    Glucose 95 65 - 100 mg/dL    BUN 90 (H) 6 - 20 MG/DL    Creatinine 7.42 (H) 0.70 - 1.30 MG/DL    BUN/Creatinine ratio 12 12 - 20      GFR est AA 10 (L) >60 ml/min/1.73m2    GFR est non-AA 8 (L) >60 ml/min/1.73m2    Calcium 8.5 8.5 - 10.1 MG/DL    Bilirubin, total 0.3 0.2 - 1.0 MG/DL    ALT (SGPT) 23 12 - 78 U/L    AST (SGOT) 15 15 - 37 U/L    Alk.  phosphatase 23 (L) 45 - 117 U/L    Protein, total 6.8 6.4 - 8.2 g/dL    Albumin 3.3 (L) 3.5 - 5.0 g/dL    Globulin 3.5 2.0 - 4.0 g/dL    A-G Ratio 0.9 (L) 1.1 - 2.2     SED RATE (ESR)    Collection Time: 11/10/21  2:41 PM   Result Value Ref Range    Sed rate, automated 13 0 - 15 mm/hr   URIC ACID    Collection Time: 11/10/21  2:41 PM   Result Value Ref Range    Uric acid 0.3 (L) 3.5 - 7.2 MG/DL

## 2021-11-11 NOTE — PROGRESS NOTES
The results were reviewed.  Uric acid <0.2 Medical Necessity Information: It is in your best interest to select a reason for this procedure from the list below. All of these items fulfill various CMS LCD requirements except the new and changing color options. Detail Level: Detailed Render Post-Care Instructions In Note?: yes Render Note In Bullet Format When Appropriate: No Number Of Freeze-Thaw Cycles: 1 freeze-thaw cycle Consent: The patient's consent was obtained including but not limited to risks of crusting, scabbing, blistering, scarring, darker or lighter pigmentary change, recurrence, incomplete removal and infection. Post-Care Instructions: I reviewed with the patient in detail post-care instructions. Patient is to wear sunprotection, and avoid picking at any of the treated lesions. Pt may apply Vaseline to crusted or scabbing areas. Medical Necessity Clause: This procedure was medically necessary because the lesions that were treated were:

## 2021-11-24 ENCOUNTER — HOSPITAL ENCOUNTER (OUTPATIENT)
Dept: INFUSION THERAPY | Age: 51
Discharge: HOME | End: 2021-11-24
Payer: COMMERCIAL

## 2021-11-24 VITALS
SYSTOLIC BLOOD PRESSURE: 123 MMHG | OXYGEN SATURATION: 98 % | HEART RATE: 73 BPM | TEMPERATURE: 96.9 F | DIASTOLIC BLOOD PRESSURE: 74 MMHG | RESPIRATION RATE: 18 BRPM

## 2021-11-24 DIAGNOSIS — M1A.39X1 CHRONIC TOPHACEOUS GOUT OF MULTIPLE SITES DUE TO RENAL IMPAIRMENT: Primary | ICD-10-CM

## 2021-11-24 LAB
ALBUMIN SERPL-MCNC: 3.6 G/DL (ref 3.5–5)
ALBUMIN/GLOB SERPL: 1 {RATIO} (ref 1.1–2.2)
ALP SERPL-CCNC: 24 U/L (ref 45–117)
ALT SERPL-CCNC: 27 U/L (ref 12–78)
ANION GAP SERPL CALC-SCNC: 9 MMOL/L (ref 5–15)
AST SERPL-CCNC: 14 U/L (ref 15–37)
BASOPHILS # BLD: 0.1 K/UL (ref 0–0.1)
BASOPHILS NFR BLD: 1 % (ref 0–1)
BILIRUB SERPL-MCNC: 0.4 MG/DL (ref 0.2–1)
BUN SERPL-MCNC: 87 MG/DL (ref 6–20)
BUN/CREAT SERPL: 12 (ref 12–20)
CALCIUM SERPL-MCNC: 9.3 MG/DL (ref 8.5–10.1)
CHLORIDE SERPL-SCNC: 110 MMOL/L (ref 97–108)
CO2 SERPL-SCNC: 16 MMOL/L (ref 21–32)
CREAT SERPL-MCNC: 7.37 MG/DL (ref 0.7–1.3)
DIFFERENTIAL METHOD BLD: ABNORMAL
EOSINOPHIL # BLD: 0.6 K/UL (ref 0–0.4)
EOSINOPHIL NFR BLD: 11 % (ref 0–7)
ERYTHROCYTE [DISTWIDTH] IN BLOOD BY AUTOMATED COUNT: 13 % (ref 11.5–14.5)
ERYTHROCYTE [SEDIMENTATION RATE] IN BLOOD: 14 MM/HR (ref 0–15)
GLOBULIN SER CALC-MCNC: 3.6 G/DL (ref 2–4)
GLUCOSE SERPL-MCNC: 131 MG/DL (ref 65–100)
HCT VFR BLD AUTO: 32.4 % (ref 36.6–50.3)
HGB BLD-MCNC: 9.5 G/DL (ref 12.1–17)
IMM GRANULOCYTES # BLD AUTO: 0 K/UL (ref 0–0.04)
IMM GRANULOCYTES NFR BLD AUTO: 0 % (ref 0–0.5)
LYMPHOCYTES # BLD: 1.9 K/UL (ref 0.8–3.5)
LYMPHOCYTES NFR BLD: 31 % (ref 12–49)
MCH RBC QN AUTO: 29.5 PG (ref 26–34)
MCHC RBC AUTO-ENTMCNC: 29.3 G/DL (ref 30–36.5)
MCV RBC AUTO: 100.6 FL (ref 80–99)
MONOCYTES # BLD: 0.5 K/UL (ref 0–1)
MONOCYTES NFR BLD: 8 % (ref 5–13)
NEUTS SEG # BLD: 3 K/UL (ref 1.8–8)
NEUTS SEG NFR BLD: 49 % (ref 32–75)
NRBC # BLD: 0 K/UL (ref 0–0.01)
NRBC BLD-RTO: 0 PER 100 WBC
PLATELET # BLD AUTO: 242 K/UL (ref 150–400)
PMV BLD AUTO: 10.2 FL (ref 8.9–12.9)
POTASSIUM SERPL-SCNC: 4.2 MMOL/L (ref 3.5–5.1)
PROT SERPL-MCNC: 7.2 G/DL (ref 6.4–8.2)
RBC # BLD AUTO: 3.22 M/UL (ref 4.1–5.7)
SODIUM SERPL-SCNC: 135 MMOL/L (ref 136–145)
URATE SERPL-MCNC: <0.2 MG/DL (ref 3.5–7.2)
URATE SERPL-MCNC: <0.2 MG/DL (ref 3.5–7.2)
WBC # BLD AUTO: 6 K/UL (ref 4.1–11.1)

## 2021-11-24 PROCEDURE — 36415 COLL VENOUS BLD VENIPUNCTURE: CPT

## 2021-11-24 PROCEDURE — 85025 COMPLETE CBC W/AUTO DIFF WBC: CPT

## 2021-11-24 PROCEDURE — 96375 TX/PRO/DX INJ NEW DRUG ADDON: CPT

## 2021-11-24 PROCEDURE — 85652 RBC SED RATE AUTOMATED: CPT

## 2021-11-24 PROCEDURE — 74011250637 HC RX REV CODE- 250/637: Performed by: INTERNAL MEDICINE

## 2021-11-24 PROCEDURE — 96365 THER/PROPH/DIAG IV INF INIT: CPT

## 2021-11-24 PROCEDURE — 84550 ASSAY OF BLOOD/URIC ACID: CPT

## 2021-11-24 PROCEDURE — 74011250636 HC RX REV CODE- 250/636: Performed by: INTERNAL MEDICINE

## 2021-11-24 PROCEDURE — 74011000250 HC RX REV CODE- 250: Performed by: INTERNAL MEDICINE

## 2021-11-24 PROCEDURE — 96366 THER/PROPH/DIAG IV INF ADDON: CPT

## 2021-11-24 PROCEDURE — 80053 COMPREHEN METABOLIC PANEL: CPT

## 2021-11-24 RX ORDER — EPINEPHRINE 1 MG/ML
0.3 INJECTION, SOLUTION, CONCENTRATE INTRAVENOUS AS NEEDED
Status: CANCELLED | OUTPATIENT
Start: 2021-12-08

## 2021-11-24 RX ORDER — SODIUM CHLORIDE 0.9 % (FLUSH) 0.9 %
10 SYRINGE (ML) INJECTION AS NEEDED
Status: CANCELLED | OUTPATIENT
Start: 2021-12-08

## 2021-11-24 RX ORDER — SODIUM CHLORIDE 0.9 % (FLUSH) 0.9 %
10 SYRINGE (ML) INJECTION AS NEEDED
Status: DISCONTINUED | OUTPATIENT
Start: 2021-11-24 | End: 2021-11-25 | Stop reason: HOSPADM

## 2021-11-24 RX ORDER — DIPHENHYDRAMINE HYDROCHLORIDE 50 MG/ML
25 INJECTION, SOLUTION INTRAMUSCULAR; INTRAVENOUS AS NEEDED
Status: CANCELLED
Start: 2021-12-08

## 2021-11-24 RX ORDER — DIPHENHYDRAMINE HYDROCHLORIDE 50 MG/ML
50 INJECTION, SOLUTION INTRAMUSCULAR; INTRAVENOUS AS NEEDED
Status: CANCELLED
Start: 2021-12-08

## 2021-11-24 RX ORDER — HEPARIN 100 UNIT/ML
300-500 SYRINGE INTRAVENOUS AS NEEDED
Status: CANCELLED
Start: 2021-12-08

## 2021-11-24 RX ORDER — SODIUM CHLORIDE 9 MG/ML
10 INJECTION INTRAMUSCULAR; INTRAVENOUS; SUBCUTANEOUS AS NEEDED
Status: CANCELLED | OUTPATIENT
Start: 2021-12-08

## 2021-11-24 RX ORDER — SODIUM CHLORIDE 9 MG/ML
25 INJECTION, SOLUTION INTRAVENOUS CONTINUOUS
Status: CANCELLED | OUTPATIENT
Start: 2021-12-08

## 2021-11-24 RX ORDER — SODIUM CHLORIDE 9 MG/ML
25 INJECTION, SOLUTION INTRAVENOUS CONTINUOUS
Status: DISCONTINUED | OUTPATIENT
Start: 2021-11-24 | End: 2021-11-25 | Stop reason: HOSPADM

## 2021-11-24 RX ORDER — HYDROCORTISONE SODIUM SUCCINATE 100 MG/2ML
100 INJECTION, POWDER, FOR SOLUTION INTRAMUSCULAR; INTRAVENOUS AS NEEDED
Status: CANCELLED | OUTPATIENT
Start: 2021-12-08

## 2021-11-24 RX ORDER — DIPHENHYDRAMINE HCL 25 MG
50 CAPSULE ORAL ONCE
Status: COMPLETED | OUTPATIENT
Start: 2021-11-24 | End: 2021-11-24

## 2021-11-24 RX ORDER — ALBUTEROL SULFATE 0.83 MG/ML
2.5 SOLUTION RESPIRATORY (INHALATION) AS NEEDED
Status: CANCELLED
Start: 2021-12-08

## 2021-11-24 RX ORDER — ACETAMINOPHEN 325 MG/1
650 TABLET ORAL ONCE
Status: COMPLETED | OUTPATIENT
Start: 2021-11-24 | End: 2021-11-24

## 2021-11-24 RX ORDER — DIPHENHYDRAMINE HCL 25 MG
50 CAPSULE ORAL ONCE
Status: CANCELLED
Start: 2021-12-08 | End: 2021-12-08

## 2021-11-24 RX ORDER — ONDANSETRON 2 MG/ML
8 INJECTION INTRAMUSCULAR; INTRAVENOUS AS NEEDED
Status: CANCELLED | OUTPATIENT
Start: 2021-12-08

## 2021-11-24 RX ORDER — ACETAMINOPHEN 325 MG/1
650 TABLET ORAL AS NEEDED
Status: CANCELLED
Start: 2021-12-08

## 2021-11-24 RX ORDER — ACETAMINOPHEN 325 MG/1
650 TABLET ORAL ONCE
Status: CANCELLED | OUTPATIENT
Start: 2021-12-08 | End: 2021-12-08

## 2021-11-24 RX ADMIN — Medication 10 ML: at 15:50

## 2021-11-24 RX ADMIN — FAMOTIDINE 40 MG: 10 INJECTION INTRAVENOUS at 16:14

## 2021-11-24 RX ADMIN — ACETAMINOPHEN 650 MG: 325 TABLET ORAL at 16:12

## 2021-11-24 RX ADMIN — SODIUM CHLORIDE 25 ML/HR: 900 INJECTION, SOLUTION INTRAVENOUS at 16:10

## 2021-11-24 RX ADMIN — DIPHENHYDRAMINE HYDROCHLORIDE 50 MG: 25 CAPSULE ORAL at 16:11

## 2021-11-24 RX ADMIN — PEGLOTICASE 8 MG: 8 INJECTION, SOLUTION INTRAVENOUS at 16:31

## 2021-11-24 NOTE — PROGRESS NOTES
[facility] Progress Note    Date: November 24, 2021        1535: Pt arrived ambulatory to [facility] for q 2 wk Krystexxa in stable condition. Assessment completed. Fistula placed to left lower arm for future dialysis needs. PIV placed to right hand with positive blood return. Labs drawn and sent for processing. 1600-Pt diaphoretic during IV start; stating \"I feel like I'm on fire\". Pt placed in supine position; feet elevated. Cold cloth placed on forehead. Vitals WNL. No other symptoms voiced. Pt provided with nourishment. 1605-Pt states starting to feel better. He had an episode like this a couple of weeks ago at work. Recovered well after eating. 1610-Pt states \"much better now\" . Pre-medications administered as ordered. Patient denies any symptoms of COVID-19, including SOB, coughing, fever, or generally not feeling well. Patient denies any recent exposure to COVID-19. Patient denies any recent contact with family or friends that have a pending COVID-19 test.    Patient Vitals for the past 12 hrs:   Temp Pulse Resp BP SpO2   11/24/21 1605 -- 63 -- 103/65 --   11/24/21 1600 -- 63 18 113/74 98 %   11/24/21 1535 96.9 °F (36.1 °C) 79 18 114/76 --       Recent Results (from the past 12 hour(s))   CBC WITH AUTOMATED DIFF    Collection Time: 11/24/21  3:45 PM   Result Value Ref Range    WBC 6.0 4.1 - 11.1 K/uL    RBC 3.22 (L) 4.10 - 5.70 M/uL    HGB 9.5 (L) 12.1 - 17.0 g/dL    HCT 32.4 (L) 36.6 - 50.3 %    .6 (H) 80.0 - 99.0 FL    MCH 29.5 26.0 - 34.0 PG    MCHC 29.3 (L) 30.0 - 36.5 g/dL    RDW 13.0 11.5 - 14.5 %    PLATELET 433 158 - 575 K/uL    MPV 10.2 8.9 - 12.9 FL    NRBC 0.0 0  WBC    ABSOLUTE NRBC 0.00 0.00 - 0.01 K/uL    NEUTROPHILS 49 32 - 75 %    LYMPHOCYTES 31 12 - 49 %    MONOCYTES 8 5 - 13 %    EOSINOPHILS 11 (H) 0 - 7 %    BASOPHILS 1 0 - 1 %    IMMATURE GRANULOCYTES 0 0.0 - 0.5 %    ABS. NEUTROPHILS 3.0 1.8 - 8.0 K/UL    ABS. LYMPHOCYTES 1.9 0.8 - 3.5 K/UL    ABS.  MONOCYTES 0.5 0.0 - 1.0 K/UL    ABS. EOSINOPHILS 0.6 (H) 0.0 - 0.4 K/UL    ABS. BASOPHILS 0.1 0.0 - 0.1 K/UL    ABS. IMM. GRANS. 0.0 0.00 - 0.04 K/UL    DF AUTOMATED     METABOLIC PANEL, COMPREHENSIVE    Collection Time: 11/24/21  3:45 PM   Result Value Ref Range    Sodium 135 (L) 136 - 145 mmol/L    Potassium 4.2 3.5 - 5.1 mmol/L    Chloride 110 (H) 97 - 108 mmol/L    CO2 16 (L) 21 - 32 mmol/L    Anion gap 9 5 - 15 mmol/L    Glucose 131 (H) 65 - 100 mg/dL    BUN 87 (H) 6 - 20 MG/DL    Creatinine 7.37 (H) 0.70 - 1.30 MG/DL    BUN/Creatinine ratio 12 12 - 20      GFR est AA 10 (L) >60 ml/min/1.73m2    GFR est non-AA 8 (L) >60 ml/min/1.73m2    Calcium 9.3 8.5 - 10.1 MG/DL    Bilirubin, total 0.4 0.2 - 1.0 MG/DL    ALT (SGPT) 27 12 - 78 U/L    AST (SGOT) 14 (L) 15 - 37 U/L    Alk.  phosphatase 24 (L) 45 - 117 U/L    Protein, total 7.2 6.4 - 8.2 g/dL    Albumin 3.6 3.5 - 5.0 g/dL    Globulin 3.6 2.0 - 4.0 g/dL    A-G Ratio 1.0 (L) 1.1 - 2.2       Medications Administered     0.9% sodium chloride infusion     Admin Date  11/24/2021 Action  New Bag Dose  25 mL/hr Rate  25 mL/hr Route  IntraVENous Administered By  Alexandra Maya RN          acetaminophen (TYLENOL) tablet 650 mg     Admin Date  11/24/2021 Action  Given Dose  650 mg Route  Oral Administered By  Alexandra Maya RN          diphenhydrAMINE (BENADRYL) capsule 50 mg     Admin Date  11/24/2021 Action  Given Dose  50 mg Route  Oral Administered By  Alexandra Maya RN          famotidine (PF) (PEPCID) 40 mg in 0.9% sodium chloride 10 mL injection     Admin Date  11/24/2021 Action  Given Dose  40 mg Route  IntraVENous Administered By  Alexandra Maya RN          [facility] 8 mg in 0.9% sodium chloride 250 mL, overfill volume 25 mL infusion     Admin Date  11/24/2021 Action  New Bag Dose  8 mg Rate  138 mL/hr Route  IntraVENous Administered By  Alexandra Maya RN          sodium chloride (NS) flush 10 mL     Admin Date  11/24/2021 Action  Given Dose  10 mL Route  IntraVENous Administered By  Dedrick Shaver, LORRI                0159:  Pt tolerated treatment well, no adverse reactions noted. Post infusion uric acid drawn and sent for processing. PIV flushed and removed. 2x2 and coban placed. D/Cd from [facility] ambulatory and in no distress.   Patient is aware of next scheduled [facility] appointment on   Future Appointments   Date Time Provider Barry Cano   12/8/2021  2:00 PM B3 [address]   12/22/2021  1:00 PM A3 [address]   3/2/2022 10:00 AM Reagan Rabago MD AOCR BS MARILOU Costello, RN, RN  November 24, 2021

## 2021-11-30 ENCOUNTER — TRANSCRIBE ORDER (OUTPATIENT)
Dept: REGISTRATION | Age: 51
End: 2021-11-30

## 2021-11-30 DIAGNOSIS — Z01.812 PRE-PROCEDURE LAB EXAM: Primary | ICD-10-CM

## 2021-12-01 ENCOUNTER — HOSPITAL ENCOUNTER (OUTPATIENT)
Dept: PREADMISSION TESTING | Age: 51
Discharge: HOME | End: 2021-12-01
Payer: COMMERCIAL

## 2021-12-01 DIAGNOSIS — Z01.812 PRE-PROCEDURE LAB EXAM: ICD-10-CM

## 2021-12-01 PROCEDURE — U0005 INFEC AGEN DETEC AMPLI PROBE: HCPCS

## 2021-12-02 LAB
SARS-COV-2, XPLCVT: NOT DETECTED
SOURCE, COVRS: NORMAL

## 2021-12-03 ENCOUNTER — ANESTHESIA EVENT (OUTPATIENT)
Dept: SURGERY | Age: 51
End: 2021-12-03
Payer: COMMERCIAL

## 2021-12-04 NOTE — ANESTHESIA PREPROCEDURE EVALUATION
Relevant Problems   RESPIRATORY SYSTEM   (+) SOB (shortness of breath)      CARDIOVASCULAR   (+) Essential hypertension      RENAL FAILURE   (+) Autosomal dominant adult polycystic kidney disease   (+) CKD (chronic kidney disease) stage 3, GFR 30-59 ml/min (HCC)   (+) CKD (chronic kidney disease) stage 4, GFR 15-29 ml/min ([facility])       Anesthetic History   No history of anesthetic complications            Review of Systems / Medical History  Patient summary reviewed, nursing notes reviewed and pertinent labs reviewed    Pulmonary            Asthma        Neuro/Psych   Within defined limits           Cardiovascular    Hypertension      CHF             GI/Hepatic/Renal         Renal disease: ESRD and dialysis       Endo/Other  Within defined limits           Other Findings              Physical Exam    Airway  Mallampati: II  TM Distance: > 6 cm  Neck ROM: normal range of motion   Mouth opening: Normal     Cardiovascular  Regular rate and rhythm,  S1 and S2 normal,  no murmur, click, rub, or gallop             Dental  No notable dental hx       Pulmonary  Breath sounds clear to auscultation               Abdominal  GI exam deferred       Other Findings            Anesthetic Plan    ASA: 3  Anesthesia type: MAC      Post-op pain plan if not by surgeon: peripheral nerve block single    Induction: Intravenous  Anesthetic plan and risks discussed with: Patient

## 2021-12-06 ENCOUNTER — HOSPITAL ENCOUNTER (OUTPATIENT)
Age: 51
Setting detail: OUTPATIENT SURGERY
Discharge: HOME | End: 2021-12-06
Payer: COMMERCIAL

## 2021-12-06 ENCOUNTER — ANESTHESIA (OUTPATIENT)
Dept: SURGERY | Age: 51
End: 2021-12-06
Payer: COMMERCIAL

## 2021-12-06 VITALS
TEMPERATURE: 97.7 F | RESPIRATION RATE: 10 BRPM | DIASTOLIC BLOOD PRESSURE: 80 MMHG | BODY MASS INDEX: 28.52 KG/M2 | HEART RATE: 65 BPM | OXYGEN SATURATION: 95 % | SYSTOLIC BLOOD PRESSURE: 121 MMHG | WEIGHT: 203.71 LBS | HEIGHT: 71 IN

## 2021-12-06 DIAGNOSIS — N18.4 CKD (CHRONIC KIDNEY DISEASE) STAGE 4, GFR 15-29 ML/MIN (HCC): Primary | ICD-10-CM

## 2021-12-06 PROCEDURE — 77030031139 HC SUT VCRL2 J&J -A

## 2021-12-06 PROCEDURE — 76060000032 HC ANESTHESIA 0.5 TO 1 HR

## 2021-12-06 PROCEDURE — 77030002996 HC SUT SLK J&J -A

## 2021-12-06 PROCEDURE — 76210000020 HC REC RM PH II FIRST 0.5 HR

## 2021-12-06 PROCEDURE — 77030040361 HC SLV COMPR DVT MDII -B

## 2021-12-06 PROCEDURE — 77030040922 HC BLNKT HYPOTHRM STRY -A

## 2021-12-06 PROCEDURE — 74011000250 HC RX REV CODE- 250

## 2021-12-06 PROCEDURE — 74011250636 HC RX REV CODE- 250/636: Performed by: ANESTHESIOLOGY

## 2021-12-06 PROCEDURE — 77030010938 HC CLP LIG TELE -A

## 2021-12-06 PROCEDURE — 76010000138 HC OR TIME 0.5 TO 1 HR

## 2021-12-06 PROCEDURE — 2709999900 HC NON-CHARGEABLE SUPPLY

## 2021-12-06 PROCEDURE — 74011000258 HC RX REV CODE- 258

## 2021-12-06 PROCEDURE — 74011250636 HC RX REV CODE- 250/636

## 2021-12-06 PROCEDURE — 74011000250 HC RX REV CODE- 250: Performed by: ANESTHESIOLOGY

## 2021-12-06 PROCEDURE — 77030042556 HC PNCL CAUT -B

## 2021-12-06 PROCEDURE — 74011250637 HC RX REV CODE- 250/637: Performed by: ANESTHESIOLOGY

## 2021-12-06 PROCEDURE — 76210000006 HC OR PH I REC 0.5 TO 1 HR

## 2021-12-06 RX ORDER — FENTANYL CITRATE 50 UG/ML
25 INJECTION, SOLUTION INTRAMUSCULAR; INTRAVENOUS
Status: DISCONTINUED | OUTPATIENT
Start: 2021-12-06 | End: 2021-12-06 | Stop reason: HOSPADM

## 2021-12-06 RX ORDER — FENTANYL CITRATE 50 UG/ML
INJECTION, SOLUTION INTRAMUSCULAR; INTRAVENOUS AS NEEDED
Status: DISCONTINUED | OUTPATIENT
Start: 2021-12-06 | End: 2021-12-06 | Stop reason: HOSPADM

## 2021-12-06 RX ORDER — ONDANSETRON 2 MG/ML
4 INJECTION INTRAMUSCULAR; INTRAVENOUS AS NEEDED
Status: DISCONTINUED | OUTPATIENT
Start: 2021-12-06 | End: 2021-12-06 | Stop reason: HOSPADM

## 2021-12-06 RX ORDER — MORPHINE SULFATE 2 MG/ML
2 INJECTION, SOLUTION INTRAMUSCULAR; INTRAVENOUS
Status: DISCONTINUED | OUTPATIENT
Start: 2021-12-06 | End: 2021-12-06 | Stop reason: HOSPADM

## 2021-12-06 RX ORDER — MIDAZOLAM HYDROCHLORIDE 1 MG/ML
INJECTION, SOLUTION INTRAMUSCULAR; INTRAVENOUS AS NEEDED
Status: DISCONTINUED | OUTPATIENT
Start: 2021-12-06 | End: 2021-12-06 | Stop reason: HOSPADM

## 2021-12-06 RX ORDER — SODIUM CHLORIDE, SODIUM LACTATE, POTASSIUM CHLORIDE, CALCIUM CHLORIDE 600; 310; 30; 20 MG/100ML; MG/100ML; MG/100ML; MG/100ML
1000 INJECTION, SOLUTION INTRAVENOUS CONTINUOUS
Status: DISCONTINUED | OUTPATIENT
Start: 2021-12-06 | End: 2021-12-06 | Stop reason: HOSPADM

## 2021-12-06 RX ORDER — SODIUM CHLORIDE 9 MG/ML
INJECTION, SOLUTION INTRAVENOUS
Status: DISCONTINUED | OUTPATIENT
Start: 2021-12-06 | End: 2021-12-06 | Stop reason: HOSPADM

## 2021-12-06 RX ORDER — MIDAZOLAM HYDROCHLORIDE 1 MG/ML
1 INJECTION, SOLUTION INTRAMUSCULAR; INTRAVENOUS AS NEEDED
Status: DISCONTINUED | OUTPATIENT
Start: 2021-12-06 | End: 2021-12-06 | Stop reason: HOSPADM

## 2021-12-06 RX ORDER — ACETAMINOPHEN 325 MG/1
650 TABLET ORAL ONCE
Status: COMPLETED | OUTPATIENT
Start: 2021-12-06 | End: 2021-12-06

## 2021-12-06 RX ORDER — SODIUM CHLORIDE 9 MG/ML
25 INJECTION, SOLUTION INTRAVENOUS CONTINUOUS
Status: DISCONTINUED | OUTPATIENT
Start: 2021-12-06 | End: 2021-12-06 | Stop reason: HOSPADM

## 2021-12-06 RX ORDER — PHENYLEPHRINE HCL IN 0.9% NACL 0.4MG/10ML
SYRINGE (ML) INTRAVENOUS AS NEEDED
Status: DISCONTINUED | OUTPATIENT
Start: 2021-12-06 | End: 2021-12-06 | Stop reason: HOSPADM

## 2021-12-06 RX ORDER — LIDOCAINE HYDROCHLORIDE 20 MG/ML
INJECTION, SOLUTION EPIDURAL; INFILTRATION; INTRACAUDAL; PERINEURAL AS NEEDED
Status: DISCONTINUED | OUTPATIENT
Start: 2021-12-06 | End: 2021-12-06 | Stop reason: HOSPADM

## 2021-12-06 RX ORDER — DEXAMETHASONE SODIUM PHOSPHATE 4 MG/ML
INJECTION, SOLUTION INTRA-ARTICULAR; INTRALESIONAL; INTRAMUSCULAR; INTRAVENOUS; SOFT TISSUE AS NEEDED
Status: DISCONTINUED | OUTPATIENT
Start: 2021-12-06 | End: 2021-12-06 | Stop reason: HOSPADM

## 2021-12-06 RX ORDER — DIPHENHYDRAMINE HYDROCHLORIDE 50 MG/ML
12.5 INJECTION, SOLUTION INTRAMUSCULAR; INTRAVENOUS AS NEEDED
Status: DISCONTINUED | OUTPATIENT
Start: 2021-12-06 | End: 2021-12-06 | Stop reason: HOSPADM

## 2021-12-06 RX ORDER — ALBUTEROL SULFATE 0.83 MG/ML
2.5 SOLUTION RESPIRATORY (INHALATION) AS NEEDED
Status: DISCONTINUED | OUTPATIENT
Start: 2021-12-06 | End: 2021-12-06 | Stop reason: HOSPADM

## 2021-12-06 RX ORDER — KETAMINE HCL IN 0.9 % NACL 50 MG/5 ML
SYRINGE (ML) INTRAVENOUS AS NEEDED
Status: DISCONTINUED | OUTPATIENT
Start: 2021-12-06 | End: 2021-12-06 | Stop reason: HOSPADM

## 2021-12-06 RX ORDER — GLYCOPYRROLATE 0.2 MG/ML
0.2 INJECTION INTRAMUSCULAR; INTRAVENOUS
Status: DISCONTINUED | OUTPATIENT
Start: 2021-12-06 | End: 2021-12-06 | Stop reason: HOSPADM

## 2021-12-06 RX ORDER — ROPIVACAINE HYDROCHLORIDE 5 MG/ML
30 INJECTION, SOLUTION EPIDURAL; INFILTRATION; PERINEURAL AS NEEDED
Status: DISCONTINUED | OUTPATIENT
Start: 2021-12-06 | End: 2021-12-06 | Stop reason: HOSPADM

## 2021-12-06 RX ORDER — ONDANSETRON 2 MG/ML
INJECTION INTRAMUSCULAR; INTRAVENOUS AS NEEDED
Status: DISCONTINUED | OUTPATIENT
Start: 2021-12-06 | End: 2021-12-06 | Stop reason: HOSPADM

## 2021-12-06 RX ORDER — HYDROCODONE BITARTRATE AND ACETAMINOPHEN 7.5; 325 MG/1; MG/1
1 TABLET ORAL
Qty: 10 TABLET | Refills: 0 | Status: SHIPPED | OUTPATIENT
Start: 2021-12-06 | End: 2021-12-09

## 2021-12-06 RX ORDER — PROPOFOL 10 MG/ML
INJECTION, EMULSION INTRAVENOUS
Status: DISCONTINUED | OUTPATIENT
Start: 2021-12-06 | End: 2021-12-06 | Stop reason: HOSPADM

## 2021-12-06 RX ORDER — HYDROMORPHONE HYDROCHLORIDE 1 MG/ML
0.2 INJECTION, SOLUTION INTRAMUSCULAR; INTRAVENOUS; SUBCUTANEOUS
Status: DISCONTINUED | OUTPATIENT
Start: 2021-12-06 | End: 2021-12-06 | Stop reason: HOSPADM

## 2021-12-06 RX ORDER — MIDAZOLAM HYDROCHLORIDE 1 MG/ML
0.5 INJECTION, SOLUTION INTRAMUSCULAR; INTRAVENOUS
Status: DISCONTINUED | OUTPATIENT
Start: 2021-12-06 | End: 2021-12-06 | Stop reason: HOSPADM

## 2021-12-06 RX ORDER — PROPOFOL 10 MG/ML
INJECTION, EMULSION INTRAVENOUS AS NEEDED
Status: DISCONTINUED | OUTPATIENT
Start: 2021-12-06 | End: 2021-12-06 | Stop reason: HOSPADM

## 2021-12-06 RX ORDER — FENTANYL CITRATE 50 UG/ML
50 INJECTION, SOLUTION INTRAMUSCULAR; INTRAVENOUS AS NEEDED
Status: DISCONTINUED | OUTPATIENT
Start: 2021-12-06 | End: 2021-12-06 | Stop reason: HOSPADM

## 2021-12-06 RX ORDER — LIDOCAINE HYDROCHLORIDE 10 MG/ML
0.1 INJECTION, SOLUTION EPIDURAL; INFILTRATION; INTRACAUDAL; PERINEURAL AS NEEDED
Status: DISCONTINUED | OUTPATIENT
Start: 2021-12-06 | End: 2021-12-06 | Stop reason: HOSPADM

## 2021-12-06 RX ORDER — HYDROCODONE BITARTRATE AND ACETAMINOPHEN 5; 325 MG/1; MG/1
1 TABLET ORAL AS NEEDED
Status: DISCONTINUED | OUTPATIENT
Start: 2021-12-06 | End: 2021-12-06 | Stop reason: HOSPADM

## 2021-12-06 RX ADMIN — PROPOFOL 75 MCG/KG/MIN: 10 INJECTION, EMULSION INTRAVENOUS at 07:34

## 2021-12-06 RX ADMIN — ONDANSETRON HYDROCHLORIDE 4 MG: 2 INJECTION, SOLUTION INTRAMUSCULAR; INTRAVENOUS at 08:06

## 2021-12-06 RX ADMIN — HYDROCODONE BITARTRATE AND ACETAMINOPHEN 1 TABLET: 5; 325 TABLET ORAL at 08:29

## 2021-12-06 RX ADMIN — PROPOFOL 50 MG: 10 INJECTION, EMULSION INTRAVENOUS at 07:45

## 2021-12-06 RX ADMIN — Medication 80 MCG: at 07:55

## 2021-12-06 RX ADMIN — SODIUM CHLORIDE 25 ML/HR: 9 INJECTION, SOLUTION INTRAVENOUS at 06:36

## 2021-12-06 RX ADMIN — PROPOFOL 50 MG: 10 INJECTION, EMULSION INTRAVENOUS at 07:50

## 2021-12-06 RX ADMIN — PROPOFOL 20 MG: 10 INJECTION, EMULSION INTRAVENOUS at 07:40

## 2021-12-06 RX ADMIN — ACETAMINOPHEN 650 MG: 325 TABLET ORAL at 06:38

## 2021-12-06 RX ADMIN — LIDOCAINE HYDROCHLORIDE 80 MG: 20 INJECTION, SOLUTION EPIDURAL; INFILTRATION; INTRACAUDAL; PERINEURAL at 07:34

## 2021-12-06 RX ADMIN — SODIUM CHLORIDE: 900 INJECTION, SOLUTION INTRAVENOUS at 07:28

## 2021-12-06 RX ADMIN — Medication 80 MCG: at 07:58

## 2021-12-06 RX ADMIN — MIDAZOLAM 1 MG: 1 INJECTION INTRAMUSCULAR; INTRAVENOUS at 07:28

## 2021-12-06 RX ADMIN — Medication 30 MG: at 07:44

## 2021-12-06 RX ADMIN — WATER 2 G: 1 INJECTION INTRAMUSCULAR; INTRAVENOUS; SUBCUTANEOUS at 07:40

## 2021-12-06 RX ADMIN — FENTANYL CITRATE 50 MCG: 50 INJECTION, SOLUTION INTRAMUSCULAR; INTRAVENOUS at 07:50

## 2021-12-06 RX ADMIN — PROPOFOL 80 MG: 10 INJECTION, EMULSION INTRAVENOUS at 07:34

## 2021-12-06 RX ADMIN — DEXAMETHASONE SODIUM PHOSPHATE 4 MG: 4 INJECTION, SOLUTION INTRAMUSCULAR; INTRAVENOUS at 07:54

## 2021-12-06 RX ADMIN — FENTANYL CITRATE 50 MCG: 50 INJECTION, SOLUTION INTRAMUSCULAR; INTRAVENOUS at 07:52

## 2021-12-06 NOTE — OP NOTES
[address] [facility]  OPERATIVE REPORT    Name:  Serene Meza  MR#:  [id number]  :  1970  ACCOUNT #:  [de-identified]  DATE OF SERVICE:  2021      PREOPERATIVE DIAGNOSIS:  Persistent left arm dialysis fistula side branch. POSTOPERATIVE DIAGNOSIS:  Persistent left arm dialysis fistula side branch. PROCEDURE PERFORMED:  Ligation, left arm dialysis fistula side branch. SURGEON:  Simon Taylor MD    ASSISTANT:  Srinivas Mendosa. ANESTHESIA:  Local with sedation. COMPLICATIONS:  None. SPECIMENS REMOVED:  None. IMPLANTS:  None. ESTIMATED BLOOD LOSS:  Minimal.    INDICATIONS:  The patient is a 47-year-old male with chronic renal insufficiency, not yet requiring dialysis. He had a left proximal radial artery to cephalic vein fistula in mid October. The fistula is patent, but has less than adequate flow due to a persistent side branch that appears to be a centimeter or two from the anastomosis. This could not be safely treated with coils and it will be ligated. PROCEDURE:  The patient's left arm was prepped and draped. A longitudinal incision was made in the proximal anterior forearm through the previous skin scar for fistula placement. The incision was carried into the subcutaneous level where the fistula was identified and dissected free. A prominent side branch on the posterior medial side of the fistula was identified that appears to empty into the deep system. This was ligated with a silk tie. No other side branches were identified. The incision was then closed with Vicryl subcutaneous suture and Vicryl subcuticular skin closure. Dressings were applied and the patient was returned to the recovery room in stable condition.       Marjorie Barber MD      [email or web address]  D:  2021 8:16  T:  2021 8:29  JOB #:  [id number]

## 2021-12-06 NOTE — ANESTHESIA POSTPROCEDURE EVALUATION
Post-Anesthesia Evaluation and Assessment    Patient: Jennie Serrato MRN: [id number]  SSN: [id number]    YOB: 1970  Age: 54 y.o. Sex: male      I have evaluated the patient and they are stable and ready for discharge from the PACU. Cardiovascular Function/Vital Signs  Visit Vitals  /80 (BP 1 Location: Right lower arm, BP Patient Position: At rest;Sitting)   Pulse 65   Temp 36.5 °C (97.7 °F)   Resp 10   Ht 5' 11\" (1.803 m)   Wt 92.4 kg (203 lb 11.3 oz)   SpO2 95%   BMI 28.41 kg/m²       Patient is status post MAC anesthesia for Procedure(s):  LIGATION OF LEFT ARM ARTERIO-VENOUS FISTULA SIDE BRANCH. Nausea/Vomiting: None    Postoperative hydration reviewed and adequate. Pain:  Pain Scale 1: Numeric (0 - 10) (12/06/21 0845)  Pain Intensity 1: 0 (12/06/21 0845)   Managed    Neurological Status:   Neuro (WDL): Within Defined Limits (12/06/21 0845)   At baseline    Mental Status, Level of Consciousness: Alert and  oriented to person, place, and time    Pulmonary Status:   O2 Device: None (Room air) (12/06/21 0920)   Adequate oxygenation and airway patent    Complications related to anesthesia: None    Post-anesthesia assessment completed. No concerns    Signed By: Qian Hand MD     December 6, 2021              Procedure(s):  LIGATION OF LEFT ARM ARTERIO-VENOUS FISTULA SIDE BRANCH. MAC    <BSHSIANPOST>    INITIAL Post-op Vital signs:   Vitals Value Taken Time   /88 12/06/21 0845   Temp 36.5 °C (97.7 °F) 12/06/21 0821   Pulse 68 12/06/21 0851   Resp 13 12/06/21 0851   SpO2 97 % 12/06/21 0851   Vitals shown include unvalidated device data.

## 2021-12-06 NOTE — DISCHARGE INSTRUCTIONS
You took [facility] at 8:30AM.        Patient Discharge Instructions    Madhu Cintron / [id number] : 1970    Admitted 2021 Discharged: 2021     Take Home Medications     . · It is important that you take the medication exactly as they are prescribed. · Keep your medication in the bottles provided by the pharmacist and keep a list of the medication names, dosages, and times to be taken in your wallet. · Do not take other medications without consulting your doctor. What to do at Home    Recommended diet: Renal Diet,     Recommended activity: Activity as tolerated,     Additional Instructions: remove left arm dressing on Wed and leave open    Follow-up with Dr Clementine Cao in 2 weeks, call [phone] for appt        ______________________________________________________________________    Anesthesia Discharge Instructions    After general anesthesia or intervenous sedation, for 24 hours or while taking prescription Narcotics:  · Limit your activities  · Do not drive or operate hazardous machinery  · If you have not urinated within 8 hours after discharge, please contact your surgeon on call. · Do not make important personal or business decisions  · Do not drink alcoholic beverages    Report the following to your surgeon:  · Excessive pain, swelling, redness or odor of or around the surgical area  · Temperature over 100.5 degrees  · Nausea and vomiting lasting longer than 4 hours or if unable to take medication  · Any signs of decreased circulation or nerve impairment to extremity:  Change in color, persistent numbness, tingling, coldness or increased pain.   · Any questions

## 2021-12-08 ENCOUNTER — HOSPITAL ENCOUNTER (OUTPATIENT)
Dept: INFUSION THERAPY | Age: 51
Discharge: HOME | End: 2021-12-08
Payer: COMMERCIAL

## 2021-12-08 VITALS
DIASTOLIC BLOOD PRESSURE: 79 MMHG | SYSTOLIC BLOOD PRESSURE: 127 MMHG | HEART RATE: 76 BPM | TEMPERATURE: 97.3 F | RESPIRATION RATE: 18 BRPM

## 2021-12-08 DIAGNOSIS — M1A.39X1 CHRONIC TOPHACEOUS GOUT OF MULTIPLE SITES DUE TO RENAL IMPAIRMENT: Primary | ICD-10-CM

## 2021-12-08 LAB
ALBUMIN SERPL-MCNC: 3.6 G/DL (ref 3.5–5)
ALBUMIN/GLOB SERPL: 1.1 {RATIO} (ref 1.1–2.2)
ALP SERPL-CCNC: 22 U/L (ref 45–117)
ALT SERPL-CCNC: 12 U/L (ref 12–78)
ANION GAP SERPL CALC-SCNC: 9 MMOL/L (ref 5–15)
AST SERPL-CCNC: 11 U/L (ref 15–37)
BASOPHILS # BLD: 0 K/UL (ref 0–0.1)
BASOPHILS NFR BLD: 1 % (ref 0–1)
BILIRUB SERPL-MCNC: 0.4 MG/DL (ref 0.2–1)
BUN SERPL-MCNC: 89 MG/DL (ref 6–20)
BUN/CREAT SERPL: 12 (ref 12–20)
CALCIUM SERPL-MCNC: 9.2 MG/DL (ref 8.5–10.1)
CHLORIDE SERPL-SCNC: 114 MMOL/L (ref 97–108)
CO2 SERPL-SCNC: 17 MMOL/L (ref 21–32)
CREAT SERPL-MCNC: 7.13 MG/DL (ref 0.7–1.3)
DIFFERENTIAL METHOD BLD: ABNORMAL
EOSINOPHIL # BLD: 0.6 K/UL (ref 0–0.4)
EOSINOPHIL NFR BLD: 10 % (ref 0–7)
ERYTHROCYTE [DISTWIDTH] IN BLOOD BY AUTOMATED COUNT: 13.2 % (ref 11.5–14.5)
ERYTHROCYTE [SEDIMENTATION RATE] IN BLOOD: 21 MM/HR (ref 0–15)
GLOBULIN SER CALC-MCNC: 3.3 G/DL (ref 2–4)
GLUCOSE SERPL-MCNC: 89 MG/DL (ref 65–100)
HCT VFR BLD AUTO: 30.2 % (ref 36.6–50.3)
HGB BLD-MCNC: 8.8 G/DL (ref 12.1–17)
IMM GRANULOCYTES # BLD AUTO: 0 K/UL (ref 0–0.04)
IMM GRANULOCYTES NFR BLD AUTO: 0 % (ref 0–0.5)
LYMPHOCYTES # BLD: 1.4 K/UL (ref 0.8–3.5)
LYMPHOCYTES NFR BLD: 24 % (ref 12–49)
MCH RBC QN AUTO: 29.3 PG (ref 26–34)
MCHC RBC AUTO-ENTMCNC: 29.1 G/DL (ref 30–36.5)
MCV RBC AUTO: 100.7 FL (ref 80–99)
MONOCYTES # BLD: 0.5 K/UL (ref 0–1)
MONOCYTES NFR BLD: 9 % (ref 5–13)
NEUTS SEG # BLD: 3.1 K/UL (ref 1.8–8)
NEUTS SEG NFR BLD: 56 % (ref 32–75)
NRBC # BLD: 0 K/UL (ref 0–0.01)
NRBC BLD-RTO: 0 PER 100 WBC
PLATELET # BLD AUTO: 236 K/UL (ref 150–400)
PMV BLD AUTO: 10.5 FL (ref 8.9–12.9)
POTASSIUM SERPL-SCNC: 4.3 MMOL/L (ref 3.5–5.1)
PROT SERPL-MCNC: 6.9 G/DL (ref 6.4–8.2)
RBC # BLD AUTO: 3 M/UL (ref 4.1–5.7)
SODIUM SERPL-SCNC: 140 MMOL/L (ref 136–145)
URATE SERPL-MCNC: <0.2 MG/DL (ref 3.5–7.2)
URATE SERPL-MCNC: <0.2 MG/DL (ref 3.5–7.2)
WBC # BLD AUTO: 5.7 K/UL (ref 4.1–11.1)

## 2021-12-08 PROCEDURE — 74011250637 HC RX REV CODE- 250/637: Performed by: INTERNAL MEDICINE

## 2021-12-08 PROCEDURE — 96366 THER/PROPH/DIAG IV INF ADDON: CPT

## 2021-12-08 PROCEDURE — 96365 THER/PROPH/DIAG IV INF INIT: CPT

## 2021-12-08 PROCEDURE — 74011250636 HC RX REV CODE- 250/636: Performed by: INTERNAL MEDICINE

## 2021-12-08 PROCEDURE — 85652 RBC SED RATE AUTOMATED: CPT

## 2021-12-08 PROCEDURE — 84550 ASSAY OF BLOOD/URIC ACID: CPT

## 2021-12-08 PROCEDURE — 36415 COLL VENOUS BLD VENIPUNCTURE: CPT

## 2021-12-08 PROCEDURE — 85025 COMPLETE CBC W/AUTO DIFF WBC: CPT

## 2021-12-08 PROCEDURE — 74011000250 HC RX REV CODE- 250: Performed by: INTERNAL MEDICINE

## 2021-12-08 PROCEDURE — 80053 COMPREHEN METABOLIC PANEL: CPT

## 2021-12-08 PROCEDURE — 96375 TX/PRO/DX INJ NEW DRUG ADDON: CPT

## 2021-12-08 RX ORDER — DIPHENHYDRAMINE HCL 25 MG
50 CAPSULE ORAL ONCE
Status: COMPLETED | OUTPATIENT
Start: 2021-12-08 | End: 2021-12-08

## 2021-12-08 RX ORDER — SODIUM CHLORIDE 9 MG/ML
25 INJECTION, SOLUTION INTRAVENOUS CONTINUOUS
Status: CANCELLED | OUTPATIENT
Start: 2021-12-22

## 2021-12-08 RX ORDER — ALBUTEROL SULFATE 0.83 MG/ML
2.5 SOLUTION RESPIRATORY (INHALATION) AS NEEDED
Status: CANCELLED
Start: 2021-12-22

## 2021-12-08 RX ORDER — HYDROCORTISONE SODIUM SUCCINATE 100 MG/2ML
100 INJECTION, POWDER, FOR SOLUTION INTRAMUSCULAR; INTRAVENOUS AS NEEDED
Status: CANCELLED | OUTPATIENT
Start: 2021-12-22

## 2021-12-08 RX ORDER — DIPHENHYDRAMINE HYDROCHLORIDE 50 MG/ML
50 INJECTION, SOLUTION INTRAMUSCULAR; INTRAVENOUS AS NEEDED
Status: CANCELLED
Start: 2021-12-22

## 2021-12-08 RX ORDER — SODIUM CHLORIDE 9 MG/ML
25 INJECTION, SOLUTION INTRAVENOUS CONTINUOUS
Status: DISCONTINUED | OUTPATIENT
Start: 2021-12-08 | End: 2021-12-09 | Stop reason: HOSPADM

## 2021-12-08 RX ORDER — ACETAMINOPHEN 325 MG/1
650 TABLET ORAL ONCE
Status: CANCELLED | OUTPATIENT
Start: 2021-12-22 | End: 2021-12-22

## 2021-12-08 RX ORDER — DIPHENHYDRAMINE HYDROCHLORIDE 50 MG/ML
25 INJECTION, SOLUTION INTRAMUSCULAR; INTRAVENOUS AS NEEDED
Status: CANCELLED
Start: 2021-12-22

## 2021-12-08 RX ORDER — SODIUM CHLORIDE 0.9 % (FLUSH) 0.9 %
10 SYRINGE (ML) INJECTION AS NEEDED
Status: CANCELLED | OUTPATIENT
Start: 2021-12-22

## 2021-12-08 RX ORDER — ACETAMINOPHEN 325 MG/1
650 TABLET ORAL AS NEEDED
Status: CANCELLED
Start: 2021-12-22

## 2021-12-08 RX ORDER — DIPHENHYDRAMINE HCL 25 MG
50 CAPSULE ORAL ONCE
Status: CANCELLED
Start: 2021-12-22 | End: 2021-12-22

## 2021-12-08 RX ORDER — ACETAMINOPHEN 325 MG/1
650 TABLET ORAL ONCE
Status: COMPLETED | OUTPATIENT
Start: 2021-12-08 | End: 2021-12-08

## 2021-12-08 RX ORDER — HEPARIN 100 UNIT/ML
300-500 SYRINGE INTRAVENOUS AS NEEDED
Status: CANCELLED
Start: 2021-12-22

## 2021-12-08 RX ORDER — SODIUM CHLORIDE 9 MG/ML
10 INJECTION INTRAMUSCULAR; INTRAVENOUS; SUBCUTANEOUS AS NEEDED
Status: CANCELLED | OUTPATIENT
Start: 2021-12-22

## 2021-12-08 RX ORDER — ONDANSETRON 2 MG/ML
8 INJECTION INTRAMUSCULAR; INTRAVENOUS AS NEEDED
Status: CANCELLED | OUTPATIENT
Start: 2021-12-22

## 2021-12-08 RX ORDER — EPINEPHRINE 1 MG/ML
0.3 INJECTION, SOLUTION, CONCENTRATE INTRAVENOUS AS NEEDED
Status: CANCELLED | OUTPATIENT
Start: 2021-12-22

## 2021-12-08 RX ADMIN — FAMOTIDINE 40 MG: 10 INJECTION INTRAVENOUS at 14:55

## 2021-12-08 RX ADMIN — PEGLOTICASE 8 MG: 8 INJECTION, SOLUTION INTRAVENOUS at 15:33

## 2021-12-08 RX ADMIN — ACETAMINOPHEN 650 MG: 325 TABLET ORAL at 14:54

## 2021-12-08 RX ADMIN — SODIUM CHLORIDE 25 ML/HR: 900 INJECTION, SOLUTION INTRAVENOUS at 14:54

## 2021-12-08 RX ADMIN — DIPHENHYDRAMINE HYDROCHLORIDE 50 MG: 25 CAPSULE ORAL at 14:54

## 2021-12-08 NOTE — PROGRESS NOTES
OPIC Short Note                       Date: 2021    Name: Katey Calhoun    MRN: [id number]         : 1970    1415 Pt admit to [facility] for [address] ambulatory in stable condition. Assessment completed. No new concerns voiced. Patient denies SOB, fever, cough, general not feeling well. Patient denies recent exposure to someone who has tested positive for COVID-19. Patient denies having contact with anyone who has a pending COVID test.    Mr. Gladys Agosto vitals were reviewed prior to treatment. Patient Vitals for the past 12 hrs:   Temp Pulse Resp BP   21 1413 97.3 °F (36.3 °C) 76 18 127/79       PIV with positive blood return flushed, heparinized and de-accessed per protocol. Medications given:   Medications Administered     0.9% sodium chloride infusion     Admin Date  2021 Action  New Bag Dose  25 mL/hr Rate  25 mL/hr Route  IntraVENous Administered By  Clint Robles RN          acetaminophen (TYLENOL) tablet 650 mg     Admin Date  2021 Action  Given Dose  650 mg Route  Oral Administered By  Clint Robles RN          diphenhydrAMINE (BENADRYL) capsule 50 mg     Admin Date  2021 Action  Given Dose  50 mg Route  Oral Administered By  Clint Robles RN          famotidine (PF) (PEPCID) 40 mg in 0.9% sodium chloride 10 mL injection     Admin Date  2021 Action  Given Dose  40 mg Route  IntraVENous Administered By  Clint Robles RN          pegloticase [facility]) 8 mg in 0.9% sodium chloride 250 mL, overfill volume 25 mL infusion     Admin Date  2021 Action  New Bag Dose  8 mg Rate  138 mL/hr Route  IntraVENous Administered By  Clint Robles RN                   8405 Pt tolerated treatment well. D/c home ambulatory in no distress.     Future Appointments   Date Time Provider Barry Cano   2021  1:00 PM [address]   3/2/2022 10:00 AM Rosita Rabago MD AO BS [id number] [facility], RN  2021  6:14 PM

## 2021-12-22 ENCOUNTER — HOSPITAL ENCOUNTER (OUTPATIENT)
Dept: INFUSION THERAPY | Age: 51
Discharge: HOME | End: 2021-12-22
Payer: COMMERCIAL

## 2021-12-22 VITALS
SYSTOLIC BLOOD PRESSURE: 127 MMHG | DIASTOLIC BLOOD PRESSURE: 76 MMHG | RESPIRATION RATE: 18 BRPM | TEMPERATURE: 97.6 F | HEART RATE: 76 BPM

## 2021-12-22 DIAGNOSIS — M1A.39X1 CHRONIC TOPHACEOUS GOUT OF MULTIPLE SITES DUE TO RENAL IMPAIRMENT: Primary | ICD-10-CM

## 2021-12-22 LAB
ALBUMIN SERPL-MCNC: 3.6 G/DL (ref 3.5–5)
ALBUMIN/GLOB SERPL: 1.1 {RATIO} (ref 1.1–2.2)
ALP SERPL-CCNC: 22 U/L (ref 45–117)
ALT SERPL-CCNC: 26 U/L (ref 12–78)
ANION GAP SERPL CALC-SCNC: 11 MMOL/L (ref 5–15)
AST SERPL-CCNC: 17 U/L (ref 15–37)
BASOPHILS # BLD: 0 K/UL (ref 0–0.1)
BASOPHILS NFR BLD: 1 % (ref 0–1)
BILIRUB SERPL-MCNC: 0.4 MG/DL (ref 0.2–1)
BUN SERPL-MCNC: 88 MG/DL (ref 6–20)
BUN/CREAT SERPL: 12 (ref 12–20)
CALCIUM SERPL-MCNC: 9.5 MG/DL (ref 8.5–10.1)
CHLORIDE SERPL-SCNC: 111 MMOL/L (ref 97–108)
CO2 SERPL-SCNC: 18 MMOL/L (ref 21–32)
CREAT SERPL-MCNC: 7.37 MG/DL (ref 0.7–1.3)
DIFFERENTIAL METHOD BLD: ABNORMAL
EOSINOPHIL # BLD: 0.5 K/UL (ref 0–0.4)
EOSINOPHIL NFR BLD: 7 % (ref 0–7)
ERYTHROCYTE [DISTWIDTH] IN BLOOD BY AUTOMATED COUNT: 13.1 % (ref 11.5–14.5)
ERYTHROCYTE [SEDIMENTATION RATE] IN BLOOD: 47 MM/HR (ref 0–15)
GLOBULIN SER CALC-MCNC: 3.2 G/DL (ref 2–4)
GLUCOSE SERPL-MCNC: 101 MG/DL (ref 65–100)
HCT VFR BLD AUTO: 28.3 % (ref 36.6–50.3)
HGB BLD-MCNC: 8.4 G/DL (ref 12.1–17)
IMM GRANULOCYTES # BLD AUTO: 0 K/UL (ref 0–0.04)
IMM GRANULOCYTES NFR BLD AUTO: 0 % (ref 0–0.5)
LYMPHOCYTES # BLD: 1.8 K/UL (ref 0.8–3.5)
LYMPHOCYTES NFR BLD: 27 % (ref 12–49)
MCH RBC QN AUTO: 29.9 PG (ref 26–34)
MCHC RBC AUTO-ENTMCNC: 29.7 G/DL (ref 30–36.5)
MCV RBC AUTO: 100.7 FL (ref 80–99)
MONOCYTES # BLD: 0.7 K/UL (ref 0–1)
MONOCYTES NFR BLD: 11 % (ref 5–13)
NEUTS SEG # BLD: 3.6 K/UL (ref 1.8–8)
NEUTS SEG NFR BLD: 54 % (ref 32–75)
NRBC # BLD: 0 K/UL (ref 0–0.01)
NRBC BLD-RTO: 0 PER 100 WBC
PLATELET # BLD AUTO: 200 K/UL (ref 150–400)
PMV BLD AUTO: 10.3 FL (ref 8.9–12.9)
POTASSIUM SERPL-SCNC: 4.3 MMOL/L (ref 3.5–5.1)
PROT SERPL-MCNC: 6.8 G/DL (ref 6.4–8.2)
RBC # BLD AUTO: 2.81 M/UL (ref 4.1–5.7)
SODIUM SERPL-SCNC: 140 MMOL/L (ref 136–145)
URATE SERPL-MCNC: <0.2 MG/DL (ref 3.5–7.2)
URATE SERPL-MCNC: <0.2 MG/DL (ref 3.5–7.2)
WBC # BLD AUTO: 6.6 K/UL (ref 4.1–11.1)

## 2021-12-22 PROCEDURE — 96365 THER/PROPH/DIAG IV INF INIT: CPT

## 2021-12-22 PROCEDURE — 85652 RBC SED RATE AUTOMATED: CPT

## 2021-12-22 PROCEDURE — 85025 COMPLETE CBC W/AUTO DIFF WBC: CPT

## 2021-12-22 PROCEDURE — 80053 COMPREHEN METABOLIC PANEL: CPT

## 2021-12-22 PROCEDURE — 84550 ASSAY OF BLOOD/URIC ACID: CPT

## 2021-12-22 PROCEDURE — 36415 COLL VENOUS BLD VENIPUNCTURE: CPT

## 2021-12-22 PROCEDURE — 74011250636 HC RX REV CODE- 250/636: Performed by: INTERNAL MEDICINE

## 2021-12-22 PROCEDURE — 96375 TX/PRO/DX INJ NEW DRUG ADDON: CPT

## 2021-12-22 PROCEDURE — 74011250637 HC RX REV CODE- 250/637: Performed by: INTERNAL MEDICINE

## 2021-12-22 PROCEDURE — 96366 THER/PROPH/DIAG IV INF ADDON: CPT

## 2021-12-22 RX ORDER — DIPHENHYDRAMINE HCL 25 MG
50 CAPSULE ORAL ONCE
Status: CANCELLED
Start: 2022-01-05 | End: 2022-01-05

## 2021-12-22 RX ORDER — DIPHENHYDRAMINE HCL 25 MG
50 CAPSULE ORAL ONCE
Status: COMPLETED | OUTPATIENT
Start: 2021-12-22 | End: 2021-12-22

## 2021-12-22 RX ORDER — ACETAMINOPHEN 325 MG/1
650 TABLET ORAL ONCE
Status: COMPLETED | OUTPATIENT
Start: 2021-12-22 | End: 2021-12-22

## 2021-12-22 RX ORDER — SODIUM CHLORIDE 0.9 % (FLUSH) 0.9 %
10 SYRINGE (ML) INJECTION AS NEEDED
Status: CANCELLED | OUTPATIENT
Start: 2022-01-05

## 2021-12-22 RX ORDER — ACETAMINOPHEN 325 MG/1
650 TABLET ORAL AS NEEDED
Status: CANCELLED
Start: 2022-01-05

## 2021-12-22 RX ORDER — SODIUM CHLORIDE 9 MG/ML
10 INJECTION INTRAMUSCULAR; INTRAVENOUS; SUBCUTANEOUS AS NEEDED
Status: CANCELLED | OUTPATIENT
Start: 2022-01-05

## 2021-12-22 RX ORDER — SODIUM CHLORIDE 9 MG/ML
25 INJECTION, SOLUTION INTRAVENOUS CONTINUOUS
Status: CANCELLED | OUTPATIENT
Start: 2022-01-05

## 2021-12-22 RX ORDER — HYDROCORTISONE SODIUM SUCCINATE 100 MG/2ML
100 INJECTION, POWDER, FOR SOLUTION INTRAMUSCULAR; INTRAVENOUS AS NEEDED
Status: CANCELLED | OUTPATIENT
Start: 2022-01-05

## 2021-12-22 RX ORDER — SODIUM CHLORIDE 9 MG/ML
25 INJECTION, SOLUTION INTRAVENOUS CONTINUOUS
Status: DISCONTINUED | OUTPATIENT
Start: 2021-12-22 | End: 2021-12-23 | Stop reason: HOSPADM

## 2021-12-22 RX ORDER — ACETAMINOPHEN 325 MG/1
650 TABLET ORAL ONCE
Status: CANCELLED | OUTPATIENT
Start: 2022-01-05 | End: 2022-01-05

## 2021-12-22 RX ORDER — ONDANSETRON 2 MG/ML
8 INJECTION INTRAMUSCULAR; INTRAVENOUS AS NEEDED
Status: CANCELLED | OUTPATIENT
Start: 2022-01-05

## 2021-12-22 RX ORDER — DIPHENHYDRAMINE HYDROCHLORIDE 50 MG/ML
50 INJECTION, SOLUTION INTRAMUSCULAR; INTRAVENOUS AS NEEDED
Status: CANCELLED
Start: 2022-01-05

## 2021-12-22 RX ORDER — EPINEPHRINE 1 MG/ML
0.3 INJECTION, SOLUTION, CONCENTRATE INTRAVENOUS AS NEEDED
Status: CANCELLED | OUTPATIENT
Start: 2022-01-05

## 2021-12-22 RX ORDER — ALBUTEROL SULFATE 0.83 MG/ML
2.5 SOLUTION RESPIRATORY (INHALATION) AS NEEDED
Status: CANCELLED
Start: 2022-01-05

## 2021-12-22 RX ORDER — DIPHENHYDRAMINE HYDROCHLORIDE 50 MG/ML
25 INJECTION, SOLUTION INTRAMUSCULAR; INTRAVENOUS AS NEEDED
Status: CANCELLED
Start: 2022-01-05

## 2021-12-22 RX ORDER — HEPARIN 100 UNIT/ML
300-500 SYRINGE INTRAVENOUS AS NEEDED
Status: CANCELLED
Start: 2022-01-05

## 2021-12-22 RX ADMIN — FAMOTIDINE 40 MG: 10 INJECTION INTRAVENOUS at 13:49

## 2021-12-22 RX ADMIN — ACETAMINOPHEN 650 MG: 325 TABLET ORAL at 13:49

## 2021-12-22 RX ADMIN — DIPHENHYDRAMINE HYDROCHLORIDE 50 MG: 25 CAPSULE ORAL at 13:49

## 2021-12-22 RX ADMIN — PEGLOTICASE 8 MG: 8 INJECTION, SOLUTION INTRAVENOUS at 14:22

## 2021-12-22 RX ADMIN — SODIUM CHLORIDE 25 ML/HR: 900 INJECTION, SOLUTION INTRAVENOUS at 14:22

## 2021-12-22 NOTE — PROGRESS NOTES
[facility] Progress Note    Date: 2021    Name: Alea Skinner    MRN: [id number]         : 1970        1330: Pt arrived ambulatory to [facility] for Amos Amato in stable condition. Assessment completed. No other complaints voiced at this time. Peripheral IV established with positive blood return. Labs drawn per order and sent for processing. Normal Saline started at [facility]. Patient denies SOB, fever, cough, general not feeling well. Patient denies recent exposure to someone who has tested positive for COVID-19. Patient denies having contact with anyone who has a pending COVID test.      Patient Vitals for the past 12 hrs:   Temp Pulse Resp BP   21 1330 97.6 °F (36.4 °C) 76 18 127/76         Medications Administered     0.9% sodium chloride infusion     Admin Date  2021 Action  New Bag Dose  25 mL/hr Rate  25 mL/hr Route  IntraVENous Administered By  Gwen Anguiano RN          acetaminophen (TYLENOL) tablet 650 mg     Admin Date  2021 Action  Given Dose  650 mg Route  Oral Administered By  Gwen Anguiano RN          diphenhydrAMINE (BENADRYL) capsule 50 mg     Admin Date  2021 Action  Given Dose  50 mg Route  Oral Administered By  Gwen Anguiano RN          famotidine (PF) (PEPCID) 40 mg in 0.9% sodium chloride 10 mL injection     Admin Date  2021 Action  Given Dose  40 mg Route  IntraVENous Administered By  Gwen Anguiano RN          pegloticase [facility]) 8 mg in 0.9% sodium chloride 250 mL, overfill volume 25 mL infusion     Admin Date  2021 Action  New Bag Dose  8 mg Rate  138 mL/hr Route  IntraVENous Administered By  Gwen Anguiano, LORRI                  1630: Tolerated treatment well, no adverse reactions noted. D/Cd from [facility] ambulatory and in no distress.       Future Appointments   Date Time Provider Barry Cano   2022  1:00 PM A1 [address]   2022  1:00 PM D4 [address]   3/2/2022 10:00 AM Hood Jones MD AOCR BS AMB           Jessica Campbell RN  December 22, 2021

## 2021-12-29 ENCOUNTER — HOSPITAL ENCOUNTER (OUTPATIENT)
Dept: GENERAL RADIOLOGY | Age: 51
Discharge: HOME | End: 2021-12-29
Payer: COMMERCIAL

## 2021-12-29 ENCOUNTER — TRANSCRIBE ORDER (OUTPATIENT)
Dept: REGISTRATION | Age: 51
End: 2021-12-29

## 2021-12-29 DIAGNOSIS — I10 ESSENTIAL HYPERTENSION, MALIGNANT: ICD-10-CM

## 2021-12-29 DIAGNOSIS — E55.9 AVITAMINOSIS D: ICD-10-CM

## 2021-12-29 DIAGNOSIS — G56.03 CARPAL TUNNEL SYNDROME, BILATERAL: ICD-10-CM

## 2021-12-29 DIAGNOSIS — I10 ESSENTIAL HYPERTENSION, BENIGN: ICD-10-CM

## 2021-12-29 DIAGNOSIS — D50.8 IRON DEFICIENCY ANEMIA SECONDARY TO INADEQUATE DIETARY IRON INTAKE: Primary | ICD-10-CM

## 2021-12-29 DIAGNOSIS — D50.8 IRON DEFICIENCY ANEMIA SECONDARY TO INADEQUATE DIETARY IRON INTAKE: ICD-10-CM

## 2021-12-29 PROCEDURE — 71046 X-RAY EXAM CHEST 2 VIEWS: CPT

## 2022-01-05 ENCOUNTER — HOSPITAL ENCOUNTER (OUTPATIENT)
Dept: INFUSION THERAPY | Age: 52
Discharge: HOME | End: 2022-01-05
Attending: INTERNAL MEDICINE
Payer: COMMERCIAL

## 2022-01-05 VITALS
DIASTOLIC BLOOD PRESSURE: 82 MMHG | TEMPERATURE: 96.8 F | HEART RATE: 69 BPM | RESPIRATION RATE: 16 BRPM | SYSTOLIC BLOOD PRESSURE: 128 MMHG

## 2022-01-05 DIAGNOSIS — M1A.39X1 CHRONIC TOPHACEOUS GOUT OF MULTIPLE SITES DUE TO RENAL IMPAIRMENT: Primary | ICD-10-CM

## 2022-01-05 LAB
ALBUMIN SERPL-MCNC: 3.6 G/DL (ref 3.5–5)
ALBUMIN/GLOB SERPL: 1 {RATIO} (ref 1.1–2.2)
ALP SERPL-CCNC: 23 U/L (ref 45–117)
ALT SERPL-CCNC: 24 U/L (ref 12–78)
ANION GAP SERPL CALC-SCNC: 11 MMOL/L (ref 5–15)
AST SERPL-CCNC: 19 U/L (ref 15–37)
BASOPHILS # BLD: 0 K/UL (ref 0–0.1)
BASOPHILS NFR BLD: 1 % (ref 0–1)
BILIRUB SERPL-MCNC: 0.3 MG/DL (ref 0.2–1)
BUN SERPL-MCNC: 86 MG/DL (ref 6–20)
BUN/CREAT SERPL: 10 (ref 12–20)
CALCIUM SERPL-MCNC: 8.8 MG/DL (ref 8.5–10.1)
CHLORIDE SERPL-SCNC: 111 MMOL/L (ref 97–108)
CO2 SERPL-SCNC: 18 MMOL/L (ref 21–32)
CREAT SERPL-MCNC: 8.36 MG/DL (ref 0.7–1.3)
DIFFERENTIAL METHOD BLD: ABNORMAL
EOSINOPHIL # BLD: 0.4 K/UL (ref 0–0.4)
EOSINOPHIL NFR BLD: 12 % (ref 0–7)
ERYTHROCYTE [DISTWIDTH] IN BLOOD BY AUTOMATED COUNT: 12.9 % (ref 11.5–14.5)
GLOBULIN SER CALC-MCNC: 3.6 G/DL (ref 2–4)
GLUCOSE SERPL-MCNC: 134 MG/DL (ref 65–100)
HCT VFR BLD AUTO: 30.2 % (ref 36.6–50.3)
HGB BLD-MCNC: 8.9 G/DL (ref 12.1–17)
IMM GRANULOCYTES # BLD AUTO: 0 K/UL (ref 0–0.04)
IMM GRANULOCYTES NFR BLD AUTO: 0 % (ref 0–0.5)
LYMPHOCYTES # BLD: 1.1 K/UL (ref 0.8–3.5)
LYMPHOCYTES NFR BLD: 30 % (ref 12–49)
MCH RBC QN AUTO: 29.7 PG (ref 26–34)
MCHC RBC AUTO-ENTMCNC: 29.5 G/DL (ref 30–36.5)
MCV RBC AUTO: 100.7 FL (ref 80–99)
MONOCYTES # BLD: 0.3 K/UL (ref 0–1)
MONOCYTES NFR BLD: 9 % (ref 5–13)
NEUTS SEG # BLD: 1.7 K/UL (ref 1.8–8)
NEUTS SEG NFR BLD: 48 % (ref 32–75)
NRBC # BLD: 0 K/UL (ref 0–0.01)
NRBC BLD-RTO: 0 PER 100 WBC
PLATELET # BLD AUTO: 169 K/UL (ref 150–400)
PMV BLD AUTO: 11 FL (ref 8.9–12.9)
POTASSIUM SERPL-SCNC: 4.3 MMOL/L (ref 3.5–5.1)
PROT SERPL-MCNC: 7.2 G/DL (ref 6.4–8.2)
RBC # BLD AUTO: 3 M/UL (ref 4.1–5.7)
SODIUM SERPL-SCNC: 140 MMOL/L (ref 136–145)
URATE SERPL-MCNC: <0.2 MG/DL (ref 3.5–7.2)
URATE SERPL-MCNC: <0.2 MG/DL (ref 3.5–7.2)
WBC # BLD AUTO: 3.6 K/UL (ref 4.1–11.1)

## 2022-01-05 PROCEDURE — 80053 COMPREHEN METABOLIC PANEL: CPT

## 2022-01-05 PROCEDURE — 96375 TX/PRO/DX INJ NEW DRUG ADDON: CPT

## 2022-01-05 PROCEDURE — 74011250636 HC RX REV CODE- 250/636: Performed by: INTERNAL MEDICINE

## 2022-01-05 PROCEDURE — 36415 COLL VENOUS BLD VENIPUNCTURE: CPT

## 2022-01-05 PROCEDURE — 96366 THER/PROPH/DIAG IV INF ADDON: CPT

## 2022-01-05 PROCEDURE — 74011250637 HC RX REV CODE- 250/637: Performed by: INTERNAL MEDICINE

## 2022-01-05 PROCEDURE — 84550 ASSAY OF BLOOD/URIC ACID: CPT

## 2022-01-05 PROCEDURE — 74011000250 HC RX REV CODE- 250: Performed by: INTERNAL MEDICINE

## 2022-01-05 PROCEDURE — 96365 THER/PROPH/DIAG IV INF INIT: CPT

## 2022-01-05 PROCEDURE — 85025 COMPLETE CBC W/AUTO DIFF WBC: CPT

## 2022-01-05 RX ORDER — ONDANSETRON 2 MG/ML
8 INJECTION INTRAMUSCULAR; INTRAVENOUS AS NEEDED
Status: CANCELLED | OUTPATIENT
Start: 2022-01-19

## 2022-01-05 RX ORDER — ACETAMINOPHEN 325 MG/1
650 TABLET ORAL AS NEEDED
Status: CANCELLED
Start: 2022-01-19

## 2022-01-05 RX ORDER — DIPHENHYDRAMINE HYDROCHLORIDE 50 MG/ML
25 INJECTION, SOLUTION INTRAMUSCULAR; INTRAVENOUS AS NEEDED
Status: CANCELLED
Start: 2022-01-19

## 2022-01-05 RX ORDER — HEPARIN 100 UNIT/ML
300-500 SYRINGE INTRAVENOUS AS NEEDED
Status: CANCELLED
Start: 2022-01-19

## 2022-01-05 RX ORDER — HYDROCORTISONE SODIUM SUCCINATE 100 MG/2ML
100 INJECTION, POWDER, FOR SOLUTION INTRAMUSCULAR; INTRAVENOUS AS NEEDED
Status: CANCELLED | OUTPATIENT
Start: 2022-01-19

## 2022-01-05 RX ORDER — SODIUM CHLORIDE 0.9 % (FLUSH) 0.9 %
10 SYRINGE (ML) INJECTION AS NEEDED
Status: CANCELLED | OUTPATIENT
Start: 2022-01-19

## 2022-01-05 RX ORDER — ACETAMINOPHEN 325 MG/1
650 TABLET ORAL ONCE
Status: CANCELLED | OUTPATIENT
Start: 2022-01-19 | End: 2022-01-19

## 2022-01-05 RX ORDER — EPINEPHRINE 1 MG/ML
0.3 INJECTION, SOLUTION, CONCENTRATE INTRAVENOUS AS NEEDED
Status: CANCELLED | OUTPATIENT
Start: 2022-01-19

## 2022-01-05 RX ORDER — SODIUM CHLORIDE 9 MG/ML
25 INJECTION, SOLUTION INTRAVENOUS CONTINUOUS
Status: CANCELLED | OUTPATIENT
Start: 2022-01-19

## 2022-01-05 RX ORDER — ACETAMINOPHEN 325 MG/1
650 TABLET ORAL ONCE
Status: COMPLETED | OUTPATIENT
Start: 2022-01-05 | End: 2022-01-05

## 2022-01-05 RX ORDER — DIPHENHYDRAMINE HYDROCHLORIDE 50 MG/ML
50 INJECTION, SOLUTION INTRAMUSCULAR; INTRAVENOUS AS NEEDED
Status: CANCELLED
Start: 2022-01-19

## 2022-01-05 RX ORDER — DIPHENHYDRAMINE HCL 25 MG
50 CAPSULE ORAL ONCE
Status: CANCELLED
Start: 2022-01-19 | End: 2022-01-19

## 2022-01-05 RX ORDER — SODIUM CHLORIDE 9 MG/ML
10 INJECTION INTRAMUSCULAR; INTRAVENOUS; SUBCUTANEOUS AS NEEDED
Status: CANCELLED | OUTPATIENT
Start: 2022-01-19

## 2022-01-05 RX ORDER — SODIUM CHLORIDE 9 MG/ML
25 INJECTION, SOLUTION INTRAVENOUS CONTINUOUS
Status: DISCONTINUED | OUTPATIENT
Start: 2022-01-05 | End: 2022-01-06 | Stop reason: HOSPADM

## 2022-01-05 RX ORDER — DIPHENHYDRAMINE HCL 25 MG
50 CAPSULE ORAL ONCE
Status: COMPLETED | OUTPATIENT
Start: 2022-01-05 | End: 2022-01-05

## 2022-01-05 RX ORDER — ALBUTEROL SULFATE 0.83 MG/ML
2.5 SOLUTION RESPIRATORY (INHALATION) AS NEEDED
Status: CANCELLED
Start: 2022-01-19

## 2022-01-05 RX ADMIN — SODIUM CHLORIDE 25 ML/HR: 9 INJECTION, SOLUTION INTRAVENOUS at 13:39

## 2022-01-05 RX ADMIN — FAMOTIDINE 40 MG: 10 INJECTION INTRAVENOUS at 13:47

## 2022-01-05 RX ADMIN — ACETAMINOPHEN 650 MG: 325 TABLET ORAL at 13:50

## 2022-01-05 RX ADMIN — PEGLOTICASE 8 MG: 8 INJECTION, SOLUTION INTRAVENOUS at 14:37

## 2022-01-05 RX ADMIN — DIPHENHYDRAMINE HYDROCHLORIDE 50 MG: 25 CAPSULE ORAL at 13:50

## 2022-01-05 NOTE — PROGRESS NOTES
OPIC Short Note                   6079 Pt admit to [facility] for [address] ambulatory in stable condition. Assessment completed. No new concerns voiced. PIV established in right arm with good blood return. Labs collected and sent for processing. NS infusing KVO     Mr. Orene Lundborg vitals were reviewed prior to and after treatment. Patient Vitals for the past 12 hrs:   Temp Pulse Resp BP   01/05/22 1657 -- 69 -- 128/82   01/05/22 1335 96.8 °F (36 °C) 70 16 126/79     Lab results were obtained and reviewed. Recent Results (from the past 12 hour(s))   CBC WITH AUTOMATED DIFF    Collection Time: 01/05/22  1:52 PM   Result Value Ref Range    WBC 3.6 (L) 4.1 - 11.1 K/uL    RBC 3.00 (L) 4.10 - 5.70 M/uL    HGB 8.9 (L) 12.1 - 17.0 g/dL    HCT 30.2 (L) 36.6 - 50.3 %    .7 (H) 80.0 - 99.0 FL    MCH 29.7 26.0 - 34.0 PG    MCHC 29.5 (L) 30.0 - 36.5 g/dL    RDW 12.9 11.5 - 14.5 %    PLATELET 543 188 - 774 K/uL    MPV 11.0 8.9 - 12.9 FL    NRBC 0.0 0  WBC    ABSOLUTE NRBC 0.00 0.00 - 0.01 K/uL    NEUTROPHILS 48 32 - 75 %    LYMPHOCYTES 30 12 - 49 %    MONOCYTES 9 5 - 13 %    EOSINOPHILS 12 (H) 0 - 7 %    BASOPHILS 1 0 - 1 %    IMMATURE GRANULOCYTES 0 0.0 - 0.5 %    ABS. NEUTROPHILS 1.7 (L) 1.8 - 8.0 K/UL    ABS. LYMPHOCYTES 1.1 0.8 - 3.5 K/UL    ABS. MONOCYTES 0.3 0.0 - 1.0 K/UL    ABS. EOSINOPHILS 0.4 0.0 - 0.4 K/UL    ABS. BASOPHILS 0.0 0.0 - 0.1 K/UL    ABS. IMM.  GRANS. 0.0 0.00 - 0.04 K/UL    DF AUTOMATED     METABOLIC PANEL, COMPREHENSIVE    Collection Time: 01/05/22  1:52 PM   Result Value Ref Range    Sodium 140 136 - 145 mmol/L    Potassium 4.3 3.5 - 5.1 mmol/L    Chloride 111 (H) 97 - 108 mmol/L    CO2 18 (L) 21 - 32 mmol/L    Anion gap 11 5 - 15 mmol/L    Glucose 134 (H) 65 - 100 mg/dL    BUN 86 (H) 6 - 20 MG/DL    Creatinine 8.36 (H) 0.70 - 1.30 MG/DL    BUN/Creatinine ratio 10 (L) 12 - 20      GFR est AA 8 (L) >60 ml/min/1.73m2    GFR est non-AA 7 (L) >60 ml/min/1.73m2    Calcium 8.8 8.5 - 10.1 MG/DL Bilirubin, total 0.3 0.2 - 1.0 MG/DL    ALT (SGPT) 24 12 - 78 U/L    AST (SGOT) 19 15 - 37 U/L    Alk. phosphatase 23 (L) 45 - 117 U/L    Protein, total 7.2 6.4 - 8.2 g/dL    Albumin 3.6 3.5 - 5.0 g/dL    Globulin 3.6 2.0 - 4.0 g/dL    A-G Ratio 1.0 (L) 1.1 - 2.2     URIC ACID    Collection Time: 01/05/22  1:52 PM   Result Value Ref Range    Uric acid <0.2 (L) 3.5 - 7.2 MG/DL     Medications Administered     0.9% sodium chloride infusion     Admin Date  01/05/2022 Action  New Bag Dose  25 mL/hr Rate  25 mL/hr Route  IntraVENous Administered By  Destini Valerio RN          acetaminophen (TYLENOL) tablet 650 mg     Admin Date  01/05/2022 Action  Given Dose  650 mg Route  Oral Administered By  Destini Valerio RN          diphenhydrAMINE (BENADRYL) capsule 50 mg     Admin Date  01/05/2022 Action  Given Dose  50 mg Route  Oral Administered By  Destini Valerio RN          famotidine (PF) (PEPCID) 40 mg in 0.9% sodium chloride 10 mL injection     Admin Date  01/05/2022 Action  Given Dose  40 mg Route  IntraVENous Administered By  Destini Valerio RN          [facility]) 8 mg in 0.9% sodium chloride 250 mL, overfill volume 25 mL infusion     Admin Date  01/05/2022 Action  New Bag Dose  8 mg Rate  138 mL/hr Route  IntraVENous Administered By  Destini Valerio RN              Mr. Ave Elliott tolerated the infusion, and had no complaints. Post uric level acid sent for processing. PIV removed without difficulty. Mr. Ave Elliott was discharged from [place] [address] in stable condition. Patient is aware of next appointment.      Future Appointments   Date Time Provider Barry Cano   1/19/2022  1:00 PM [place] [id number] [facility]   3/2/2022 10:00 AM Isreal Rabago MD AOCR BS AMB       Alexandria Luu RN  January 5, 2022

## 2022-01-19 ENCOUNTER — HOSPITAL ENCOUNTER (OUTPATIENT)
Dept: INFUSION THERAPY | Age: 52
Discharge: HOME | End: 2022-01-19
Attending: INTERNAL MEDICINE
Payer: COMMERCIAL

## 2022-01-19 VITALS
RESPIRATION RATE: 18 BRPM | HEART RATE: 70 BPM | SYSTOLIC BLOOD PRESSURE: 122 MMHG | TEMPERATURE: 98.4 F | DIASTOLIC BLOOD PRESSURE: 82 MMHG

## 2022-01-19 DIAGNOSIS — M1A.39X1 CHRONIC TOPHACEOUS GOUT OF MULTIPLE SITES DUE TO RENAL IMPAIRMENT: Primary | ICD-10-CM

## 2022-01-19 LAB
ALBUMIN SERPL-MCNC: 3.8 G/DL (ref 3.5–5)
ALBUMIN/GLOB SERPL: 1.2 {RATIO} (ref 1.1–2.2)
ALP SERPL-CCNC: 23 U/L (ref 45–117)
ALT SERPL-CCNC: 20 U/L (ref 12–78)
ANION GAP SERPL CALC-SCNC: 9 MMOL/L (ref 5–15)
AST SERPL-CCNC: 11 U/L (ref 15–37)
BASOPHILS # BLD: 0 K/UL (ref 0–0.1)
BASOPHILS NFR BLD: 1 % (ref 0–1)
BILIRUB SERPL-MCNC: 0.4 MG/DL (ref 0.2–1)
BUN SERPL-MCNC: 104 MG/DL (ref 6–20)
BUN/CREAT SERPL: 13 (ref 12–20)
CALCIUM SERPL-MCNC: 9.4 MG/DL (ref 8.5–10.1)
CHLORIDE SERPL-SCNC: 114 MMOL/L (ref 97–108)
CO2 SERPL-SCNC: 17 MMOL/L (ref 21–32)
CREAT SERPL-MCNC: 8.2 MG/DL (ref 0.7–1.3)
DIFFERENTIAL METHOD BLD: ABNORMAL
EOSINOPHIL # BLD: 0.4 K/UL (ref 0–0.4)
EOSINOPHIL NFR BLD: 7 % (ref 0–7)
ERYTHROCYTE [DISTWIDTH] IN BLOOD BY AUTOMATED COUNT: 13.1 % (ref 11.5–14.5)
ERYTHROCYTE [SEDIMENTATION RATE] IN BLOOD: 34 MM/HR (ref 0–20)
GLOBULIN SER CALC-MCNC: 3.3 G/DL (ref 2–4)
GLUCOSE SERPL-MCNC: 91 MG/DL (ref 65–100)
HCT VFR BLD AUTO: 29.5 % (ref 36.6–50.3)
HGB BLD-MCNC: 8.7 G/DL (ref 12.1–17)
IMM GRANULOCYTES # BLD AUTO: 0 K/UL (ref 0–0.04)
IMM GRANULOCYTES NFR BLD AUTO: 0 % (ref 0–0.5)
LYMPHOCYTES # BLD: 1.4 K/UL (ref 0.8–3.5)
LYMPHOCYTES NFR BLD: 26 % (ref 12–49)
MCH RBC QN AUTO: 29.9 PG (ref 26–34)
MCHC RBC AUTO-ENTMCNC: 29.5 G/DL (ref 30–36.5)
MCV RBC AUTO: 101.4 FL (ref 80–99)
MONOCYTES # BLD: 0.6 K/UL (ref 0–1)
MONOCYTES NFR BLD: 12 % (ref 5–13)
NEUTS SEG # BLD: 2.9 K/UL (ref 1.8–8)
NEUTS SEG NFR BLD: 54 % (ref 32–75)
NRBC # BLD: 0 K/UL (ref 0–0.01)
NRBC BLD-RTO: 0 PER 100 WBC
PLATELET # BLD AUTO: 258 K/UL (ref 150–400)
PMV BLD AUTO: 10 FL (ref 8.9–12.9)
POTASSIUM SERPL-SCNC: 5.1 MMOL/L (ref 3.5–5.1)
PROT SERPL-MCNC: 7.1 G/DL (ref 6.4–8.2)
RBC # BLD AUTO: 2.91 M/UL (ref 4.1–5.7)
SODIUM SERPL-SCNC: 140 MMOL/L (ref 136–145)
URATE SERPL-MCNC: <0.2 MG/DL (ref 3.5–7.2)
URATE SERPL-MCNC: <0.2 MG/DL (ref 3.5–7.2)
WBC # BLD AUTO: 5.3 K/UL (ref 4.1–11.1)

## 2022-01-19 PROCEDURE — 96375 TX/PRO/DX INJ NEW DRUG ADDON: CPT

## 2022-01-19 PROCEDURE — 96365 THER/PROPH/DIAG IV INF INIT: CPT

## 2022-01-19 PROCEDURE — 74011250636 HC RX REV CODE- 250/636: Performed by: INTERNAL MEDICINE

## 2022-01-19 PROCEDURE — 96366 THER/PROPH/DIAG IV INF ADDON: CPT

## 2022-01-19 PROCEDURE — 85652 RBC SED RATE AUTOMATED: CPT

## 2022-01-19 PROCEDURE — 80053 COMPREHEN METABOLIC PANEL: CPT

## 2022-01-19 PROCEDURE — 36415 COLL VENOUS BLD VENIPUNCTURE: CPT

## 2022-01-19 PROCEDURE — 85025 COMPLETE CBC W/AUTO DIFF WBC: CPT

## 2022-01-19 PROCEDURE — 74011250637 HC RX REV CODE- 250/637: Performed by: INTERNAL MEDICINE

## 2022-01-19 PROCEDURE — 84550 ASSAY OF BLOOD/URIC ACID: CPT

## 2022-01-19 PROCEDURE — 74011000250 HC RX REV CODE- 250: Performed by: INTERNAL MEDICINE

## 2022-01-19 RX ORDER — HYDROCORTISONE SODIUM SUCCINATE 100 MG/2ML
100 INJECTION, POWDER, FOR SOLUTION INTRAMUSCULAR; INTRAVENOUS AS NEEDED
Status: CANCELLED | OUTPATIENT
Start: 2022-02-02

## 2022-01-19 RX ORDER — ONDANSETRON 2 MG/ML
8 INJECTION INTRAMUSCULAR; INTRAVENOUS AS NEEDED
Status: CANCELLED | OUTPATIENT
Start: 2022-02-02

## 2022-01-19 RX ORDER — DIPHENHYDRAMINE HYDROCHLORIDE 50 MG/ML
50 INJECTION, SOLUTION INTRAMUSCULAR; INTRAVENOUS AS NEEDED
Status: CANCELLED
Start: 2022-02-02

## 2022-01-19 RX ORDER — EPINEPHRINE 1 MG/ML
0.3 INJECTION, SOLUTION, CONCENTRATE INTRAVENOUS AS NEEDED
Status: CANCELLED | OUTPATIENT
Start: 2022-02-02

## 2022-01-19 RX ORDER — ACETAMINOPHEN 325 MG/1
650 TABLET ORAL ONCE
Status: CANCELLED | OUTPATIENT
Start: 2022-02-02 | End: 2022-02-02

## 2022-01-19 RX ORDER — DIPHENHYDRAMINE HCL 25 MG
50 CAPSULE ORAL ONCE
Status: COMPLETED | OUTPATIENT
Start: 2022-01-19 | End: 2022-01-19

## 2022-01-19 RX ORDER — ALBUTEROL SULFATE 0.83 MG/ML
2.5 SOLUTION RESPIRATORY (INHALATION) AS NEEDED
Status: CANCELLED
Start: 2022-02-02

## 2022-01-19 RX ORDER — DIPHENHYDRAMINE HYDROCHLORIDE 50 MG/ML
25 INJECTION, SOLUTION INTRAMUSCULAR; INTRAVENOUS AS NEEDED
Status: CANCELLED
Start: 2022-02-02

## 2022-01-19 RX ORDER — ACETAMINOPHEN 325 MG/1
650 TABLET ORAL AS NEEDED
Status: CANCELLED
Start: 2022-02-02

## 2022-01-19 RX ORDER — DIPHENHYDRAMINE HCL 25 MG
50 CAPSULE ORAL ONCE
Status: CANCELLED
Start: 2022-02-02 | End: 2022-02-02

## 2022-01-19 RX ORDER — SODIUM CHLORIDE 9 MG/ML
25 INJECTION, SOLUTION INTRAVENOUS CONTINUOUS
Status: DISCONTINUED | OUTPATIENT
Start: 2022-01-19 | End: 2022-01-20 | Stop reason: HOSPADM

## 2022-01-19 RX ORDER — HEPARIN 100 UNIT/ML
300-500 SYRINGE INTRAVENOUS AS NEEDED
Status: CANCELLED
Start: 2022-02-02

## 2022-01-19 RX ORDER — SODIUM CHLORIDE 9 MG/ML
10 INJECTION INTRAMUSCULAR; INTRAVENOUS; SUBCUTANEOUS AS NEEDED
Status: CANCELLED | OUTPATIENT
Start: 2022-02-02

## 2022-01-19 RX ORDER — ACETAMINOPHEN 325 MG/1
650 TABLET ORAL ONCE
Status: COMPLETED | OUTPATIENT
Start: 2022-01-19 | End: 2022-01-19

## 2022-01-19 RX ORDER — SODIUM CHLORIDE 9 MG/ML
25 INJECTION, SOLUTION INTRAVENOUS CONTINUOUS
Status: CANCELLED | OUTPATIENT
Start: 2022-02-02

## 2022-01-19 RX ORDER — SODIUM CHLORIDE 0.9 % (FLUSH) 0.9 %
10 SYRINGE (ML) INJECTION AS NEEDED
Status: CANCELLED | OUTPATIENT
Start: 2022-02-02

## 2022-01-19 RX ADMIN — ACETAMINOPHEN 650 MG: 325 TABLET ORAL at 13:43

## 2022-01-19 RX ADMIN — PEGLOTICASE 8 MG: 8 INJECTION, SOLUTION INTRAVENOUS at 14:30

## 2022-01-19 RX ADMIN — FAMOTIDINE 40 MG: 10 INJECTION INTRAVENOUS at 13:45

## 2022-01-19 RX ADMIN — SODIUM CHLORIDE 25 ML/HR: 9 INJECTION, SOLUTION INTRAVENOUS at 13:42

## 2022-01-19 RX ADMIN — DIPHENHYDRAMINE HYDROCHLORIDE 50 MG: 25 CAPSULE ORAL at 13:43

## 2022-01-19 NOTE — PROGRESS NOTES
[facility] VISIT NOTE    1315  Pt arrived at [facility] ambulatory and in no distress for Krystexxa infusion. Assessment completed, no new complaints at this time. PIV started with no difficulty in RAC. Positive blood return noted and labs drawn. Recent Results (from the past 12 hour(s))   CBC WITH AUTOMATED DIFF    Collection Time: 01/19/22  1:36 PM   Result Value Ref Range    WBC 5.3 4.1 - 11.1 K/uL    RBC 2.91 (L) 4.10 - 5.70 M/uL    HGB 8.7 (L) 12.1 - 17.0 g/dL    HCT 29.5 (L) 36.6 - 50.3 %    .4 (H) 80.0 - 99.0 FL    MCH 29.9 26.0 - 34.0 PG    MCHC 29.5 (L) 30.0 - 36.5 g/dL    RDW 13.1 11.5 - 14.5 %    PLATELET 297 350 - 370 K/uL    MPV 10.0 8.9 - 12.9 FL    NRBC 0.0 0  WBC    ABSOLUTE NRBC 0.00 0.00 - 0.01 K/uL    NEUTROPHILS 54 32 - 75 %    LYMPHOCYTES 26 12 - 49 %    MONOCYTES 12 5 - 13 %    EOSINOPHILS 7 0 - 7 %    BASOPHILS 1 0 - 1 %    IMMATURE GRANULOCYTES 0 0.0 - 0.5 %    ABS. NEUTROPHILS 2.9 1.8 - 8.0 K/UL    ABS. LYMPHOCYTES 1.4 0.8 - 3.5 K/UL    ABS. MONOCYTES 0.6 0.0 - 1.0 K/UL    ABS. EOSINOPHILS 0.4 0.0 - 0.4 K/UL    ABS. BASOPHILS 0.0 0.0 - 0.1 K/UL    ABS. IMM. GRANS. 0.0 0.00 - 0.04 K/UL    DF AUTOMATED     METABOLIC PANEL, COMPREHENSIVE    Collection Time: 01/19/22  1:36 PM   Result Value Ref Range    Sodium 140 136 - 145 mmol/L    Potassium 5.1 3.5 - 5.1 mmol/L    Chloride 114 (H) 97 - 108 mmol/L    CO2 17 (L) 21 - 32 mmol/L    Anion gap 9 5 - 15 mmol/L    Glucose 91 65 - 100 mg/dL     (H) 6 - 20 MG/DL    Creatinine 8.20 (H) 0.70 - 1.30 MG/DL    BUN/Creatinine ratio 13 12 - 20      GFR est AA 8 (L) >60 ml/min/1.73m2    GFR est non-AA 7 (L) >60 ml/min/1.73m2    Calcium 9.4 8.5 - 10.1 MG/DL    Bilirubin, total 0.4 0.2 - 1.0 MG/DL    ALT (SGPT) 20 12 - 78 U/L    AST (SGOT) 11 (L) 15 - 37 U/L    Alk.  phosphatase 23 (L) 45 - 117 U/L    Protein, total 7.1 6.4 - 8.2 g/dL    Albumin 3.8 3.5 - 5.0 g/dL    Globulin 3.3 2.0 - 4.0 g/dL    A-G Ratio 1.2 1.1 - 2.2     URIC ACID    Collection Time: 01/19/22  1:36 PM   Result Value Ref Range    Uric acid <0.2 (L) 3.5 - 7.2 MG/DL     Medications received:  NS at [facility]  Tylenol PO  Benadryl PO  Pepcid IVP  Krystexxa IV over 2 hours    Patient Vitals for the past 12 hrs:   Temp Pulse Resp BP   01/19/22 1640 -- 70 18 122/82   01/19/22 1317 98.4 °F (36.9 °C) 80 18 125/87     Tolerated treatment well, no adverse reaction noted. Pt declined 1 hour observation. Post infusion uric acid drawn peripherally with no difficulty. PIV removed per protocol. 1650  D/C'd from [facility] ambulatory and in no distress.

## 2022-01-27 DIAGNOSIS — M1A.39X1 CHRONIC TOPHACEOUS GOUT OF MULTIPLE SITES DUE TO RENAL IMPAIRMENT: Primary | ICD-10-CM

## 2022-01-27 RX ORDER — DIPHENHYDRAMINE HYDROCHLORIDE 50 MG/ML
50 INJECTION, SOLUTION INTRAMUSCULAR; INTRAVENOUS AS NEEDED
Status: CANCELLED
Start: 2022-02-02

## 2022-01-27 RX ORDER — SODIUM CHLORIDE 9 MG/ML
25 INJECTION, SOLUTION INTRAVENOUS CONTINUOUS
Status: CANCELLED | OUTPATIENT
Start: 2022-02-02

## 2022-01-27 RX ORDER — DIPHENHYDRAMINE HYDROCHLORIDE 50 MG/ML
25 INJECTION, SOLUTION INTRAMUSCULAR; INTRAVENOUS AS NEEDED
Status: CANCELLED
Start: 2022-02-02

## 2022-01-27 RX ORDER — ALBUTEROL SULFATE 0.83 MG/ML
2.5 SOLUTION RESPIRATORY (INHALATION) AS NEEDED
Status: CANCELLED
Start: 2022-02-02

## 2022-01-27 RX ORDER — HYDROCORTISONE SODIUM SUCCINATE 100 MG/2ML
100 INJECTION, POWDER, FOR SOLUTION INTRAMUSCULAR; INTRAVENOUS AS NEEDED
Status: CANCELLED | OUTPATIENT
Start: 2022-02-02

## 2022-01-27 RX ORDER — DIPHENHYDRAMINE HYDROCHLORIDE 50 MG/ML
50 INJECTION, SOLUTION INTRAMUSCULAR; INTRAVENOUS ONCE
Status: CANCELLED | OUTPATIENT
Start: 2022-02-02 | End: 2022-02-02

## 2022-01-27 RX ORDER — HEPARIN 100 UNIT/ML
300-500 SYRINGE INTRAVENOUS AS NEEDED
Status: CANCELLED
Start: 2022-02-02

## 2022-01-27 RX ORDER — ACETAMINOPHEN 325 MG/1
650 TABLET ORAL AS NEEDED
Status: CANCELLED
Start: 2022-02-02

## 2022-01-27 RX ORDER — EPINEPHRINE 1 MG/ML
0.3 INJECTION, SOLUTION, CONCENTRATE INTRAVENOUS AS NEEDED
Status: CANCELLED | OUTPATIENT
Start: 2022-02-02

## 2022-01-27 RX ORDER — SODIUM CHLORIDE 0.9 % (FLUSH) 0.9 %
10 SYRINGE (ML) INJECTION AS NEEDED
Status: CANCELLED | OUTPATIENT
Start: 2022-02-02

## 2022-01-27 RX ORDER — ACETAMINOPHEN 325 MG/1
650 TABLET ORAL ONCE
Status: CANCELLED | OUTPATIENT
Start: 2022-02-02 | End: 2022-02-02

## 2022-01-27 RX ORDER — SODIUM CHLORIDE 9 MG/ML
10 INJECTION INTRAMUSCULAR; INTRAVENOUS; SUBCUTANEOUS AS NEEDED
Status: CANCELLED | OUTPATIENT
Start: 2022-02-02

## 2022-01-27 RX ORDER — ONDANSETRON 2 MG/ML
8 INJECTION INTRAMUSCULAR; INTRAVENOUS AS NEEDED
Status: CANCELLED | OUTPATIENT
Start: 2022-02-02

## 2022-02-02 ENCOUNTER — HOSPITAL ENCOUNTER (OUTPATIENT)
Dept: INFUSION THERAPY | Age: 52
Discharge: HOME | End: 2022-02-02
Payer: COMMERCIAL

## 2022-02-02 VITALS
DIASTOLIC BLOOD PRESSURE: 89 MMHG | HEART RATE: 85 BPM | RESPIRATION RATE: 18 BRPM | TEMPERATURE: 97.7 F | SYSTOLIC BLOOD PRESSURE: 138 MMHG

## 2022-02-02 DIAGNOSIS — M1A.39X1 CHRONIC TOPHACEOUS GOUT OF MULTIPLE SITES DUE TO RENAL IMPAIRMENT: Primary | ICD-10-CM

## 2022-02-02 LAB
ALBUMIN SERPL-MCNC: 3.8 G/DL (ref 3.5–5)
ALBUMIN/GLOB SERPL: 1.1 {RATIO} (ref 1.1–2.2)
ALP SERPL-CCNC: 26 U/L (ref 45–117)
ALT SERPL-CCNC: 22 U/L (ref 12–78)
ANION GAP SERPL CALC-SCNC: 7 MMOL/L (ref 5–15)
AST SERPL-CCNC: 14 U/L (ref 15–37)
BASOPHILS # BLD: 0.1 K/UL (ref 0–0.1)
BASOPHILS NFR BLD: 1 % (ref 0–1)
BILIRUB SERPL-MCNC: 0.3 MG/DL (ref 0.2–1)
BUN SERPL-MCNC: 90 MG/DL (ref 6–20)
BUN/CREAT SERPL: 12 (ref 12–20)
CALCIUM SERPL-MCNC: 10.6 MG/DL (ref 8.5–10.1)
CHLORIDE SERPL-SCNC: 111 MMOL/L (ref 97–108)
CO2 SERPL-SCNC: 20 MMOL/L (ref 21–32)
CREAT SERPL-MCNC: 7.6 MG/DL (ref 0.7–1.3)
DIFFERENTIAL METHOD BLD: ABNORMAL
EOSINOPHIL # BLD: 0.5 K/UL (ref 0–0.4)
EOSINOPHIL NFR BLD: 8 % (ref 0–7)
ERYTHROCYTE [DISTWIDTH] IN BLOOD BY AUTOMATED COUNT: 13.2 % (ref 11.5–14.5)
GLOBULIN SER CALC-MCNC: 3.4 G/DL (ref 2–4)
GLUCOSE SERPL-MCNC: 98 MG/DL (ref 65–100)
HCT VFR BLD AUTO: 28.5 % (ref 36.6–50.3)
HGB BLD-MCNC: 8.6 G/DL (ref 12.1–17)
IMM GRANULOCYTES # BLD AUTO: 0 K/UL (ref 0–0.04)
IMM GRANULOCYTES NFR BLD AUTO: 0 % (ref 0–0.5)
LYMPHOCYTES # BLD: 1.5 K/UL (ref 0.8–3.5)
LYMPHOCYTES NFR BLD: 23 % (ref 12–49)
MCH RBC QN AUTO: 30.2 PG (ref 26–34)
MCHC RBC AUTO-ENTMCNC: 30.2 G/DL (ref 30–36.5)
MCV RBC AUTO: 100 FL (ref 80–99)
MONOCYTES # BLD: 0.7 K/UL (ref 0–1)
MONOCYTES NFR BLD: 12 % (ref 5–13)
NEUTS SEG # BLD: 3.6 K/UL (ref 1.8–8)
NEUTS SEG NFR BLD: 56 % (ref 32–75)
NRBC # BLD: 0 K/UL (ref 0–0.01)
NRBC BLD-RTO: 0 PER 100 WBC
PLATELET # BLD AUTO: 251 K/UL (ref 150–400)
PMV BLD AUTO: 10.2 FL (ref 8.9–12.9)
POTASSIUM SERPL-SCNC: 4.3 MMOL/L (ref 3.5–5.1)
PROT SERPL-MCNC: 7.2 G/DL (ref 6.4–8.2)
RBC # BLD AUTO: 2.85 M/UL (ref 4.1–5.7)
SODIUM SERPL-SCNC: 138 MMOL/L (ref 136–145)
URATE SERPL-MCNC: <0.2 MG/DL (ref 3.5–7.2)
URATE SERPL-MCNC: <0.2 MG/DL (ref 3.5–7.2)
WBC # BLD AUTO: 6.3 K/UL (ref 4.1–11.1)

## 2022-02-02 PROCEDURE — 74011000250 HC RX REV CODE- 250: Performed by: INTERNAL MEDICINE

## 2022-02-02 PROCEDURE — 96366 THER/PROPH/DIAG IV INF ADDON: CPT

## 2022-02-02 PROCEDURE — 96375 TX/PRO/DX INJ NEW DRUG ADDON: CPT

## 2022-02-02 PROCEDURE — 36415 COLL VENOUS BLD VENIPUNCTURE: CPT

## 2022-02-02 PROCEDURE — 80053 COMPREHEN METABOLIC PANEL: CPT

## 2022-02-02 PROCEDURE — 96365 THER/PROPH/DIAG IV INF INIT: CPT

## 2022-02-02 PROCEDURE — 74011250637 HC RX REV CODE- 250/637: Performed by: INTERNAL MEDICINE

## 2022-02-02 PROCEDURE — 74011250636 HC RX REV CODE- 250/636: Performed by: INTERNAL MEDICINE

## 2022-02-02 PROCEDURE — 85025 COMPLETE CBC W/AUTO DIFF WBC: CPT

## 2022-02-02 PROCEDURE — 84550 ASSAY OF BLOOD/URIC ACID: CPT

## 2022-02-02 RX ORDER — HEPARIN 100 UNIT/ML
300-500 SYRINGE INTRAVENOUS AS NEEDED
Status: CANCELLED
Start: 2022-02-16

## 2022-02-02 RX ORDER — DIPHENHYDRAMINE HYDROCHLORIDE 50 MG/ML
50 INJECTION, SOLUTION INTRAMUSCULAR; INTRAVENOUS AS NEEDED
Status: CANCELLED
Start: 2022-02-16

## 2022-02-02 RX ORDER — DIPHENHYDRAMINE HYDROCHLORIDE 50 MG/ML
50 INJECTION, SOLUTION INTRAMUSCULAR; INTRAVENOUS AS NEEDED
Status: DISCONTINUED | OUTPATIENT
Start: 2022-02-02 | End: 2022-02-03 | Stop reason: HOSPADM

## 2022-02-02 RX ORDER — DIPHENHYDRAMINE HYDROCHLORIDE 50 MG/ML
50 INJECTION, SOLUTION INTRAMUSCULAR; INTRAVENOUS ONCE
Status: CANCELLED | OUTPATIENT
Start: 2022-02-16 | End: 2022-02-16

## 2022-02-02 RX ORDER — ACETAMINOPHEN 325 MG/1
650 TABLET ORAL ONCE
Status: CANCELLED | OUTPATIENT
Start: 2022-02-16 | End: 2022-02-16

## 2022-02-02 RX ORDER — ONDANSETRON 2 MG/ML
8 INJECTION INTRAMUSCULAR; INTRAVENOUS AS NEEDED
Status: DISCONTINUED | OUTPATIENT
Start: 2022-02-02 | End: 2022-02-03 | Stop reason: HOSPADM

## 2022-02-02 RX ORDER — DIPHENHYDRAMINE HYDROCHLORIDE 50 MG/ML
25 INJECTION, SOLUTION INTRAMUSCULAR; INTRAVENOUS AS NEEDED
Status: DISCONTINUED | OUTPATIENT
Start: 2022-02-02 | End: 2022-02-03 | Stop reason: HOSPADM

## 2022-02-02 RX ORDER — SODIUM CHLORIDE 9 MG/ML
25 INJECTION, SOLUTION INTRAVENOUS CONTINUOUS
Status: DISCONTINUED | OUTPATIENT
Start: 2022-02-02 | End: 2022-02-03 | Stop reason: HOSPADM

## 2022-02-02 RX ORDER — ACETAMINOPHEN 325 MG/1
650 TABLET ORAL AS NEEDED
Status: DISCONTINUED | OUTPATIENT
Start: 2022-02-02 | End: 2022-02-03 | Stop reason: HOSPADM

## 2022-02-02 RX ORDER — SODIUM CHLORIDE 9 MG/ML
10 INJECTION INTRAMUSCULAR; INTRAVENOUS; SUBCUTANEOUS AS NEEDED
Status: DISCONTINUED | OUTPATIENT
Start: 2022-02-02 | End: 2022-02-03 | Stop reason: HOSPADM

## 2022-02-02 RX ORDER — ALBUTEROL SULFATE 0.83 MG/ML
2.5 SOLUTION RESPIRATORY (INHALATION) AS NEEDED
Status: CANCELLED
Start: 2022-02-16

## 2022-02-02 RX ORDER — ACETAMINOPHEN 325 MG/1
650 TABLET ORAL ONCE
Status: COMPLETED | OUTPATIENT
Start: 2022-02-02 | End: 2022-02-02

## 2022-02-02 RX ORDER — HYDROCORTISONE SODIUM SUCCINATE 100 MG/2ML
100 INJECTION, POWDER, FOR SOLUTION INTRAMUSCULAR; INTRAVENOUS AS NEEDED
Status: DISCONTINUED | OUTPATIENT
Start: 2022-02-02 | End: 2022-02-03 | Stop reason: HOSPADM

## 2022-02-02 RX ORDER — SODIUM CHLORIDE 9 MG/ML
10 INJECTION INTRAMUSCULAR; INTRAVENOUS; SUBCUTANEOUS AS NEEDED
Status: CANCELLED | OUTPATIENT
Start: 2022-02-16

## 2022-02-02 RX ORDER — ALBUTEROL SULFATE 0.83 MG/ML
2.5 SOLUTION RESPIRATORY (INHALATION) AS NEEDED
Status: DISCONTINUED | OUTPATIENT
Start: 2022-02-02 | End: 2022-02-03 | Stop reason: HOSPADM

## 2022-02-02 RX ORDER — DIPHENHYDRAMINE HYDROCHLORIDE 50 MG/ML
25 INJECTION, SOLUTION INTRAMUSCULAR; INTRAVENOUS AS NEEDED
Status: CANCELLED
Start: 2022-02-16

## 2022-02-02 RX ORDER — EPINEPHRINE 1 MG/ML
0.3 INJECTION, SOLUTION, CONCENTRATE INTRAVENOUS AS NEEDED
Status: CANCELLED | OUTPATIENT
Start: 2022-02-16

## 2022-02-02 RX ORDER — SODIUM CHLORIDE 9 MG/ML
25 INJECTION, SOLUTION INTRAVENOUS CONTINUOUS
Status: CANCELLED | OUTPATIENT
Start: 2022-02-16

## 2022-02-02 RX ORDER — DIPHENHYDRAMINE HYDROCHLORIDE 50 MG/ML
50 INJECTION, SOLUTION INTRAMUSCULAR; INTRAVENOUS ONCE
Status: COMPLETED | OUTPATIENT
Start: 2022-02-02 | End: 2022-02-02

## 2022-02-02 RX ORDER — HEPARIN 100 UNIT/ML
300-500 SYRINGE INTRAVENOUS AS NEEDED
Status: DISCONTINUED | OUTPATIENT
Start: 2022-02-02 | End: 2022-02-03 | Stop reason: HOSPADM

## 2022-02-02 RX ORDER — SODIUM CHLORIDE 0.9 % (FLUSH) 0.9 %
10 SYRINGE (ML) INJECTION AS NEEDED
Status: DISCONTINUED | OUTPATIENT
Start: 2022-02-02 | End: 2022-02-03 | Stop reason: HOSPADM

## 2022-02-02 RX ORDER — SODIUM CHLORIDE 0.9 % (FLUSH) 0.9 %
10 SYRINGE (ML) INJECTION AS NEEDED
Status: CANCELLED | OUTPATIENT
Start: 2022-02-16

## 2022-02-02 RX ORDER — ACETAMINOPHEN 325 MG/1
650 TABLET ORAL AS NEEDED
Status: CANCELLED
Start: 2022-02-16

## 2022-02-02 RX ORDER — EPINEPHRINE 1 MG/ML
0.3 INJECTION, SOLUTION, CONCENTRATE INTRAVENOUS AS NEEDED
Status: DISCONTINUED | OUTPATIENT
Start: 2022-02-02 | End: 2022-02-03 | Stop reason: HOSPADM

## 2022-02-02 RX ORDER — ONDANSETRON 2 MG/ML
8 INJECTION INTRAMUSCULAR; INTRAVENOUS AS NEEDED
Status: CANCELLED | OUTPATIENT
Start: 2022-02-16

## 2022-02-02 RX ORDER — HYDROCORTISONE SODIUM SUCCINATE 100 MG/2ML
100 INJECTION, POWDER, FOR SOLUTION INTRAMUSCULAR; INTRAVENOUS AS NEEDED
Status: CANCELLED | OUTPATIENT
Start: 2022-02-16

## 2022-02-02 RX ADMIN — SODIUM CHLORIDE 25 ML/HR: 9 INJECTION, SOLUTION INTRAVENOUS at 14:18

## 2022-02-02 RX ADMIN — PEGLOTICASE 8 MG: 8 INJECTION, SOLUTION INTRAVENOUS at 14:56

## 2022-02-02 RX ADMIN — ACETAMINOPHEN 650 MG: 325 TABLET ORAL at 14:20

## 2022-02-02 RX ADMIN — METHYLPREDNISOLONE SODIUM SUCCINATE 125 MG: 125 INJECTION, POWDER, FOR SOLUTION INTRAMUSCULAR; INTRAVENOUS at 14:22

## 2022-02-02 RX ADMIN — FAMOTIDINE 40 MG: 10 INJECTION INTRAVENOUS at 14:20

## 2022-02-02 RX ADMIN — DIPHENHYDRAMINE HYDROCHLORIDE 50 MG: 50 INJECTION INTRAMUSCULAR; INTRAVENOUS at 14:23

## 2022-02-02 NOTE — PROGRESS NOTES
OPI Short Note                       Date: 2022    Name: Nena Barnard    MRN: [id number]         : 1970    1330 Pt admit to [facility] for [address] ambulatory in stable condition. Assessment completed. No new concerns voiced. Please review pending lab results in [address] [place] [address]. Patient denies SOB, fever, cough, general not feeling well. Patient denies recent exposure to someone who has tested positive for COVID-19. Patient denies having contact with anyone who has a pending COVID test.    Mr. Gerri Duncan vitals were reviewed prior to treatment. Patient Vitals for the past 12 hrs:   Temp Pulse Resp BP   22 1329 97.7 °F (36.5 °C) 85 18 138/89       PIV with positive blood return. Lab drawn, flushed, heparinized and de-accessed per protocol. Medications given:   Medications Administered     0.9% sodium chloride infusion     Admin Date  2022 Action  New Bag Dose  25 mL/hr Rate  25 mL/hr Route  IntraVENous Administered By  Neftaly Locke RN          acetaminophen (TYLENOL) tablet 650 mg     Admin Date  2022 Action  Given Dose  650 mg Route  Oral Administered By  Neftaly Locke RN          diphenhydrAMINE (BENADRYL) injection 50 mg     Admin Date  2022 Action  Given Dose  50 mg Route  IntraVENous Administered By  Neftaly Locke RN          famotidine (PF) (PEPCID) 40 mg in 0.9% sodium chloride 10 mL injection     Admin Date  2022 Action  Given Dose  40 mg Route  IntraVENous Administered By  Neftaly Locke RN          methylPREDNISolone (PF) (Solu-MEDROL) injection 125 mg     Admin Date  2022 Action  Given Dose  125 mg Route  IntraVENous Administered By  Neftaly Locke RN          pegloticase [facility]) 8 mg in 0.9% sodium chloride 250 mL, overfill volume 25 mL infusion     Admin Date  2022 Action  New Bag Dose  8 mg Rate  138 mL/hr Route  IntraVENous Administered By  Neftaly Locke RN                   5587 Pt tolerated treatment well.  D/c home ambulatory in no distress.      Future Appointments   Date Time Provider Barry Rachael   2/16/2022  1:00 PM C1 [facility]   3/2/2022 10:00 AM Joelle Simons MD AOCR BS AMB   3/2/2022  1:00 PM A3 [facility]   3/16/2022  1:00 PM B1 [facility] [id number] Cecilia Lin RN  February 2, 2022  5:54 PM

## 2022-02-08 ENCOUNTER — HOSPITAL ENCOUNTER (OUTPATIENT)
Dept: PREADMISSION TESTING | Age: 52
Discharge: HOME | End: 2022-02-08
Payer: COMMERCIAL

## 2022-02-08 VITALS
BODY MASS INDEX: 28.58 KG/M2 | WEIGHT: 204.15 LBS | OXYGEN SATURATION: 100 % | DIASTOLIC BLOOD PRESSURE: 77 MMHG | HEIGHT: 71 IN | TEMPERATURE: 98.1 F | HEART RATE: 77 BPM | SYSTOLIC BLOOD PRESSURE: 112 MMHG

## 2022-02-08 LAB
ALBUMIN SERPL-MCNC: 3.6 G/DL (ref 3.5–5)
ALBUMIN/GLOB SERPL: 1.2 {RATIO} (ref 1.1–2.2)
ALP SERPL-CCNC: 24 U/L (ref 45–117)
ALT SERPL-CCNC: 25 U/L (ref 12–78)
ANION GAP SERPL CALC-SCNC: 10 MMOL/L (ref 5–15)
APTT PPP: 29.3 SEC (ref 22.1–31)
AST SERPL-CCNC: 12 U/L (ref 15–37)
BASOPHILS # BLD: 0 K/UL (ref 0–0.1)
BASOPHILS NFR BLD: 0 % (ref 0–1)
BILIRUB SERPL-MCNC: 0.3 MG/DL (ref 0.2–1)
BUN SERPL-MCNC: 88 MG/DL (ref 6–20)
BUN/CREAT SERPL: 12 (ref 12–20)
CALCIUM SERPL-MCNC: 9.4 MG/DL (ref 8.5–10.1)
CHLORIDE SERPL-SCNC: 108 MMOL/L (ref 97–108)
CO2 SERPL-SCNC: 19 MMOL/L (ref 21–32)
CREAT SERPL-MCNC: 7.49 MG/DL (ref 0.7–1.3)
DIFFERENTIAL METHOD BLD: ABNORMAL
EOSINOPHIL # BLD: 0.6 K/UL (ref 0–0.4)
EOSINOPHIL NFR BLD: 8 % (ref 0–7)
ERYTHROCYTE [DISTWIDTH] IN BLOOD BY AUTOMATED COUNT: 13.2 % (ref 11.5–14.5)
GLOBULIN SER CALC-MCNC: 3.1 G/DL (ref 2–4)
GLUCOSE SERPL-MCNC: 119 MG/DL (ref 65–100)
HCT VFR BLD AUTO: 28.7 % (ref 36.6–50.3)
HGB BLD-MCNC: 8.6 G/DL (ref 12.1–17)
IMM GRANULOCYTES # BLD AUTO: 0 K/UL (ref 0–0.04)
IMM GRANULOCYTES NFR BLD AUTO: 0 % (ref 0–0.5)
INR PPP: 1 (ref 0.9–1.1)
LYMPHOCYTES # BLD: 2.2 K/UL (ref 0.8–3.5)
LYMPHOCYTES NFR BLD: 32 % (ref 12–49)
MCH RBC QN AUTO: 30.3 PG (ref 26–34)
MCHC RBC AUTO-ENTMCNC: 30 G/DL (ref 30–36.5)
MCV RBC AUTO: 101.1 FL (ref 80–99)
MONOCYTES # BLD: 0.6 K/UL (ref 0–1)
MONOCYTES NFR BLD: 9 % (ref 5–13)
NEUTS SEG # BLD: 3.6 K/UL (ref 1.8–8)
NEUTS SEG NFR BLD: 51 % (ref 32–75)
NRBC # BLD: 0 K/UL (ref 0–0.01)
NRBC BLD-RTO: 0 PER 100 WBC
PLATELET # BLD AUTO: 244 K/UL (ref 150–400)
PMV BLD AUTO: 10.3 FL (ref 8.9–12.9)
POTASSIUM SERPL-SCNC: 3.8 MMOL/L (ref 3.5–5.1)
PROT SERPL-MCNC: 6.7 G/DL (ref 6.4–8.2)
PROTHROMBIN TIME: 10.9 SEC (ref 9–11.1)
RBC # BLD AUTO: 2.84 M/UL (ref 4.1–5.7)
SODIUM SERPL-SCNC: 137 MMOL/L (ref 136–145)
THERAPEUTIC RANGE,PTTT: NORMAL SECS (ref 58–77)
WBC # BLD AUTO: 7.1 K/UL (ref 4.1–11.1)

## 2022-02-08 PROCEDURE — 85025 COMPLETE CBC W/AUTO DIFF WBC: CPT

## 2022-02-08 PROCEDURE — 36415 COLL VENOUS BLD VENIPUNCTURE: CPT

## 2022-02-08 PROCEDURE — 85730 THROMBOPLASTIN TIME PARTIAL: CPT

## 2022-02-08 PROCEDURE — 80053 COMPREHEN METABOLIC PANEL: CPT

## 2022-02-08 PROCEDURE — 85610 PROTHROMBIN TIME: CPT

## 2022-02-08 NOTE — PERIOP NOTES
PAT instructions reviewed with patient and given the opportunity to ask questions. Patient given surgical site information FAQs handout and reviewed. Patient given two bottles CHG soap and instruction sheet, instructions for use reviewed with patient. Patient made aware of COVID 19 testing to be done 96  hours prior to surgery. Patient instructed to self quarantine between testing and arrival time day of surgery. Patient instructed re: check-in procedure for day of surgery. Cardiac notes received from Dr. Amy Sinclair office and placed on chart.

## 2022-02-08 NOTE — PERIOP NOTES
[address] INSTRUCTIONS    Surgery Date:   2-17-22    [facility] staff will call you between 4 PM- 8 PM the day before surgery with your arrival time. If your surgery is on a Monday, we will call you the preceding Friday. Please call [phone] after 8 PM if you did not receive your arrival time. 1. Please report to [address] Patient Access/Admitting on the 1st floor. Bring your insurance card, photo identification, and any copayment ( if applicable). 2. If you are going home the same day of your surgery, you must have a responsible adult to drive you home. You need to have a responsible adult to stay with you the first 24 hours after surgery and you should not drive a car for 24 hours following your surgery. 3. Nothing to eat or drink after midnight the night before surgery. This includes no water, gum, mints, coffee, juice, etc.  Please note special instructions, if applicable, below for medications. 4. Do NOT drink alcohol or smoke 24 hours before surgery. STOP smoking for 14 days prior as it helps with breathing and healing after surgery. 5. If you are being admitted to the hospital, please leave personal belongings/luggage in your car until you have an assigned hospital room number. 6. Please wear comfortable clothes. Wear your glasses instead of contacts. We ask that all money, jewelry and valuables be left at home. Wear no make up, particularly mascara, the day of surgery. 7.  All body piercings, rings, and jewelry need to be removed and left at home. Please remove any nail polish or artificial nails from your fingernails. Please wear your hair loose or down. Please no pony-tails, buns, or any metal hair accessories. If you shower the morning of surgery, please do not apply any lotions or powders afterwards. You may wear deodorant, unless having breast surgery. Do not shave any body area within 24 hours of your surgery.   8. Please follow all instructions to avoid any potential surgical cancellation. 9. Should your physical condition change, (i.e. fever, cold, flu, etc.) please notify your surgeon as soon as possible. 10. It is important to be on time. If a situation occurs where you may be delayed, please call:  [phone] / [phone] on the day of surgery. 11. The Preadmission Testing staff can be reached at [phone]. 12. Special instructions: NONE      Current Outpatient Medications   Medication Sig    colchicine (Colcrys) 0.6 mg tablet TAKE ONE TABLET BY MOUTH DAILY AS NEEDED FOR GOUT FLARE    calcitRIOL (ROCALTROL) 0.5 mcg capsule Take 1 mcg by mouth Every morning.  sodium bicarbonate 325 mg tablet Take 650 mg by mouth two (2) times a day.  leflunomide (ARAVA) 20 mg tablet Take 1 Tablet by mouth daily.  spironolactone (ALDACTONE) 25 mg tablet Take 25 mg by mouth daily.  cholecalciferol (Vitamin D3) 25 mcg (1,000 unit) cap Take  by mouth daily.  carvedilol (COREG) 25 mg tablet Take 1 Tab by mouth two (2) times daily (with meals).  bumetanide (BUMEX) 1 mg tablet Take 1 Tab by mouth daily.  pegloticase (KRYSTEXXA) 8 mg/mL soln injection 8 mg by IntraVENous route Once every 2 weeks. No current facility-administered medications for this encounter. 1. YOU MUST ONLY TAKE THESE MEDICATIONS THE MORNING OF SURGERY WITH A SIP OF WATER: CARVEDILOL  2. MEDICATIONS TO TAKE THE MORNING OF SURGERY ONLY IF NEEDED: COLCICHINE  3. HOLD these prescription medications DAY OF Surgery: BUMETANIDE, SPIRONOLACTONE, SODIUM BICARB, CALCITROL  4. Ask your surgeon/prescribing physician about when/if to STOP taking these medications: ARAVA  5. Stop all vitamins, herbal medicines and Aspirin containing products 7 days prior to surgery. Stop any non-steroidal anti-inflammatory drugs (i.e. Ibuprofen, Naproxen, Advil, Aleve) 3 days before surgery. You may take Tylenol.     6. If you are currently taking Plavix, Coumadin, or any other blood-thinning/anticoagulant medication contact your prescribing physician for instructions. Preventing Infections Before and After - Your Surgery    IMPORTANT INSTRUCTIONS    Please read and follow these instructions carefully. If you are unable to comply with the below instructions your procedure will be cancelled. You play an important role in your health and preparation for surgery. To reduce the germs on your skin you will need to shower with CHG soap (Chorhexidine gluconate 4%) two times before surgery. CHG soap (Hibiclens, Hex-A-Clens or store brand)   CHG soap will be provided at your Preadmission Testing (PAT) appointment.  If you do not have a PAT appointment before surgery, you may arrange to  CHG soap from our office or purchase CHG soap at a pharmacy, grocery or department store.  You need to purchase TWO 4 ounce bottles to use for your 2 showers. Steps to follow:  1. Wash your hair with your normal shampoo and your body with regular soap and rinse well to remove shampoo and soap from your skin. 2. Wet a clean washcloth and turn off the shower. 3. Put CHG soap on washcloth and apply to your entire body from the neck down. Do not use on your head, face or private parts(genitals). Do not use CHG soap on open sores, wounds or areas of skin irritation. 4. Wash you body gently for 5 minutes. Do not wash your skin too hard. This soap does not create lather. Pay special attention to your underarms and from your belly button to your feet. 5. Turn the shower back on and rinse well to get CHG soap off your body. 6. Pat your skin dry with a clean, dry towel. Do not apply lotions or moisturizer. 7. Put on clean clothes and sleep on fresh bed sheets and do not allow pets to sleep with you. Shower with CHG soap 2 times before your surgery   The evening before your surgery   The morning of your surgery      Tips to help prevent infections after your surgery:  1.  Protect your surgical wound from germs:  ? Hand washing is the most important thing you and your caregivers can do to prevent infections. ? Keep your bandage clean and dry! ? Do not touch your surgical wound. 2. Use clean, freshly washed towels and washcloths every time you shower; do not share bath linens with others. 3. Until your surgical wound is healed, wear clothing and sleep on bed linens each day that are clean and freshly washed. 4. Do not allow pets to sleep in your bed with you or touch your surgical wound. 5. Do not smoke - smoking delays wound healing. This may be a good time to stop smoking. 6. If you have diabetes, it is important for you to manage your blood sugar levels properly before your surgery as well as after your surgery. Poorly managed blood sugar levels slow down wound healing and prevent you from healing completely. [employer]   Instructions for Pre-Surgery COVID-19 Testing     Across our ministry we have established standard guidelines to ensure the health and safety of our patients, residents and associates as we resume elective services for patients. All patients presenting for surgery are required to have a COVID-19 test result within 96 hours of their scheduled surgery. [facility] is providing this test free of charge to the patient.    Instructions for COVID-19 Testing:     Patients will receive a call from Pre-Admission Testing 4-5 days prior to surgery to schedule a date and time to come to the [address] [place] [address] Drive for their COVID-19 test   Patients are advised to self-quarantine after testing until their scheduled surgery   Once on site, patients will be registered and receive COVID test in their vehicle   If a patient is scheduled for normal Pre Admission Testing 96 hours from date of surgery, the patient will still have their COVID test done at the [address] [place] [address] located at [address] [place] [address] Positive results will be shared with the surgeon and anesthesiologist and may result in cancellation of the elective procedure    Testing Hours and Location:   Address:  [address] Admission [address] [facility] in the Discharge Lot on [facility] (Map Attached)  [address] [place] [address], [facility] [address]   Hours: Monday- Friday 7a-3p    PAT Phone Number: [phone]            Patient Information Regarding COVID Restrictions    Patients are advised to self-quarantine after COVID testing up to the day of the scheduled procedure. Day of Procedure     Please park in the parking deck or any designated visitor parking lot.  Enter the facility through the Main Entrance of the hospital.   A temperature check and appropriate symptom/exposure screening will be done prior to entry to the facility.  On the day of surgery, please provide the cell phone number of the person who will be waiting for you to the Patient Access representative at the time of registration.  Please wear a mask on the day of your procedure.  We are now allowing one designated visitor per stay. Pediatric patients may have 2 designated visitors. This one person may come in with you on the day of your procedure.  No visitors under the age of 13.  The designated visitor must also wear a mask.  Once your procedure and the immediate recovery period is completed, a nurse in the recovery area will contact your designated visitor to inform them of your room number or to otherwise review other pertinent information regarding your care.  Social distancing practices are to be adhered to in waiting areas and the cafeteria. The patient was contacted  in person. He verbalized understanding of all instructions does not  need reinforcement.

## 2022-02-09 NOTE — PERIOP NOTES
FAXED PRE-ADMISSION TESTING REPORTS (AND FAX CONFIRMATION RECEIVED TO DR. SENA'S OFFICE . CALLED ON 2/9/22  AT 1420  AND SPOKE WITH MALCOLM , RE: ABNORMAL HEMOGLOBIN 8.6 (L), HEMATOCRIT 28.7 (L), BUN 88 (H), CREATININE 7.49 (H),     AND SENT EKG TO ANESTHESIA FOR REVIEW. OK PER DR. Gayathri Archer.

## 2022-02-14 ENCOUNTER — TRANSCRIBE ORDER (OUTPATIENT)
Dept: REGISTRATION | Age: 52
End: 2022-02-14

## 2022-02-14 ENCOUNTER — HOSPITAL ENCOUNTER (OUTPATIENT)
Dept: PREADMISSION TESTING | Age: 52
Discharge: HOME | End: 2022-02-14
Payer: COMMERCIAL

## 2022-02-14 DIAGNOSIS — U07.1 COVID-19: Primary | ICD-10-CM

## 2022-02-14 DIAGNOSIS — U07.1 COVID-19: ICD-10-CM

## 2022-02-14 PROCEDURE — U0005 INFEC AGEN DETEC AMPLI PROBE: HCPCS

## 2022-02-15 LAB
SARS-COV-2, XPLCVT: NOT DETECTED
SOURCE, COVRS: NORMAL

## 2022-02-16 ENCOUNTER — HOSPITAL ENCOUNTER (OUTPATIENT)
Dept: INFUSION THERAPY | Age: 52
Discharge: HOME | End: 2022-02-16

## 2022-02-17 ENCOUNTER — HOSPITAL ENCOUNTER (OUTPATIENT)
Age: 52
Setting detail: OUTPATIENT SURGERY
Discharge: HOME | End: 2022-02-17
Payer: COMMERCIAL

## 2022-02-17 ENCOUNTER — ANESTHESIA (OUTPATIENT)
Dept: SURGERY | Age: 52
End: 2022-02-17
Payer: COMMERCIAL

## 2022-02-17 ENCOUNTER — ANESTHESIA EVENT (OUTPATIENT)
Dept: SURGERY | Age: 52
End: 2022-02-17
Payer: COMMERCIAL

## 2022-02-17 VITALS
TEMPERATURE: 97.5 F | HEART RATE: 78 BPM | DIASTOLIC BLOOD PRESSURE: 94 MMHG | BODY MASS INDEX: 28.58 KG/M2 | WEIGHT: 204.15 LBS | RESPIRATION RATE: 13 BRPM | SYSTOLIC BLOOD PRESSURE: 126 MMHG | HEIGHT: 71 IN | OXYGEN SATURATION: 99 %

## 2022-02-17 DIAGNOSIS — N18.6 ESRD (END STAGE RENAL DISEASE) ON DIALYSIS (HCC): Primary | ICD-10-CM

## 2022-02-17 DIAGNOSIS — Z99.2 ESRD (END STAGE RENAL DISEASE) ON DIALYSIS (HCC): Primary | ICD-10-CM

## 2022-02-17 LAB
ANION GAP BLD CALC-SCNC: 12 MMOL/L (ref 10–20)
CA-I BLD-MCNC: 1.26 MMOL/L (ref 1.12–1.32)
CHLORIDE BLD-SCNC: 112 MMOL/L (ref 98–107)
CO2 BLD-SCNC: 18.4 MMOL/L (ref 21–32)
CREAT BLD-MCNC: 8.72 MG/DL (ref 0.6–1.3)
GLUCOSE BLD-MCNC: 115 MG/DL (ref 65–100)
POTASSIUM BLD-SCNC: 4.3 MMOL/L (ref 3.5–5.1)
SERVICE CMNT-IMP: ABNORMAL
SODIUM BLD-SCNC: 141 MMOL/L (ref 136–145)

## 2022-02-17 PROCEDURE — 74011250636 HC RX REV CODE- 250/636

## 2022-02-17 PROCEDURE — 77030042556 HC PNCL CAUT -B

## 2022-02-17 PROCEDURE — 77030002987 HC SUT PROL J&J -B

## 2022-02-17 PROCEDURE — 76210000021 HC REC RM PH II 0.5 TO 1 HR

## 2022-02-17 PROCEDURE — 77030040922 HC BLNKT HYPOTHRM STRY -A

## 2022-02-17 PROCEDURE — 77030008462 HC STPLR SKN PROX J&J -A

## 2022-02-17 PROCEDURE — 77030003601 HC NDL NRV BLK BBMI -A

## 2022-02-17 PROCEDURE — 76010000153 HC OR TIME 1.5 TO 2 HR

## 2022-02-17 PROCEDURE — 76210000006 HC OR PH I REC 0.5 TO 1 HR

## 2022-02-17 PROCEDURE — 74011250636 HC RX REV CODE- 250/636: Performed by: ANESTHESIOLOGY

## 2022-02-17 PROCEDURE — 77030031139 HC SUT VCRL2 J&J -A

## 2022-02-17 PROCEDURE — 74011000250 HC RX REV CODE- 250

## 2022-02-17 PROCEDURE — 74011250636 HC RX REV CODE- 250/636: Performed by: NURSE ANESTHETIST, CERTIFIED REGISTERED

## 2022-02-17 PROCEDURE — 76060000034 HC ANESTHESIA 1.5 TO 2 HR

## 2022-02-17 PROCEDURE — 80047 BASIC METABLC PNL IONIZED CA: CPT

## 2022-02-17 PROCEDURE — 2709999900 HC NON-CHARGEABLE SUPPLY

## 2022-02-17 PROCEDURE — 77030011992 HC AIRWY NASOPHGL TELE -A: Performed by: ANESTHESIOLOGY

## 2022-02-17 RX ORDER — SODIUM CHLORIDE, SODIUM LACTATE, POTASSIUM CHLORIDE, CALCIUM CHLORIDE 600; 310; 30; 20 MG/100ML; MG/100ML; MG/100ML; MG/100ML
1000 INJECTION, SOLUTION INTRAVENOUS CONTINUOUS
Status: DISCONTINUED | OUTPATIENT
Start: 2022-02-17 | End: 2022-02-17 | Stop reason: HOSPADM

## 2022-02-17 RX ORDER — SODIUM CHLORIDE 9 MG/ML
25 INJECTION, SOLUTION INTRAVENOUS CONTINUOUS
Status: DISCONTINUED | OUTPATIENT
Start: 2022-02-17 | End: 2022-02-17 | Stop reason: HOSPADM

## 2022-02-17 RX ORDER — ACETAMINOPHEN 325 MG/1
650 TABLET ORAL ONCE
Status: DISCONTINUED | OUTPATIENT
Start: 2022-02-17 | End: 2022-02-17 | Stop reason: HOSPADM

## 2022-02-17 RX ORDER — MORPHINE SULFATE 2 MG/ML
2 INJECTION, SOLUTION INTRAMUSCULAR; INTRAVENOUS
Status: DISCONTINUED | OUTPATIENT
Start: 2022-02-17 | End: 2022-02-17 | Stop reason: HOSPADM

## 2022-02-17 RX ORDER — FENTANYL CITRATE 50 UG/ML
25 INJECTION, SOLUTION INTRAMUSCULAR; INTRAVENOUS
Status: DISCONTINUED | OUTPATIENT
Start: 2022-02-17 | End: 2022-02-17 | Stop reason: HOSPADM

## 2022-02-17 RX ORDER — HYDROCODONE BITARTRATE AND ACETAMINOPHEN 7.5; 325 MG/1; MG/1
1 TABLET ORAL
Qty: 20 TABLET | Refills: 0 | Status: SHIPPED | OUTPATIENT
Start: 2022-02-17 | End: 2022-02-20

## 2022-02-17 RX ORDER — ONDANSETRON 2 MG/ML
4 INJECTION INTRAMUSCULAR; INTRAVENOUS AS NEEDED
Status: DISCONTINUED | OUTPATIENT
Start: 2022-02-17 | End: 2022-02-17 | Stop reason: HOSPADM

## 2022-02-17 RX ORDER — MIDAZOLAM HYDROCHLORIDE 1 MG/ML
1 INJECTION, SOLUTION INTRAMUSCULAR; INTRAVENOUS AS NEEDED
Status: DISCONTINUED | OUTPATIENT
Start: 2022-02-17 | End: 2022-02-17 | Stop reason: HOSPADM

## 2022-02-17 RX ORDER — ROPIVACAINE HYDROCHLORIDE 5 MG/ML
30 INJECTION, SOLUTION EPIDURAL; INFILTRATION; PERINEURAL AS NEEDED
Status: DISCONTINUED | OUTPATIENT
Start: 2022-02-17 | End: 2022-02-17 | Stop reason: HOSPADM

## 2022-02-17 RX ORDER — PROPOFOL 10 MG/ML
INJECTION, EMULSION INTRAVENOUS
Status: DISCONTINUED | OUTPATIENT
Start: 2022-02-17 | End: 2022-02-17 | Stop reason: HOSPADM

## 2022-02-17 RX ORDER — FENTANYL CITRATE 50 UG/ML
50 INJECTION, SOLUTION INTRAMUSCULAR; INTRAVENOUS AS NEEDED
Status: DISCONTINUED | OUTPATIENT
Start: 2022-02-17 | End: 2022-02-17 | Stop reason: HOSPADM

## 2022-02-17 RX ORDER — PROPOFOL 10 MG/ML
INJECTION, EMULSION INTRAVENOUS AS NEEDED
Status: DISCONTINUED | OUTPATIENT
Start: 2022-02-17 | End: 2022-02-17 | Stop reason: HOSPADM

## 2022-02-17 RX ORDER — MIDAZOLAM HYDROCHLORIDE 1 MG/ML
0.5 INJECTION, SOLUTION INTRAMUSCULAR; INTRAVENOUS
Status: DISCONTINUED | OUTPATIENT
Start: 2022-02-17 | End: 2022-02-17 | Stop reason: HOSPADM

## 2022-02-17 RX ORDER — GLYCOPYRROLATE 0.2 MG/ML
0.2 INJECTION INTRAMUSCULAR; INTRAVENOUS
Status: DISCONTINUED | OUTPATIENT
Start: 2022-02-17 | End: 2022-02-17 | Stop reason: HOSPADM

## 2022-02-17 RX ORDER — SODIUM CHLORIDE 9 MG/ML
INJECTION, SOLUTION INTRAVENOUS
Status: DISCONTINUED | OUTPATIENT
Start: 2022-02-17 | End: 2022-02-17 | Stop reason: HOSPADM

## 2022-02-17 RX ORDER — HYDROMORPHONE HYDROCHLORIDE 1 MG/ML
0.2 INJECTION, SOLUTION INTRAMUSCULAR; INTRAVENOUS; SUBCUTANEOUS
Status: DISCONTINUED | OUTPATIENT
Start: 2022-02-17 | End: 2022-02-17 | Stop reason: HOSPADM

## 2022-02-17 RX ORDER — HYDROCODONE BITARTRATE AND ACETAMINOPHEN 5; 325 MG/1; MG/1
1 TABLET ORAL AS NEEDED
Status: DISCONTINUED | OUTPATIENT
Start: 2022-02-17 | End: 2022-02-17 | Stop reason: HOSPADM

## 2022-02-17 RX ORDER — LIDOCAINE HYDROCHLORIDE 10 MG/ML
0.1 INJECTION, SOLUTION EPIDURAL; INFILTRATION; INTRACAUDAL; PERINEURAL AS NEEDED
Status: DISCONTINUED | OUTPATIENT
Start: 2022-02-17 | End: 2022-02-17 | Stop reason: HOSPADM

## 2022-02-17 RX ORDER — DIPHENHYDRAMINE HYDROCHLORIDE 50 MG/ML
12.5 INJECTION, SOLUTION INTRAMUSCULAR; INTRAVENOUS AS NEEDED
Status: DISCONTINUED | OUTPATIENT
Start: 2022-02-17 | End: 2022-02-17 | Stop reason: HOSPADM

## 2022-02-17 RX ORDER — ALBUTEROL SULFATE 0.83 MG/ML
2.5 SOLUTION RESPIRATORY (INHALATION) AS NEEDED
Status: DISCONTINUED | OUTPATIENT
Start: 2022-02-17 | End: 2022-02-17 | Stop reason: HOSPADM

## 2022-02-17 RX ADMIN — SODIUM CHLORIDE 25 ML/HR: 9 INJECTION, SOLUTION INTRAVENOUS at 06:41

## 2022-02-17 RX ADMIN — MEPIVACAINE HYDROCHLORIDE 30 ML: 15 INJECTION, SOLUTION EPIDURAL; INFILTRATION at 07:55

## 2022-02-17 RX ADMIN — PROPOFOL 20 MG: 10 INJECTION, EMULSION INTRAVENOUS at 09:16

## 2022-02-17 RX ADMIN — PROPOFOL 20 MG: 10 INJECTION, EMULSION INTRAVENOUS at 08:10

## 2022-02-17 RX ADMIN — PROPOFOL 100 MCG/KG/MIN: 10 INJECTION, EMULSION INTRAVENOUS at 08:03

## 2022-02-17 RX ADMIN — SODIUM CHLORIDE: 900 INJECTION, SOLUTION INTRAVENOUS at 07:55

## 2022-02-17 RX ADMIN — MIDAZOLAM 2 MG: 1 INJECTION INTRAMUSCULAR; INTRAVENOUS at 07:34

## 2022-02-17 RX ADMIN — WATER 2 G: 1 INJECTION INTRAMUSCULAR; INTRAVENOUS; SUBCUTANEOUS at 08:06

## 2022-02-17 RX ADMIN — PROPOFOL 20 MG: 10 INJECTION, EMULSION INTRAVENOUS at 08:51

## 2022-02-17 RX ADMIN — PROPOFOL 30 MG: 10 INJECTION, EMULSION INTRAVENOUS at 08:07

## 2022-02-17 RX ADMIN — FENTANYL CITRATE 50 MCG: 50 INJECTION, SOLUTION INTRAMUSCULAR; INTRAVENOUS at 07:34

## 2022-02-17 RX ADMIN — PROPOFOL 50 MG: 10 INJECTION, EMULSION INTRAVENOUS at 08:03

## 2022-02-17 NOTE — BRIEF OP NOTE
Brief Postoperative Note    Patient: Ravi Oh  YOB: 1970  MRN: [id number]    Date of Procedure: 2/17/2022     Pre-Op Diagnosis: END STAGE RENAL    Post-Op Diagnosis: Same as preoperative diagnosis.       Procedure(s):  CREATION RIGHT UPPER ARM BASILIC VEIN FISUTLA    Surgeon(s):  Javon Reddy MD    Surgical Assistant: Surg Asst-1: Conchita Beckett    Anesthesia: Regional     Estimated Blood Loss (mL): Minimal    Complications: None    Specimens: * No specimens in log *     Implants: * No implants in log *    Drains: * No LDAs found *    Findings: none    Electronically Signed by Miguel Angel Gomez MD on 2/17/2022 at 9:25 AM

## 2022-02-17 NOTE — OP NOTES
[facility]  OPERATIVE REPORT    Name:  Aminah Savage  MR#:  [id number]  :  1970  ACCOUNT #:  [de-identified]  DATE OF SERVICE:  2022      PREOPERATIVE DIAGNOSIS:  Renal failure. POSTOPERATIVE DIAGNOSIS:  Renal failure. PROCEDURE PERFORMED:  Right upper arm basilic vein transposition dialysis fistula. SURGEON:  Qian Augustine MD    ASSISTANT:  Maryam Forde. ANESTHESIA:  Regional block. COMPLICATIONS:  None. SPECIMENS REMOVED:  None. IMPLANTS:  None. ESTIMATED BLOOD LOSS:  Minimal.    INDICATIONS:  The patient is a 53-year-old male with end-stage renal disease, on hemodialysis. He had a previous left upper arm cephalic vein fistula that failed before it could mature adequately for use. His next best option is his right upper arm basilic vein which will be used for a fistula. PROCEDURE:  The patient's right arm was prepped and draped. A longitudinal incision was made in the distal medial upper arm. The basilic vein was identified and dissected free. Side branches were ligated with silk ties. The vein was dissected into the antecubital area where a transverse incision was made. The vein was further dissected free in to the proximal forearm where it was ligated and divided. The brachial artery was then dissected free. On further dissection, it became clear that there was a high bifurcation of the radial and ulnar arteries and that the artery that was identified at the antecubital level was likely a radial artery. An additional longitudinal incision was made in the proximal medial upper arm and the vein was dissected up to the axilla. The vein was then removed from its previous location and tunneled in the anteromedial upper arm using one counterincision. The vein was brought to lie alongside the proximal radial artery and was sewn end to side using running 6-0 Prolene. At the completion, there was a palpable thrill and an augmentable pulse.   The incisions were closed with Vicryl subcutaneous suture and skin staples. Dressings were applied and the patient was returned to the recovery room in stable condition.       Ceasar Brooks MD      [id number]  D:  02/17/2022 9:34  T:  02/17/2022 12:39  JOB #:  [id number]

## 2022-02-17 NOTE — ANESTHESIA POSTPROCEDURE EVALUATION
Post-Anesthesia Evaluation and Assessment    Patient: Sena Mckee MRN: [id number]  SSN: [id number]    YOB: 1970  Age: 50 y.o. Sex: male      I have evaluated the patient and they are stable and ready for discharge from the PACU. Cardiovascular Function/Vital Signs  Visit Vitals  BP (!) 126/94   Pulse 78   Temp 36.4 °C (97.5 °F)   Resp 13   Ht 5' 11\" (1.803 m)   Wt 92.6 kg (204 lb 2.3 oz)   SpO2 99%   BMI 28.47 kg/m²       Patient is status post Regional anesthesia for Procedure(s):  CREATION RIGHT UPPER ARM BASILIC VEIN FISUTLA. Nausea/Vomiting: None    Postoperative hydration reviewed and adequate. Pain:  Pain Scale 1: Numeric (0 - 10) (02/17/22 1000)  Pain Intensity 1: 0 (02/17/22 1000)   Managed    Neurological Status:   Neuro (WDL): Within Defined Limits (02/17/22 1000)  Neuro  RUE Motor Response: Weak (02/17/22 1000)   At baseline    Mental Status, Level of Consciousness: Alert and  oriented to person, place, and time    Pulmonary Status:   O2 Device: None (Room air) (02/17/22 1000)   Adequate oxygenation and airway patent    Complications related to anesthesia: None    Post-anesthesia assessment completed. No concerns    Signed By: Ace Matias MD     February 17, 2022              Procedure(s):  CREATION RIGHT UPPER ARM BASILIC VEIN FISUTLA.    regional, MAC    <BSHSIANPOST>    INITIAL Post-op Vital signs:   Vitals Value Taken Time   /102 02/17/22 1004   Temp 36.4 °C (97.5 °F) 02/17/22 0933   Pulse 79 02/17/22 1005   Resp 15 02/17/22 1005   SpO2 99 % 02/17/22 1005   Vitals shown include unvalidated device data.

## 2022-02-17 NOTE — DISCHARGE INSTRUCTIONS
Patient Discharge Instructions    Paxton Goncalves / [id number] : 1970    Admitted 2022 Discharged: 2022     Take Home Medications     . · It is important that you take the medication exactly as they are prescribed. · Keep your medication in the bottles provided by the pharmacist and keep a list of the medication names, dosages, and times to be taken in your wallet. · Do not take other medications without consulting your doctor. What to do at Home    Recommended diet: Renal Diet,     Recommended activity: Activity as tolerated,     Additional Instructions: remove right arm dressings on Sat and leave open    Follow-up with Dr Maverick Rosales in 2 weeks, call [phone] for appt        Information obtained by :  I understand that if any problems occur once I am at home I am to contact my physician. I understand and acknowledge receipt of the instructions indicated above. Physician's or R.N.'s Signature                                                                  Date/Time                                                                                                                                              Patient or Representative Signature                                                          Date/Time      ______________________________________________________________________    Anesthesia Discharge Instructions    After general anesthesia or intervenous sedation, for 24 hours or while taking prescription Narcotics:  · Limit your activities  · Do not drive or operate hazardous machinery  · If you have not urinated within 8 hours after discharge, please contact your surgeon on call.   · Do not make important personal or business decisions  · Do not drink alcoholic beverages    Report the following to your surgeon:  · Excessive pain, swelling, redness or odor of or around the surgical area  · Temperature over 100.5 degrees  · Nausea and vomiting lasting longer than 4 hours or if unable to take medication  · Any signs of decreased circulation or nerve impairment to extremity:  Change in color, persistent numbness, tingling, coldness or increased pain.   · Any questions

## 2022-02-17 NOTE — ANESTHESIA PREPROCEDURE EVALUATION
Relevant Problems   RESPIRATORY SYSTEM   (+) SOB (shortness of breath)      CARDIOVASCULAR   (+) Essential hypertension      RENAL FAILURE   (+) Autosomal dominant adult polycystic kidney disease   (+) CKD (chronic kidney disease) stage 3, GFR 30-59 ml/min (HCC)   (+) CKD (chronic kidney disease) stage 4, GFR 15-29 ml/min ([facility])       Anesthetic History   No history of anesthetic complications            Review of Systems / Medical History  Patient summary reviewed, nursing notes reviewed and pertinent labs reviewed    Pulmonary            Asthma        Neuro/Psych   Within defined limits           Cardiovascular    Hypertension      CHF             GI/Hepatic/Renal         Renal disease: ESRD and dialysis       Endo/Other  Within defined limits           Other Findings              Physical Exam    Airway  Mallampati: II  TM Distance: > 6 cm  Neck ROM: normal range of motion   Mouth opening: Normal     Cardiovascular  Regular rate and rhythm,  S1 and S2 normal,  no murmur, click, rub, or gallop             Dental  No notable dental hx       Pulmonary  Breath sounds clear to auscultation               Abdominal  GI exam deferred       Other Findings            Anesthetic Plan    ASA: 3  Anesthesia type: regional - supraclavicular block      Post-op pain plan if not by surgeon: peripheral nerve block single    Induction: Intravenous  Anesthetic plan and risks discussed with: Patient

## 2022-02-17 NOTE — PERIOP NOTES
Patient: Kavya Goode MRN: [id number]  SSN: [id number]   YOB: 1970  Age: 50 y.o. Sex: male     Patient is status post Procedure(s):  CREATION RIGHT UPPER ARM BASILIC VEIN FISUTLA.     Surgeon(s) and Role:     * Pati Ross MD - Primary    Local/Dose/Irrigation:  na                  Peripheral IV 02/17/22 Left Hand (Active)        Hemodialysis Access (Active)                       Dressing/Packing:  Incision 02/17/22 Arm Right-Dressing/Treatment: Gauze dressing/dressing sponge;Tape/Soft cloth adhesive tape (02/17/22 0916)    Splint/Cast:  ]    Other:  na

## 2022-02-17 NOTE — ANESTHESIA PROCEDURE NOTES
Peripheral Block    Start time: 2/17/2022 7:35 AM  End time: 2/17/2022 7:45 AM  Performed by: Maryl Barr MD  Authorized by: Marly Barr MD       Pre-procedure: Indications: at surgeon's request, post-op pain management and procedure for pain    Preanesthetic Checklist: patient identified, risks and benefits discussed, site marked, timeout performed, anesthesia consent given and patient being monitored    Timeout Time: 07:35 EST          Block Type:   Block Type:   Interscalene  Laterality:  Right  Monitoring:  Standard ASA monitoring, continuous pulse ox, frequent vital sign checks, heart rate, oxygen and responsive to questions  Injection Technique:  Single shot  Procedures: nerve stimulator    Patient Position: supine  Prep: chlorhexidine    Location:  Interscalene  Needle Type:  Stimuplex  Needle Gauge:  22 G  Needle Localization:  Anatomical landmarks and nerve stimulator  Motor Response comment:   Motor Response: minimal motor response >0.4 mA   Medication Injected:  Mepivacaine PF (CARBOCAINE) 1.5 % injection, 30 mL  Med Admin Time: 2/17/2022 7:55 AM    Assessment:  Number of attempts:  1  Injection Assessment:  Incremental injection every 5 mL, negative aspiration for CSF, no paresthesia, negative aspiration for blood and no intravascular symptoms  Patient tolerance:  Patient tolerated the procedure well with no immediate complications

## 2022-02-23 ENCOUNTER — HOSPITAL ENCOUNTER (OUTPATIENT)
Dept: INFUSION THERAPY | Age: 52
Discharge: HOME | End: 2022-02-23
Attending: INTERNAL MEDICINE
Payer: COMMERCIAL

## 2022-02-23 VITALS
TEMPERATURE: 96 F | SYSTOLIC BLOOD PRESSURE: 114 MMHG | OXYGEN SATURATION: 96 % | RESPIRATION RATE: 18 BRPM | DIASTOLIC BLOOD PRESSURE: 75 MMHG | HEART RATE: 88 BPM

## 2022-02-23 DIAGNOSIS — M1A.39X1 CHRONIC TOPHACEOUS GOUT OF MULTIPLE SITES DUE TO RENAL IMPAIRMENT: Primary | ICD-10-CM

## 2022-02-23 LAB
ALBUMIN SERPL-MCNC: 3.2 G/DL (ref 3.5–5)
ALBUMIN/GLOB SERPL: 0.8 {RATIO} (ref 1.1–2.2)
ALP SERPL-CCNC: 23 U/L (ref 45–117)
ALT SERPL-CCNC: 17 U/L (ref 12–78)
ANION GAP SERPL CALC-SCNC: 2 MMOL/L (ref 5–15)
AST SERPL-CCNC: 16 U/L (ref 15–37)
BASOPHILS # BLD: 0.1 K/UL (ref 0–0.1)
BASOPHILS NFR BLD: 1 % (ref 0–1)
BILIRUB SERPL-MCNC: 0.3 MG/DL (ref 0.2–1)
BUN SERPL-MCNC: 24 MG/DL (ref 6–20)
BUN/CREAT SERPL: 6 (ref 12–20)
CALCIUM SERPL-MCNC: 8.9 MG/DL (ref 8.5–10.1)
CHLORIDE SERPL-SCNC: 102 MMOL/L (ref 97–108)
CO2 SERPL-SCNC: 34 MMOL/L (ref 21–32)
CREAT SERPL-MCNC: 3.99 MG/DL (ref 0.7–1.3)
DIFFERENTIAL METHOD BLD: ABNORMAL
EOSINOPHIL # BLD: 0.5 K/UL (ref 0–0.4)
EOSINOPHIL NFR BLD: 8 % (ref 0–7)
ERYTHROCYTE [DISTWIDTH] IN BLOOD BY AUTOMATED COUNT: 13.6 % (ref 11.5–14.5)
ERYTHROCYTE [SEDIMENTATION RATE] IN BLOOD: 31 MM/HR (ref 0–20)
GLOBULIN SER CALC-MCNC: 3.8 G/DL (ref 2–4)
GLUCOSE SERPL-MCNC: 113 MG/DL (ref 65–100)
HCT VFR BLD AUTO: 29.8 % (ref 36.6–50.3)
HGB BLD-MCNC: 8.8 G/DL (ref 12.1–17)
IMM GRANULOCYTES # BLD AUTO: 0 K/UL (ref 0–0.04)
IMM GRANULOCYTES NFR BLD AUTO: 0 % (ref 0–0.5)
LYMPHOCYTES # BLD: 1.3 K/UL (ref 0.8–3.5)
LYMPHOCYTES NFR BLD: 22 % (ref 12–49)
MCH RBC QN AUTO: 30.6 PG (ref 26–34)
MCHC RBC AUTO-ENTMCNC: 29.5 G/DL (ref 30–36.5)
MCV RBC AUTO: 103.5 FL (ref 80–99)
MONOCYTES # BLD: 0.8 K/UL (ref 0–1)
MONOCYTES NFR BLD: 13 % (ref 5–13)
NEUTS SEG # BLD: 3.4 K/UL (ref 1.8–8)
NEUTS SEG NFR BLD: 56 % (ref 32–75)
NRBC # BLD: 0.03 K/UL (ref 0–0.01)
NRBC BLD-RTO: 0.5 PER 100 WBC
PLATELET # BLD AUTO: 169 K/UL (ref 150–400)
PMV BLD AUTO: 10.5 FL (ref 8.9–12.9)
POTASSIUM SERPL-SCNC: 3.4 MMOL/L (ref 3.5–5.1)
PROT SERPL-MCNC: 7 G/DL (ref 6.4–8.2)
RBC # BLD AUTO: 2.88 M/UL (ref 4.1–5.7)
SODIUM SERPL-SCNC: 138 MMOL/L (ref 136–145)
URATE SERPL-MCNC: 0.2 MG/DL (ref 3.5–7.2)
WBC # BLD AUTO: 6 K/UL (ref 4.1–11.1)

## 2022-02-23 PROCEDURE — 85652 RBC SED RATE AUTOMATED: CPT

## 2022-02-23 PROCEDURE — 74011250636 HC RX REV CODE- 250/636: Performed by: INTERNAL MEDICINE

## 2022-02-23 PROCEDURE — 74011000250 HC RX REV CODE- 250: Performed by: INTERNAL MEDICINE

## 2022-02-23 PROCEDURE — 96375 TX/PRO/DX INJ NEW DRUG ADDON: CPT

## 2022-02-23 PROCEDURE — 96365 THER/PROPH/DIAG IV INF INIT: CPT

## 2022-02-23 PROCEDURE — 96366 THER/PROPH/DIAG IV INF ADDON: CPT

## 2022-02-23 PROCEDURE — 85025 COMPLETE CBC W/AUTO DIFF WBC: CPT

## 2022-02-23 PROCEDURE — 74011250637 HC RX REV CODE- 250/637: Performed by: INTERNAL MEDICINE

## 2022-02-23 PROCEDURE — 96415 CHEMO IV INFUSION ADDL HR: CPT

## 2022-02-23 PROCEDURE — 36415 COLL VENOUS BLD VENIPUNCTURE: CPT

## 2022-02-23 PROCEDURE — 84550 ASSAY OF BLOOD/URIC ACID: CPT

## 2022-02-23 PROCEDURE — 80053 COMPREHEN METABOLIC PANEL: CPT

## 2022-02-23 PROCEDURE — 96413 CHEMO IV INFUSION 1 HR: CPT

## 2022-02-23 RX ORDER — DIPHENHYDRAMINE HCL 25 MG
50 CAPSULE ORAL ONCE
Status: COMPLETED | OUTPATIENT
Start: 2022-02-23 | End: 2022-02-23

## 2022-02-23 RX ORDER — DIPHENHYDRAMINE HYDROCHLORIDE 50 MG/ML
25 INJECTION, SOLUTION INTRAMUSCULAR; INTRAVENOUS AS NEEDED
Status: CANCELLED
Start: 2022-03-02

## 2022-02-23 RX ORDER — ACETAMINOPHEN 325 MG/1
650 TABLET ORAL AS NEEDED
Status: CANCELLED
Start: 2022-03-02

## 2022-02-23 RX ORDER — ONDANSETRON 2 MG/ML
8 INJECTION INTRAMUSCULAR; INTRAVENOUS AS NEEDED
Status: CANCELLED | OUTPATIENT
Start: 2022-03-02

## 2022-02-23 RX ORDER — DIPHENHYDRAMINE HYDROCHLORIDE 50 MG/ML
50 INJECTION, SOLUTION INTRAMUSCULAR; INTRAVENOUS AS NEEDED
Status: DISCONTINUED | OUTPATIENT
Start: 2022-02-23 | End: 2022-02-24 | Stop reason: HOSPADM

## 2022-02-23 RX ORDER — ACETAMINOPHEN 325 MG/1
650 TABLET ORAL ONCE
Status: CANCELLED | OUTPATIENT
Start: 2022-03-02 | End: 2022-03-02

## 2022-02-23 RX ORDER — SODIUM CHLORIDE 9 MG/ML
25 INJECTION, SOLUTION INTRAVENOUS CONTINUOUS
Status: CANCELLED | OUTPATIENT
Start: 2022-03-02

## 2022-02-23 RX ORDER — ONDANSETRON 2 MG/ML
8 INJECTION INTRAMUSCULAR; INTRAVENOUS AS NEEDED
Status: DISCONTINUED | OUTPATIENT
Start: 2022-02-23 | End: 2022-02-24 | Stop reason: HOSPADM

## 2022-02-23 RX ORDER — HEPARIN 100 UNIT/ML
300-500 SYRINGE INTRAVENOUS AS NEEDED
Status: CANCELLED
Start: 2022-03-02

## 2022-02-23 RX ORDER — SODIUM CHLORIDE 9 MG/ML
25 INJECTION, SOLUTION INTRAVENOUS CONTINUOUS
Status: DISCONTINUED | OUTPATIENT
Start: 2022-02-23 | End: 2022-02-24 | Stop reason: HOSPADM

## 2022-02-23 RX ORDER — SODIUM CHLORIDE 0.9 % (FLUSH) 0.9 %
10 SYRINGE (ML) INJECTION AS NEEDED
Status: DISCONTINUED | OUTPATIENT
Start: 2022-02-23 | End: 2022-02-24 | Stop reason: HOSPADM

## 2022-02-23 RX ORDER — HYDROCORTISONE SODIUM SUCCINATE 100 MG/2ML
100 INJECTION, POWDER, FOR SOLUTION INTRAMUSCULAR; INTRAVENOUS AS NEEDED
Status: DISCONTINUED | OUTPATIENT
Start: 2022-02-23 | End: 2022-02-24 | Stop reason: HOSPADM

## 2022-02-23 RX ORDER — ALBUTEROL SULFATE 0.83 MG/ML
2.5 SOLUTION RESPIRATORY (INHALATION) AS NEEDED
Status: DISCONTINUED | OUTPATIENT
Start: 2022-02-23 | End: 2022-02-24 | Stop reason: HOSPADM

## 2022-02-23 RX ORDER — SODIUM CHLORIDE 0.9 % (FLUSH) 0.9 %
10 SYRINGE (ML) INJECTION AS NEEDED
Status: CANCELLED | OUTPATIENT
Start: 2022-03-02

## 2022-02-23 RX ORDER — DIPHENHYDRAMINE HCL 25 MG
50 CAPSULE ORAL ONCE
Status: CANCELLED
Start: 2022-02-23 | End: 2022-02-23

## 2022-02-23 RX ORDER — HEPARIN 100 UNIT/ML
300-500 SYRINGE INTRAVENOUS AS NEEDED
Status: DISCONTINUED | OUTPATIENT
Start: 2022-02-23 | End: 2022-02-24 | Stop reason: HOSPADM

## 2022-02-23 RX ORDER — DIPHENHYDRAMINE HYDROCHLORIDE 50 MG/ML
25 INJECTION, SOLUTION INTRAMUSCULAR; INTRAVENOUS AS NEEDED
Status: DISCONTINUED | OUTPATIENT
Start: 2022-02-23 | End: 2022-02-24 | Stop reason: HOSPADM

## 2022-02-23 RX ORDER — ACETAMINOPHEN 325 MG/1
650 TABLET ORAL ONCE
Status: COMPLETED | OUTPATIENT
Start: 2022-02-23 | End: 2022-02-23

## 2022-02-23 RX ORDER — EPINEPHRINE 1 MG/ML
0.3 INJECTION, SOLUTION, CONCENTRATE INTRAVENOUS AS NEEDED
Status: DISCONTINUED | OUTPATIENT
Start: 2022-02-23 | End: 2022-02-24 | Stop reason: HOSPADM

## 2022-02-23 RX ORDER — DIPHENHYDRAMINE HYDROCHLORIDE 50 MG/ML
50 INJECTION, SOLUTION INTRAMUSCULAR; INTRAVENOUS AS NEEDED
Status: CANCELLED
Start: 2022-03-02

## 2022-02-23 RX ORDER — EPINEPHRINE 1 MG/ML
0.3 INJECTION, SOLUTION, CONCENTRATE INTRAVENOUS AS NEEDED
Status: CANCELLED | OUTPATIENT
Start: 2022-03-02

## 2022-02-23 RX ORDER — DIPHENHYDRAMINE HYDROCHLORIDE 50 MG/ML
50 INJECTION, SOLUTION INTRAMUSCULAR; INTRAVENOUS ONCE
Status: CANCELLED | OUTPATIENT
Start: 2022-03-02 | End: 2022-03-02

## 2022-02-23 RX ORDER — SODIUM CHLORIDE 9 MG/ML
10 INJECTION INTRAMUSCULAR; INTRAVENOUS; SUBCUTANEOUS AS NEEDED
Status: CANCELLED | OUTPATIENT
Start: 2022-03-02

## 2022-02-23 RX ORDER — ALBUTEROL SULFATE 0.83 MG/ML
2.5 SOLUTION RESPIRATORY (INHALATION) AS NEEDED
Status: CANCELLED
Start: 2022-03-02

## 2022-02-23 RX ORDER — ACETAMINOPHEN 325 MG/1
650 TABLET ORAL AS NEEDED
Status: DISCONTINUED | OUTPATIENT
Start: 2022-02-23 | End: 2022-02-24 | Stop reason: HOSPADM

## 2022-02-23 RX ORDER — DIPHENHYDRAMINE HYDROCHLORIDE 50 MG/ML
50 INJECTION, SOLUTION INTRAMUSCULAR; INTRAVENOUS ONCE
Status: DISCONTINUED | OUTPATIENT
Start: 2022-02-23 | End: 2022-02-23

## 2022-02-23 RX ORDER — SODIUM CHLORIDE 9 MG/ML
10 INJECTION INTRAMUSCULAR; INTRAVENOUS; SUBCUTANEOUS AS NEEDED
Status: DISCONTINUED | OUTPATIENT
Start: 2022-02-23 | End: 2022-02-24 | Stop reason: HOSPADM

## 2022-02-23 RX ORDER — DIPHENHYDRAMINE HCL 25 MG
50 CAPSULE ORAL ONCE
Status: CANCELLED
Start: 2022-03-09 | End: 2022-03-09

## 2022-02-23 RX ORDER — HYDROCORTISONE SODIUM SUCCINATE 100 MG/2ML
100 INJECTION, POWDER, FOR SOLUTION INTRAMUSCULAR; INTRAVENOUS AS NEEDED
Status: CANCELLED | OUTPATIENT
Start: 2022-03-02

## 2022-02-23 RX ADMIN — ACETAMINOPHEN 650 MG: 325 TABLET ORAL at 16:37

## 2022-02-23 RX ADMIN — HEPARIN 500 UNITS: 100 SYRINGE at 19:33

## 2022-02-23 RX ADMIN — DIPHENHYDRAMINE HYDROCHLORIDE 50 MG: 25 CAPSULE ORAL at 16:36

## 2022-02-23 RX ADMIN — METHYLPREDNISOLONE SODIUM SUCCINATE 125 MG: 125 INJECTION, POWDER, FOR SOLUTION INTRAMUSCULAR; INTRAVENOUS at 16:04

## 2022-02-23 RX ADMIN — PEGLOTICASE 8 MG: 8 INJECTION, SOLUTION INTRAVENOUS at 17:22

## 2022-02-23 RX ADMIN — FAMOTIDINE 40 MG: 10 INJECTION INTRAVENOUS at 16:01

## 2022-02-23 RX ADMIN — SODIUM CHLORIDE 25 ML/HR: 9 INJECTION, SOLUTION INTRAVENOUS at 15:59

## 2022-02-23 NOTE — PROGRESS NOTES
[facility] Short Note                       Date: 2022    Name: Ravi Oh    MRN: [id number]         : 1970    Treatment: Eder Hinojosa COVID-19 SCREENING      The patient was asked the following questions and answered as documented below:    1. Do you have any symptoms of COVID-19? SOB, coughing, fever, or generally not feeling well? NO  2. Have you been exposed to COVID-19 recently? NO  3. Have you had any recent contact with family/friend that has a pending COVID test? NO      Follow Up: Proceed with treatment    Mr. Husam Obrien was assessed and education was provided. Lines:   Peripheral IV 22 Anterior;Right;Posterior Hand (Active)        Mr. Ramiro Calvillo vitals were reviewed prior to and after treatment. Patient Vitals for the past 12 hrs:   Temp Pulse Resp BP SpO2   22 1936 -- 88 -- 114/75 --   22 1419 (!) 96 °F (35.6 °C) (!) 103 18 107/74 96 %         Lab results were obtained and reviewed. Recent Results (from the past 12 hour(s))   CBC WITH AUTOMATED DIFF    Collection Time: 22  2:18 PM   Result Value Ref Range    WBC 6.0 4.1 - 11.1 K/uL    RBC 2.88 (L) 4.10 - 5.70 M/uL    HGB 8.8 (L) 12.1 - 17.0 g/dL    HCT 29.8 (L) 36.6 - 50.3 %    .5 (H) 80.0 - 99.0 FL    MCH 30.6 26.0 - 34.0 PG    MCHC 29.5 (L) 30.0 - 36.5 g/dL    RDW 13.6 11.5 - 14.5 %    PLATELET 221 471 - 087 K/uL    MPV 10.5 8.9 - 12.9 FL    NRBC 0.5 (H) 0  WBC    ABSOLUTE NRBC 0.03 (H) 0.00 - 0.01 K/uL    NEUTROPHILS 56 32 - 75 %    LYMPHOCYTES 22 12 - 49 %    MONOCYTES 13 5 - 13 %    EOSINOPHILS 8 (H) 0 - 7 %    BASOPHILS 1 0 - 1 %    IMMATURE GRANULOCYTES 0 0.0 - 0.5 %    ABS. NEUTROPHILS 3.4 1.8 - 8.0 K/UL    ABS. LYMPHOCYTES 1.3 0.8 - 3.5 K/UL    ABS. MONOCYTES 0.8 0.0 - 1.0 K/UL    ABS. EOSINOPHILS 0.5 (H) 0.0 - 0.4 K/UL    ABS. BASOPHILS 0.1 0.0 - 0.1 K/UL    ABS. IMM.  GRANS. 0.0 0.00 - 0.04 K/UL    DF AUTOMATED     METABOLIC PANEL, COMPREHENSIVE    Collection Time: 22 2:18 PM   Result Value Ref Range    Sodium 138 136 - 145 mmol/L    Potassium 3.4 (L) 3.5 - 5.1 mmol/L    Chloride 102 97 - 108 mmol/L    CO2 34 (H) 21 - 32 mmol/L    Anion gap 2 (L) 5 - 15 mmol/L    Glucose 113 (H) 65 - 100 mg/dL    BUN 24 (H) 6 - 20 MG/DL    Creatinine 3.99 (H) 0.70 - 1.30 MG/DL    BUN/Creatinine ratio 6 (L) 12 - 20      GFR est AA 19 (L) >60 ml/min/1.73m2    GFR est non-AA 16 (L) >60 ml/min/1.73m2    Calcium 8.9 8.5 - 10.1 MG/DL    Bilirubin, total 0.3 0.2 - 1.0 MG/DL    ALT (SGPT) 17 12 - 78 U/L    AST (SGOT) 16 15 - 37 U/L    Alk.  phosphatase 23 (L) 45 - 117 U/L    Protein, total 7.0 6.4 - 8.2 g/dL    Albumin 3.2 (L) 3.5 - 5.0 g/dL    Globulin 3.8 2.0 - 4.0 g/dL    A-G Ratio 0.8 (L) 1.1 - 2.2     SED RATE (ESR)    Collection Time: 02/23/22  2:18 PM   Result Value Ref Range    Sed rate, automated 31 (H) 0 - 20 mm/hr   URIC ACID    Collection Time: 02/23/22  2:18 PM   Result Value Ref Range    Uric acid 0.2 (L) 3.5 - 7.2 MG/DL       Medications given:  Medications Administered       0.9% sodium chloride infusion       Admin Date  02/23/2022 Action  New Bag Dose  25 mL/hr Rate  25 mL/hr Route  IntraVENous Administered By  Anusha Navas RN              acetaminophen (TYLENOL) tablet 650 mg       Admin Date  02/23/2022 Action  Given Dose  650 mg Route  Oral Administered By  Anusha Navas RN              diphenhydrAMINE (BENADRYL) capsule 50 mg       Admin Date  02/23/2022 Action  Given Dose  50 mg Route  Oral Administered By  Anusha Navas RN              famotidine (PF) (PEPCID) 40 mg in 0.9% sodium chloride 10 mL injection       Admin Date  02/23/2022 Action  Given Dose  40 mg Route  IntraVENous Administered By  Anusha Navas RN              methylPREDNISolone (PF) (Solu-MEDROL) injection 125 mg       Admin Date  02/23/2022 Action  Given Dose  125 mg Route  IntraVENous Administered By  Anusha Navas RN              pegloticase (KRYSTEXXA) 8 mg in 0.9% sodium chloride 250 mL, overfill volume 25 mL infusion       Admin Date  02/23/2022 Action  New Bag Dose  8 mg Rate  138 mL/hr Route  IntraVENous Administered By  Lucila Samuel RN                    Mr. Karis Suarez tolerated the treatment without complaints. Mr. Karis Suarez was discharged from [place] [address] in stable condition.     Future Appointments   Date Time Provider Barry Ellisi   3/2/2022 10:00 AM Yara Casey MD AOCR BS AMB   3/2/2022  1:00 PM A3 DEEPA  [facility]   3/16/2022  1:00 PM B1 DEEPA MED [facility]       Melanie Poole RN  February 23, 2022  5:42 PM    Problem: Knowledge Deficit  Goal: *Verbalizes understanding of procedures and medications  Outcome: Progressing Towards Goal

## 2022-03-02 ENCOUNTER — HOSPITAL ENCOUNTER (OUTPATIENT)
Dept: INFUSION THERAPY | Age: 52
End: 2022-03-02
Attending: INTERNAL MEDICINE
Payer: COMMERCIAL

## 2022-03-09 ENCOUNTER — HOSPITAL ENCOUNTER (OUTPATIENT)
Dept: INFUSION THERAPY | Age: 52
Discharge: HOME | End: 2022-03-09
Attending: INTERNAL MEDICINE
Payer: COMMERCIAL

## 2022-03-09 VITALS
SYSTOLIC BLOOD PRESSURE: 125 MMHG | TEMPERATURE: 96.8 F | HEART RATE: 75 BPM | DIASTOLIC BLOOD PRESSURE: 87 MMHG | RESPIRATION RATE: 18 BRPM

## 2022-03-09 DIAGNOSIS — M1A.39X1 CHRONIC TOPHACEOUS GOUT OF MULTIPLE SITES DUE TO RENAL IMPAIRMENT: Primary | ICD-10-CM

## 2022-03-09 LAB
ALBUMIN SERPL-MCNC: 3.6 G/DL (ref 3.5–5)
ALBUMIN/GLOB SERPL: 0.9 {RATIO} (ref 1.1–2.2)
ALP SERPL-CCNC: 30 U/L (ref 45–117)
ALT SERPL-CCNC: 20 U/L (ref 12–78)
ANION GAP SERPL CALC-SCNC: 6 MMOL/L (ref 5–15)
AST SERPL-CCNC: 22 U/L (ref 15–37)
BASOPHILS # BLD: 0.1 K/UL (ref 0–0.1)
BASOPHILS NFR BLD: 1 % (ref 0–1)
BILIRUB SERPL-MCNC: 0.4 MG/DL (ref 0.2–1)
BUN SERPL-MCNC: 20 MG/DL (ref 6–20)
BUN/CREAT SERPL: 5 (ref 12–20)
CALCIUM SERPL-MCNC: 9.3 MG/DL (ref 8.5–10.1)
CHLORIDE SERPL-SCNC: 104 MMOL/L (ref 97–108)
CO2 SERPL-SCNC: 29 MMOL/L (ref 21–32)
CREAT SERPL-MCNC: 4.21 MG/DL (ref 0.7–1.3)
DIFFERENTIAL METHOD BLD: ABNORMAL
EOSINOPHIL # BLD: 0.4 K/UL (ref 0–0.4)
EOSINOPHIL NFR BLD: 7 % (ref 0–7)
ERYTHROCYTE [DISTWIDTH] IN BLOOD BY AUTOMATED COUNT: 14.6 % (ref 11.5–14.5)
ERYTHROCYTE [SEDIMENTATION RATE] IN BLOOD: 8 MM/HR (ref 0–20)
GLOBULIN SER CALC-MCNC: 3.8 G/DL (ref 2–4)
GLUCOSE SERPL-MCNC: 96 MG/DL (ref 65–100)
HCT VFR BLD AUTO: 39.3 % (ref 36.6–50.3)
HGB BLD-MCNC: 11.6 G/DL (ref 12.1–17)
IMM GRANULOCYTES # BLD AUTO: 0 K/UL (ref 0–0.04)
IMM GRANULOCYTES NFR BLD AUTO: 0 % (ref 0–0.5)
LYMPHOCYTES # BLD: 1.5 K/UL (ref 0.8–3.5)
LYMPHOCYTES NFR BLD: 22 % (ref 12–49)
MCH RBC QN AUTO: 30.9 PG (ref 26–34)
MCHC RBC AUTO-ENTMCNC: 29.5 G/DL (ref 30–36.5)
MCV RBC AUTO: 104.8 FL (ref 80–99)
MONOCYTES # BLD: 0.8 K/UL (ref 0–1)
MONOCYTES NFR BLD: 12 % (ref 5–13)
NEUTS SEG # BLD: 3.8 K/UL (ref 1.8–8)
NEUTS SEG NFR BLD: 58 % (ref 32–75)
NRBC # BLD: 0 K/UL (ref 0–0.01)
NRBC BLD-RTO: 0 PER 100 WBC
PLATELET # BLD AUTO: 219 K/UL (ref 150–400)
PMV BLD AUTO: 10.2 FL (ref 8.9–12.9)
POTASSIUM SERPL-SCNC: 3.4 MMOL/L (ref 3.5–5.1)
PROT SERPL-MCNC: 7.4 G/DL (ref 6.4–8.2)
RBC # BLD AUTO: 3.75 M/UL (ref 4.1–5.7)
SODIUM SERPL-SCNC: 139 MMOL/L (ref 136–145)
URATE SERPL-MCNC: <0.2 MG/DL (ref 3.5–7.2)
WBC # BLD AUTO: 6.5 K/UL (ref 4.1–11.1)

## 2022-03-09 PROCEDURE — 85652 RBC SED RATE AUTOMATED: CPT

## 2022-03-09 PROCEDURE — 96375 TX/PRO/DX INJ NEW DRUG ADDON: CPT

## 2022-03-09 PROCEDURE — 74011000250 HC RX REV CODE- 250: Performed by: INTERNAL MEDICINE

## 2022-03-09 PROCEDURE — 74011250636 HC RX REV CODE- 250/636: Performed by: INTERNAL MEDICINE

## 2022-03-09 PROCEDURE — 36415 COLL VENOUS BLD VENIPUNCTURE: CPT

## 2022-03-09 PROCEDURE — 84550 ASSAY OF BLOOD/URIC ACID: CPT

## 2022-03-09 PROCEDURE — 96413 CHEMO IV INFUSION 1 HR: CPT

## 2022-03-09 PROCEDURE — 96366 THER/PROPH/DIAG IV INF ADDON: CPT

## 2022-03-09 PROCEDURE — 96365 THER/PROPH/DIAG IV INF INIT: CPT

## 2022-03-09 PROCEDURE — 96415 CHEMO IV INFUSION ADDL HR: CPT

## 2022-03-09 PROCEDURE — 85025 COMPLETE CBC W/AUTO DIFF WBC: CPT

## 2022-03-09 PROCEDURE — 74011250637 HC RX REV CODE- 250/637: Performed by: INTERNAL MEDICINE

## 2022-03-09 PROCEDURE — 80053 COMPREHEN METABOLIC PANEL: CPT

## 2022-03-09 RX ORDER — EPINEPHRINE 1 MG/ML
0.3 INJECTION, SOLUTION, CONCENTRATE INTRAVENOUS AS NEEDED
Status: DISCONTINUED | OUTPATIENT
Start: 2022-03-09 | End: 2022-03-10 | Stop reason: HOSPADM

## 2022-03-09 RX ORDER — ONDANSETRON 2 MG/ML
8 INJECTION INTRAMUSCULAR; INTRAVENOUS AS NEEDED
Status: DISCONTINUED | OUTPATIENT
Start: 2022-03-09 | End: 2022-03-10 | Stop reason: HOSPADM

## 2022-03-09 RX ORDER — DIPHENHYDRAMINE HCL 25 MG
50 CAPSULE ORAL ONCE
Status: COMPLETED | OUTPATIENT
Start: 2022-03-09 | End: 2022-03-09

## 2022-03-09 RX ORDER — HYDROCORTISONE SODIUM SUCCINATE 100 MG/2ML
100 INJECTION, POWDER, FOR SOLUTION INTRAMUSCULAR; INTRAVENOUS AS NEEDED
Status: CANCELLED | OUTPATIENT
Start: 2022-03-23

## 2022-03-09 RX ORDER — SODIUM CHLORIDE 9 MG/ML
25 INJECTION, SOLUTION INTRAVENOUS CONTINUOUS
Status: DISCONTINUED | OUTPATIENT
Start: 2022-03-09 | End: 2022-03-10 | Stop reason: HOSPADM

## 2022-03-09 RX ORDER — SODIUM CHLORIDE 9 MG/ML
25 INJECTION, SOLUTION INTRAVENOUS CONTINUOUS
Status: CANCELLED | OUTPATIENT
Start: 2022-03-23

## 2022-03-09 RX ORDER — SODIUM CHLORIDE 0.9 % (FLUSH) 0.9 %
10 SYRINGE (ML) INJECTION AS NEEDED
Status: CANCELLED | OUTPATIENT
Start: 2022-03-23

## 2022-03-09 RX ORDER — ACETAMINOPHEN 325 MG/1
650 TABLET ORAL ONCE
Status: CANCELLED | OUTPATIENT
Start: 2022-03-23 | End: 2022-03-23

## 2022-03-09 RX ORDER — ALBUTEROL SULFATE 0.83 MG/ML
2.5 SOLUTION RESPIRATORY (INHALATION) AS NEEDED
Status: CANCELLED
Start: 2022-03-23

## 2022-03-09 RX ORDER — DIPHENHYDRAMINE HYDROCHLORIDE 50 MG/ML
50 INJECTION, SOLUTION INTRAMUSCULAR; INTRAVENOUS AS NEEDED
Status: CANCELLED
Start: 2022-03-23

## 2022-03-09 RX ORDER — SODIUM CHLORIDE 0.9 % (FLUSH) 0.9 %
10 SYRINGE (ML) INJECTION AS NEEDED
Status: DISCONTINUED | OUTPATIENT
Start: 2022-03-09 | End: 2022-03-10 | Stop reason: HOSPADM

## 2022-03-09 RX ORDER — ACETAMINOPHEN 325 MG/1
650 TABLET ORAL AS NEEDED
Status: DISCONTINUED | OUTPATIENT
Start: 2022-03-09 | End: 2022-03-10 | Stop reason: HOSPADM

## 2022-03-09 RX ORDER — DIPHENHYDRAMINE HYDROCHLORIDE 50 MG/ML
50 INJECTION, SOLUTION INTRAMUSCULAR; INTRAVENOUS AS NEEDED
Status: DISCONTINUED | OUTPATIENT
Start: 2022-03-09 | End: 2022-03-10 | Stop reason: HOSPADM

## 2022-03-09 RX ORDER — HYDROCORTISONE SODIUM SUCCINATE 100 MG/2ML
100 INJECTION, POWDER, FOR SOLUTION INTRAMUSCULAR; INTRAVENOUS AS NEEDED
Status: DISCONTINUED | OUTPATIENT
Start: 2022-03-09 | End: 2022-03-10 | Stop reason: HOSPADM

## 2022-03-09 RX ORDER — HEPARIN 100 UNIT/ML
300-500 SYRINGE INTRAVENOUS AS NEEDED
Status: CANCELLED
Start: 2022-03-23

## 2022-03-09 RX ORDER — SODIUM CHLORIDE 9 MG/ML
10 INJECTION INTRAMUSCULAR; INTRAVENOUS; SUBCUTANEOUS AS NEEDED
Status: CANCELLED | OUTPATIENT
Start: 2022-03-23

## 2022-03-09 RX ORDER — EPINEPHRINE 1 MG/ML
0.3 INJECTION, SOLUTION, CONCENTRATE INTRAVENOUS AS NEEDED
Status: CANCELLED | OUTPATIENT
Start: 2022-03-23

## 2022-03-09 RX ORDER — ONDANSETRON 2 MG/ML
8 INJECTION INTRAMUSCULAR; INTRAVENOUS AS NEEDED
Status: CANCELLED | OUTPATIENT
Start: 2022-03-23

## 2022-03-09 RX ORDER — ACETAMINOPHEN 325 MG/1
650 TABLET ORAL ONCE
Status: COMPLETED | OUTPATIENT
Start: 2022-03-09 | End: 2022-03-09

## 2022-03-09 RX ORDER — SODIUM CHLORIDE 9 MG/ML
10 INJECTION INTRAMUSCULAR; INTRAVENOUS; SUBCUTANEOUS AS NEEDED
Status: DISCONTINUED | OUTPATIENT
Start: 2022-03-09 | End: 2022-03-10 | Stop reason: HOSPADM

## 2022-03-09 RX ORDER — DIPHENHYDRAMINE HYDROCHLORIDE 50 MG/ML
25 INJECTION, SOLUTION INTRAMUSCULAR; INTRAVENOUS AS NEEDED
Status: DISCONTINUED | OUTPATIENT
Start: 2022-03-09 | End: 2022-03-10 | Stop reason: HOSPADM

## 2022-03-09 RX ORDER — ACETAMINOPHEN 325 MG/1
650 TABLET ORAL AS NEEDED
Status: CANCELLED
Start: 2022-03-23

## 2022-03-09 RX ORDER — DIPHENHYDRAMINE HCL 25 MG
50 CAPSULE ORAL ONCE
Status: CANCELLED
Start: 2022-03-23 | End: 2022-03-23

## 2022-03-09 RX ORDER — ALBUTEROL SULFATE 0.83 MG/ML
2.5 SOLUTION RESPIRATORY (INHALATION) AS NEEDED
Status: DISCONTINUED | OUTPATIENT
Start: 2022-03-09 | End: 2022-03-10 | Stop reason: HOSPADM

## 2022-03-09 RX ORDER — DIPHENHYDRAMINE HYDROCHLORIDE 50 MG/ML
25 INJECTION, SOLUTION INTRAMUSCULAR; INTRAVENOUS AS NEEDED
Status: CANCELLED
Start: 2022-03-23

## 2022-03-09 RX ORDER — HEPARIN 100 UNIT/ML
300-500 SYRINGE INTRAVENOUS AS NEEDED
Status: DISCONTINUED | OUTPATIENT
Start: 2022-03-09 | End: 2022-03-10 | Stop reason: HOSPADM

## 2022-03-09 RX ADMIN — SODIUM CHLORIDE 25 ML/HR: 9 INJECTION, SOLUTION INTRAVENOUS at 16:00

## 2022-03-09 RX ADMIN — SODIUM CHLORIDE, PRESERVATIVE FREE 10 ML: 5 INJECTION INTRAVENOUS at 19:00

## 2022-03-09 RX ADMIN — FAMOTIDINE 40 MG: 10 INJECTION, SOLUTION INTRAVENOUS at 16:38

## 2022-03-09 RX ADMIN — PEGLOTICASE 8 MG: 8 INJECTION, SOLUTION INTRAVENOUS at 17:15

## 2022-03-09 RX ADMIN — DIPHENHYDRAMINE HYDROCHLORIDE 50 MG: 25 CAPSULE ORAL at 16:33

## 2022-03-09 RX ADMIN — ACETAMINOPHEN 650 MG: 325 TABLET ORAL at 16:33

## 2022-03-09 NOTE — PROGRESS NOTES
[facility] Short Note                       Date: 2022    Name: Troy Rice    MRN: [id number]         : 1970    Treatment: Guerrero Hunter COVID-19 SCREENING      The patient was asked the following questions and answered as documented below:    1. Do you have any symptoms of COVID-19? SOB, coughing, fever, or generally not feeling well? NO  2. Have you been exposed to COVID-19 recently? NO  3. Have you had any recent contact with family/friend that has a pending COVID test? NO      Follow Up: Proceed with treatment    Mr. Claudetta Heman was assessed and education was provided. Lines: Peripheral IV placed successfully in R forearm, labs drawn and processed. Peripheral IV 22 Left (Active)        Mr. Orene Lundborg vitals were reviewed prior to and after treatment. Patient Vitals for the past 12 hrs:   Temp Pulse Resp BP   22 1920 96.8 °F (36 °C) 75 18 125/87   22 1335 96.8 °F (36 °C) 80 -- 125/82       Lab results were obtained and reviewed. Recent Results (from the past 12 hour(s))   CBC WITH AUTOMATED DIFF    Collection Time: 22  3:53 PM   Result Value Ref Range    WBC 6.5 4.1 - 11.1 K/uL    RBC 3.75 (L) 4.10 - 5.70 M/uL    HGB 11.6 (L) 12.1 - 17.0 g/dL    HCT 39.3 36.6 - 50.3 %    .8 (H) 80.0 - 99.0 FL    MCH 30.9 26.0 - 34.0 PG    MCHC 29.5 (L) 30.0 - 36.5 g/dL    RDW 14.6 (H) 11.5 - 14.5 %    PLATELET 388 018 - 680 K/uL    MPV 10.2 8.9 - 12.9 FL    NRBC 0.0 0  WBC    ABSOLUTE NRBC 0.00 0.00 - 0.01 K/uL    NEUTROPHILS 58 32 - 75 %    LYMPHOCYTES 22 12 - 49 %    MONOCYTES 12 5 - 13 %    EOSINOPHILS 7 0 - 7 %    BASOPHILS 1 0 - 1 %    IMMATURE GRANULOCYTES 0 0.0 - 0.5 %    ABS. NEUTROPHILS 3.8 1.8 - 8.0 K/UL    ABS. LYMPHOCYTES 1.5 0.8 - 3.5 K/UL    ABS. MONOCYTES 0.8 0.0 - 1.0 K/UL    ABS. EOSINOPHILS 0.4 0.0 - 0.4 K/UL    ABS. BASOPHILS 0.1 0.0 - 0.1 K/UL    ABS. IMM.  GRANS. 0.0 0.00 - 0.04 K/UL    DF AUTOMATED     METABOLIC PANEL, COMPREHENSIVE    Collection Time: 03/09/22  3:53 PM   Result Value Ref Range    Sodium 139 136 - 145 mmol/L    Potassium 3.4 (L) 3.5 - 5.1 mmol/L    Chloride 104 97 - 108 mmol/L    CO2 29 21 - 32 mmol/L    Anion gap 6 5 - 15 mmol/L    Glucose 96 65 - 100 mg/dL    BUN 20 6 - 20 MG/DL    Creatinine 4.21 (H) 0.70 - 1.30 MG/DL    BUN/Creatinine ratio 5 (L) 12 - 20      GFR est AA 18 (L) >60 ml/min/1.73m2    GFR est non-AA 15 (L) >60 ml/min/1.73m2    Calcium 9.3 8.5 - 10.1 MG/DL    Bilirubin, total 0.4 0.2 - 1.0 MG/DL    ALT (SGPT) 20 12 - 78 U/L    AST (SGOT) 22 15 - 37 U/L    Alk. phosphatase 30 (L) 45 - 117 U/L    Protein, total 7.4 6.4 - 8.2 g/dL    Albumin 3.6 3.5 - 5.0 g/dL    Globulin 3.8 2.0 - 4.0 g/dL    A-G Ratio 0.9 (L) 1.1 - 2.2     SED RATE (ESR)    Collection Time: 03/09/22  3:53 PM   Result Value Ref Range    Sed rate, automated 8 0 - 20 mm/hr   URIC ACID    Collection Time: 03/09/22  3:53 PM   Result Value Ref Range    Uric acid <0.2 (L) 3.5 - 7.2 MG/DL       Medications given:  Solu medrol discontinued in plan per Dr. Mj Moreno.   Medications Administered     0.9% sodium chloride infusion     Admin Date  03/09/2022 Action  New Bag Dose  25 mL/hr Rate  25 mL/hr Route  IntraVENous Administered By  Charissa Carranza RN          acetaminophen (TYLENOL) tablet 650 mg     Admin Date  03/09/2022 Action  Given Dose  650 mg Route  Oral Administered By  Charissa Carranza RN          diphenhydrAMINE (BENADRYL) capsule 50 mg     Admin Date  03/09/2022 Action  Given Dose  50 mg Route  Oral Administered By  Charissa Carranza RN          famotidine (PF) (PEPCID) 40 mg in 0.9% sodium chloride 10 mL injection     Admin Date  03/09/2022 Action  Given Dose  40 mg Route  IntraVENous Administered By  Charissa Carranza, LORRI          pegloticase [facility]) 8 mg, overfill volume 25 mL in 0.9% sodium chloride 250 mL infusion     Admin Date  03/09/2022 Action  New Bag Dose  8 mg Rate  138 mL/hr Route  IntraVENous Administered By  Charissa Carranza, RN          sodium chloride (NS) flush 10 mL     Admin Date  03/09/2022 Action  Given Dose  10 mL Route  IntraVENous Administered By  Geo Olivier RN                Mr. Medina tolerated the treatment without complaints. Mr. Medina was discharged from [place] [address] in stable condition.     Future Appointments   Date Time Provider Barry Cano   3/23/2022  2:00 PM B1 DEEPA MED [address] [place] [facility] [place] [facility]   4/6/2022  2:00 PM B3 DEEPA MED [address] [place] [facility] [place] [facility]   4/20/2022  2:00 PM E3 DEEPA MED [address] [place] [address] [place] [facility]       Kaity Murray RN  March 9, 2022  5:01 PM    Problem: Knowledge Deficit  Goal: *Verbalizes understanding of procedures and medications  Outcome: Progressing Towards Goal

## 2022-03-16 ENCOUNTER — APPOINTMENT (OUTPATIENT)
Dept: INFUSION THERAPY | Age: 52
End: 2022-03-16
Attending: INTERNAL MEDICINE

## 2022-03-18 PROBLEM — Q61.2 AUTOSOMAL DOMINANT ADULT POLYCYSTIC KIDNEY DISEASE: Status: ACTIVE | Noted: 2019-06-13

## 2022-03-18 PROBLEM — I10 ESSENTIAL HYPERTENSION: Status: ACTIVE | Noted: 2019-06-13

## 2022-03-18 PROBLEM — K43.6 INCARCERATED VENTRAL HERNIA: Status: ACTIVE | Noted: 2019-06-01

## 2022-03-19 PROBLEM — I50.21 ACUTE SYSTOLIC CHF (CONGESTIVE HEART FAILURE) (HCC): Status: ACTIVE | Noted: 2019-10-27

## 2022-03-19 PROBLEM — M1A.39X1 CHRONIC TOPHACEOUS GOUT OF MULTIPLE SITES DUE TO RENAL IMPAIRMENT: Status: ACTIVE | Noted: 2019-06-13

## 2022-03-19 PROBLEM — R06.02 SOB (SHORTNESS OF BREATH): Status: ACTIVE | Noted: 2019-10-26

## 2022-03-19 PROBLEM — N18.30 CKD (CHRONIC KIDNEY DISEASE) STAGE 3, GFR 30-59 ML/MIN (HCC): Status: ACTIVE | Noted: 2019-06-13

## 2022-03-19 PROBLEM — Z79.60 LONG-TERM USE OF IMMUNOSUPPRESSANT MEDICATION: Status: ACTIVE | Noted: 2019-09-12

## 2022-03-19 PROBLEM — N18.4 CKD (CHRONIC KIDNEY DISEASE) STAGE 4, GFR 15-29 ML/MIN (HCC): Status: ACTIVE | Noted: 2019-09-12

## 2022-03-22 PROCEDURE — 99283 EMERGENCY DEPT VISIT LOW MDM: CPT

## 2022-03-23 ENCOUNTER — HOSPITAL ENCOUNTER (EMERGENCY)
Age: 52
Discharge: HOME | End: 2022-03-23
Attending: EMERGENCY MEDICINE | Admitting: EMERGENCY MEDICINE
Payer: COMMERCIAL

## 2022-03-23 VITALS
OXYGEN SATURATION: 100 % | SYSTOLIC BLOOD PRESSURE: 167 MMHG | TEMPERATURE: 98.3 F | RESPIRATION RATE: 16 BRPM | DIASTOLIC BLOOD PRESSURE: 95 MMHG | HEART RATE: 89 BPM

## 2022-03-23 DIAGNOSIS — K02.9 DENTAL CARIES: ICD-10-CM

## 2022-03-23 DIAGNOSIS — K08.89 PAIN, DENTAL: Primary | ICD-10-CM

## 2022-03-23 PROCEDURE — 74011000250 HC RX REV CODE- 250: Performed by: NURSE PRACTITIONER

## 2022-03-23 PROCEDURE — 74011250637 HC RX REV CODE- 250/637: Performed by: NURSE PRACTITIONER

## 2022-03-23 RX ORDER — ACETAMINOPHEN 325 MG/1
650 TABLET ORAL
Qty: 20 TABLET | Refills: 0 | Status: SHIPPED | OUTPATIENT
Start: 2022-03-23

## 2022-03-23 RX ORDER — PENICILLIN V POTASSIUM 500 MG/1
500 TABLET, FILM COATED ORAL 4 TIMES DAILY
Qty: 28 TABLET | Refills: 0 | Status: SHIPPED | OUTPATIENT
Start: 2022-03-23 | End: 2022-03-30

## 2022-03-23 RX ORDER — TRAMADOL HYDROCHLORIDE 50 MG/1
50 TABLET ORAL
Qty: 12 TABLET | Refills: 0 | Status: SHIPPED | OUTPATIENT
Start: 2022-03-23 | End: 2022-03-26

## 2022-03-23 RX ORDER — PENICILLIN V POTASSIUM 250 MG/1
500 TABLET, FILM COATED ORAL
Status: COMPLETED | OUTPATIENT
Start: 2022-03-23 | End: 2022-03-23

## 2022-03-23 RX ADMIN — LIDOCAINE HYDROCHLORIDE: 20 SOLUTION ORAL; TOPICAL at 00:21

## 2022-03-23 RX ADMIN — PENICILLIN V POTASSIUM 500 MG: 250 TABLET, FILM COATED ORAL at 00:22

## 2022-03-23 NOTE — ED TRIAGE NOTES
Triage: Pt arrives ambulatory from home with CC of dental pain. He has not seen his dentist yet. Denies fever.

## 2022-03-23 NOTE — ED PROVIDER NOTES
HPI     Shima Calloway is a 50 y.o. male with Hx of OA, asthma, CKD/ ESRD, HF, gout, HTN, polycystic kidneys who presents ambulatory to [address] [place] [address] ED with cc of dental pain. Patient reports right upper and right lower dental pain. States that the symptoms have been present for the last 2 days. He reports nothing makes symptoms better or worse. He is taken Tylenol with no relief. He does not have a dentist and has not seen one for quite some time. Denies F/C, N/V/D, cough, congestion, CP, SOB, facial swelling, urinary or bowel habit concerns. Denies tobacco/ ETOH/ substance abuse. Drove himself to the ED tonight. PCP: Agus Lopez NP    There are no other complaints, changes or physical findings at this time. Past Medical History:   Diagnosis Date    Arthritis     Asthma     AS A CHILD    Chronic kidney disease     ESRD    Chronic pain     KNEES, ANKLES    Gout     Heart failure ([facility] [address] 2019    CHF    Hypertension     Polycystic kidney disease        Past Surgical History:   Procedure Laterality Date    HX HERNIA REPAIR  94/88/7811    Supra umbilical hernia with mesh and Umbilical hernia repair by Dr. Jossy Barnes.     HX VASCULAR ACCESS  10/18/2021    LEFT ARM FISTULA         Family History:   Problem Relation Age of Onset    Hypertension Mother     Kidney Disease Mother     Hypertension Father     Kidney Disease Father     Cancer Father         REANAL CANCER    Other Sister         PKD, Fibromyalgia    Cancer Brother         PROSTATE    Cancer Brother         PROSTATE    Anesth Problems Neg Hx        Social History     Socioeconomic History    Marital status: SINGLE     Spouse name: Not on file    Number of children: Not on file    Years of education: Not on file    Highest education level: Not on file   Occupational History    Not on file   Tobacco Use    Smoking status: Never Smoker    Smokeless tobacco: Never Used   Vaping Use    Vaping Use: Never used   Substance and Sexual Activity    Alcohol use: Not Currently     Alcohol/week: 4.0 standard drinks     Types: 4 Shots of liquor per week    Drug use: Yes     Types: Marijuana     Comment: 4-5 X WEEK    Sexual activity: Not on file   Other Topics Concern    Not on file   Social History Narrative    Not on file     Social Determinants of Health     Financial Resource Strain:     Difficulty of Paying Living Expenses: Not on file   Food Insecurity:     Worried About Running Out of Food in the Last Year: Not on file    Elijah of Food in the Last Year: Not on file   Transportation Needs:     Lack of Transportation (Medical): Not on file    Lack of Transportation (Non-Medical): Not on file   Physical Activity:     Days of Exercise per Week: Not on file    Minutes of Exercise per Session: Not on file   Stress:     Feeling of Stress : Not on file   Social Connections:     Frequency of Communication with Friends and Family: Not on file    Frequency of Social Gatherings with Friends and Family: Not on file    Attends Jainism Services: Not on file    Active Member of [address] [place] [address] or Organizations: Not on file    Attends Club or Organization Meetings: Not on file    Marital Status: Not on file   Intimate Partner Violence:     Fear of Current or Ex-Partner: Not on file    Emotionally Abused: Not on file    Physically Abused: Not on file    Sexually Abused: Not on file   Housing Stability:     Unable to Pay for Housing in the Last Year: Not on file    Number of [place] in the Last Year: Not on file    Unstable Housing in the Last Year: Not on file         ALLERGIES: Shellfish containing products    Review of Systems   Constitutional: Negative for activity change, appetite change, chills and fever. HENT: Positive for dental problem. Negative for congestion, facial swelling, rhinorrhea and sore throat. Eyes: Negative for visual disturbance. Respiratory: Negative for cough and shortness of breath. Cardiovascular: Negative for chest pain. Gastrointestinal: Negative for abdominal pain, diarrhea, nausea and vomiting. Genitourinary: Negative for dysuria, flank pain, frequency and urgency. Musculoskeletal: Negative for arthralgias, myalgias and neck pain. Skin: Negative for color change and rash. Neurological: Positive for headaches (referred from dental pain ). Negative for dizziness, speech difficulty, weakness, light-headedness and numbness. Psychiatric/Behavioral: Negative for agitation, behavioral problems and confusion. All other systems reviewed and are negative. Vitals:    03/22/22 2359 03/23/22 0004   BP: 90/65 (!) 167/95   Pulse: 89    Resp: 16    Temp: 98.3 °F (36.8 °C)    SpO2: 100%             Physical Exam  Vitals and nursing note reviewed. Constitutional:       General: He is not in acute distress. Appearance: He is well-developed. HENT:      Head: Normocephalic and atraumatic. Right Ear: External ear normal.      Left Ear: External ear normal.      Nose: Nose normal.      Mouth/Throat:      Lips: No lesions. Mouth: Mucous membranes are moist.      Dentition: Abnormal dentition. Dental tenderness and dental caries present. No dental abscesses. Pharynx: No oropharyngeal exudate. Comments: Patient has extensive dental decay throughout. No obvious fractured teeth or abscess present. Eyes:      Conjunctiva/sclera: Conjunctivae normal.      Pupils: Pupils are equal, round, and reactive to light. Cardiovascular:      Rate and Rhythm: Normal rate and regular rhythm. Heart sounds: Normal heart sounds. Pulmonary:      Effort: Pulmonary effort is normal.      Breath sounds: Normal breath sounds. Musculoskeletal:         General: Normal range of motion. Cervical back: Normal range of motion and neck supple. Skin:     General: Skin is warm and dry. Neurological:      Mental Status: He is alert and oriented to person, place, and time. Psychiatric:         Behavior: Behavior normal.         Thought Content: Thought content normal.         Judgment: Judgment normal.          MDM  Number of Diagnoses or Management Options  Dental caries  Pain, dental  Diagnosis management comments: DDx: dental infection, dental caries, dental pain     Patient presents emergency department with concern for dental pain in the setting of dental decay and caries. He was placed on antibiotics and advised that more definitive treatment was needed through dental care. Patient states he has dental insurance. He was advised to call his dental carrier tomorrow to set up an appointment. I have provided him with prescription pain medication to get filled. Reasons to return to the emergency department were provided. Amount and/or Complexity of Data Reviewed  Review and summarize past medical records: yes           Procedures    LABORATORY TESTS:  No results found for this or any previous visit (from the past 12 hour(s)). IMAGING RESULTS:  No orders to display       MEDICATIONS GIVEN:  Medications   dental ball (lidocaine/benadryl/benzocaine) mixture ( Other Given 3/23/22 0021)   penicillin v potassium (VEETID) tablet 500 mg (500 mg Oral Given 3/23/22 0022)       IMPRESSION:  1. Pain, dental    2. Dental caries        PLAN:  1. Discharge Medication List as of 3/23/2022 12:35 AM      START taking these medications    Details   penicillin v potassium (VEETID) 500 mg tablet Take 1 Tablet by mouth four (4) times daily for 7 days. , Normal, Disp-28 Tablet, R-0      traMADoL (Ultram) 50 mg tablet Take 1 Tablet by mouth every six (6) hours as needed for Pain for up to 3 days.  Max Daily Amount: 200 mg., Normal, Disp-12 Tablet, R-0      acetaminophen (TYLENOL) 325 mg tablet Take 2 Tablets by mouth every four (4) hours as needed for Pain., Normal, Disp-20 Tablet, R-0         CONTINUE these medications which have NOT CHANGED    Details   colchicine (Colcrys) 0.6 mg tablet TAKE ONE TABLET BY MOUTH DAILY AS NEEDED FOR GOUT FLARE, Normal, Disp-90 Tablet, R-5      calcitRIOL (ROCALTROL) 0.5 mcg capsule Take 1 mcg by mouth Every morning., Historical Med      sodium bicarbonate 325 mg tablet Take 650 mg by mouth two (2) times a day., Historical Med      leflunomide (ARAVA) 20 mg tablet Take 1 Tablet by mouth daily. , Normal, Disp-90 Tablet, R-5      spironolactone (ALDACTONE) 25 mg tablet Take 25 mg by mouth daily. , Historical Med      cholecalciferol (Vitamin D3) 25 mcg (1,000 unit) cap Take  by mouth daily. , Historical Med      carvedilol (COREG) 25 mg tablet Take 1 Tab by mouth two (2) times daily (with meals). , Print, Disp-60 Tab, R-1      bumetanide (BUMEX) 1 mg tablet Take 1 Tab by mouth daily. , Normal, Disp-30 Tab, R-0      pegloticase (KRYSTEXXA) 8 mg/mL soln injection 8 mg by IntraVENous route Once every 2 weeks. , Historical Med           2. Follow-up Information     Follow up With Specialties Details Why [facility]  Schedule an appointment as soon as possible for a visit  Call tomorrow morning to setup an appointment [address] [address]  [phone]    [address] [place] [facility] Emergency Medicine Go to  As needed, If symptoms worsen [facility]  [phone]        3.  Return to ED if worse

## 2022-03-23 NOTE — DISCHARGE INSTRUCTIONS
Please take medications as directed, you need to see a dentist as soon as possible for more definitive management of your dental pain. If you are unable to get into an appointment with [facility] dentistry, you can call your dental plan to see if there is other dentists in the area that you can get evaluated by. Your prescriptions have been called into the [facility]. Please touch base with your nephrology team, about any medication interactions with your dialysis. Return to the emergency department for any worsening or worrisome symptoms.

## 2022-03-26 ENCOUNTER — HOSPITAL ENCOUNTER (EMERGENCY)
Age: 52
Discharge: HOME | End: 2022-03-26
Attending: EMERGENCY MEDICINE
Payer: COMMERCIAL

## 2022-03-26 VITALS
BODY MASS INDEX: 28.14 KG/M2 | OXYGEN SATURATION: 97 % | DIASTOLIC BLOOD PRESSURE: 73 MMHG | HEIGHT: 71 IN | SYSTOLIC BLOOD PRESSURE: 111 MMHG | RESPIRATION RATE: 16 BRPM | TEMPERATURE: 98.1 F | HEART RATE: 85 BPM | WEIGHT: 201 LBS

## 2022-03-26 DIAGNOSIS — Z78.9 PROBLEM WITH VASCULAR ACCESS: Primary | ICD-10-CM

## 2022-03-26 DIAGNOSIS — N18.6 ESRD (END STAGE RENAL DISEASE) (HCC): ICD-10-CM

## 2022-03-26 LAB
ALBUMIN SERPL-MCNC: 3.1 G/DL (ref 3.5–5)
ALBUMIN/GLOB SERPL: 0.8 {RATIO} (ref 1.1–2.2)
ALP SERPL-CCNC: 32 U/L (ref 45–117)
ALT SERPL-CCNC: 22 U/L (ref 12–78)
ANION GAP SERPL CALC-SCNC: 6 MMOL/L (ref 5–15)
AST SERPL-CCNC: 13 U/L (ref 15–37)
BASOPHILS # BLD: 0.1 K/UL (ref 0–0.1)
BASOPHILS NFR BLD: 1 % (ref 0–1)
BILIRUB SERPL-MCNC: 0.3 MG/DL (ref 0.2–1)
BUN SERPL-MCNC: 36 MG/DL (ref 6–20)
BUN/CREAT SERPL: 6 (ref 12–20)
CALCIUM SERPL-MCNC: 8.9 MG/DL (ref 8.5–10.1)
CHLORIDE SERPL-SCNC: 101 MMOL/L (ref 97–108)
CO2 SERPL-SCNC: 30 MMOL/L (ref 21–32)
COMMENT, HOLDF: NORMAL
CREAT SERPL-MCNC: 6.52 MG/DL (ref 0.7–1.3)
DIFFERENTIAL METHOD BLD: ABNORMAL
EOSINOPHIL # BLD: 0.5 K/UL (ref 0–0.4)
EOSINOPHIL NFR BLD: 7 % (ref 0–7)
ERYTHROCYTE [DISTWIDTH] IN BLOOD BY AUTOMATED COUNT: 13.7 % (ref 11.5–14.5)
GLOBULIN SER CALC-MCNC: 4 G/DL (ref 2–4)
GLUCOSE SERPL-MCNC: 113 MG/DL (ref 65–100)
HCT VFR BLD AUTO: 37.6 % (ref 36.6–50.3)
HGB BLD-MCNC: 11.4 G/DL (ref 12.1–17)
IMM GRANULOCYTES # BLD AUTO: 0 K/UL (ref 0–0.04)
IMM GRANULOCYTES NFR BLD AUTO: 0 % (ref 0–0.5)
LYMPHOCYTES # BLD: 0.9 K/UL (ref 0.8–3.5)
LYMPHOCYTES NFR BLD: 13 % (ref 12–49)
MAGNESIUM SERPL-MCNC: 2.3 MG/DL (ref 1.6–2.4)
MCH RBC QN AUTO: 31.1 PG (ref 26–34)
MCHC RBC AUTO-ENTMCNC: 30.3 G/DL (ref 30–36.5)
MCV RBC AUTO: 102.7 FL (ref 80–99)
MONOCYTES # BLD: 1.1 K/UL (ref 0–1)
MONOCYTES NFR BLD: 15 % (ref 5–13)
NEUTS SEG # BLD: 4.8 K/UL (ref 1.8–8)
NEUTS SEG NFR BLD: 64 % (ref 32–75)
NRBC # BLD: 0 K/UL (ref 0–0.01)
NRBC BLD-RTO: 0 PER 100 WBC
PLATELET # BLD AUTO: 202 K/UL (ref 150–400)
PMV BLD AUTO: 10.4 FL (ref 8.9–12.9)
POTASSIUM SERPL-SCNC: 3.6 MMOL/L (ref 3.5–5.1)
PROT SERPL-MCNC: 7.1 G/DL (ref 6.4–8.2)
RBC # BLD AUTO: 3.66 M/UL (ref 4.1–5.7)
SAMPLES BEING HELD,HOLD: NORMAL
SODIUM SERPL-SCNC: 137 MMOL/L (ref 136–145)
WBC # BLD AUTO: 7.5 K/UL (ref 4.1–11.1)

## 2022-03-26 PROCEDURE — 36415 COLL VENOUS BLD VENIPUNCTURE: CPT

## 2022-03-26 PROCEDURE — 99283 EMERGENCY DEPT VISIT LOW MDM: CPT

## 2022-03-26 PROCEDURE — 85025 COMPLETE CBC W/AUTO DIFF WBC: CPT

## 2022-03-26 PROCEDURE — 83735 ASSAY OF MAGNESIUM: CPT

## 2022-03-26 PROCEDURE — 80053 COMPREHEN METABOLIC PANEL: CPT

## 2022-03-26 NOTE — DISCHARGE INSTRUCTIONS
Follow-up with Dr. Candida Vaughan on Monday for reestablishment of vascular access. Will skip dialysis tomorrow and do Monday instead. Continue to monitor symptoms and return with any changes or worsening.

## 2022-03-26 NOTE — ED TRIAGE NOTES
Pt arrived from work c/o losing hemodialysis access. Pt gets HD T, Carlosu, Sun. No other complaints.

## 2022-03-26 NOTE — ED PROVIDER NOTES
61-year-old male with history of asthma, end-stage renal disease, CHF and HTN presents to the ED with concerns of vascular access loss. He reports that he was at work [employer] and noticed that his catheter had slipped out. He is unsure how this happened. He also notes that he has a fistula in his R arm that was placed about a month ago but it is not ready to be used. He currently gets dialysis T, Th, Sunday. He denies any bleeding, pain, fevers, chills, drainage, chest pain, shortness of breath or cough. The history is provided by the patient. Vascular Access Problem  Pertinent negatives include no chest pain and no headaches. Past Medical History:   Diagnosis Date    Arthritis     Asthma     AS A CHILD    Chronic kidney disease     ESRD    Chronic pain     KNEES, ANKLES    Gout     Heart failure ([facility] [facility] 2019    CHF    Hypertension     Polycystic kidney disease        Past Surgical History:   Procedure Laterality Date    HX HERNIA REPAIR  87/84/6894    Supra umbilical hernia with mesh and Umbilical hernia repair by Dr. Germán Mendenhall.     HX VASCULAR ACCESS  10/18/2021    LEFT ARM FISTULA         Family History:   Problem Relation Age of Onset    Hypertension Mother     Kidney Disease Mother     Hypertension Father     Kidney Disease Father     Cancer Father         REANAL CANCER    Other Sister         PKD, Fibromyalgia    Cancer Brother         PROSTATE    Cancer Brother         PROSTATE    Anesth Problems Neg Hx        Social History     Socioeconomic History    Marital status: SINGLE     Spouse name: Not on file    Number of children: Not on file    Years of education: Not on file    Highest education level: Not on file   Occupational History    Not on file   Tobacco Use    Smoking status: Never Smoker    Smokeless tobacco: Never Used   Vaping Use    Vaping Use: Never used   Substance and Sexual Activity    Alcohol use: Not Currently     Alcohol/week: 4.0 standard drinks     Types: 4 Shots of liquor per week    Drug use: Yes     Types: Marijuana     Comment: 4-5 X WEEK    Sexual activity: Not on file   Other Topics Concern    Not on file   Social History Narrative    Not on file     Social Determinants of Health     Financial Resource Strain:     Difficulty of Paying Living Expenses: Not on file   Food Insecurity:     Worried About Running Out of Food in the Last Year: Not on file    Elijah of Food in the Last Year: Not on file   Transportation Needs:     Lack of Transportation (Medical): Not on file    Lack of Transportation (Non-Medical): Not on file   Physical Activity:     Days of Exercise per Week: Not on file    Minutes of Exercise per Session: Not on file   Stress:     Feeling of Stress : Not on file   Social Connections:     Frequency of Communication with Friends and Family: Not on file    Frequency of Social Gatherings with Friends and Family: Not on file    Attends [employer] Services: Not on file    Active Member of [address] [place] [address] or Organizations: Not on file    Attends Club or Organization Meetings: Not on file    Marital Status: Not on file   Intimate Partner Violence:     Fear of Current or Ex-Partner: Not on file    Emotionally Abused: Not on file    Physically Abused: Not on file    Sexually Abused: Not on file   Housing Stability:     Unable to Pay for Housing in the Last Year: Not on file    Number of [place] in the Last Year: Not on file    Unstable Housing in the Last Year: Not on file         ALLERGIES: Shellfish containing products    Review of Systems   Constitutional: Negative for fever. HENT: Negative for congestion and sinus pain. Respiratory: Negative for cough. Cardiovascular: Negative for chest pain. Gastrointestinal: Negative for nausea and vomiting. Genitourinary: Negative for dysuria. Musculoskeletal: Negative for myalgias. Skin: Positive for wound. Neurological: Negative for headaches. Hematological: Negative for adenopathy. All other systems reviewed and are negative. Vitals:    03/26/22 1041   BP: 111/73   Pulse: 85   Resp: 16   Temp: 98.1 °F (36.7 °C)   SpO2: 97%   Weight: 91.2 kg (201 lb)   Height: 5' 11\" (1.803 m)            Physical Exam  Vitals and nursing note reviewed. Constitutional:       Appearance: Normal appearance. Eyes:      Extraocular Movements: Extraocular movements intact. Pupils: Pupils are equal, round, and reactive to light. Cardiovascular:      Rate and Rhythm: Normal rate and regular rhythm. Heart sounds: Normal heart sounds. Pulmonary:      Effort: Pulmonary effort is normal.      Breath sounds: Normal breath sounds. Abdominal:      Palpations: Abdomen is soft. Tenderness: There is no abdominal tenderness. Lymphadenopathy:      Cervical: No cervical adenopathy. Skin:     General: Skin is warm and dry. Neurological:      General: No focal deficit present. Mental Status: He is alert and oriented to person, place, and time. Psychiatric:         Mood and Affect: Mood normal.         Behavior: Behavior normal.          MDM  Number of Diagnoses or Management Options  ESRD (end stage renal disease) ([facility] [address]  Problem with vascular access  Diagnosis management comments: 65-year-old male with history of asthma, end-stage renal disease, CHF and HTN presents to the ED with concerns of vascular access loss. Vital signs stable in triage. Physical exam notable for a small hole to the right chest where catheter previously was inserted without tenderness to palpation redness, bleeding or drainage. See below for consult with vascular surgery, appreciate advice. Labs at baseline. Patient will be discharged with instructions for follow-up, conservative care and return precautions.        Amount and/or Complexity of Data Reviewed  Clinical lab tests: ordered and reviewed  Discuss the patient with other providers: yes      ED Course as of 03/26/22 1235   Sat Mar 26, 2022   5248 Talked to Dr. Dashawn Schuster with vascular surgery - recommended to come in outpatient office [phone] to have catheter replaced, skip dialysis tomorrow and come in Monday instead [AM]      ED Course User Index  [AM] IRMA Nieto       Procedures

## 2022-06-15 ENCOUNTER — TELEPHONE (OUTPATIENT)
Dept: RHEUMATOLOGY | Age: 52
End: 2022-06-15

## 2022-06-15 NOTE — TELEPHONE ENCOUNTER
Called patient to schedule next available appt, 10.17.22 at 4:30. Also states someone from [facility] called and he is not familiar with them he used to go somewhere else. Would like a call at [phone].

## 2022-06-15 NOTE — TELEPHONE ENCOUNTER
Spoke with pt and provided him with the number to central scheduling for the infusion center so that he can schedule his next krystexxa infusion at whichever [address] [place] [address] he prefers. He stated and understanding and will call them tomorrow.

## 2022-06-26 ENCOUNTER — HOSPITAL ENCOUNTER (EMERGENCY)
Age: 52
Discharge: HOME | End: 2022-06-26
Attending: EMERGENCY MEDICINE
Payer: COMMERCIAL

## 2022-06-26 ENCOUNTER — APPOINTMENT (OUTPATIENT)
Dept: GENERAL RADIOLOGY | Age: 52
End: 2022-06-26
Attending: EMERGENCY MEDICINE
Payer: COMMERCIAL

## 2022-06-26 VITALS
DIASTOLIC BLOOD PRESSURE: 110 MMHG | TEMPERATURE: 97.8 F | RESPIRATION RATE: 18 BRPM | OXYGEN SATURATION: 99 % | WEIGHT: 200 LBS | HEART RATE: 75 BPM | HEIGHT: 71 IN | BODY MASS INDEX: 28 KG/M2 | SYSTOLIC BLOOD PRESSURE: 158 MMHG

## 2022-06-26 DIAGNOSIS — M50.30 DEGENERATIVE DISC DISEASE, CERVICAL: Primary | ICD-10-CM

## 2022-06-26 DIAGNOSIS — M62.838 TRAPEZIUS MUSCLE SPASM: ICD-10-CM

## 2022-06-26 PROCEDURE — 99283 EMERGENCY DEPT VISIT LOW MDM: CPT

## 2022-06-26 PROCEDURE — 74011250637 HC RX REV CODE- 250/637: Performed by: EMERGENCY MEDICINE

## 2022-06-26 PROCEDURE — 72050 X-RAY EXAM NECK SPINE 4/5VWS: CPT

## 2022-06-26 RX ORDER — OXYCODONE HYDROCHLORIDE 5 MG/1
5 TABLET ORAL
Qty: 9 TABLET | Refills: 0 | Status: SHIPPED | OUTPATIENT
Start: 2022-06-26 | End: 2022-06-29

## 2022-06-26 RX ORDER — OXYCODONE HYDROCHLORIDE 5 MG/1
10 TABLET ORAL
Status: COMPLETED | OUTPATIENT
Start: 2022-06-26 | End: 2022-06-26

## 2022-06-26 RX ADMIN — OXYCODONE 10 MG: 5 TABLET ORAL at 15:35

## 2022-06-26 NOTE — ED TRIAGE NOTES
Pt reports right side neck pain that is sharp x 2 days, patient first thought was related to tooth extraction 4 days but pain has increased. Pt states chewing doesn't worsen pain. Denies fevers or sob. Standing helps relieve extreme pain but is dialysis pt and needs to be able to sit in chair for treatment tonite.

## 2022-06-28 DIAGNOSIS — M1A.39X1 CHRONIC TOPHACEOUS GOUT OF MULTIPLE SITES DUE TO RENAL IMPAIRMENT: Primary | ICD-10-CM

## 2022-06-28 RX ORDER — DIPHENHYDRAMINE HYDROCHLORIDE 50 MG/ML
25 INJECTION, SOLUTION INTRAMUSCULAR; INTRAVENOUS AS NEEDED
Status: CANCELLED
Start: 2022-07-01

## 2022-06-28 RX ORDER — SODIUM CHLORIDE 0.9 % (FLUSH) 0.9 %
10 SYRINGE (ML) INJECTION AS NEEDED
Status: CANCELLED | OUTPATIENT
Start: 2022-07-01

## 2022-06-28 RX ORDER — HEPARIN 100 UNIT/ML
300-500 SYRINGE INTRAVENOUS AS NEEDED
Status: CANCELLED
Start: 2022-07-01

## 2022-06-28 RX ORDER — DIPHENHYDRAMINE HYDROCHLORIDE 50 MG/ML
50 INJECTION, SOLUTION INTRAMUSCULAR; INTRAVENOUS AS NEEDED
Status: CANCELLED
Start: 2022-07-01

## 2022-06-28 RX ORDER — ACETAMINOPHEN 325 MG/1
650 TABLET ORAL AS NEEDED
Status: CANCELLED
Start: 2022-07-01

## 2022-06-28 RX ORDER — ONDANSETRON 2 MG/ML
8 INJECTION INTRAMUSCULAR; INTRAVENOUS AS NEEDED
Status: CANCELLED | OUTPATIENT
Start: 2022-07-01

## 2022-06-28 RX ORDER — DIPHENHYDRAMINE HCL 25 MG
50 CAPSULE ORAL ONCE
Status: CANCELLED
Start: 2022-07-01 | End: 2022-07-01

## 2022-06-28 RX ORDER — ACETAMINOPHEN 325 MG/1
650 TABLET ORAL ONCE
Status: CANCELLED | OUTPATIENT
Start: 2022-07-01 | End: 2022-07-01

## 2022-06-28 RX ORDER — EPINEPHRINE 1 MG/ML
0.3 INJECTION, SOLUTION, CONCENTRATE INTRAVENOUS AS NEEDED
Status: CANCELLED | OUTPATIENT
Start: 2022-07-01

## 2022-06-28 RX ORDER — HYDROCORTISONE SODIUM SUCCINATE 100 MG/2ML
100 INJECTION, POWDER, FOR SOLUTION INTRAMUSCULAR; INTRAVENOUS AS NEEDED
Status: CANCELLED | OUTPATIENT
Start: 2022-07-01

## 2022-06-28 RX ORDER — SODIUM CHLORIDE 9 MG/ML
10 INJECTION INTRAMUSCULAR; INTRAVENOUS; SUBCUTANEOUS AS NEEDED
Status: CANCELLED | OUTPATIENT
Start: 2022-07-01

## 2022-06-28 RX ORDER — ALBUTEROL SULFATE 0.83 MG/ML
2.5 SOLUTION RESPIRATORY (INHALATION) AS NEEDED
Status: CANCELLED
Start: 2022-07-01

## 2022-06-28 RX ORDER — SODIUM CHLORIDE 9 MG/ML
25 INJECTION, SOLUTION INTRAVENOUS CONTINUOUS
Status: CANCELLED | OUTPATIENT
Start: 2022-07-01

## 2022-06-28 NOTE — PROGRESS NOTES
OK to resume Krystexxa infusions though will be at higher risk for antibody formation with the holiday, will need to monitor carefully. Has not yet been seen in clinic, will reach out to  to have patient seen sooner than October if he's willing to see someone other than Dr. Jayesh Black.

## 2022-07-01 ENCOUNTER — HOSPITAL ENCOUNTER (OUTPATIENT)
Dept: INFUSION THERAPY | Age: 52
End: 2022-07-01
Attending: INTERNAL MEDICINE

## 2022-07-05 ENCOUNTER — TELEPHONE (OUTPATIENT)
Dept: RHEUMATOLOGY | Age: 52
End: 2022-07-05

## 2022-07-06 ENCOUNTER — OFFICE VISIT (OUTPATIENT)
Dept: RHEUMATOLOGY | Age: 52
End: 2022-07-06
Payer: COMMERCIAL

## 2022-07-06 VITALS
BODY MASS INDEX: 28.42 KG/M2 | HEIGHT: 71 IN | TEMPERATURE: 98 F | HEART RATE: 105 BPM | WEIGHT: 203 LBS | DIASTOLIC BLOOD PRESSURE: 83 MMHG | RESPIRATION RATE: 18 BRPM | SYSTOLIC BLOOD PRESSURE: 118 MMHG

## 2022-07-06 DIAGNOSIS — N18.6 ESRD (END STAGE RENAL DISEASE) ON DIALYSIS (HCC): ICD-10-CM

## 2022-07-06 DIAGNOSIS — Z99.2 ESRD (END STAGE RENAL DISEASE) ON DIALYSIS (HCC): ICD-10-CM

## 2022-07-06 DIAGNOSIS — M1A.39X1 CHRONIC TOPHACEOUS GOUT OF MULTIPLE SITES DUE TO RENAL IMPAIRMENT: Primary | ICD-10-CM

## 2022-07-06 PROCEDURE — 99215 OFFICE O/P EST HI 40 MIN: CPT | Performed by: INTERNAL MEDICINE

## 2022-07-06 RX ORDER — DIPHENHYDRAMINE HYDROCHLORIDE 50 MG/ML
25 INJECTION, SOLUTION INTRAMUSCULAR; INTRAVENOUS AS NEEDED
Status: CANCELLED
Start: 2022-08-03

## 2022-07-06 RX ORDER — SODIUM CHLORIDE 9 MG/ML
5-250 INJECTION, SOLUTION INTRAVENOUS AS NEEDED
Status: CANCELLED | OUTPATIENT
Start: 2022-08-03

## 2022-07-06 RX ORDER — ALBUTEROL SULFATE 0.83 MG/ML
2.5 SOLUTION RESPIRATORY (INHALATION) AS NEEDED
Status: CANCELLED
Start: 2022-08-03

## 2022-07-06 RX ORDER — ONDANSETRON 2 MG/ML
8 INJECTION INTRAMUSCULAR; INTRAVENOUS AS NEEDED
Status: CANCELLED | OUTPATIENT
Start: 2022-08-03

## 2022-07-06 RX ORDER — DIPHENHYDRAMINE HYDROCHLORIDE 50 MG/ML
50 INJECTION, SOLUTION INTRAMUSCULAR; INTRAVENOUS AS NEEDED
Status: CANCELLED
Start: 2022-08-03

## 2022-07-06 RX ORDER — SODIUM CHLORIDE 9 MG/ML
5-40 INJECTION INTRAMUSCULAR; INTRAVENOUS; SUBCUTANEOUS AS NEEDED
Status: CANCELLED | OUTPATIENT
Start: 2022-08-03

## 2022-07-06 RX ORDER — EPINEPHRINE 1 MG/ML
0.3 INJECTION, SOLUTION, CONCENTRATE INTRAVENOUS AS NEEDED
Status: CANCELLED | OUTPATIENT
Start: 2022-08-03

## 2022-07-06 RX ORDER — ACETAMINOPHEN 325 MG/1
650 TABLET ORAL ONCE
Status: CANCELLED | OUTPATIENT
Start: 2022-08-03 | End: 2022-07-13

## 2022-07-06 RX ORDER — HEPARIN 100 UNIT/ML
500 SYRINGE INTRAVENOUS AS NEEDED
Status: CANCELLED
Start: 2022-08-03

## 2022-07-06 RX ORDER — ACETAMINOPHEN 325 MG/1
650 TABLET ORAL AS NEEDED
Status: CANCELLED
Start: 2022-08-03

## 2022-07-06 RX ORDER — HYDROCORTISONE SODIUM SUCCINATE 100 MG/2ML
100 INJECTION, POWDER, FOR SOLUTION INTRAMUSCULAR; INTRAVENOUS AS NEEDED
Status: CANCELLED | OUTPATIENT
Start: 2022-08-03

## 2022-07-06 RX ORDER — DIPHENHYDRAMINE HYDROCHLORIDE 50 MG/ML
50 INJECTION, SOLUTION INTRAMUSCULAR; INTRAVENOUS ONCE
Status: CANCELLED | OUTPATIENT
Start: 2022-08-03 | End: 2022-07-13

## 2022-07-06 RX ORDER — SODIUM CHLORIDE 0.9 % (FLUSH) 0.9 %
5-40 SYRINGE (ML) INJECTION AS NEEDED
Status: CANCELLED | OUTPATIENT
Start: 2022-08-03

## 2022-07-06 NOTE — PATIENT INSTRUCTIONS
1. We'll start working on getting you the Krystexxa infusions again, this time from [facility]. 2. Continue leflunomide 20mg daily. If you are ever diagnosed with an infection requiring antibiotics, hold the leflunomide until you complete treatment. 3. See if your Nephrologist can have your blood work drawn from your next dialysis session, before your [place]. 4. Try to reduce your colchicine to not more than 1-2 pills/week, when you feel a flare coming on. 5. Return in 3 months.

## 2022-07-06 NOTE — PROGRESS NOTES
REASON FOR VISIT    This is a new Rheumatology provider follow-up visit for Mr. Yong Joshua for     ICD-10-CM   1. 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     Gouty arthritis phenotype includes:  Tophi: yes  Erosions: yes  Tenosynovitis: yes  Double Contour: N/A     Therapy Includes:  NSAIDs: no (contra-indicated due to CKD)  Colchicine prophylaxis: PRN  Current uricosuric therapy: none  Current urate-lowering therapy: Krystexxa 8 mg every 14 days (6/28/2019 to 10/18/2019; 8/26/2020 to present)  Previous urate-lowering therapy: none  Discontinued uricosuric because of inefficacy: none  Discontinued uricosuric because of side effects: none  Discontinued urate-lowering therapy because of inefficacy: allopurinol 50 mg daily  Discontinued urate-lowering therapy because of side effects: none  Contraindicated urate-lowering therapy because of CKD: allopurinol, probenecid  Contra-Indicated urate-lowering therapy because of FDA warning of all-cause mortality and cardiac death: Uloric (febuxostat)  G6PD Status: normal  Current anti-immunogenicity DMARD therapy: leflunomide 20 mg daily    HISTORY OF PRESENT ILLNESS    Mr. Yong Joshua returns for a follow-up visit. Thinks first diagnosed with gout in 2007, with flaring in the left foot. Was initially getting colchicine for flares, through the start of his pegloticase infusion. Now he estimates he takes colchicine 1 pill 2-3x/week on average for breakthrough joint pain. Thinks last severe flare was before he started [place]. Last [place] was in early March, before this was held after visits for dialysis access and musculoskeletal pain since then. In June had severe right basilar neck pain, treated with steroid pack and muscle relaxers with great deal of improvement. Had been bothering him for a few months leading up to that. Will start PT on 7/22. Remains on leflunomide 20mg. Denies diarrhea or new numbness/tingling.     No interval infections. Has tolerated pegloticase infusions well. Has been able to get his last few doses without any steroids with the infusion. Has longstanding cramping issues. Now these are only when he is on dialysis, no longer happening now. Sleeps at HD center, gets overnight treatments T/Th/Su. Started dialysis via RUE fistula about 4 months ago, after an initial period through the catheter. Last uric acid on 3/9 was <0.2. The patient has not had any interval hospital admissions, infections, or surgeries. REVIEW OF SYSTEMS    A comprehensive review of systems was performed and pertinent results are documented in the HPI, review of systems is otherwise non-contributory. PAST MEDICAL HISTORY    He has a past medical history of Arthritis, Asthma, Chronic kidney disease, Chronic pain, DJD (degenerative joint disease), Gout, Heart failure ([facility] [address] [facility] (2019), Hypertension, and Polycystic kidney disease. FAMILY HISTORY    His family history includes Cancer in his brother, brother, and father; Hypertension in his father and mother; Kidney Disease in his father and mother; Other in his sister. SOCIAL HISTORY    He reports that he has never smoked. He has never used smokeless tobacco. He reports previous alcohol use of about 4.0 standard drinks of alcohol per week. He reports current drug use. Drug: Marijuana. MEDICATIONS    Current Outpatient Medications   Medication Sig    acetaminophen (TYLENOL) 325 mg tablet Take 2 Tablets by mouth every four (4) hours as needed for Pain.  colchicine (Colcrys) 0.6 mg tablet TAKE ONE TABLET BY MOUTH DAILY AS NEEDED FOR GOUT FLARE    sodium bicarbonate 325 mg tablet Take 650 mg by mouth two (2) times a day.  leflunomide (ARAVA) 20 mg tablet Take 1 Tablet by mouth daily.  spironolactone (ALDACTONE) 25 mg tablet Take 25 mg by mouth daily.  carvedilol (COREG) 25 mg tablet Take 1 Tab by mouth two (2) times daily (with meals).     bumetanide (BUMEX) 1 mg tablet Take 1 Tab by mouth daily.  calcitRIOL (ROCALTROL) 0.5 mcg capsule Take 1 mcg by mouth Every morning. (Patient not taking: Reported on 7/6/2022)    cholecalciferol (Vitamin D3) 25 mcg (1,000 unit) cap Take  by mouth daily. (Patient not taking: Reported on 7/6/2022)    pegloticase (KRYSTEXXA) 8 mg/mL soln injection 8 mg by IntraVENous route Once every 2 weeks. (Patient not taking: Reported on 7/6/2022)     No current facility-administered medications for this visit. ALLERGIES    Allergies   Allergen Reactions    Shellfish Containing Products Swelling     PHYSICAL EXAMINATION    Visit Vitals  /83   Pulse (!) 105   Temp 98 °F (36.7 °C)   Resp 18   Ht 5' 11\" (1.803 m)   Wt 203 lb (92.1 kg)   BMI 28.31 kg/m²     Body mass index is 28.31 kg/m². General:  The patient is well developed, well nourished, alert, and in no apparent distress. Eyes: Sclera are anicteric. No conjunctival injection. HEENT:  Oropharynx is clear. No oral ulcers. Adequate salivary pooling. No cervical or supraclavicular lymphadenopathy. Lungs:  Clear to auscultation bilaterally, without wheeze or stridor. Normal respiratory effort. Cor:  Regular rate and rhythm. No murmur rub or gallop. Abdomen: Soft, non-tender, without hepatomegaly or masses. Extremities: Left upper extremity failed graft. Right upper extremity fistula with thrill. No calf tenderness or edema. Warm and well perfused. Skin: Interval resolution of extensive tophi described previously. No current helix, elbow, knee, or toe tophi. No significant abnormalities. Neuro: Nonfocal, intact sensation distally, no foot or wrist drop, normal unassisted gait. Musculoskeletal:    A comprehensive musculoskeletal exam was performed for all joints of each upper and lower extremity and assessed for swelling, tenderness and range of motion.  Results are documented as below:  Prominent right > left ulnar styloid, no wrist synovitis today  Large cool effusion of left knee, nontender with intact ROM. No evidence of synovitis in the small joints of the hands, wrists, shoulders, elbows, hips, knees or ankles. DATA REVIEW    Laboratory     3/26/22: WBC 7.5, Hgb 11.4, .7, Plt 202; Cr 6.52, LFTs WNL    Recent laboratory results were reviewed, summarized, and discussed with the patient. Imaging    Musculoskeletal Ultrasound    None    Radiographs    6/26/22 XR Cspine: No acute abnormality. Degenerative disc changes at C6-7 and to a lesser extent at C4-5. Bilateral Elbow 6/13/2019: RIGHT: normal alignment and bone mineral density. There is a well-circumscribed erosion within the olecranon. No definite effusion. There is high density material in the region of the olecranon bursa. LEFT: no fracture or effusion. No erosive change. There is high density material in the region of the olecranon bursa. Bilateral Hand 6/13/2019: RIGHT: normal alignment. Bone mineral density is normal. No fracture. There are extensive erosive changes of the distal ulna with adjacent high density soft tissue mass. There is erosive change within the triquetrum capitate and likely trapezoid. Erosive changes are noted the first second and third MCP joints. There is high density soft tissue adjacent to the second digit PIP joint. LEFT: normal alignment and no fracture. There are scattered erosive changes throughout the carpus second MTP joint and PIP joints of the second third and fourth digits. There is soft tissue swelling which is high density of the thumb and second MTP joints. Additional soft tissue swelling at the wrist.     Bilateral Foot 6/13/2019: RIGHT: normal alignment and bone mineral density. There are erosive changes throughout the ankle, talar head, navicular and Lisfranc joint as well as the medial base proximal phalanx of the great toe. There is adjacent soft tissue density at the first metatarsal head. No acute fracture. LEFT: normal alignment bone mineral densities.  There are periarticular erosions throughout the Lisfranc joint, between the navicular and cuneiforms and at the first MTP joint. There is mild soft tissue thickening and density medial to the first metatarsal head. There is suggestion of soft tissue density between the second and third metatarsal heads. No acute fracture.     CT Imaging    CT Abdomen and Pelvis without contrast 6/01/2019: Heart/vessels: Pericardial effusion and/or thickening measuring approximately 0.8 cm. Lungs/Pleura: Within normal limits. . ABDOMEN: Liver: Small, low-attenuation lesions in the liver which are incompletely characterized and statistically likely benign. Gallbladder/Biliary: Within normal limits. Spleen: Within normal limits. Pancreas: Within normal limits. Adrenals: Within normal limits. Kidneys: Innumerable low-attenuation lesions and high attenuation lesions throughout the kidneys with complete loss of normal-appearing parenchyma suggestive of polycystic kidney disease. Peritoneum/Mesenteries: Trace amount of fluid in the pelvis. Extraperitoneum: Within normal limits. Gastrointestinal tract: There is a short segment of small bowel within a supraumbilical hernia. The short segment of bowel within the hernia appears to have some mural thickening There are distended loops of small bowel with air-fluid levels associated with the hernia. The more distal small bowel is nearly entirely decompressed as is the large bowel. Normal-appearing appendix. Vascular: Within normal limits. PELVIS: Extraperitoneum: Within normal limits. Ureters: Within normal limits. Bladder: The bladder is nearly entirely decompressed. There is concentric mural thickening in the bladder. Reproductive System: Within normal limits. MSK:  There is a supraumbilical hernia containing a short segment of small bowel with adjacent stranding and inflammation and stranding/inflammation within the fat in the hernia. Tiny, fat-containing umbilical hernia.  Degenerative changes in the lumbar spine. There is degenerative disc disease at L3/L4 and L4/L5. Extensive degenerative changes in both hips. Dystrophic appearing calcifications at the origin of both hamstrings at the pubic rami. Degenerative changes in the pubic symphysis. MR Imaging    MRI Right Ankle without contrast 4/10/2012: There is osseous edema like signal at the inferior aspect of the lateral malleolus. There are also subcortical cysts in the anterior process of the calcaneus adjacent to the sinus tarsi. A mild to moderate osseous edema and the medial cuneiform bone is demonstrated subjacent to an area of distal high-grade chondral derangement. There is a moderate to large effusion of the ankle and posterior subtalar joints with demonstration of mild diffuse chondral thinning with small marginal osteophytes. There is a 7 mm loose body in the anterior joint recess. There is also heterogeneous signal in the posterior joint recesses which could contain small loose bodies as well. There is absent  visualization of the anterior talofibular ligament consistent with chronic tear. The other ankle ligaments appear intact. There is a small effusion of the talonavicular joint. Small dorsal marginal osteophytes throughout the midfoot region are noted. There is a moderate effusion in the posterior tibialis tendon sheath with diffuse mild tendon thickening and inframalleolar heterogeneous signal. There is a small effusion of the flexor digitorum longus and flexor hallucis longus tendon sheaths with normal appearing tendons. There is a small effusion of the peroneal tendon sheath with mild diffuse thickening of the peroneus longus tendon though uniform signal. Mild. Achilles peritendinitis is noted. There is a trace amount of fluid signal in the extensor digitorum longus tendon sheath. There is mild flatfoot deformity. No tarsal coalition is shown. There is no bone or soft tissue mass.     DXA     None      ASSESSMENT AND PLAN    This is a follow-up visit for Mr. Jason Justin for chronic tophaceous gout of multiple joints 2/2 polycystic kidney disease. He has had resolution of his extensive tophi with Gabriella Long and will most likely continue to improve with continued hemodialysis, but for medium term plan to resume Krystexxa every 2 weeks. He declined therapeutic left knee arthrocentesis today. 1. 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 ESRD (end stage renal disease[facility]  - CBC WITH AUTOMATED DIFF; Future  - METABOLIC PANEL, COMPREHENSIVE; Future     The patient voiced understanding of the aforementioned assessment and plan.      Face to face time: 25 minutes  Note preparation and records review day of service: 25 minutes  Total provider time day of service: 50 minutes    TODAY'S ORDERS    Orders Placed This Encounter    URIC ACID     Standing Status:   Future     Number of Occurrences:   1     Standing Expiration Date:   7/6/2023    C REACTIVE PROTEIN, QT     Standing Status:   Future     Number of Occurrences:   1     Standing Expiration Date:   1/6/2023    CBC WITH AUTOMATED DIFF     Standing Status:   Future     Number of Occurrences:   1     Standing Expiration Date:   2/5/4952    METABOLIC PANEL, COMPREHENSIVE     Standing Status:   Future     Number of Occurrences:   1     Standing Expiration Date:   1/6/2023    SED RATE (ESR)     Standing Status:   Future     Number of Occurrences:   1     Standing Expiration Date:   1/6/2023       Future Appointments   Date Time Provider Barry Cano   10/6/2022  8:00 MD Kenneth Boggs MD    Adult Rheumatology   [address] [place] [address] [address]   Phone [phone]  Fax [phone]

## 2022-07-06 NOTE — LETTER
7/6/2022    Patient: Naren Hillman   YOB: 1970   Date of Visit: 7/6/2022     Edilberto Avendaño NP  [address]  [facility] [address] [facility] [address]  [place] [place], [facility] [address]  Via Fax: [phone]    Dear MIHAELA Barroso MD,    We recently saw Mr. Delmar Chow in the [facility] for evaluation. My notes for this consultation are attached. If you have questions, please do not hesitate to call me. I look forward to following your patient along with you.       Sincerely,  Dima Perez MD [facility]  Cell: [phone]

## 2022-07-25 DIAGNOSIS — M1A.39X1 CHRONIC TOPHACEOUS GOUT OF MULTIPLE SITES DUE TO RENAL IMPAIRMENT: ICD-10-CM

## 2022-07-25 NOTE — TELEPHONE ENCOUNTER
Received faxed request from [facility] for Leflunomide 20mg tablet refill. Patient had an appointment on 7/6/22, and had labs done 3/26/22. Next appointment is for October 2022.

## 2022-07-26 RX ORDER — LEFLUNOMIDE 10 MG/1
10 TABLET ORAL DAILY
Qty: 30 TABLET | Refills: 1 | Status: SHIPPED | OUTPATIENT
Start: 2022-07-26 | End: 2022-10-07 | Stop reason: SDUPTHER

## 2022-08-03 ENCOUNTER — HOSPITAL ENCOUNTER (OUTPATIENT)
Dept: INFUSION THERAPY | Age: 52
Discharge: HOME | End: 2022-08-03
Payer: COMMERCIAL

## 2022-08-03 VITALS
HEIGHT: 71 IN | DIASTOLIC BLOOD PRESSURE: 66 MMHG | RESPIRATION RATE: 18 BRPM | SYSTOLIC BLOOD PRESSURE: 103 MMHG | BODY MASS INDEX: 28.27 KG/M2 | WEIGHT: 201.9 LBS | HEART RATE: 73 BPM | OXYGEN SATURATION: 98 % | TEMPERATURE: 97.5 F

## 2022-08-03 DIAGNOSIS — M1A.39X1 CHRONIC TOPHACEOUS GOUT OF MULTIPLE SITES DUE TO RENAL IMPAIRMENT: Primary | ICD-10-CM

## 2022-08-03 LAB
ALBUMIN SERPL-MCNC: 3.6 G/DL (ref 3.5–5)
ALBUMIN/GLOB SERPL: 1 {RATIO} (ref 1.1–2.2)
ALP SERPL-CCNC: 37 U/L (ref 45–117)
ALT SERPL-CCNC: 29 U/L (ref 12–78)
ANION GAP SERPL CALC-SCNC: 8 MMOL/L (ref 5–15)
AST SERPL-CCNC: 17 U/L (ref 15–37)
BASOPHILS # BLD: 0 K/UL (ref 0–0.1)
BASOPHILS NFR BLD: 1 % (ref 0–1)
BILIRUB SERPL-MCNC: 0.6 MG/DL (ref 0.2–1)
BUN SERPL-MCNC: 24 MG/DL (ref 6–20)
BUN/CREAT SERPL: 6 (ref 12–20)
CALCIUM SERPL-MCNC: 9.6 MG/DL (ref 8.5–10.1)
CHLORIDE SERPL-SCNC: 103 MMOL/L (ref 97–108)
CO2 SERPL-SCNC: 29 MMOL/L (ref 21–32)
CREAT SERPL-MCNC: 4.3 MG/DL (ref 0.7–1.3)
DIFFERENTIAL METHOD BLD: ABNORMAL
EOSINOPHIL # BLD: 0.4 K/UL (ref 0–0.4)
EOSINOPHIL NFR BLD: 9 % (ref 0–7)
ERYTHROCYTE [DISTWIDTH] IN BLOOD BY AUTOMATED COUNT: 15.8 % (ref 11.5–14.5)
ERYTHROCYTE [SEDIMENTATION RATE] IN BLOOD: 17 MM/HR (ref 0–20)
GLOBULIN SER CALC-MCNC: 3.6 G/DL (ref 2–4)
GLUCOSE SERPL-MCNC: 93 MG/DL (ref 65–100)
HCT VFR BLD AUTO: 39.8 % (ref 36.6–50.3)
HGB BLD-MCNC: 12.7 G/DL (ref 12.1–17)
IMM GRANULOCYTES # BLD AUTO: 0 K/UL (ref 0–0.04)
IMM GRANULOCYTES NFR BLD AUTO: 0 % (ref 0–0.5)
LYMPHOCYTES # BLD: 1.2 K/UL (ref 0.8–3.5)
LYMPHOCYTES NFR BLD: 29 % (ref 12–49)
MCH RBC QN AUTO: 29.7 PG (ref 26–34)
MCHC RBC AUTO-ENTMCNC: 31.9 G/DL (ref 30–36.5)
MCV RBC AUTO: 93 FL (ref 80–99)
MONOCYTES # BLD: 0.4 K/UL (ref 0–1)
MONOCYTES NFR BLD: 10 % (ref 5–13)
NEUTS SEG # BLD: 2.1 K/UL (ref 1.8–8)
NEUTS SEG NFR BLD: 51 % (ref 32–75)
NRBC # BLD: 0 K/UL (ref 0–0.01)
NRBC BLD-RTO: 0 PER 100 WBC
PLATELET # BLD AUTO: 231 K/UL (ref 150–400)
PMV BLD AUTO: 10.5 FL (ref 8.9–12.9)
POTASSIUM SERPL-SCNC: 3.8 MMOL/L (ref 3.5–5.1)
PROT SERPL-MCNC: 7.2 G/DL (ref 6.4–8.2)
RBC # BLD AUTO: 4.28 M/UL (ref 4.1–5.7)
SODIUM SERPL-SCNC: 140 MMOL/L (ref 136–145)
URATE SERPL-MCNC: 0.3 MG/DL (ref 3.5–7.2)
URATE SERPL-MCNC: 2.6 MG/DL (ref 3.5–7.2)
WBC # BLD AUTO: 4.1 K/UL (ref 4.1–11.1)

## 2022-08-03 PROCEDURE — 85025 COMPLETE CBC W/AUTO DIFF WBC: CPT

## 2022-08-03 PROCEDURE — 96365 THER/PROPH/DIAG IV INF INIT: CPT

## 2022-08-03 PROCEDURE — 74011250636 HC RX REV CODE- 250/636: Performed by: INTERNAL MEDICINE

## 2022-08-03 PROCEDURE — 36415 COLL VENOUS BLD VENIPUNCTURE: CPT

## 2022-08-03 PROCEDURE — 85652 RBC SED RATE AUTOMATED: CPT

## 2022-08-03 PROCEDURE — 96375 TX/PRO/DX INJ NEW DRUG ADDON: CPT

## 2022-08-03 PROCEDURE — 84550 ASSAY OF BLOOD/URIC ACID: CPT

## 2022-08-03 PROCEDURE — 74011250637 HC RX REV CODE- 250/637: Performed by: INTERNAL MEDICINE

## 2022-08-03 PROCEDURE — 96366 THER/PROPH/DIAG IV INF ADDON: CPT

## 2022-08-03 PROCEDURE — 80053 COMPREHEN METABOLIC PANEL: CPT

## 2022-08-03 PROCEDURE — 74011000250 HC RX REV CODE- 250: Performed by: INTERNAL MEDICINE

## 2022-08-03 RX ORDER — DIPHENHYDRAMINE HYDROCHLORIDE 50 MG/ML
50 INJECTION, SOLUTION INTRAMUSCULAR; INTRAVENOUS ONCE
Status: COMPLETED | OUTPATIENT
Start: 2022-08-03 | End: 2022-08-03

## 2022-08-03 RX ORDER — EPINEPHRINE 1 MG/ML
0.3 INJECTION, SOLUTION, CONCENTRATE INTRAVENOUS AS NEEDED
Status: CANCELLED | OUTPATIENT
Start: 2022-08-17

## 2022-08-03 RX ORDER — ALBUTEROL SULFATE 0.83 MG/ML
2.5 SOLUTION RESPIRATORY (INHALATION) AS NEEDED
Status: CANCELLED
Start: 2022-08-17

## 2022-08-03 RX ORDER — SODIUM CHLORIDE 9 MG/ML
5-40 INJECTION INTRAMUSCULAR; INTRAVENOUS; SUBCUTANEOUS AS NEEDED
Status: CANCELLED | OUTPATIENT
Start: 2022-08-17

## 2022-08-03 RX ORDER — ONDANSETRON 2 MG/ML
8 INJECTION INTRAMUSCULAR; INTRAVENOUS AS NEEDED
Status: CANCELLED | OUTPATIENT
Start: 2022-08-17

## 2022-08-03 RX ORDER — ACETAMINOPHEN 325 MG/1
650 TABLET ORAL ONCE
Status: COMPLETED | OUTPATIENT
Start: 2022-08-03 | End: 2022-08-03

## 2022-08-03 RX ORDER — ACETAMINOPHEN 325 MG/1
650 TABLET ORAL ONCE
Status: CANCELLED | OUTPATIENT
Start: 2022-08-17 | End: 2022-08-17

## 2022-08-03 RX ORDER — HEPARIN 100 UNIT/ML
500 SYRINGE INTRAVENOUS AS NEEDED
Status: CANCELLED
Start: 2022-08-17

## 2022-08-03 RX ORDER — DIPHENHYDRAMINE HYDROCHLORIDE 50 MG/ML
50 INJECTION, SOLUTION INTRAMUSCULAR; INTRAVENOUS ONCE
Status: CANCELLED | OUTPATIENT
Start: 2022-08-17 | End: 2022-08-17

## 2022-08-03 RX ORDER — HYDROCORTISONE SODIUM SUCCINATE 100 MG/2ML
100 INJECTION, POWDER, FOR SOLUTION INTRAMUSCULAR; INTRAVENOUS AS NEEDED
Status: CANCELLED | OUTPATIENT
Start: 2022-08-17

## 2022-08-03 RX ORDER — SODIUM CHLORIDE 9 MG/ML
5-250 INJECTION, SOLUTION INTRAVENOUS AS NEEDED
Status: CANCELLED | OUTPATIENT
Start: 2022-08-17

## 2022-08-03 RX ORDER — DIPHENHYDRAMINE HYDROCHLORIDE 50 MG/ML
50 INJECTION, SOLUTION INTRAMUSCULAR; INTRAVENOUS AS NEEDED
Status: CANCELLED
Start: 2022-08-17

## 2022-08-03 RX ORDER — ACETAMINOPHEN 325 MG/1
650 TABLET ORAL AS NEEDED
Status: CANCELLED
Start: 2022-08-17

## 2022-08-03 RX ORDER — SODIUM CHLORIDE 0.9 % (FLUSH) 0.9 %
5-40 SYRINGE (ML) INJECTION AS NEEDED
Status: CANCELLED | OUTPATIENT
Start: 2022-08-17

## 2022-08-03 RX ORDER — SODIUM CHLORIDE 9 MG/ML
5-250 INJECTION, SOLUTION INTRAVENOUS AS NEEDED
Status: DISPENSED | OUTPATIENT
Start: 2022-08-03 | End: 2022-08-03

## 2022-08-03 RX ORDER — DIPHENHYDRAMINE HYDROCHLORIDE 50 MG/ML
25 INJECTION, SOLUTION INTRAMUSCULAR; INTRAVENOUS AS NEEDED
Status: CANCELLED
Start: 2022-08-17

## 2022-08-03 RX ADMIN — DIPHENHYDRAMINE HYDROCHLORIDE 50 MG: 50 INJECTION, SOLUTION INTRAMUSCULAR; INTRAVENOUS at 11:23

## 2022-08-03 RX ADMIN — ACETAMINOPHEN 650 MG: 325 TABLET ORAL at 11:19

## 2022-08-03 RX ADMIN — PEGLOTICASE 8 MG: 8 INJECTION, SOLUTION INTRAVENOUS at 11:58

## 2022-08-03 RX ADMIN — SODIUM CHLORIDE 250 ML/HR: 9 INJECTION, SOLUTION INTRAVENOUS at 11:19

## 2022-08-03 RX ADMIN — FAMOTIDINE 40 MG: 10 INJECTION INTRAVENOUS at 11:23

## 2022-08-03 NOTE — PROGRESS NOTES
Outpatient Infusion Center Short Visit Progress Note    2300 Patient admitted to [facility] for [facility] ambulatory in stable condition. Assessment completed. No new concerns voiced. Covid Screening      1. Do you have any symptoms of COVID-19? SOB, coughing, fever, or generally not feeling well ? no  2. Have you been exposed to COVID-19 recently? no  3. Have you had any recent contact with family/friend that has a pending COVID test? no    Vital Signs:  Visit Vitals  BP 99/63   Pulse 82   Temp 97.5 °F (36.4 °C)   Resp 18   Ht 5' 11\" (1.803 m)   Wt 91.6 kg (201 lb 14.4 oz)   SpO2 98%   BMI 28.16 kg/m²         PIV with positive blood return. Lab Results:     Recent Results (from the past 12 hour(s))   CBC WITH AUTOMATED DIFF    Collection Time: 08/03/22 10:42 AM   Result Value Ref Range    WBC 4.1 4.1 - 11.1 K/uL    RBC 4.28 4.10 - 5.70 M/uL    HGB 12.7 12.1 - 17.0 g/dL    HCT 39.8 36.6 - 50.3 %    MCV 93.0 80.0 - 99.0 FL    MCH 29.7 26.0 - 34.0 PG    MCHC 31.9 30.0 - 36.5 g/dL    RDW 15.8 (H) 11.5 - 14.5 %    PLATELET 266 645 - 887 K/uL    MPV 10.5 8.9 - 12.9 FL    NRBC 0.0 0  WBC    ABSOLUTE NRBC 0.00 0.00 - 0.01 K/uL    NEUTROPHILS 51 32 - 75 %    LYMPHOCYTES 29 12 - 49 %    MONOCYTES 10 5 - 13 %    EOSINOPHILS 9 (H) 0 - 7 %    BASOPHILS 1 0 - 1 %    IMMATURE GRANULOCYTES 0 0.0 - 0.5 %    ABS. NEUTROPHILS 2.1 1.8 - 8.0 K/UL    ABS. LYMPHOCYTES 1.2 0.8 - 3.5 K/UL    ABS. MONOCYTES 0.4 0.0 - 1.0 K/UL    ABS. EOSINOPHILS 0.4 0.0 - 0.4 K/UL    ABS. BASOPHILS 0.0 0.0 - 0.1 K/UL    ABS. IMM.  GRANS. 0.0 0.00 - 0.04 K/UL    DF AUTOMATED     METABOLIC PANEL, COMPREHENSIVE    Collection Time: 08/03/22 10:42 AM   Result Value Ref Range    Sodium 140 136 - 145 mmol/L    Potassium 3.8 3.5 - 5.1 mmol/L    Chloride 103 97 - 108 mmol/L    CO2 29 21 - 32 mmol/L    Anion gap 8 5 - 15 mmol/L    Glucose 93 65 - 100 mg/dL    BUN 24 (H) 6 - 20 MG/DL    Creatinine 4.30 (H) 0.70 - 1.30 MG/DL    BUN/Creatinine ratio 6 (L) 12 - 20 GFR est AA 18 (L) >60 ml/min/1.73m2    GFR est non-AA 15 (L) >60 ml/min/1.73m2    Calcium 9.6 8.5 - 10.1 MG/DL    Bilirubin, total 0.6 0.2 - 1.0 MG/DL    ALT (SGPT) 29 12 - 78 U/L    AST (SGOT) 17 15 - 37 U/L    Alk.  phosphatase 37 (L) 45 - 117 U/L    Protein, total 7.2 6.4 - 8.2 g/dL    Albumin 3.6 3.5 - 5.0 g/dL    Globulin 3.6 2.0 - 4.0 g/dL    A-G Ratio 1.0 (L) 1.1 - 2.2     SED RATE (ESR)    Collection Time: 08/03/22 10:42 AM   Result Value Ref Range    Sed rate, automated 17 0 - 20 mm/hr   URIC ACID    Collection Time: 08/03/22 10:42 AM   Result Value Ref Range    Uric acid 2.6 (L) 3.5 - 7.2 MG/DL          Medications:      Medications Administered       0.9% sodium chloride infusion       Admin Date  08/03/2022 Action  New Bag Dose  250 mL/hr Rate  250 mL/hr Route  IntraVENous Administered By  David Abbasi RN              acetaminophen (TYLENOL) tablet 650 mg       Admin Date  08/03/2022 Action  Given Dose  650 mg Route  Oral Administered By  David Abbasi RN              diphenhydrAMINE (BENADRYL) injection 50 mg       Admin Date  08/03/2022 Action  Given Dose  50 mg Route  IntraVENous Administered By  David Abbasi RN              famotidine (PF) (PEPCID) 40 mg in 0.9% sodium chloride 10 mL injection       Admin Date  08/03/2022 Action  Given Dose  40 mg Route  IntraVENous Administered By  David Abbasi RN              [facility]) 8 mg in 0.9% sodium chloride 250 mL, overfill volume 25 mL infusion       Admin Date  08/03/2022 Action  New Bag Dose  8 mg Rate  138 mL/hr Route  IntraVENous Administered By  David Abbasi RN                               AVS declined  SBAR to [employer] @ 1177      Future Appointments   Date Time Provider Barry Cano   8/17/2022 10:00 AM SS INF7 CH2 <1H [facility] STEstephanie MULTANI   8/31/2022 10:00 AM SS INF7 CH2 <1H [facility]   9/14/2022  9:30 AM SS INF7 CH2 [facility]1H [facility]   9/28/2022 10:00 [facility]   10/6/2022 8:00 AM Maceo Claude, MD [facility] BS AMB   10/12/2022 10:00 AM SS INF7 CH2 <1H FAR[facility] Davis

## 2022-08-03 NOTE — PROGRESS NOTES
Outpatient Infusion Center   Visit Progress Note    SBAR received from [facility]    Pt relaxing in chair; IV infusing. Pt denies complaints, denies needs. Medications:  Medications Administered       0.9% sodium chloride infusion       Admin Date  08/03/2022 Action  New Bag Dose  250 mL/hr Rate  250 mL/hr Route  IntraVENous Administered By  Senthil Deras RN              acetaminophen (TYLENOL) tablet 650 mg       Admin Date  08/03/2022 Action  Given Dose  650 mg Route  Oral Administered By  Senthil Deras RN              diphenhydrAMINE (BENADRYL) injection 50 mg       Admin Date  08/03/2022 Action  Given Dose  50 mg Route  IntraVENous Administered By  Senthil Deras RN              famotidine (PF) (PEPCID) 40 mg in 0.9% sodium chloride 10 mL injection       Admin Date  08/03/2022 Action  Given Dose  40 mg Route  IntraVENous Administered By  Senthil Deras RN              [facility]) 8 mg in 0.9% sodium chloride 250 mL, overfill volume 25 mL infusion       Admin Date  08/03/2022 Action  New Bag Dose  8 mg Rate  138 mL/hr Route  IntraVENous Administered By  Senthil Deras, LORRI                      0528 Patient tolerated treatment well. Patient discharged from [place] [address] in no distress. Patient aware of next appointment. Labs  Post treatment labs were drawn and sent for processing. Will result in [address] when available.

## 2022-08-03 NOTE — ED PROVIDER NOTES
28-year-old male with a history of end-stage renal disease on hemodialysis presents with right-sided neck pain that was worsened recently. He had a tooth extraction done on Wednesday under local anesthesia. He is concerned about infection. He states that the pain has been going on for a month but worsened yesterday. The history is provided by the patient. Neck Pain   This is a new problem. Episode onset: 2 months. The problem occurs constantly. The problem has been rapidly worsening (worsened yesterday). The pain is associated with nothing. The pain is present in the right side. The quality of the pain is described as stabbing (dull). The pain is at a severity of 10/10. The pain is severe. The symptoms are aggravated by certain positions, twisting and bending. The pain is worse during the night (worse with movement). Associated symptoms include headaches. Pertinent negatives include no photophobia, no visual change, no chest pain, no syncope, no numbness, no weight loss, no bowel incontinence, no bladder incontinence, no leg pain, no paresis, no tingling and no weakness. Treatments tried: acetaminophen, advil, ice, heat, BC powder. The treatment provided mild relief. Past Medical History:   Diagnosis Date    Arthritis     Asthma     AS A CHILD    Chronic kidney disease     ESRD    Chronic pain     KNEES, ANKLES    Gout     Heart failure ([facility] [address] 2019    CHF    Hypertension     Polycystic kidney disease        Past Surgical History:   Procedure Laterality Date    HX HERNIA REPAIR  82/07/9385    Supra umbilical hernia with mesh and Umbilical hernia repair by Dr. Katiuska Rizo.     HX VASCULAR ACCESS  10/18/2021    LEFT ARM FISTULA         Family History:   Problem Relation Age of Onset    Hypertension Mother     Kidney Disease Mother     Hypertension Father     Kidney Disease Father     Cancer Father         REANAL CANCER    Other Sister         PKD, Fibromyalgia    Cancer Brother         PROSTATE LM for patient to return a call to our office to reschedule. Portal message also sent.    \"Denise Pearson,  I am reaching out on behalf of [facility] in [place]. We need to get you scheduled for a follow up here in our office. Please give our clinic a call as soon as you can to assist with getting you scheduled. Our direct phone number is [phone].        Thank you!  Advocate Mandie Higgins BOB\"    Cancer Brother         PROSTATE    Anesth Problems Neg Hx        Social History     Socioeconomic History    Marital status: SINGLE     Spouse name: Not on file    Number of children: Not on file    Years of education: Not on file    Highest education level: Not on file   Occupational History    Not on file   Tobacco Use    Smoking status: Never Smoker    Smokeless tobacco: Never Used   Vaping Use    Vaping Use: Never used   Substance and Sexual Activity    Alcohol use: Not Currently     Alcohol/week: 4.0 standard drinks     Types: 4 Shots of liquor per week    Drug use: Yes     Types: Marijuana     Comment: 4-5 X WEEK    Sexual activity: Not on file   Other Topics Concern    Not on file   Social History Narrative    Not on file     Social Determinants of Health     Financial Resource Strain:     Difficulty of Paying Living Expenses: Not on file   Food Insecurity:     Worried About Running Out of Food in the Last Year: Not on file    Elijah of Food in the Last Year: Not on file   Transportation Needs:     Lack of Transportation (Medical): Not on file    Lack of Transportation (Non-Medical):  Not on file   Physical Activity:     Days of Exercise per Week: Not on file    Minutes of Exercise per Session: Not on file   Stress:     Feeling of Stress : Not on file   Social Connections:     Frequency of Communication with Friends and Family: Not on file    Frequency of Social Gatherings with Friends and Family: Not on file    Attends Orthodox Services: Not on file    Active Member of Clubs or Organizations: Not on file    Attends Club or Organization Meetings: Not on file    Marital Status: Not on file   Intimate Partner Violence:     Fear of Current or Ex-Partner: Not on file    Emotionally Abused: Not on file    Physically Abused: Not on file    Sexually Abused: Not on file   Housing Stability:     Unable to Pay for Housing in the Last Year: Not on file    Number of [place] in the Last Year: Not on file    Unstable Housing in the Last Year: Not on file         ALLERGIES: Shellfish containing products    Review of Systems   Constitutional: Negative for weight loss. Eyes: Negative for photophobia. Cardiovascular: Negative for chest pain and syncope. Gastrointestinal: Negative for bowel incontinence. Genitourinary: Negative for bladder incontinence. Musculoskeletal: Positive for neck pain. Neurological: Positive for headaches. Negative for tingling, weakness and numbness. All other systems reviewed and are negative. Vitals:    06/26/22 1430   BP: (!) 158/110   Pulse: 75   Resp: 18   Temp: 97.8 °F (36.6 °C)   SpO2: 99%   Weight: 90.7 kg (200 lb)   Height: 5' 11\" (1.803 m)            Physical Exam  Vitals and nursing note reviewed. Constitutional:       Appearance: He is well-developed. HENT:      Head: Normocephalic and atraumatic. Mouth/Throat:      Comments: Prior extractions site on the right mandibular with no appreciable abscess, drainage or signs of infection  Eyes:      General: No scleral icterus. Neck:      Trachea: No tracheal deviation. Cardiovascular:      Rate and Rhythm: Normal rate. Pulses: Normal pulses. Pulmonary:      Effort: Pulmonary effort is normal.   Abdominal:      General: There is no distension. Musculoskeletal:         General: Tenderness (Right trapezius muscle) present. No deformity. Cervical back: No rigidity. Skin:     General: Skin is warm and dry. Neurological:      General: No focal deficit present. Mental Status: He is alert. Sensory: No sensory deficit. Motor: No weakness. Coordination: Coordination normal.      Gait: Gait normal.   Psychiatric:         Mood and Affect: Mood normal.          MDM  Number of Diagnoses or Management Options  Degenerative disc disease, cervical  Trapezius muscle spasm  Diagnosis management comments: 74-year-old male presents with right trapezius pain.   Neuro exam normal.  No numbness or weakness. Patient was offered a trigger point injection. He ultimately declined. He was prescribed 3 days of oxycodone so that he could get his dialysis. His symptoms started after tooth extraction. I suspect that his neck was moved in an awkward position and contributed to his pain. He had a cervical spine x-ray in the emergency department that showed degenerative disc disease. He was referred to orthopedics. He was given return precautions. Terra Pope.  Pranav Johnson MD           Procedures

## 2022-08-04 ENCOUNTER — HOSPITAL ENCOUNTER (EMERGENCY)
Age: 52
Discharge: HOME | End: 2022-08-05
Attending: EMERGENCY MEDICINE | Admitting: EMERGENCY MEDICINE
Payer: COMMERCIAL

## 2022-08-04 DIAGNOSIS — I48.0 PAROXYSMAL ATRIAL FIBRILLATION (HCC): Primary | ICD-10-CM

## 2022-08-04 PROCEDURE — 96374 THER/PROPH/DIAG INJ IV PUSH: CPT

## 2022-08-04 PROCEDURE — 99284 EMERGENCY DEPT VISIT MOD MDM: CPT

## 2022-08-04 RX ORDER — DILTIAZEM HYDROCHLORIDE 5 MG/ML
15 INJECTION INTRAVENOUS ONCE
Status: COMPLETED | OUTPATIENT
Start: 2022-08-04 | End: 2022-08-05

## 2022-08-04 RX ORDER — METOPROLOL TARTRATE 25 MG/1
25 TABLET, FILM COATED ORAL
Status: DISCONTINUED | OUTPATIENT
Start: 2022-08-04 | End: 2022-08-05 | Stop reason: HOSPADM

## 2022-08-05 VITALS
DIASTOLIC BLOOD PRESSURE: 84 MMHG | TEMPERATURE: 98.6 F | RESPIRATION RATE: 20 BRPM | HEIGHT: 71 IN | WEIGHT: 200 LBS | SYSTOLIC BLOOD PRESSURE: 111 MMHG | OXYGEN SATURATION: 96 % | HEART RATE: 77 BPM | BODY MASS INDEX: 28 KG/M2

## 2022-08-05 LAB
ANION GAP SERPL CALC-SCNC: 7 MMOL/L (ref 5–15)
BASOPHILS # BLD: 0.1 K/UL (ref 0–0.1)
BASOPHILS NFR BLD: 1 % (ref 0–1)
BUN SERPL-MCNC: 33 MG/DL (ref 6–20)
BUN/CREAT SERPL: 6 (ref 12–20)
CALCIUM SERPL-MCNC: 9.6 MG/DL (ref 8.5–10.1)
CHLORIDE SERPL-SCNC: 100 MMOL/L (ref 97–108)
CO2 SERPL-SCNC: 32 MMOL/L (ref 21–32)
COMMENT, HOLDF: NORMAL
CREAT SERPL-MCNC: 5.2 MG/DL (ref 0.7–1.3)
DIFFERENTIAL METHOD BLD: ABNORMAL
EOSINOPHIL # BLD: 0.5 K/UL (ref 0–0.4)
EOSINOPHIL NFR BLD: 7 % (ref 0–7)
ERYTHROCYTE [DISTWIDTH] IN BLOOD BY AUTOMATED COUNT: 15.7 % (ref 11.5–14.5)
GLUCOSE SERPL-MCNC: 78 MG/DL (ref 65–100)
HCT VFR BLD AUTO: 39.2 % (ref 36.6–50.3)
HGB BLD-MCNC: 12.4 G/DL (ref 12.1–17)
IMM GRANULOCYTES # BLD AUTO: 0 K/UL (ref 0–0.04)
IMM GRANULOCYTES NFR BLD AUTO: 0 % (ref 0–0.5)
LYMPHOCYTES # BLD: 2.6 K/UL (ref 0.8–3.5)
LYMPHOCYTES NFR BLD: 39 % (ref 12–49)
MAGNESIUM SERPL-MCNC: 2.3 MG/DL (ref 1.6–2.4)
MCH RBC QN AUTO: 29.9 PG (ref 26–34)
MCHC RBC AUTO-ENTMCNC: 31.6 G/DL (ref 30–36.5)
MCV RBC AUTO: 94.5 FL (ref 80–99)
MONOCYTES # BLD: 0.9 K/UL (ref 0–1)
MONOCYTES NFR BLD: 13 % (ref 5–13)
NEUTS SEG # BLD: 2.7 K/UL (ref 1.8–8)
NEUTS SEG NFR BLD: 40 % (ref 32–75)
NRBC # BLD: 0 K/UL (ref 0–0.01)
NRBC BLD-RTO: 0 PER 100 WBC
PLATELET # BLD AUTO: 216 K/UL (ref 150–400)
PMV BLD AUTO: 10.5 FL (ref 8.9–12.9)
POTASSIUM SERPL-SCNC: 3 MMOL/L (ref 3.5–5.1)
RBC # BLD AUTO: 4.15 M/UL (ref 4.1–5.7)
SAMPLES BEING HELD,HOLD: NORMAL
SODIUM SERPL-SCNC: 139 MMOL/L (ref 136–145)
WBC # BLD AUTO: 6.6 K/UL (ref 4.1–11.1)

## 2022-08-05 PROCEDURE — 36415 COLL VENOUS BLD VENIPUNCTURE: CPT

## 2022-08-05 PROCEDURE — 85025 COMPLETE CBC W/AUTO DIFF WBC: CPT

## 2022-08-05 PROCEDURE — 80048 BASIC METABOLIC PNL TOTAL CA: CPT

## 2022-08-05 PROCEDURE — 74011000250 HC RX REV CODE- 250: Performed by: EMERGENCY MEDICINE

## 2022-08-05 PROCEDURE — 83735 ASSAY OF MAGNESIUM: CPT

## 2022-08-05 RX ORDER — METOPROLOL TARTRATE 25 MG/1
25 TABLET, FILM COATED ORAL 2 TIMES DAILY
Qty: 60 TABLET | Refills: 0 | Status: SHIPPED | OUTPATIENT
Start: 2022-08-05 | End: 2022-09-04

## 2022-08-05 RX ADMIN — DILTIAZEM HYDROCHLORIDE 15 MG: 5 INJECTION, SOLUTION INTRAVENOUS at 00:14

## 2022-08-05 NOTE — ED TRIAGE NOTES
Pt arrives via EMS with CC of a-fib. Pt was about 3.5 hours into his dialysis session when the nurses noted he was in and out of a-fib on their monitor. Pt does not have a hx of it. Pt denies chest pain and SOB.     Pt has polycystic kidney disease

## 2022-08-05 NOTE — ED PROVIDER NOTES
The history is provided by the patient and the EMS personnel. Palpitations   This is a new problem. The current episode started less than 1 hour ago. The problem has not changed since onset. Associated with: dialysis. Associated symptoms include irregular heartbeat. Pertinent negatives include no fever, no chest pain, no near-syncope, no nausea, no vomiting, no dizziness and no shortness of breath. Risk factors include hypertension and male gender. His past medical history is significant for heart disease and hypertension. His past medical history does not include atrial fibrillation or SVT. Past Medical History:   Diagnosis Date    Arthritis     Asthma     AS A CHILD    Chronic kidney disease     ESRD    Chronic pain     KNEES, ANKLES    DJD (degenerative joint disease)     Gout     Heart failure ([facility] [address] 2019    CHF    Hypertension     Polycystic kidney disease        Past Surgical History:   Procedure Laterality Date    HX HERNIA REPAIR  25/61/9159    Supra umbilical hernia with mesh and Umbilical hernia repair by Dr. Aman Jj.     HX VASCULAR ACCESS  10/18/2021    LEFT ARM FISTULA         Family History:   Problem Relation Age of Onset    Hypertension Mother     Kidney Disease Mother     Hypertension Father     Kidney Disease Father     Cancer Father         REANAL CANCER    Other Sister         PKD, Fibromyalgia    Cancer Brother         PROSTATE    Cancer Brother         PROSTATE    Anesth Problems Neg Hx        Social History     Socioeconomic History    Marital status: SINGLE     Spouse name: Not on file    Number of children: Not on file    Years of education: Not on file    Highest education level: Not on file   Occupational History    Not on file   Tobacco Use    Smoking status: Never    Smokeless tobacco: Never   Vaping Use    Vaping Use: Never used   Substance and Sexual Activity    Alcohol use: Not Currently     Alcohol/week: 4.0 standard drinks     Types: 4 Shots of liquor per week    Drug use: Yes     Types: Marijuana     Comment: 4-5 X WEEK    Sexual activity: Not on file   Other Topics Concern    Not on file   Social History Narrative    Not on file     Social Determinants of Health     Financial Resource Strain: Not on file   Food Insecurity: Not on file   Transportation Needs: Not on file   Physical Activity: Not on file   Stress: Not on file   Social Connections: Not on file   Intimate Partner Violence: Not on file   Housing Stability: Not on file         ALLERGIES: Shellfish containing products    Review of Systems   Constitutional:  Negative for fever. Respiratory:  Negative for shortness of breath. Cardiovascular:  Positive for palpitations. Negative for chest pain and near-syncope. Gastrointestinal:  Negative for nausea and vomiting. Neurological:  Negative for dizziness. All other systems reviewed and are negative. Vitals:    08/04/22 2329   BP: 107/87   Pulse: 96   Resp: 18   Temp: 98.4 °F (36.9 °C)   SpO2: 100%            Physical Exam  Vitals and nursing note reviewed. Constitutional:       General: He is not in acute distress. Appearance: He is well-developed. HENT:      Head: Normocephalic and atraumatic. Eyes:      Conjunctiva/sclera: Conjunctivae normal.      Pupils: Pupils are equal, round, and reactive to light. Cardiovascular:      Rate and Rhythm: Tachycardia present. Rhythm irregular. Pulmonary:      Effort: Pulmonary effort is normal.   Abdominal:      General: There is no distension. Palpations: Abdomen is soft. Tenderness: There is no abdominal tenderness. Musculoskeletal:         General: Normal range of motion. Cervical back: Normal range of motion. Skin:     General: Skin is dry. Capillary Refill: Capillary refill takes less than 2 seconds. Neurological:      General: No focal deficit present. Mental Status: He is alert and oriented to person, place, and time.    Psychiatric:         Mood and Affect: Mood normal.         Behavior: Behavior normal.        [facility]    ED Course as of 08/05/22 0452   Fri Aug 05, 2022   0006 EKG at 11:39 PM shows normal sinus rhythm, first-degree AV block, left ventricular hypertrophy, no STEMI, no ectopy. EKG is interpreted by Ramirez Chavarria MD  [IO]      ED Course User Index  [IO] Richard Roach MD       Procedures        Patient's presentation and findings were discussed with Dr. Dania Barrera, cardiology, who recommends starting Eliquis 5 mg twice a day as well as discontinuing the patient's carvedilol and starting metoprolol 25 mg twice a day. The patient can follow-up with cardiology early next week for continued evaluation and management. The patient is resting comfortably and feels better, is alert and in no distress. The repeat examination is unremarkable and benign. The electrocardiogram shows no signs of acute ischemia and the history, exam, diagnostic testing and current condition do not suggest that this patient is having an acute myocardial infarction, unstable arrhythmia, unstable angina. The vital signs have been stable. The patient's condition is stable and appropriate for discharge. The patient will pursue further outpatient evaluation with the cardiologist. The patient and/or caregivers have expressed a clear and thorough understanding and agree to follow up as instructed. MEDICATIONS GIVEN:  Medications   metoprolol tartrate (LOPRESSOR) tablet 25 mg (0 mg Oral Held 8/5/22 0020)   dilTIAZem (CARDIZEM) injection 15 mg (15 mg IntraVENous Given 8/5/22 0014)       IMPRESSION:  1. Paroxysmal atrial fibrillation (HCC)        PLAN:  1. Metoprolol 25 BID  2.  Eliquis 5 mg BID    Return to ED if worse

## 2022-08-17 ENCOUNTER — HOSPITAL ENCOUNTER (OUTPATIENT)
Dept: INFUSION THERAPY | Age: 52
Discharge: HOME | End: 2022-08-17

## 2022-08-19 ENCOUNTER — HOSPITAL ENCOUNTER (OUTPATIENT)
Dept: INFUSION THERAPY | Age: 52
Discharge: HOME | End: 2022-08-19
Payer: COMMERCIAL

## 2022-08-19 VITALS
WEIGHT: 203.6 LBS | RESPIRATION RATE: 18 BRPM | TEMPERATURE: 98.2 F | HEIGHT: 71 IN | BODY MASS INDEX: 28.5 KG/M2 | HEART RATE: 84 BPM | OXYGEN SATURATION: 97 % | SYSTOLIC BLOOD PRESSURE: 96 MMHG | DIASTOLIC BLOOD PRESSURE: 74 MMHG

## 2022-08-19 DIAGNOSIS — M1A.39X1 CHRONIC TOPHACEOUS GOUT OF MULTIPLE SITES DUE TO RENAL IMPAIRMENT: Primary | ICD-10-CM

## 2022-08-19 LAB
ALBUMIN SERPL-MCNC: 3.3 G/DL (ref 3.5–5)
ALBUMIN/GLOB SERPL: 0.9 {RATIO} (ref 1.1–2.2)
ALP SERPL-CCNC: 32 U/L (ref 45–117)
ALT SERPL-CCNC: 35 U/L (ref 12–78)
ANION GAP SERPL CALC-SCNC: 7 MMOL/L (ref 5–15)
AST SERPL-CCNC: 25 U/L (ref 15–37)
BASOPHILS # BLD: 0.1 K/UL (ref 0–0.1)
BASOPHILS NFR BLD: 1 % (ref 0–1)
BILIRUB SERPL-MCNC: 0.4 MG/DL (ref 0.2–1)
BUN SERPL-MCNC: 20 MG/DL (ref 6–20)
BUN/CREAT SERPL: 5 (ref 12–20)
CALCIUM SERPL-MCNC: 9.2 MG/DL (ref 8.5–10.1)
CHLORIDE SERPL-SCNC: 99 MMOL/L (ref 97–108)
CO2 SERPL-SCNC: 33 MMOL/L (ref 21–32)
CREAT SERPL-MCNC: 3.99 MG/DL (ref 0.7–1.3)
DIFFERENTIAL METHOD BLD: ABNORMAL
EOSINOPHIL # BLD: 0.5 K/UL (ref 0–0.4)
EOSINOPHIL NFR BLD: 9 % (ref 0–7)
ERYTHROCYTE [DISTWIDTH] IN BLOOD BY AUTOMATED COUNT: 15.4 % (ref 11.5–14.5)
ERYTHROCYTE [SEDIMENTATION RATE] IN BLOOD: 19 MM/HR (ref 0–20)
GLOBULIN SER CALC-MCNC: 3.8 G/DL (ref 2–4)
GLUCOSE SERPL-MCNC: 100 MG/DL (ref 65–100)
HCT VFR BLD AUTO: 38.6 % (ref 36.6–50.3)
HGB BLD-MCNC: 12.2 G/DL (ref 12.1–17)
IMM GRANULOCYTES # BLD AUTO: 0 K/UL (ref 0–0.04)
IMM GRANULOCYTES NFR BLD AUTO: 0 % (ref 0–0.5)
LYMPHOCYTES # BLD: 1.6 K/UL (ref 0.8–3.5)
LYMPHOCYTES NFR BLD: 26 % (ref 12–49)
MCH RBC QN AUTO: 29.7 PG (ref 26–34)
MCHC RBC AUTO-ENTMCNC: 31.6 G/DL (ref 30–36.5)
MCV RBC AUTO: 93.9 FL (ref 80–99)
MONOCYTES # BLD: 0.7 K/UL (ref 0–1)
MONOCYTES NFR BLD: 12 % (ref 5–13)
NEUTS SEG # BLD: 3.1 K/UL (ref 1.8–8)
NEUTS SEG NFR BLD: 52 % (ref 32–75)
NRBC # BLD: 0 K/UL (ref 0–0.01)
NRBC BLD-RTO: 0 PER 100 WBC
PLATELET # BLD AUTO: 253 K/UL (ref 150–400)
PMV BLD AUTO: 10.2 FL (ref 8.9–12.9)
POTASSIUM SERPL-SCNC: 3.4 MMOL/L (ref 3.5–5.1)
PROT SERPL-MCNC: 7.1 G/DL (ref 6.4–8.2)
RBC # BLD AUTO: 4.11 M/UL (ref 4.1–5.7)
SODIUM SERPL-SCNC: 139 MMOL/L (ref 136–145)
URATE SERPL-MCNC: <0.2 MG/DL (ref 3.5–7.2)
URATE SERPL-MCNC: <0.2 MG/DL (ref 3.5–7.2)
WBC # BLD AUTO: 6.1 K/UL (ref 4.1–11.1)

## 2022-08-19 PROCEDURE — 96366 THER/PROPH/DIAG IV INF ADDON: CPT

## 2022-08-19 PROCEDURE — 74011250636 HC RX REV CODE- 250/636: Performed by: INTERNAL MEDICINE

## 2022-08-19 PROCEDURE — 85025 COMPLETE CBC W/AUTO DIFF WBC: CPT

## 2022-08-19 PROCEDURE — 96375 TX/PRO/DX INJ NEW DRUG ADDON: CPT

## 2022-08-19 PROCEDURE — 74011000250 HC RX REV CODE- 250: Performed by: INTERNAL MEDICINE

## 2022-08-19 PROCEDURE — 36415 COLL VENOUS BLD VENIPUNCTURE: CPT

## 2022-08-19 PROCEDURE — 74011250637 HC RX REV CODE- 250/637: Performed by: INTERNAL MEDICINE

## 2022-08-19 PROCEDURE — 84550 ASSAY OF BLOOD/URIC ACID: CPT

## 2022-08-19 PROCEDURE — 80053 COMPREHEN METABOLIC PANEL: CPT

## 2022-08-19 PROCEDURE — 96365 THER/PROPH/DIAG IV INF INIT: CPT

## 2022-08-19 PROCEDURE — 85652 RBC SED RATE AUTOMATED: CPT

## 2022-08-19 RX ORDER — ONDANSETRON 2 MG/ML
8 INJECTION INTRAMUSCULAR; INTRAVENOUS AS NEEDED
Status: CANCELLED | OUTPATIENT
Start: 2022-08-31

## 2022-08-19 RX ORDER — SODIUM CHLORIDE 9 MG/ML
5-250 INJECTION, SOLUTION INTRAVENOUS AS NEEDED
Status: CANCELLED | OUTPATIENT
Start: 2022-08-31

## 2022-08-19 RX ORDER — ALBUTEROL SULFATE 0.83 MG/ML
2.5 SOLUTION RESPIRATORY (INHALATION) AS NEEDED
Status: CANCELLED
Start: 2022-08-31

## 2022-08-19 RX ORDER — ACETAMINOPHEN 325 MG/1
650 TABLET ORAL AS NEEDED
Status: CANCELLED
Start: 2022-08-31

## 2022-08-19 RX ORDER — EPINEPHRINE 1 MG/ML
0.3 INJECTION, SOLUTION, CONCENTRATE INTRAVENOUS AS NEEDED
Status: CANCELLED | OUTPATIENT
Start: 2022-08-31

## 2022-08-19 RX ORDER — DIPHENHYDRAMINE HYDROCHLORIDE 50 MG/ML
50 INJECTION, SOLUTION INTRAMUSCULAR; INTRAVENOUS ONCE
Status: CANCELLED | OUTPATIENT
Start: 2022-08-31 | End: 2022-08-31

## 2022-08-19 RX ORDER — SODIUM CHLORIDE 9 MG/ML
5-40 INJECTION INTRAMUSCULAR; INTRAVENOUS; SUBCUTANEOUS AS NEEDED
Status: CANCELLED | OUTPATIENT
Start: 2022-08-31

## 2022-08-19 RX ORDER — DIPHENHYDRAMINE HYDROCHLORIDE 50 MG/ML
25 INJECTION, SOLUTION INTRAMUSCULAR; INTRAVENOUS AS NEEDED
Status: CANCELLED
Start: 2022-08-31

## 2022-08-19 RX ORDER — HYDROCORTISONE SODIUM SUCCINATE 100 MG/2ML
100 INJECTION, POWDER, FOR SOLUTION INTRAMUSCULAR; INTRAVENOUS AS NEEDED
Status: CANCELLED | OUTPATIENT
Start: 2022-08-31

## 2022-08-19 RX ORDER — DIPHENHYDRAMINE HYDROCHLORIDE 50 MG/ML
50 INJECTION, SOLUTION INTRAMUSCULAR; INTRAVENOUS AS NEEDED
Status: CANCELLED
Start: 2022-08-31

## 2022-08-19 RX ORDER — SODIUM CHLORIDE 0.9 % (FLUSH) 0.9 %
5-40 SYRINGE (ML) INJECTION AS NEEDED
Status: CANCELLED | OUTPATIENT
Start: 2022-08-31

## 2022-08-19 RX ORDER — SODIUM CHLORIDE 9 MG/ML
5-250 INJECTION, SOLUTION INTRAVENOUS AS NEEDED
Status: DISPENSED | OUTPATIENT
Start: 2022-08-19 | End: 2022-08-19

## 2022-08-19 RX ORDER — ACETAMINOPHEN 325 MG/1
650 TABLET ORAL ONCE
Status: COMPLETED | OUTPATIENT
Start: 2022-08-19 | End: 2022-08-19

## 2022-08-19 RX ORDER — DIPHENHYDRAMINE HCL 25 MG
50 CAPSULE ORAL ONCE
Status: COMPLETED | OUTPATIENT
Start: 2022-08-19 | End: 2022-08-19

## 2022-08-19 RX ORDER — DIPHENHYDRAMINE HCL 25 MG
50 CAPSULE ORAL ONCE
Qty: 2 CAPSULE | Refills: 0 | Status: SHIPPED | OUTPATIENT
Start: 2022-08-19 | End: 2022-08-19

## 2022-08-19 RX ORDER — ACETAMINOPHEN 325 MG/1
650 TABLET ORAL ONCE
Status: CANCELLED | OUTPATIENT
Start: 2022-08-31 | End: 2022-08-31

## 2022-08-19 RX ORDER — HEPARIN 100 UNIT/ML
500 SYRINGE INTRAVENOUS AS NEEDED
Status: CANCELLED
Start: 2022-08-31

## 2022-08-19 RX ADMIN — DIPHENHYDRAMINE HYDROCHLORIDE 50 MG: 25 CAPSULE ORAL at 11:22

## 2022-08-19 RX ADMIN — ACETAMINOPHEN 650 MG: 325 TABLET ORAL at 11:18

## 2022-08-19 RX ADMIN — PEGLOTICASE 8 MG: 8 INJECTION, SOLUTION INTRAVENOUS at 11:57

## 2022-08-19 RX ADMIN — SODIUM CHLORIDE 25 ML/HR: 9 INJECTION, SOLUTION INTRAVENOUS at 11:17

## 2022-08-19 RX ADMIN — FAMOTIDINE 40 MG: 10 INJECTION, SOLUTION INTRAVENOUS at 11:18

## 2022-08-19 NOTE — PROGRESS NOTES
Outpatient Infusion Center Progress Note    2070 Pt admit to [facility] for [address] ambulatory in stable condition. Assessment completed. No new concerns voiced. Patient states he was in the ER 8/4/2022 where he was newly diagnosed with A-Fib. I contacted Dr. Lynn Red and received a written okay to proceed with [address] infusion today post ER visit. Peripheral IV 24 g established in left arm with positive blood return. Labs obtained and sent for processing. Labs obtained post infusion also. Visit Vitals  BP (P) 116/83 (BP 1 Location: Left arm, BP Patient Position: Sitting)   Pulse (P) 82   Temp (P) 98.2 °F (36.8 °C)   Resp (P) 18   Ht 5' 11\" (1.803 m)   Wt 92.4 kg (203 lb 9.6 oz)   SpO2 (P) 93%   BMI 28.40 kg/m²     Patient Vitals for the past 12 hrs:   Temp Pulse Resp BP SpO2   08/19/22 1409 (P) 98.2 °F (36.8 °C) (P) 82 (P) 18 (P) 116/83 (P) 93 %   08/19/22 1024 98.2 °F (36.8 °C) 84 18 96/74 97 %      Recent Results (from the past 12 hour(s))   CBC WITH AUTOMATED DIFF    Collection Time: 08/19/22 10:47 AM   Result Value Ref Range    WBC 6.1 4.1 - 11.1 K/uL    RBC 4.11 4.10 - 5.70 M/uL    HGB 12.2 12.1 - 17.0 g/dL    HCT 38.6 36.6 - 50.3 %    MCV 93.9 80.0 - 99.0 FL    MCH 29.7 26.0 - 34.0 PG    MCHC 31.6 30.0 - 36.5 g/dL    RDW 15.4 (H) 11.5 - 14.5 %    PLATELET 537 895 - 757 K/uL    MPV 10.2 8.9 - 12.9 FL    NRBC 0.0 0  WBC    ABSOLUTE NRBC 0.00 0.00 - 0.01 K/uL    NEUTROPHILS 52 32 - 75 %    LYMPHOCYTES 26 12 - 49 %    MONOCYTES 12 5 - 13 %    EOSINOPHILS 9 (H) 0 - 7 %    BASOPHILS 1 0 - 1 %    IMMATURE GRANULOCYTES 0 0.0 - 0.5 %    ABS. NEUTROPHILS 3.1 1.8 - 8.0 K/UL    ABS. LYMPHOCYTES 1.6 0.8 - 3.5 K/UL    ABS. MONOCYTES 0.7 0.0 - 1.0 K/UL    ABS. EOSINOPHILS 0.5 (H) 0.0 - 0.4 K/UL    ABS. BASOPHILS 0.1 0.0 - 0.1 K/UL    ABS. IMM.  GRANS. 0.0 0.00 - 0.04 K/UL    DF AUTOMATED     METABOLIC PANEL, COMPREHENSIVE    Collection Time: 08/19/22 10:47 AM   Result Value Ref Range    Sodium 139 136 - 145 mmol/L    Potassium 3.4 (L) 3.5 - 5.1 mmol/L    Chloride 99 97 - 108 mmol/L    CO2 33 (H) 21 - 32 mmol/L    Anion gap 7 5 - 15 mmol/L    Glucose 100 65 - 100 mg/dL    BUN 20 6 - 20 MG/DL    Creatinine 3.99 (H) 0.70 - 1.30 MG/DL    BUN/Creatinine ratio 5 (L) 12 - 20      GFR est AA 19 (L) >60 ml/min/1.73m2    GFR est non-AA 16 (L) >60 ml/min/1.73m2    Calcium 9.2 8.5 - 10.1 MG/DL    Bilirubin, total 0.4 0.2 - 1.0 MG/DL    ALT (SGPT) 35 12 - 78 U/L    AST (SGOT) 25 15 - 37 U/L    Alk. phosphatase 32 (L) 45 - 117 U/L    Protein, total 7.1 6.4 - 8.2 g/dL    Albumin 3.3 (L) 3.5 - 5.0 g/dL    Globulin 3.8 2.0 - 4.0 g/dL    A-G Ratio 0.9 (L) 1.1 - 2.2     SED RATE (ESR)    Collection Time: 08/19/22 10:47 AM   Result Value Ref Range    Sed rate, automated 19 0 - 20 mm/hr   URIC ACID    Collection Time: 08/19/22 10:47 AM   Result Value Ref Range    Uric acid <0.2 (L) 3.5 - 7.2 MG/DL      Medications:  Medications Administered       0.9% sodium chloride infusion       Admin Date  08/19/2022 Action  New Bag Dose  25 mL/hr Rate  25 mL/hr Route  IntraVENous Administered By  Jarret Ware RN              acetaminophen (TYLENOL) tablet 650 mg       Admin Date  08/19/2022 Action  Given Dose  650 mg Route  Oral Administered By  Jarret Ware RN              diphenhydrAMINE (BENADRYL) capsule 50 mg       Admin Date  08/19/2022 Action  Given Dose  50 mg Route  Oral Administered By  Jarret Ware RN              famotidine (PF) (PEPCID) 40 mg in 0.9% sodium chloride 10 mL injection       Admin Date  08/19/2022 Action  Given Dose  40 mg Route  IntraVENous Administered By  Jarret Ware RN              [facility] 8 mg in 0.9% sodium chloride 250 mL, overfill volume 25 mL infusion       Admin Date  08/19/2022 Action  New Bag Dose  8 mg Rate  138 mL/hr Route  IntraVENous Administered By  Jarret Ware, RN                     1425 Pt tolerated treatment well.  PIV taken out with no issues, pressure applied, placed 2x2 gauze and band-aid. D/c home ambulatory in no distress.  Pt aware of next appointment scheduled for    Future Appointments   Date Time Provider Barry Cano   8/31/2022 10:00 [facility]   9/13/2022  3:00 PM MD ALYSSA Gibbons BS AMB   9/14/2022  9:30 AM [facility]   9/28/2022 10:00 AM SS INF7 CH2 <1H [facility]   10/6/2022  8:00 AM Mahogany Zamorano MD AO BS AMB   10/12/2022 10:00 AM SS INF7 CH2 <1H [facility]

## 2022-08-31 ENCOUNTER — HOSPITAL ENCOUNTER (OUTPATIENT)
Dept: INFUSION THERAPY | Age: 52
Discharge: HOME | End: 2022-08-31
Payer: COMMERCIAL

## 2022-08-31 VITALS
OXYGEN SATURATION: 96 % | TEMPERATURE: 96.9 F | RESPIRATION RATE: 18 BRPM | WEIGHT: 205 LBS | HEART RATE: 91 BPM | BODY MASS INDEX: 28.7 KG/M2 | SYSTOLIC BLOOD PRESSURE: 126 MMHG | DIASTOLIC BLOOD PRESSURE: 79 MMHG | HEIGHT: 71 IN

## 2022-08-31 DIAGNOSIS — M1A.39X1 CHRONIC TOPHACEOUS GOUT OF MULTIPLE SITES DUE TO RENAL IMPAIRMENT: Primary | ICD-10-CM

## 2022-08-31 LAB
ALBUMIN SERPL-MCNC: 3.2 G/DL (ref 3.5–5)
ALBUMIN/GLOB SERPL: 0.8 {RATIO} (ref 1.1–2.2)
ALP SERPL-CCNC: 29 U/L (ref 45–117)
ALT SERPL-CCNC: 30 U/L (ref 12–78)
ANION GAP SERPL CALC-SCNC: 14 MMOL/L (ref 5–15)
AST SERPL-CCNC: 31 U/L (ref 15–37)
BASOPHILS # BLD: 0.1 K/UL (ref 0–0.1)
BASOPHILS NFR BLD: 1 % (ref 0–1)
BILIRUB SERPL-MCNC: 0.6 MG/DL (ref 0.2–1)
BUN SERPL-MCNC: 88 MG/DL (ref 6–20)
BUN/CREAT SERPL: 10 (ref 12–20)
CALCIUM SERPL-MCNC: 9.3 MG/DL (ref 8.5–10.1)
CHLORIDE SERPL-SCNC: 106 MMOL/L (ref 97–108)
CO2 SERPL-SCNC: 21 MMOL/L (ref 21–32)
CREAT SERPL-MCNC: 9.19 MG/DL (ref 0.7–1.3)
DIFFERENTIAL METHOD BLD: ABNORMAL
EOSINOPHIL # BLD: 0.4 K/UL (ref 0–0.4)
EOSINOPHIL NFR BLD: 8 % (ref 0–7)
ERYTHROCYTE [DISTWIDTH] IN BLOOD BY AUTOMATED COUNT: 15.4 % (ref 11.5–14.5)
ERYTHROCYTE [SEDIMENTATION RATE] IN BLOOD: 21 MM/HR (ref 0–20)
GLOBULIN SER CALC-MCNC: 3.9 G/DL (ref 2–4)
GLUCOSE SERPL-MCNC: 134 MG/DL (ref 65–100)
HCT VFR BLD AUTO: 35.8 % (ref 36.6–50.3)
HGB BLD-MCNC: 11 G/DL (ref 12.1–17)
IMM GRANULOCYTES # BLD AUTO: 0 K/UL (ref 0–0.04)
IMM GRANULOCYTES NFR BLD AUTO: 0 % (ref 0–0.5)
LYMPHOCYTES # BLD: 1.4 K/UL (ref 0.8–3.5)
LYMPHOCYTES NFR BLD: 25 % (ref 12–49)
MCH RBC QN AUTO: 29.6 PG (ref 26–34)
MCHC RBC AUTO-ENTMCNC: 30.7 G/DL (ref 30–36.5)
MCV RBC AUTO: 96.2 FL (ref 80–99)
MONOCYTES # BLD: 0.6 K/UL (ref 0–1)
MONOCYTES NFR BLD: 10 % (ref 5–13)
NEUTS SEG # BLD: 3.1 K/UL (ref 1.8–8)
NEUTS SEG NFR BLD: 56 % (ref 32–75)
NRBC # BLD: 0 K/UL (ref 0–0.01)
NRBC BLD-RTO: 0 PER 100 WBC
PLATELET # BLD AUTO: 301 K/UL (ref 150–400)
PMV BLD AUTO: 10.6 FL (ref 8.9–12.9)
POTASSIUM SERPL-SCNC: 4.4 MMOL/L (ref 3.5–5.1)
PROT SERPL-MCNC: 7.1 G/DL (ref 6.4–8.2)
RBC # BLD AUTO: 3.72 M/UL (ref 4.1–5.7)
SODIUM SERPL-SCNC: 141 MMOL/L (ref 136–145)
URATE SERPL-MCNC: <0.2 MG/DL (ref 3.5–7.2)
WBC # BLD AUTO: 5.6 K/UL (ref 4.1–11.1)

## 2022-08-31 PROCEDURE — 96375 TX/PRO/DX INJ NEW DRUG ADDON: CPT

## 2022-08-31 PROCEDURE — 84550 ASSAY OF BLOOD/URIC ACID: CPT

## 2022-08-31 PROCEDURE — 80053 COMPREHEN METABOLIC PANEL: CPT

## 2022-08-31 PROCEDURE — 74011000250 HC RX REV CODE- 250: Performed by: INTERNAL MEDICINE

## 2022-08-31 PROCEDURE — 85025 COMPLETE CBC W/AUTO DIFF WBC: CPT

## 2022-08-31 PROCEDURE — 96366 THER/PROPH/DIAG IV INF ADDON: CPT

## 2022-08-31 PROCEDURE — 85652 RBC SED RATE AUTOMATED: CPT

## 2022-08-31 PROCEDURE — 74011250636 HC RX REV CODE- 250/636: Performed by: INTERNAL MEDICINE

## 2022-08-31 PROCEDURE — 36415 COLL VENOUS BLD VENIPUNCTURE: CPT

## 2022-08-31 PROCEDURE — 74011250637 HC RX REV CODE- 250/637: Performed by: INTERNAL MEDICINE

## 2022-08-31 PROCEDURE — 96365 THER/PROPH/DIAG IV INF INIT: CPT

## 2022-08-31 RX ORDER — DIPHENHYDRAMINE HCL 25 MG
50 CAPSULE ORAL ONCE
Status: CANCELLED
Start: 2022-08-31 | End: 2022-08-31

## 2022-08-31 RX ORDER — ACETAMINOPHEN 325 MG/1
650 TABLET ORAL ONCE
Status: CANCELLED | OUTPATIENT
Start: 2022-09-14 | End: 2022-09-14

## 2022-08-31 RX ORDER — ACETAMINOPHEN 325 MG/1
650 TABLET ORAL AS NEEDED
Status: CANCELLED
Start: 2022-09-14

## 2022-08-31 RX ORDER — ACETAMINOPHEN 325 MG/1
650 TABLET ORAL ONCE
Status: COMPLETED | OUTPATIENT
Start: 2022-08-31 | End: 2022-08-31

## 2022-08-31 RX ORDER — DIPHENHYDRAMINE HYDROCHLORIDE 50 MG/ML
50 INJECTION, SOLUTION INTRAMUSCULAR; INTRAVENOUS AS NEEDED
Status: CANCELLED
Start: 2022-09-14

## 2022-08-31 RX ORDER — SODIUM CHLORIDE 9 MG/ML
5-250 INJECTION, SOLUTION INTRAVENOUS AS NEEDED
Status: CANCELLED | OUTPATIENT
Start: 2022-09-14

## 2022-08-31 RX ORDER — DIPHENHYDRAMINE HCL 25 MG
50 CAPSULE ORAL ONCE
Status: COMPLETED | OUTPATIENT
Start: 2022-08-31 | End: 2022-08-31

## 2022-08-31 RX ORDER — ALBUTEROL SULFATE 0.83 MG/ML
2.5 SOLUTION RESPIRATORY (INHALATION) AS NEEDED
Status: CANCELLED
Start: 2022-09-14

## 2022-08-31 RX ORDER — HEPARIN 100 UNIT/ML
500 SYRINGE INTRAVENOUS AS NEEDED
Status: CANCELLED
Start: 2022-09-14

## 2022-08-31 RX ORDER — ONDANSETRON 2 MG/ML
8 INJECTION INTRAMUSCULAR; INTRAVENOUS AS NEEDED
Status: CANCELLED | OUTPATIENT
Start: 2022-09-14

## 2022-08-31 RX ORDER — DIPHENHYDRAMINE HCL 25 MG
50 TABLET ORAL ONCE
Qty: 2 TABLET | Refills: 0 | Status: SHIPPED
Start: 2022-08-31 | End: 2022-08-31

## 2022-08-31 RX ORDER — SODIUM CHLORIDE 0.9 % (FLUSH) 0.9 %
5-40 SYRINGE (ML) INJECTION AS NEEDED
Status: CANCELLED | OUTPATIENT
Start: 2022-09-14

## 2022-08-31 RX ORDER — DIPHENHYDRAMINE HCL 25 MG
50 CAPSULE ORAL ONCE
Status: CANCELLED
Start: 2022-09-14 | End: 2022-09-14

## 2022-08-31 RX ORDER — DIPHENHYDRAMINE HYDROCHLORIDE 50 MG/ML
25 INJECTION, SOLUTION INTRAMUSCULAR; INTRAVENOUS AS NEEDED
Status: CANCELLED
Start: 2022-09-14

## 2022-08-31 RX ORDER — HYDROCORTISONE SODIUM SUCCINATE 100 MG/2ML
100 INJECTION, POWDER, FOR SOLUTION INTRAMUSCULAR; INTRAVENOUS AS NEEDED
Status: CANCELLED | OUTPATIENT
Start: 2022-09-14

## 2022-08-31 RX ORDER — SODIUM CHLORIDE 9 MG/ML
5-250 INJECTION, SOLUTION INTRAVENOUS AS NEEDED
Status: DISPENSED | OUTPATIENT
Start: 2022-08-31 | End: 2022-08-31

## 2022-08-31 RX ORDER — SODIUM CHLORIDE 9 MG/ML
5-40 INJECTION INTRAMUSCULAR; INTRAVENOUS; SUBCUTANEOUS AS NEEDED
Status: CANCELLED | OUTPATIENT
Start: 2022-09-14

## 2022-08-31 RX ORDER — EPINEPHRINE 1 MG/ML
0.3 INJECTION, SOLUTION, CONCENTRATE INTRAVENOUS AS NEEDED
Status: CANCELLED | OUTPATIENT
Start: 2022-09-14

## 2022-08-31 RX ADMIN — DIPHENHYDRAMINE HYDROCHLORIDE 50 MG: 25 CAPSULE ORAL at 12:01

## 2022-08-31 RX ADMIN — PEGLOTICASE 8 MG: 8 INJECTION, SOLUTION INTRAVENOUS at 12:51

## 2022-08-31 RX ADMIN — SODIUM CHLORIDE, PRESERVATIVE FREE 40 MG: 5 INJECTION INTRAVENOUS at 12:06

## 2022-08-31 RX ADMIN — ACETAMINOPHEN 650 MG: 325 TABLET ORAL at 12:01

## 2022-08-31 NOTE — PROGRESS NOTES
[facility] Progress Note        Date: 2022    Name: Emily Renee    MRN: [id number]         : 1970    1040  Mr. Sanya Perez Arrived ambulatory and in no distress for Krystexxa Regimen. Assessment was completed, no acute issues at this time, no new complaints voiced. 24g PIV inserted in the left hand, labs drawn and sent for processing. Do you have any symptoms of COVID-19? SOB, coughing, fever, or generally not feeling well NO    2. Have you been exposed to COVID-19 recently? NO    3. Have you had any recent contact with family/friend that has a pending COVID test? NO           Mr. Juanita Esquivel vitals were reviewed. Patient Vitals for the past 12 hrs:   Temp Pulse Resp BP SpO2   22 1507 96.9 °F (36.1 °C) 91 -- 126/79 --   22 1040 (!) 96.6 °F (35.9 °C) 75 18 110/74 96 %         Lab results were obtained and reviewed. Recent Results (from the past 12 hour(s))   CBC WITH AUTOMATED DIFF    Collection Time: 22 10:46 AM   Result Value Ref Range    WBC 5.6 4.1 - 11.1 K/uL    RBC 3.72 (L) 4.10 - 5.70 M/uL    HGB 11.0 (L) 12.1 - 17.0 g/dL    HCT 35.8 (L) 36.6 - 50.3 %    MCV 96.2 80.0 - 99.0 FL    MCH 29.6 26.0 - 34.0 PG    MCHC 30.7 30.0 - 36.5 g/dL    RDW 15.4 (H) 11.5 - 14.5 %    PLATELET 717 526 - 991 K/uL    MPV 10.6 8.9 - 12.9 FL    NRBC 0.0 0  WBC    ABSOLUTE NRBC 0.00 0.00 - 0.01 K/uL    NEUTROPHILS 56 32 - 75 %    LYMPHOCYTES 25 12 - 49 %    MONOCYTES 10 5 - 13 %    EOSINOPHILS 8 (H) 0 - 7 %    BASOPHILS 1 0 - 1 %    IMMATURE GRANULOCYTES 0 0.0 - 0.5 %    ABS. NEUTROPHILS 3.1 1.8 - 8.0 K/UL    ABS. LYMPHOCYTES 1.4 0.8 - 3.5 K/UL    ABS. MONOCYTES 0.6 0.0 - 1.0 K/UL    ABS. EOSINOPHILS 0.4 0.0 - 0.4 K/UL    ABS. BASOPHILS 0.1 0.0 - 0.1 K/UL    ABS. IMM.  GRANS. 0.0 0.00 - 0.04 K/UL    DF AUTOMATED     METABOLIC PANEL, COMPREHENSIVE    Collection Time: 22 10:46 AM   Result Value Ref Range    Sodium 141 136 - 145 mmol/L    Potassium 4.4 3.5 - 5.1 mmol/L    Chloride 106 97 - 108 mmol/L    CO2 21 21 - 32 mmol/L    Anion gap 14 5 - 15 mmol/L    Glucose 134 (H) 65 - 100 mg/dL    BUN 88 (H) 6 - 20 MG/DL    Creatinine 9.19 (H) 0.70 - 1.30 MG/DL    BUN/Creatinine ratio 10 (L) 12 - 20      GFR est AA 7 (L) >60 ml/min/1.73m2    GFR est non-AA 6 (L) >60 ml/min/1.73m2    Calcium 9.3 8.5 - 10.1 MG/DL    Bilirubin, total 0.6 0.2 - 1.0 MG/DL    ALT (SGPT) 30 12 - 78 U/L    AST (SGOT) 31 15 - 37 U/L    Alk. phosphatase 29 (L) 45 - 117 U/L    Protein, total 7.1 6.4 - 8.2 g/dL    Albumin 3.2 (L) 3.5 - 5.0 g/dL    Globulin 3.9 2.0 - 4.0 g/dL    A-G Ratio 0.8 (L) 1.1 - 2.2     URIC ACID    Collection Time: 08/31/22 10:46 AM   Result Value Ref Range    Uric acid <0.2 (L) 3.5 - 7.2 MG/DL   SED RATE (ESR)    Collection Time: 08/31/22 10:46 AM   Result Value Ref Range    Sed rate, automated 21 (H) 0 - 20 mm/hr       Medications received:  Medications Administered       acetaminophen (TYLENOL) tablet 650 mg       Admin Date  08/31/2022 Action  Given Dose  650 mg Route  Oral Administered By  Albert Romero RN              diphenhydrAMINE (BENADRYL) capsule 50 mg       Admin Date  08/31/2022 Action  Given Dose  50 mg Route  Oral Administered By  Albert Romero RN              famotidine (PF) (PEPCID) 40 mg in 0.9% sodium chloride 10 mL injection       Admin Date  08/31/2022 Action  Given Dose  40 mg Route  IntraVENous Administered By  Albert Romero RN              [facility]) 8 mg in 0.9% sodium chloride 250 mL, overfill volume 25 mL infusion       Admin Date  08/31/2022 Action  New Bag Dose  8 mg Rate  140 mL/hr Route  IntraVENous Administered By  Jeane Juarez RN                     Mr. Chay De La Cruz tolerated treatment well and was discharged from [place] [address] in stable condition at 1510. He is to return on  September 14, 2022 at 0930 for his next appointment.     Guillermo Gutierrez RN  August 31, 2022    Future Appointments:  Future Appointments   Date Time Provider Barry Cano   9/13/2022  3:00 PM MD ALYSSA Franco BS AMB   9/14/2022  9:30 AM [facility]   9/28/2022 10:00 AM SS [facility]   10/6/2022  8:00 AM Adebayo Saleem MD AO BS AMB   10/12/2022 10:00 AM SS INF7 [facility] A[facility] Styles

## 2022-09-13 ENCOUNTER — OFFICE VISIT (OUTPATIENT)
Dept: CARDIOLOGY CLINIC | Age: 52
End: 2022-09-13
Payer: COMMERCIAL

## 2022-09-13 VITALS
DIASTOLIC BLOOD PRESSURE: 80 MMHG | SYSTOLIC BLOOD PRESSURE: 130 MMHG | BODY MASS INDEX: 29.54 KG/M2 | WEIGHT: 211 LBS | OXYGEN SATURATION: 98 % | HEIGHT: 71 IN | HEART RATE: 88 BPM | RESPIRATION RATE: 14 BRPM

## 2022-09-13 DIAGNOSIS — I50.21 ACUTE SYSTOLIC CHF (CONGESTIVE HEART FAILURE) (HCC): ICD-10-CM

## 2022-09-13 DIAGNOSIS — I48.0 PAF (PAROXYSMAL ATRIAL FIBRILLATION) (HCC): ICD-10-CM

## 2022-09-13 DIAGNOSIS — I10 ESSENTIAL HYPERTENSION: Primary | ICD-10-CM

## 2022-09-13 PROCEDURE — 99204 OFFICE O/P NEW MOD 45 MIN: CPT | Performed by: SPECIALIST

## 2022-09-13 PROCEDURE — 93000 ELECTROCARDIOGRAM COMPLETE: CPT | Performed by: SPECIALIST

## 2022-09-13 RX ORDER — METOPROLOL TARTRATE 25 MG/1
25 TABLET, FILM COATED ORAL 2 TIMES DAILY
COMMUNITY
End: 2022-09-13 | Stop reason: SDUPTHER

## 2022-09-13 RX ORDER — METOPROLOL TARTRATE 25 MG/1
25 TABLET, FILM COATED ORAL 2 TIMES DAILY
Qty: 180 TABLET | Refills: 1 | Status: SHIPPED | OUTPATIENT
Start: 2022-09-13 | End: 2022-10-20 | Stop reason: ALTCHOICE

## 2022-09-13 NOTE — PROGRESS NOTES
[facility]                                                            OFFICE NOTE        Maye Lal M.D.,ULI Mirna Martinez   1970  [id number]    Date/Time:  9/13/20223:09 PM            SUBJECTIVE:  He has been feeling very well no chest pain or shortness of breath palpitation presyncopal syncopal episode reported. Continues to work as a  and doing well with that. Assessment/Plan    1. Paroxysmal atrial fibrillation: I have no evidence at this time of atrial fibrillation based on EKG from the emergency room. Is electrogram today reveals normal sinus rhythm as well. For now continue metoprolol decrease Eliquis to 2.5 mg twice daily since the patient is on dialysis. His CHADS2 DS 2 vascular score is 1 for hypertension. Proceed with echocardiogram and exercise Myoview perfusion study on account of his risk factors. Proceed with 4 weeks event monitor as well to see if we can document any paroxysmal atrial fibrillation. 2.  Hypertension: Seems to be well controlled today. 3.  CKD: On hemodialysis 3 times a week and doing well. Follow-up course by nephrology. Seen back in approximately 5 to 6 weeks. HPI   Very pleasant 50years old male with a past medical history remarkable for polycystic kidney disease resulting in renal failure and hemodialysis also history hypertension who presents today for cardiac evaluation of atrial fibrillation. Irregular heart rate was noted during dialysis the patient was sent to the emergency room where EKG was done on August 4, 2022. EKG in the emergency room was nonetheless showing normal sinus rhythm. Past medical history: As above. Past surgical history: Nonsignificant. Hernia repair. Social history: He does not smoke or drink.   He works as a     Family history: Significant echocardiograms. CARDIAC STUDIES        10/26/19    ECHO ADULT COMPLETE 10/26/2019 10/26/2019    Interpretation Summary  · Left Ventricle: Moderately dilated left ventricle. Mild concentric hypertrophy. Severe global systolic dysfunction. Estimated left ventricular ejection fraction is 26 - 30%. Biplane method used to measure ejection fraction. Left ventricular global hypokinesis. Severe (grade 3) left ventricular diastolic dysfunction. · Left Atrium: Mildly dilated left atrium. · Aortic Valve: Mild aortic valve regurgitation is present. · Right Atrium: Mildly dilated right atrium. · Mitral Valve: Mild mitral valve regurgitation is present. · Tricuspid Valve: Mild tricuspid valve regurgitation is present. · IVC/Hepatic Veins: Severely elevated central venous pressure (15+ mmHg); IVC diameter is larger than 21 mm and collapses less than 50% with respiration. · Pericardium: Small circumferential pericardial effusion. Effusion is fluid. · Pulmonary Artery: Moderate pulmonary hypertension. · Interatrial Septum: Color flow Doppler was used. Signed by: Curtis Estrada MD on 10/26/2019  3:24 PM            10/26/19    NUCLEAR CARDIAC STRESS TEST 10/28/2019, 10/29/2019 10/30/2019    Interpretation Summary  · Baseline ECG: Sinus tachycardia, non-specific ST-T wave abnormalities. · Gated SPECT: Calculated ejection fraction is 17%. ·    Impression  : Apical infarct. No evidence of ischemia. Left ventricular ejection fraction measures 17%. · IMPRESSION: Apical infarct. No evidence of ischemia. Left ventricular ejection fraction measures 17%.   · Inconclusive stress test.    Signed by: Olga Anders MD on 10/28/2019  1:43 PM, Signed by: Wisam Richardson MD on 10/29/2019  8:52 AM                    EKG Results       Procedure 720 Value Units Date/Time    AMB POC EKG ROUTINE W/ 12 LEADS, INTER & REP [[id number]]     Order Status: Sent                 IMAGING      MRI Results (most recent):  Results from [place] encounter on 04/10/12    MRI ANKLE RIGHT WITHOUT CONTRAST    Narrative  **Final Report**      ICD Codes / Adm. Diagnosis: 718.17  719.07 / Loose body in ankle and foot j  Effusion of ankle and foot j  Examination:  MR ANKLE MRI WO CON RT  - [id number] - Apr 10 2012  7:13PM  Accession No:  [id number]  Reason:  pain      REPORT:  INDICATION: pain of left ankle 718.17, 719.07, 719.47    COMPARISON: None    EXAM: Sagittal T1-weighted spin-echo and fat-suppressed T2-weighted fast  spin-echo, axial fat-suppressed proton density weighted and T2 weighted fast  spin echo, sagittal fat-suppressed 3D gradient-echo and coronal  fat-suppressed T2 weighted fast spin-echo images of the right ankle are  obtained. FINDINGS: There is osseous edema like signal at the inferior aspect of the  lateral malleolus. There are also subcortical cysts in the anterior process  of the calcaneus adjacent to the sinus tarsi. A mild to moderate osseous  edema and the medial cuneiform bone is demonstrated subjacent to an area of  distal high-grade chondral derangement. There is a moderate to large effusion of the ankle and posterior subtalar  joints with demonstration of mild diffuse chondral thinning with small  marginal osteophytes. There is a 7 mm loose body in the anterior joint  recess. There is also heterogeneous signal in the posterior joint recesses  which could contain small loose bodies as well. There is absent  visualization of the anterior talofibular ligament consistent with chronic  tear. The other ankle ligaments appear intact. There is a small effusion of the talonavicular joint. Small dorsal marginal  osteophytes throughout the midfoot region are noted.     There is a moderate effusion in the posterior tibialis tendon sheath with  diffuse mild tendon thickening and inframalleolar heterogeneous signal.  There is a small effusion of the flexor digitorum longus and flexor hallucis  longus tendon sheaths with normal appearing tendons. There is a small  effusion of the peroneal tendon sheath with mild diffuse thickening of the  peroneus longus tendon though uniform signal. Mild. Achilles peritendinitis  is noted. There is a trace amount of fluid signal in the extensor digitorum  longus tendon sheath. There is mild flatfoot deformity. No tarsal coalition is shown. There is no  bone or soft tissue mass. IMPRESSION:  1. Chronic tear of anterior talofibular ligament with mild to moderate ankle  joint osteoarthritis including at least one anterior recess loose body. Moderate ankle and posterior subtalar effusions. 2. Posterior tibialis and peroneus tenosynovitis with chronic tendinopathy  of posterior tibialis and peroneus longus. 3. Mild Achilles peritendinitis. Signing/Reading Doctor: Jolie Schaeffer ([id number])  Approved: MALINI Schaeffer ([id number])  04/11/2012      CT Results (most recent):  Results from [place] encounter on 10/26/19    CT CHEST WO CONT    Narrative  EXAM:  CT CHEST WO CONT    INDICATION:  sob    COMPARISON: Chest radiograph 10/26/2019. CONTRAST:  None. TECHNIQUE: Unenhanced multislice helical CT was performed from the thoracic  inlet to the adrenal glands without intravenous contrast administration. Contiguous 5 mm axial images were reconstructed and lung and soft tissue windows  were generated. Coronal and sagittal reformations were generated. CT dose  reduction was achieved through use of a standardized protocol tailored for this  examination and automatic exposure control for dose modulation. FINDINGS:  Lungs/Pleura: Bilateral interlobular septal thickening, most prominent in the  lower lobes, with scattered patchy peribronchovascular groundglass opacities  throughout both lungs. Trace bilateral pleural effusions. No pneumothorax. No  suspicious pulmonary nodules. Axilla/Soft Tissue: No pathologic axillary adenopathy.     Mediastinum: The heart is normal in size. Trace pericardial effusion. No  pathologic mediastinal or hilar adenopathy. Upper Abdomen: Markedly enlarged kidneys with cysts entirely replacing the renal  parenchyma, including multiple hemorrhagic cysts, consistent with autosomal  dominant polycystic kidney disease. Small 13 mm cyst in hepatic segment 7. Remaining visualized upper abdomen is unremarkable. Bones: No evidence of acute fracture, dislocation, or aggressive osseous  abnormality. Impression  IMPRESSION:  1. Pulmonary interstitial and alveolar edema with trace bilateral pleural  effusions. 2. Trace pericardial effusion. 3. Findings consistent with autosomal dominant polycystic kidney disease. XR Results (most recent):  Results from Hospital Encounter encounter on 06/26/22    XR SPINE CERV 4 OR 5 V    Narrative  EXAM:  XR SPINE CERV 4 OR 5 V    INDICATION: Right-sided neck pain for 2 days    COMPARISON: None. TECHNIQUE: 5 views cervical spine    FINDINGS: There is no acute fracture or subluxation. Vertebral body heights are  maintained. There is intervertebral disc space narrowing and endplate  irregularity at C6-7. There is mild intervertebral disc space narrowing at C4-5. The neural foramen are patent bilaterally. There is no abnormality in alignment. The C1-2 relationship is within normal limits. The prevertebral soft tissues are  unremarkable. Impression  No acute abnormality. Degenerative disc changes at C6-7 and to a  lesser extent at C4-5. Past Medical History:   Diagnosis Date    Arthritis     Asthma     AS A CHILD    Chronic kidney disease     ESRD    Chronic pain     KNEES, ANKLES    DJD (degenerative joint disease)     Gout     Heart failure ([facility] [address] 2019    CHF    Hypertension     Polycystic kidney disease      Past Surgical History:   Procedure Laterality Date    HX HERNIA REPAIR  96/85/8063    Supra umbilical hernia with mesh and Umbilical hernia repair by Dr. Geoff Chow.     HX VASCULAR ACCESS 10/18/2021    LEFT ARM FISTULA     Social History     Tobacco Use    Smoking status: Never    Smokeless tobacco: Never   Vaping Use    Vaping Use: Never used   Substance Use Topics    Alcohol use: Not Currently     Alcohol/week: 4.0 standard drinks     Types: 4 Shots of liquor per week    Drug use: Yes     Types: Marijuana     Comment: 4-5 X WEEK     Family History   Problem Relation Age of Onset    Hypertension Mother     Kidney Disease Mother     Hypertension Father     Kidney Disease Father     Cancer Father         REANAL CANCER    Other Sister         PKD, Fibromyalgia    Cancer Brother         PROSTATE    Cancer Brother         PROSTATE    Anesth Problems Neg Hx      Allergies   Allergen Reactions    Shellfish Containing Products Swelling         Visit Vitals  Ht 5' 11\" (1.803 m)   Wt 211 lb (95.7 kg)   BMI 29.43 kg/m²         Last 3 Recorded Weights in this Encounter    09/13/22 1508   Weight: 211 lb (95.7 kg)            Review of Systems:   Pertinent items are noted in the History of Present Illness. Neck: no JVD  Heart: regular rate and rhythm  Lungs: clear to auscultation bilaterally  Abdomen: soft, non-tender. Bowel sounds normal. No masses,  no organomegaly  Extremities: no edema      Current Outpatient Medications on File Prior to Visit   Medication Sig Dispense Refill    ferric citrate (AURYXIA PO) Take  by mouth. leflunomide (ARAVA) 10 mg tablet Take 1 Tablet by mouth in the morning. Take 1 Tablet by mouth daily. NOTE NEW DOSE 30 Tablet 1    acetaminophen (TYLENOL) 325 mg tablet Take 2 Tablets by mouth every four (4) hours as needed for Pain. 20 Tablet 0    colchicine (Colcrys) 0.6 mg tablet TAKE ONE TABLET BY MOUTH DAILY AS NEEDED FOR GOUT FLARE 90 Tablet 5    sodium bicarbonate 325 mg tablet Take 650 mg by mouth two (2) times a day. spironolactone (ALDACTONE) 25 mg tablet Take 25 mg by mouth daily. bumetanide (BUMEX) 1 mg tablet Take 1 Tab by mouth daily.  30 Tab 0    pegloticase (KRYSTEXXA) 8 mg/mL soln injection 8 mg by IntraVENous route Once every 2 weeks. (Patient not taking: Reported on 7/6/2022)       No current facility-administered medications on file prior to visit. Avi Zaragoza had no medications administered during this visit. Current Outpatient Medications   Medication Sig    ferric citrate (AURYXIA PO) Take  by mouth. leflunomide (ARAVA) 10 mg tablet Take 1 Tablet by mouth in the morning. Take 1 Tablet by mouth daily. NOTE NEW DOSE    acetaminophen (TYLENOL) 325 mg tablet Take 2 Tablets by mouth every four (4) hours as needed for Pain. colchicine (Colcrys) 0.6 mg tablet TAKE ONE TABLET BY MOUTH DAILY AS NEEDED FOR GOUT FLARE    sodium bicarbonate 325 mg tablet Take 650 mg by mouth two (2) times a day. spironolactone (ALDACTONE) 25 mg tablet Take 25 mg by mouth daily. bumetanide (BUMEX) 1 mg tablet Take 1 Tab by mouth daily. pegloticase (KRYSTEXXA) 8 mg/mL soln injection 8 mg by IntraVENous route Once every 2 weeks. (Patient not taking: Reported on 7/6/2022)     No current facility-administered medications for this visit.          Lab Results   Component Value Date/Time    Cholesterol, total 141 10/27/2019 03:38 AM    HDL Cholesterol 47 10/27/2019 03:38 AM    LDL, calculated 71.6 10/27/2019 03:38 AM    VLDL, calculated 22.4 10/27/2019 03:38 AM    Triglyceride 112 10/27/2019 03:38 AM    CHOL/HDL Ratio 3.0 10/27/2019 03:38 AM       Lab Results   Component Value Date/Time    Sodium 141 08/31/2022 10:46 AM    Potassium 4.4 08/31/2022 10:46 AM    Chloride 106 08/31/2022 10:46 AM    CO2 21 08/31/2022 10:46 AM    Anion gap 14 08/31/2022 10:46 AM    Glucose 134 (H) 08/31/2022 10:46 AM    BUN 88 (H) 08/31/2022 10:46 AM    Creatinine 9.19 (H) 08/31/2022 10:46 AM    BUN/Creatinine ratio 10 (L) 08/31/2022 10:46 AM    GFR est AA 7 (L) 08/31/2022 10:46 AM    GFR est non-AA 6 (L) 08/31/2022 10:46 AM    Calcium 9.3 08/31/2022 10:46 AM       Lab Results   Component Value Date/Time    ALT (SGPT) 30 08/31/2022 10:46 AM    Alk. phosphatase 29 (L) 08/31/2022 10:46 AM    Bilirubin, total 0.6 08/31/2022 10:46 AM       Lab Results   Component Value Date/Time    WBC 5.6 08/31/2022 10:46 AM    HGB 11.0 (L) 08/31/2022 10:46 AM    HCT 35.8 (L) 08/31/2022 10:46 AM    PLATELET 271 64/47/8065 10:46 AM    MCV 96.2 08/31/2022 10:46 AM       Lab Results   Component Value Date/Time    TSH 1.29 10/27/2019 03:38 AM         Lab Results   Component Value Date/Time    Cholesterol, total 141 10/27/2019 03:38 AM    HDL Cholesterol 47 10/27/2019 03:38 AM    LDL, calculated 71.6 10/27/2019 03:38 AM    Triglyceride 112 10/27/2019 03:38 AM    CHOL/HDL Ratio 3.0 10/27/2019 03:38 AM                Please note that this dictation was completed with [employer] MD, the [employer] voice recognition software. Quite often unanticipated grammatical, syntax, homophones, and other interpretative errors are inadvertently transcribed by the computer software. Please disregard these errors. Please excuse any errors that have escaped final proofreading.

## 2022-09-13 NOTE — PATIENT INSTRUCTIONS
Please DECREASE your Eliquis to 2.5mg twice a day    A monitor has been ordered for you. This will be mailed to the address given to us. Please read over the instructions given to you about [employer] monitors. If you have any insurance questions regarding coverage and out of pocket cost please contact the number below. If you have any billing questions regarding deductible or responsibility call ([phone])   If you need additional supplies or issue with your monitor please call ([phone])    You will be scheduled for an echocardiogram and a Nuclear Stress Test after your appointment today. Nuclear stress testing evaluates blood flow to your heart muscle and assesses cardiac function. There are 2 parts (Rest/Stress) to this procedure and will include either an exercise on a treadmill or an IV administration of a stressing medication called Lexiscan. Your cardiologist will determine which type of testing is best for you. This test can be performed in one day unless it is determined that better quality images will be obtained by performing the test over two days. *Please arrive 15 minutes prior to your appointment time    Test Duration:    -One day testing will take 4 hours    Day of testing instructions:    NO CAFFEINE (not even decaffeinated products) 24 HOURS PRIOR TO TESTING. This includes coffee, soda, tea, chocolate, multivitamins, and migraine medication, like Excedrin or Fioricet that contains caffeine. Nothing to eat or drink 4 HOURS prior to testing  NO NICOTINE 12 hours prior to testing  Hold any medications requested by your cardiologist. Otherwise take medications as directed with a few sips of water. If you are unsure you may bring your medications with you to take after instructed by your stressing nurse.  It is recommended you hold NONE of your medications prior to your test. DIABETIC PATIENTS: Take half of your insulin with a light meal 4 hours before your test.  Wear comfortable clothes and shoes (Shirts with no metal, shorts or pants, tennis shoes, no heels or flip flops)    IMPORTANT: This testing involves a cardiac tracer ordered specifically for you. If you are unable to make your appointment, please call to cancel/reschedule AT LEAST 24 hours prior to your appointment so your tracer can be cancelled. [phone].

## 2022-09-14 ENCOUNTER — TELEPHONE (OUTPATIENT)
Dept: CARDIOLOGY CLINIC | Age: 52
End: 2022-09-14

## 2022-09-14 ENCOUNTER — HOSPITAL ENCOUNTER (OUTPATIENT)
Dept: INFUSION THERAPY | Age: 52
Discharge: HOME | End: 2022-09-14
Payer: COMMERCIAL

## 2022-09-14 VITALS
TEMPERATURE: 97.6 F | BODY MASS INDEX: 28.55 KG/M2 | OXYGEN SATURATION: 96 % | DIASTOLIC BLOOD PRESSURE: 74 MMHG | HEART RATE: 93 BPM | WEIGHT: 203.9 LBS | SYSTOLIC BLOOD PRESSURE: 109 MMHG | HEIGHT: 71 IN

## 2022-09-14 DIAGNOSIS — M1A.39X1 CHRONIC TOPHACEOUS GOUT OF MULTIPLE SITES DUE TO RENAL IMPAIRMENT: Primary | ICD-10-CM

## 2022-09-14 LAB
ALBUMIN SERPL-MCNC: 3.5 G/DL (ref 3.5–5)
ALBUMIN/GLOB SERPL: 0.9 {RATIO} (ref 1.1–2.2)
ALP SERPL-CCNC: 31 U/L (ref 45–117)
ALT SERPL-CCNC: 26 U/L (ref 12–78)
ANION GAP SERPL CALC-SCNC: 7 MMOL/L (ref 5–15)
AST SERPL-CCNC: 15 U/L (ref 15–37)
BASOPHILS # BLD: 0.1 K/UL (ref 0–0.1)
BASOPHILS NFR BLD: 1 % (ref 0–1)
BILIRUB SERPL-MCNC: 0.4 MG/DL (ref 0.2–1)
BUN SERPL-MCNC: 32 MG/DL (ref 6–20)
BUN/CREAT SERPL: 7 (ref 12–20)
CALCIUM SERPL-MCNC: 9.1 MG/DL (ref 8.5–10.1)
CHLORIDE SERPL-SCNC: 99 MMOL/L (ref 97–108)
CO2 SERPL-SCNC: 32 MMOL/L (ref 21–32)
CREAT SERPL-MCNC: 4.48 MG/DL (ref 0.7–1.3)
DIFFERENTIAL METHOD BLD: ABNORMAL
EOSINOPHIL # BLD: 0.4 K/UL (ref 0–0.4)
EOSINOPHIL NFR BLD: 8 % (ref 0–7)
ERYTHROCYTE [DISTWIDTH] IN BLOOD BY AUTOMATED COUNT: 14.1 % (ref 11.5–14.5)
ERYTHROCYTE [SEDIMENTATION RATE] IN BLOOD: 24 MM/HR (ref 0–20)
GLOBULIN SER CALC-MCNC: 3.9 G/DL (ref 2–4)
GLUCOSE SERPL-MCNC: 99 MG/DL (ref 65–100)
HCT VFR BLD AUTO: 35.5 % (ref 36.6–50.3)
HGB BLD-MCNC: 11 G/DL (ref 12.1–17)
IMM GRANULOCYTES # BLD AUTO: 0 K/UL (ref 0–0.04)
IMM GRANULOCYTES NFR BLD AUTO: 0 % (ref 0–0.5)
LYMPHOCYTES # BLD: 1.3 K/UL (ref 0.8–3.5)
LYMPHOCYTES NFR BLD: 26 % (ref 12–49)
MCH RBC QN AUTO: 30.8 PG (ref 26–34)
MCHC RBC AUTO-ENTMCNC: 31 G/DL (ref 30–36.5)
MCV RBC AUTO: 99.4 FL (ref 80–99)
MONOCYTES # BLD: 0.6 K/UL (ref 0–1)
MONOCYTES NFR BLD: 12 % (ref 5–13)
NEUTS SEG # BLD: 2.7 K/UL (ref 1.8–8)
NEUTS SEG NFR BLD: 53 % (ref 32–75)
NRBC # BLD: 0 K/UL (ref 0–0.01)
NRBC BLD-RTO: 0 PER 100 WBC
PLATELET # BLD AUTO: 238 K/UL (ref 150–400)
PMV BLD AUTO: 9.6 FL (ref 8.9–12.9)
POTASSIUM SERPL-SCNC: 3.5 MMOL/L (ref 3.5–5.1)
PROT SERPL-MCNC: 7.4 G/DL (ref 6.4–8.2)
RBC # BLD AUTO: 3.57 M/UL (ref 4.1–5.7)
SODIUM SERPL-SCNC: 138 MMOL/L (ref 136–145)
URATE SERPL-MCNC: <0.2 MG/DL (ref 3.5–7.2)
URATE SERPL-MCNC: <0.2 MG/DL (ref 3.5–7.2)
WBC # BLD AUTO: 5.1 K/UL (ref 4.1–11.1)

## 2022-09-14 PROCEDURE — 74011250637 HC RX REV CODE- 250/637: Performed by: INTERNAL MEDICINE

## 2022-09-14 PROCEDURE — 85025 COMPLETE CBC W/AUTO DIFF WBC: CPT

## 2022-09-14 PROCEDURE — 96366 THER/PROPH/DIAG IV INF ADDON: CPT

## 2022-09-14 PROCEDURE — 85652 RBC SED RATE AUTOMATED: CPT

## 2022-09-14 PROCEDURE — 84550 ASSAY OF BLOOD/URIC ACID: CPT

## 2022-09-14 PROCEDURE — 80053 COMPREHEN METABOLIC PANEL: CPT

## 2022-09-14 PROCEDURE — 36415 COLL VENOUS BLD VENIPUNCTURE: CPT

## 2022-09-14 PROCEDURE — 96365 THER/PROPH/DIAG IV INF INIT: CPT

## 2022-09-14 PROCEDURE — 74011250636 HC RX REV CODE- 250/636: Performed by: INTERNAL MEDICINE

## 2022-09-14 PROCEDURE — 96375 TX/PRO/DX INJ NEW DRUG ADDON: CPT

## 2022-09-14 PROCEDURE — 74011000250 HC RX REV CODE- 250: Performed by: INTERNAL MEDICINE

## 2022-09-14 RX ORDER — SODIUM CHLORIDE 9 MG/ML
5-250 INJECTION, SOLUTION INTRAVENOUS AS NEEDED
Status: CANCELLED | OUTPATIENT
Start: 2022-09-28

## 2022-09-14 RX ORDER — SODIUM CHLORIDE 9 MG/ML
5-250 INJECTION, SOLUTION INTRAVENOUS AS NEEDED
Status: DISPENSED | OUTPATIENT
Start: 2022-09-14 | End: 2022-09-14

## 2022-09-14 RX ORDER — HYDROCORTISONE SODIUM SUCCINATE 100 MG/2ML
100 INJECTION, POWDER, FOR SOLUTION INTRAMUSCULAR; INTRAVENOUS AS NEEDED
Status: CANCELLED | OUTPATIENT
Start: 2022-09-28

## 2022-09-14 RX ORDER — ACETAMINOPHEN 325 MG/1
650 TABLET ORAL AS NEEDED
Status: CANCELLED
Start: 2022-09-28

## 2022-09-14 RX ORDER — ALBUTEROL SULFATE 0.83 MG/ML
2.5 SOLUTION RESPIRATORY (INHALATION) AS NEEDED
Status: CANCELLED
Start: 2022-09-28

## 2022-09-14 RX ORDER — DIPHENHYDRAMINE HYDROCHLORIDE 50 MG/ML
25 INJECTION, SOLUTION INTRAMUSCULAR; INTRAVENOUS AS NEEDED
Status: CANCELLED
Start: 2022-09-28

## 2022-09-14 RX ORDER — HEPARIN 100 UNIT/ML
500 SYRINGE INTRAVENOUS AS NEEDED
Status: CANCELLED
Start: 2022-09-28

## 2022-09-14 RX ORDER — SODIUM CHLORIDE 9 MG/ML
5-40 INJECTION INTRAMUSCULAR; INTRAVENOUS; SUBCUTANEOUS AS NEEDED
Status: CANCELLED | OUTPATIENT
Start: 2022-09-28

## 2022-09-14 RX ORDER — DIPHENHYDRAMINE HCL 25 MG
50 CAPSULE ORAL ONCE
Status: COMPLETED | OUTPATIENT
Start: 2022-09-14 | End: 2022-09-14

## 2022-09-14 RX ORDER — ACETAMINOPHEN 325 MG/1
650 TABLET ORAL ONCE
Status: CANCELLED | OUTPATIENT
Start: 2022-09-28 | End: 2022-09-28

## 2022-09-14 RX ORDER — DIPHENHYDRAMINE HCL 25 MG
50 CAPSULE ORAL ONCE
Status: CANCELLED
Start: 2022-09-28 | End: 2022-09-28

## 2022-09-14 RX ORDER — ACETAMINOPHEN 325 MG/1
650 TABLET ORAL ONCE
Status: COMPLETED | OUTPATIENT
Start: 2022-09-14 | End: 2022-09-14

## 2022-09-14 RX ORDER — SODIUM CHLORIDE 0.9 % (FLUSH) 0.9 %
5-40 SYRINGE (ML) INJECTION AS NEEDED
Status: CANCELLED | OUTPATIENT
Start: 2022-09-28

## 2022-09-14 RX ORDER — EPINEPHRINE 1 MG/ML
0.3 INJECTION, SOLUTION, CONCENTRATE INTRAVENOUS AS NEEDED
Status: CANCELLED | OUTPATIENT
Start: 2022-09-28

## 2022-09-14 RX ORDER — DIPHENHYDRAMINE HYDROCHLORIDE 50 MG/ML
50 INJECTION, SOLUTION INTRAMUSCULAR; INTRAVENOUS AS NEEDED
Status: CANCELLED
Start: 2022-09-28

## 2022-09-14 RX ORDER — ONDANSETRON 2 MG/ML
8 INJECTION INTRAMUSCULAR; INTRAVENOUS AS NEEDED
Status: CANCELLED | OUTPATIENT
Start: 2022-09-28

## 2022-09-14 RX ADMIN — ACETAMINOPHEN 650 MG: 325 TABLET ORAL at 10:38

## 2022-09-14 RX ADMIN — PEGLOTICASE 8 MG: 8 INJECTION, SOLUTION INTRAVENOUS at 11:13

## 2022-09-14 RX ADMIN — SODIUM CHLORIDE, PRESERVATIVE FREE 40 MG: 5 INJECTION INTRAVENOUS at 10:39

## 2022-09-14 RX ADMIN — DIPHENHYDRAMINE HYDROCHLORIDE 50 MG: 25 CAPSULE ORAL at 10:38

## 2022-09-14 RX ADMIN — SODIUM CHLORIDE 25 ML/HR: 9 INJECTION, SOLUTION INTRAVENOUS at 10:37

## 2022-09-14 NOTE — PROGRESS NOTES
[facility] Progress Note        Date: 2022    Name: Misty Waddell    MRN: [id number]         : 1970    Mr. Baron Glass Arrived ambulatory and in no distress for Krystexxa Regimen. Assessment was completed, no acute issues at this time, no new complaints voiced. PIV 24g placed in left distal forearm. Do you have any symptoms of COVID-19? SOB, coughing, fever, or generally not feeling well NO    2. Have you been exposed to COVID-19 recently? NO    3. Have you had any recent contact with family/friend that has a pending COVID test? NO           Mr. Yusuf Watters vitals were reviewed. Patient Vitals for the past 12 hrs:   Temp Pulse BP SpO2   22 1340 97.6 °F (36.4 °C) 93 109/74 96 %   22 0949 98.3 °F (36.8 °C) 88 103/69 99 %         Lab results were obtained and reviewed. Recent Results (from the past 12 hour(s))   URIC ACID    Collection Time: 22 10:12 AM   Result Value Ref Range    Uric acid <0.2 (L) 3.5 - 7.2 MG/DL   CBC WITH AUTOMATED DIFF    Collection Time: 22 10:12 AM   Result Value Ref Range    WBC 5.1 4.1 - 11.1 K/uL    RBC 3.57 (L) 4.10 - 5.70 M/uL    HGB 11.0 (L) 12.1 - 17.0 g/dL    HCT 35.5 (L) 36.6 - 50.3 %    MCV 99.4 (H) 80.0 - 99.0 FL    MCH 30.8 26.0 - 34.0 PG    MCHC 31.0 30.0 - 36.5 g/dL    RDW 14.1 11.5 - 14.5 %    PLATELET 486 001 - 681 K/uL    MPV 9.6 8.9 - 12.9 FL    NRBC 0.0 0  WBC    ABSOLUTE NRBC 0.00 0.00 - 0.01 K/uL    NEUTROPHILS 53 32 - 75 %    LYMPHOCYTES 26 12 - 49 %    MONOCYTES 12 5 - 13 %    EOSINOPHILS 8 (H) 0 - 7 %    BASOPHILS 1 0 - 1 %    IMMATURE GRANULOCYTES 0 0.0 - 0.5 %    ABS. NEUTROPHILS 2.7 1.8 - 8.0 K/UL    ABS. LYMPHOCYTES 1.3 0.8 - 3.5 K/UL    ABS. MONOCYTES 0.6 0.0 - 1.0 K/UL    ABS. EOSINOPHILS 0.4 0.0 - 0.4 K/UL    ABS. BASOPHILS 0.1 0.0 - 0.1 K/UL    ABS. IMM.  GRANS. 0.0 0.00 - 0.04 K/UL    DF AUTOMATED     METABOLIC PANEL, COMPREHENSIVE    Collection Time: 22 10:12 AM   Result Value Ref Range Sodium 138 136 - 145 mmol/L    Potassium 3.5 3.5 - 5.1 mmol/L    Chloride 99 97 - 108 mmol/L    CO2 32 21 - 32 mmol/L    Anion gap 7 5 - 15 mmol/L    Glucose 99 65 - 100 mg/dL    BUN 32 (H) 6 - 20 MG/DL    Creatinine 4.48 (H) 0.70 - 1.30 MG/DL    BUN/Creatinine ratio 7 (L) 12 - 20      GFR est AA 17 (L) >60 ml/min/1.73m2    GFR est non-AA 14 (L) >60 ml/min/1.73m2    Calcium 9.1 8.5 - 10.1 MG/DL    Bilirubin, total 0.4 0.2 - 1.0 MG/DL    ALT (SGPT) 26 12 - 78 U/L    AST (SGOT) 15 15 - 37 U/L    Alk.  phosphatase 31 (L) 45 - 117 U/L    Protein, total 7.4 6.4 - 8.2 g/dL    Albumin 3.5 3.5 - 5.0 g/dL    Globulin 3.9 2.0 - 4.0 g/dL    A-G Ratio 0.9 (L) 1.1 - 2.2     SED RATE (ESR)    Collection Time: 09/14/22 10:12 AM   Result Value Ref Range    Sed rate, automated 24 (H) 0 - 20 mm/hr   URIC ACID    Collection Time: 09/14/22  1:55 PM   Result Value Ref Range    Uric acid <0.2 (L) 3.5 - 7.2 MG/DL       Medications received:  Medications Administered       0.9% sodium chloride infusion       Admin Date  09/14/2022 Action  New Bag Dose  25 mL/hr Rate  25 mL/hr Route  IntraVENous Administered By  Juan F Manzano RN              acetaminophen (TYLENOL) tablet 650 mg       Admin Date  09/14/2022 Action  Given Dose  650 mg Route  Oral Administered By  Juan F Manzano RN              diphenhydrAMINE (BENADRYL) capsule 50 mg       Admin Date  09/14/2022 Action  Given Dose  50 mg Route  Oral Administered By  Juan F Manzano RN              famotidine (PF) (PEPCID) 40 mg in 0.9% sodium chloride 10 mL injection       Admin Date  09/14/2022 Action  Given Dose  40 mg Route  IntraVENous Administered By  Juan F Manzano RN              pegloticase [facility]) 8 mg in 0.9% sodium chloride 250 mL, overfill volume 25 mL infusion       Admin Date  09/14/2022 Action  New Bag Dose  8 mg Rate  138 mL/hr Route  IntraVENous Administered By  Juan F Manzano RN                     Mr. Yamilet young treatment well and was discharged from [place] [address] in stable condition at 1345. He is to return on  September 28, 2022 at 1000 for his next appointment.     Yanni Dolan RN  September 14, 2022    Future Appointments:  Future Appointments   Date Time Provider Barry Rachael   9/28/2022 10:00 [facility]   10/6/2022  8:00 AM Lizbeth Grace MD [facility] BS AMB   10/12/2022 10:00 [facility]   10/26/2022  1:00 PM NUCLEARLEONOR BS AMB   10/26/2022  3:00 PM VASCULARLEONOR AMB   11/7/2022  3:20 PM Hadley Chow MD CAVRE[facility] BS AMB

## 2022-09-14 NOTE — TELEPHONE ENCOUNTER
Enrolled with [employer] - Ordered and being shipped to patient's home address on file. ETA within 5-7 business days. Message  Received: Cornelia Tang, RN  Leah Oropeza  Please send a 4 week monitor for PAF per Dr. Jennifer Jones. Thank you!

## 2022-09-28 ENCOUNTER — HOSPITAL ENCOUNTER (OUTPATIENT)
Dept: INFUSION THERAPY | Age: 52
Discharge: HOME | End: 2022-09-28
Payer: COMMERCIAL

## 2022-09-28 VITALS
HEART RATE: 79 BPM | BODY MASS INDEX: 29.41 KG/M2 | WEIGHT: 210.1 LBS | RESPIRATION RATE: 16 BRPM | SYSTOLIC BLOOD PRESSURE: 107 MMHG | DIASTOLIC BLOOD PRESSURE: 73 MMHG | HEIGHT: 71 IN | TEMPERATURE: 98.1 F | OXYGEN SATURATION: 97 %

## 2022-09-28 DIAGNOSIS — M1A.39X1 CHRONIC TOPHACEOUS GOUT OF MULTIPLE SITES DUE TO RENAL IMPAIRMENT: Primary | ICD-10-CM

## 2022-09-28 LAB
ALBUMIN SERPL-MCNC: 3.2 G/DL (ref 3.5–5)
ALBUMIN/GLOB SERPL: 0.9 {RATIO} (ref 1.1–2.2)
ALP SERPL-CCNC: 28 U/L (ref 45–117)
ALT SERPL-CCNC: 23 U/L (ref 12–78)
ANION GAP SERPL CALC-SCNC: 6 MMOL/L (ref 5–15)
AST SERPL-CCNC: 15 U/L (ref 15–37)
BASOPHILS # BLD: 0 K/UL (ref 0–0.1)
BASOPHILS NFR BLD: 1 % (ref 0–1)
BILIRUB SERPL-MCNC: 0.5 MG/DL (ref 0.2–1)
BUN SERPL-MCNC: 34 MG/DL (ref 6–20)
BUN/CREAT SERPL: 6 (ref 12–20)
CALCIUM SERPL-MCNC: 9.1 MG/DL (ref 8.5–10.1)
CHLORIDE SERPL-SCNC: 102 MMOL/L (ref 97–108)
CO2 SERPL-SCNC: 31 MMOL/L (ref 21–32)
CREAT SERPL-MCNC: 5.44 MG/DL (ref 0.7–1.3)
DIFFERENTIAL METHOD BLD: ABNORMAL
EOSINOPHIL # BLD: 0.8 K/UL (ref 0–0.4)
EOSINOPHIL NFR BLD: 13 % (ref 0–7)
ERYTHROCYTE [DISTWIDTH] IN BLOOD BY AUTOMATED COUNT: 13.4 % (ref 11.5–14.5)
ERYTHROCYTE [SEDIMENTATION RATE] IN BLOOD: 21 MM/HR (ref 0–20)
GLOBULIN SER CALC-MCNC: 3.7 G/DL (ref 2–4)
GLUCOSE SERPL-MCNC: 110 MG/DL (ref 65–100)
HCT VFR BLD AUTO: 34.8 % (ref 36.6–50.3)
HGB BLD-MCNC: 11 G/DL (ref 12.1–17)
IMM GRANULOCYTES # BLD AUTO: 0 K/UL (ref 0–0.04)
IMM GRANULOCYTES NFR BLD AUTO: 0 % (ref 0–0.5)
LYMPHOCYTES # BLD: 1.2 K/UL (ref 0.8–3.5)
LYMPHOCYTES NFR BLD: 21 % (ref 12–49)
MCH RBC QN AUTO: 31.2 PG (ref 26–34)
MCHC RBC AUTO-ENTMCNC: 31.6 G/DL (ref 30–36.5)
MCV RBC AUTO: 98.6 FL (ref 80–99)
MONOCYTES # BLD: 0.6 K/UL (ref 0–1)
MONOCYTES NFR BLD: 10 % (ref 5–13)
NEUTS SEG # BLD: 3.2 K/UL (ref 1.8–8)
NEUTS SEG NFR BLD: 55 % (ref 32–75)
NRBC # BLD: 0 K/UL (ref 0–0.01)
NRBC BLD-RTO: 0 PER 100 WBC
PLATELET # BLD AUTO: 250 K/UL (ref 150–400)
PMV BLD AUTO: 9.7 FL (ref 8.9–12.9)
POTASSIUM SERPL-SCNC: 3.8 MMOL/L (ref 3.5–5.1)
PROT SERPL-MCNC: 6.9 G/DL (ref 6.4–8.2)
RBC # BLD AUTO: 3.53 M/UL (ref 4.1–5.7)
SODIUM SERPL-SCNC: 139 MMOL/L (ref 136–145)
URATE SERPL-MCNC: 0.6 MG/DL (ref 3.5–7.2)
URATE SERPL-MCNC: <0.2 MG/DL (ref 3.5–7.2)
WBC # BLD AUTO: 5.8 K/UL (ref 4.1–11.1)

## 2022-09-28 PROCEDURE — 74011250637 HC RX REV CODE- 250/637: Performed by: INTERNAL MEDICINE

## 2022-09-28 PROCEDURE — 74011250636 HC RX REV CODE- 250/636: Performed by: INTERNAL MEDICINE

## 2022-09-28 PROCEDURE — 85025 COMPLETE CBC W/AUTO DIFF WBC: CPT

## 2022-09-28 PROCEDURE — 96365 THER/PROPH/DIAG IV INF INIT: CPT

## 2022-09-28 PROCEDURE — 36415 COLL VENOUS BLD VENIPUNCTURE: CPT

## 2022-09-28 PROCEDURE — 96375 TX/PRO/DX INJ NEW DRUG ADDON: CPT

## 2022-09-28 PROCEDURE — 74011000250 HC RX REV CODE- 250: Performed by: INTERNAL MEDICINE

## 2022-09-28 PROCEDURE — 84550 ASSAY OF BLOOD/URIC ACID: CPT

## 2022-09-28 PROCEDURE — 80053 COMPREHEN METABOLIC PANEL: CPT

## 2022-09-28 PROCEDURE — 85652 RBC SED RATE AUTOMATED: CPT

## 2022-09-28 PROCEDURE — 96366 THER/PROPH/DIAG IV INF ADDON: CPT

## 2022-09-28 RX ORDER — SODIUM CHLORIDE 0.9 % (FLUSH) 0.9 %
5-40 SYRINGE (ML) INJECTION AS NEEDED
Status: CANCELLED | OUTPATIENT
Start: 2022-10-12

## 2022-09-28 RX ORDER — SODIUM CHLORIDE 9 MG/ML
5-250 INJECTION, SOLUTION INTRAVENOUS AS NEEDED
Status: CANCELLED | OUTPATIENT
Start: 2022-10-12

## 2022-09-28 RX ORDER — ALBUTEROL SULFATE 0.83 MG/ML
2.5 SOLUTION RESPIRATORY (INHALATION) AS NEEDED
Status: CANCELLED
Start: 2022-10-12

## 2022-09-28 RX ORDER — HYDROCORTISONE SODIUM SUCCINATE 100 MG/2ML
100 INJECTION, POWDER, FOR SOLUTION INTRAMUSCULAR; INTRAVENOUS AS NEEDED
Status: CANCELLED | OUTPATIENT
Start: 2022-10-12

## 2022-09-28 RX ORDER — SODIUM CHLORIDE 0.9 % (FLUSH) 0.9 %
5-40 SYRINGE (ML) INJECTION AS NEEDED
Status: DISPENSED | OUTPATIENT
Start: 2022-09-28 | End: 2022-09-28

## 2022-09-28 RX ORDER — HEPARIN 100 UNIT/ML
500 SYRINGE INTRAVENOUS AS NEEDED
Status: CANCELLED
Start: 2022-10-12

## 2022-09-28 RX ORDER — ACETAMINOPHEN 325 MG/1
650 TABLET ORAL ONCE
Status: CANCELLED | OUTPATIENT
Start: 2022-10-12 | End: 2022-10-12

## 2022-09-28 RX ORDER — DIPHENHYDRAMINE HYDROCHLORIDE 50 MG/ML
50 INJECTION, SOLUTION INTRAMUSCULAR; INTRAVENOUS AS NEEDED
Status: CANCELLED
Start: 2022-10-12

## 2022-09-28 RX ORDER — HEPARIN 100 UNIT/ML
500 SYRINGE INTRAVENOUS AS NEEDED
Status: ACTIVE | OUTPATIENT
Start: 2022-09-28 | End: 2022-09-28

## 2022-09-28 RX ORDER — ONDANSETRON 2 MG/ML
8 INJECTION INTRAMUSCULAR; INTRAVENOUS AS NEEDED
Status: CANCELLED | OUTPATIENT
Start: 2022-10-12

## 2022-09-28 RX ORDER — EPINEPHRINE 1 MG/ML
0.3 INJECTION, SOLUTION, CONCENTRATE INTRAVENOUS AS NEEDED
Status: CANCELLED | OUTPATIENT
Start: 2022-10-12

## 2022-09-28 RX ORDER — ACETAMINOPHEN 325 MG/1
650 TABLET ORAL AS NEEDED
Status: CANCELLED
Start: 2022-10-12

## 2022-09-28 RX ORDER — SODIUM CHLORIDE 9 MG/ML
5-250 INJECTION, SOLUTION INTRAVENOUS AS NEEDED
Status: DISPENSED | OUTPATIENT
Start: 2022-09-28 | End: 2022-09-28

## 2022-09-28 RX ORDER — DIPHENHYDRAMINE HCL 25 MG
50 CAPSULE ORAL ONCE
Status: COMPLETED | OUTPATIENT
Start: 2022-09-28 | End: 2022-09-28

## 2022-09-28 RX ORDER — ACETAMINOPHEN 325 MG/1
650 TABLET ORAL ONCE
Status: COMPLETED | OUTPATIENT
Start: 2022-09-28 | End: 2022-09-28

## 2022-09-28 RX ORDER — SODIUM CHLORIDE 9 MG/ML
5-40 INJECTION INTRAMUSCULAR; INTRAVENOUS; SUBCUTANEOUS AS NEEDED
Status: CANCELLED | OUTPATIENT
Start: 2022-10-12

## 2022-09-28 RX ORDER — DIPHENHYDRAMINE HCL 25 MG
50 CAPSULE ORAL ONCE
Status: CANCELLED
Start: 2022-10-12 | End: 2022-10-12

## 2022-09-28 RX ORDER — DIPHENHYDRAMINE HYDROCHLORIDE 50 MG/ML
25 INJECTION, SOLUTION INTRAMUSCULAR; INTRAVENOUS AS NEEDED
Status: CANCELLED
Start: 2022-10-12

## 2022-09-28 RX ORDER — SODIUM CHLORIDE 9 MG/ML
5-40 INJECTION INTRAMUSCULAR; INTRAVENOUS; SUBCUTANEOUS AS NEEDED
Status: ACTIVE | OUTPATIENT
Start: 2022-09-28 | End: 2022-09-28

## 2022-09-28 RX ADMIN — PEGLOTICASE 8 MG: 8 INJECTION, SOLUTION INTRAVENOUS at 11:56

## 2022-09-28 RX ADMIN — SODIUM CHLORIDE 25 ML/HR: 9 INJECTION, SOLUTION INTRAVENOUS at 11:01

## 2022-09-28 RX ADMIN — ACETAMINOPHEN 650 MG: 325 TABLET ORAL at 11:03

## 2022-09-28 RX ADMIN — DIPHENHYDRAMINE HYDROCHLORIDE 50 MG: 25 CAPSULE ORAL at 11:03

## 2022-09-28 RX ADMIN — SODIUM CHLORIDE, PRESERVATIVE FREE 40 MG: 5 INJECTION INTRAVENOUS at 11:05

## 2022-09-28 NOTE — PROGRESS NOTES
[facility]fusion [facility] Progress Note     Pt admit to [place] for Krystexxa infusion ambulatory in stable condition. Assessment completed. No new concerns voiced. 22gPIV inserted L forearm without difficulty, separate stick x 2 required for blood work. Received okay from Dr. Radames Singer that we don't have to wait for the patient's uric acid to result to proceed with treatment. Visit Vitals  /73   Pulse 79   Temp 98.1 °F (36.7 °C)   Resp 16   Ht 5' 11\" (1.803 m)   Wt 95.3 kg (210 lb 1.6 oz)   SpO2 97%   BMI 29.30 kg/m²       Medications:  Medications Administered       0.9% sodium chloride infusion       Admin Date  09/28/2022 Action  New Bag Dose  25 mL/hr Rate  25 mL/hr Route  IntraVENous Administered By  Jose Higuera RN              acetaminophen (TYLENOL) tablet 650 mg       Admin Date  09/28/2022 Action  Given Dose  650 mg Route  Oral Administered By  Jose Higuera RN              diphenhydrAMINE (BENADRYL) capsule 50 mg       Admin Date  09/28/2022 Action  Given Dose  50 mg Route  Oral Administered By  Jose Higuera RN              famotidine (PF) (PEPCID) 40 mg in 0.9% sodium chloride 10 mL injection       Admin Date  09/28/2022 Action  Given Dose  40 mg Route  IntraVENous Administered By  Jose Higuera RN              [place]) 8 mg in 0.9% sodium chloride 250 mL, overfill volume 25 mL infusion       Admin Date  09/28/2022 Action  New Bag Dose  8 mg Rate  138 mL/hr Route  IntraVENous Administered By  Jose Higuera, LORRI                      Pt tolerated treatment well. PIV flushed and discontinued per protocol. D/c home ambulatory in no distress. Pt aware of next appointment scheduled for 10/12.     Recent Results (from the past 12 hour(s))   CBC WITH AUTOMATED DIFF    Collection Time: 09/28/22 10:46 AM   Result Value Ref Range    WBC 5.8 4.1 - 11.1 K/uL    RBC 3.53 (L) 4.10 - 5.70 M/uL    HGB 11.0 (L) 12.1 - 17.0 g/dL    HCT 34.8 (L) 36.6 - 50.3 %    MCV 98.6 80.0 - 99.0 FL    MCH 31.2 26.0 - 34.0 PG    MCHC 31.6 30.0 - 36.5 g/dL    RDW 13.4 11.5 - 14.5 %    PLATELET 169 571 - 512 K/uL    MPV 9.7 8.9 - 12.9 FL    NRBC 0.0 0  WBC    ABSOLUTE NRBC 0.00 0.00 - 0.01 K/uL    NEUTROPHILS 55 32 - 75 %    LYMPHOCYTES 21 12 - 49 %    MONOCYTES 10 5 - 13 %    EOSINOPHILS 13 (H) 0 - 7 %    BASOPHILS 1 0 - 1 %    IMMATURE GRANULOCYTES 0 0.0 - 0.5 %    ABS. NEUTROPHILS 3.2 1.8 - 8.0 K/UL    ABS. LYMPHOCYTES 1.2 0.8 - 3.5 K/UL    ABS. MONOCYTES 0.6 0.0 - 1.0 K/UL    ABS. EOSINOPHILS 0.8 (H) 0.0 - 0.4 K/UL    ABS. BASOPHILS 0.0 0.0 - 0.1 K/UL    ABS. IMM. GRANS. 0.0 0.00 - 0.04 K/UL    DF AUTOMATED     METABOLIC PANEL, COMPREHENSIVE    Collection Time: 09/28/22 10:46 AM   Result Value Ref Range    Sodium 139 136 - 145 mmol/L    Potassium 3.8 3.5 - 5.1 mmol/L    Chloride 102 97 - 108 mmol/L    CO2 31 21 - 32 mmol/L    Anion gap 6 5 - 15 mmol/L    Glucose 110 (H) 65 - 100 mg/dL    BUN 34 (H) 6 - 20 MG/DL    Creatinine 5.44 (H) 0.70 - 1.30 MG/DL    BUN/Creatinine ratio 6 (L) 12 - 20      GFR est AA 14 (L) >60 ml/min/1.73m2    GFR est non-AA 11 (L) >60 ml/min/1.73m2    Calcium 9.1 8.5 - 10.1 MG/DL    Bilirubin, total 0.5 0.2 - 1.0 MG/DL    ALT (SGPT) 23 12 - 78 U/L    AST (SGOT) 15 15 - 37 U/L    Alk.  phosphatase 28 (L) 45 - 117 U/L    Protein, total 6.9 6.4 - 8.2 g/dL    Albumin 3.2 (L) 3.5 - 5.0 g/dL    Globulin 3.7 2.0 - 4.0 g/dL    A-G Ratio 0.9 (L) 1.1 - 2.2     SED RATE (ESR)    Collection Time: 09/28/22 10:46 AM   Result Value Ref Range    Sed rate, automated 21 (H) 0 - 20 mm/hr   URIC ACID    Collection Time: 09/28/22 10:46 AM   Result Value Ref Range    Uric acid <0.2 (L) 3.5 - 7.2 MG/DL

## 2022-10-06 ENCOUNTER — TELEPHONE (OUTPATIENT)
Dept: RHEUMATOLOGY | Age: 52
End: 2022-10-06

## 2022-10-06 DIAGNOSIS — M1A.39X1 CHRONIC TOPHACEOUS GOUT OF MULTIPLE SITES DUE TO RENAL IMPAIRMENT: ICD-10-CM

## 2022-10-06 NOTE — TELEPHONE ENCOUNTER
Received faxed request for Leflunomide 10mg tablets.     Patient no showed to appointment today 10/5/22    Labs done 9/28/22  Last visit 7/6/22

## 2022-10-07 RX ORDER — LEFLUNOMIDE 10 MG/1
10 TABLET ORAL DAILY
Qty: 30 TABLET | Refills: 0 | Status: SHIPPED | OUTPATIENT
Start: 2022-10-07

## 2022-10-12 ENCOUNTER — DOCUMENTATION ONLY (OUTPATIENT)
Dept: CARDIOLOGY CLINIC | Age: 52
End: 2022-10-12

## 2022-10-12 ENCOUNTER — HOSPITAL ENCOUNTER (OUTPATIENT)
Dept: INFUSION THERAPY | Age: 52
End: 2022-10-12

## 2022-10-18 DIAGNOSIS — M1A.39X1 CHRONIC TOPHACEOUS GOUT OF MULTIPLE SITES DUE TO RENAL IMPAIRMENT: Primary | ICD-10-CM

## 2022-10-18 RX ORDER — SODIUM CHLORIDE 9 MG/ML
5-250 INJECTION, SOLUTION INTRAVENOUS AS NEEDED
Status: CANCELLED | OUTPATIENT
Start: 2022-10-19

## 2022-10-18 RX ORDER — DIPHENHYDRAMINE HCL 25 MG
50 CAPSULE ORAL ONCE
Status: CANCELLED
Start: 2022-10-19 | End: 2022-10-19

## 2022-10-18 RX ORDER — ACETAMINOPHEN 325 MG/1
650 TABLET ORAL ONCE
Status: CANCELLED | OUTPATIENT
Start: 2022-10-19 | End: 2022-10-19

## 2022-10-19 ENCOUNTER — HOSPITAL ENCOUNTER (OUTPATIENT)
Dept: INFUSION THERAPY | Age: 52
Discharge: HOME | End: 2022-10-19
Payer: COMMERCIAL

## 2022-10-19 VITALS
RESPIRATION RATE: 18 BRPM | SYSTOLIC BLOOD PRESSURE: 100 MMHG | HEART RATE: 78 BPM | OXYGEN SATURATION: 98 % | DIASTOLIC BLOOD PRESSURE: 65 MMHG | TEMPERATURE: 98.2 F

## 2022-10-19 DIAGNOSIS — M1A.39X1 CHRONIC TOPHACEOUS GOUT OF MULTIPLE SITES DUE TO RENAL IMPAIRMENT: Primary | ICD-10-CM

## 2022-10-19 LAB
ALBUMIN SERPL-MCNC: 3.5 G/DL (ref 3.5–5)
ALBUMIN/GLOB SERPL: 0.9 {RATIO} (ref 1.1–2.2)
ALP SERPL-CCNC: 31 U/L (ref 45–117)
ALT SERPL-CCNC: 26 U/L (ref 12–78)
ANION GAP SERPL CALC-SCNC: 6 MMOL/L (ref 5–15)
AST SERPL-CCNC: 18 U/L (ref 15–37)
BASOPHILS # BLD: 0.1 K/UL (ref 0–0.1)
BASOPHILS NFR BLD: 1 % (ref 0–1)
BILIRUB SERPL-MCNC: 0.4 MG/DL (ref 0.2–1)
BUN SERPL-MCNC: 27 MG/DL (ref 6–20)
BUN/CREAT SERPL: 6 (ref 12–20)
CALCIUM SERPL-MCNC: 9.2 MG/DL (ref 8.5–10.1)
CHLORIDE SERPL-SCNC: 99 MMOL/L (ref 97–108)
CO2 SERPL-SCNC: 34 MMOL/L (ref 21–32)
CREAT SERPL-MCNC: 4.45 MG/DL (ref 0.7–1.3)
DIFFERENTIAL METHOD BLD: ABNORMAL
EOSINOPHIL # BLD: 0.7 K/UL (ref 0–0.4)
EOSINOPHIL NFR BLD: 12 % (ref 0–7)
ERYTHROCYTE [DISTWIDTH] IN BLOOD BY AUTOMATED COUNT: 13.4 % (ref 11.5–14.5)
ERYTHROCYTE [SEDIMENTATION RATE] IN BLOOD: 17 MM/HR (ref 0–20)
GLOBULIN SER CALC-MCNC: 3.8 G/DL (ref 2–4)
GLUCOSE SERPL-MCNC: 102 MG/DL (ref 65–100)
HCT VFR BLD AUTO: 37.2 % (ref 36.6–50.3)
HGB BLD-MCNC: 11.5 G/DL (ref 12.1–17)
IMM GRANULOCYTES # BLD AUTO: 0 K/UL (ref 0–0.04)
IMM GRANULOCYTES NFR BLD AUTO: 0 % (ref 0–0.5)
LYMPHOCYTES # BLD: 1.4 K/UL (ref 0.8–3.5)
LYMPHOCYTES NFR BLD: 23 % (ref 12–49)
MCH RBC QN AUTO: 30.5 PG (ref 26–34)
MCHC RBC AUTO-ENTMCNC: 30.9 G/DL (ref 30–36.5)
MCV RBC AUTO: 98.7 FL (ref 80–99)
MONOCYTES # BLD: 0.6 K/UL (ref 0–1)
MONOCYTES NFR BLD: 10 % (ref 5–13)
NEUTS SEG # BLD: 3.1 K/UL (ref 1.8–8)
NEUTS SEG NFR BLD: 54 % (ref 32–75)
NRBC # BLD: 0 K/UL (ref 0–0.01)
NRBC BLD-RTO: 0 PER 100 WBC
PLATELET # BLD AUTO: 222 K/UL (ref 150–400)
PMV BLD AUTO: 9.9 FL (ref 8.9–12.9)
POTASSIUM SERPL-SCNC: 3.3 MMOL/L (ref 3.5–5.1)
PROT SERPL-MCNC: 7.3 G/DL (ref 6.4–8.2)
RBC # BLD AUTO: 3.77 M/UL (ref 4.1–5.7)
SODIUM SERPL-SCNC: 139 MMOL/L (ref 136–145)
URATE SERPL-MCNC: 0.3 MG/DL (ref 3.5–7.2)
URATE SERPL-MCNC: <0.2 MG/DL (ref 3.5–7.2)
WBC # BLD AUTO: 5.8 K/UL (ref 4.1–11.1)

## 2022-10-19 PROCEDURE — 96375 TX/PRO/DX INJ NEW DRUG ADDON: CPT

## 2022-10-19 PROCEDURE — 74011000250 HC RX REV CODE- 250: Performed by: INTERNAL MEDICINE

## 2022-10-19 PROCEDURE — 85025 COMPLETE CBC W/AUTO DIFF WBC: CPT

## 2022-10-19 PROCEDURE — 74011250637 HC RX REV CODE- 250/637: Performed by: INTERNAL MEDICINE

## 2022-10-19 PROCEDURE — 96365 THER/PROPH/DIAG IV INF INIT: CPT

## 2022-10-19 PROCEDURE — 74011250636 HC RX REV CODE- 250/636: Performed by: INTERNAL MEDICINE

## 2022-10-19 PROCEDURE — 85652 RBC SED RATE AUTOMATED: CPT

## 2022-10-19 PROCEDURE — 96366 THER/PROPH/DIAG IV INF ADDON: CPT

## 2022-10-19 PROCEDURE — 36415 COLL VENOUS BLD VENIPUNCTURE: CPT

## 2022-10-19 PROCEDURE — 80053 COMPREHEN METABOLIC PANEL: CPT

## 2022-10-19 PROCEDURE — 84550 ASSAY OF BLOOD/URIC ACID: CPT

## 2022-10-19 RX ORDER — HYDROCORTISONE SODIUM SUCCINATE 100 MG/2ML
100 INJECTION, POWDER, FOR SOLUTION INTRAMUSCULAR; INTRAVENOUS AS NEEDED
OUTPATIENT
Start: 2022-11-02

## 2022-10-19 RX ORDER — DIPHENHYDRAMINE HCL 25 MG
50 CAPSULE ORAL ONCE
Status: COMPLETED | OUTPATIENT
Start: 2022-10-19 | End: 2022-10-19

## 2022-10-19 RX ORDER — ALBUTEROL SULFATE 0.83 MG/ML
2.5 SOLUTION RESPIRATORY (INHALATION) AS NEEDED
Start: 2022-11-02

## 2022-10-19 RX ORDER — SODIUM CHLORIDE 9 MG/ML
5-250 INJECTION, SOLUTION INTRAVENOUS AS NEEDED
Status: DISPENSED | OUTPATIENT
Start: 2022-10-19 | End: 2022-10-19

## 2022-10-19 RX ORDER — DIPHENHYDRAMINE HYDROCHLORIDE 50 MG/ML
25 INJECTION, SOLUTION INTRAMUSCULAR; INTRAVENOUS AS NEEDED
Start: 2022-11-02

## 2022-10-19 RX ORDER — ONDANSETRON 2 MG/ML
8 INJECTION INTRAMUSCULAR; INTRAVENOUS AS NEEDED
OUTPATIENT
Start: 2022-11-02

## 2022-10-19 RX ORDER — HEPARIN 100 UNIT/ML
500 SYRINGE INTRAVENOUS AS NEEDED
Start: 2022-11-02

## 2022-10-19 RX ORDER — SODIUM CHLORIDE 9 MG/ML
5-40 INJECTION INTRAMUSCULAR; INTRAVENOUS; SUBCUTANEOUS AS NEEDED
OUTPATIENT
Start: 2022-11-02

## 2022-10-19 RX ORDER — SODIUM CHLORIDE 9 MG/ML
5-250 INJECTION, SOLUTION INTRAVENOUS AS NEEDED
OUTPATIENT
Start: 2022-11-02

## 2022-10-19 RX ORDER — EPINEPHRINE 1 MG/ML
0.3 INJECTION, SOLUTION, CONCENTRATE INTRAVENOUS AS NEEDED
OUTPATIENT
Start: 2022-11-02

## 2022-10-19 RX ORDER — DIPHENHYDRAMINE HCL 25 MG
50 CAPSULE ORAL ONCE
Start: 2022-11-02 | End: 2022-11-02

## 2022-10-19 RX ORDER — SODIUM CHLORIDE 0.9 % (FLUSH) 0.9 %
5-40 SYRINGE (ML) INJECTION AS NEEDED
OUTPATIENT
Start: 2022-11-02

## 2022-10-19 RX ORDER — ACETAMINOPHEN 325 MG/1
650 TABLET ORAL ONCE
OUTPATIENT
Start: 2022-11-02 | End: 2022-11-02

## 2022-10-19 RX ORDER — DIPHENHYDRAMINE HYDROCHLORIDE 50 MG/ML
50 INJECTION, SOLUTION INTRAMUSCULAR; INTRAVENOUS AS NEEDED
Start: 2022-11-02

## 2022-10-19 RX ORDER — ACETAMINOPHEN 325 MG/1
650 TABLET ORAL ONCE
Status: COMPLETED | OUTPATIENT
Start: 2022-10-19 | End: 2022-10-19

## 2022-10-19 RX ORDER — ACETAMINOPHEN 325 MG/1
650 TABLET ORAL AS NEEDED
Start: 2022-11-02

## 2022-10-19 RX ADMIN — ACETAMINOPHEN 650 MG: 325 TABLET, FILM COATED ORAL at 11:14

## 2022-10-19 RX ADMIN — SODIUM CHLORIDE, PRESERVATIVE FREE 40 MG: 5 INJECTION INTRAVENOUS at 11:15

## 2022-10-19 RX ADMIN — PEGLOTICASE 8 MG: 8 INJECTION, SOLUTION INTRAVENOUS at 11:45

## 2022-10-19 RX ADMIN — DIPHENHYDRAMINE HYDROCHLORIDE 50 MG: 25 CAPSULE ORAL at 11:14

## 2022-10-19 NOTE — PROGRESS NOTES
[facility] Progress Note    Date: 2022    Name: Neida Washington    MRN: [id number]         : 1970    Mr. Omero Mckeon Arrived ambulatory and in no distress for Krystexxa Infusion. Assessment was completed, no acute issues at this time, no new complaints voiced. 22 G PIV established to left hand by Walker Sainz RN after three attempts, with + blood return. Mr. Horacio Alcantara vitals were reviewed. Visit Vitals  /81   Pulse 78   Temp 98.2 °F (36.8 °C)   Resp 18   SpO2 98%       Medications:  Medications Administered       acetaminophen (TYLENOL) tablet 650 mg       Admin Date  10/19/2022 Action  Given Dose  650 mg Route  Oral Administered By  Crystal Marquez RN              diphenhydrAMINE (BENADRYL) capsule 50 mg       Admin Date  10/19/2022 Action  Given Dose  50 mg Route  Oral Administered By  Crystal Marquez RN              famotidine (PF) (PEPCID) 40 mg in 0.9% sodium chloride 10 mL injection       Admin Date  10/19/2022 Action  Given Dose  40 mg Route  IntraVENous Administered By  Crystal Marquez RN              [facility]) 8 mg in 0.9% sodium chloride 250 mL, overfill volume 25 mL infusion       Admin Date  10/19/2022 Action  New Bag Dose  8 mg Rate  138 mL/hr Route  IntraVENous Administered By  Crystal Marquez RN                    Mr. Omero Mckeon tolerated treatment well and was discharged from [place] [address] in stable condition. PIV flushed & removed. He is aware of future appointments.     Future Appointments   Date Time Provider Barry Cano   10/26/2022  1:00 PM NUCLEARLEONOR BS AMB   10/26/2022  3:00 PM VASCULARLEONOR BS AMB   2022  3:20 PM MD Sami Jackson BS AMB       Syd Vo RN  2022

## 2022-10-20 ENCOUNTER — TELEPHONE (OUTPATIENT)
Dept: CARDIOLOGY CLINIC | Age: 52
End: 2022-10-20

## 2022-10-20 RX ORDER — METOPROLOL TARTRATE 37.5 MG/1
25 TABLET, FILM COATED ORAL 2 TIMES DAILY
Qty: 30 TABLET | Refills: 1 | Status: SHIPPED | OUTPATIENT
Start: 2022-10-20 | End: 2022-10-20

## 2022-10-20 RX ORDER — METOPROLOL TARTRATE 37.5 MG/1
37.5 TABLET, FILM COATED ORAL 2 TIMES DAILY
Qty: 30 TABLET | Refills: 1 | Status: SHIPPED | OUTPATIENT
Start: 2022-10-20

## 2022-10-20 NOTE — TELEPHONE ENCOUNTER
Bryan Maynard MD  You 2 days ago     MG  Refer to Dr Rose oMlina or Dr. Lubna Wade for SVT   Increase metoprolol to 37.5 bid in the meanwhile      Verified patient using two identifiers. Spoke with Mr. Kyra Egan regarding loop results per Dr. Lubna Wade and Dr. Viviane Marshall. Advised to increase metoprolol dose. New prescription sent to pharmacy. Scheduled appointment to follow up with Dr. Rose Molina regarding SVT. Patient verbalized understanding, no further questions.     Future Appointments   Date Time Provider Barry Cano   10/26/2022  1:00 PM NUCLEARLEONOR BS AMB   10/26/2022  3:00 PM VASCULARLEONOR AMB   11/7/2022  3:20 PM MD ALYSSA Dutta BS AMB   11/11/2022  3:15 PM Marline Albarado MD CAVREY BS AMB

## 2022-10-20 NOTE — TELEPHONE ENCOUNTER
[employer] is calling for clarification on the prescription metoprolol. The medicine states 37.5 mg and the directions say 25 mg. Please advise.     [phone] doctor line

## 2022-10-26 ENCOUNTER — ANCILLARY PROCEDURE (OUTPATIENT)
Dept: CARDIOLOGY CLINIC | Age: 52
End: 2022-10-26

## 2022-10-26 ENCOUNTER — ANCILLARY PROCEDURE (OUTPATIENT)
Dept: CARDIOLOGY CLINIC | Age: 52
End: 2022-10-26
Payer: COMMERCIAL

## 2022-10-26 VITALS
HEIGHT: 71 IN | DIASTOLIC BLOOD PRESSURE: 62 MMHG | BODY MASS INDEX: 29.4 KG/M2 | SYSTOLIC BLOOD PRESSURE: 100 MMHG | WEIGHT: 210 LBS

## 2022-10-26 DIAGNOSIS — I10 ESSENTIAL HYPERTENSION: ICD-10-CM

## 2022-10-26 DIAGNOSIS — I50.21 ACUTE SYSTOLIC CHF (CONGESTIVE HEART FAILURE) (HCC): ICD-10-CM

## 2022-10-26 DIAGNOSIS — I48.0 PAF (PAROXYSMAL ATRIAL FIBRILLATION) (HCC): ICD-10-CM

## 2022-10-26 PROCEDURE — A9500 TC99M SESTAMIBI: HCPCS | Performed by: SPECIALIST

## 2022-10-26 PROCEDURE — 93306 TTE W/DOPPLER COMPLETE: CPT | Performed by: SPECIALIST

## 2022-10-26 PROCEDURE — 78452 HT MUSCLE IMAGE SPECT MULT: CPT | Performed by: SPECIALIST

## 2022-10-26 PROCEDURE — 93015 CV STRESS TEST SUPVJ I&R: CPT | Performed by: SPECIALIST

## 2022-10-26 RX ORDER — TETRAKIS(2-METHOXYISOBUTYLISOCYANIDE)COPPER(I) TETRAFLUOROBORATE 1 MG/ML
30 INJECTION, POWDER, LYOPHILIZED, FOR SOLUTION INTRAVENOUS ONCE
Status: COMPLETED | OUTPATIENT
Start: 2022-10-26 | End: 2022-10-26

## 2022-10-26 RX ORDER — TETRAKIS(2-METHOXYISOBUTYLISOCYANIDE)COPPER(I) TETRAFLUOROBORATE 1 MG/ML
10 INJECTION, POWDER, LYOPHILIZED, FOR SOLUTION INTRAVENOUS ONCE
Status: COMPLETED | OUTPATIENT
Start: 2022-10-26 | End: 2022-10-26

## 2022-10-26 RX ADMIN — TETRAKIS(2-METHOXYISOBUTYLISOCYANIDE)COPPER(I) TETRAFLUOROBORATE 22.2 MILLICURIE: 1 INJECTION, POWDER, LYOPHILIZED, FOR SOLUTION INTRAVENOUS at 14:20

## 2022-10-26 RX ADMIN — TETRAKIS(2-METHOXYISOBUTYLISOCYANIDE)COPPER(I) TETRAFLUOROBORATE 8.5 MILLICURIE: 1 INJECTION, POWDER, LYOPHILIZED, FOR SOLUTION INTRAVENOUS at 13:10

## 2022-10-27 LAB
ECHO AO ASC DIAM: 4.2 CM
ECHO AO ASCENDING AORTA INDEX: 1.95 CM/M2
ECHO AO ROOT DIAM: 4.3 CM
ECHO AO ROOT INDEX: 2 CM/M2
ECHO AV AREA PEAK VELOCITY: 3.4 CM2
ECHO AV AREA VTI: 3.3 CM2
ECHO AV AREA/BSA PEAK VELOCITY: 1.6 CM2/M2
ECHO AV AREA/BSA VTI: 1.5 CM2/M2
ECHO AV MEAN GRADIENT: 5 MMHG
ECHO AV MEAN VELOCITY: 1.1 M/S
ECHO AV PEAK GRADIENT: 11 MMHG
ECHO AV PEAK VELOCITY: 1.7 M/S
ECHO AV VELOCITY RATIO: 0.71
ECHO AV VTI: 29.7 CM
ECHO EST RA PRESSURE: 8 MMHG
ECHO IVC PROX: 2.7 CM
ECHO LA DIAMETER INDEX: 1.91 CM/M2
ECHO LA DIAMETER: 4.1 CM
ECHO LA TO AORTIC ROOT RATIO: 0.95
ECHO LA VOL 2C: 79 ML (ref 18–58)
ECHO LA VOL 4C: 70 ML (ref 18–58)
ECHO LA VOL BP: 81 ML (ref 18–58)
ECHO LA VOL/BSA BIPLANE: 38 ML/M2 (ref 16–34)
ECHO LA VOLUME AREA LENGTH: 86 ML
ECHO LA VOLUME INDEX A2C: 37 ML/M2 (ref 16–34)
ECHO LA VOLUME INDEX A4C: 33 ML/M2 (ref 16–34)
ECHO LA VOLUME INDEX AREA LENGTH: 40 ML/M2 (ref 16–34)
ECHO LV E' LATERAL VELOCITY: 7 CM/S
ECHO LV E' SEPTAL VELOCITY: 5 CM/S
ECHO LV EDV A2C: 141 ML
ECHO LV EDV A4C: 130 ML
ECHO LV EDV BP: 139 ML (ref 67–155)
ECHO LV EDV INDEX A4C: 60 ML/M2
ECHO LV EDV INDEX BP: 65 ML/M2
ECHO LV EDV NDEX A2C: 66 ML/M2
ECHO LV EJECTION FRACTION A2C: 57 %
ECHO LV EJECTION FRACTION A4C: 60 %
ECHO LV EJECTION FRACTION BIPLANE: 58 % (ref 55–100)
ECHO LV ESV A2C: 60 ML
ECHO LV ESV A4C: 52 ML
ECHO LV ESV BP: 58 ML (ref 22–58)
ECHO LV ESV INDEX A2C: 28 ML/M2
ECHO LV ESV INDEX A4C: 24 ML/M2
ECHO LV ESV INDEX BP: 27 ML/M2
ECHO LV FRACTIONAL SHORTENING: 37 % (ref 28–44)
ECHO LV INTERNAL DIMENSION DIASTOLE INDEX: 2.14 CM/M2
ECHO LV INTERNAL DIMENSION DIASTOLIC: 4.6 CM (ref 4.2–5.9)
ECHO LV INTERNAL DIMENSION SYSTOLIC INDEX: 1.35 CM/M2
ECHO LV INTERNAL DIMENSION SYSTOLIC: 2.9 CM
ECHO LV IVSD: 1.5 CM (ref 0.6–1)
ECHO LV MASS 2D: 299.5 G (ref 88–224)
ECHO LV MASS INDEX 2D: 139.3 G/M2 (ref 49–115)
ECHO LV POSTERIOR WALL DIASTOLIC: 1.6 CM (ref 0.6–1)
ECHO LV RELATIVE WALL THICKNESS RATIO: 0.7
ECHO LVOT AREA: 4.9 CM2
ECHO LVOT AV VTI INDEX: 0.66
ECHO LVOT DIAM: 2.5 CM
ECHO LVOT MEAN GRADIENT: 2 MMHG
ECHO LVOT PEAK GRADIENT: 5 MMHG
ECHO LVOT PEAK VELOCITY: 1.2 M/S
ECHO LVOT STROKE VOLUME INDEX: 44.7 ML/M2
ECHO LVOT SV: 96.2 ML
ECHO LVOT VTI: 19.6 CM
ECHO MV A VELOCITY: 0.52 M/S
ECHO MV AREA PHT: 3 CM2
ECHO MV E DECELERATION TIME (DT): 249.6 MS
ECHO MV E VELOCITY: 0.78 M/S
ECHO MV E/A RATIO: 1.5
ECHO MV E/E' LATERAL: 11.14
ECHO MV E/E' RATIO (AVERAGED): 13.37
ECHO MV E/E' SEPTAL: 15.6
ECHO MV PRESSURE HALF TIME (PHT): 72.4 MS
ECHO RV INTERNAL DIMENSION: 3.5 CM
ECHO RV TAPSE: 2 CM (ref 1.7–?)
NUC STRESS EJECTION FRACTION: 50 %
STRESS ANGINA INDEX: 0
STRESS BASELINE DIAS BP: 62 MMHG
STRESS BASELINE HR: 90 BPM
STRESS BASELINE ST DEPRESSION: 0 MM
STRESS BASELINE SYS BP: 100 MMHG
STRESS ESTIMATED WORKLOAD: 7 METS
STRESS EXERCISE DUR MIN: 5 MIN
STRESS EXERCISE DUR SEC: 1 SEC
STRESS O2 SAT PEAK: 99 %
STRESS O2 SAT REST: 100 %
STRESS PEAK DIAS BP: 80 MMHG
STRESS PEAK SYS BP: 154 MMHG
STRESS PERCENT HR ACHIEVED: 90 %
STRESS POST PEAK HR: 153 BPM
STRESS RATE PRESSURE PRODUCT: NORMAL BPM*MMHG
STRESS TARGET HR: 170 BPM

## 2022-11-07 ENCOUNTER — OFFICE VISIT (OUTPATIENT)
Dept: CARDIOLOGY CLINIC | Age: 52
End: 2022-11-07
Payer: COMMERCIAL

## 2022-11-07 VITALS
HEIGHT: 71 IN | DIASTOLIC BLOOD PRESSURE: 70 MMHG | RESPIRATION RATE: 14 BRPM | HEART RATE: 88 BPM | BODY MASS INDEX: 29.23 KG/M2 | SYSTOLIC BLOOD PRESSURE: 110 MMHG | WEIGHT: 208.8 LBS | OXYGEN SATURATION: 97 %

## 2022-11-07 DIAGNOSIS — I10 ESSENTIAL HYPERTENSION: Primary | ICD-10-CM

## 2022-11-07 DIAGNOSIS — I50.21 ACUTE SYSTOLIC CHF (CONGESTIVE HEART FAILURE) (HCC): ICD-10-CM

## 2022-11-07 PROCEDURE — 93000 ELECTROCARDIOGRAM COMPLETE: CPT | Performed by: SPECIALIST

## 2022-11-07 PROCEDURE — 99213 OFFICE O/P EST LOW 20 MIN: CPT | Performed by: SPECIALIST

## 2022-11-07 PROCEDURE — 3074F SYST BP LT 130 MM HG: CPT | Performed by: SPECIALIST

## 2022-11-07 PROCEDURE — 3078F DIAST BP <80 MM HG: CPT | Performed by: SPECIALIST

## 2022-11-07 NOTE — PROGRESS NOTES
Visit Vitals  /70 (BP 1 Location: Left upper arm, BP Patient Position: Sitting, BP Cuff Size: Adult)   Pulse 88   Resp 14   Ht 5' 11\" (1.803 m)   Wt 208 lb 12.8 oz (94.7 kg)   SpO2 97%   BMI 29.12 kg/m²

## 2022-11-07 NOTE — PROGRESS NOTES
[address] [facility]                                                            OFFICE NOTE        Marvin Telles M.D.,ULI Raimundo Daub   1970  [id number]    Date/Time:  11/7/20222:18 PM            SUBJECTIVE:  No recurrent palpitations   No cp or sob        Assessment/Plan  1. Paroxysmal atrial fibrillation: I have no evidence at this time of atrial fibrillation based on EKG from the emergency room. His  electrogram revealed normal sinus rhythm as well. For now continue metoprolol     His CHADS2 DS 2 vascular score is 1 for hypertension. Discussed echo and stress test    Normal EF no valves issues and no ischemia by stress test    Now with no recurrent palpitations    Continue with increased dose of metoprolol    Still waiting for event monitor final  results    Continue eliquis for now but I will consider stopping if no AF by event monitor on final report    2. Hypertension: Seems to be well controlled today. 3.  CKD: On hemodialysis 3 times a week and doing well. Follow-up course by nephrology. Seen back in approximately 3 weeks. HPI   Very pleasant 50years old male with a past medical history remarkable for polycystic kidney disease resulting in renal failure and hemodialysis also history hypertension who presents today for cardiac evaluation of atrial fibrillation. Irregular heart rate was noted during dialysis the patient was sent to the emergency room where EKG was done on August 4, 2022. EKG in the emergency room was nonetheless showing normal sinus rhythm. Past medical history: As above. Past surgical history: Nonsignificant. Hernia repair. Social history: He does not smoke or drink. He works as a     Family history: Significant echocardiograms.               CARDIAC STUDIES        10/26/22    ECHO ADULT COMPLETE 10/27/2022 10/27/2022    Interpretation Summary    Left Ventricle: Normal left ventricular systolic function. EF by 2D Simpsons Biplane is 58%. Left ventricle size is normal. Increased wall thickness consistent with moderate concentric hypertrophy. Normal wall motion. Abnormal diastolic function. No significant valvular pathology. Aorta: Mildly dilated sinus of Valsalva (4.3 cm). Signed by: Paxton Berkowitz MD on 10/27/2022  2:09 PM            10/26/22    NUCLEAR CARDIAC STRESS TEST 10/27/2022 11/1/2022    Interpretation Summary    Nuclear Findings: There is a moderate severity left ventricular stress perfusion defect that is medium in size present in the basal to mid inferior segment(s) that is predominantly fixed. There is normal wall motion in the defect area. The defect appears to be probable artifact caused by subdiaphragmatic activity. Nuclear Findings: Normal left ventricular systolic function post-stress. Nuclear Findings: Normal treadmill myocardial perfusion study at 90 %% PHR. ECG: Resting ECG demonstrates normal sinus rhythm and left anterior fascicular block. ECG: Stress ECG was negative for ischemia. Stress Test: A Peter protocol stress test was performed. Overall, the patient's exercise capacity was below average for their age. The patient reached stage 2 of the protocol And was stressed for 5 min and 1 sec. Hemodynamics are adequate for diagnosis. Blood pressure demonstrated a normal response and heart rate demonstrated a normal response to stress. The patient's heart rate recovery was normal. The patient reported no symptoms during the stress test.    Post-stress ejection fraction is 50%. Stress ECG: Arrhythmias during recovery: rare PVCs. occasional episodes of PAT with the longest of 13 beats duration    Perfusion Comments: Prone images were obtained. Prone imaging was helpful in correcting soft tissue attenuation.     Stress Function: Left ventricular function post-stress is low normal. Post-stress ejection fraction is 50%. Stress Combined Conclusion: Normal treadmill myocardial perfusion study at 90 %% PHR. Signed by: Raul Engle MD on 10/27/2022  4:30 PM                    EKG Results       None                IMAGING      MRI Results (most recent):  Results from Hospital Encounter encounter on 04/10/12    MRI ANKLE RIGHT WITHOUT CONTRAST    Narrative  **Final Report**      ICD Codes / Adm. Diagnosis: 718.17  719.07 / Loose body in ankle and foot j  Effusion of ankle and foot j  Examination:  MR ANKLE MRI WO CON RT  - [id number] - Apr 10 2012  7:13PM  Accession No:  [id number]  Reason:  pain      REPORT:  INDICATION: pain of left ankle 718.17, 719.07, 719.47    COMPARISON: None    EXAM: Sagittal T1-weighted spin-echo and fat-suppressed T2-weighted fast  spin-echo, axial fat-suppressed proton density weighted and T2 weighted fast  spin echo, sagittal fat-suppressed 3D gradient-echo and coronal  fat-suppressed T2 weighted fast spin-echo images of the right ankle are  obtained. FINDINGS: There is osseous edema like signal at the inferior aspect of the  lateral malleolus. There are also subcortical cysts in the anterior process  of the calcaneus adjacent to the sinus tarsi. A mild to moderate osseous  edema and the medial cuneiform bone is demonstrated subjacent to an area of  distal high-grade chondral derangement. There is a moderate to large effusion of the ankle and posterior subtalar  joints with demonstration of mild diffuse chondral thinning with small  marginal osteophytes. There is a 7 mm loose body in the anterior joint  recess. There is also heterogeneous signal in the posterior joint recesses  which could contain small loose bodies as well. There is absent  visualization of the anterior talofibular ligament consistent with chronic  tear. The other ankle ligaments appear intact. There is a small effusion of the talonavicular joint.  Small dorsal marginal  osteophytes throughout the midfoot region are noted. There is a moderate effusion in the posterior tibialis tendon sheath with  diffuse mild tendon thickening and inframalleolar heterogeneous signal.  There is a small effusion of the flexor digitorum longus and flexor hallucis  longus tendon sheaths with normal appearing tendons. There is a small  effusion of the peroneal tendon sheath with mild diffuse thickening of the  peroneus longus tendon though uniform signal. Mild. Achilles peritendinitis  is noted. There is a trace amount of fluid signal in the extensor digitorum  longus tendon sheath. There is mild flatfoot deformity. No tarsal coalition is shown. There is no  bone or soft tissue mass. IMPRESSION:  1. Chronic tear of anterior talofibular ligament with mild to moderate ankle  joint osteoarthritis including at least one anterior recess loose body. Moderate ankle and posterior subtalar effusions. 2. Posterior tibialis and peroneus tenosynovitis with chronic tendinopathy  of posterior tibialis and peroneus longus. 3. Mild Achilles peritendinitis. Signing/Reading Doctor: Evelin Mesa. Laura Au ([id number])  Approved: MALINI Au ([id number])  04/11/2012      CT Results (most recent):  Results from [place] encounter on 10/26/19    CT CHEST WO CONT    Narrative  EXAM:  CT CHEST WO CONT    INDICATION:  sob    COMPARISON: Chest radiograph 10/26/2019. CONTRAST:  None. TECHNIQUE: Unenhanced multislice helical CT was performed from the thoracic  inlet to the adrenal glands without intravenous contrast administration. Contiguous 5 mm axial images were reconstructed and lung and soft tissue windows  were generated. Coronal and sagittal reformations were generated. CT dose  reduction was achieved through use of a standardized protocol tailored for this  examination and automatic exposure control for dose modulation.     FINDINGS:  Lungs/Pleura: Bilateral interlobular septal thickening, most prominent in the  lower lobes, with scattered patchy peribronchovascular groundglass opacities  throughout both lungs. Trace bilateral pleural effusions. No pneumothorax. No  suspicious pulmonary nodules. Axilla/Soft Tissue: No pathologic axillary adenopathy. Mediastinum: The heart is normal in size. Trace pericardial effusion. No  pathologic mediastinal or hilar adenopathy. Upper Abdomen: Markedly enlarged kidneys with cysts entirely replacing the renal  parenchyma, including multiple hemorrhagic cysts, consistent with autosomal  dominant polycystic kidney disease. Small 13 mm cyst in hepatic segment 7. Remaining visualized upper abdomen is unremarkable. Bones: No evidence of acute fracture, dislocation, or aggressive osseous  abnormality. Impression  IMPRESSION:  1. Pulmonary interstitial and alveolar edema with trace bilateral pleural  effusions. 2. Trace pericardial effusion. 3. Findings consistent with autosomal dominant polycystic kidney disease. XR Results (most recent):  Results from Hospital Encounter encounter on 06/26/22    XR SPINE CERV 4 OR 5 V    Narrative  EXAM:  XR SPINE CERV 4 OR 5 V    INDICATION: Right-sided neck pain for 2 days    COMPARISON: None. TECHNIQUE: 5 views cervical spine    FINDINGS: There is no acute fracture or subluxation. Vertebral body heights are  maintained. There is intervertebral disc space narrowing and endplate  irregularity at C6-7. There is mild intervertebral disc space narrowing at C4-5. The neural foramen are patent bilaterally. There is no abnormality in alignment. The C1-2 relationship is within normal limits. The prevertebral soft tissues are  unremarkable. Impression  No acute abnormality. Degenerative disc changes at C6-7 and to a  lesser extent at C4-5.           Past Medical History:   Diagnosis Date    Arthritis     Asthma     AS A CHILD    Chronic kidney disease     ESRD    Chronic pain     KNEES, ANKLES    DJD (degenerative joint disease) Gout     Heart failure ([facility] [address] 2019    CHF    Hypertension     Polycystic kidney disease      Past Surgical History:   Procedure Laterality Date    HX HERNIA REPAIR  28/55/0710    Supra umbilical hernia with mesh and Umbilical hernia repair by Dr. Sean Lawton. HX VASCULAR ACCESS  10/18/2021    LEFT ARM FISTULA     Social History     Tobacco Use    Smoking status: Never    Smokeless tobacco: Never   Vaping Use    Vaping Use: Never used   Substance Use Topics    Alcohol use: Not Currently     Alcohol/week: 4.0 standard drinks     Types: 4 Shots of liquor per week    Drug use: Yes     Types: Marijuana     Comment: 4-5 X WEEK     Family History   Problem Relation Age of Onset    Hypertension Mother     Kidney Disease Mother     Hypertension Father     Kidney Disease Father     Cancer Father         REANAL CANCER    Other Sister         PKD, Fibromyalgia    Cancer Brother         PROSTATE    Cancer Brother         PROSTATE    Anesth Problems Neg Hx      Allergies   Allergen Reactions    Shellfish Containing Products Swelling         There were no vitals taken for this visit. There were no vitals filed for this visit. Review of Systems:   Pertinent items are noted in the History of Present Illness. Visit Vitals  /70 (BP 1 Location: Left upper arm, BP Patient Position: Sitting, BP Cuff Size: Adult)   Pulse 88   Resp 14   Ht 5' 11\" (1.803 m)   Wt 208 lb 12.8 oz (94.7 kg)   SpO2 97%   BMI 29.12 kg/m²     General Appearance:  Well developed, well nourished,alert and oriented x 3, and individual in no acute distress. Ears/Nose/Mouth/Throat:   Hearing grossly normal.         Neck: Supple. Chest:   Lungs clear to auscultation bilaterally. Cardiovascular:  Regular rate and rhythm, S1, S2 normal, no murmur. Abdomen:   Soft, non-tender, bowel sounds are active. Extremities: No edema bilaterally. Skin: Warm and dry.                  Current Outpatient Medications on File Prior to Visit   Medication Sig Dispense Refill    metoprolol tartrate 37.5 mg tab Take 37.5 mg by mouth two (2) times a day. 30 Tablet 1    leflunomide (ARAVA) 10 mg tablet Take 1 Tablet by mouth daily. Please call Dr. London De La Fuente to reschedule followup, no further refills until seen. 30 Tablet 0    apixaban (Eliquis) 2.5 mg tablet Take 1 Tablet by mouth two (2) times a day. 180 Tablet 1    ferric citrate (AURYXIA PO) Take 1 Tablet by mouth three (3) times daily (with meals). acetaminophen (TYLENOL) 325 mg tablet Take 2 Tablets by mouth every four (4) hours as needed for Pain. 20 Tablet 0    colchicine (Colcrys) 0.6 mg tablet TAKE ONE TABLET BY MOUTH DAILY AS NEEDED FOR GOUT FLARE 90 Tablet 5    sodium bicarbonate 325 mg tablet Take 650 mg by mouth two (2) times a day. spironolactone (ALDACTONE) 25 mg tablet Take 25 mg by mouth daily. bumetanide (BUMEX) 1 mg tablet Take 1 Tab by mouth daily. 30 Tab 0    pegloticase (KRYSTEXXA) 8 mg/mL soln injection 8 mg by IntraVENous route Once every 2 weeks. No current facility-administered medications on file prior to visit. [facility] administered during this visit. Current Outpatient Medications   Medication Sig    metoprolol tartrate 37.5 mg tab Take 37.5 mg by mouth two (2) times a day. leflunomide (ARAVA) 10 mg tablet Take 1 Tablet by mouth daily. Please call Dr. London De La Fuente to reschedule followup, no further refills until seen. apixaban (Eliquis) 2.5 mg tablet Take 1 Tablet by mouth two (2) times a day. ferric citrate (AURYXIA PO) Take 1 Tablet by mouth three (3) times daily (with meals). acetaminophen (TYLENOL) 325 mg tablet Take 2 Tablets by mouth every four (4) hours as needed for Pain. colchicine (Colcrys) 0.6 mg tablet TAKE ONE TABLET BY MOUTH DAILY AS NEEDED FOR GOUT FLARE    sodium bicarbonate 325 mg tablet Take 650 mg by mouth two (2) times a day. spironolactone (ALDACTONE) 25 mg tablet Take 25 mg by mouth daily.     bumetanide (BUMEX) 1 mg tablet Take 1 Tab by mouth daily. pegloticase (KRYSTEXXA) 8 mg/mL soln injection 8 mg by IntraVENous route Once every 2 weeks. No current facility-administered medications for this visit. Lab Results   Component Value Date/Time    Cholesterol, total 141 10/27/2019 03:38 AM    HDL Cholesterol 47 10/27/2019 03:38 AM    LDL, calculated 71.6 10/27/2019 03:38 AM    VLDL, calculated 22.4 10/27/2019 03:38 AM    Triglyceride 112 10/27/2019 03:38 AM    CHOL/HDL Ratio 3.0 10/27/2019 03:38 AM       Lab Results   Component Value Date/Time    Sodium 139 10/19/2022 10:57 AM    Potassium 3.3 (L) 10/19/2022 10:57 AM    Chloride 99 10/19/2022 10:57 AM    CO2 34 (H) 10/19/2022 10:57 AM    Anion gap 6 10/19/2022 10:57 AM    Glucose 102 (H) 10/19/2022 10:57 AM    BUN 27 (H) 10/19/2022 10:57 AM    Creatinine 4.45 (H) 10/19/2022 10:57 AM    BUN/Creatinine ratio 6 (L) 10/19/2022 10:57 AM    GFR est AA 14 (L) 09/28/2022 10:46 AM    GFR est non-AA 11 (L) 09/28/2022 10:46 AM    Calcium 9.2 10/19/2022 10:57 AM       Lab Results   Component Value Date/Time    ALT (SGPT) 26 10/19/2022 10:57 AM    Alk. phosphatase 31 (L) 10/19/2022 10:57 AM    Bilirubin, total 0.4 10/19/2022 10:57 AM       Lab Results   Component Value Date/Time    WBC 5.8 10/19/2022 10:57 AM    HGB 11.5 (L) 10/19/2022 10:57 AM    HCT 37.2 10/19/2022 10:57 AM    PLATELET 244 30/57/4734 10:57 AM    MCV 98.7 10/19/2022 10:57 AM       Lab Results   Component Value Date/Time    TSH 1.29 10/27/2019 03:38 AM         Lab Results   Component Value Date/Time    Cholesterol, total 141 10/27/2019 03:38 AM    HDL Cholesterol 47 10/27/2019 03:38 AM    LDL, calculated 71.6 10/27/2019 03:38 AM    Triglyceride 112 10/27/2019 03:38 AM    CHOL/HDL Ratio 3.0 10/27/2019 03:38 AM                Please note that this dictation was completed with [employer], the [employer] voice recognition software.   Quite often unanticipated grammatical, syntax, homophones, and other interpretative errors are inadvertently transcribed by the computer software. Please disregard these errors. Please excuse any errors that have escaped final proofreading.

## 2022-11-10 PROBLEM — Z79.01 ANTICOAGULATION ADEQUATE: Status: ACTIVE | Noted: 2022-11-10

## 2022-11-10 PROBLEM — I47.1 SVT (SUPRAVENTRICULAR TACHYCARDIA) (HCC): Status: ACTIVE | Noted: 2022-11-10

## 2022-11-10 PROBLEM — I47.10 SVT (SUPRAVENTRICULAR TACHYCARDIA): Status: ACTIVE | Noted: 2022-11-10

## 2022-11-10 PROBLEM — N18.5 CHRONIC KIDNEY DISEASE (CKD), STAGE V (HCC): Status: ACTIVE | Noted: 2022-11-10

## 2022-11-11 ENCOUNTER — OFFICE VISIT (OUTPATIENT)
Dept: CARDIOLOGY CLINIC | Age: 52
End: 2022-11-11
Payer: COMMERCIAL

## 2022-11-11 VITALS
BODY MASS INDEX: 28.84 KG/M2 | DIASTOLIC BLOOD PRESSURE: 88 MMHG | SYSTOLIC BLOOD PRESSURE: 130 MMHG | RESPIRATION RATE: 16 BRPM | HEART RATE: 99 BPM | OXYGEN SATURATION: 98 % | HEIGHT: 71 IN | WEIGHT: 206 LBS

## 2022-11-11 DIAGNOSIS — Z99.2 ESRD (END STAGE RENAL DISEASE) ON DIALYSIS (HCC): ICD-10-CM

## 2022-11-11 DIAGNOSIS — I47.1 SVT (SUPRAVENTRICULAR TACHYCARDIA) (HCC): ICD-10-CM

## 2022-11-11 DIAGNOSIS — I10 ESSENTIAL HYPERTENSION: ICD-10-CM

## 2022-11-11 DIAGNOSIS — N18.6 ESRD (END STAGE RENAL DISEASE) ON DIALYSIS (HCC): ICD-10-CM

## 2022-11-11 DIAGNOSIS — N18.5 CHRONIC KIDNEY DISEASE (CKD), STAGE V (HCC): ICD-10-CM

## 2022-11-11 DIAGNOSIS — Z79.01 ANTICOAGULATION ADEQUATE: ICD-10-CM

## 2022-11-11 DIAGNOSIS — I48.0 PAROXYSMAL ATRIAL FIBRILLATION (HCC): Primary | ICD-10-CM

## 2022-11-11 PROCEDURE — 3078F DIAST BP <80 MM HG: CPT | Performed by: INTERNAL MEDICINE

## 2022-11-11 PROCEDURE — 99214 OFFICE O/P EST MOD 30 MIN: CPT | Performed by: INTERNAL MEDICINE

## 2022-11-11 PROCEDURE — 93000 ELECTROCARDIOGRAM COMPLETE: CPT | Performed by: INTERNAL MEDICINE

## 2022-11-11 PROCEDURE — 3074F SYST BP LT 130 MM HG: CPT | Performed by: INTERNAL MEDICINE

## 2022-11-11 RX ORDER — GUAIFENESIN 100 MG/5ML
81 LIQUID (ML) ORAL DAILY
Qty: 90 TABLET | Refills: 3 | Status: SHIPPED | OUTPATIENT
Start: 2022-11-11

## 2022-11-11 NOTE — PATIENT INSTRUCTIONS
Stop Eliquis because your monitor did not show any recent Afib  Start Aspirin 81 mg daily  If you have recurrent Afib in the future, we would need to discuss need for restarting Eliquis  Continue to Follow up with Dr. Gaby Giraldo with NP in 1 year

## 2022-11-11 NOTE — PROGRESS NOTES
Cardiac Electrophysiology OFFICE Consultation Note       Assessment/Plan:   1. Paroxysmal atrial fibrillation (HCC)  2. Chronic kidney disease (CKD), stage V  -     AMB POC EKG ROUTINE W/ 12 LEADS, INTER & REP  -     AMB POC EKG ROUTINE W/ 12 LEADS, INTER & REP  3. ESRD (end stage renal disease) on dialysis ([facility] [address]  4. SVT (supraventricular tachycardia) ([facility] [address]  5. Essential hypertension  6. Anticoagulation adequate     Paroxysmal atrial fibrillation  Diagnosed at dialysis in September and presented to the ED. He has had significant work-up since including TTE from 10/26/22 with LVEF of 58%, moderate LVH. Negative Nuclear stress test on 10/26/22.  1 month event monitor demonstrated no evidence of atrial fibrillation except for few runs of SVT. - Agree with continuation of beta-blocker with metoprolol for rate control  - he has not had any clear ongoing atrial fibrillation and his Joseph-VASc score is 1, therefore it would be reasonable to stop Eliquis at this time and initiate aspirin 81 mg daily  - Explained that if he has recurrent episode of atrial fibrillation, we would rediscuss need for anticoagulation plus or minus rhythm control management  - Follow-up with EP NP in 1 year    End-stage renal disease on dialysis  Getting his dialysis through fistula, tolerating well  - Followed by nephrology    Anticoagulation  Denies of any bleeding issues. Know to contact clinic with any bleeding side effects (BRBPR, melena, hemotysis, hematuria). - Given his end-stage renal disease, high risk for bleeding complications, will stop Eliquis as noted above and start aspirin 81 mg daily    Hypertension  Continue metoprolol 37.5 mg twice daily    Subjective: Eliza Keen is a 50 y.o. patient who is seen for evaluation of  SVT. September he presented in the ED with PAF. He has been on BB. Normal LVEF 58% on TTE from 10/26/22, no ischemia on stress test.  Event monitor demonstrated SVT but no clear Afib.   History of PCKD with ESRD on HD. Otherwise he denies of any chest pain, shortness of breath, palpitations. 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    Autosomal dominant adult polycystic kidney disease Q61.2    Essential hypertension I10    CKD (chronic kidney disease) stage 3, GFR 30-59 ml/min ([facility]) N18.30    Long-term use of immunosuppressant medication Z79.60    CKD (chronic kidney disease) stage 4, GFR 15-29 ml/min ([facility]) N18.4    SOB (shortness of breath) T06.16    Acute systolic CHF (congestive heart failure) ([facility]) I50.21    Anticoagulation adequate Z79.01    ESRD (end stage renal disease) on dialysis ([facility]) N18.6, Z99.2    SVT (supraventricular tachycardia) ([facility]) I47.1    Paroxysmal atrial fibrillation ([facility]) I48.0     Current Outpatient Medications   Medication Sig Dispense Refill    aspirin 81 mg chewable tablet Take 1 Tablet by mouth daily. 90 Tablet 3    metoprolol tartrate 37.5 mg tab Take 37.5 mg by mouth two (2) times a day. 30 Tablet 1    leflunomide (ARAVA) 10 mg tablet Take 1 Tablet by mouth daily. Please call Dr. Haroldine Shone to reschedule followup, no further refills until seen. 30 Tablet 0    ferric citrate (AURYXIA PO) Take 1 Tablet by mouth three (3) times daily (with meals). acetaminophen (TYLENOL) 325 mg tablet Take 2 Tablets by mouth every four (4) hours as needed for Pain. 20 Tablet 0    colchicine (Colcrys) 0.6 mg tablet TAKE ONE TABLET BY MOUTH DAILY AS NEEDED FOR GOUT FLARE 90 Tablet 5    sodium bicarbonate 325 mg tablet Take 650 mg by mouth two (2) times a day. spironolactone (ALDACTONE) 25 mg tablet Take 25 mg by mouth daily. bumetanide (BUMEX) 1 mg tablet Take 1 Tab by mouth daily.  30 Tab 0    pegloticase (KRYSTEXXA) 8 mg/mL soln injection 8 mg by IntraVENous route Once every 2 weeks. Allergies   Allergen Reactions    Shellfish Containing Products Swelling     Past Medical History:   Diagnosis Date    Arthritis     Asthma     AS A CHILD    Chronic kidney disease     ESRD    Chronic pain     KNEES, ANKLES    DJD (degenerative joint disease)     Gout     Heart failure ([facility] [address] 2019    CHF    Hypertension     Polycystic kidney disease      Past Surgical History:   Procedure Laterality Date    HX HERNIA REPAIR  14/75/9223    Supra umbilical hernia with mesh and Umbilical hernia repair by Dr. Madeline Phalen. HX VASCULAR ACCESS  10/18/2021    LEFT ARM FISTULA     Family History   Problem Relation Age of Onset    Hypertension Mother     Kidney Disease Mother     Hypertension Father     Kidney Disease Father     Cancer Father         REANAL CANCER    Other Sister         PKD, Fibromyalgia    Cancer Brother         PROSTATE    Cancer Brother         PROSTATE    Anesth Problems Neg Hx      Social History     Tobacco Use    Smoking status: Never    Smokeless tobacco: Never   Substance Use Topics    Alcohol use: Not Currently     Alcohol/week: 4.0 standard drinks     Types: 4 Shots of liquor per week        Review of Systems:   12 point review of systems was performed.  All negative except for HPI     Objective:   /88   Pulse 99   Resp 16   Ht 5' 11\" (1.803 m)   Wt 206 lb (93.4 kg)   SpO2 98%   BMI 28.73 kg/m²     Physical Exam:   General:  Alert and oriented, in no acute distress  Head:  Atraumatic, normocephalic  Eyes:  extraocular muscles intact  Neck:  Supple, normal range of motion  Lungs:  Clear to auscultation bilaterally, no wheezes/rales/rhonchi   Cardiovascular:  Regular rate and rhythm, normal S1-S2, no murmurs/rubs/gallops  Abdomen:  Soft, nontender, nondistended, normoactive bowel sounds  Skin:  Intact, no rash  Extremities:, no clubbing, cyanosis, or edema; right arm fistula  Musculoskeletal: normal range of motion  Neurological:  Alert and oriented, no focal neurologic deficits  Psychiatric:  Normal mood and affect    No results found for: HBA1C, VHD1FKPM, ZIY0FLHB, FGY5ZUYM  EKG: EKG today demonstrated sinus rhythm with AV delay, WA of 208 ms, rate of 99 bpm poor R wave progression  10/26/22    ECHO ADULT COMPLETE 10/27/2022 10/27/2022    Interpretation Summary    Left Ventricle: Normal left ventricular systolic function. EF by 2D Simpsons Biplane is 58%. Left ventricle size is normal. Increased wall thickness consistent with moderate concentric hypertrophy. Normal wall motion. Abnormal diastolic function. No significant valvular pathology. Aorta: Mildly dilated sinus of Valsalva (4.3 cm). Signed by: Mikhail Cabral MD on 10/27/2022  2:09 PM      10/26/22    NUCLEAR CARDIAC STRESS TEST 10/27/2022 11/1/2022    Interpretation Summary    Nuclear Findings: There is a moderate severity left ventricular stress perfusion defect that is medium in size present in the basal to mid inferior segment(s) that is predominantly fixed. There is normal wall motion in the defect area. The defect appears to be probable artifact caused by subdiaphragmatic activity. Nuclear Findings: Normal left ventricular systolic function post-stress. Nuclear Findings: Normal treadmill myocardial perfusion study at 90 %% PHR. ECG: Resting ECG demonstrates normal sinus rhythm and left anterior fascicular block. ECG: Stress ECG was negative for ischemia. Stress Test: A Peter protocol stress test was performed. Overall, the patient's exercise capacity was below average for their age. The patient reached stage 2 of the protocol And was stressed for 5 min and 1 sec. Hemodynamics are adequate for diagnosis. Blood pressure demonstrated a normal response and heart rate demonstrated a normal response to stress. The patient's heart rate recovery was normal. The patient reported no symptoms during the stress test.    Post-stress ejection fraction is 50%.     Stress ECG: Arrhythmias during recovery: rare PVCs. occasional episodes of PAT with the longest of 13 beats duration    Perfusion Comments: Prone images were obtained. Prone imaging was helpful in correcting soft tissue attenuation. Stress Function: Left ventricular function post-stress is low normal. Post-stress ejection fraction is 50%. Stress Combined Conclusion: Normal treadmill myocardial perfusion study at 90 %% PHR. Signed by: Jonathan Bear MD on 10/27/2022  4:30 PM    Results for orders placed or performed during the hospital encounter of 10/13/21   EKG, 12 LEAD, INITIAL   Result Value Ref Range    Ventricular Rate 70 BPM    Atrial Rate 70 BPM    P-R Interval 218 ms    QRS Duration 106 ms    Q-T Interval 400 ms    QTC Calculation (Bezet) 432 ms    Calculated P Axis 59 degrees    Calculated R Axis -53 degrees    Calculated T Axis 23 degrees    Diagnosis       Sinus rhythm with 1st degree AV block  Left axis deviation  Borderline criteria for Inferior infarct , age undetermined  When compared with ECG of 27-OCT-2019 09:26,  DC interval has increased  Borderline criteria for ` Inferior infarct is now present  T wave inversion no longer evident in Anterior leads  Confirmed by Solomon Mcclure ([id number]) on 10/13/2021 6:47:36 PM               Thank you for involving me in this patient's care and please call with further concerns or questions. ________________________________________  An Sergio Dodd MD, [facility], [facility]  Cardiac Electrophysiology  [facility]  [address] [place] [address]                             [phone]     [address] [place] [address].  [address] [place] [address], [address] [place] [address]  [phone]

## 2022-11-16 DIAGNOSIS — M1A.39X1 CHRONIC TOPHACEOUS GOUT OF MULTIPLE SITES DUE TO RENAL IMPAIRMENT: ICD-10-CM

## 2022-11-16 RX ORDER — LEFLUNOMIDE 10 MG/1
10 TABLET ORAL DAILY
Qty: 30 TABLET | Refills: 0 | OUTPATIENT
Start: 2022-11-16

## 2022-11-16 NOTE — TELEPHONE ENCOUNTER
Received refill request for Leflunomide 10mg tablet. Last visit was 7/6/22  No follow up scheduled at this time  METABOLIC PANEL, COMPREHENSIVE  Order: [id number]  Collected 10/19/2022 10:57    Status: Final result    Next appt: 11/28/2022 at 01:40 PM in Cardiology Matthieu Merrill MD)    0 Result Notes  Component Ref Range & Units 10/19/22 1057 9/28/22 1046 9/14/22 1012 8/31/22 1046 8/19/22 1047 8/5/22 0016 8/3/22 1042   Sodium 136 - 145 mmol/L 139  139  138  141  139  139  140    Potassium 3.5 - 5.1 mmol/L 3.3 Low   3.8  3.5  4.4 CM  3.4 Low   3.0 Low   3.8    Chloride 97 - 108 mmol/L 99  102  99  106  99  100  103    CO2 21 - 32 mmol/L 34 High   31  32  21  33 High   32  29    Anion gap 5 - 15 mmol/L 6  6  7  14  7  7  8    Glucose 65 - 100 mg/dL 102 High   110 High   99  134 High   100  78  93    BUN 6 - 20 MG/DL 27 High   34 High   32 High   88 High   20  33 High   24 High     Creatinine 0.70 - 1.30 MG/DL 4.45 High   5.44 High   4.48 High   9.19 High   3.99 High   5.20 High   4.30 High     BUN/Creatinine ratio 12 - 20   6 Low   6 Low   7 Low   10 Low   5 Low   6 Low   6 Low     eGFR >60 ml/min/1.73m2 15 Low           Comment:      Pediatric calculator link: Lulu.at. org/professionals/kdoqi/gfr_calculatorped         Effective Oct 3, 2022         These results are not intended for use in patients <25years of age. eGFR results are calculated without a race factor using  the 2021 CKD-EPI equation. Careful clinical correlation is recommended, particularly when comparing to results calculated using previous equations. The CKD-EPI equation is less accurate in patients with extremes of muscle mass, extra-renal metabolism of creatinine, excessive creatine ingestion, or following therapy that affects renal tubular secretion.     Calcium 8.5 - 10.1 MG/DL 9.2  9.1  9.1  9.3  9.2  9.6  9.6    Bilirubin, total 0.2 - 1.0 MG/DL 0.4  0.5  0.4  0.6  0.4   0.6    ALT (SGPT) 12 - 78 U/L 26  23  26  30  35 29    AST (SGOT) 15 - 37 U/L 18  15  15  31 CM  25   17    Alk. phosphatase 45 - 117 U/L 31 Low   28 Low   31 Low   29 Low   32 Low    37 Low     Protein, total 6.4 - 8.2 g/dL 7.3  6.9  7.4  7.1  7.1   7.2    Albumin 3.5 - 5.0 g/dL 3.5  3.2 Low   3.5  3.2 Low   3.3 Low    3.6    Globulin 2.0 - 4.0 g/dL 3.8  3.7  3.9  3.9  3.8   3.6    A-G Ratio 1.1 - 2.2   0.9 Low   0.9 Low   0.9 Low   0.8 Low          CBC WITH AUTOMATED DIFF  Order: [id number]  Collected 10/19/2022 10:57    Status: Final result    Next appt: 11/28/2022 at 01:40 PM in Cardiology Hawk Huerta MD)    0 Result Notes  Component Ref Range & Units 10/19/22 1057 9/28/22 1046 9/14/22 1012 8/31/22 1046 8/19/22 1047 8/5/22 0016 8/3/22 1042   WBC 4.1 - 11.1 K/uL 5.8  5.8  5.1  5.6  6.1  6.6  4.1    RBC 4.10 - 5.70 M/uL 3.77 Low   3.53 Low   3.57 Low   3.72 Low   4.11  4.15  4.28    HGB 12.1 - 17.0 g/dL 11.5 Low   11.0 Low   11.0 Low   11.0 Low   12.2  12.4  12.7    HCT 36.6 - 50.3 % 37.2  34.8 Low   35.5 Low   35.8 Low   38.6  39.2  39.8    MCV 80.0 - 99.0 FL 98.7  98.6  99.4 High   96.2  93.9  94.5  93.0    MCH 26.0 - 34.0 PG 30.5  31.2  30.8  29.6  29.7  29.9  29.7    MCHC 30.0 - 36.5 g/dL 30.9  31.6  31.0  30.7  31.6  31.6  31.9    RDW 11.5 - 14.5 % 13.4  13.4  14.1  15.4 High   15.4 High   15.7 High   15.8 High     PLATELET 094 - 173 K/uL 222  250  238  301  253  216  231    MPV 8.9 - 12.9 FL 9.9  9.7  9.6  10.6  10.2  10.5  10.5    NRBC 0  WBC 0.0  0.0  0.0  0.0  0.0  0.0  0.0    ABSOLUTE NRBC 0.00 - 0.01 K/uL 0.00  0.00  0.00  0.00  0.00  0.00  0.00    NEUTROPHILS 32 - 75 % 54  55  53  56  52  40  51    LYMPHOCYTES 12 - 49 % 23  21  26  25  26  39  29    MONOCYTES 5 - 13 % 10  10  12  10  12  13  10    EOSINOPHILS 0 - 7 % 12 High   13 High   8 High   8 High   9 High   7  9 High     BASOPHILS 0 - 1 % 1  1  1  1  1  1  1    IMMATURE GRANULOCYTES 0.0 - 0.5 % 0  0  0  0  0  0  0    ABS.  NEUTROPHILS 1.8 - 8.0 K/UL 3.1  3.2  2.7  3.1  3.1  2.7  2.1 ABS. LYMPHOCYTES 0.8 - 3.5 K/UL 1.4  1.2  1.3  1.4  1.6  2.6  1.2    ABS. MONOCYTES 0.0 - 1.0 K/UL 0.6  0.6  0.6  0.6  0.7  0.9  0.4    ABS. EOSINOPHILS 0.0 - 0.4 K/UL 0.7 High   0.8 High   0.4  0.4  0.5 High   0.5 High   0.4    ABS. BASOPHILS 0.0 - 0.1 K/UL 0.1  0.0  0.1  0.1  0.1  0.1  0.0    ABS. IMM.  GRANS. 0.00 - 0.04 K/UL 0.0  0.0  0.0  0.0

## 2022-11-16 NOTE — TELEPHONE ENCOUNTER
Please remind patient to schedule followup, as warned with his last refill we need to see him in-office before further continuation

## 2022-11-21 RX ORDER — METOPROLOL TARTRATE 37.5 MG/1
TABLET, FILM COATED ORAL
Qty: 180 TABLET | Refills: 1 | Status: SHIPPED
Start: 2022-11-21 | End: 2022-11-28

## 2022-11-21 NOTE — TELEPHONE ENCOUNTER
Requested Prescriptions     Signed Prescriptions Disp Refills    metoprolol tartrate 37.5 mg tab 180 Tablet 1     Sig: TAKE ONE TABLET BY MOUTH TWICE A DAY     Authorizing Provider: Aislinn Sesay     Ordering User: Kathia Leblanc     Verbal order per Dr. Franco Betancourt.     Future Appointments   Date Time Provider Barry Cano   11/28/2022  1:40 PM MD ALYSSA Romero   11/13/2023  1:00 PM MIHAELA Herrera AMB

## 2022-11-28 ENCOUNTER — OFFICE VISIT (OUTPATIENT)
Dept: CARDIOLOGY CLINIC | Age: 52
End: 2022-11-28
Payer: COMMERCIAL

## 2022-11-28 VITALS
SYSTOLIC BLOOD PRESSURE: 128 MMHG | DIASTOLIC BLOOD PRESSURE: 88 MMHG | BODY MASS INDEX: 29.4 KG/M2 | HEIGHT: 71 IN | WEIGHT: 210 LBS | OXYGEN SATURATION: 95 % | RESPIRATION RATE: 16 BRPM | HEART RATE: 80 BPM

## 2022-11-28 DIAGNOSIS — I47.1 SVT (SUPRAVENTRICULAR TACHYCARDIA) (HCC): Primary | ICD-10-CM

## 2022-11-28 PROCEDURE — 3074F SYST BP LT 130 MM HG: CPT | Performed by: SPECIALIST

## 2022-11-28 PROCEDURE — 3078F DIAST BP <80 MM HG: CPT | Performed by: SPECIALIST

## 2022-11-28 PROCEDURE — 99213 OFFICE O/P EST LOW 20 MIN: CPT | Performed by: SPECIALIST

## 2022-11-28 RX ORDER — METOPROLOL TARTRATE 50 MG/1
50 TABLET ORAL 2 TIMES DAILY
Qty: 90 TABLET | Refills: 1 | Status: SHIPPED | OUTPATIENT
Start: 2022-11-28

## 2022-11-28 NOTE — PROGRESS NOTES
Visit Vitals  /88   Pulse 80   Resp 16   Ht 5' 11\" (1.803 m)   Wt 210 lb (95.3 kg)   SpO2 95%   BMI 29.29 kg/m²

## 2022-11-28 NOTE — PROGRESS NOTES
[address] [facility]                                                            OFFICE NOTE        Vito Cox M.D.,ULI Odette Barnes   1970  [id number]    Date/Time:  11/28/202212:20 PM            SUBJECTIVE:    He is doing well seldom palpitations   No cp or sob reported   No syncope or presyncope     Assessment/Plan  1. Paroxysmal atrial fibrillation: I have no evidence at this time of atrial fibrillation based on EKG from the emergency room. His  electrogram revealed normal sinus rhythm as well. His CHADS2 DS 2 vascular score is 1 for hypertension. Discussed echo and stress test    Normal EF no valves issues and no ischemia by stress test     Loop with occasional pvcs short nsvt and svt    Increase metoprolol to 50 mg bid      Continue off eliquis and on aspirin alone for now    Also seen by EP Dr. Donney Primrose    2. Hypertension: Seems to be well controlled today. 3.  CKD: On hemodialysis 3 times a week and doing well. Follow-up course by nephrology. Seen back in approximately 6 months        HPI   Very pleasant 50years old male with a past medical history remarkable for polycystic kidney disease resulting in renal failure and hemodialysis also history hypertension who presents today for cardiac evaluation of atrial fibrillation. Irregular heart rate was noted during dialysis the patient was sent to the emergency room where EKG was done on August 4, 2022. EKG in the emergency room was nonetheless showing normal sinus rhythm. Past medical history: As above. Past surgical history: Nonsignificant. Hernia repair. Social history: He does not smoke or drink. He works as a     Family history: Significant echocardiograms.                  CARDIAC STUDIES        10/26/22    ECHO ADULT COMPLETE 10/27/2022 10/27/2022    Interpretation Summary    Left Ventricle: Normal left ventricular systolic function. EF by 2D Simpsons Biplane is 58%. Left ventricle size is normal. Increased wall thickness consistent with moderate concentric hypertrophy. Normal wall motion. Abnormal diastolic function. No significant valvular pathology. Aorta: Mildly dilated sinus of Valsalva (4.3 cm). Signed by: Karina Tucker MD on 10/27/2022  2:09 PM            10/26/22    NUCLEAR CARDIAC STRESS TEST 10/27/2022 11/1/2022    Interpretation Summary    Nuclear Findings: There is a moderate severity left ventricular stress perfusion defect that is medium in size present in the basal to mid inferior segment(s) that is predominantly fixed. There is normal wall motion in the defect area. The defect appears to be probable artifact caused by subdiaphragmatic activity. Nuclear Findings: Normal left ventricular systolic function post-stress. Nuclear Findings: Normal treadmill myocardial perfusion study at 90 %% PHR. ECG: Resting ECG demonstrates normal sinus rhythm and left anterior fascicular block. ECG: Stress ECG was negative for ischemia. Stress Test: A Peter protocol stress test was performed. Overall, the patient's exercise capacity was below average for their age. The patient reached stage 2 of the protocol And was stressed for 5 min and 1 sec. Hemodynamics are adequate for diagnosis. Blood pressure demonstrated a normal response and heart rate demonstrated a normal response to stress. The patient's heart rate recovery was normal. The patient reported no symptoms during the stress test.    Post-stress ejection fraction is 50%. Stress ECG: Arrhythmias during recovery: rare PVCs. occasional episodes of PAT with the longest of 13 beats duration    Perfusion Comments: Prone images were obtained. Prone imaging was helpful in correcting soft tissue attenuation. Stress Function: Left ventricular function post-stress is low normal. Post-stress ejection fraction is 50%.     Stress Combined Conclusion: Normal treadmill myocardial perfusion study at 90 %% PHR. Signed by: Jonathan Bear MD on 10/27/2022  4:30 PM                    EKG Results       None                IMAGING      MRI Results (most recent):  Results from Hospital Encounter encounter on 04/10/12    MRI ANKLE RIGHT WITHOUT CONTRAST    Narrative  **Final Report**      ICD Codes / Adm. Diagnosis: 718.17  719.07 / Loose body in ankle and foot j  Effusion of ankle and foot j  Examination:  MR ANKLE MRI WO CON RT  - [id number] - Apr 10 2012  7:13PM  Accession No:  [id number]  Reason:  pain      REPORT:  INDICATION: pain of left ankle 718.17, 719.07, 719.47    COMPARISON: None    EXAM: Sagittal T1-weighted spin-echo and fat-suppressed T2-weighted fast  spin-echo, axial fat-suppressed proton density weighted and T2 weighted fast  spin echo, sagittal fat-suppressed 3D gradient-echo and coronal  fat-suppressed T2 weighted fast spin-echo images of the right ankle are  obtained. FINDINGS: There is osseous edema like signal at the inferior aspect of the  lateral malleolus. There are also subcortical cysts in the anterior process  of the calcaneus adjacent to the sinus tarsi. A mild to moderate osseous  edema and the medial cuneiform bone is demonstrated subjacent to an area of  distal high-grade chondral derangement. There is a moderate to large effusion of the ankle and posterior subtalar  joints with demonstration of mild diffuse chondral thinning with small  marginal osteophytes. There is a 7 mm loose body in the anterior joint  recess. There is also heterogeneous signal in the posterior joint recesses  which could contain small loose bodies as well. There is absent  visualization of the anterior talofibular ligament consistent with chronic  tear. The other ankle ligaments appear intact. There is a small effusion of the talonavicular joint. Small dorsal marginal  osteophytes throughout the midfoot region are noted.     There is a moderate effusion in the posterior tibialis tendon sheath with  diffuse mild tendon thickening and inframalleolar heterogeneous signal.  There is a small effusion of the flexor digitorum longus and flexor hallucis  longus tendon sheaths with normal appearing tendons. There is a small  effusion of the peroneal tendon sheath with mild diffuse thickening of the  peroneus longus tendon though uniform signal. Mild. Achilles peritendinitis  is noted. There is a trace amount of fluid signal in the extensor digitorum  longus tendon sheath. There is mild flatfoot deformity. No tarsal coalition is shown. There is no  bone or soft tissue mass. IMPRESSION:  1. Chronic tear of anterior talofibular ligament with mild to moderate ankle  joint osteoarthritis including at least one anterior recess loose body. Moderate ankle and posterior subtalar effusions. 2. Posterior tibialis and peroneus tenosynovitis with chronic tendinopathy  of posterior tibialis and peroneus longus. 3. Mild Achilles peritendinitis. Signing/Reading Doctor: Clarita Lang ([id number])  Approved: MALINI Lang ([id number])  04/11/2012      CT Results (most recent):  Results from [place] encounter on 10/26/19    CT CHEST WO CONT    Narrative  EXAM:  CT CHEST WO CONT    INDICATION:  sob    COMPARISON: Chest radiograph 10/26/2019. CONTRAST:  None. TECHNIQUE: Unenhanced multislice helical CT was performed from the thoracic  inlet to the adrenal glands without intravenous contrast administration. Contiguous 5 mm axial images were reconstructed and lung and soft tissue windows  were generated. Coronal and sagittal reformations were generated. CT dose  reduction was achieved through use of a standardized protocol tailored for this  examination and automatic exposure control for dose modulation.     FINDINGS:  Lungs/Pleura: Bilateral interlobular septal thickening, most prominent in the  lower lobes, with scattered patchy peribronchovascular groundglass opacities  throughout both lungs. Trace bilateral pleural effusions. No pneumothorax. No  suspicious pulmonary nodules. Axilla/Soft Tissue: No pathologic axillary adenopathy. Mediastinum: The heart is normal in size. Trace pericardial effusion. No  pathologic mediastinal or hilar adenopathy. Upper Abdomen: Markedly enlarged kidneys with cysts entirely replacing the renal  parenchyma, including multiple hemorrhagic cysts, consistent with autosomal  dominant polycystic kidney disease. Small 13 mm cyst in hepatic segment 7. Remaining visualized upper abdomen is unremarkable. Bones: No evidence of acute fracture, dislocation, or aggressive osseous  abnormality. Impression  IMPRESSION:  1. Pulmonary interstitial and alveolar edema with trace bilateral pleural  effusions. 2. Trace pericardial effusion. 3. Findings consistent with autosomal dominant polycystic kidney disease. XR Results (most recent):  Results from Hospital Encounter encounter on 06/26/22    XR SPINE CERV 4 OR 5 V    Narrative  EXAM:  XR SPINE CERV 4 OR 5 V    INDICATION: Right-sided neck pain for 2 days    COMPARISON: None. TECHNIQUE: 5 views cervical spine    FINDINGS: There is no acute fracture or subluxation. Vertebral body heights are  maintained. There is intervertebral disc space narrowing and endplate  irregularity at C6-7. There is mild intervertebral disc space narrowing at C4-5. The neural foramen are patent bilaterally. There is no abnormality in alignment. The C1-2 relationship is within normal limits. The prevertebral soft tissues are  unremarkable. Impression  No acute abnormality. Degenerative disc changes at C6-7 and to a  lesser extent at C4-5.           Past Medical History:   Diagnosis Date    Arthritis     Asthma     AS A CHILD    Chronic kidney disease     ESRD    Chronic pain     KNEES, ANKLES    DJD (degenerative joint disease)     Gout     Heart failure ([facility] [address] 2019    CHF    Hypertension Polycystic kidney disease      Past Surgical History:   Procedure Laterality Date    HX HERNIA REPAIR  19/53/0473    Supra umbilical hernia with mesh and Umbilical hernia repair by Dr. Rosalie Schmitz. HX VASCULAR ACCESS  10/18/2021    LEFT ARM FISTULA     Social History     Tobacco Use    Smoking status: Never    Smokeless tobacco: Never   Vaping Use    Vaping Use: Never used   Substance Use Topics    Alcohol use: Not Currently     Alcohol/week: 4.0 standard drinks     Types: 4 Shots of liquor per week    Drug use: Yes     Types: Marijuana     Comment: 4-5 X WEEK     Family History   Problem Relation Age of Onset    Hypertension Mother     Kidney Disease Mother     Hypertension Father     Kidney Disease Father     Cancer Father         REANAL CANCER    Other Sister         PKD, Fibromyalgia    Cancer Brother         PROSTATE    Cancer Brother         PROSTATE    Anesth Problems Neg Hx      Allergies   Allergen Reactions    Shellfish Containing Products Swelling         There were no vitals taken for this visit. There were no vitals filed for this visit. Review of Systems:   Pertinent items are noted in the History of Present Illness. Current Outpatient Medications on File Prior to Visit   Medication Sig Dispense Refill    metoprolol tartrate 37.5 mg tab TAKE ONE TABLET BY MOUTH TWICE A  Tablet 1    aspirin 81 mg chewable tablet Take 1 Tablet by mouth daily. 90 Tablet 3    leflunomide (ARAVA) 10 mg tablet Take 1 Tablet by mouth daily. Please call Dr. Huan Martinez to reschedule followup, no further refills until seen. 30 Tablet 0    ferric citrate (AURYXIA PO) Take 1 Tablet by mouth three (3) times daily (with meals). acetaminophen (TYLENOL) 325 mg tablet Take 2 Tablets by mouth every four (4) hours as needed for Pain.  20 Tablet 0    colchicine (Colcrys) 0.6 mg tablet TAKE ONE TABLET BY MOUTH DAILY AS NEEDED FOR GOUT FLARE 90 Tablet 5    sodium bicarbonate 325 mg tablet Take 650 mg by mouth two (2) times a day. spironolactone (ALDACTONE) 25 mg tablet Take 25 mg by mouth daily. bumetanide (BUMEX) 1 mg tablet Take 1 Tab by mouth daily. 30 Tab 0    pegloticase (KRYSTEXXA) 8 mg/mL soln injection 8 mg by IntraVENous route Once every 2 weeks. No current facility-administered medications on file prior to visit. Amari Rob had no medications administered during this visit. Current Outpatient Medications   Medication Sig    metoprolol tartrate 37.5 mg tab TAKE ONE TABLET BY MOUTH TWICE A DAY    aspirin 81 mg chewable tablet Take 1 Tablet by mouth daily. leflunomide (ARAVA) 10 mg tablet Take 1 Tablet by mouth daily. Please call Dr. Bailey Bloch to reschedule followup, no further refills until seen. ferric citrate (AURYXIA PO) Take 1 Tablet by mouth three (3) times daily (with meals). acetaminophen (TYLENOL) 325 mg tablet Take 2 Tablets by mouth every four (4) hours as needed for Pain. colchicine (Colcrys) 0.6 mg tablet TAKE ONE TABLET BY MOUTH DAILY AS NEEDED FOR GOUT FLARE    sodium bicarbonate 325 mg tablet Take 650 mg by mouth two (2) times a day. spironolactone (ALDACTONE) 25 mg tablet Take 25 mg by mouth daily. bumetanide (BUMEX) 1 mg tablet Take 1 Tab by mouth daily. pegloticase (KRYSTEXXA) 8 mg/mL soln injection 8 mg by IntraVENous route Once every 2 weeks. No current facility-administered medications for this visit.          Lab Results   Component Value Date/Time    Cholesterol, total 141 10/27/2019 03:38 AM    HDL Cholesterol 47 10/27/2019 03:38 AM    LDL, calculated 71.6 10/27/2019 03:38 AM    VLDL, calculated 22.4 10/27/2019 03:38 AM    Triglyceride 112 10/27/2019 03:38 AM    CHOL/HDL Ratio 3.0 10/27/2019 03:38 AM       Lab Results   Component Value Date/Time    Sodium 139 10/19/2022 10:57 AM    Potassium 3.3 (L) 10/19/2022 10:57 AM    Chloride 99 10/19/2022 10:57 AM    CO2 34 (H) 10/19/2022 10:57 AM    Anion gap 6 10/19/2022 10:57 AM    Glucose 102 (H) 10/19/2022 10:57 AM    BUN 27 (H) 10/19/2022 10:57 AM    Creatinine 4.45 (H) 10/19/2022 10:57 AM    BUN/Creatinine ratio 6 (L) 10/19/2022 10:57 AM    GFR est AA 14 (L) 09/28/2022 10:46 AM    GFR est non-AA 11 (L) 09/28/2022 10:46 AM    Calcium 9.2 10/19/2022 10:57 AM       Lab Results   Component Value Date/Time    ALT (SGPT) 26 10/19/2022 10:57 AM    Alk. phosphatase 31 (L) 10/19/2022 10:57 AM    Bilirubin, total 0.4 10/19/2022 10:57 AM       Lab Results   Component Value Date/Time    WBC 5.8 10/19/2022 10:57 AM    HGB 11.5 (L) 10/19/2022 10:57 AM    HCT 37.2 10/19/2022 10:57 AM    PLATELET 654 76/77/3704 10:57 AM    MCV 98.7 10/19/2022 10:57 AM       Lab Results   Component Value Date/Time    TSH 1.29 10/27/2019 03:38 AM         Lab Results   Component Value Date/Time    Cholesterol, total 141 10/27/2019 03:38 AM    HDL Cholesterol 47 10/27/2019 03:38 AM    LDL, calculated 71.6 10/27/2019 03:38 AM    Triglyceride 112 10/27/2019 03:38 AM    CHOL/HDL Ratio 3.0 10/27/2019 03:38 AM                Please note that this dictation was completed with [employer] the [employer] voice recognition software. Quite often unanticipated grammatical, syntax, homophones, and other interpretative errors are inadvertently transcribed by the computer software. Please disregard these errors. Please excuse any errors that have escaped final proofreading.

## 2022-12-23 ENCOUNTER — APPOINTMENT (OUTPATIENT)
Dept: CT IMAGING | Age: 52
End: 2022-12-23
Attending: NURSE PRACTITIONER
Payer: COMMERCIAL

## 2022-12-23 ENCOUNTER — HOSPITAL ENCOUNTER (EMERGENCY)
Age: 52
Discharge: HOME | End: 2022-12-23
Attending: STUDENT IN AN ORGANIZED HEALTH CARE EDUCATION/TRAINING PROGRAM
Payer: COMMERCIAL

## 2022-12-23 VITALS
TEMPERATURE: 97.8 F | DIASTOLIC BLOOD PRESSURE: 70 MMHG | SYSTOLIC BLOOD PRESSURE: 126 MMHG | OXYGEN SATURATION: 99 % | RESPIRATION RATE: 14 BRPM | HEART RATE: 75 BPM

## 2022-12-23 DIAGNOSIS — Q61.3 POLYCYSTIC KIDNEY DISEASE: ICD-10-CM

## 2022-12-23 DIAGNOSIS — N20.0 NEPHROLITHIASIS: Primary | ICD-10-CM

## 2022-12-23 LAB
ALBUMIN SERPL-MCNC: 3.5 G/DL (ref 3.5–5)
ALBUMIN/GLOB SERPL: 0.9 {RATIO} (ref 1.1–2.2)
ALP SERPL-CCNC: 31 U/L (ref 45–117)
ALT SERPL-CCNC: 23 U/L (ref 12–78)
ANION GAP SERPL CALC-SCNC: 6 MMOL/L (ref 5–15)
APPEARANCE UR: CLEAR
AST SERPL-CCNC: 14 U/L (ref 15–37)
BACTERIA URNS QL MICRO: NEGATIVE /HPF
BASOPHILS # BLD: 0.1 K/UL (ref 0–0.1)
BASOPHILS NFR BLD: 1 % (ref 0–1)
BILIRUB SERPL-MCNC: 0.2 MG/DL (ref 0.2–1)
BILIRUB UR QL: NEGATIVE
BUN SERPL-MCNC: 31 MG/DL (ref 6–20)
BUN/CREAT SERPL: 7 (ref 12–20)
CALCIUM SERPL-MCNC: 9.6 MG/DL (ref 8.5–10.1)
CHLORIDE SERPL-SCNC: 102 MMOL/L (ref 97–108)
CO2 SERPL-SCNC: 30 MMOL/L (ref 21–32)
COLOR UR: ABNORMAL
COMMENT, HOLDF: NORMAL
CREAT SERPL-MCNC: 4.55 MG/DL (ref 0.7–1.3)
DIFFERENTIAL METHOD BLD: ABNORMAL
EOSINOPHIL # BLD: 0.4 K/UL (ref 0–0.4)
EOSINOPHIL NFR BLD: 6 % (ref 0–7)
EPITH CASTS URNS QL MICRO: ABNORMAL /LPF
ERYTHROCYTE [DISTWIDTH] IN BLOOD BY AUTOMATED COUNT: 14.3 % (ref 11.5–14.5)
GLOBULIN SER CALC-MCNC: 3.8 G/DL (ref 2–4)
GLUCOSE SERPL-MCNC: 92 MG/DL (ref 65–100)
GLUCOSE UR STRIP.AUTO-MCNC: 100 MG/DL
HCT VFR BLD AUTO: 33.6 % (ref 36.6–50.3)
HGB BLD-MCNC: 10.8 G/DL (ref 12.1–17)
HGB UR QL STRIP: ABNORMAL
HYALINE CASTS URNS QL MICRO: ABNORMAL /LPF (ref 0–5)
IMM GRANULOCYTES # BLD AUTO: 0 K/UL (ref 0–0.04)
IMM GRANULOCYTES NFR BLD AUTO: 0 % (ref 0–0.5)
KETONES UR QL STRIP.AUTO: NEGATIVE MG/DL
LEUKOCYTE ESTERASE UR QL STRIP.AUTO: ABNORMAL
LYMPHOCYTES # BLD: 1.6 K/UL (ref 0.8–3.5)
LYMPHOCYTES NFR BLD: 25 % (ref 12–49)
MCH RBC QN AUTO: 30.3 PG (ref 26–34)
MCHC RBC AUTO-ENTMCNC: 32.1 G/DL (ref 30–36.5)
MCV RBC AUTO: 94.1 FL (ref 80–99)
MONOCYTES # BLD: 0.7 K/UL (ref 0–1)
MONOCYTES NFR BLD: 12 % (ref 5–13)
NEUTS SEG # BLD: 3.6 K/UL (ref 1.8–8)
NEUTS SEG NFR BLD: 56 % (ref 32–75)
NITRITE UR QL STRIP.AUTO: NEGATIVE
NRBC # BLD: 0 K/UL (ref 0–0.01)
NRBC BLD-RTO: 0 PER 100 WBC
PH UR STRIP: >8.5 [PH] (ref 5–8)
PLATELET # BLD AUTO: 214 K/UL (ref 150–400)
PMV BLD AUTO: 9.9 FL (ref 8.9–12.9)
POTASSIUM SERPL-SCNC: 3.7 MMOL/L (ref 3.5–5.1)
PROT SERPL-MCNC: 7.3 G/DL (ref 6.4–8.2)
PROT UR STRIP-MCNC: 100 MG/DL
RBC # BLD AUTO: 3.57 M/UL (ref 4.1–5.7)
RBC #/AREA URNS HPF: >100 /HPF (ref 0–5)
SAMPLES BEING HELD,HOLD: NORMAL
SODIUM SERPL-SCNC: 138 MMOL/L (ref 136–145)
SP GR UR REFRACTOMETRY: 1.01 (ref 1–1.03)
UR CULT HOLD, URHOLD: NORMAL
UROBILINOGEN UR QL STRIP.AUTO: 0.2 EU/DL (ref 0.2–1)
WBC # BLD AUTO: 6.4 K/UL (ref 4.1–11.1)
WBC URNS QL MICRO: ABNORMAL /HPF (ref 0–4)

## 2022-12-23 PROCEDURE — 99284 EMERGENCY DEPT VISIT MOD MDM: CPT

## 2022-12-23 PROCEDURE — 85025 COMPLETE CBC W/AUTO DIFF WBC: CPT

## 2022-12-23 PROCEDURE — 81001 URINALYSIS AUTO W/SCOPE: CPT

## 2022-12-23 PROCEDURE — 80053 COMPREHEN METABOLIC PANEL: CPT

## 2022-12-23 PROCEDURE — 74176 CT ABD & PELVIS W/O CONTRAST: CPT

## 2022-12-23 PROCEDURE — 36415 COLL VENOUS BLD VENIPUNCTURE: CPT

## 2022-12-23 NOTE — ED PROVIDER NOTES
Patient is a 50 y.o. M who presents today with complaints of L flank pain. The left flank pain has been going on intermittently all week, does not radiate. Denies nausea vomiting diarrhea. Denies any dysuria frequency urgency, fevers, chills. States he began noticing gross hematuria earlier this week, and it went away for a few days, but now is back. Of note he has ESRD, receives dialysis at [facility] Tuesday, Thursday, Sunday due to right upper extremity AV fistula. He still does urinate about 4-5 times a day at baseline. He tried contacting his nephrologist but he is currently out of town. States he spoke to the nurse at his dialysis center who was sent him here for evaluation. PMH: as below plus pAfib- no longer on eliquis, nephrolithiasis. Surgical History: None  Smoking: None  Alcohol: None  Drug Use: None  ALLERGIES: Shellfish containing products    Past Medical History:   Diagnosis Date    Arthritis     Asthma     AS A CHILD    Chronic kidney disease     ESRD    Chronic pain     KNEES, ANKLES    DJD (degenerative joint disease)     Gout     Heart failure ([facility] [address] 2019    CHF    Hypertension     Polycystic kidney disease      Past Surgical History:   Procedure Laterality Date    HX HERNIA REPAIR  40/96/5740    Supra umbilical hernia with mesh and Umbilical hernia repair by Dr. Johny Dawkins.     HX VASCULAR ACCESS  10/18/2021    LEFT ARM FISTULA     Family History:   Problem Relation Age of Onset    Hypertension Mother     Kidney Disease Mother     Hypertension Father     Kidney Disease Father     Cancer Father         REANAL CANCER    Other Sister         PKD, Fibromyalgia    Cancer Brother         PROSTATE    Cancer Brother         PROSTATE    Anesth Problems Neg Hx      Social History     Socioeconomic History    Marital status: SINGLE     Spouse name: Not on file    Number of children: Not on file    Years of education: Not on file    Highest education level: Not on file   Occupational History    Not on file Tobacco Use    Smoking status: Never    Smokeless tobacco: Never   Vaping Use    Vaping Use: Never used   Substance and Sexual Activity    Alcohol use: Not Currently     Alcohol/week: 4.0 standard drinks     Types: 4 Shots of liquor per week    Drug use: Yes     Types: Marijuana     Comment: 4-5 X WEEK    Sexual activity: Not on file   Other Topics Concern    Not on file   Social History Narrative    Not on file     Social Determinants of Health     Financial Resource Strain: Not on file   Food Insecurity: Not on file   Transportation Needs: Not on file   Physical Activity: Not on file   Stress: Not on file   Social Connections: Not on file   Intimate Partner Violence: Not on file   Housing Stability: Not on file           Review of Systems   Constitutional:  Negative for fever. HENT:  Negative for congestion. Eyes:  Negative for visual disturbance. Respiratory:  Negative for shortness of breath. Cardiovascular:  Negative for chest pain. Gastrointestinal:  Negative for diarrhea, nausea and vomiting. Endocrine: Negative for polyuria. Genitourinary:  Positive for flank pain and hematuria. Negative for dysuria. Musculoskeletal:  Negative for back pain. Skin:  Negative for color change. Neurological:  Negative for seizures. Hematological:  Does not bruise/bleed easily. Psychiatric/Behavioral:  Negative for hallucinations. Vitals:    12/23/22 1155   BP: 126/70   Pulse: 75   Resp: 14   Temp: 97.8 °F (36.6 °C)   SpO2: 99%            Physical Exam  Constitutional:       Appearance: Normal appearance. HENT:      Head: Normocephalic and atraumatic. Eyes:      Pupils: Pupils are equal, round, and reactive to light. Cardiovascular:      Rate and Rhythm: Normal rate and regular rhythm. Heart sounds: Normal heart sounds. Pulmonary:      Effort: Pulmonary effort is normal.      Breath sounds: Normal breath sounds. Abdominal:      General: Abdomen is flat.       Palpations: Abdomen is soft.      Tenderness: There is no right CVA tenderness or left CVA tenderness. Musculoskeletal:      Cervical back: Normal range of motion. Skin:     General: Skin is dry. Neurological:      General: No focal deficit present. Mental Status: He is alert and oriented to person, place, and time. Psychiatric:         Mood and Affect: Mood normal.         Behavior: Behavior normal.            LABORATORY RESULTS:  Recent Results (from the past 24 hour(s))   CBC WITH AUTOMATED DIFF    Collection Time: 12/23/22 12:52 PM   Result Value Ref Range    WBC 6.4 4.1 - 11.1 K/uL    RBC 3.57 (L) 4.10 - 5.70 M/uL    HGB 10.8 (L) 12.1 - 17.0 g/dL    HCT 33.6 (L) 36.6 - 50.3 %    MCV 94.1 80.0 - 99.0 FL    MCH 30.3 26.0 - 34.0 PG    MCHC 32.1 30.0 - 36.5 g/dL    RDW 14.3 11.5 - 14.5 %    PLATELET 459 083 - 819 K/uL    MPV 9.9 8.9 - 12.9 FL    NRBC 0.0 0  WBC    ABSOLUTE NRBC 0.00 0.00 - 0.01 K/uL    NEUTROPHILS 56 32 - 75 %    LYMPHOCYTES 25 12 - 49 %    MONOCYTES 12 5 - 13 %    EOSINOPHILS 6 0 - 7 %    BASOPHILS 1 0 - 1 %    IMMATURE GRANULOCYTES 0 0.0 - 0.5 %    ABS. NEUTROPHILS 3.6 1.8 - 8.0 K/UL    ABS. LYMPHOCYTES 1.6 0.8 - 3.5 K/UL    ABS. MONOCYTES 0.7 0.0 - 1.0 K/UL    ABS. EOSINOPHILS 0.4 0.0 - 0.4 K/UL    ABS. BASOPHILS 0.1 0.0 - 0.1 K/UL    ABS. IMM. GRANS. 0.0 0.00 - 0.04 K/UL    DF AUTOMATED     METABOLIC PANEL, COMPREHENSIVE    Collection Time: 12/23/22 12:52 PM   Result Value Ref Range    Sodium 138 136 - 145 mmol/L    Potassium 3.7 3.5 - 5.1 mmol/L    Chloride 102 97 - 108 mmol/L    CO2 30 21 - 32 mmol/L    Anion gap 6 5 - 15 mmol/L    Glucose 92 65 - 100 mg/dL    BUN 31 (H) 6 - 20 MG/DL    Creatinine 4.55 (H) 0.70 - 1.30 MG/DL    BUN/Creatinine ratio 7 (L) 12 - 20      eGFR 15 (L) >60 ml/min/1.73m2    Calcium 9.6 8.5 - 10.1 MG/DL    Bilirubin, total 0.2 0.2 - 1.0 MG/DL    ALT (SGPT) 23 12 - 78 U/L    AST (SGOT) 14 (L) 15 - 37 U/L    Alk.  phosphatase 31 (L) 45 - 117 U/L    Protein, total 7.3 6.4 - 8.2 g/dL    Albumin 3.5 3.5 - 5.0 g/dL    Globulin 3.8 2.0 - 4.0 g/dL    A-G Ratio 0.9 (L) 1.1 - 2.2     SAMPLES BEING HELD    Collection Time: 12/23/22 12:52 PM   Result Value Ref Range    SAMPLES BEING HELD [id number]     COMMENT        Add-on orders for these samples will be processed based on acceptable specimen integrity and analyte stability, which may vary by analyte. URINALYSIS W/MICROSCOPIC    Collection Time: 12/23/22  1:02 PM   Result Value Ref Range    Color YELLOW/STRAW      Appearance CLEAR CLEAR      Specific gravity 1.010 1.003 - 1.030      pH (UA) >8.5 (H) 5.0 - 8.0    Protein 100 (A) NEG mg/dL    Glucose 100 (A) NEG mg/dL    Ketone Negative NEG mg/dL    Bilirubin Negative NEG      Blood LARGE (A) NEG      Urobilinogen 0.2 0.2 - 1.0 EU/dL    Nitrites Negative NEG      Leukocyte Esterase TRACE (A) NEG      WBC 10-20 0 - 4 /hpf    RBC >100 (H) 0 - 5 /hpf    Epithelial cells FEW FEW /lpf    Bacteria Negative NEG /hpf    Hyaline cast 0-2 0 - 5 /lpf   URINE CULTURE HOLD SAMPLE    Collection Time: 12/23/22  1:02 PM    Specimen: Urine   Result Value Ref Range    Urine culture hold        Urine on hold in Microbiology dept for 2 days. If unpreserved urine is submitted, it cannot be used for addtional testing after 24 hours, recollection will be required. IMAGING RESULTS:  CT ABD PELV WO CONT    Result Date: 12/23/2022  1. Enlarged kidneys with cysts compatible with polycystic kidney disease. Punctate stones without hydronephrosis 2. Tiny umbilical hernia containing fat     MEDICATIONS GIVEN:  Medications - No data to display         MDM  Number of Diagnoses or Management Options  Nephrolithiasis  Diagnosis management comments: Patient with end-stage renal disease presents with intermittent flank pain likely secondary to renal colic from likely non-obstructed non infected kidney stone.  Given history, exam, and work up I have low suspicion for atypical appendicitis, genital torsion, acute cholecystitis, AAA, infected obstructed stone, pyelonephritis, or other emergent intraabdominal pathology. CT abdomen pelvis remarkable for      1. Enlarged kidneys with cysts compatible with polycystic kidney disease. Punctate stones without hydronephrosis   2. Tiny umbilical hernia containing fat    Patient states he has polycystic kidneys at baseline, has previously had umbilical hernia repair. Based on symptoms and UA indicate no infection. CMP shows creatinine at baseline. Patient states pain is only 3 out of 10, not requiring analgesia or antiemetics here in emergency department today. . Patient appropriate for discharge with outpatient follow-up -given kidney stone hotline         Amount and/or Complexity of Data Reviewed  Clinical lab tests: reviewed  Tests in the radiology section of CPT®: reviewed        Presentation, management, and disposition were discussed with the attending physician, Dr. Brian Stephenson, who is in agreement with plan of care. Discussed results and work-up with patient and answered all questions, the patient expresses understanding and agrees with the care plan and disposition. The patient was given an opportunity to ask questions and all concerns raised were addressed prior to discharge. Recommended patient follow-up with provider as listed below. Counseled patient on standard home and self-care measures. Specifically explained the emergent conditions that could arise and clearly instructed the patient to return to the emergency department for those and any other new, worsening, or concerning symptoms. Patient stable and ready for discharge. IMPRESSION:  1. Nephrolithiasis    2.  Polycystic kidney disease        DISPOSITION:  Discharge    PLAN:  Follow-up Information       Follow up With Specialties Details Why [address] [place] [address] Urology Urology Schedule an appointment as soon as possible for a visit   [address] [facility] [address], Jovani Herrera NP Family Nurse Practitioner, Nurse Practitioner Schedule an appointment as soon as possible for a visit   [facility] [address] [address]  [phone]      [address] [facility] [address] [address] [facility] Emergency Medicine  As needed, If symptoms worsen [address] [facility]  [phone]          Current Discharge Medication List          Please note that this dictation was completed with Bacula, the computer voice recognition software. Quite often unanticipated grammatical, syntax, homophones, and other interpretive errors are inadvertently transcribed by the computer software. Please disregard these errors. Please excuse any errors that have escaped final proofreading.

## 2022-12-23 NOTE — DISCHARGE INSTRUCTIONS
[facility] Kidney Stone Hotline    The Kidney [facility] Hotline is a resource to assist you when experiencing the symptoms of a kidney stone. Call the [facility] hotline at 302-399-YBAZ(e). When you call this number, a staff member will coordinate appropriate care by either scheduling you a timely appointment at our office or directing you to immediate care, as needed. You were seen in the emergency department today for flank pain concerning for kidney stone. See the attached information for the results of your testing. You should follow-up with your primary care provider in the next couple of days. You can take over the over-the-counter medications that we discussed for your symptoms. Return if you develop any difficulty breathing, chest pain, or any other new or concerning symptoms.

## 2022-12-23 NOTE — ED TRIAGE NOTES
Triage: Patient reports hematuria and left flank pain since Monday. Rates flank pain 3/10 at this time. Receives dialysis T, TR, Saturday.

## 2022-12-23 NOTE — Clinical Note
[address]  [address] [facility] [address]  [phone]    Work/School Note    Date: 12/23/2022    To Whom It May concern:      Sandy Onofre was seen and treated today in the emergency room by the following provider(s):  Attending Provider: Ashely Briggs MD  Nurse Practitioner: Damian Maradiaga NP. Sandy Onofre is excused from work/school on 12/23/22. He is clear to return to work/school on 12/24/22.         Sincerely,          Patti Melo NP

## 2023-01-18 DIAGNOSIS — M1A.39X1 CHRONIC TOPHACEOUS GOUT OF MULTIPLE SITES DUE TO RENAL IMPAIRMENT: ICD-10-CM

## 2023-01-18 NOTE — TELEPHONE ENCOUNTER
Received faxed refill for Leflunomide. Last visit was 7/6/22, follow up not scheduled at this time. No-Show October appointment. Reached out in November to reschedule. Will reach out again. Lab Results   Component Value Date/Time    Sodium 138 12/23/2022 12:52 PM    Potassium 3.7 12/23/2022 12:52 PM    Chloride 102 12/23/2022 12:52 PM    CO2 30 12/23/2022 12:52 PM    Anion gap 6 12/23/2022 12:52 PM    Glucose 92 12/23/2022 12:52 PM    BUN 31 (H) 12/23/2022 12:52 PM    Creatinine 4.55 (H) 12/23/2022 12:52 PM    BUN/Creatinine ratio 7 (L) 12/23/2022 12:52 PM    GFR est AA 14 (L) 09/28/2022 10:46 AM    GFR est non-AA 11 (L) 09/28/2022 10:46 AM    Calcium 9.6 12/23/2022 12:52 PM    Bilirubin, total 0.2 12/23/2022 12:52 PM    Alk.  phosphatase 31 (L) 12/23/2022 12:52 PM    Protein, total 7.3 12/23/2022 12:52 PM    Albumin 3.5 12/23/2022 12:52 PM    Globulin 3.8 12/23/2022 12:52 PM    A-G Ratio 0.9 (L) 12/23/2022 12:52 PM    ALT (SGPT) 23 12/23/2022 12:52 PM    AST (SGOT) 14 (L) 12/23/2022 12:52 PM     Lab Results   Component Value Date/Time    WBC 6.4 12/23/2022 12:52 PM    HGB 10.8 (L) 12/23/2022 12:52 PM    HCT 33.6 (L) 12/23/2022 12:52 PM    PLATELET 415 83/17/4065 12:52 PM    MCV 94.1 12/23/2022 12:52 PM

## 2023-01-19 RX ORDER — LEFLUNOMIDE 10 MG/1
10 TABLET ORAL DAILY
Qty: 30 TABLET | Refills: 0 | OUTPATIENT
Start: 2023-01-19

## 2023-01-25 ENCOUNTER — HOSPITAL ENCOUNTER (OUTPATIENT)
Age: 53
Setting detail: OUTPATIENT SURGERY
End: 2023-01-25
Attending: STUDENT IN AN ORGANIZED HEALTH CARE EDUCATION/TRAINING PROGRAM | Admitting: STUDENT IN AN ORGANIZED HEALTH CARE EDUCATION/TRAINING PROGRAM

## 2023-02-11 ENCOUNTER — ANESTHESIA EVENT (OUTPATIENT)
Dept: SURGERY | Age: 53
End: 2023-02-11

## 2023-02-11 RX ORDER — SODIUM CHLORIDE, SODIUM LACTATE, POTASSIUM CHLORIDE, CALCIUM CHLORIDE 600; 310; 30; 20 MG/100ML; MG/100ML; MG/100ML; MG/100ML
125 INJECTION, SOLUTION INTRAVENOUS CONTINUOUS
Status: CANCELLED | OUTPATIENT
Start: 2023-02-11

## 2023-02-11 RX ORDER — MIDAZOLAM HYDROCHLORIDE 1 MG/ML
1 INJECTION, SOLUTION INTRAMUSCULAR; INTRAVENOUS AS NEEDED
Status: CANCELLED | OUTPATIENT
Start: 2023-02-11

## 2023-02-11 RX ORDER — SODIUM CHLORIDE 0.9 % (FLUSH) 0.9 %
5-40 SYRINGE (ML) INJECTION EVERY 8 HOURS
Status: CANCELLED | OUTPATIENT
Start: 2023-02-11

## 2023-02-11 RX ORDER — SODIUM CHLORIDE 0.9 % (FLUSH) 0.9 %
5-40 SYRINGE (ML) INJECTION AS NEEDED
Status: CANCELLED | OUTPATIENT
Start: 2023-02-11

## 2023-02-11 RX ORDER — ACETAMINOPHEN 325 MG/1
650 TABLET ORAL ONCE
Status: CANCELLED | OUTPATIENT
Start: 2023-02-11 | End: 2023-02-11

## 2023-02-11 RX ORDER — LIDOCAINE HYDROCHLORIDE 10 MG/ML
0.5 INJECTION, SOLUTION EPIDURAL; INFILTRATION; INTRACAUDAL; PERINEURAL AS NEEDED
Status: CANCELLED | OUTPATIENT
Start: 2023-02-11

## 2023-02-11 RX ORDER — OXYCODONE HYDROCHLORIDE 5 MG/1
5 TABLET ORAL AS NEEDED
Status: CANCELLED | OUTPATIENT
Start: 2023-02-11

## 2023-02-11 RX ORDER — ONDANSETRON 2 MG/ML
4 INJECTION INTRAMUSCULAR; INTRAVENOUS AS NEEDED
Status: CANCELLED | OUTPATIENT
Start: 2023-02-11

## 2023-02-11 RX ORDER — MIDAZOLAM HYDROCHLORIDE 1 MG/ML
1 INJECTION, SOLUTION INTRAMUSCULAR; INTRAVENOUS
Status: CANCELLED | OUTPATIENT
Start: 2023-02-11

## 2023-02-11 RX ORDER — ROPIVACAINE HYDROCHLORIDE 5 MG/ML
30 INJECTION, SOLUTION EPIDURAL; INFILTRATION; PERINEURAL AS NEEDED
Status: CANCELLED | OUTPATIENT
Start: 2023-02-11

## 2023-02-11 RX ORDER — DEXTROSE, SODIUM CHLORIDE, SODIUM LACTATE, POTASSIUM CHLORIDE, AND CALCIUM CHLORIDE 5; .6; .31; .03; .02 G/100ML; G/100ML; G/100ML; G/100ML; G/100ML
125 INJECTION, SOLUTION INTRAVENOUS CONTINUOUS
Status: CANCELLED | OUTPATIENT
Start: 2023-02-11 | End: 2023-02-12

## 2023-02-11 RX ORDER — SODIUM CHLORIDE, SODIUM LACTATE, POTASSIUM CHLORIDE, CALCIUM CHLORIDE 600; 310; 30; 20 MG/100ML; MG/100ML; MG/100ML; MG/100ML
125 INJECTION, SOLUTION INTRAVENOUS CONTINUOUS
Status: CANCELLED | OUTPATIENT
Start: 2023-02-11 | End: 2023-02-12

## 2023-02-11 RX ORDER — FENTANYL CITRATE 50 UG/ML
50 INJECTION, SOLUTION INTRAMUSCULAR; INTRAVENOUS AS NEEDED
Status: CANCELLED | OUTPATIENT
Start: 2023-02-11

## 2023-02-11 RX ORDER — MORPHINE SULFATE 2 MG/ML
2 INJECTION, SOLUTION INTRAMUSCULAR; INTRAVENOUS
Status: CANCELLED | OUTPATIENT
Start: 2023-02-11

## 2023-02-11 RX ORDER — DIPHENHYDRAMINE HYDROCHLORIDE 50 MG/ML
12.5 INJECTION, SOLUTION INTRAMUSCULAR; INTRAVENOUS AS NEEDED
Status: CANCELLED | OUTPATIENT
Start: 2023-02-11 | End: 2023-02-11

## 2023-02-11 RX ORDER — FENTANYL CITRATE 50 UG/ML
25 INJECTION, SOLUTION INTRAMUSCULAR; INTRAVENOUS
Status: CANCELLED | OUTPATIENT
Start: 2023-02-11

## 2023-02-13 ENCOUNTER — ANESTHESIA (OUTPATIENT)
Dept: SURGERY | Age: 53
End: 2023-02-13

## 2023-02-13 NOTE — ANESTHESIA PREPROCEDURE EVALUATION
Relevant Problems   RESPIRATORY SYSTEM   (+) SOB (shortness of breath)      CARDIOVASCULAR   (+) Essential hypertension      RENAL FAILURE   (+) Autosomal dominant adult polycystic kidney disease   (+) CKD (chronic kidney disease) stage 3, GFR 30-59 ml/min (HCC)   (+) CKD (chronic kidney disease) stage 4, GFR 15-29 ml/min ([facility])       Anesthetic History   No history of anesthetic complications            Review of Systems / Medical History  Patient summary reviewed, nursing notes reviewed and pertinent labs reviewed    Pulmonary            Asthma        Neuro/Psych   Within defined limits           Cardiovascular    Hypertension      CHF             GI/Hepatic/Renal         Renal disease: ESRD and dialysis       Endo/Other  Within defined limits           Other Findings              Physical Exam    Airway  Mallampati: II  TM Distance: > 6 cm  Neck ROM: normal range of motion   Mouth opening: Normal     Cardiovascular  Regular rate and rhythm,  S1 and S2 normal,  no murmur, click, rub, or gallop             Dental  No notable dental hx       Pulmonary  Breath sounds clear to auscultation               Abdominal  GI exam deferred       Other Findings            Anesthetic Plan    ASA: 3  Anesthesia type: general - supraclavicular block          Induction: Intravenous  Anesthetic plan and risks discussed with: Patient

## 2023-02-16 ENCOUNTER — OFFICE VISIT (OUTPATIENT)
Dept: RHEUMATOLOGY | Age: 53
End: 2023-02-16
Payer: COMMERCIAL

## 2023-02-16 VITALS
WEIGHT: 210 LBS | BODY MASS INDEX: 29.29 KG/M2 | DIASTOLIC BLOOD PRESSURE: 72 MMHG | HEART RATE: 74 BPM | OXYGEN SATURATION: 98 % | SYSTOLIC BLOOD PRESSURE: 107 MMHG | RESPIRATION RATE: 16 BRPM

## 2023-02-16 DIAGNOSIS — M1A.39X1 CHRONIC TOPHACEOUS GOUT OF MULTIPLE SITES DUE TO RENAL IMPAIRMENT: Primary | ICD-10-CM

## 2023-02-16 LAB
APPEARANCE SNV: ABNORMAL
BODY FLD TYPE: NORMAL
COLOR SNV: YELLOW
CRYSTALS FLD MICRO: NORMAL
LYMPHOCYTES NFR SNV MANUAL: 5 % (ref 0–15)
MONOCYTES NFR SNV MANUAL: 41 % (ref 0–65)
RBC # SNV: >100 /CU MM
SPECIMEN SOURCE FLD: ABNORMAL
SYNOVIAL LINING CELLS, 019267: 54
WBC # SNV: 186 /CU MM (ref 0–150)

## 2023-02-16 RX ORDER — FEBUXOSTAT 40 MG/1
40 TABLET, FILM COATED ORAL DAILY
Qty: 30 TABLET | Refills: 3 | Status: SHIPPED | OUTPATIENT
Start: 2023-02-16 | End: 2023-02-23 | Stop reason: SDUPTHER

## 2023-02-16 RX ORDER — TRIAMCINOLONE ACETONIDE 40 MG/ML
40 INJECTION, SUSPENSION INTRA-ARTICULAR; INTRAMUSCULAR ONCE
Status: COMPLETED | OUTPATIENT
Start: 2023-02-16 | End: 2023-02-16

## 2023-02-16 RX ORDER — LIDOCAINE HYDROCHLORIDE 10 MG/ML
2 INJECTION INFILTRATION; PERINEURAL ONCE
Status: COMPLETED | OUTPATIENT
Start: 2023-02-16 | End: 2023-02-16

## 2023-02-16 RX ADMIN — TRIAMCINOLONE ACETONIDE 40 MG: 40 INJECTION, SUSPENSION INTRA-ARTICULAR; INTRAMUSCULAR at 16:40

## 2023-02-16 RX ADMIN — LIDOCAINE HYDROCHLORIDE 2 ML: 10 INJECTION INFILTRATION; PERINEURAL at 16:40

## 2023-02-16 NOTE — PROGRESS NOTES
Chief Complaint   Patient presents with    Joint Pain     1. Have you been to the ER, urgent care clinic since your last visit? Hospitalized since your last visit? No    2. Have you seen or consulted any other health care providers outside of the [address] [place] [address] since your last visit? Include any pap smears or colon screening.  No

## 2023-02-16 NOTE — PATIENT INSTRUCTIONS
1) I am applying for Uloric. Call us if you do not hear about the approval by the end of next week. This is a once daily pill. Let me know if you get a gout flare after starting this. 2) I will inject your left knee with steroids today. 3) Check labs ASAP. 4) Follow up in 3 months. Let me know if you have any questions or concerns in the meantime.

## 2023-02-16 NOTE — PROGRESS NOTES
REASON FOR VISIT    This is an in-office Rheumatology provider follow-up visit for Mr. Zenon Steele for     ICD-10-CM   1. 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     Gouty arthritis phenotype includes:  Tophi: yes  Erosions: yes  Tenosynovitis: yes  Double Contour: N/A     Therapy Includes:  NSAIDs: no (contra-indicated due to CKD)  Colchicine prophylaxis: PRN  Current uricosuric therapy: none  Current urate-lowering therapy: Krystexxa 8 mg every 14 days (6/28/2019 to 10/18/2019; 8/26/2020 to present)  Previous urate-lowering therapy: none  Discontinued uricosuric because of inefficacy: none  Discontinued uricosuric because of side effects: none  Discontinued urate-lowering therapy because of inefficacy: allopurinol 50 mg daily  Discontinued urate-lowering therapy because of side effects: none  Contraindicated urate-lowering therapy because of CKD: allopurinol, probenecid  Contra-Indicated urate-lowering therapy because of FDA warning of all-cause mortality and cardiac death: Uloric (febuxostat)  G6PD Status: normal  Current anti-immunogenicity DMARD therapy: leflunomide 20 mg daily    HISTORY OF PRESENT ILLNESS    Mr. Zenon Steele returns for a follow-up visit. Last visit 10/6/2022. He says that he is doing good today. Pt is not currently getting Krystexxa infusions. He has not had any gout flares since last visit. When he used to get gout flares it would be in his bilateral ankles, knees, and elbows. His first flares were in his feet, but he has not had gout flares in his feet since it first started. Pt is hesitant to take steroids because one time when he took steroids he had severe swelling and needed to go to the hospital.     Pt gets hema dialysis 3:45-7:45 in the evenings. Pt only has joint pain in his L knee. He says that his L knee stays swollen.        REVIEW OF SYSTEMS    A comprehensive review of systems was performed and pertinent results are documented in the HPI, review of systems is otherwise non-contributory. PAST MEDICAL HISTORY    He has a past medical history of Arthritis, Asthma, Chronic kidney disease, Chronic pain, DJD (degenerative joint disease), Gout, Heart failure ([facility] [address] (2019), Hypertension, and Polycystic kidney disease. FAMILY HISTORY    His family history includes Cancer in his brother, brother, and father; Hypertension in his father and mother; Kidney Disease in his father and mother; Other in his sister. SOCIAL HISTORY    He reports that he has never smoked. He has never used smokeless tobacco. He reports that he does not currently use alcohol after a past usage of about 4.0 standard drinks per week. He reports current drug use. Drug: Marijuana. MEDICATIONS    Current Outpatient Medications   Medication Sig    metoprolol tartrate (LOPRESSOR) 50 mg tablet Take 1 Tablet by mouth two (2) times a day. aspirin 81 mg chewable tablet Take 1 Tablet by mouth daily. leflunomide (ARAVA) 10 mg tablet Take 1 Tablet by mouth daily. Please call Dr. Jayson Beverly to reschedule followup, no further refills until seen. ferric citrate (AURYXIA PO) Take 1 Tablet by mouth three (3) times daily (with meals). acetaminophen (TYLENOL) 325 mg tablet Take 2 Tablets by mouth every four (4) hours as needed for Pain. colchicine (Colcrys) 0.6 mg tablet TAKE ONE TABLET BY MOUTH DAILY AS NEEDED FOR GOUT FLARE    sodium bicarbonate 325 mg tablet Take 650 mg by mouth two (2) times a day. spironolactone (ALDACTONE) 25 mg tablet Take 25 mg by mouth daily. bumetanide (BUMEX) 1 mg tablet Take 1 Tab by mouth daily. pegloticase (KRYSTEXXA) 8 mg/mL soln injection 8 mg by IntraVENous route Once every 2 weeks. No current facility-administered medications for this visit. ALLERGIES    Allergies   Allergen Reactions    Shellfish Containing Products Swelling     PHYSICAL EXAMINATION    There were no vitals taken for this visit.     There is no height or weight on file to calculate BMI. General:  The patient is well developed, well nourished, alert, and in no apparent distress. Eyes: Sclera are anicteric. No conjunctival injection. HEENT:  Oropharynx is clear. No oral ulcers. Adequate salivary pooling. No cervical or supraclavicular lymphadenopathy. Lungs:  Clear to auscultation bilaterally, without wheeze or stridor. Normal respiratory effort. Cor:  Regular rate and rhythm. No murmur rub or gallop. Abdomen: Soft, non-tender, without hepatomegaly or masses. Extremities: No calf tenderness or edema. Warm and well perfused. Skin:  No significant abnormalities. Neuro: Nonfocal  Musculoskeletal:    A comprehensive musculoskeletal exam was performed for all joints of each upper and lower extremity and assessed for swelling, tenderness and range of motion. Results are documented as below:  No evidence of synovitis in the small joints of the hands, wrists, shoulders, elbows, hips, knees or ankles. ___  General:  The patient is well developed, well nourished, alert, and in no apparent distress. Eyes: Sclera are anicteric. No conjunctival injection. HEENT:  Oropharynx is clear. No oral ulcers. Adequate salivary pooling. No cervical or supraclavicular lymphadenopathy. Lungs:  Clear to auscultation bilaterally, without wheeze or stridor. Normal respiratory effort. Cor:  Regular rate and rhythm. No murmur rub or gallop. Abdomen: Soft, non-tender, without hepatomegaly or masses. Extremities: Left upper extremity failed graft. Right upper extremity fistula with thrill. No calf tenderness or edema. Warm and well perfused. Skin: Interval resolution of extensive tophi described previously. No current helix, elbow, knee, or toe tophi. No significant abnormalities. Neuro: Nonfocal, intact sensation distally, no foot or wrist drop, normal unassisted gait.   Musculoskeletal:    A comprehensive musculoskeletal exam was performed for all joints of each upper and lower extremity and assessed for swelling, tenderness and range of motion. Results are documented as below:  Prominent right > left ulnar styloid, no wrist synovitis today  Large cool effusion of left knee, nontender with intact ROM. No evidence of synovitis in the small joints of the hands, wrists, shoulders, elbows, hips, knees or ankles. DATA REVIEW    Laboratory   12/23/22: Cr 4.55, LFT WNL, WBC 6.4, HGB 10.8, Plt 214  10/19/22: Uric acid 0.3, ESR 17, Cr 4.45, LFT WNL, WBC 5.8, HGB 11.5, Plt 222  3/26/22: WBC 7.5, Hgb 11.4, .7, Plt 202; Cr 6.52, LFTs WNL    Recent laboratory results were reviewed, summarized, and discussed with the patient. Imaging    Musculoskeletal Ultrasound    None    Radiographs    6/26/22 XR Cspine: No acute abnormality. Degenerative disc changes at C6-7 and to a lesser extent at C4-5. Bilateral Elbow 6/13/2019: RIGHT: normal alignment and bone mineral density. There is a well-circumscribed erosion within the olecranon. No definite effusion. There is high density material in the region of the olecranon bursa. LEFT: no fracture or effusion. No erosive change. There is high density material in the region of the olecranon bursa. Bilateral Hand 6/13/2019: RIGHT: normal alignment. Bone mineral density is normal. No fracture. There are extensive erosive changes of the distal ulna with adjacent high density soft tissue mass. There is erosive change within the triquetrum capitate and likely trapezoid. Erosive changes are noted the first second and third MCP joints. There is high density soft tissue adjacent to the second digit PIP joint. LEFT: normal alignment and no fracture. There are scattered erosive changes throughout the carpus second MTP joint and PIP joints of the second third and fourth digits. There is soft tissue swelling which is high density of the thumb and second MTP joints.  Additional soft tissue swelling at the wrist.     Bilateral Foot 6/13/2019: RIGHT: normal alignment and bone mineral density. There are erosive changes throughout the ankle, talar head, navicular and Lisfranc joint as well as the medial base proximal phalanx of the great toe. There is adjacent soft tissue density at the first metatarsal head. No acute fracture. LEFT: normal alignment bone mineral densities. There are periarticular erosions throughout the Lisfranc joint, between the navicular and cuneiforms and at the first MTP joint. There is mild soft tissue thickening and density medial to the first metatarsal head. There is suggestion of soft tissue density between the second and third metatarsal heads. No acute fracture. CT Imaging    CT ABD PELV WO CONT (12/23/22):   1. Enlarged kidneys with cysts compatible with polycystic kidney disease. Punctate stones without hydronephrosis  2. Tiny umbilical hernia containing fat    CT Abdomen and Pelvis without contrast 6/01/2019: Heart/vessels: Pericardial effusion and/or thickening measuring approximately 0.8 cm. Lungs/Pleura: Within normal limits. . ABDOMEN: Liver: Small, low-attenuation lesions in the liver which are incompletely characterized and statistically likely benign. Gallbladder/Biliary: Within normal limits. Spleen: Within normal limits. Pancreas: Within normal limits. Adrenals: Within normal limits. Kidneys: Innumerable low-attenuation lesions and high attenuation lesions throughout the kidneys with complete loss of normal-appearing parenchyma suggestive of polycystic kidney disease. Peritoneum/Mesenteries: Trace amount of fluid in the pelvis. Extraperitoneum: Within normal limits. Gastrointestinal tract: There is a short segment of small bowel within a supraumbilical hernia. The short segment of bowel within the hernia appears to have some mural thickening There are distended loops of small bowel with air-fluid levels associated with the hernia. The more distal small bowel is nearly entirely decompressed as is the large bowel. Normal-appearing appendix. Vascular: Within normal limits. PELVIS: Extraperitoneum: Within normal limits. Ureters: Within normal limits. Bladder: The bladder is nearly entirely decompressed. There is concentric mural thickening in the bladder. Reproductive System: Within normal limits. MSK:  There is a supraumbilical hernia containing a short segment of small bowel with adjacent stranding and inflammation and stranding/inflammation within the fat in the hernia. Tiny, fat-containing umbilical hernia. Degenerative changes in the lumbar spine. There is degenerative disc disease at L3/L4 and L4/L5. Extensive degenerative changes in both hips. Dystrophic appearing calcifications at the origin of both hamstrings at the pubic rami. Degenerative changes in the pubic symphysis. MR Imaging    MRI Right Ankle without contrast 4/10/2012: There is osseous edema like signal at the inferior aspect of the lateral malleolus. There are also subcortical cysts in the anterior process of the calcaneus adjacent to the sinus tarsi. A mild to moderate osseous edema and the medial cuneiform bone is demonstrated subjacent to an area of distal high-grade chondral derangement. There is a moderate to large effusion of the ankle and posterior subtalar joints with demonstration of mild diffuse chondral thinning with small marginal osteophytes. There is a 7 mm loose body in the anterior joint recess. There is also heterogeneous signal in the posterior joint recesses which could contain small loose bodies as well. There is absent  visualization of the anterior talofibular ligament consistent with chronic tear. The other ankle ligaments appear intact. There is a small effusion of the talonavicular joint. Small dorsal marginal osteophytes throughout the midfoot region are noted.  There is a moderate effusion in the posterior tibialis tendon sheath with diffuse mild tendon thickening and inframalleolar heterogeneous signal. There is a small effusion of the flexor digitorum longus and flexor hallucis longus tendon sheaths with normal appearing tendons. There is a small effusion of the peroneal tendon sheath with mild diffuse thickening of the peroneus longus tendon though uniform signal. Mild. Achilles peritendinitis is noted. There is a trace amount of fluid signal in the extensor digitorum longus tendon sheath. There is mild flatfoot deformity. No tarsal coalition is shown. There is no bone or soft tissue mass. DXA     None      ASSESSMENT AND PLAN    This is a follow-up visit for Mr. Samina Geiger for chronic tophaceous gout of multiple joints 2/2 polycystic kidney disease. He has had resolution of his extensive tophi with Carina  and will most likely continue to improve with continued hemodialysis, and given difficulty keeping Carina  appointments will update uric acid and transition to Uloric daily. Left knee therapeutic arthrocentesis performed today with 140mL of noninflammatory-appearing fluid removed. 1. 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 ESRD (end stage renal disease) on dialysis ([facility] [address]  - CBC WITH AUTOMATED DIFF; Future  - METABOLIC PANEL, COMPREHENSIVE; Future     The patient voiced understanding of the aforementioned assessment and plan.      Face to face time: minutes  Note preparation and records review day of service: minutes  Total provider time day of service: minutes    TODAY'S ORDERS    Orders Placed This Encounter    C REACTIVE PROTEIN, QT     Standing Status:   Future     Number of Occurrences:   1     Standing Expiration Date:   8/16/2023    CBC WITH AUTOMATED DIFF     Standing Status:   Future     Number of Occurrences:   1     Standing Expiration Date:   1/91/6210    METABOLIC PANEL, COMPREHENSIVE     Standing Status:   Future     Number of Occurrences:   1     Standing Expiration Date:   8/16/2023    SED RATE (ESR)     Standing Status:   Future     Number of Occurrences:   1     Standing Expiration Date:   8/16/2023    URIC ACID     Standing Status:   Future     Number of Occurrences:   1     Standing Expiration Date:   2/16/2024    CRYSTALS, SYNOVIAL FLUID     Standing Status:   Future     Standing Expiration Date:   2/16/2024     Order Specific Question:   Fluid type: Answer:   Synovial Fluid [242]    CELL COUNT, SYNOVIAL     Standing Status:   Future     Standing Expiration Date:   2/16/2024    81727 - DRAIN/INJECT LARGE JOINT/BURSA    periton. dialysis 4-1.5 % dext (DIALYTE/1.5% DEXTROSE IP)     Sig: by IntraPERitoneal route.    lidocaine (XYLOCAINE) 10 mg/mL (1 %) injection 2 mL    triamcinolone acetonide (KENALOG-40) 40 mg/mL injection 40 mg    febuxostat (Uloric) 40 mg tab tablet     Sig: Take 1 Tablet by mouth daily. Dispense:  30 Tablet     Refill:  3         Future Appointments   Date Time Provider Barry Cano   2/16/2023  3:40 PM MD EMELIA Cruz BS AMB   5/30/2023  2:00 PM MD ALYSSA Miller BS AMB   11/13/2023  1:00 PM MIHAELA Pickering BS AMB     [facility] [address]  OFFICE PROCEDURE PROGRESS NOTE      Symptom relief from Left knee Arthritis.  (02/16/23)     Chart reviewed for the following:   Shyanne Calderon MD, have reviewed the History, Physical and updated the Allergic reactions for [email or web address] performed immediately prior to start of procedure:   Shyanne Calderon MD, have performed the following reviews on Kary Perazap prior to the start of the procedure            * Patient was identified by name and date of birth   * Agreement on procedure being performed was verified  * Risks and Benefits explained to the patient  * Procedure site verified and marked as necessary  * Patient was positioned for comfort  * Consent was signed and verified     Time: 16:50    Date of procedure: 2/16/2023    Procedure performed by: Dane Jamil MD    Provider assisted by: self    Patient assisted by: self    How tolerated by patient: tolerated the procedure well with no complications    Post Procedural Pain Scale: {post procedural pain assessment [facility] amb:25575}    Comments: {None:87330::\"none\"}    After consent was obtained, using sterile technique the left knee was prepped and topical ethyl chloride spray was used as local anesthetic. The joint was entered and 140 ml's of clear yellow-colored fluid was withdrawn and sent for cell count and crystals. Kenalog 40 mg and 2 ml plain Lidocaine was then injected and the needle withdrawn. The procedure was well tolerated. The patient is asked to continue to rest the joint for a few more days before resuming regular activities. It may be more painful for the first 1-2 days. Watch for fever, or increased swelling or persistent pain in the joint. Call or return to clinic prn if such symptoms occur or there is failure to improve as anticipated.

## 2023-02-23 DIAGNOSIS — M1A.39X1 CHRONIC TOPHACEOUS GOUT OF MULTIPLE SITES DUE TO RENAL IMPAIRMENT: ICD-10-CM

## 2023-02-23 NOTE — TELEPHONE ENCOUNTER
Received PA request via fax for Uloric Acid. Last visit was 2/16/23  No follow up scheduled at this time  Lab Results   Component Value Date/Time    Sodium 138 12/23/2022 12:52 PM    Potassium 3.7 12/23/2022 12:52 PM    Chloride 102 12/23/2022 12:52 PM    CO2 30 12/23/2022 12:52 PM    Anion gap 6 12/23/2022 12:52 PM    Glucose 92 12/23/2022 12:52 PM    BUN 31 (H) 12/23/2022 12:52 PM    Creatinine 4.55 (H) 12/23/2022 12:52 PM    BUN/Creatinine ratio 7 (L) 12/23/2022 12:52 PM    GFR est AA 14 (L) 09/28/2022 10:46 AM    GFR est non-AA 11 (L) 09/28/2022 10:46 AM    Calcium 9.6 12/23/2022 12:52 PM    Bilirubin, total 0.2 12/23/2022 12:52 PM    Alk.  phosphatase 31 (L) 12/23/2022 12:52 PM    Protein, total 7.3 12/23/2022 12:52 PM    Albumin 3.5 12/23/2022 12:52 PM    Globulin 3.8 12/23/2022 12:52 PM    A-G Ratio 0.9 (L) 12/23/2022 12:52 PM    ALT (SGPT) 23 12/23/2022 12:52 PM    AST (SGOT) 14 (L) 12/23/2022 12:52 PM     Lab Results   Component Value Date/Time    WBC 6.4 12/23/2022 12:52 PM    HGB 10.8 (L) 12/23/2022 12:52 PM    HCT 33.6 (L) 12/23/2022 12:52 PM    PLATELET 279 80/10/8266 12:52 PM    MCV 94.1 12/23/2022 12:52 PM

## 2023-02-24 RX ORDER — FEBUXOSTAT 40 MG/1
40 TABLET, FILM COATED ORAL DAILY
Qty: 30 TABLET | Refills: 3 | Status: SHIPPED | OUTPATIENT
Start: 2023-02-24

## 2023-02-28 ENCOUNTER — DOCUMENTATION ONLY (OUTPATIENT)
Dept: PHARMACY | Age: 53
End: 2023-02-28

## 2023-02-28 DIAGNOSIS — M1A.39X1 CHRONIC TOPHACEOUS GOUT OF MULTIPLE SITES DUE TO RENAL IMPAIRMENT: ICD-10-CM

## 2023-02-28 RX ORDER — FEBUXOSTAT 40 MG/1
40 TABLET, FILM COATED ORAL DAILY
Qty: 30 TABLET | Refills: 3 | Status: SHIPPED | OUTPATIENT
Start: 2023-02-28

## 2023-02-28 RX ORDER — METOPROLOL TARTRATE 50 MG/1
TABLET ORAL
Qty: 180 TABLET | Refills: 1 | Status: SHIPPED | OUTPATIENT
Start: 2023-02-28

## 2023-02-28 NOTE — TELEPHONE ENCOUNTER
Cardiologist: Dr. Agnieszka Dean    Last appt: 11/28/2022  Future Appointments   Date Time Provider Barry Cano   5/30/2023  2:00 PM MD ALYSSA Jimenez   11/13/2023  1:00 PM Ed , NP ALYSSA ZAMARRIPA       Requested Prescriptions     Signed Prescriptions Disp Refills    metoprolol tartrate (LOPRESSOR) 50 mg tablet 180 Tablet 1     Sig: TAKE ONE TABLET BY MOUTH TWICE A DAY     Authorizing Provider: Joao Gage     Ordering User: ARETHA LAGUNAS         Refills VO per Dr. Agnieszka Dean.

## 2023-02-28 NOTE — PROGRESS NOTES
[facility] at [address] [facility] Update    Date: 02/28/23    Luke Mascorro 1970    Medication: Febuxostat 40 mg    Prior Authorization: through 2/27/2024    Prescription needs to be transferred to: [facility] ([phone]) was notified that this specialty prescription needs to be transferred to the insurance mandated specialty pharmacy above.      Jami Suresh, [address] at [facility],  [place], [address]  phone: [phone]   fax: [phone]

## 2023-03-16 ENCOUNTER — HOSPITAL ENCOUNTER (OUTPATIENT)
Dept: PREADMISSION TESTING | Age: 53
Discharge: HOME | End: 2023-03-16
Payer: COMMERCIAL

## 2023-03-16 VITALS
BODY MASS INDEX: 29.71 KG/M2 | WEIGHT: 212.2 LBS | HEIGHT: 71 IN | SYSTOLIC BLOOD PRESSURE: 126 MMHG | DIASTOLIC BLOOD PRESSURE: 78 MMHG | RESPIRATION RATE: 12 BRPM | TEMPERATURE: 98 F | HEART RATE: 73 BPM

## 2023-03-16 LAB
ALBUMIN SERPL-MCNC: 3.8 G/DL (ref 3.5–5)
ALBUMIN/GLOB SERPL: 1.1 (ref 1.1–2.2)
ALP SERPL-CCNC: 29 U/L (ref 45–117)
ALT SERPL-CCNC: 24 U/L (ref 12–78)
ANION GAP SERPL CALC-SCNC: 8 MMOL/L (ref 5–15)
APPEARANCE UR: CLEAR
AST SERPL-CCNC: 16 U/L (ref 15–37)
BACTERIA URNS QL MICRO: NEGATIVE /HPF
BASOPHILS # BLD: 0 K/UL (ref 0–0.1)
BASOPHILS NFR BLD: 1 % (ref 0–1)
BILIRUB SERPL-MCNC: 0.3 MG/DL (ref 0.2–1)
BILIRUB UR QL: NEGATIVE
BUN SERPL-MCNC: 50 MG/DL (ref 6–20)
BUN/CREAT SERPL: 7 (ref 12–20)
CALCIUM SERPL-MCNC: 10.7 MG/DL (ref 8.5–10.1)
CHLORIDE SERPL-SCNC: 101 MMOL/L (ref 97–108)
CO2 SERPL-SCNC: 30 MMOL/L (ref 21–32)
COLOR UR: ABNORMAL
CREAT SERPL-MCNC: 7.41 MG/DL (ref 0.7–1.3)
DIFFERENTIAL METHOD BLD: ABNORMAL
EOSINOPHIL # BLD: 0.4 K/UL (ref 0–0.4)
EOSINOPHIL NFR BLD: 7 % (ref 0–7)
EPITH CASTS URNS QL MICRO: ABNORMAL /LPF
ERYTHROCYTE [DISTWIDTH] IN BLOOD BY AUTOMATED COUNT: 14.7 % (ref 11.5–14.5)
GLOBULIN SER CALC-MCNC: 3.4 G/DL (ref 2–4)
GLUCOSE SERPL-MCNC: 88 MG/DL (ref 65–100)
GLUCOSE UR STRIP.AUTO-MCNC: 100 MG/DL
HCT VFR BLD AUTO: 37.3 % (ref 36.6–50.3)
HGB BLD-MCNC: 11.5 G/DL (ref 12.1–17)
HGB UR QL STRIP: ABNORMAL
HYALINE CASTS URNS QL MICRO: ABNORMAL /LPF (ref 0–5)
IMM GRANULOCYTES # BLD AUTO: 0 K/UL (ref 0–0.04)
IMM GRANULOCYTES NFR BLD AUTO: 0 % (ref 0–0.5)
KETONES UR QL STRIP.AUTO: NEGATIVE MG/DL
LEUKOCYTE ESTERASE UR QL STRIP.AUTO: NEGATIVE
LYMPHOCYTES # BLD: 2 K/UL (ref 0.8–3.5)
LYMPHOCYTES NFR BLD: 33 % (ref 12–49)
MCH RBC QN AUTO: 29.9 PG (ref 26–34)
MCHC RBC AUTO-ENTMCNC: 30.8 G/DL (ref 30–36.5)
MCV RBC AUTO: 96.9 FL (ref 80–99)
MONOCYTES # BLD: 0.7 K/UL (ref 0–1)
MONOCYTES NFR BLD: 12 % (ref 5–13)
NEUTS SEG # BLD: 2.8 K/UL (ref 1.8–8)
NEUTS SEG NFR BLD: 47 % (ref 32–75)
NITRITE UR QL STRIP.AUTO: NEGATIVE
NRBC # BLD: 0 K/UL (ref 0–0.01)
NRBC BLD-RTO: 0 PER 100 WBC
PH UR STRIP: 8.5 (ref 5–8)
PLATELET # BLD AUTO: 237 K/UL (ref 150–400)
PMV BLD AUTO: 10.3 FL (ref 8.9–12.9)
POTASSIUM SERPL-SCNC: 3.8 MMOL/L (ref 3.5–5.1)
PROT SERPL-MCNC: 7.2 G/DL (ref 6.4–8.2)
PROT UR STRIP-MCNC: 100 MG/DL
RBC # BLD AUTO: 3.85 M/UL (ref 4.1–5.7)
RBC #/AREA URNS HPF: ABNORMAL /HPF (ref 0–5)
SODIUM SERPL-SCNC: 139 MMOL/L (ref 136–145)
SP GR UR REFRACTOMETRY: 1.01 (ref 1–1.03)
UA: UC IF INDICATED,UAUC: ABNORMAL
UROBILINOGEN UR QL STRIP.AUTO: 0.2 EU/DL (ref 0.2–1)
WBC # BLD AUTO: 6 K/UL (ref 4.1–11.1)
WBC URNS QL MICRO: ABNORMAL /HPF (ref 0–4)

## 2023-03-16 PROCEDURE — 81001 URINALYSIS AUTO W/SCOPE: CPT

## 2023-03-16 PROCEDURE — 36415 COLL VENOUS BLD VENIPUNCTURE: CPT

## 2023-03-16 PROCEDURE — 85025 COMPLETE CBC W/AUTO DIFF WBC: CPT

## 2023-03-16 PROCEDURE — 80053 COMPREHEN METABOLIC PANEL: CPT

## 2023-03-16 NOTE — PERIOP NOTES
[address] INSTRUCTIONS    Surgery Date:   3/23/23    Your surgeon's office or [facility] staff will call you between 4 PM- 8 PM the day before surgery with your arrival time. If your surgery is on a Monday, you will receive a call the preceding Friday. Please report to [facility] Patient Access/Admitting on the 1st floor. Bring your insurance card, photo identification, and any copayment ( if applicable). If you are going home the same day of your surgery, you must have a responsible adult to drive you home. You need to have a responsible adult to stay with you the first 24 hours after surgery and you should not drive a car for 24 hours following your surgery. Nothing to eat or drink after midnight the night before surgery. This includes no water, gum, mints, coffee, juice, etc.  Please note special instructions, if applicable, below for medications. Do NOT drink alcohol or smoke 24 hours before surgery. STOP smoking for 14 days prior as it helps with breathing and healing after surgery. If you are being admitted to the hospital, please leave personal belongings/luggage in your car until you have an assigned hospital room number. Please wear comfortable clothes. Wear your glasses instead of contacts. We ask that all money, jewelry and valuables be left at home. Wear no make up, particularly mascara, the day of surgery. All body piercings, rings, and jewelry need to be removed and left at home. Please remove any nail polish or artificial nails from your fingernails. Please wear your hair loose or down. Please no pony-tails, buns, or any metal hair accessories. If you shower the morning of surgery, please do not apply any lotions or powders afterwards. You may wear deodorant, unless having breast surgery. Do not shave any body area within 24 hours of your surgery. Please follow all instructions to avoid any potential surgical cancellation.   Should your physical condition change, (i.e. fever, cold, flu, etc.) please notify your surgeon as soon as possible. It is important to be on time. If a situation occurs where you may be delayed, please call:  [phone] / [phone] on the day of surgery. The Preadmission Testing staff can be reached at [phone]. Special instructions: ANY FURTHER INSTRUCTIONS GIVEN TO YOU BY DR. Roger Paul OFFICE      Current Outpatient Medications   Medication Sig    folic acid/vit B complex and C (DIALYVITE PO) Take 1 Tablet by mouth daily. metoprolol tartrate (LOPRESSOR) 50 mg tablet TAKE ONE TABLET BY MOUTH TWICE A DAY (Patient taking differently: Take 50 mg by mouth two (2) times a day.)    febuxostat (Uloric) 40 mg tab tablet Take 1 Tablet by mouth daily. aspirin 81 mg chewable tablet Take 1 Tablet by mouth daily. ferric citrate (AURYXIA PO) Take 2 Tablets by mouth three (3) times daily (with meals). acetaminophen (TYLENOL) 325 mg tablet Take 2 Tablets by mouth every four (4) hours as needed for Pain. colchicine (Colcrys) 0.6 mg tablet TAKE ONE TABLET BY MOUTH DAILY AS NEEDED FOR GOUT FLARE (Patient taking differently: 0.6 mg as needed. TAKE ONE TABLET BY MOUTH DAILY AS NEEDED FOR GOUT FLARE)    spironolactone (ALDACTONE) 25 mg tablet Take 25 mg by mouth daily. bumetanide (BUMEX) 1 mg tablet Take 1 Tab by mouth daily. periton. dialysis 4-1.5 % dext (DIALYTE/1.5% DEXTROSE IP) by IntraPERitoneal route. leflunomide (ARAVA) 10 mg tablet Take 1 Tablet by mouth daily. Please call Dr. Marylou Wilson to reschedule followup, no further refills until seen. (Patient not taking: Reported on 3/16/2023)    sodium bicarbonate 325 mg tablet Take 650 mg by mouth two (2) times a day. (Patient not taking: Reported on 3/16/2023)     No current facility-administered medications for this encounter.        YOU MUST ONLY TAKE THESE MEDICATIONS THE MORNING OF SURGERY WITH A SIP OF WATER: AURYXIA, METOPROLOL,   MEDICATIONS TO TAKE THE MORNING OF SURGERY ONLY IF NEEDED: COLCHICINE  HOLD these prescription medications BEFORE Surgery: STOP ASPIRIN FOR 7 DAYS PRIOR TO SURGERY  Ask your surgeon/prescribing physician about when/if to STOP taking these medications: BUMETADINE, SPIRONOLACTONE  Stop all vitamins, herbal medicines and Aspirin containing products 7 days prior to surgery. Stop any non-steroidal anti-inflammatory drugs (i.e. Ibuprofen, Naproxen, Advil, Aleve) 3 days before surgery. You may take Tylenol. If you are currently taking Plavix, Coumadin, or any other blood-thinning/anticoagulant medication contact your prescribing physician for instructions. Preventing Infections Before and After - Your Surgery    IMPORTANT INSTRUCTIONS      You play an important role in your health and preparation for surgery. To reduce the germs on your skin you will need to shower with CHG soap (Chorhexidine gluconate 4%) two times before surgery. CHG soap (Hibiclens, Hex-A-Clens or store brand)  CHG soap will be provided at your Preadmission Testing (PAT) appointment. If you do not have a PAT appointment before surgery, you may arrange to  CHG soap from our office or purchase CHG soap at a pharmacy, grocery or department store. You need to purchase TWO 4 ounce bottles to use for your 2 showers. Steps to follow:  Sneha Savory your hair with your normal shampoo and your body with regular soap and rinse well to remove shampoo and soap from your skin. Wet a clean washcloth and turn off the shower. Put CHG soap on washcloth and apply to your entire body from the neck down. Do not use on your head, face or private parts(genitals). Do not use CHG soap on open sores, wounds or areas of skin irritation. Wash you body gently for 5 minutes. Do not wash your skin too hard. This soap does not create lather. Pay special attention to your underarms and from your belly button to your feet. Turn the shower back on and rinse well to get CHG soap off your body.   Pat your skin dry with a clean, dry towel. Do not apply lotions or moisturizer. Put on clean clothes and sleep on fresh bed sheets and do not allow pets to sleep with you. Shower with CHG soap 2 times before your surgery  The evening before your surgery  The morning of your surgery      Tips to help prevent infections after your surgery:  Protect your surgical wound from germs:  Hand washing is the most important thing you and your caregivers can do to prevent infections. Keep your bandage clean and dry! Do not touch your surgical wound. Use clean, freshly washed towels and washcloths every time you shower; do not share bath linens with others. Until your surgical wound is healed, wear clothing and sleep on bed linens each day that are clean and freshly washed. Do not allow pets to sleep in your bed with you or touch your surgical wound. Do not smoke - smoking delays wound healing. This may be a good time to stop smoking. If you have diabetes, it is important for you to manage your blood sugar levels properly before your surgery as well as after your surgery. Poorly managed blood sugar levels slow down wound healing and prevent you from healing completely. Patient Information Regarding COVID Restrictions    Day of Procedure    Please park in the parking deck or any designated visitor parking lot. Enter the facility through the Main Entrance of the hospital.  On the day of surgery, please provide the cell phone number of the person who will be waiting for you to the Patient Access representative at the time of registration. Masks are highly recommended in the hospital, but not required. Once your procedure and the immediate recovery period is completed, a nurse in the recovery area will contact your designated visitor to inform them of your room number or to otherwise review other pertinent information regarding your care. Social distancing practices are strongly encouraged in waiting areas and the cafeteria. The patient was contacted  in person. He verbalized understanding of all instructions does not  need reinforcement.

## 2023-03-16 NOTE — PERIOP NOTES
Preoperative instructions reviewed with patient. Patient given SSI infection FAQS sheet. Given two bottles of skin prep chlorhexidine soap; given written and verbal instructions on use. Patient was given the opportunity to ask questions on the information provided. PATIENT DID NOT WANT TO SIGN SURGICAL CONSENT TODAY; HE HAD QUESTIONS REGARDING THE PROCEDURE. COVID CARD VIEWED ON PHONE, DATES IN EMR CORRECT.

## 2023-03-23 ENCOUNTER — APPOINTMENT (OUTPATIENT)
Dept: GENERAL RADIOLOGY | Age: 53
End: 2023-03-23
Attending: STUDENT IN AN ORGANIZED HEALTH CARE EDUCATION/TRAINING PROGRAM
Payer: COMMERCIAL

## 2023-03-23 ENCOUNTER — ANESTHESIA EVENT (OUTPATIENT)
Dept: SURGERY | Age: 53
End: 2023-03-23
Payer: COMMERCIAL

## 2023-03-23 ENCOUNTER — HOSPITAL ENCOUNTER (OUTPATIENT)
Age: 53
Setting detail: OUTPATIENT SURGERY
Discharge: HOME | End: 2023-03-23
Attending: STUDENT IN AN ORGANIZED HEALTH CARE EDUCATION/TRAINING PROGRAM | Admitting: STUDENT IN AN ORGANIZED HEALTH CARE EDUCATION/TRAINING PROGRAM
Payer: COMMERCIAL

## 2023-03-23 ENCOUNTER — ANESTHESIA (OUTPATIENT)
Dept: SURGERY | Age: 53
End: 2023-03-23
Payer: COMMERCIAL

## 2023-03-23 VITALS
DIASTOLIC BLOOD PRESSURE: 85 MMHG | RESPIRATION RATE: 16 BRPM | TEMPERATURE: 98.2 F | SYSTOLIC BLOOD PRESSURE: 132 MMHG | OXYGEN SATURATION: 99 % | WEIGHT: 212.2 LBS | HEIGHT: 71 IN | HEART RATE: 73 BPM | BODY MASS INDEX: 29.71 KG/M2

## 2023-03-23 LAB
ANION GAP BLD CALC-SCNC: 10 MMOL/L (ref 10–20)
CA-I BLD-MCNC: 0.99 MMOL/L (ref 1.12–1.32)
CHLORIDE BLD-SCNC: 105 MMOL/L (ref 98–107)
CO2 BLD-SCNC: 26.3 MMOL/L (ref 21–32)
CREAT BLD-MCNC: 7.79 MG/DL (ref 0.6–1.3)
GLUCOSE BLD-MCNC: 85 MG/DL (ref 65–100)
POTASSIUM BLD-SCNC: 3.4 MMOL/L (ref 3.5–5.1)
SERVICE CMNT-IMP: ABNORMAL
SODIUM BLD-SCNC: 140 MMOL/L (ref 136–145)

## 2023-03-23 PROCEDURE — 76010000138 HC OR TIME 0.5 TO 1 HR: Performed by: STUDENT IN AN ORGANIZED HEALTH CARE EDUCATION/TRAINING PROGRAM

## 2023-03-23 PROCEDURE — 77030019927 HC TBNG IRR CYSTO BAXT -A: Performed by: STUDENT IN AN ORGANIZED HEALTH CARE EDUCATION/TRAINING PROGRAM

## 2023-03-23 PROCEDURE — 76060000033 HC ANESTHESIA 1 TO 1.5 HR: Performed by: STUDENT IN AN ORGANIZED HEALTH CARE EDUCATION/TRAINING PROGRAM

## 2023-03-23 PROCEDURE — 74011250636 HC RX REV CODE- 250/636: Performed by: STUDENT IN AN ORGANIZED HEALTH CARE EDUCATION/TRAINING PROGRAM

## 2023-03-23 PROCEDURE — 77030040361 HC SLV COMPR DVT MDII -B: Performed by: STUDENT IN AN ORGANIZED HEALTH CARE EDUCATION/TRAINING PROGRAM

## 2023-03-23 PROCEDURE — 74011000636 HC RX REV CODE- 636: Performed by: STUDENT IN AN ORGANIZED HEALTH CARE EDUCATION/TRAINING PROGRAM

## 2023-03-23 PROCEDURE — 74011250636 HC RX REV CODE- 250/636: Performed by: NURSE ANESTHETIST, CERTIFIED REGISTERED

## 2023-03-23 PROCEDURE — 80047 BASIC METABLC PNL IONIZED CA: CPT

## 2023-03-23 PROCEDURE — 74011000250 HC RX REV CODE- 250: Performed by: NURSE ANESTHETIST, CERTIFIED REGISTERED

## 2023-03-23 PROCEDURE — 74011000250 HC RX REV CODE- 250: Performed by: STUDENT IN AN ORGANIZED HEALTH CARE EDUCATION/TRAINING PROGRAM

## 2023-03-23 PROCEDURE — 76210000016 HC OR PH I REC 1 TO 1.5 HR: Performed by: STUDENT IN AN ORGANIZED HEALTH CARE EDUCATION/TRAINING PROGRAM

## 2023-03-23 PROCEDURE — 77030010509 HC AIRWY LMA MSK TELE -A: Performed by: ANESTHESIOLOGY

## 2023-03-23 PROCEDURE — 2709999900 HC NON-CHARGEABLE SUPPLY: Performed by: STUDENT IN AN ORGANIZED HEALTH CARE EDUCATION/TRAINING PROGRAM

## 2023-03-23 PROCEDURE — 74011250636 HC RX REV CODE- 250/636: Performed by: ANESTHESIOLOGY

## 2023-03-23 PROCEDURE — C1758 CATHETER, URETERAL: HCPCS | Performed by: STUDENT IN AN ORGANIZED HEALTH CARE EDUCATION/TRAINING PROGRAM

## 2023-03-23 PROCEDURE — C1769 GUIDE WIRE: HCPCS | Performed by: STUDENT IN AN ORGANIZED HEALTH CARE EDUCATION/TRAINING PROGRAM

## 2023-03-23 PROCEDURE — 76210000021 HC REC RM PH II 0.5 TO 1 HR: Performed by: STUDENT IN AN ORGANIZED HEALTH CARE EDUCATION/TRAINING PROGRAM

## 2023-03-23 RX ORDER — DEXAMETHASONE SODIUM PHOSPHATE 4 MG/ML
INJECTION, SOLUTION INTRA-ARTICULAR; INTRALESIONAL; INTRAMUSCULAR; INTRAVENOUS; SOFT TISSUE AS NEEDED
Status: DISCONTINUED | OUTPATIENT
Start: 2023-03-23 | End: 2023-03-23 | Stop reason: HOSPADM

## 2023-03-23 RX ORDER — SODIUM CHLORIDE, SODIUM LACTATE, POTASSIUM CHLORIDE, CALCIUM CHLORIDE 600; 310; 30; 20 MG/100ML; MG/100ML; MG/100ML; MG/100ML
100 INJECTION, SOLUTION INTRAVENOUS CONTINUOUS
Status: DISCONTINUED | OUTPATIENT
Start: 2023-03-23 | End: 2023-03-23 | Stop reason: CLARIF

## 2023-03-23 RX ORDER — SODIUM CHLORIDE 9 MG/ML
100 INJECTION, SOLUTION INTRAVENOUS CONTINUOUS
Status: DISCONTINUED | OUTPATIENT
Start: 2023-03-23 | End: 2023-03-23 | Stop reason: HOSPADM

## 2023-03-23 RX ORDER — FENTANYL CITRATE 50 UG/ML
INJECTION, SOLUTION INTRAMUSCULAR; INTRAVENOUS AS NEEDED
Status: DISCONTINUED | OUTPATIENT
Start: 2023-03-23 | End: 2023-03-23 | Stop reason: HOSPADM

## 2023-03-23 RX ORDER — LIDOCAINE HYDROCHLORIDE 20 MG/ML
INJECTION, SOLUTION EPIDURAL; INFILTRATION; INTRACAUDAL; PERINEURAL AS NEEDED
Status: DISCONTINUED | OUTPATIENT
Start: 2023-03-23 | End: 2023-03-23 | Stop reason: HOSPADM

## 2023-03-23 RX ORDER — PROPOFOL 10 MG/ML
INJECTION, EMULSION INTRAVENOUS AS NEEDED
Status: DISCONTINUED | OUTPATIENT
Start: 2023-03-23 | End: 2023-03-23 | Stop reason: HOSPADM

## 2023-03-23 RX ORDER — ONDANSETRON 2 MG/ML
INJECTION INTRAMUSCULAR; INTRAVENOUS AS NEEDED
Status: DISCONTINUED | OUTPATIENT
Start: 2023-03-23 | End: 2023-03-23 | Stop reason: HOSPADM

## 2023-03-23 RX ORDER — MIDAZOLAM HYDROCHLORIDE 1 MG/ML
INJECTION, SOLUTION INTRAMUSCULAR; INTRAVENOUS AS NEEDED
Status: DISCONTINUED | OUTPATIENT
Start: 2023-03-23 | End: 2023-03-23 | Stop reason: HOSPADM

## 2023-03-23 RX ADMIN — ONDANSETRON HYDROCHLORIDE 4 MG: 2 INJECTION, SOLUTION INTRAMUSCULAR; INTRAVENOUS at 14:00

## 2023-03-23 RX ADMIN — LIDOCAINE HYDROCHLORIDE 60 MG: 20 INJECTION, SOLUTION EPIDURAL; INFILTRATION; INTRACAUDAL; PERINEURAL at 13:32

## 2023-03-23 RX ADMIN — WATER 2 G: 1 INJECTION INTRAMUSCULAR; INTRAVENOUS; SUBCUTANEOUS at 13:42

## 2023-03-23 RX ADMIN — DEXAMETHASONE SODIUM PHOSPHATE 4 MG: 4 INJECTION, SOLUTION INTRAMUSCULAR; INTRAVENOUS at 13:44

## 2023-03-23 RX ADMIN — FENTANYL CITRATE 25 MCG: 50 INJECTION, SOLUTION INTRAMUSCULAR; INTRAVENOUS at 13:56

## 2023-03-23 RX ADMIN — FENTANYL CITRATE 25 MCG: 50 INJECTION, SOLUTION INTRAMUSCULAR; INTRAVENOUS at 13:54

## 2023-03-23 RX ADMIN — MIDAZOLAM 2 MG: 1 INJECTION INTRAMUSCULAR; INTRAVENOUS at 13:20

## 2023-03-23 RX ADMIN — PROPOFOL 50 MG: 10 INJECTION, EMULSION INTRAVENOUS at 13:33

## 2023-03-23 RX ADMIN — PROPOFOL 150 MG: 10 INJECTION, EMULSION INTRAVENOUS at 13:32

## 2023-03-23 RX ADMIN — SODIUM CHLORIDE: 900 INJECTION, SOLUTION INTRAVENOUS at 13:11

## 2023-03-23 NOTE — PROGRESS NOTES
Discharge instructions reviewed with patient and family using teachback . All questions have been answered. . Vital signs stable, pain appropriately managed. Patient wheeled off the unit with PACU staff.

## 2023-03-23 NOTE — DISCHARGE INSTRUCTIONS
Cystoscopy: What to Expect at [address] [facility]     A cystoscopy is a procedure that lets a doctor look inside of the bladder and the urethra. The urethra is the tube that carries urine from the bladder to outside the body. The doctor uses a thin, lighted tool called a cystoscope. Your bladder is filled with fluid. This stretches the bladder so that your doctor can look closely at the inside of your bladder. After the cystoscopy, your urethra may be sore at first, and it may burn when you urinate for the first few days after the procedure. You may feel the need to urinate more often, and your urine may be pink. These symptoms should get better in 1 or 2 days. You will probably be able to go back to most of your usual activities in 1 or 2 days. This care sheet gives you a general idea about how long it will take for you to recover. But each person recovers at a different pace. Follow the steps below to get better as quickly as possible. How can you care for yourself at home? Activity    Rest when you feel tired. Getting enough sleep will help you recover. Try to walk each day. Start by walking a little more than you did the day before. Bit by bit, increase the amount you walk. Walking boosts blood flow and helps prevent pneumonia and constipation. Avoid strenuous activities, such as bicycle riding, jogging, weight lifting, or aerobic exercise, until your doctor says it is okay. Ask your doctor when you can drive again. Most people are able to return to work within 1 or 2 days after the procedure. You may shower and take baths as usual.     Ask your doctor when it is okay for you to have sex. Diet    You can eat your normal diet. If your stomach is upset, try bland, low-fat foods like plain rice, broiled chicken, toast, and yogurt. Drink plenty of fluids (unless your doctor tells you not to). Medicines    Take pain medicines exactly as directed.   If the doctor gave you a prescription medicine for pain, take it as prescribed. If you are not taking a prescription pain medicine, ask your doctor if you can take an over-the-counter medicine. If you think your pain medicine is making you sick to your stomach: Take your medicine after meals (unless your doctor has told you not to). Ask your doctor for a different pain medicine. If your doctor prescribed antibiotics, take them as directed. Do not stop taking them just because you feel better. You need to take the full course of antibiotics. Follow-up care is a key part of your treatment and safety. Be sure to make and go to all appointments, and call your doctor if you are having problems. It's also a good idea to know your test results and keep a list of the medicines you take. When should you call for help? Call 911 anytime you think you may need emergency care. For example, call if:    You passed out (lost consciousness). You have severe trouble breathing. You have sudden chest pain and shortness of breath, or you cough up blood. You have severe belly pain. Call your doctor now or seek immediate medical care if:    You are sick to your stomach or cannot keep fluids down. Your urine is still red or you see blood clots after you have urinated several times. You have trouble passing urine or stool, especially if you have pain or swelling in your lower belly. You have signs of a blood clot, such as:  Pain in your calf, back of the knee, thigh, or groin. Redness and swelling in your leg or groin. You develop a fever or severe chills. You have pain in your back just below your rib cage. This is called flank pain. Watch closely for changes in your health, and be sure to contact your doctor if:    You have pain or burning when you urinate. A burning feeling is normal for a day or two after the test, but call if it does not get better.      You have a frequent urge to urinate but can pass only small amounts of urine. Your urine is pink, red, or cloudy, or smells bad. It is normal for the urine to have a pinkish color for a few days after the test, but call if it does not get better. Where can you learn more? Go to http://www.gray.com/  Enter C842 in the search box to learn more about \"Cystoscopy: What to Expect at Home. \"  Current as of: June 16, 2022               Content Version: 13.4  © 3224-7187 [employer]. Care instructions adapted under license by [employer] (which disclaims liability or warranty for this information). If you have questions about a medical condition or this instruction, always ask your healthcare professional. [address] any warranty or liability for your use of this information. ______________________________________________________________________    Anesthesia Discharge Instructions    After general anesthesia or intervenous sedation, for 24 hours or while taking prescription Narcotics:  Limit your activities  Do not drive or operate hazardous machinery  If you have not urinated within 8 hours after discharge, please contact your surgeon on call. Do not make important personal or business decisions  Do not drink alcoholic beverages    Report the following to your surgeon:  Excessive pain, swelling, redness or odor of or around the surgical area  Temperature over 100.5 degrees  Nausea and vomiting lasting longer than 4 hours or if unable to take medication  Any signs of decreased circulation or nerve impairment to extremity:  Change in color, persistent numbness, tingling, coldness or increased pain.   Any questions

## 2023-03-23 NOTE — ANESTHESIA POSTPROCEDURE EVALUATION
Post-Anesthesia Evaluation and Assessment    Patient: Lanre Jose MRN: [id number]  SSN: [id number]    YOB: 1970  Age: 48 y.o. Sex: male      I have evaluated the patient and they are stable and ready for discharge from the PACU. Cardiovascular Function/Vital Signs  Visit Vitals  /79   Pulse 79   Temp 36.6 °C (97.8 °F)   Resp 14   Ht 5' 11\" (1.803 m)   Wt 96.3 kg (212 lb 3.2 oz)   SpO2 97%   BMI 29.60 kg/m²       Patient is status post General anesthesia for Procedure(s):  CYSTOSCOPY WITH RETROGRADES. Nausea/Vomiting: None    Postoperative hydration reviewed and adequate. Pain:  Pain Scale 1: Numeric (0 - 10) (03/23/23 1455)  Pain Intensity 1: 0 (03/23/23 1455)   Managed    Neurological Status:   Neuro (WDL): Within Defined Limits (03/23/23 1245)  Neuro  Neurologic State: Alert (03/23/23 1455)  LUE Motor Response: Purposeful (03/23/23 1455)  LLE Motor Response: Purposeful (03/23/23 1455)  RUE Motor Response: Purposeful (03/23/23 1455)  RLE Motor Response: Purposeful (03/23/23 1455)   At baseline    Mental Status, Level of Consciousness: Alert and  oriented to person, place, and time    Pulmonary Status:   O2 Device: None (Room air) (03/23/23 1455)   Adequate oxygenation and airway patent    Complications related to anesthesia: None    Post-anesthesia assessment completed. No concerns    Signed By: Nereida Penny MD     March 23, 2023              Procedure(s):  CYSTOSCOPY WITH RETROGRADES. general    <BSHSIANPOST>    INITIAL Post-op Vital signs:   Vitals Value Taken Time   /79 03/23/23 1515   Temp 36.6 °C (97.8 °F) 03/23/23 1455   Pulse 80 03/23/23 1527   Resp 14 03/23/23 1527   SpO2 98 % 03/23/23 1527   Vitals shown include unvalidated device data.

## 2023-03-23 NOTE — ROUTINE PROCESS
Patient: Lb Batres MRN: [id number]  SSN: [id number]   YOB: 1970  Age: 48 y.o. Sex: male     Patient is status post Procedure(s):  CYSTOSCOPY WITH RETROGRADES. Surgeon(s) and Role:     * Hilary Najera MD - Primary    Local/Dose/Irrigation:  SEE MAR                  Peripheral IV 10/26/22 Left; Lower (Active)       Peripheral IV 03/23/23 Left Hand (Active)   Site Assessment Clean, dry, & intact 03/23/23 1315   Phlebitis Assessment 0 03/23/23 1315   Infiltration Assessment 0 03/23/23 1315   Dressing Status Clean, dry, & intact 03/23/23 1315   Dressing Type Transparent;Tape 03/23/23 1315   Hub Color/Line Status Infusing;Blue 03/23/23 1315            Airway - Endotracheal Tube 03/23/23 Oral (Active)                   Dressing/Packing:       Splint/Cast:  ]    Other:

## 2023-03-23 NOTE — H&P
History and Physical    Patient: Rut Ribera MRN: [id number]  SSN: [id number]    YOB: 1970  Age: 48 y.o. Sex: male      Subjective: Rut Ribera is a 48 y.o. male who    Patient with a history of polycystic kidney disease. He has been having some gross hematuria when he saw me and came in for a work-up he is not a smoker. He had a cytology was negative for malignant cells. We ordered a CT IVP in the interim he does have a history of kidney stones he is here to review the CT urogram and discussed the possibility of office cystoscopy. He is doing well has had no further episodes of hematuria no flank pain no burning with urination overall no urinary symptoms he does urinate a decent volume per day per his report. He is on dialysis. Ct urogram with no real contrast in ureters  Here today for cysto under anesthiesa with retrogrades     Past Medical History:   Diagnosis Date    Arthritis     GOUT    Asthma     AS A CHILD    Chronic kidney disease     ESRD    Chronic pain     KNEES, ANKLES    DJD (degenerative joint disease)     Gout     Heart failure ([facility] [address] 2019    CHF    Hypertension     Polycystic kidney disease      Past Surgical History:   Procedure Laterality Date    HX HERNIA REPAIR  26/50/1895    Supra umbilical hernia with mesh and Umbilical hernia repair by Dr. Sonia Angelo.     HX VASCULAR ACCESS  10/18/2021    LEFT ARM FISTULA (NEVER FUNCTIONED)    HX VASCULAR ACCESS  2021    RIGHT ARM  FISTULA      Family History   Problem Relation Age of Onset    Hypertension Mother     Kidney Disease Mother     Hypertension Father     Kidney Disease Father     Cancer Father         REANAL CANCER    Other Sister         PKD, Fibromyalgia    Cancer Brother         PROSTATE    Cancer Brother         PROSTATE    Anesth Problems Neg Hx      Social History     Tobacco Use    Smoking status: Never    Smokeless tobacco: Never   Substance Use Topics    Alcohol use: Not Currently      Prior to Admission medications    Medication Sig Start Date End Date Taking? Authorizing Provider   folic acid/vit B complex and C (DIALYVITE PO) Take 1 Tablet by mouth daily. Yes Provider, Historical   metoprolol tartrate (LOPRESSOR) 50 mg tablet TAKE ONE TABLET BY MOUTH TWICE A DAY  Patient taking differently: Take 50 mg by mouth two (2) times a day. 2/28/23  Yes Sofya Rdz MD   periton. dialysis 4-1.5 % dext (DIALYTE/1.5% DEXTROSE IP) by IntraPERitoneal route. Yes Provider, Historical   aspirin 81 mg chewable tablet Take 1 Tablet by mouth daily. 11/11/22  Yes Marline Albarado MD   ferric citrate (AURYXIA PO) Take 2 Tablets by mouth three (3) times daily (with meals). Yes Provider, Historical   acetaminophen (TYLENOL) 325 mg tablet Take 2 Tablets by mouth every four (4) hours as needed for Pain. 3/23/22  Yes Clover Rush NP   spironolactone (ALDACTONE) 25 mg tablet Take 25 mg by mouth daily. Yes Provider, Historical   bumetanide (BUMEX) 1 mg tablet Take 1 Tab by mouth daily. 10/17/19  Yes Yvonnie Lennox, MD   febuxostat (Uloric) 40 mg tab tablet Take 1 Tablet by mouth daily. Patient not taking: Reported on 3/23/2023 2/28/23   Kevin Archibald MD   leflunomide (ARAVA) 10 mg tablet Take 1 Tablet by mouth daily. Please call Dr. Jemal Jimenez to reschedule followup, no further refills until seen. Patient not taking: No sig reported 10/7/22   Kevin Archibald MD   colchicine (Colcrys) 0.6 mg tablet TAKE ONE TABLET BY MOUTH DAILY AS NEEDED FOR GOUT FLARE  Patient taking differently: 0.6 mg as needed. TAKE ONE TABLET BY MOUTH DAILY AS NEEDED FOR GOUT FLARE 10/14/21   Yvonnie Lennox, MD   sodium bicarbonate 325 mg tablet Take 650 mg by mouth two (2) times a day.   Patient not taking: No sig reported    Provider, Historical        Allergies   Allergen Reactions    Shellfish Containing Products Swelling       Review of Systems:  Denies fever chills nausea vomiting sob chest pain       Objective:     Vitals:    03/23/23 1228   BP: (!) 120/90   Pulse: 86   Resp: 16   Temp: 98.4 °F (36.9 °C)   SpO2: 98%   Weight: 96.3 kg (212 lb 3.2 oz)   Height: 5' 11\" (1.803 m)        Physical Exam:  Nad  Warm well perfused  Breathing comf symmetric exp  Nt/nd    Assessment:     Hospital Problems  Date Reviewed: 3/17/2023   None  48 y.o.   On dialysis PCKD hx of hematuria likely from cyst no delays 2/2 renal funuction here for cysto retrogrades    Plan:     Discussed risks including sepsis hematuria need for bx or other surgery     Signed By: India Hewitt MD     March 23, 2023

## 2023-03-23 NOTE — ANESTHESIA PREPROCEDURE EVALUATION
Relevant Problems   RESPIRATORY SYSTEM   (+) SOB (shortness of breath)      CARDIOVASCULAR   (+) Essential hypertension      RENAL FAILURE   (+) Autosomal dominant adult polycystic kidney disease   (+) CKD (chronic kidney disease) stage 3, GFR 30-59 ml/min (HCC)   (+) CKD (chronic kidney disease) stage 4, GFR 15-29 ml/min ([facility])       Anesthetic History   No history of anesthetic complications            Review of Systems / Medical History  Patient summary reviewed, nursing notes reviewed and pertinent labs reviewed    Pulmonary            Asthma        Neuro/Psych   Within defined limits           Cardiovascular    Hypertension      CHF             GI/Hepatic/Renal         Renal disease: ESRD and dialysis       Endo/Other  Within defined limits           Other Findings              Physical Exam    Airway  Mallampati: II  TM Distance: > 6 cm  Neck ROM: normal range of motion   Mouth opening: Normal     Cardiovascular  Regular rate and rhythm,  S1 and S2 normal,  no murmur, click, rub, or gallop             Dental  No notable dental hx       Pulmonary  Breath sounds clear to auscultation               Abdominal  GI exam deferred       Other Findings            Anesthetic Plan    ASA: 3  Anesthesia type: general - supraclavicular block          Induction: Intravenous  Anesthetic plan and risks discussed with: Patient

## 2023-03-23 NOTE — OP NOTES
Operative Note    Patient: Adithya Caceres  YOB: 1970  MRN: [id number]    Date of Procedure: 3/23/2023     Pre-Op Diagnosis: hematuria    Post-Op Diagnosis: Same as preoperative diagnosis. Procedure(s):  CYSTOSCOPY WITH RETROGRADES    Surgeon(s):  Elmo Charlton MD    Surgical Assistant: None    Anesthesia: General     Estimated Blood Loss (mL):  Minimal    Complications: None    Specimens: * No specimens in log *     Implants: * No implants in log *    Drains: * No LDAs found *    Findings: Minor posterior urethral stricturing   Normal appearing bladder no masses bilateral retrogrades with no filling defect - odd shape to collecting system likely 2/2 large polycystic kidneys     Detailed Description of Procedure:   Patient taken to the operating room and placed on the table a pre procedure timeout was performed everyone was in agreement he received ancef as preoperative antibiotics. Anesthiesa was administered he was positioned in dorsal lithotomy positioning prepped and draped in standard sterile fashion   A preprocedure timeout was performed   I performed cystourethroscopy with 21 Italian scope normal penile urethra he had some minor stricturing posterior urethra that I was able to pass with the scope. The prostate was not very obstructive. His bladder appeared normal with minor inflammation of trigone no lesions noted with good full inspection of bladder wall he had bilateral orthotopic ureteral orifices.  Retrogrades were performed on both sides with no filling defects noted good drainage bilaterally abnormal track 2/2 large polycystic kidneys bladder was drained he was taken to pacu in stable condition    Electronically Signed by Norman Daniel MD on 3/23/2023 at 2:05 PM

## 2023-09-18 RX ORDER — METOPROLOL TARTRATE 50 MG/1
TABLET, FILM COATED ORAL
Qty: 180 TABLET | Refills: 0 | Status: SHIPPED | OUTPATIENT
Start: 2023-09-18

## 2023-09-18 NOTE — TELEPHONE ENCOUNTER
Requested Prescriptions     Signed Prescriptions Disp Refills    metoprolol tartrate (LOPRESSOR) 50 MG tablet 180 tablet 0     Sig: TAKE ONE TABLET BY MOUTH TWICE A DAY     Authorizing Provider: Mark Maher     Ordering User: Joslyn MONTOYA per MD    Future Appointments   Date Time Provider [address]   11/13/2023  1:00 PM ALEENA Diaz NP AMB

## 2023-09-20 ENCOUNTER — TELEPHONE (OUTPATIENT)
Age: 53
End: 2023-09-20

## 2023-12-11 RX ORDER — ASPIRIN 81 MG/1
81 TABLET, CHEWABLE ORAL DAILY
Qty: 90 TABLET | Refills: 3 | Status: SHIPPED | OUTPATIENT
Start: 2023-12-11

## 2023-12-11 NOTE — TELEPHONE ENCOUNTER
Requested Prescriptions     Signed Prescriptions Disp Refills    aspirin 81 MG chewable tablet 90 tablet 3     Sig: Take 1 tablet by mouth daily     Authorizing Provider: KAYLIE ADAMS     Ordering User: Corinna Joyacel     Refill per verbal order Dr. Elmer Whittaker.    Last visit:11/11/22  Next visit: 1/31/24

## 2024-01-15 RX ORDER — METOPROLOL TARTRATE 50 MG/1
50 TABLET, FILM COATED ORAL 2 TIMES DAILY
Qty: 180 TABLET | Refills: 0 | Status: SHIPPED | OUTPATIENT
Start: 2024-01-15

## 2024-01-15 NOTE — TELEPHONE ENCOUNTER
Requested Prescriptions     Signed Prescriptions Disp Refills    metoprolol tartrate (LOPRESSOR) 50 MG tablet 180 tablet 0     Sig: Take 1 tablet by mouth 2 times daily (Please schedule follow up with Dr. Gutierrez)     Authorizing Provider: LEMUEL GUTIERREZ     Ordering User: CRISPIN KATZ     Verbal order per Dr. Gutierrez.    Future Appointments   Date Time Provider Department Center   1/31/2024  8:20 AM Yoselin King, APRN - NP [facility]

## 2025-05-23 ENCOUNTER — APPOINTMENT (OUTPATIENT)
Facility: HOSPITAL | Age: 55
End: 2025-05-23
Payer: MEDICARE

## 2025-05-23 ENCOUNTER — HOSPITAL ENCOUNTER (EMERGENCY)
Facility: HOSPITAL | Age: 55
Discharge: HOME | End: 2025-05-23
Attending: STUDENT IN AN ORGANIZED HEALTH CARE EDUCATION/TRAINING PROGRAM
Payer: MEDICARE

## 2025-05-23 VITALS
WEIGHT: 205 LBS | HEIGHT: 71 IN | DIASTOLIC BLOOD PRESSURE: 91 MMHG | SYSTOLIC BLOOD PRESSURE: 146 MMHG | BODY MASS INDEX: 28.7 KG/M2 | HEART RATE: 72 BPM | OXYGEN SATURATION: 100 % | TEMPERATURE: 98.4 F | RESPIRATION RATE: 16 BRPM

## 2025-05-23 DIAGNOSIS — R51.9 ACUTE NONINTRACTABLE HEADACHE, UNSPECIFIED HEADACHE TYPE: Primary | ICD-10-CM

## 2025-05-23 LAB
ANION GAP SERPL CALC-SCNC: 9 MMOL/L (ref 2–12)
BASOPHILS # BLD: 0.02 K/UL (ref 0–0.1)
BASOPHILS NFR BLD: 0.3 % (ref 0–1)
BUN SERPL-MCNC: 20 MG/DL (ref 6–20)
BUN/CREAT SERPL: 17 (ref 12–20)
CALCIUM SERPL-MCNC: 9.9 MG/DL (ref 8.5–10.1)
CHLORIDE SERPL-SCNC: 108 MMOL/L (ref 97–108)
CO2 SERPL-SCNC: 21 MMOL/L (ref 21–32)
CREAT SERPL-MCNC: 1.16 MG/DL (ref 0.7–1.3)
DIFFERENTIAL METHOD BLD: ABNORMAL
EOSINOPHIL # BLD: 0.08 K/UL (ref 0–0.4)
EOSINOPHIL NFR BLD: 1 % (ref 0–7)
ERYTHROCYTE [DISTWIDTH] IN BLOOD BY AUTOMATED COUNT: 15.3 % (ref 11.5–14.5)
ERYTHROCYTE [SEDIMENTATION RATE] IN BLOOD: 8 MM/HR (ref 0–20)
GLUCOSE SERPL-MCNC: 93 MG/DL (ref 65–100)
HCT VFR BLD AUTO: 45.6 % (ref 36.6–50.3)
HGB BLD-MCNC: 14.4 G/DL (ref 12.1–17)
IMM GRANULOCYTES # BLD AUTO: 0.03 K/UL (ref 0–0.04)
IMM GRANULOCYTES NFR BLD AUTO: 0.4 % (ref 0–0.5)
LYMPHOCYTES # BLD: 2.07 K/UL (ref 0.8–3.5)
LYMPHOCYTES NFR BLD: 27.2 % (ref 12–49)
MCH RBC QN AUTO: 28.2 PG (ref 26–34)
MCHC RBC AUTO-ENTMCNC: 31.6 G/DL (ref 30–36.5)
MCV RBC AUTO: 89.4 FL (ref 80–99)
MONOCYTES # BLD: 0.67 K/UL (ref 0–1)
MONOCYTES NFR BLD: 8.8 % (ref 5–13)
NEUTS SEG # BLD: 4.73 K/UL (ref 1.8–8)
NEUTS SEG NFR BLD: 62.3 % (ref 32–75)
NRBC # BLD: 0 K/UL (ref 0–0.01)
NRBC BLD-RTO: 0 PER 100 WBC
PLATELET # BLD AUTO: 196 K/UL (ref 150–400)
POTASSIUM SERPL-SCNC: 4.6 MMOL/L (ref 3.5–5.1)
RBC # BLD AUTO: 5.1 M/UL (ref 4.1–5.7)
RBC MORPH BLD: ABNORMAL
SODIUM SERPL-SCNC: 138 MMOL/L (ref 136–145)
WBC # BLD AUTO: 7.6 K/UL (ref 4.1–11.1)

## 2025-05-23 PROCEDURE — 85652 RBC SED RATE AUTOMATED: CPT

## 2025-05-23 PROCEDURE — 6360000002 HC RX W HCPCS: Performed by: STUDENT IN AN ORGANIZED HEALTH CARE EDUCATION/TRAINING PROGRAM

## 2025-05-23 PROCEDURE — 85025 COMPLETE CBC W/AUTO DIFF WBC: CPT

## 2025-05-23 PROCEDURE — 96375 TX/PRO/DX INJ NEW DRUG ADDON: CPT

## 2025-05-23 PROCEDURE — 70450 CT HEAD/BRAIN W/O DYE: CPT

## 2025-05-23 PROCEDURE — 2580000003 HC RX 258: Performed by: STUDENT IN AN ORGANIZED HEALTH CARE EDUCATION/TRAINING PROGRAM

## 2025-05-23 PROCEDURE — 80048 BASIC METABOLIC PNL TOTAL CA: CPT

## 2025-05-23 PROCEDURE — 70498 CT ANGIOGRAPHY NECK: CPT

## 2025-05-23 PROCEDURE — 6360000004 HC RX CONTRAST MEDICATION: Performed by: STUDENT IN AN ORGANIZED HEALTH CARE EDUCATION/TRAINING PROGRAM

## 2025-05-23 PROCEDURE — 96374 THER/PROPH/DIAG INJ IV PUSH: CPT

## 2025-05-23 PROCEDURE — 99285 EMERGENCY DEPT VISIT HI MDM: CPT

## 2025-05-23 PROCEDURE — 36415 COLL VENOUS BLD VENIPUNCTURE: CPT

## 2025-05-23 RX ORDER — NAPROXEN 500 MG/1
500 TABLET ORAL 2 TIMES DAILY PRN
Qty: 60 TABLET | Refills: 0 | Status: SHIPPED | OUTPATIENT
Start: 2025-05-23

## 2025-05-23 RX ORDER — IOPAMIDOL 755 MG/ML
100 INJECTION, SOLUTION INTRAVASCULAR
Status: COMPLETED | OUTPATIENT
Start: 2025-05-23 | End: 2025-05-23

## 2025-05-23 RX ORDER — METOCLOPRAMIDE HYDROCHLORIDE 5 MG/ML
3.3 INJECTION INTRAMUSCULAR; INTRAVENOUS ONCE
Status: COMPLETED | OUTPATIENT
Start: 2025-05-23 | End: 2025-05-23

## 2025-05-23 RX ORDER — DEXAMETHASONE SODIUM PHOSPHATE 4 MG/ML
4 INJECTION, SOLUTION INTRA-ARTICULAR; INTRALESIONAL; INTRAMUSCULAR; INTRAVENOUS; SOFT TISSUE ONCE
Status: COMPLETED | OUTPATIENT
Start: 2025-05-23 | End: 2025-05-23

## 2025-05-23 RX ORDER — DIPHENHYDRAMINE HYDROCHLORIDE 50 MG/ML
25 INJECTION, SOLUTION INTRAMUSCULAR; INTRAVENOUS ONCE
Status: COMPLETED | OUTPATIENT
Start: 2025-05-23 | End: 2025-05-23

## 2025-05-23 RX ORDER — 0.9 % SODIUM CHLORIDE 0.9 %
1000 INTRAVENOUS SOLUTION INTRAVENOUS ONCE
Status: COMPLETED | OUTPATIENT
Start: 2025-05-23 | End: 2025-05-23

## 2025-05-23 RX ORDER — BUTALBITAL, ACETAMINOPHEN AND CAFFEINE 50; 325; 40 MG/1; MG/1; MG/1
1 TABLET ORAL EVERY 6 HOURS PRN
Qty: 30 TABLET | Refills: 0 | Status: SHIPPED | OUTPATIENT
Start: 2025-05-23 | End: 2025-05-27

## 2025-05-23 RX ORDER — KETOROLAC TROMETHAMINE 30 MG/ML
15 INJECTION, SOLUTION INTRAMUSCULAR; INTRAVENOUS ONCE
Status: COMPLETED | OUTPATIENT
Start: 2025-05-23 | End: 2025-05-23

## 2025-05-23 RX ADMIN — DIPHENHYDRAMINE HYDROCHLORIDE 25 MG: 50 INJECTION INTRAMUSCULAR; INTRAVENOUS at 14:52

## 2025-05-23 RX ADMIN — IOPAMIDOL 100 ML: 755 INJECTION, SOLUTION INTRAVENOUS at 16:00

## 2025-05-23 RX ADMIN — SODIUM CHLORIDE 1000 ML: 0.9 INJECTION, SOLUTION INTRAVENOUS at 14:46

## 2025-05-23 RX ADMIN — METOCLOPRAMIDE 3.5 MG: 5 INJECTION, SOLUTION INTRAMUSCULAR; INTRAVENOUS at 14:57

## 2025-05-23 RX ADMIN — KETOROLAC TROMETHAMINE 15 MG: 30 INJECTION, SOLUTION INTRAMUSCULAR at 14:53

## 2025-05-23 RX ADMIN — DEXAMETHASONE SODIUM PHOSPHATE 4 MG: 4 INJECTION INTRA-ARTICULAR; INTRALESIONAL; INTRAMUSCULAR; INTRAVENOUS; SOFT TISSUE at 14:54

## 2025-05-23 ASSESSMENT — PAIN - FUNCTIONAL ASSESSMENT
PAIN_FUNCTIONAL_ASSESSMENT: NONE - DENIES PAIN
PAIN_FUNCTIONAL_ASSESSMENT: 0-10

## 2025-05-23 ASSESSMENT — PAIN SCALES - GENERAL
PAINLEVEL_OUTOF10: 7
PAINLEVEL_OUTOF10: 0
PAINLEVEL_OUTOF10: 7

## 2025-05-23 ASSESSMENT — PAIN DESCRIPTION - DESCRIPTORS: DESCRIPTORS: ACHING

## 2025-05-23 ASSESSMENT — PAIN DESCRIPTION - ORIENTATION: ORIENTATION: RIGHT

## 2025-05-23 ASSESSMENT — PAIN DESCRIPTION - LOCATION: LOCATION: HEAD

## 2025-05-23 NOTE — DISCHARGE INSTRUCTIONS
Thank you for allowing us to provide you with medical care today.  We realize that you have many choices for your emergency care needs.  We thank you for choosing [facility].  Please choose us in the future for any continued health care needs.     Your workup here was reassuring.  CT imaging showed no acute process.  CT of the head showed no aneurysms.  Your headache improved with treatment.  Follow-up with your PCP.  Prescriptions were for headache medications for future use if you start having a headache again.  Recommend if you have the feeling of that you are getting a headache take naproxen and/or Fioricet and go lie down in a dark room and rest for about 30 minutes.     The exam and treatment you received in the Emergency Department were for an emergent problem and are not intended as complete care. It is important that you follow up with a doctor, nurse practitioner, or physician assistant for ongoing care. If your symptoms worsen or you do not improve as expected and you are unable to reach your usual health care provider, you should return to the Emergency Department. We are available 24 hours a day.      Please make an appointment with your healthcare provider(s) for follow up of your Emergency Department visit.  Take this sheet with you when you go to your follow-up visit.

## 2025-05-23 NOTE — ED PROVIDER NOTES
[facility] EMERGENCY DEPARTMENT  EMERGENCY DEPARTMENT ENCOUNTER      Pt Name: Wilber Booker  MRN: [id number]  Birthdate 1970  Date of evaluation: 5/23/2025  Provider: Alyssa Weiss MD    CHIEF COMPLAINT       Chief Complaint   Patient presents with    Headache         HISTORY OF PRESENT ILLNESS   (Location/Symptom, Timing/Onset, Context/Setting, Quality, Duration, Modifying Factors, Severity)  Note limiting factors.   The history is provided by the patient.     55-year-old male with a history of asthma, arthritis, chronic knee pain, CKD, gout, CHF, hypertension, polycystic kidney disease presenting for headache.  Reports yesterday around 7 AM he began having a sharp right-sided headache.  Reports he does not usually get headaches which is unusual for him.  Reports he took Tylenol yesterday without relief.  Reports his headache has not gone away despite taking pain medicine at home and resting.  Reports his headache was gradually worsening and severe causing him to leave work earlier.  Reports he has not had any vision changes, denies numbness or weakness, gait instability, confusion.  Patient reports no fevers or chills.  Reports the pain is on the right side behind his eye.  Reports no falls or head trauma.    Review of External Medical Records:         Nursing Notes were reviewed.      REVIEW OF SYSTEMS    (2-9 systems for level 4, 10 or more for level 5)     Except as noted above the remainder of the review of systems was reviewed and negative.       PAST MEDICAL HISTORY     Past Medical History:   Diagnosis Date    Arthritis     GOUT    Asthma     AS A CHILD    Chronic kidney disease     ESRD    Chronic pain     KNEES, ANKLES    DJD (degenerative joint disease)     Gout     Heart failure (HCC) 2019    CHF    Hypertension     Polycystic kidney disease          SURGICAL HISTORY       Past Surgical History:   Procedure Laterality Date    HERNIA REPAIR  06/02/2019    Supra umbilical hernia with  mesh and Umbilical hernia repair by Dr. Westbrook.    VASCULAR SURGERY  2021    RIGHT ARM  FISTULA    VASCULAR SURGERY  10/18/2021    LEFT ARM FISTULA (NEVER FUNCTIONED)         CURRENT MEDICATIONS       Discharge Medication List as of 5/23/2025  4:31 PM        CONTINUE these medications which have NOT CHANGED    Details   metoprolol tartrate (LOPRESSOR) 50 MG tablet Take 1 tablet by mouth 2 times daily (Please schedule follow up with Dr. Gutierrez), Disp-180 tablet, R-0Normal      aspirin 81 MG chewable tablet Take 1 tablet by mouth daily, Disp-90 tablet, R-3Normal      acetaminophen (TYLENOL) 325 MG tablet Take by mouth every 4 hours as neededHistorical Med      bumetanide (BUMEX) 1 MG tablet Take by mouth dailyHistorical Med      colchicine (COLCRYS) 0.6 MG tablet TAKE ONE TABLET BY MOUTH DAILY AS NEEDED FOR GOUT FLAREHistorical Med      febuxostat (ULORIC) 40 MG TABS tablet Take by mouth dailyHistorical Med      leflunomide (ARAVA) 10 MG tablet Take by mouth dailyHistorical Med      sodium bicarbonate 325 MG tablet Take by mouth 2 times dailyHistorical Med      spironolactone (ALDACTONE) 25 MG tablet Take by mouth dailyHistorical Med             ALLERGIES     Shellfish allergy    FAMILY HISTORY       Family History   Problem Relation Age of Onset    Cancer Brother         PROSTATE    Cancer Brother         PROSTATE    Other Sister         PKD, Fibromyalgia    Cancer Father         REANAL CANCER    Kidney Disease Father     Hypertension Father     Kidney Disease Mother     Hypertension Mother     Anesth Problems Neg Hx           SOCIAL HISTORY       Social History     Socioeconomic History    Marital status: Single   Tobacco Use    Smoking status: Never    Smokeless tobacco: Never   Substance and Sexual Activity    Alcohol use: Not Currently    Drug use: Yes     Types: Marijuana (Weed)           PHYSICAL EXAM    (up to 7 for level 4, 8 or more for level 5)     ED Triage Vitals [05/23/25 1258]   BP Systolic BP Percentile

## 2025-05-23 NOTE — ED TRIAGE NOTES
Patient arrived ambulatory via POV with cc headache behind right eye that started yesterday at 0700. Patient reports he does not normally get headaches. Denies vision changes, numbness/tingling, unilateral weakness. Denies n/v, denies recent trauma/injury.     Tylenol this AM without relief     Hx polycystic kidney disease with transplant 1 year ago, hypertension. - compliant with meds per patient.

## 2025-05-27 RX ORDER — BUTALBITAL, ACETAMINOPHEN AND CAFFEINE 50; 325; 40 MG/1; MG/1; MG/1
1 TABLET ORAL EVERY 6 HOURS PRN
Qty: 30 TABLET | Refills: 0 | Status: SHIPPED | OUTPATIENT
Start: 2025-05-27 | End: 2025-06-26

## (undated) DEVICE — COVER,MAYO STAND,STERILE: Brand: MEDLINE

## (undated) DEVICE — SOLUTION IRRIG 1000ML 0.9% SOD CHL USP POUR PLAS BTL

## (undated) DEVICE — GLOVE ORANGE PI 7   MSG9070

## (undated) DEVICE — SUTURE PERMAHAND SZ 2-0 L30IN NONABSORBABLE BLK SILK W/O A305H

## (undated) DEVICE — KENDALL SCD EXPRESS SLEEVES, KNEE LENGTH, MEDIUM: Brand: KENDALL SCD

## (undated) DEVICE — SOLUTION IRRIG 1000ML STRL H2O USP PLAS POUR BTL

## (undated) DEVICE — SPONGE LAP 18X18IN STRL -- 5/PK

## (undated) DEVICE — OPEN-END URETERAL CATHETER: Brand: COOK

## (undated) DEVICE — SOLUTION IRRIG 1000ML H2O STRL BLT

## (undated) DEVICE — ROCKER SWITCH PENCIL BLADE ELECTRODE, HOLSTER: Brand: EDGE

## (undated) DEVICE — PENCIL SMK EVAC 10 FT BLADE ELECTRD ROCKER FOR TELSCP

## (undated) DEVICE — REM POLYHESIVE ADULT PATIENT RETURN ELECTRODE: Brand: VALLEYLAB

## (undated) DEVICE — GARMENT,MEDLINE,DVT,INT,CALF,MED, GEN2: Brand: MEDLINE

## (undated) DEVICE — STRAP,POSITIONING,KNEE/BODY,FOAM,4X60": Brand: MEDLINE

## (undated) DEVICE — SYR 10ML LUER LOK 1/5ML GRAD --

## (undated) DEVICE — SUTURE VCRL SZ 3-0 L27IN ABSRB UD L26MM SH 1/2 CIR J416H

## (undated) DEVICE — SYRINGE MED 20ML STD CLR PLAS LUERLOCK TIP N CTRL DISP

## (undated) DEVICE — SUTURE PERMAHAND SZ 0 L30IN NONABSORBABLE BLK SILK BRAID A306H

## (undated) DEVICE — SPONGE GZ W4XL4IN COT 12 PLY TYP VII WVN C FLD DSGN

## (undated) DEVICE — CYSTO-SMH: Brand: MEDLINE INDUSTRIES, INC.

## (undated) DEVICE — Device

## (undated) DEVICE — SUT PROL 6-0 18IN BV1 DA BLU --

## (undated) DEVICE — SUTURE PDS II SZ 1 L27IN ABSRB VLT CT-1 L36MM 1/2 CIR Z341H

## (undated) DEVICE — HANDLE LT SNAP ON ULT DURABLE LENS FOR TRUMPF ALC DISPOSABLE

## (undated) DEVICE — 3M™ TEGADERM™ TRANSPARENT FILM DRESSING FRAME STYLE, 1626W, 4 IN X 4-3/4 IN (10 CM X 12 CM), 50/CT 4CT/CASE: Brand: 3M™ TEGADERM™

## (undated) DEVICE — SURGICAL PROCEDURE PACK BASIN MAJ SET CUST NO CAUT

## (undated) DEVICE — YANKAUER,TAPERED BULBOUS TIP,W/O VENT: Brand: MEDLINE

## (undated) DEVICE — SUTURE VCRL SZ 3-0 L27IN ABSRB UD FS-2 L19MM 1/2 CIR J423H

## (undated) DEVICE — STERILE POLYISOPRENE POWDER-FREE SURGICAL GLOVES WITH EMOLLIENT COATING: Brand: PROTEXIS

## (undated) DEVICE — SOL INJ SOD CL 0.9% 500ML BG --

## (undated) DEVICE — CLIP INT SM WIDE RED TI TRNSVRS GRV CHEVRON SHP W/ PRECIS

## (undated) DEVICE — DRAPE,EXTREMITY,89X128,STERILE: Brand: MEDLINE

## (undated) DEVICE — GDWIRE UROL STR 150CM FLX TP -- BX/5 SENSOR

## (undated) DEVICE — INTENDED FOR TISSUE SEPARATION, AND OTHER PROCEDURES THAT REQUIRE A SHARP SURGICAL BLADE TO PUNCTURE OR CUT.: Brand: BARD-PARKER ® CARBON RIB-BACK BLADES

## (undated) DEVICE — DRAPE,REIN 53X77,STERILE: Brand: MEDLINE

## (undated) DEVICE — MINOR BASIN -SMH: Brand: MEDLINE INDUSTRIES, INC.

## (undated) DEVICE — CYSTO/BLADDER IRRIGATION SET, REGULATING CLAMP

## (undated) DEVICE — NEEDLE HYPO 22GA L1.5IN BLK S STL HUB POLYPR SHLD REG BVL

## (undated) DEVICE — BLADE ASSEMB CLP HAIR FINE --

## (undated) DEVICE — DBD-PACK,LAPAROTOMY,2 REINFORCED GOWNS: Brand: MEDLINE

## (undated) DEVICE — CLIP LIG M BLU TI HRT SHP WIRE HORZ 180 PER BX

## (undated) DEVICE — SOLUTION IRRIGATION H2O 0797305] ICU MEDICAL INC]

## (undated) DEVICE — APPLICATOR BNDG 1MM ADH PREMIERPRO EXOFIN

## (undated) DEVICE — TROCAR SITE CLOSURE DEVICE: Brand: ENDO CLOSE

## (undated) DEVICE — PREP SKN CHLRAPRP APL 26ML STR --

## (undated) DEVICE — INFECTION CONTROL KIT SYS

## (undated) DEVICE — (D)PREP SKN CHLRAPRP APPL 26ML -- CONVERT TO ITEM 371833

## (undated) DEVICE — SUTURE MCRYL SZ 4-0 L27IN ABSRB UD L19MM PS-2 1/2 CIR PRIM Y426H

## (undated) DEVICE — GLOVE ORANGE PI 7 1/2   MSG9075

## (undated) DEVICE — SUTURE PDS II SZ 0 L27IN ABSRB VLT L26MM CT-2 1/2 CIR Z334H

## (undated) DEVICE — TOWEL SURG W17XL27IN STD BLU COT NONFENESTRATED PREWASHED